# Patient Record
Sex: MALE | Race: WHITE | NOT HISPANIC OR LATINO | Employment: OTHER | URBAN - METROPOLITAN AREA
[De-identification: names, ages, dates, MRNs, and addresses within clinical notes are randomized per-mention and may not be internally consistent; named-entity substitution may affect disease eponyms.]

---

## 2017-01-13 ENCOUNTER — LAB VISIT (OUTPATIENT)
Dept: LAB | Facility: HOSPITAL | Age: 82
End: 2017-01-13
Attending: INTERNAL MEDICINE
Payer: MEDICARE

## 2017-01-13 DIAGNOSIS — E78.5 HYPERLIPIDEMIA: Chronic | ICD-10-CM

## 2017-01-13 LAB
ALBUMIN SERPL BCP-MCNC: 3.9 G/DL
ALP SERPL-CCNC: 42 U/L
ALT SERPL W/O P-5'-P-CCNC: 32 U/L
ANION GAP SERPL CALC-SCNC: 9 MMOL/L
AST SERPL-CCNC: 32 U/L
BILIRUB SERPL-MCNC: 0.9 MG/DL
BUN SERPL-MCNC: 25 MG/DL
CALCIUM SERPL-MCNC: 9.6 MG/DL
CHLORIDE SERPL-SCNC: 107 MMOL/L
CHOLEST/HDLC SERPL: 4.6 {RATIO}
CO2 SERPL-SCNC: 26 MMOL/L
CREAT SERPL-MCNC: 1.3 MG/DL
EST. GFR  (AFRICAN AMERICAN): 59.2 ML/MIN/1.73 M^2
EST. GFR  (NON AFRICAN AMERICAN): 51.2 ML/MIN/1.73 M^2
GLUCOSE SERPL-MCNC: 122 MG/DL
HDL/CHOLESTEROL RATIO: 21.7 %
HDLC SERPL-MCNC: 152 MG/DL
HDLC SERPL-MCNC: 33 MG/DL
LDLC SERPL CALC-MCNC: 85.6 MG/DL
NONHDLC SERPL-MCNC: 119 MG/DL
POTASSIUM SERPL-SCNC: 3.9 MMOL/L
PROT SERPL-MCNC: 7.4 G/DL
SODIUM SERPL-SCNC: 142 MMOL/L
TRIGL SERPL-MCNC: 167 MG/DL

## 2017-01-13 PROCEDURE — 80061 LIPID PANEL: CPT

## 2017-01-13 PROCEDURE — 36415 COLL VENOUS BLD VENIPUNCTURE: CPT | Mod: PO

## 2017-01-13 PROCEDURE — 80053 COMPREHEN METABOLIC PANEL: CPT

## 2017-01-13 RX ORDER — HYDROCHLOROTHIAZIDE 25 MG/1
TABLET ORAL
Qty: 90 TABLET | Refills: 3 | Status: SHIPPED | OUTPATIENT
Start: 2017-01-13 | End: 2018-02-11 | Stop reason: SDUPTHER

## 2017-01-16 ENCOUNTER — TELEPHONE (OUTPATIENT)
Dept: CARDIOLOGY | Facility: CLINIC | Age: 82
End: 2017-01-16

## 2017-01-16 NOTE — TELEPHONE ENCOUNTER
----- Message from Nereyda Birmingham MD sent at 1/13/2017  5:48 PM CST -----  Please review.  We will discuss the results during your upcoming visit with me. It is ok,  But it could be better

## 2017-01-20 ENCOUNTER — OFFICE VISIT (OUTPATIENT)
Dept: CARDIOLOGY | Facility: CLINIC | Age: 82
End: 2017-01-20
Payer: MEDICARE

## 2017-01-20 VITALS
WEIGHT: 172.06 LBS | HEART RATE: 106 BPM | DIASTOLIC BLOOD PRESSURE: 93 MMHG | BODY MASS INDEX: 27.65 KG/M2 | SYSTOLIC BLOOD PRESSURE: 150 MMHG | HEIGHT: 66 IN

## 2017-01-20 DIAGNOSIS — Z87.891 EX-SMOKER: ICD-10-CM

## 2017-01-20 DIAGNOSIS — E88.810 METABOLIC SYNDROME: Chronic | ICD-10-CM

## 2017-01-20 DIAGNOSIS — I10 ESSENTIAL HYPERTENSION: Primary | Chronic | ICD-10-CM

## 2017-01-20 DIAGNOSIS — I48.19 PERSISTENT ATRIAL FIBRILLATION: Chronic | ICD-10-CM

## 2017-01-20 DIAGNOSIS — E78.2 MIXED HYPERLIPIDEMIA: Chronic | ICD-10-CM

## 2017-01-20 DIAGNOSIS — N18.2: Chronic | ICD-10-CM

## 2017-01-20 DIAGNOSIS — Z79.01 CHRONIC ANTICOAGULATION: ICD-10-CM

## 2017-01-20 PROCEDURE — 99214 OFFICE O/P EST MOD 30 MIN: CPT | Mod: S$PBB,,, | Performed by: INTERNAL MEDICINE

## 2017-01-20 PROCEDURE — 99999 PR PBB SHADOW E&M-EST. PATIENT-LVL III: CPT | Mod: PBBFAC,,, | Performed by: INTERNAL MEDICINE

## 2017-01-20 PROCEDURE — 99213 OFFICE O/P EST LOW 20 MIN: CPT | Mod: PBBFAC,PO | Performed by: INTERNAL MEDICINE

## 2017-01-20 RX ORDER — METOPROLOL SUCCINATE 50 MG/1
TABLET, EXTENDED RELEASE ORAL
Qty: 45 TABLET | Refills: 6 | Status: SHIPPED | OUTPATIENT
Start: 2017-01-20 | End: 2018-02-11 | Stop reason: SDUPTHER

## 2017-01-20 RX ORDER — METOPROLOL SUCCINATE 50 MG/1
50 TABLET, EXTENDED RELEASE ORAL DAILY
COMMUNITY
End: 2017-01-20 | Stop reason: SDUPTHER

## 2017-01-20 NOTE — PROGRESS NOTES
"Subjective:   Patient ID:  Cliff Magaña is a 81 y.o. male who presents for follow-up of Hyperlipidemia and Hypertension      HPI: Patient is here for follow-up of elevated blood pressure. He is not exercising and is not adherent to a low-salt diet. Patient denies chest pain, dyspnea, palpitations, lower extremity edema and palpitations.  Today BP and pulse are higher than expected.        Lab Results   Component Value Date     01/13/2017    K 3.9 01/13/2017     01/13/2017    CO2 26 01/13/2017    BUN 25 (H) 01/13/2017    CREATININE 1.3 01/13/2017     (H) 01/13/2017    HGBA1C 6.3 (H) 11/10/2016    AST 32 01/13/2017    ALT 32 01/13/2017    ALBUMIN 3.9 01/13/2017    PROT 7.4 01/13/2017    BILITOT 0.9 01/13/2017    WBC 7.78 11/10/2016    HGB 16.2 11/10/2016    HCT 46.4 11/10/2016    MCV 92 11/10/2016     11/10/2016    INR 2.8 12/17/2013    INR 1.8 (H) 06/07/2011    PSA 0.43 06/06/2013    TSH 2.036 11/10/2016    CHOL 152 01/13/2017    HDL 33 (L) 01/13/2017    LDLCALC 85.6 01/13/2017    TRIG 167 (H) 01/13/2017       Review of Systems   Constitution: Negative.   HENT: Negative.    Eyes: Negative.    Cardiovascular: Positive for irregular heartbeat. Negative for chest pain, claudication, dyspnea on exertion, leg swelling, near-syncope, palpitations and syncope.   Respiratory: Negative.  Negative for cough, shortness of breath, snoring and wheezing.    Endocrine: Negative.  Negative for cold intolerance, heat intolerance, polydipsia, polyphagia and polyuria.   Skin: Negative.    Musculoskeletal: Negative.    Gastrointestinal: Positive for heartburn.   Genitourinary: Positive for bladder incontinence and frequency.   Neurological: Negative.    Psychiatric/Behavioral: Negative.        Objective:   Physical Exam   Constitutional: He is oriented to person, place, and time. He appears well-developed and well-nourished.   BP (!) 150/93  Pulse 106  Ht 5' 5.5" (1.664 m)  Wt 78 kg (172 lb 1.1 oz)  BMI 28.2 " kg/m2     HENT:   Head: Normocephalic.   Eyes: Pupils are equal, round, and reactive to light.   Neck: Normal range of motion. Neck supple. Carotid bruit is not present. No thyromegaly present.   Cardiovascular: Normal heart sounds and intact distal pulses.  An irregularly irregular rhythm present. Tachycardia present.  Exam reveals no gallop and no friction rub.    No murmur heard.  Pulses:       Carotid pulses are 2+ on the right side, and 2+ on the left side.       Radial pulses are 2+ on the right side, and 2+ on the left side.        Femoral pulses are 2+ on the right side, and 2+ on the left side.       Popliteal pulses are 2+ on the right side, and 2+ on the left side.        Dorsalis pedis pulses are 2+ on the right side, and 2+ on the left side.        Posterior tibial pulses are 2+ on the right side, and 2+ on the left side.   Pulmonary/Chest: Effort normal and breath sounds normal. No respiratory distress. He has no wheezes. He has no rales. He exhibits no tenderness.   Abdominal: Soft. Bowel sounds are normal.   Musculoskeletal: Normal range of motion. He exhibits no edema.   Neurological: He is alert and oriented to person, place, and time.   Skin: Skin is warm and dry.   Psychiatric: He has a normal mood and affect.   Nursing note and vitals reviewed.      Current Outpatient Prescriptions   Medication Sig    aspirin (ECOTRIN) 81 MG EC tablet Take 81 mg by mouth once daily.    fenofibrate micronized (LOFIBRA) 200 MG Cap TAKE 1 CAPSULE DAILY WITH BREAKFAST    hydrochlorothiazide (HYDRODIURIL) 25 MG tablet TAKE 1 TABLET DAILY    krill-om-3-dha-epa-phospho-ast (MEGARED OMEGA-3 KRILL OIL) 1,000-230-60 mg Cap Take by mouth once daily. Pt taking one pill once a day    loratadine (CLARITIN) 10 mg tablet Take 10 mg by mouth once daily.    TRAMAINE BIOTIN ORAL Take 5,000 mcg by mouth once daily.    metoprolol succinate (TOPROL XL) 50 MG 24 hr tablet Pt to take 1 1/2 tb daily.    multivitamin-minerals-lutein  (CENTRUM SILVER) Tab Take by mouth. 1 Tablet Oral Every day    XARELTO 20 mg Tab TAKE 1 TABLET DAILY     No current facility-administered medications for this visit.        Assessment:     1. Essential hypertension    2. Persistent atrial fibrillation    3. Mixed hyperlipidemia    4. Metabolic syndrome    5. Chronic renal disease, stage 2 (mild)    6. Chronic anticoagulation    7. Ex-smoker        Plan:     Essential hypertension  -     metoprolol succinate (TOPROL XL) 50 MG 24 hr tablet; Pt to take 1 1/2 tb daily.  Dispense: 45 tablet; Refill: 6  -     TSH; Future; Expected date: 1/20/17  -     Comprehensive metabolic panel; Future; Expected date: 7/22/17  -     Lipid panel; Future; Expected date: 7/22/17    Persistent atrial fibrillation  -     metoprolol succinate (TOPROL XL) 50 MG 24 hr tablet; Pt to take 1 1/2 tb daily.  Dispense: 45 tablet; Refill: 6  -     TSH; Future; Expected date: 1/20/17  -     Comprehensive metabolic panel; Future; Expected date: 7/22/17  -     Lipid panel; Future; Expected date: 7/22/17    Mixed hyperlipidemia  -     metoprolol succinate (TOPROL XL) 50 MG 24 hr tablet; Pt to take 1 1/2 tb daily.  Dispense: 45 tablet; Refill: 6  -     TSH; Future; Expected date: 1/20/17  -     Comprehensive metabolic panel; Future; Expected date: 7/22/17  -     Lipid panel; Future; Expected date: 7/22/17    Metabolic syndrome  -     metoprolol succinate (TOPROL XL) 50 MG 24 hr tablet; Pt to take 1 1/2 tb daily.  Dispense: 45 tablet; Refill: 6  -     TSH; Future; Expected date: 1/20/17  -     Comprehensive metabolic panel; Future; Expected date: 7/22/17  -     Lipid panel; Future; Expected date: 7/22/17    Chronic renal disease, stage 2 (mild)    Chronic anticoagulation    Ex-smoker      Increase Metoprolol to 75 mg daily.  In the past anaphylactic reaction to Niacin.  May need to restart Amlodipine or consider ARB  Orders Placed This Encounter   Procedures    TSH    Comprehensive metabolic panel     Lipid panel     Return in about 6 months (around 7/20/2017).

## 2017-01-20 NOTE — MR AVS SNAPSHOT
[facility]   [facility]  [place] [address]  Phone: [phone]                  Cliff Magaña   2017 11:30 AM   Office Visit    Description:  Male : 1935   Provider:  Nereyda Birmingham MD   Department:  [facility]           Reason for Visit     Hyperlipidemia     Hypertension                To Do List           Goals (5 Years of Data)     None      [facility]     [facility] On Call Nurse Care Line -  Assistance  Registered nurses in the [facility] On Call Center provide clinical advisement, health education, appointment booking, and other advisory services.  Call for this free service at [phone].             Medications           Message regarding Medications     Verify the changes and/or additions to your medication regime listed below are the same as discussed with your clinician today.  If any of these changes or additions are incorrect, please notify your healthcare provider.        STOP taking these medications     UNABLE TO FIND Take 5,000 mcg by mouth once daily. Biotin 5000mcg once a day           Verify that the below list of medications is an accurate representation of the medications you are currently taking.  If none reported, the list may be blank. If incorrect, please contact your healthcare provider. Carry this list with you in case of emergency.           Current Medications     aspirin (ECOTRIN) 81 MG EC tablet Take 81 mg by mouth once daily.    fenofibrate micronized (LOFIBRA) 200 MG Cap TAKE 1 CAPSULE DAILY WITH BREAKFAST    hydrochlorothiazide (HYDRODIURIL) 25 MG tablet TAKE 1 TABLET DAILY    krill-om-3-dha-epa-phospho-ast (MEGARED OMEGA-3 KRILL OIL) 1,000-230-60 mg Cap Take by mouth once daily. Pt taking one pill once a day    loratadine (CLARITIN) 10 mg tablet Take 10 mg by mouth once daily.    TRAMAINE BIOTIN ORAL Take 5,000 mcg by mouth once daily.    multivitamin-minerals-lutein (CENTRUM SILVER) Tab Take by mouth. 1  "Tablet Oral Every day    TOPROL XL 50 mg 24 hr tablet TAKE 1 TABLET DAILY    XARELTO 20 mg Tab TAKE 1 TABLET DAILY           Clinical Reference Information           Vital Signs - Last Recorded  Most recent update: 1/20/2017 11:37 AM by Radha Mccormack MA    BP Pulse Ht Wt BMI    (!) 150/93 106 5' 5.5" (1.664 m) 78 kg (172 lb 1.1 oz) 28.2 kg/m2      Blood Pressure          Most Recent Value    Right Arm BP - Sitting  150/93    Left Arm BP - Sitting  142/84    BP  (!)  150/93      Allergies as of 1/20/2017     Niacin      Immunizations Administered on Date of Encounter - 1/20/2017     None      "

## 2017-02-13 RX ORDER — FENOFIBRATE 200 MG/1
CAPSULE ORAL
Qty: 90 CAPSULE | Refills: 3 | Status: SHIPPED | OUTPATIENT
Start: 2017-02-13 | End: 2019-01-14 | Stop reason: SDUPTHER

## 2017-03-08 ENCOUNTER — PATIENT MESSAGE (OUTPATIENT)
Dept: CARDIOLOGY | Facility: CLINIC | Age: 82
End: 2017-03-08

## 2017-04-21 ENCOUNTER — TELEPHONE (OUTPATIENT)
Dept: INTERNAL MEDICINE | Facility: CLINIC | Age: 82
End: 2017-04-21

## 2017-04-21 DIAGNOSIS — I10 ESSENTIAL HYPERTENSION: Chronic | ICD-10-CM

## 2017-04-21 DIAGNOSIS — Z87.891 EX-SMOKER: ICD-10-CM

## 2017-04-21 DIAGNOSIS — N18.2: Chronic | ICD-10-CM

## 2017-04-21 DIAGNOSIS — I48.19 PERSISTENT ATRIAL FIBRILLATION: Chronic | ICD-10-CM

## 2017-04-21 DIAGNOSIS — E78.2 MIXED HYPERLIPIDEMIA: Chronic | ICD-10-CM

## 2017-04-21 DIAGNOSIS — Z79.01 CHRONIC ANTICOAGULATION: ICD-10-CM

## 2017-04-21 DIAGNOSIS — Z83.3 FAMILY HISTORY OF DIABETES MELLITUS: ICD-10-CM

## 2017-04-21 DIAGNOSIS — E88.810 METABOLIC SYNDROME: Chronic | ICD-10-CM

## 2017-04-21 DIAGNOSIS — Z00.00 WELL ADULT EXAM: Primary | ICD-10-CM

## 2017-04-21 NOTE — TELEPHONE ENCOUNTER
----- Message from Mila Chinchilla sent at 4/21/2017  9:14 AM CDT -----  Contact: fyi  Doctor appointment and lab have been scheduled.  Please link lab orders to the lab appointment.  Date of doctor appointment:  05/18/17  Physical or EP:  epp  Date of lab appointment:  05/12/17  Comments:

## 2017-05-12 ENCOUNTER — LAB VISIT (OUTPATIENT)
Dept: LAB | Facility: HOSPITAL | Age: 82
End: 2017-05-12
Attending: FAMILY MEDICINE
Payer: MEDICARE

## 2017-05-12 DIAGNOSIS — E88.810 METABOLIC SYNDROME: Chronic | ICD-10-CM

## 2017-05-12 DIAGNOSIS — Z83.3 FAMILY HISTORY OF DIABETES MELLITUS: ICD-10-CM

## 2017-05-12 DIAGNOSIS — E78.2 MIXED HYPERLIPIDEMIA: Chronic | ICD-10-CM

## 2017-05-12 DIAGNOSIS — Z00.00 WELL ADULT EXAM: ICD-10-CM

## 2017-05-12 DIAGNOSIS — I48.19 PERSISTENT ATRIAL FIBRILLATION: Chronic | ICD-10-CM

## 2017-05-12 DIAGNOSIS — Z87.891 EX-SMOKER: ICD-10-CM

## 2017-05-12 DIAGNOSIS — Z79.01 CHRONIC ANTICOAGULATION: ICD-10-CM

## 2017-05-12 DIAGNOSIS — I10 ESSENTIAL HYPERTENSION: Chronic | ICD-10-CM

## 2017-05-12 DIAGNOSIS — N18.2: Chronic | ICD-10-CM

## 2017-05-12 LAB
ALBUMIN SERPL BCP-MCNC: 3.8 G/DL
ALP SERPL-CCNC: 44 U/L
ALT SERPL W/O P-5'-P-CCNC: 27 U/L
ANION GAP SERPL CALC-SCNC: 10 MMOL/L
ANISOCYTOSIS BLD QL SMEAR: SLIGHT
AST SERPL-CCNC: 27 U/L
BASOPHILS # BLD AUTO: 0.02 K/UL
BASOPHILS NFR BLD: 0.3 %
BILIRUB SERPL-MCNC: 0.9 MG/DL
BUN SERPL-MCNC: 21 MG/DL
CALCIUM SERPL-MCNC: 9.3 MG/DL
CHLORIDE SERPL-SCNC: 107 MMOL/L
CHOLEST/HDLC SERPL: 4 {RATIO}
CO2 SERPL-SCNC: 25 MMOL/L
CREAT SERPL-MCNC: 1.5 MG/DL
DIFFERENTIAL METHOD: ABNORMAL
EOSINOPHIL # BLD AUTO: 0.3 K/UL
EOSINOPHIL NFR BLD: 3.4 %
ERYTHROCYTE [DISTWIDTH] IN BLOOD BY AUTOMATED COUNT: 13.8 %
EST. GFR  (AFRICAN AMERICAN): 49.8 ML/MIN/1.73 M^2
EST. GFR  (NON AFRICAN AMERICAN): 43 ML/MIN/1.73 M^2
GLUCOSE SERPL-MCNC: 128 MG/DL
HCT VFR BLD AUTO: 46.1 %
HDL/CHOLESTEROL RATIO: 24.8 %
HDLC SERPL-MCNC: 129 MG/DL
HDLC SERPL-MCNC: 32 MG/DL
HGB BLD-MCNC: 16.5 G/DL
LDLC SERPL CALC-MCNC: 72 MG/DL
LYMPHOCYTES # BLD AUTO: 2.9 K/UL
LYMPHOCYTES NFR BLD: 37.8 %
MCH RBC QN AUTO: 32.2 PG
MCHC RBC AUTO-ENTMCNC: 35.8 %
MCV RBC AUTO: 90 FL
MONOCYTES # BLD AUTO: 0.7 K/UL
MONOCYTES NFR BLD: 9.5 %
NEUTROPHILS # BLD AUTO: 3.7 K/UL
NEUTROPHILS NFR BLD: 48.6 %
NONHDLC SERPL-MCNC: 97 MG/DL
PLATELET # BLD AUTO: 188 K/UL
PLATELET BLD QL SMEAR: ABNORMAL
PMV BLD AUTO: 13.5 FL
POTASSIUM SERPL-SCNC: 3.4 MMOL/L
PROT SERPL-MCNC: 7.2 G/DL
RBC # BLD AUTO: 5.13 M/UL
SODIUM SERPL-SCNC: 142 MMOL/L
T4 FREE SERPL-MCNC: 1 NG/DL
TRIGL SERPL-MCNC: 125 MG/DL
TSH SERPL DL<=0.005 MIU/L-ACNC: 2.24 UIU/ML
WBC # BLD AUTO: 7.56 K/UL

## 2017-05-12 PROCEDURE — 36415 COLL VENOUS BLD VENIPUNCTURE: CPT | Mod: PO

## 2017-05-12 PROCEDURE — 84439 ASSAY OF FREE THYROXINE: CPT

## 2017-05-12 PROCEDURE — 80053 COMPREHEN METABOLIC PANEL: CPT

## 2017-05-12 PROCEDURE — 80061 LIPID PANEL: CPT

## 2017-05-12 PROCEDURE — 85025 COMPLETE CBC W/AUTO DIFF WBC: CPT

## 2017-05-12 PROCEDURE — 84443 ASSAY THYROID STIM HORMONE: CPT

## 2017-05-18 ENCOUNTER — OFFICE VISIT (OUTPATIENT)
Dept: INTERNAL MEDICINE | Facility: CLINIC | Age: 82
End: 2017-05-18
Payer: MEDICARE

## 2017-05-18 VITALS
SYSTOLIC BLOOD PRESSURE: 134 MMHG | DIASTOLIC BLOOD PRESSURE: 82 MMHG | RESPIRATION RATE: 14 BRPM | TEMPERATURE: 98 F | HEIGHT: 66 IN | HEART RATE: 69 BPM | WEIGHT: 171.75 LBS | BODY MASS INDEX: 27.6 KG/M2

## 2017-05-18 DIAGNOSIS — I10 ESSENTIAL HYPERTENSION: Chronic | ICD-10-CM

## 2017-05-18 DIAGNOSIS — Z87.891 EX-SMOKER: ICD-10-CM

## 2017-05-18 DIAGNOSIS — Z00.00 WELL ADULT EXAM: Primary | ICD-10-CM

## 2017-05-18 DIAGNOSIS — E88.810 METABOLIC SYNDROME: Chronic | ICD-10-CM

## 2017-05-18 DIAGNOSIS — E78.2 MIXED HYPERLIPIDEMIA: Chronic | ICD-10-CM

## 2017-05-18 DIAGNOSIS — Z79.01 CHRONIC ANTICOAGULATION: ICD-10-CM

## 2017-05-18 DIAGNOSIS — I48.19 PERSISTENT ATRIAL FIBRILLATION: Chronic | ICD-10-CM

## 2017-05-18 DIAGNOSIS — Z83.3 FAMILY HISTORY OF DIABETES MELLITUS: ICD-10-CM

## 2017-05-18 DIAGNOSIS — N18.3 CHRONIC RENAL DISEASE, STAGE 3 (MODERATE): Chronic | ICD-10-CM

## 2017-05-18 PROCEDURE — 99215 OFFICE O/P EST HI 40 MIN: CPT | Mod: S$PBB,,, | Performed by: FAMILY MEDICINE

## 2017-05-18 PROCEDURE — 99213 OFFICE O/P EST LOW 20 MIN: CPT | Mod: PBBFAC,PO | Performed by: FAMILY MEDICINE

## 2017-05-18 PROCEDURE — 99999 PR PBB SHADOW E&M-EST. PATIENT-LVL III: CPT | Mod: PBBFAC,,, | Performed by: FAMILY MEDICINE

## 2017-05-18 NOTE — PROGRESS NOTES
Subjective:       Patient ID: Cliff Magaña is a 81 y.o. male.    Chief Complaint: Annual Exam    HPI 81-year-old white male presents to clinic today for annual physical exam.  He continues to be followed by cardiology for treatment of hypertension and persistent atrial fibrillation which is well-controlled on metoprolol 50 mg daily, hydrochlorothiazide 25 mg daily, and anticoagulation with Xarelto and aspirin.  He continues to have well-controlled hyperlipidemia on fenofibrate and fish oil.  Stage III chronic kidney disease remain stable.  He has a past surgical history of cholecystectomy, tonsillectomy, and cataract repair.  He has a family history of his parents passing away in a motor vehicle collision in his maternal aunt passed away from diabetes.  His cardiac vascular risk factors include male gender, hypertension, cardiovascular disease, and hyperlipidemia.  He is up-to-date with all vaccinations.  Review of Systems   Constitutional: Negative for appetite change, chills, fatigue and fever.   HENT: Negative for congestion, ear pain, hearing loss, postnasal drip, rhinorrhea, sinus pressure, sore throat and tinnitus.    Eyes: Negative for redness, itching and visual disturbance.   Respiratory: Negative for cough, chest tightness and shortness of breath.    Cardiovascular: Negative for chest pain and palpitations.   Gastrointestinal: Negative for abdominal pain, constipation, diarrhea, nausea and vomiting.   Genitourinary: Negative for decreased urine volume, difficulty urinating, dysuria, frequency, hematuria and urgency.   Musculoskeletal: Negative for back pain, myalgias, neck pain and neck stiffness.   Skin: Negative for rash.   Neurological: Negative for dizziness, light-headedness and headaches.   Psychiatric/Behavioral: Negative.        Objective:      Physical Exam   Constitutional: He is oriented to person, place, and time. He appears well-developed and well-nourished. No distress.   HENT:   Head:  Normocephalic and atraumatic.   Right Ear: External ear normal.   Left Ear: External ear normal.   Nose: Nose normal.   Mouth/Throat: Oropharynx is clear and moist. No oropharyngeal exudate.   Eyes: Conjunctivae and EOM are normal. Pupils are equal, round, and reactive to light. Right eye exhibits no discharge. Left eye exhibits no discharge. No scleral icterus.   Neck: Normal range of motion. Neck supple. No JVD present. No tracheal deviation present. No thyromegaly present.   Cardiovascular: Normal rate, regular rhythm, normal heart sounds and intact distal pulses.  Exam reveals no gallop and no friction rub.    No murmur heard.  Pulmonary/Chest: Effort normal and breath sounds normal. No stridor. No respiratory distress. He has no wheezes. He has no rales.   Abdominal: Soft. Bowel sounds are normal. He exhibits no distension and no mass. There is no tenderness. There is no rebound and no guarding.   Musculoskeletal: Normal range of motion. He exhibits no edema or tenderness.   Lymphadenopathy:     He has no cervical adenopathy.   Neurological: He is alert and oriented to person, place, and time.   Skin: Skin is warm and dry. No rash noted. He is not diaphoretic. No erythema. No pallor.   Psychiatric: He has a normal mood and affect. His behavior is normal. Judgment and thought content normal.   Nursing note and vitals reviewed.      Assessment:       1. Well adult exam    2. Persistent atrial fibrillation    3. Essential hypertension    4. Mixed hyperlipidemia    5. Metabolic syndrome    6. Chronic renal disease, stage 3 (moderate)    7. Chronic anticoagulation    8. Ex-smoker    9. Family history of diabetes mellitus        Plan:       1.  Labs have been reviewed and are overall within normal limits except for continued stable stage III chronic kidney disease.  2.  Continue metoprolol 50 mg daily and hydrochlorothiazide 25 mg daily.  Atrial fibrillation and hypertension are well controlled.  Continue follow-up  with cardiology as scheduled.  3.  Continue fenofibrate and fish oil as prescribed.  Hyperlipidemia is well controlled.  4.  Continue diet and exercise.  5.  Encourage hydration.  Stage III chronic kidney disease is stable.  6.  Continue aspirin and Xarelto as prescribed.  7.  Return to clinic as needed or in 6 months for general exam.      40 minutes have been spent with the patient with a proximally 50% of the clinic visit spent reviewing medications, discussing lab results, and counseling on medical care.

## 2017-05-18 NOTE — MR AVS SNAPSHOT
[place] [facility]   [facility]  [place] [address]  Phone: [phone]  Fax: [phone]                  Cliff Magaña   2017 9:00 AM   Office Visit    Description:  Male : 1935   Provider:  Munir Estrada MD   Department:  [place] [facility]           Reason for Visit     Annual Exam                To Do List           Goals (5 Years of Data)     None      Follow-Up and Disposition     Return in about 6 months (around 2017), or if symptoms worsen or fail to improve.      [facility] On Call     [facility] On Call Nurse Care Line -  Assistance  Unless otherwise directed by your provider, please contact [facility], our nurse care line that is available for  assistance.     Registered nurses in the [facility] On Call Center provide: appointment scheduling, clinical advisement, health education, and other advisory services.  Call: [phone] (toll free)               Medications           Message regarding Medications     Verify the changes and/or additions to your medication regime listed below are the same as discussed with your clinician today.  If any of these changes or additions are incorrect, please notify your healthcare provider.             Verify that the below list of medications is an accurate representation of the medications you are currently taking.  If none reported, the list may be blank. If incorrect, please contact your healthcare provider. Carry this list with you in case of emergency.           Current Medications     aspirin (ECOTRIN) 81 MG EC tablet Take 81 mg by mouth once daily.    fenofibrate micronized (LOFIBRA) 200 MG Cap TAKE 1 CAPSULE DAILY WITH BREAKFAST    hydrochlorothiazide (HYDRODIURIL) 25 MG tablet TAKE 1 TABLET DAILY    krill-om-3-dha-epa-phospho-ast (MEGARED OMEGA-3 KRILL OIL) 1,000-230-60 mg Cap Take by mouth once daily. Pt taking one pill once a day    loratadine (CLARITIN) 10 mg tablet Take 10 mg by  "mouth once daily.    TRAMAINE BIOTIN ORAL Take 5,000 mcg by mouth once daily.    metoprolol succinate (TOPROL XL) 50 MG 24 hr tablet Pt to take 1 1/2 tb daily.    multivitamin-minerals-lutein (CENTRUM SILVER) Tab Take by mouth. 1 Tablet Oral Every day    XARELTO 20 mg Tab TAKE 1 TABLET DAILY           Clinical Reference Information           Your Vitals Were     BP Pulse Temp Resp    134/82 (BP Location: Left arm, Patient Position: Sitting, BP Method: Manual) 69 97.7 °F (36.5 °C) (Oral) 14    Height Weight BMI    5' 5.5" (1.664 m) 77.9 kg (171 lb 11.8 oz) 28.14 kg/m2      Blood Pressure          Most Recent Value    BP  134/82      Allergies as of 5/18/2017     Niacin      Immunizations Administered on Date of Encounter - 5/18/2017     None      Language Assistance Services     ATTENTION: Language assistance services are available, free of charge. Please call [phone].      ATENCIÓN: Si habla beata, tiene a claudio disposición servicios gratuitos de asistencia lingüística. Llame al [phone].     ADÁN Ý: N?u b?n nói Ti?ng Vi?t, có các d?ch v? h? tr? ngôn ng? mi?n phí lennoxh cho b?n. G?i s? [phone].         [facility] complies with applicable Federal civil rights laws and does not discriminate on the basis of race, color, national origin, age, disability, or sex.        "

## 2017-07-25 ENCOUNTER — TELEPHONE (OUTPATIENT)
Dept: CARDIOLOGY | Facility: CLINIC | Age: 82
End: 2017-07-25

## 2017-07-25 ENCOUNTER — LAB VISIT (OUTPATIENT)
Dept: LAB | Facility: HOSPITAL | Age: 82
End: 2017-07-25
Attending: INTERNAL MEDICINE
Payer: MEDICARE

## 2017-07-25 DIAGNOSIS — E88.810 METABOLIC SYNDROME: Chronic | ICD-10-CM

## 2017-07-25 DIAGNOSIS — I48.19 PERSISTENT ATRIAL FIBRILLATION: Chronic | ICD-10-CM

## 2017-07-25 DIAGNOSIS — E78.2 MIXED HYPERLIPIDEMIA: Chronic | ICD-10-CM

## 2017-07-25 DIAGNOSIS — I10 ESSENTIAL HYPERTENSION: Chronic | ICD-10-CM

## 2017-07-25 LAB
ALBUMIN SERPL BCP-MCNC: 3.6 G/DL
ALP SERPL-CCNC: 51 U/L
ALT SERPL W/O P-5'-P-CCNC: 31 U/L
ANION GAP SERPL CALC-SCNC: 12 MMOL/L
AST SERPL-CCNC: 32 U/L
BILIRUB SERPL-MCNC: 0.5 MG/DL
BUN SERPL-MCNC: 19 MG/DL
CALCIUM SERPL-MCNC: 9.2 MG/DL
CHLORIDE SERPL-SCNC: 107 MMOL/L
CHOLEST/HDLC SERPL: 4.5 {RATIO}
CO2 SERPL-SCNC: 25 MMOL/L
CREAT SERPL-MCNC: 1.3 MG/DL
EST. GFR  (AFRICAN AMERICAN): 59.2 ML/MIN/1.73 M^2
EST. GFR  (NON AFRICAN AMERICAN): 51.2 ML/MIN/1.73 M^2
GLUCOSE SERPL-MCNC: 135 MG/DL
HDL/CHOLESTEROL RATIO: 22 %
HDLC SERPL-MCNC: 127 MG/DL
HDLC SERPL-MCNC: 28 MG/DL
LDLC SERPL CALC-MCNC: 59.6 MG/DL
NONHDLC SERPL-MCNC: 99 MG/DL
POTASSIUM SERPL-SCNC: 3.5 MMOL/L
PROT SERPL-MCNC: 7.1 G/DL
SODIUM SERPL-SCNC: 144 MMOL/L
TRIGL SERPL-MCNC: 197 MG/DL
TSH SERPL DL<=0.005 MIU/L-ACNC: 2.28 UIU/ML

## 2017-07-25 PROCEDURE — 80061 LIPID PANEL: CPT

## 2017-07-25 PROCEDURE — 36415 COLL VENOUS BLD VENIPUNCTURE: CPT | Mod: PO

## 2017-07-25 PROCEDURE — 84443 ASSAY THYROID STIM HORMONE: CPT

## 2017-07-25 PROCEDURE — 80053 COMPREHEN METABOLIC PANEL: CPT

## 2017-07-25 NOTE — TELEPHONE ENCOUNTER
----- Message from Nereyda Birmingham MD sent at 7/25/2017 12:38 PM CDT -----  Please review.  We will discuss the results during your upcoming visit with me. Lipids are not at goal.

## 2017-07-26 ENCOUNTER — TELEPHONE (OUTPATIENT)
Dept: CARDIOLOGY | Facility: CLINIC | Age: 82
End: 2017-07-26

## 2017-08-01 ENCOUNTER — OFFICE VISIT (OUTPATIENT)
Dept: CARDIOLOGY | Facility: CLINIC | Age: 82
End: 2017-08-01
Payer: MEDICARE

## 2017-08-01 VITALS
SYSTOLIC BLOOD PRESSURE: 132 MMHG | BODY MASS INDEX: 27.39 KG/M2 | WEIGHT: 170.44 LBS | HEART RATE: 88 BPM | DIASTOLIC BLOOD PRESSURE: 86 MMHG | HEIGHT: 66 IN

## 2017-08-01 DIAGNOSIS — Z79.01 CHRONIC ANTICOAGULATION: ICD-10-CM

## 2017-08-01 DIAGNOSIS — I10 ESSENTIAL HYPERTENSION: Primary | Chronic | ICD-10-CM

## 2017-08-01 DIAGNOSIS — N18.30 STAGE 3 CHRONIC KIDNEY DISEASE: Chronic | ICD-10-CM

## 2017-08-01 DIAGNOSIS — E88.810 METABOLIC SYNDROME: Chronic | ICD-10-CM

## 2017-08-01 DIAGNOSIS — I48.19 PERSISTENT ATRIAL FIBRILLATION: Chronic | ICD-10-CM

## 2017-08-01 DIAGNOSIS — Z87.891 EX-SMOKER: ICD-10-CM

## 2017-08-01 DIAGNOSIS — Z83.3 FAMILY HISTORY OF DIABETES MELLITUS: ICD-10-CM

## 2017-08-01 DIAGNOSIS — E78.2 MIXED HYPERLIPIDEMIA: Chronic | ICD-10-CM

## 2017-08-01 DIAGNOSIS — E74.89 OTHER SPECIFIED DISORDERS OF CARBOHYDRATE METABOLISM: ICD-10-CM

## 2017-08-01 PROCEDURE — 99213 OFFICE O/P EST LOW 20 MIN: CPT | Mod: S$PBB,,, | Performed by: INTERNAL MEDICINE

## 2017-08-01 PROCEDURE — 99999 PR PBB SHADOW E&M-EST. PATIENT-LVL III: CPT | Mod: PBBFAC,,, | Performed by: INTERNAL MEDICINE

## 2017-08-01 PROCEDURE — 99213 OFFICE O/P EST LOW 20 MIN: CPT | Mod: PBBFAC,PO | Performed by: INTERNAL MEDICINE

## 2017-08-01 PROCEDURE — 1126F AMNT PAIN NOTED NONE PRSNT: CPT | Mod: ,,, | Performed by: INTERNAL MEDICINE

## 2017-08-01 PROCEDURE — 1159F MED LIST DOCD IN RCRD: CPT | Mod: ,,, | Performed by: INTERNAL MEDICINE

## 2017-08-01 NOTE — PROGRESS NOTES
"Subjective:   Patient ID:  Cliff Magaña is a 81 y.o. male who presents for follow-up of Hypertension and Hyperlipidemia      HPI: No complaints. Doing well. Does not exercise. Lipids have worsened. Patient admits to dietary indiscretions    Lab Results   Component Value Date     07/25/2017    K 3.5 07/25/2017     07/25/2017    CO2 25 07/25/2017    BUN 19 07/25/2017    CREATININE 1.3 07/25/2017     (H) 07/25/2017    HGBA1C 6.3 (H) 11/10/2016    AST 32 07/25/2017    ALT 31 07/25/2017    ALBUMIN 3.6 07/25/2017    PROT 7.1 07/25/2017    BILITOT 0.5 07/25/2017    WBC 7.56 05/12/2017    HGB 16.5 05/12/2017    HCT 46.1 05/12/2017    MCV 90 05/12/2017     05/12/2017    INR 2.8 12/17/2013    INR 1.8 (H) 06/07/2011    PSA 0.43 06/06/2013    TSH 2.281 07/25/2017    CHOL 127 07/25/2017    HDL 28 (L) 07/25/2017    LDLCALC 59.6 (L) 07/25/2017    TRIG 197 (H) 07/25/2017       Review of Systems   Constitution: Negative.   HENT: Negative.    Eyes: Negative.    Cardiovascular: Positive for irregular heartbeat. Negative for chest pain, claudication, dyspnea on exertion, leg swelling, near-syncope, palpitations and syncope.   Respiratory: Negative.  Negative for cough, shortness of breath, snoring and wheezing.    Endocrine: Negative.  Negative for cold intolerance, heat intolerance, polydipsia, polyphagia and polyuria.   Skin: Negative.    Musculoskeletal: Negative.    Gastrointestinal: Negative.    Genitourinary: Positive for bladder incontinence.   Neurological: Negative.    Psychiatric/Behavioral: Negative.        Objective:   Physical Exam   Constitutional: He is oriented to person, place, and time. He appears well-developed and well-nourished.   /86   Pulse 88   Ht 5' 5.5" (1.664 m)   Wt 77.3 kg (170 lb 6.7 oz)   BMI 27.93 kg/m²      HENT:   Head: Normocephalic.   Eyes: Pupils are equal, round, and reactive to light.   Neck: Normal range of motion. Neck supple. Carotid bruit is not present. " No thyromegaly present.   Cardiovascular: Normal rate, normal heart sounds and intact distal pulses.  An irregularly irregular rhythm present. Exam reveals no gallop and no friction rub.    No murmur heard.  Pulses:       Carotid pulses are 2+ on the right side, and 2+ on the left side.       Radial pulses are 2+ on the right side, and 2+ on the left side.        Femoral pulses are 2+ on the right side, and 2+ on the left side.       Popliteal pulses are 2+ on the right side, and 2+ on the left side.        Dorsalis pedis pulses are 2+ on the right side, and 2+ on the left side.        Posterior tibial pulses are 2+ on the right side, and 2+ on the left side.   Pulmonary/Chest: Effort normal and breath sounds normal. No respiratory distress. He has no wheezes. He has no rales. He exhibits no tenderness.   Abdominal: Soft. Bowel sounds are normal.   Musculoskeletal: Normal range of motion. He exhibits no edema.   Neurological: He is alert and oriented to person, place, and time.   Skin: Skin is warm and dry.   Psychiatric: He has a normal mood and affect.   Nursing note and vitals reviewed.        Assessment and Plan:     Problem List Items Addressed This Visit        Cardiology Problems    HTN (hypertension) - Primary (Chronic)    Relevant Orders    Lipid panel    Comprehensive metabolic panel    TSH    Hemoglobin A1c    Hyperlipidemia (Chronic)    Relevant Orders    Lipid panel    Comprehensive metabolic panel    TSH    Hemoglobin A1c    Persistent atrial fibrillation (Chronic)    Relevant Orders    Lipid panel    Comprehensive metabolic panel    TSH    Hemoglobin A1c       Other    Metabolic syndrome (Chronic)    Relevant Orders    Lipid panel    Comprehensive metabolic panel    TSH    Hemoglobin A1c    Chronic renal disease (Chronic)    Relevant Orders    Lipid panel    Comprehensive metabolic panel    TSH    Hemoglobin A1c    Family history of diabetes mellitus    Relevant Orders    Lipid panel    Comprehensive  metabolic panel    TSH    Hemoglobin A1c    Chronic anticoagulation    Ex-smoker    Relevant Orders    Lipid panel    Comprehensive metabolic panel    TSH    Hemoglobin A1c      Other Visit Diagnoses     Other specified disorders of carbohydrate metabolism         Relevant Orders    Hemoglobin A1c          Patient's Medications   New Prescriptions    No medications on file   Previous Medications    ASPIRIN (ECOTRIN) 81 MG EC TABLET    Take 81 mg by mouth once daily.    FENOFIBRATE MICRONIZED (LOFIBRA) 200 MG CAP    TAKE 1 CAPSULE DAILY WITH BREAKFAST    HYDROCHLOROTHIAZIDE (HYDRODIURIL) 25 MG TABLET    TAKE 1 TABLET DAILY    KRILL-OM-3-DHA-EPA-PHOSPHO-AST (MEGARED OMEGA-3 KRILL OIL) 1,000-230-60 MG CAP    Take by mouth once daily. Pt taking one pill once a day    LORATADINE (CLARITIN) 10 MG TABLET    Take 10 mg by mouth once daily.    TRAMAINE BIOTIN ORAL    Take 5,000 mcg by mouth once daily.    METOPROLOL SUCCINATE (TOPROL XL) 50 MG 24 HR TABLET    Pt to take 1 1/2 tb daily.    MULTIVITAMIN-MINERALS-LUTEIN (CENTRUM SILVER) TAB    Take by mouth. 1 Tablet Oral Every day    XARELTO 20 MG TAB    TAKE 1 TABLET DAILY   Modified Medications    No medications on file   Discontinued Medications    No medications on file   Compliance reinforced.  Exercise encouraged.  Return in about 1 year (around 8/1/2018).

## 2017-09-13 ENCOUNTER — TELEPHONE (OUTPATIENT)
Dept: INTERNAL MEDICINE | Facility: CLINIC | Age: 82
End: 2017-09-13

## 2017-09-13 DIAGNOSIS — Z87.891 EX-SMOKER: ICD-10-CM

## 2017-09-13 DIAGNOSIS — Z00.00 WELL ADULT EXAM: Primary | ICD-10-CM

## 2017-09-13 DIAGNOSIS — N18.30 STAGE 3 CHRONIC KIDNEY DISEASE: Chronic | ICD-10-CM

## 2017-09-13 DIAGNOSIS — I48.19 PERSISTENT ATRIAL FIBRILLATION: Chronic | ICD-10-CM

## 2017-09-13 DIAGNOSIS — Z79.01 CHRONIC ANTICOAGULATION: ICD-10-CM

## 2017-09-13 DIAGNOSIS — Z83.3 FAMILY HISTORY OF DIABETES MELLITUS: ICD-10-CM

## 2017-09-13 DIAGNOSIS — R73.01 IMPAIRED FASTING GLUCOSE: ICD-10-CM

## 2017-09-13 DIAGNOSIS — E88.810 METABOLIC SYNDROME: Chronic | ICD-10-CM

## 2017-09-13 DIAGNOSIS — E78.2 MIXED HYPERLIPIDEMIA: Chronic | ICD-10-CM

## 2017-09-13 DIAGNOSIS — I10 ESSENTIAL HYPERTENSION: Chronic | ICD-10-CM

## 2017-09-13 NOTE — TELEPHONE ENCOUNTER
----- Message from Rogelio Catherine sent at 9/13/2017 10:59 AM CDT -----  Contact: Self [phone]  Doctor appointment and lab have been scheduled.  Please link lab orders to the lab appointment.  Date of doctor appointment:    Physical or EP:  11/21  Date of lab appointment: 11/14   Comments:

## 2017-10-17 ENCOUNTER — CLINICAL SUPPORT (OUTPATIENT)
Dept: INTERNAL MEDICINE | Facility: CLINIC | Age: 82
End: 2017-10-17
Payer: MEDICARE

## 2017-10-17 DIAGNOSIS — I48.19 PERSISTENT ATRIAL FIBRILLATION: Chronic | ICD-10-CM

## 2017-10-17 PROCEDURE — G0008 ADMIN INFLUENZA VIRUS VAC: HCPCS | Mod: PBBFAC,PO

## 2017-10-17 RX ORDER — RIVAROXABAN 20 MG/1
TABLET, FILM COATED ORAL
Qty: 90 TABLET | Refills: 4 | Status: SHIPPED | OUTPATIENT
Start: 2017-10-17 | End: 2019-01-10 | Stop reason: SDUPTHER

## 2017-11-07 ENCOUNTER — LAB VISIT (OUTPATIENT)
Dept: LAB | Facility: HOSPITAL | Age: 82
End: 2017-11-07
Attending: INTERNAL MEDICINE
Payer: MEDICARE

## 2017-11-07 DIAGNOSIS — Z79.01 CHRONIC ANTICOAGULATION: ICD-10-CM

## 2017-11-07 DIAGNOSIS — E78.2 MIXED HYPERLIPIDEMIA: Chronic | ICD-10-CM

## 2017-11-07 DIAGNOSIS — R73.01 IMPAIRED FASTING GLUCOSE: ICD-10-CM

## 2017-11-07 DIAGNOSIS — I10 ESSENTIAL HYPERTENSION: Chronic | ICD-10-CM

## 2017-11-07 DIAGNOSIS — Z83.3 FAMILY HISTORY OF DIABETES MELLITUS: ICD-10-CM

## 2017-11-07 DIAGNOSIS — N18.30 STAGE 3 CHRONIC KIDNEY DISEASE: Chronic | ICD-10-CM

## 2017-11-07 DIAGNOSIS — E88.810 METABOLIC SYNDROME: Chronic | ICD-10-CM

## 2017-11-07 DIAGNOSIS — Z87.891 EX-SMOKER: ICD-10-CM

## 2017-11-07 DIAGNOSIS — Z00.00 WELL ADULT EXAM: ICD-10-CM

## 2017-11-07 DIAGNOSIS — I48.19 PERSISTENT ATRIAL FIBRILLATION: Chronic | ICD-10-CM

## 2017-11-07 LAB
25(OH)D3+25(OH)D2 SERPL-MCNC: 31 NG/ML
ALBUMIN SERPL BCP-MCNC: 3.7 G/DL
ALP SERPL-CCNC: 47 U/L
ALT SERPL W/O P-5'-P-CCNC: 30 U/L
ANION GAP SERPL CALC-SCNC: 9 MMOL/L
AST SERPL-CCNC: 32 U/L
BASOPHILS # BLD AUTO: 0.05 K/UL
BASOPHILS NFR BLD: 0.8 %
BILIRUB SERPL-MCNC: 0.8 MG/DL
BUN SERPL-MCNC: 27 MG/DL
CALCIUM SERPL-MCNC: 9.8 MG/DL
CHLORIDE SERPL-SCNC: 108 MMOL/L
CHOLEST SERPL-MCNC: 147 MG/DL
CHOLEST/HDLC SERPL: 4.5 {RATIO}
CO2 SERPL-SCNC: 25 MMOL/L
CREAT SERPL-MCNC: 1.5 MG/DL
DIFFERENTIAL METHOD: ABNORMAL
EOSINOPHIL # BLD AUTO: 0.3 K/UL
EOSINOPHIL NFR BLD: 4.1 %
ERYTHROCYTE [DISTWIDTH] IN BLOOD BY AUTOMATED COUNT: 13.3 %
EST. GFR  (AFRICAN AMERICAN): 49.4 ML/MIN/1.73 M^2
EST. GFR  (NON AFRICAN AMERICAN): 42.7 ML/MIN/1.73 M^2
ESTIMATED AVG GLUCOSE: 117 MG/DL
GLUCOSE SERPL-MCNC: 113 MG/DL
HBA1C MFR BLD HPLC: 5.7 %
HCT VFR BLD AUTO: 48.2 %
HDLC SERPL-MCNC: 33 MG/DL
HDLC SERPL: 22.4 %
HGB BLD-MCNC: 16.6 G/DL
IMM GRANULOCYTES # BLD AUTO: 0.01 K/UL
IMM GRANULOCYTES NFR BLD AUTO: 0.2 %
LDLC SERPL CALC-MCNC: 91 MG/DL
LYMPHOCYTES # BLD AUTO: 2.9 K/UL
LYMPHOCYTES NFR BLD: 45.5 %
MCH RBC QN AUTO: 31.7 PG
MCHC RBC AUTO-ENTMCNC: 34.4 G/DL
MCV RBC AUTO: 92 FL
MONOCYTES # BLD AUTO: 0.6 K/UL
MONOCYTES NFR BLD: 8.8 %
NEUTROPHILS # BLD AUTO: 2.6 K/UL
NEUTROPHILS NFR BLD: 40.6 %
NONHDLC SERPL-MCNC: 114 MG/DL
NRBC BLD-RTO: 0 /100 WBC
PLATELET # BLD AUTO: 194 K/UL
PMV BLD AUTO: 13.3 FL
POTASSIUM SERPL-SCNC: 4 MMOL/L
PROT SERPL-MCNC: 7.4 G/DL
RBC # BLD AUTO: 5.24 M/UL
SODIUM SERPL-SCNC: 142 MMOL/L
T4 FREE SERPL-MCNC: 0.88 NG/DL
TRIGL SERPL-MCNC: 115 MG/DL
TSH SERPL DL<=0.005 MIU/L-ACNC: 1.52 UIU/ML
WBC # BLD AUTO: 6.38 K/UL

## 2017-11-07 PROCEDURE — 85025 COMPLETE CBC W/AUTO DIFF WBC: CPT

## 2017-11-07 PROCEDURE — 80053 COMPREHEN METABOLIC PANEL: CPT

## 2017-11-07 PROCEDURE — 83036 HEMOGLOBIN GLYCOSYLATED A1C: CPT

## 2017-11-07 PROCEDURE — 80061 LIPID PANEL: CPT

## 2017-11-07 PROCEDURE — 84439 ASSAY OF FREE THYROXINE: CPT

## 2017-11-07 PROCEDURE — 84443 ASSAY THYROID STIM HORMONE: CPT

## 2017-11-07 PROCEDURE — 82306 VITAMIN D 25 HYDROXY: CPT

## 2017-11-07 PROCEDURE — 36415 COLL VENOUS BLD VENIPUNCTURE: CPT | Mod: PO

## 2017-11-14 ENCOUNTER — OFFICE VISIT (OUTPATIENT)
Dept: INTERNAL MEDICINE | Facility: CLINIC | Age: 82
End: 2017-11-14
Payer: MEDICARE

## 2017-11-14 VITALS
HEART RATE: 80 BPM | WEIGHT: 164.44 LBS | BODY MASS INDEX: 26.43 KG/M2 | SYSTOLIC BLOOD PRESSURE: 136 MMHG | HEIGHT: 66 IN | TEMPERATURE: 98 F | DIASTOLIC BLOOD PRESSURE: 80 MMHG

## 2017-11-14 DIAGNOSIS — N18.30 STAGE 3 CHRONIC KIDNEY DISEASE: Chronic | ICD-10-CM

## 2017-11-14 DIAGNOSIS — E88.810 METABOLIC SYNDROME: Chronic | ICD-10-CM

## 2017-11-14 DIAGNOSIS — I10 ESSENTIAL HYPERTENSION: Chronic | ICD-10-CM

## 2017-11-14 DIAGNOSIS — E78.2 MIXED HYPERLIPIDEMIA: Chronic | ICD-10-CM

## 2017-11-14 DIAGNOSIS — Z09 FOLLOW-UP EXAM, 3-6 MONTHS SINCE PREVIOUS EXAM: Primary | ICD-10-CM

## 2017-11-14 DIAGNOSIS — Z79.01 CHRONIC ANTICOAGULATION: ICD-10-CM

## 2017-11-14 DIAGNOSIS — I48.19 PERSISTENT ATRIAL FIBRILLATION: Chronic | ICD-10-CM

## 2017-11-14 PROCEDURE — 99214 OFFICE O/P EST MOD 30 MIN: CPT | Mod: S$PBB,,, | Performed by: FAMILY MEDICINE

## 2017-11-14 PROCEDURE — 99213 OFFICE O/P EST LOW 20 MIN: CPT | Mod: PBBFAC,PO | Performed by: FAMILY MEDICINE

## 2017-11-14 PROCEDURE — 99999 PR PBB SHADOW E&M-EST. PATIENT-LVL III: CPT | Mod: PBBFAC,,, | Performed by: FAMILY MEDICINE

## 2017-11-14 NOTE — PROGRESS NOTES
Subjective:       Patient ID: Cliff Magaña is a 82 y.o. male.    Chief Complaint: Follow-up    HPI 82-year-old white male presents to clinic today for follow-up of his general medical conditions.  He continues to be followed by cardiology for treatment of persistent atrial fibrillation and hypertension which remain well controlled on metoprolol 75 mg daily and hydrochlorothiazide 25 mg daily.  He continues to be anticoagulated with Xarelto and aspirin.  Hyperlipidemia controlled has improved over the past 3 months secondary to exercise.  The patient has begun using a stationary bike for approximately 20 minutes per day.  He also continues to take fenofibrate and fish oil.  Chronic kidney disease remains stable.  He is up-to-date with all vaccinations.  Review of Systems   Constitutional: Negative for activity change, appetite change, chills, fatigue, fever and unexpected weight change.   HENT: Positive for rhinorrhea. Negative for congestion, ear pain, hearing loss, postnasal drip, sinus pressure, sore throat, tinnitus and trouble swallowing.    Eyes: Negative for discharge, redness, itching and visual disturbance.   Respiratory: Negative for cough, chest tightness, shortness of breath and wheezing.    Cardiovascular: Negative for chest pain and palpitations.   Gastrointestinal: Negative for abdominal pain, blood in stool, constipation, diarrhea, nausea and vomiting.   Endocrine: Negative for polydipsia and polyuria.   Genitourinary: Positive for urgency. Negative for decreased urine volume, difficulty urinating, dysuria, frequency and hematuria.   Musculoskeletal: Negative for arthralgias, back pain, joint swelling, myalgias, neck pain and neck stiffness.   Skin: Negative for rash.   Neurological: Negative for dizziness, weakness, light-headedness and headaches.   Psychiatric/Behavioral: Negative.  Negative for confusion and dysphoric mood.       Objective:      Physical Exam   Constitutional: He is oriented to  person, place, and time. He appears well-developed and well-nourished. No distress.   HENT:   Head: Normocephalic and atraumatic.   Right Ear: External ear normal.   Left Ear: External ear normal.   Nose: Nose normal.   Mouth/Throat: Oropharynx is clear and moist. No oropharyngeal exudate.   Eyes: Conjunctivae and EOM are normal. Pupils are equal, round, and reactive to light. Right eye exhibits no discharge. Left eye exhibits no discharge. No scleral icterus.   Neck: Normal range of motion. Neck supple. No JVD present. No tracheal deviation present. No thyromegaly present.   Cardiovascular: Normal rate, normal heart sounds and intact distal pulses.  An irregularly irregular rhythm present. Exam reveals no gallop and no friction rub.    No murmur heard.  Pulmonary/Chest: Effort normal and breath sounds normal. No stridor. No respiratory distress. He has no wheezes. He has no rales.   Abdominal: Soft. Bowel sounds are normal. He exhibits no distension and no mass. There is no tenderness. There is no rebound and no guarding.   Musculoskeletal: Normal range of motion. He exhibits no edema or tenderness.   Lymphadenopathy:     He has no cervical adenopathy.   Neurological: He is alert and oriented to person, place, and time.   Skin: Skin is warm and dry. No rash noted. He is not diaphoretic. No erythema. No pallor.   Psychiatric: He has a normal mood and affect. His behavior is normal. Judgment and thought content normal.   Nursing note and vitals reviewed.      Assessment:       1. Follow-up exam, 3-6 months since previous exam    2. Persistent atrial fibrillation    3. Chronic anticoagulation    4. Essential hypertension    5. Stage 3 chronic kidney disease    6. Mixed hyperlipidemia    7. Metabolic syndrome        Plan:       1.  Labs have been reviewed and are within normal limits.  2.  Continue metoprolol 75 mg daily and hydrochlorothiazide 25 mg daily.  Atrial fibrillation is stable.  3.  Continue Xarelto and  aspirin as prescribed.  4.  Hypertension is well-controlled on metoprolol and hydrochlorothiazide.  5.  Encourage hydration.  Stage III chronic kidney disease remains stable.  6.  Continue fenofibrate and fish oil as prescribed and continue exercise daily.  Hyperlipidemia is well controlled.  7.  A1c has improved to 5.7.  8.  Return to clinic as needed or in 6 months for annual physical exam.

## 2018-01-04 ENCOUNTER — PATIENT MESSAGE (OUTPATIENT)
Dept: CARDIOLOGY | Facility: CLINIC | Age: 83
End: 2018-01-04

## 2018-01-17 ENCOUNTER — LAB VISIT (OUTPATIENT)
Dept: LAB | Facility: HOSPITAL | Age: 83
End: 2018-01-17
Attending: INTERNAL MEDICINE
Payer: MEDICARE

## 2018-01-17 DIAGNOSIS — E88.810 METABOLIC SYNDROME: Chronic | ICD-10-CM

## 2018-01-17 DIAGNOSIS — I48.19 PERSISTENT ATRIAL FIBRILLATION: Chronic | ICD-10-CM

## 2018-01-17 DIAGNOSIS — E78.2 MIXED HYPERLIPIDEMIA: Chronic | ICD-10-CM

## 2018-01-17 DIAGNOSIS — N18.30 STAGE 3 CHRONIC KIDNEY DISEASE: Chronic | ICD-10-CM

## 2018-01-17 DIAGNOSIS — I10 ESSENTIAL HYPERTENSION: Chronic | ICD-10-CM

## 2018-01-17 DIAGNOSIS — I48.91 ATRIAL FIBRILLATION, UNSPECIFIED TYPE: ICD-10-CM

## 2018-01-17 DIAGNOSIS — Z87.891 EX-SMOKER: ICD-10-CM

## 2018-01-17 DIAGNOSIS — Z83.3 FAMILY HISTORY OF DIABETES MELLITUS: ICD-10-CM

## 2018-01-17 LAB
ALBUMIN SERPL BCP-MCNC: 3.9 G/DL
ALP SERPL-CCNC: 43 U/L
ALT SERPL W/O P-5'-P-CCNC: 30 U/L
ANION GAP SERPL CALC-SCNC: 7 MMOL/L
AST SERPL-CCNC: 29 U/L
BILIRUB SERPL-MCNC: 1.1 MG/DL
BUN SERPL-MCNC: 21 MG/DL
CALCIUM SERPL-MCNC: 9.9 MG/DL
CHLORIDE SERPL-SCNC: 106 MMOL/L
CHOLEST SERPL-MCNC: 153 MG/DL
CHOLEST/HDLC SERPL: 4.3 {RATIO}
CO2 SERPL-SCNC: 30 MMOL/L
CREAT SERPL-MCNC: 1.3 MG/DL
EST. GFR  (AFRICAN AMERICAN): 58.7 ML/MIN/1.73 M^2
EST. GFR  (NON AFRICAN AMERICAN): 50.8 ML/MIN/1.73 M^2
ESTIMATED AVG GLUCOSE: 128 MG/DL
GLUCOSE SERPL-MCNC: 131 MG/DL
HBA1C MFR BLD HPLC: 6.1 %
HDLC SERPL-MCNC: 36 MG/DL
HDLC SERPL: 23.5 %
LDLC SERPL CALC-MCNC: 89.6 MG/DL
NONHDLC SERPL-MCNC: 117 MG/DL
POTASSIUM SERPL-SCNC: 3.9 MMOL/L
PROT SERPL-MCNC: 7.7 G/DL
SODIUM SERPL-SCNC: 143 MMOL/L
TRIGL SERPL-MCNC: 137 MG/DL
TSH SERPL DL<=0.005 MIU/L-ACNC: 1.79 UIU/ML

## 2018-01-17 PROCEDURE — 36415 COLL VENOUS BLD VENIPUNCTURE: CPT | Mod: PO

## 2018-01-17 PROCEDURE — 80061 LIPID PANEL: CPT

## 2018-01-17 PROCEDURE — 84443 ASSAY THYROID STIM HORMONE: CPT

## 2018-01-17 PROCEDURE — 80053 COMPREHEN METABOLIC PANEL: CPT

## 2018-01-17 PROCEDURE — 83036 HEMOGLOBIN GLYCOSYLATED A1C: CPT

## 2018-01-18 ENCOUNTER — TELEPHONE (OUTPATIENT)
Dept: CARDIOLOGY | Facility: CLINIC | Age: 83
End: 2018-01-18

## 2018-01-18 NOTE — TELEPHONE ENCOUNTER
----- Message from Nereyda Birmingham MD sent at 1/18/2018  8:18 AM CST -----  Please review.  Except for low HDL it looks good

## 2018-01-25 ENCOUNTER — TELEPHONE (OUTPATIENT)
Dept: INTERNAL MEDICINE | Facility: CLINIC | Age: 83
End: 2018-01-25

## 2018-01-25 DIAGNOSIS — Z87.891 EX-SMOKER: ICD-10-CM

## 2018-01-25 DIAGNOSIS — E78.2 MIXED HYPERLIPIDEMIA: Chronic | ICD-10-CM

## 2018-01-25 DIAGNOSIS — Z83.3 FAMILY HISTORY OF DIABETES MELLITUS: ICD-10-CM

## 2018-01-25 DIAGNOSIS — Z79.01 CHRONIC ANTICOAGULATION: ICD-10-CM

## 2018-01-25 DIAGNOSIS — E88.810 METABOLIC SYNDROME: Chronic | ICD-10-CM

## 2018-01-25 DIAGNOSIS — Z00.00 WELL ADULT EXAM: Primary | ICD-10-CM

## 2018-01-25 DIAGNOSIS — I48.19 PERSISTENT ATRIAL FIBRILLATION: Chronic | ICD-10-CM

## 2018-01-25 DIAGNOSIS — I10 ESSENTIAL HYPERTENSION: Chronic | ICD-10-CM

## 2018-01-25 DIAGNOSIS — N18.30 STAGE 3 CHRONIC KIDNEY DISEASE: Chronic | ICD-10-CM

## 2018-01-25 NOTE — TELEPHONE ENCOUNTER
----- Message from Ynes Lucio sent at 1/25/2018  9:53 AM CST -----  Contact: Patient [phone]  Patient has a Physical scheduled on: 05/23/2018    Please call and advise for lab appt.    Thank you

## 2018-02-11 DIAGNOSIS — E78.2 MIXED HYPERLIPIDEMIA: Chronic | ICD-10-CM

## 2018-02-11 DIAGNOSIS — I10 ESSENTIAL HYPERTENSION: Chronic | ICD-10-CM

## 2018-02-11 DIAGNOSIS — E88.810 METABOLIC SYNDROME: Chronic | ICD-10-CM

## 2018-02-11 DIAGNOSIS — I48.19 PERSISTENT ATRIAL FIBRILLATION: Chronic | ICD-10-CM

## 2018-02-12 RX ORDER — METOPROLOL SUCCINATE 50 MG/1
TABLET, EXTENDED RELEASE ORAL
Qty: 135 TABLET | Refills: 3 | Status: SHIPPED | OUTPATIENT
Start: 2018-02-12 | End: 2019-02-14 | Stop reason: SDUPTHER

## 2018-02-12 RX ORDER — HYDROCHLOROTHIAZIDE 25 MG/1
TABLET ORAL
Qty: 90 TABLET | Refills: 3 | Status: SHIPPED | OUTPATIENT
Start: 2018-02-12 | End: 2019-02-14 | Stop reason: SDUPTHER

## 2018-05-18 ENCOUNTER — LAB VISIT (OUTPATIENT)
Dept: LAB | Facility: HOSPITAL | Age: 83
End: 2018-05-18
Attending: FAMILY MEDICINE
Payer: MEDICARE

## 2018-05-18 DIAGNOSIS — I48.19 PERSISTENT ATRIAL FIBRILLATION: Chronic | ICD-10-CM

## 2018-05-18 DIAGNOSIS — I10 ESSENTIAL HYPERTENSION: Chronic | ICD-10-CM

## 2018-05-18 DIAGNOSIS — E78.2 MIXED HYPERLIPIDEMIA: Chronic | ICD-10-CM

## 2018-05-18 DIAGNOSIS — Z00.00 WELL ADULT EXAM: ICD-10-CM

## 2018-05-18 DIAGNOSIS — N18.30 STAGE 3 CHRONIC KIDNEY DISEASE: Chronic | ICD-10-CM

## 2018-05-18 DIAGNOSIS — E88.810 METABOLIC SYNDROME: Chronic | ICD-10-CM

## 2018-05-18 DIAGNOSIS — Z79.01 CHRONIC ANTICOAGULATION: ICD-10-CM

## 2018-05-18 DIAGNOSIS — Z83.3 FAMILY HISTORY OF DIABETES MELLITUS: ICD-10-CM

## 2018-05-18 DIAGNOSIS — Z87.891 EX-SMOKER: ICD-10-CM

## 2018-05-18 LAB
ALBUMIN SERPL BCP-MCNC: 3.9 G/DL
ALP SERPL-CCNC: 38 U/L
ALT SERPL W/O P-5'-P-CCNC: 28 U/L
ANION GAP SERPL CALC-SCNC: 9 MMOL/L
AST SERPL-CCNC: 29 U/L
BASOPHILS # BLD AUTO: 0.07 K/UL
BASOPHILS NFR BLD: 1 %
BILIRUB SERPL-MCNC: 1 MG/DL
BUN SERPL-MCNC: 22 MG/DL
CALCIUM SERPL-MCNC: 10.2 MG/DL
CHLORIDE SERPL-SCNC: 107 MMOL/L
CHOLEST SERPL-MCNC: 151 MG/DL
CHOLEST/HDLC SERPL: 4.3 {RATIO}
CO2 SERPL-SCNC: 28 MMOL/L
CREAT SERPL-MCNC: 1.4 MG/DL
DIFFERENTIAL METHOD: ABNORMAL
EOSINOPHIL # BLD AUTO: 0.3 K/UL
EOSINOPHIL NFR BLD: 4.6 %
ERYTHROCYTE [DISTWIDTH] IN BLOOD BY AUTOMATED COUNT: 13.5 %
EST. GFR  (AFRICAN AMERICAN): 53.7 ML/MIN/1.73 M^2
EST. GFR  (NON AFRICAN AMERICAN): 46.5 ML/MIN/1.73 M^2
GLUCOSE SERPL-MCNC: 111 MG/DL
HCT VFR BLD AUTO: 48.6 %
HDLC SERPL-MCNC: 35 MG/DL
HDLC SERPL: 23.2 %
HGB BLD-MCNC: 16.4 G/DL
IMM GRANULOCYTES # BLD AUTO: 0.02 K/UL
IMM GRANULOCYTES NFR BLD AUTO: 0.3 %
LDLC SERPL CALC-MCNC: 85.8 MG/DL
LYMPHOCYTES # BLD AUTO: 3 K/UL
LYMPHOCYTES NFR BLD: 42.5 %
MCH RBC QN AUTO: 31.5 PG
MCHC RBC AUTO-ENTMCNC: 33.7 G/DL
MCV RBC AUTO: 93 FL
MONOCYTES # BLD AUTO: 0.6 K/UL
MONOCYTES NFR BLD: 9 %
NEUTROPHILS # BLD AUTO: 3 K/UL
NEUTROPHILS NFR BLD: 42.6 %
NONHDLC SERPL-MCNC: 116 MG/DL
NRBC BLD-RTO: 0 /100 WBC
PLATELET # BLD AUTO: 200 K/UL
PMV BLD AUTO: 12.9 FL
POTASSIUM SERPL-SCNC: 4.5 MMOL/L
PROT SERPL-MCNC: 7.4 G/DL
RBC # BLD AUTO: 5.21 M/UL
SODIUM SERPL-SCNC: 144 MMOL/L
T4 FREE SERPL-MCNC: 0.93 NG/DL
TRIGL SERPL-MCNC: 151 MG/DL
TSH SERPL DL<=0.005 MIU/L-ACNC: 2.37 UIU/ML
WBC # BLD AUTO: 7.03 K/UL

## 2018-05-18 PROCEDURE — 84443 ASSAY THYROID STIM HORMONE: CPT

## 2018-05-18 PROCEDURE — 36415 COLL VENOUS BLD VENIPUNCTURE: CPT | Mod: PO

## 2018-05-18 PROCEDURE — 85025 COMPLETE CBC W/AUTO DIFF WBC: CPT

## 2018-05-18 PROCEDURE — 84439 ASSAY OF FREE THYROXINE: CPT

## 2018-05-18 PROCEDURE — 80061 LIPID PANEL: CPT

## 2018-05-18 PROCEDURE — 80053 COMPREHEN METABOLIC PANEL: CPT

## 2018-05-23 ENCOUNTER — OFFICE VISIT (OUTPATIENT)
Dept: INTERNAL MEDICINE | Facility: CLINIC | Age: 83
End: 2018-05-23
Payer: MEDICARE

## 2018-05-23 VITALS
DIASTOLIC BLOOD PRESSURE: 74 MMHG | HEART RATE: 75 BPM | HEIGHT: 66 IN | BODY MASS INDEX: 26.58 KG/M2 | RESPIRATION RATE: 15 BRPM | SYSTOLIC BLOOD PRESSURE: 138 MMHG | WEIGHT: 165.38 LBS | TEMPERATURE: 98 F

## 2018-05-23 DIAGNOSIS — E78.2 MIXED HYPERLIPIDEMIA: Chronic | ICD-10-CM

## 2018-05-23 DIAGNOSIS — N18.30 STAGE 3 CHRONIC KIDNEY DISEASE: Chronic | ICD-10-CM

## 2018-05-23 DIAGNOSIS — E88.810 METABOLIC SYNDROME: Chronic | ICD-10-CM

## 2018-05-23 DIAGNOSIS — Z00.00 WELL ADULT EXAM: Primary | ICD-10-CM

## 2018-05-23 DIAGNOSIS — Z87.891 EX-SMOKER: ICD-10-CM

## 2018-05-23 DIAGNOSIS — Z83.3 FAMILY HISTORY OF DIABETES MELLITUS: ICD-10-CM

## 2018-05-23 DIAGNOSIS — I48.19 PERSISTENT ATRIAL FIBRILLATION: Chronic | ICD-10-CM

## 2018-05-23 DIAGNOSIS — I10 ESSENTIAL HYPERTENSION: Chronic | ICD-10-CM

## 2018-05-23 DIAGNOSIS — Z79.01 CHRONIC ANTICOAGULATION: ICD-10-CM

## 2018-05-23 PROCEDURE — 99215 OFFICE O/P EST HI 40 MIN: CPT | Mod: S$PBB,,, | Performed by: FAMILY MEDICINE

## 2018-05-23 PROCEDURE — 99999 PR PBB SHADOW E&M-EST. PATIENT-LVL III: CPT | Mod: PBBFAC,,, | Performed by: FAMILY MEDICINE

## 2018-05-23 PROCEDURE — 99213 OFFICE O/P EST LOW 20 MIN: CPT | Mod: PBBFAC,PO | Performed by: FAMILY MEDICINE

## 2018-05-23 NOTE — PROGRESS NOTES
Subjective:       Patient ID: Cliff Magaña is a 82 y.o. male.    Chief Complaint: Annual Exam    HPI 82-year-old white male presents to clinic today for annual physical exam.  He continues to be followed by Cardiology for treatment of hypertension and persistent atrial fibrillation which is well controlled on metoprolol 50 mg daily, hydrochlorothiazide 25 mg daily, and anticoagulation with Xarelto and aspirin.  He denies any easy bleeding or bruising.  Hyperlipidemia continues to be well controlled on fenofibrate and fish oil.  He continues to have a mildly low HDL level which continues to remain stable.  Stage 3 chronic kidney disease also continues to remain stable.  He has a past surgical history of cholecystectomy, tonsillectomy, and cataract repair.  He has a family history of his parents passed away in a motor vehicle accident and his maternal aunt passed away from diabetes.  He is up-to-date with all vaccinations.  Review of Systems   Constitutional: Negative for activity change, appetite change, chills, fatigue, fever and unexpected weight change.   HENT: Negative for congestion, ear pain, hearing loss, postnasal drip, rhinorrhea, sinus pressure, sore throat, tinnitus and trouble swallowing.    Eyes: Negative for discharge, redness, itching and visual disturbance.   Respiratory: Negative for cough, chest tightness, shortness of breath and wheezing.    Cardiovascular: Negative for chest pain and palpitations.   Gastrointestinal: Negative for abdominal pain, blood in stool, constipation, diarrhea, nausea and vomiting.   Endocrine: Negative for polydipsia and polyuria.   Genitourinary: Negative for decreased urine volume, difficulty urinating, dysuria, frequency, hematuria and urgency.   Musculoskeletal: Negative for arthralgias, back pain, joint swelling, myalgias, neck pain and neck stiffness.   Skin: Negative for rash.   Neurological: Negative for dizziness, weakness, light-headedness and headaches.    Psychiatric/Behavioral: Negative.  Negative for confusion and dysphoric mood.       Objective:      Physical Exam   Constitutional: He is oriented to person, place, and time. He appears well-developed and well-nourished. No distress.   HENT:   Head: Normocephalic and atraumatic.   Right Ear: External ear normal.   Left Ear: External ear normal.   Nose: Nose normal.   Mouth/Throat: Oropharynx is clear and moist. No oropharyngeal exudate.   Eyes: Conjunctivae and EOM are normal. Pupils are equal, round, and reactive to light. Right eye exhibits no discharge. Left eye exhibits no discharge. No scleral icterus.   Neck: Normal range of motion. Neck supple. No JVD present. No tracheal deviation present. No thyromegaly present.   Cardiovascular: Normal rate, regular rhythm, normal heart sounds and intact distal pulses.  Exam reveals no gallop and no friction rub.    No murmur heard.  Pulmonary/Chest: Effort normal and breath sounds normal. No stridor. No respiratory distress. He has no wheezes. He has no rales.   Abdominal: Soft. Bowel sounds are normal. He exhibits no distension and no mass. There is no tenderness. There is no rebound and no guarding.   Musculoskeletal: Normal range of motion. He exhibits no edema or tenderness.   Lymphadenopathy:     He has no cervical adenopathy.   Neurological: He is alert and oriented to person, place, and time.   Skin: Skin is warm and dry. No rash noted. He is not diaphoretic. No erythema. No pallor.   Psychiatric: He has a normal mood and affect. His behavior is normal. Judgment and thought content normal.   Nursing note and vitals reviewed.      Assessment:       1. Well adult exam    2. Persistent atrial fibrillation    3. Chronic anticoagulation    4. Essential hypertension    5. Mixed hyperlipidemia    6. Metabolic syndrome    7. Stage 3 chronic kidney disease    8. Ex-smoker    9. Family history of diabetes mellitus        Plan:       1.  Labs have been reviewed and were  overall within normal limits.  2.  Continue metoprolol 50 mg daily and hydrochlorothiazide 25 mg daily.  Atrial fibrillation and hypertension are well controlled.  Continue follow-up with Cardiology as scheduled.  3.  Continue aspirin and Xarelto for anticoagulation secondary to atrial fibrillation.  Patient is stable.  4.  Continue fenofibrate and fish oil as prescribed.  Hyperlipidemia is stable.  5.  Metabolic syndrome is stable.  Continue exercise.  6.  Encourage hydration.  Stage 3 chronic kidney disease is stable.  7.  Return to clinic as needed or in 1 year for annual exam.    40 min have been spent with the patient with more than 50% of the clinic visit spent reviewing medications, discussing lab results, and counseling the patient concerning his chronic medical conditions.

## 2018-06-06 ENCOUNTER — TELEPHONE (OUTPATIENT)
Dept: CARDIOLOGY | Facility: CLINIC | Age: 83
End: 2018-06-06

## 2018-06-06 DIAGNOSIS — E78.2 MIXED HYPERLIPIDEMIA: ICD-10-CM

## 2018-06-06 DIAGNOSIS — E88.810 METABOLIC SYNDROME: ICD-10-CM

## 2018-06-06 DIAGNOSIS — I10 ESSENTIAL HYPERTENSION: Primary | ICD-10-CM

## 2018-06-06 DIAGNOSIS — I48.19 PERSISTENT ATRIAL FIBRILLATION: ICD-10-CM

## 2018-06-06 DIAGNOSIS — E74.89 OTHER SPECIFIED DISORDERS OF CARBOHYDRATE METABOLISM: ICD-10-CM

## 2018-06-12 ENCOUNTER — PATIENT MESSAGE (OUTPATIENT)
Dept: INTERNAL MEDICINE | Facility: CLINIC | Age: 83
End: 2018-06-12

## 2018-06-13 ENCOUNTER — OFFICE VISIT (OUTPATIENT)
Dept: INTERNAL MEDICINE | Facility: CLINIC | Age: 83
End: 2018-06-13
Payer: MEDICARE

## 2018-06-13 VITALS
HEIGHT: 66 IN | DIASTOLIC BLOOD PRESSURE: 60 MMHG | SYSTOLIC BLOOD PRESSURE: 148 MMHG | HEART RATE: 66 BPM | RESPIRATION RATE: 18 BRPM | WEIGHT: 165.38 LBS | TEMPERATURE: 98 F | OXYGEN SATURATION: 96 % | BODY MASS INDEX: 26.58 KG/M2

## 2018-06-13 DIAGNOSIS — N60.82 SEBACEOUS CYST OF BREAST, LEFT: Primary | ICD-10-CM

## 2018-06-13 PROCEDURE — 99214 OFFICE O/P EST MOD 30 MIN: CPT | Mod: PBBFAC,PO | Performed by: FAMILY MEDICINE

## 2018-06-13 PROCEDURE — 99214 OFFICE O/P EST MOD 30 MIN: CPT | Mod: S$PBB,,, | Performed by: FAMILY MEDICINE

## 2018-06-13 PROCEDURE — 99999 PR PBB SHADOW E&M-EST. PATIENT-LVL IV: CPT | Mod: PBBFAC,,, | Performed by: FAMILY MEDICINE

## 2018-06-13 RX ORDER — SULFAMETHOXAZOLE AND TRIMETHOPRIM 800; 160 MG/1; MG/1
1 TABLET ORAL 2 TIMES DAILY
Qty: 20 TABLET | Refills: 0 | Status: SHIPPED | OUTPATIENT
Start: 2018-06-13 | End: 2018-06-20 | Stop reason: SDUPTHER

## 2018-06-13 NOTE — PROGRESS NOTES
Subjective:       Patient ID: Cliff Magaña is a 82 y.o. male.    Chief Complaint: Breast Mass (left breast, red, hard, sore. pain level: 1/10)    HPI  82-year-old white male presents to clinic today secondary to concerns of an inflamed mass to the left upper chest noted since yesterday.  He reports an ongoing history of sebaceous cyst to the left upper chest for many years but reports no inflammation or complications.  Yesterday while showering he noted a tender area to his left upper chest and noted inflammation to the area.  He presents for further evaluation.  Review of Systems   Constitutional: Negative for activity change, appetite change, chills, fatigue, fever and unexpected weight change.   HENT: Negative for congestion, ear pain, hearing loss, postnasal drip, rhinorrhea, sinus pressure, sore throat, tinnitus and trouble swallowing.    Eyes: Negative for discharge, redness, itching and visual disturbance.   Respiratory: Negative for cough, chest tightness, shortness of breath and wheezing.    Cardiovascular: Negative for chest pain and palpitations.   Gastrointestinal: Negative for abdominal pain, blood in stool, constipation, diarrhea, nausea and vomiting.   Endocrine: Negative for polydipsia and polyuria.   Genitourinary: Negative for decreased urine volume, difficulty urinating, dysuria, frequency, hematuria and urgency.   Musculoskeletal: Negative for arthralgias, back pain, joint swelling, myalgias, neck pain and neck stiffness.   Skin: Positive for wound (Cyst to left upper chest). Negative for rash.   Neurological: Negative for dizziness, weakness, light-headedness and headaches.   Psychiatric/Behavioral: Negative.  Negative for confusion and dysphoric mood.       Objective:      Physical Exam   Constitutional: He is oriented to person, place, and time. He appears well-developed and well-nourished. No distress.   HENT:   Head: Normocephalic and atraumatic.   Right Ear: External ear normal.   Left  Ear: External ear normal.   Nose: Nose normal.   Mouth/Throat: Oropharynx is clear and moist. No oropharyngeal exudate.   Eyes: Conjunctivae and EOM are normal. Pupils are equal, round, and reactive to light. Right eye exhibits no discharge. Left eye exhibits no discharge. No scleral icterus.   Neck: Normal range of motion. Neck supple. No JVD present. No tracheal deviation present. No thyromegaly present.   Cardiovascular: Normal rate, regular rhythm, normal heart sounds and intact distal pulses.  Exam reveals no gallop and no friction rub.    No murmur heard.  Pulmonary/Chest: Effort normal and breath sounds normal. No stridor. No respiratory distress. He has no wheezes. He has no rales.       Abdominal: Soft. Bowel sounds are normal. He exhibits no distension and no mass. There is no tenderness. There is no rebound and no guarding.   Musculoskeletal: Normal range of motion. He exhibits no edema or tenderness.   Lymphadenopathy:     He has no cervical adenopathy.   Neurological: He is alert and oriented to person, place, and time.   Skin: Skin is warm and dry. No rash noted. He is not diaphoretic. No erythema. No pallor.   Psychiatric: He has a normal mood and affect. His behavior is normal. Judgment and thought content normal.   Nursing note and vitals reviewed.      Assessment:       1. Sebaceous cyst of breast, left        Plan:       Sebaceous cyst of breast, left  -     sulfamethoxazole-trimethoprim 800-160mg (BACTRIM DS) 800-160 mg Tab; Take 1 tablet by mouth 2 (two) times daily.  Dispense: 20 tablet; Refill: 0  -     Ambulatory Referral to General Surgery      Return to clinic as needed if symptoms persist or worsen.

## 2018-06-20 ENCOUNTER — OFFICE VISIT (OUTPATIENT)
Dept: SURGERY | Facility: CLINIC | Age: 83
End: 2018-06-20
Payer: MEDICARE

## 2018-06-20 VITALS
TEMPERATURE: 98 F | BODY MASS INDEX: 26.79 KG/M2 | HEART RATE: 63 BPM | SYSTOLIC BLOOD PRESSURE: 128 MMHG | WEIGHT: 166.69 LBS | HEIGHT: 66 IN | DIASTOLIC BLOOD PRESSURE: 82 MMHG | OXYGEN SATURATION: 93 %

## 2018-06-20 DIAGNOSIS — N60.82 SEBACEOUS CYST OF BREAST, LEFT: Primary | ICD-10-CM

## 2018-06-20 PROCEDURE — 10060 I&D ABSCESS SIMPLE/SINGLE: CPT | Mod: PBBFAC,PO | Performed by: STUDENT IN AN ORGANIZED HEALTH CARE EDUCATION/TRAINING PROGRAM

## 2018-06-20 PROCEDURE — 99213 OFFICE O/P EST LOW 20 MIN: CPT | Mod: PBBFAC,PO,25 | Performed by: STUDENT IN AN ORGANIZED HEALTH CARE EDUCATION/TRAINING PROGRAM

## 2018-06-20 PROCEDURE — 99204 OFFICE O/P NEW MOD 45 MIN: CPT | Mod: S$PBB,25,, | Performed by: STUDENT IN AN ORGANIZED HEALTH CARE EDUCATION/TRAINING PROGRAM

## 2018-06-20 PROCEDURE — 10060 I&D ABSCESS SIMPLE/SINGLE: CPT | Mod: S$PBB,,, | Performed by: STUDENT IN AN ORGANIZED HEALTH CARE EDUCATION/TRAINING PROGRAM

## 2018-06-20 PROCEDURE — 99999 PR PBB SHADOW E&M-EST. PATIENT-LVL III: CPT | Mod: PBBFAC,,, | Performed by: STUDENT IN AN ORGANIZED HEALTH CARE EDUCATION/TRAINING PROGRAM

## 2018-06-20 RX ORDER — HYDROCODONE BITARTRATE AND ACETAMINOPHEN 5; 325 MG/1; MG/1
1 TABLET ORAL EVERY 4 HOURS PRN
Qty: 5 TABLET | Refills: 0 | Status: SHIPPED | OUTPATIENT
Start: 2018-06-20 | End: 2018-09-05

## 2018-06-20 RX ORDER — SULFAMETHOXAZOLE AND TRIMETHOPRIM 800; 160 MG/1; MG/1
1 TABLET ORAL 2 TIMES DAILY
Qty: 20 TABLET | Refills: 0 | Status: SHIPPED | OUTPATIENT
Start: 2018-06-20 | End: 2018-06-30

## 2018-06-20 NOTE — LETTER
June 20, 2018      Munir Estrada MD  [address] [place] [address]           [place] - General Surgery  [address] [facility] [facility] [place] [address]  Phone: [phone]          Patient: Cliff Magaña   MR Number: [id number]   YOB: 1935   Date of Visit: 6/20/2018       Dear Dr. Munir Estrada:    Thank you for referring Cliff Magaña to me for evaluation. Attached you will find relevant portions of my assessment and plan of care.    If you have questions, please do not hesitate to call me. I look forward to following Cliff Magaña along with you.    Sincerely,    Jac Carranza MD    Enclosure  CC:  No Recipients    If you would like to receive this communication electronically, please contact [email or web address] or [phone] to request more information on [employer] Link access.    For providers and/or their staff who would like to refer a patient to [facility], please contact us through our one-stop-shop provider referral line, Carlene Lyons, at [phone].    If you feel you have received this communication in error or would no longer like to receive these types of communications, please e-mail [email or web address]

## 2018-06-20 NOTE — PROGRESS NOTES
"Cliff Magaña is a 82 y.o. male patient.    Temp: 97.5 °F (36.4 °C) (06/20/18 0957)  Pulse: 63 (06/20/18 0957)  BP: 128/82 (06/20/18 0957)  SpO2: (!) 93 % (06/20/18 0957)  Weight: 75.6 kg (166 lb 10.7 oz) (06/20/18 0957)  Height: 5' 5.5" (166.4 cm) (06/20/18 0957)       Incision & Drainage  Date/Time: 6/20/2018 11:08 AM  Performed by: JAC CARRANZA  Authorized by: JAC CARRANZA     Time out: Immediately prior to procedure a "time out" was called to verify the correct patient, procedure, equipment, support staff and site/side marked as required.    Consent Done?:  Yes (Written)    Type:  Abscess  Body area:  Trunk  Location details:  Chest  Anesthesia:  Local infiltration  Local anesthetic: lidocaine 1% with epinephrine  Risk factor:  Underlying major vessel  Scalpel size:  11  Incision type:  Single straight  Complexity:  Simple  Drainage:  Pus  Drainage amount:  Moderate  Wound treatment:  Incision and wound left open  Packing material:  1/2 in gauze  Patient tolerance:  Patient tolerated the procedure well with no immediate complications        Jac Carranza  6/20/2018  "

## 2018-06-20 NOTE — PROGRESS NOTES
Patient ID: Cliff Magaña is a 82 y.o. male.    Chief Complaint: No chief complaint on file.      HPI:  82M with left chest sebaceous cyst that is now infected. Been there for years but never bothered him until about a week ago. Been on bactrim for a week with no improvement. No fevers. Had gluteal abscess I&D many years ago otherwise no similar problems.         Review of Systems   Constitutional: Negative for fever.   HENT: Negative for trouble swallowing.    Respiratory: Negative for shortness of breath.    Cardiovascular: Negative for chest pain.   Gastrointestinal: Negative for abdominal distention.   Genitourinary: Negative for dysuria.   Musculoskeletal: Negative for gait problem.   Skin: Positive for color change.   Allergic/Immunologic: Negative for immunocompromised state.   Neurological: Negative for weakness.   Hematological: Bruises/bleeds easily.   Psychiatric/Behavioral: Negative for agitation.       Current Outpatient Prescriptions   Medication Sig Dispense Refill    aspirin (ECOTRIN) 81 MG EC tablet Take 81 mg by mouth once daily.      fenofibrate micronized (LOFIBRA) 200 MG Cap TAKE 1 CAPSULE DAILY WITH BREAKFAST 90 capsule 3    hydroCHLOROthiazide (HYDRODIURIL) 25 MG tablet TAKE 1 TABLET DAILY 90 tablet 3    krill-om-3-dha-epa-phospho-ast (MEGARED OMEGA-3 KRILL OIL) 1,000-230-60 mg Cap Take by mouth once daily. Pt taking one pill once a day      loratadine (CLARITIN) 10 mg tablet Take 10 mg by mouth once daily.      TRAMAINE BIOTIN ORAL Take 5,000 mcg by mouth once daily.      multivitamin-minerals-lutein (CENTRUM SILVER) Tab Take by mouth. 1 Tablet Oral Every day      sulfamethoxazole-trimethoprim 800-160mg (BACTRIM DS) 800-160 mg Tab Take 1 tablet by mouth 2 (two) times daily. 20 tablet 0    TOPROL XL 50 mg 24 hr tablet TAKE ONE AND ONE-HALF TABLETS ONCE DAILY 135 tablet 3    XARELTO 20 mg Tab TAKE 1 TABLET DAILY 90 tablet 4     No current facility-administered medications for this visit.         Review of patient's allergies indicates:   Allergen Reactions    Niacin      Other reaction(s): Rash  Other reaction(s): Itching       Past Medical History:   Diagnosis Date    *Atrial fibrillation     Chronic kidney disease     Hyperlipidemia     Hypertension     Metabolic syndrome        Past Surgical History:   Procedure Laterality Date    CATARACT EXTRACTION      CHOLECYSTECTOMY      EYE SURGERY      TONSILLECTOMY         Family History   Problem Relation Age of Onset    Diabetes Maternal Aunt     Heart attack Neg Hx     Heart disease Neg Hx     Heart failure Neg Hx     Hyperlipidemia Neg Hx     Hypertension Neg Hx     Stroke Neg Hx        Social History     Social History    Marital status:      Spouse name: N/A    Number of children: N/A    Years of education: N/A     Occupational History    Not on file.     Social History Main Topics    Smoking status: Former Smoker     Packs/day: 2.00     Years: 20.00     Quit date: 5/22/1983    Smokeless tobacco: Never Used    Alcohol use 2.3 oz/week     3 Shots of liquor, 1 Standard drinks or equivalent per week      Comment: occassionally    Drug use: No    Sexual activity: Yes     Partners: Female     Other Topics Concern    Not on file     Social History Narrative    No narrative on file       Vitals:    06/20/18 0957   BP: 128/82   Pulse: 63   Temp: 97.5 °F (36.4 °C)       Physical Exam   Constitutional: He is oriented to person, place, and time. He appears well-nourished. No distress.   Cardiovascular: Normal rate.    Pulmonary/Chest: Effort normal.   Abdominal: Soft. He exhibits no distension.   Musculoskeletal: He exhibits no edema.   Lymphadenopathy:     He has no cervical adenopathy.   Neurological: He is alert and oriented to person, place, and time.   Skin: Skin is warm. There is erythema.   3cm left chest mass  TTP  No purulent drainage  Superficial  Sebaceous cyst   Psychiatric: He has a normal mood and affect. His  behavior is normal.       Assessment & Plan:   82M with infected sebaceous cyst  I&D in office  RTC next week  Bactrim refill  Plan for excision in a few weeks  On xarelto

## 2018-06-21 ENCOUNTER — PATIENT MESSAGE (OUTPATIENT)
Dept: SURGERY | Facility: CLINIC | Age: 83
End: 2018-06-21

## 2018-06-21 ENCOUNTER — OFFICE VISIT (OUTPATIENT)
Dept: SURGERY | Facility: CLINIC | Age: 83
End: 2018-06-21
Payer: MEDICARE

## 2018-06-21 VITALS
WEIGHT: 164.13 LBS | HEIGHT: 66 IN | DIASTOLIC BLOOD PRESSURE: 87 MMHG | TEMPERATURE: 98 F | SYSTOLIC BLOOD PRESSURE: 154 MMHG | HEART RATE: 79 BPM | BODY MASS INDEX: 26.38 KG/M2

## 2018-06-21 DIAGNOSIS — L02.213 ABSCESS OF CHEST WALL: ICD-10-CM

## 2018-06-21 PROCEDURE — 99999 PR PBB SHADOW E&M-EST. PATIENT-LVL III: CPT | Mod: PBBFAC,,, | Performed by: STUDENT IN AN ORGANIZED HEALTH CARE EDUCATION/TRAINING PROGRAM

## 2018-06-21 PROCEDURE — 99213 OFFICE O/P EST LOW 20 MIN: CPT | Mod: PBBFAC,PO | Performed by: STUDENT IN AN ORGANIZED HEALTH CARE EDUCATION/TRAINING PROGRAM

## 2018-06-21 PROCEDURE — 99024 POSTOP FOLLOW-UP VISIT: CPT | Mod: POP,,, | Performed by: STUDENT IN AN ORGANIZED HEALTH CARE EDUCATION/TRAINING PROGRAM

## 2018-06-25 PROBLEM — L02.213 ABSCESS OF CHEST WALL: Status: ACTIVE | Noted: 2018-06-25

## 2018-06-25 NOTE — PROGRESS NOTES
Returns to office with bleeding from left chest abscess site  Stopped anticoag  SEems to be better now  Dressing reinforced at home    Erythema improving  Some oozing of blood from skin      Pressure held in office  Pressure dressing applied with ace    RTC next week

## 2018-06-27 ENCOUNTER — OFFICE VISIT (OUTPATIENT)
Dept: SURGERY | Facility: CLINIC | Age: 83
End: 2018-06-27
Payer: MEDICARE

## 2018-06-27 VITALS
DIASTOLIC BLOOD PRESSURE: 78 MMHG | OXYGEN SATURATION: 96 % | HEART RATE: 61 BPM | HEIGHT: 66 IN | SYSTOLIC BLOOD PRESSURE: 126 MMHG | TEMPERATURE: 98 F | WEIGHT: 162.94 LBS | BODY MASS INDEX: 26.19 KG/M2

## 2018-06-27 DIAGNOSIS — L02.213 ABSCESS OF CHEST WALL: Primary | ICD-10-CM

## 2018-06-27 PROCEDURE — 99999 PR PBB SHADOW E&M-EST. PATIENT-LVL III: CPT | Mod: PBBFAC,,, | Performed by: STUDENT IN AN ORGANIZED HEALTH CARE EDUCATION/TRAINING PROGRAM

## 2018-06-27 PROCEDURE — 99024 POSTOP FOLLOW-UP VISIT: CPT | Mod: POP,,, | Performed by: STUDENT IN AN ORGANIZED HEALTH CARE EDUCATION/TRAINING PROGRAM

## 2018-06-27 PROCEDURE — 99213 OFFICE O/P EST LOW 20 MIN: CPT | Mod: PBBFAC,PO | Performed by: STUDENT IN AN ORGANIZED HEALTH CARE EDUCATION/TRAINING PROGRAM

## 2018-06-27 NOTE — LETTER
June 27, 2018      Munir Estrada MD  [address] [facility] [address] [facility] [place] [address]           [place] - General Surgery  [address] [facility] [place] [address]  Phone: [phone]          Patient: lCiff Magaña   MR Number: [id number]   YOB: 1935   Date of Visit: 6/27/2018       Dear Dr. Munir Estrada:    Thank you for referring Cliff Magaña to me for evaluation. Attached you will find relevant portions of my assessment and plan of care.    If you have questions, please do not hesitate to call me. I look forward to following Cliff Magaña along with you.    Sincerely,    Jac Carranza MD    Enclosure  CC:  No Recipients    If you would like to receive this communication electronically, please contact [email or web address] or [phone] to request more information on [employer] Link access.    For providers and/or their staff who would like to refer a patient to [facility], please contact us through our one-stop-shop provider referral line, Carlene Lyons, at [phone].    If you feel you have received this communication in error or would no longer like to receive these types of communications, please e-mail [email or web address]

## 2018-06-27 NOTE — PROGRESS NOTES
Doing well  No pain now  Minimal drainage  No bleeding  Restarted xarelto 3 days ago    No erythema  Wound open    Wash with soap and water  Finish course of abx  Continue xarelto  RTC in 1 month. Plan for excision of mass once inflammation resolves  Will need to stop xarelto again. Probable office procedure  Going to [facility] in Sept

## 2018-06-28 ENCOUNTER — PATIENT MESSAGE (OUTPATIENT)
Dept: CARDIOLOGY | Facility: CLINIC | Age: 83
End: 2018-06-28

## 2018-06-29 ENCOUNTER — PATIENT MESSAGE (OUTPATIENT)
Dept: CARDIOLOGY | Facility: CLINIC | Age: 83
End: 2018-06-29

## 2018-06-29 ENCOUNTER — TELEPHONE (OUTPATIENT)
Dept: CARDIOLOGY | Facility: CLINIC | Age: 83
End: 2018-06-29

## 2018-06-29 DIAGNOSIS — I10 HYPERTENSION, ESSENTIAL: ICD-10-CM

## 2018-06-29 DIAGNOSIS — N18.30 STAGE 3 CHRONIC KIDNEY DISEASE: Primary | ICD-10-CM

## 2018-07-10 ENCOUNTER — TELEPHONE (OUTPATIENT)
Dept: CARDIOLOGY | Facility: CLINIC | Age: 83
End: 2018-07-10

## 2018-07-10 NOTE — TELEPHONE ENCOUNTER
Pt's wife, Nereyda stated that pt is on Amoxicillin 500mg TID and wants to know if this will cause problems w/the lab(bmp) he is having drawn on 7/16/18. Please advise.

## 2018-07-16 ENCOUNTER — LAB VISIT (OUTPATIENT)
Dept: LAB | Facility: HOSPITAL | Age: 83
End: 2018-07-16
Attending: INTERNAL MEDICINE
Payer: MEDICARE

## 2018-07-16 ENCOUNTER — TELEPHONE (OUTPATIENT)
Dept: CARDIOLOGY | Facility: CLINIC | Age: 83
End: 2018-07-16

## 2018-07-16 DIAGNOSIS — N18.30 STAGE 3 CHRONIC KIDNEY DISEASE: ICD-10-CM

## 2018-07-16 DIAGNOSIS — I10 HYPERTENSION, ESSENTIAL: ICD-10-CM

## 2018-07-16 LAB
ANION GAP SERPL CALC-SCNC: 11 MMOL/L
BUN SERPL-MCNC: 17 MG/DL
CALCIUM SERPL-MCNC: 9.6 MG/DL
CHLORIDE SERPL-SCNC: 105 MMOL/L
CO2 SERPL-SCNC: 25 MMOL/L
CREAT SERPL-MCNC: 1.3 MG/DL
EST. GFR  (AFRICAN AMERICAN): 58.7 ML/MIN/1.73 M^2
EST. GFR  (NON AFRICAN AMERICAN): 50.8 ML/MIN/1.73 M^2
GLUCOSE SERPL-MCNC: 155 MG/DL
POTASSIUM SERPL-SCNC: 4.3 MMOL/L
SODIUM SERPL-SCNC: 141 MMOL/L

## 2018-07-16 PROCEDURE — 36415 COLL VENOUS BLD VENIPUNCTURE: CPT | Mod: PO

## 2018-07-16 PROCEDURE — 80048 BASIC METABOLIC PNL TOTAL CA: CPT

## 2018-07-16 NOTE — TELEPHONE ENCOUNTER
----- Message from Nereyda Birmingham MD sent at 7/16/2018  2:45 PM CDT -----  Please inform the patient that blood work looks fine. Kidney functions are better.

## 2018-07-25 ENCOUNTER — PATIENT MESSAGE (OUTPATIENT)
Dept: CARDIOLOGY | Facility: CLINIC | Age: 83
End: 2018-07-25

## 2018-07-25 ENCOUNTER — OFFICE VISIT (OUTPATIENT)
Dept: SURGERY | Facility: CLINIC | Age: 83
End: 2018-07-25
Payer: MEDICARE

## 2018-07-25 VITALS
DIASTOLIC BLOOD PRESSURE: 78 MMHG | HEIGHT: 66 IN | HEART RATE: 77 BPM | SYSTOLIC BLOOD PRESSURE: 124 MMHG | BODY MASS INDEX: 26.68 KG/M2 | TEMPERATURE: 98 F | WEIGHT: 166 LBS

## 2018-07-25 DIAGNOSIS — L72.3 SEBACEOUS CYST: Primary | ICD-10-CM

## 2018-07-25 PROCEDURE — 99999 PR PBB SHADOW E&M-EST. PATIENT-LVL III: CPT | Mod: PBBFAC,,, | Performed by: STUDENT IN AN ORGANIZED HEALTH CARE EDUCATION/TRAINING PROGRAM

## 2018-07-25 PROCEDURE — 99024 POSTOP FOLLOW-UP VISIT: CPT | Mod: POP,,, | Performed by: STUDENT IN AN ORGANIZED HEALTH CARE EDUCATION/TRAINING PROGRAM

## 2018-07-25 PROCEDURE — 99213 OFFICE O/P EST LOW 20 MIN: CPT | Mod: PBBFAC,PO | Performed by: STUDENT IN AN ORGANIZED HEALTH CARE EDUCATION/TRAINING PROGRAM

## 2018-07-25 NOTE — PROGRESS NOTES
Feeling well  No drainage  No pain  Off abx  No bleeding    Left chest skin cyst ~1cm, nontender  No DC, no erythema    PLan excision next week  Hold xarelto 2 days before

## 2018-08-01 ENCOUNTER — OFFICE VISIT (OUTPATIENT)
Dept: SURGERY | Facility: CLINIC | Age: 83
End: 2018-08-01
Payer: MEDICARE

## 2018-08-01 VITALS
BODY MASS INDEX: 26.58 KG/M2 | WEIGHT: 165.38 LBS | OXYGEN SATURATION: 97 % | SYSTOLIC BLOOD PRESSURE: 142 MMHG | HEIGHT: 66 IN | DIASTOLIC BLOOD PRESSURE: 82 MMHG | HEART RATE: 70 BPM | TEMPERATURE: 98 F

## 2018-08-01 DIAGNOSIS — R22.2 MASS OF CHEST WALL, LEFT: Primary | ICD-10-CM

## 2018-08-01 PROCEDURE — 11422 EXC H-F-NK-SP B9+MARG 1.1-2: CPT | Mod: PBBFAC,PO | Performed by: STUDENT IN AN ORGANIZED HEALTH CARE EDUCATION/TRAINING PROGRAM

## 2018-08-01 PROCEDURE — 99213 OFFICE O/P EST LOW 20 MIN: CPT | Mod: PBBFAC,PO,25 | Performed by: STUDENT IN AN ORGANIZED HEALTH CARE EDUCATION/TRAINING PROGRAM

## 2018-08-01 PROCEDURE — 99999 PR PBB SHADOW E&M-EST. PATIENT-LVL III: CPT | Mod: PBBFAC,,, | Performed by: STUDENT IN AN ORGANIZED HEALTH CARE EDUCATION/TRAINING PROGRAM

## 2018-08-01 PROCEDURE — 11422 EXC H-F-NK-SP B9+MARG 1.1-2: CPT | Mod: S$PBB,51,, | Performed by: STUDENT IN AN ORGANIZED HEALTH CARE EDUCATION/TRAINING PROGRAM

## 2018-08-01 PROCEDURE — 12042 INTMD RPR N-HF/GENIT2.6-7.5: CPT | Mod: S$PBB,,, | Performed by: STUDENT IN AN ORGANIZED HEALTH CARE EDUCATION/TRAINING PROGRAM

## 2018-08-01 PROCEDURE — 99214 OFFICE O/P EST MOD 30 MIN: CPT | Mod: S$PBB,25,, | Performed by: STUDENT IN AN ORGANIZED HEALTH CARE EDUCATION/TRAINING PROGRAM

## 2018-08-01 PROCEDURE — 11402 EXC TR-EXT B9+MARG 1.1-2 CM: CPT | Mod: PBBFAC,PO | Performed by: STUDENT IN AN ORGANIZED HEALTH CARE EDUCATION/TRAINING PROGRAM

## 2018-08-01 NOTE — PROGRESS NOTES
Patient ID: Cliff Magaña is a 82 y.o. male.    Chief Complaint: No chief complaint on file.      HPI:  Doing well s/p I&D left chest infected cyst  Here for excision  No pain, no drainage        Review of Systems   Constitutional: Negative for chills, diaphoresis and fever.   HENT: Negative for trouble swallowing.    Respiratory: Negative for cough, shortness of breath, wheezing and stridor.    Cardiovascular: Negative for chest pain and palpitations.   Gastrointestinal: Negative for abdominal distention, abdominal pain, blood in stool, diarrhea, nausea and vomiting.   Endocrine: Negative for cold intolerance and heat intolerance.   Genitourinary: Negative for difficulty urinating.   Musculoskeletal: Negative for back pain.   Skin: Negative for rash.   Allergic/Immunologic: Negative for immunocompromised state.   Neurological: Negative for dizziness, syncope and numbness.   Hematological: Negative for adenopathy. Bruises/bleeds easily.   Psychiatric/Behavioral: Negative for agitation.       Current Outpatient Prescriptions   Medication Sig Dispense Refill    aspirin (ECOTRIN) 81 MG EC tablet Take 81 mg by mouth once daily.      fenofibrate micronized (LOFIBRA) 200 MG Cap TAKE 1 CAPSULE DAILY WITH BREAKFAST 90 capsule 3    hydroCHLOROthiazide (HYDRODIURIL) 25 MG tablet TAKE 1 TABLET DAILY 90 tablet 3    krill-om-3-dha-epa-phospho-ast (MEGARED OMEGA-3 KRILL OIL) 1,000-230-60 mg Cap Take by mouth once daily. Pt taking one pill once a day      loratadine (CLARITIN) 10 mg tablet Take 10 mg by mouth once daily.      TRAMAINE BIOTIN ORAL Take 5,000 mcg by mouth once daily.      multivitamin-minerals-lutein (CENTRUM SILVER) Tab Take by mouth. 1 Tablet Oral Every day      TOPROL XL 50 mg 24 hr tablet TAKE ONE AND ONE-HALF TABLETS ONCE DAILY 135 tablet 3    HYDROcodone-acetaminophen (NORCO) 5-325 mg per tablet Take 1 tablet by mouth every 4 (four) hours as needed for Pain. 5 tablet 0    XARELTO 20 mg Tab TAKE 1 TABLET  DAILY 90 tablet 4     No current facility-administered medications for this visit.        Review of patient's allergies indicates:   Allergen Reactions    Niacin      Other reaction(s): Rash  Other reaction(s): Itching       Past Medical History:   Diagnosis Date    *Atrial fibrillation     Chronic kidney disease     Hyperlipidemia     Hypertension     Metabolic syndrome        Past Surgical History:   Procedure Laterality Date    CATARACT EXTRACTION      CHOLECYSTECTOMY      EYE SURGERY      TONSILLECTOMY         Family History   Problem Relation Age of Onset    Diabetes Maternal Aunt     Heart attack Neg Hx     Heart disease Neg Hx     Heart failure Neg Hx     Hyperlipidemia Neg Hx     Hypertension Neg Hx     Stroke Neg Hx        Social History     Social History    Marital status:      Spouse name: N/A    Number of children: N/A    Years of education: N/A     Occupational History    Not on file.     Social History Main Topics    Smoking status: Former Smoker     Packs/day: 2.00     Years: 20.00     Quit date: 5/22/1983    Smokeless tobacco: Never Used    Alcohol use 2.3 oz/week     3 Shots of liquor, 1 Standard drinks or equivalent per week      Comment: occassionally    Drug use: No    Sexual activity: Yes     Partners: Female     Other Topics Concern    Not on file     Social History Narrative    No narrative on file       Vitals:    08/01/18 1010   BP: (!) 142/82   Pulse: 70   Temp: 97.8 °F (36.6 °C)       Physical Exam   Constitutional: He is oriented to person, place, and time. He appears well-nourished. No distress.   HENT:   Head: Normocephalic and atraumatic.   Eyes: No scleral icterus.   Cardiovascular: Normal rate.    Pulmonary/Chest: Effort normal. No stridor. No respiratory distress.   Abdominal: Soft. There is no tenderness.   Lymphadenopathy:     He has no cervical adenopathy.   Neurological: He is alert and oriented to person, place, and time.   Skin: Skin is  warm. No erythema.   Left chest skin cyst, healed, no erythema, no TTP  About 2cm   Psychiatric: He has a normal mood and affect. His behavior is normal.       Assessment & Plan:   82M wqith left chest cyst  Xarelto held  Left chest cyst excision in office today

## 2018-08-01 NOTE — PROGRESS NOTES
"Cliff Magaña is a 82 y.o. male patient.    Temp: 97.8 °F (36.6 °C) (08/01/18 1010)  Pulse: 70 (08/01/18 1010)  BP: (!) 142/82 (08/01/18 1010)  SpO2: 97 % (08/01/18 1010)  Weight: 75 kg (165 lb 5.5 oz) (08/01/18 1010)  Height: 5' 5.5" (166.4 cm) (08/01/18 1010)       Exc, Cyst left chest  Date/Time: 8/1/2018 11:27 AM  Performed by: JAC CARRANZA  Authorized by: JAC CARRANZA     Consent Done?:  Yes (Written)  Time out: Immediately prior to procedure a "time out" was called to verify the correct patient, procedure, equipment, support staff and site/side marked as required.    INDICATIONS:cyst  LOCATION:  Body area:  Neck / anterior thoraxleft    PREP:Position:  Supine    ANESTHESIA:  Anesthesia:  Local infiltration  Local anesthetic:  Lidocaine 1% with epinephrine    PROCEDURE DETAILS:  Excision type:  Skin  Malignancy:  Benign  Excision size (cm):  2  Scalpel size:  15  Incision type:  Elliptical  Hemostasis was obtained.  Estimated blood loss (cc):  2  Wound closure:  Intermediate layered  Wound repair size (cm):  3  Sutures:  3-0 Vicryl and 4-0 Monocryl  Sterile dressings:  Gauze and Tegaderm  Needle, instrument, and sponge counts were correct.  Patient tolerated the procedure well with no immediate complications.  Post-operative instructions were provided for the patient.  Patient was discharged and will follow up for wound check.        Jac Carranza  8/1/2018  "

## 2018-08-15 ENCOUNTER — OFFICE VISIT (OUTPATIENT)
Dept: SURGERY | Facility: CLINIC | Age: 83
End: 2018-08-15
Payer: MEDICARE

## 2018-08-15 VITALS
TEMPERATURE: 97 F | WEIGHT: 166.69 LBS | DIASTOLIC BLOOD PRESSURE: 74 MMHG | HEIGHT: 66 IN | HEART RATE: 56 BPM | SYSTOLIC BLOOD PRESSURE: 122 MMHG | BODY MASS INDEX: 26.79 KG/M2

## 2018-08-15 DIAGNOSIS — L72.3 SEBACEOUS CYST: Primary | ICD-10-CM

## 2018-08-15 PROCEDURE — 99214 OFFICE O/P EST MOD 30 MIN: CPT | Mod: PBBFAC,PO | Performed by: STUDENT IN AN ORGANIZED HEALTH CARE EDUCATION/TRAINING PROGRAM

## 2018-08-15 PROCEDURE — 99024 POSTOP FOLLOW-UP VISIT: CPT | Mod: POP,,, | Performed by: STUDENT IN AN ORGANIZED HEALTH CARE EDUCATION/TRAINING PROGRAM

## 2018-08-15 PROCEDURE — 99999 PR PBB SHADOW E&M-EST. PATIENT-LVL IV: CPT | Mod: PBBFAC,,, | Performed by: STUDENT IN AN ORGANIZED HEALTH CARE EDUCATION/TRAINING PROGRAM

## 2018-08-15 NOTE — PROGRESS NOTES
Doing well  No pain  No drainage    Incision healing well  Some induration superiorly around incision but nontender    RTC prn

## 2018-08-29 ENCOUNTER — LAB VISIT (OUTPATIENT)
Dept: LAB | Facility: HOSPITAL | Age: 83
End: 2018-08-29
Attending: FAMILY MEDICINE
Payer: MEDICARE

## 2018-08-29 DIAGNOSIS — I48.19 PERSISTENT ATRIAL FIBRILLATION: ICD-10-CM

## 2018-08-29 DIAGNOSIS — E78.2 MIXED HYPERLIPIDEMIA: ICD-10-CM

## 2018-08-29 DIAGNOSIS — E74.89 OTHER SPECIFIED DISORDERS OF CARBOHYDRATE METABOLISM: ICD-10-CM

## 2018-08-29 DIAGNOSIS — I10 ESSENTIAL HYPERTENSION: ICD-10-CM

## 2018-08-29 DIAGNOSIS — E88.810 METABOLIC SYNDROME: ICD-10-CM

## 2018-08-29 LAB
ALBUMIN SERPL BCP-MCNC: 3.9 G/DL
ALP SERPL-CCNC: 42 U/L
ALT SERPL W/O P-5'-P-CCNC: 34 U/L
ANION GAP SERPL CALC-SCNC: 11 MMOL/L
AST SERPL-CCNC: 33 U/L
BILIRUB SERPL-MCNC: 1.1 MG/DL
BUN SERPL-MCNC: 20 MG/DL
CALCIUM SERPL-MCNC: 9.9 MG/DL
CHLORIDE SERPL-SCNC: 106 MMOL/L
CHOLEST SERPL-MCNC: 153 MG/DL
CHOLEST/HDLC SERPL: 4.5 {RATIO}
CO2 SERPL-SCNC: 25 MMOL/L
CREAT SERPL-MCNC: 1.3 MG/DL
EST. GFR  (AFRICAN AMERICAN): 58.7 ML/MIN/1.73 M^2
EST. GFR  (NON AFRICAN AMERICAN): 50.8 ML/MIN/1.73 M^2
ESTIMATED AVG GLUCOSE: 120 MG/DL
GLUCOSE SERPL-MCNC: 121 MG/DL
HBA1C MFR BLD HPLC: 5.8 %
HDLC SERPL-MCNC: 34 MG/DL
HDLC SERPL: 22.2 %
LDLC SERPL CALC-MCNC: 83.4 MG/DL
NONHDLC SERPL-MCNC: 119 MG/DL
POTASSIUM SERPL-SCNC: 3.9 MMOL/L
PROT SERPL-MCNC: 7.3 G/DL
SODIUM SERPL-SCNC: 142 MMOL/L
TRIGL SERPL-MCNC: 178 MG/DL
TSH SERPL DL<=0.005 MIU/L-ACNC: 2.44 UIU/ML

## 2018-08-29 PROCEDURE — 80053 COMPREHEN METABOLIC PANEL: CPT

## 2018-08-29 PROCEDURE — 84443 ASSAY THYROID STIM HORMONE: CPT

## 2018-08-29 PROCEDURE — 80061 LIPID PANEL: CPT

## 2018-08-29 PROCEDURE — 83036 HEMOGLOBIN GLYCOSYLATED A1C: CPT

## 2018-08-29 PROCEDURE — 36415 COLL VENOUS BLD VENIPUNCTURE: CPT | Mod: PO

## 2018-08-31 ENCOUNTER — TELEPHONE (OUTPATIENT)
Dept: CARDIOLOGY | Facility: CLINIC | Age: 83
End: 2018-08-31

## 2018-08-31 ENCOUNTER — PATIENT MESSAGE (OUTPATIENT)
Dept: CARDIOLOGY | Facility: CLINIC | Age: 83
End: 2018-08-31

## 2018-08-31 NOTE — TELEPHONE ENCOUNTER
----- Message from Nereyda Birmingham MD sent at 8/31/2018  1:38 AM CDT -----  Please review.  We will discuss the results during your upcoming visit with me. It looks good except for elevated TG-C.

## 2018-09-05 ENCOUNTER — OFFICE VISIT (OUTPATIENT)
Dept: CARDIOLOGY | Facility: CLINIC | Age: 83
End: 2018-09-05
Payer: MEDICARE

## 2018-09-05 VITALS
WEIGHT: 166.88 LBS | HEIGHT: 66 IN | HEART RATE: 84 BPM | SYSTOLIC BLOOD PRESSURE: 154 MMHG | BODY MASS INDEX: 26.82 KG/M2 | DIASTOLIC BLOOD PRESSURE: 82 MMHG

## 2018-09-05 DIAGNOSIS — I10 ESSENTIAL HYPERTENSION: Primary | Chronic | ICD-10-CM

## 2018-09-05 DIAGNOSIS — Z87.891 EX-SMOKER: ICD-10-CM

## 2018-09-05 DIAGNOSIS — E78.2 MIXED HYPERLIPIDEMIA: Chronic | ICD-10-CM

## 2018-09-05 DIAGNOSIS — I70.0 AORTIC ATHEROSCLEROSIS: ICD-10-CM

## 2018-09-05 DIAGNOSIS — I48.19 PERSISTENT ATRIAL FIBRILLATION: Chronic | ICD-10-CM

## 2018-09-05 DIAGNOSIS — N18.2 STAGE 2 CHRONIC KIDNEY DISEASE: Chronic | ICD-10-CM

## 2018-09-05 PROCEDURE — 99999 PR PBB SHADOW E&M-EST. PATIENT-LVL III: CPT | Mod: PBBFAC,,, | Performed by: INTERNAL MEDICINE

## 2018-09-05 PROCEDURE — 99213 OFFICE O/P EST LOW 20 MIN: CPT | Mod: S$PBB,,, | Performed by: INTERNAL MEDICINE

## 2018-09-05 PROCEDURE — 99213 OFFICE O/P EST LOW 20 MIN: CPT | Mod: PBBFAC,PO | Performed by: INTERNAL MEDICINE

## 2018-09-05 NOTE — PROGRESS NOTES
"Subjective:   Patient ID:  Cliff Magaña is a 82 y.o. male who presents for follow-up of Hypertension      HPI: Patient has no symptoms and is doing well. He admits to dietary indiscretions. TG-C is elevated again    Lab Results   Component Value Date     08/29/2018    K 3.9 08/29/2018     08/29/2018    CO2 25 08/29/2018    BUN 20 08/29/2018    CREATININE 1.3 08/29/2018     (H) 08/29/2018    HGBA1C 5.8 (H) 08/29/2018    AST 33 08/29/2018    ALT 34 08/29/2018    ALBUMIN 3.9 08/29/2018    PROT 7.3 08/29/2018    BILITOT 1.1 (H) 08/29/2018    WBC 7.03 05/18/2018    HGB 16.4 05/18/2018    HCT 48.6 05/18/2018    MCV 93 05/18/2018     05/18/2018    INR 2.8 12/17/2013    INR 1.8 (H) 06/07/2011    PSA 0.43 06/06/2013    TSH 2.437 08/29/2018    CHOL 153 08/29/2018    HDL 34 (L) 08/29/2018    LDLCALC 83.4 08/29/2018    TRIG 178 (H) 08/29/2018       Review of Systems   Constitution: Negative.   HENT: Negative.    Eyes: Negative.    Cardiovascular: Positive for irregular heartbeat. Negative for chest pain, claudication, dyspnea on exertion, leg swelling, near-syncope, palpitations and syncope.   Respiratory: Positive for cough. Negative for shortness of breath, snoring and wheezing.    Endocrine: Negative.  Negative for cold intolerance, heat intolerance, polydipsia, polyphagia and polyuria.   Skin: Negative.    Musculoskeletal: Negative.    Gastrointestinal: Negative.    Genitourinary: Negative.    Neurological: Negative.    Psychiatric/Behavioral: Negative.        Objective:   Physical Exam   Constitutional: He is oriented to person, place, and time. He appears well-developed and well-nourished.   BP (!) 154/82   Pulse 84   Ht 5' 5.5" (1.664 m)   Wt 75.7 kg (166 lb 14.2 oz)   BMI 27.35 kg/m²      HENT:   Head: Normocephalic.   Eyes: Pupils are equal, round, and reactive to light.   Neck: Normal range of motion. Neck supple. Carotid bruit is not present. No thyromegaly present.   Cardiovascular: " Normal rate, normal heart sounds and intact distal pulses. An irregularly irregular rhythm present. Exam reveals no gallop and no friction rub.   No murmur heard.  Pulses:       Carotid pulses are 2+ on the right side, and 2+ on the left side.       Radial pulses are 2+ on the right side, and 2+ on the left side.        Femoral pulses are 2+ on the right side, and 2+ on the left side.       Popliteal pulses are 2+ on the right side, and 2+ on the left side.        Dorsalis pedis pulses are 2+ on the right side, and 2+ on the left side.        Posterior tibial pulses are 2+ on the right side, and 2+ on the left side.   Pulmonary/Chest: Effort normal and breath sounds normal. No respiratory distress. He has no wheezes. He has no rales. He exhibits no tenderness.   Abdominal: Soft. Bowel sounds are normal.   Musculoskeletal: Normal range of motion. He exhibits no edema.   Neurological: He is alert and oriented to person, place, and time.   Skin: Skin is warm and dry.   Psychiatric: He has a normal mood and affect.   Nursing note and vitals reviewed.        Assessment and Plan:     Problem List Items Addressed This Visit        Cardiology Problems    HTN (hypertension) - Primary (Chronic)    Hyperlipidemia (Chronic)    Persistent atrial fibrillation (Chronic)    Aortic atherosclerosis       Other    Chronic renal disease (Chronic)    Ex-smoker          Patient's Medications   New Prescriptions    No medications on file   Previous Medications    ASPIRIN (ECOTRIN) 81 MG EC TABLET    Take 81 mg by mouth once daily.    FENOFIBRATE MICRONIZED (LOFIBRA) 200 MG CAP    TAKE 1 CAPSULE DAILY WITH BREAKFAST    HYDROCHLOROTHIAZIDE (HYDRODIURIL) 25 MG TABLET    TAKE 1 TABLET DAILY    KRILL-OM-3-DHA-EPA-PHOSPHO-AST (MEGARED OMEGA-3 KRILL OIL) 1,000-230-60 MG CAP    Take by mouth once daily. Pt taking one pill once a day    LORATADINE (CLARITIN) 10 MG TABLET    Take 10 mg by mouth once daily.    TRAMAINE BIOTIN ORAL    Take 5,000 mcg by  mouth once daily.    MULTIVITAMIN-MINERALS-LUTEIN (CENTRUM SILVER) TAB    Take by mouth. 1 Tablet Oral Every day    TOPROL XL 50 MG 24 HR TABLET    TAKE ONE AND ONE-HALF TABLETS ONCE DAILY    XARELTO 20 MG TAB    TAKE 1 TABLET DAILY   Modified Medications    No medications on file   Discontinued Medications    HYDROCODONE-ACETAMINOPHEN (NORCO) 5-325 MG PER TABLET    Take 1 tablet by mouth every 4 (four) hours as needed for Pain.   Limiting carbs and alcohol discussed at length.  Continue on the present regimen  Patient is inquiring if he should continue on ASA.  Despite the fact that he is over 76 yo and taking Xarelto, I would still recommend ,low dose ASA as he is hypertensive, prior smoker and had increased ASCVD risk with known atherosclerosis.  Follow-up in about 1 year (around 9/5/2019).

## 2019-01-10 DIAGNOSIS — I48.19 PERSISTENT ATRIAL FIBRILLATION: Chronic | ICD-10-CM

## 2019-01-10 RX ORDER — RIVAROXABAN 20 MG/1
TABLET, FILM COATED ORAL
Qty: 90 TABLET | Refills: 4 | Status: SHIPPED | OUTPATIENT
Start: 2019-01-10 | End: 2020-04-06

## 2019-01-14 RX ORDER — FENOFIBRATE 200 MG/1
200 CAPSULE ORAL
Qty: 90 CAPSULE | Refills: 3 | Status: SHIPPED | OUTPATIENT
Start: 2019-01-14 | End: 2020-01-28 | Stop reason: SDUPTHER

## 2019-01-22 ENCOUNTER — TELEPHONE (OUTPATIENT)
Dept: INTERNAL MEDICINE | Facility: CLINIC | Age: 84
End: 2019-01-22

## 2019-01-22 DIAGNOSIS — Z87.891 EX-SMOKER: ICD-10-CM

## 2019-01-22 DIAGNOSIS — Z00.00 WELL ADULT EXAM: Primary | ICD-10-CM

## 2019-01-22 DIAGNOSIS — I48.19 PERSISTENT ATRIAL FIBRILLATION: Chronic | ICD-10-CM

## 2019-01-22 DIAGNOSIS — I70.0 AORTIC ATHEROSCLEROSIS: ICD-10-CM

## 2019-01-22 DIAGNOSIS — N18.2 STAGE 2 CHRONIC KIDNEY DISEASE: Chronic | ICD-10-CM

## 2019-01-22 DIAGNOSIS — E78.2 MIXED HYPERLIPIDEMIA: Chronic | ICD-10-CM

## 2019-01-22 DIAGNOSIS — L72.3 SEBACEOUS CYST: ICD-10-CM

## 2019-01-22 DIAGNOSIS — Z83.3 FAMILY HISTORY OF DIABETES MELLITUS: ICD-10-CM

## 2019-01-22 DIAGNOSIS — Z79.01 CHRONIC ANTICOAGULATION: ICD-10-CM

## 2019-01-22 DIAGNOSIS — L02.213 ABSCESS OF CHEST WALL: ICD-10-CM

## 2019-01-22 DIAGNOSIS — I10 ESSENTIAL HYPERTENSION: Chronic | ICD-10-CM

## 2019-01-22 NOTE — TELEPHONE ENCOUNTER
----- Message from Tessa Wharton sent at 1/22/2019  1:42 PM CST -----  Lab Orders Needed    I have scheduled the above patients annual physical and lab appointments. Lab orders need to be placed and linked.    Date of Annual Physical: 05/28/2019    Date of Lab appt: 05/21/2019    Thank You

## 2019-02-14 DIAGNOSIS — I48.19 PERSISTENT ATRIAL FIBRILLATION: Chronic | ICD-10-CM

## 2019-02-14 DIAGNOSIS — E88.810 METABOLIC SYNDROME: Chronic | ICD-10-CM

## 2019-02-14 DIAGNOSIS — E78.2 MIXED HYPERLIPIDEMIA: Chronic | ICD-10-CM

## 2019-02-14 DIAGNOSIS — I10 ESSENTIAL HYPERTENSION: Chronic | ICD-10-CM

## 2019-02-14 RX ORDER — METOPROLOL SUCCINATE 50 MG/1
TABLET, EXTENDED RELEASE ORAL
Qty: 135 TABLET | Refills: 3 | Status: SHIPPED | OUTPATIENT
Start: 2019-02-14 | End: 2019-12-23 | Stop reason: SDUPTHER

## 2019-02-14 RX ORDER — HYDROCHLOROTHIAZIDE 25 MG/1
TABLET ORAL
Qty: 90 TABLET | Refills: 3 | Status: SHIPPED | OUTPATIENT
Start: 2019-02-14 | End: 2019-12-30

## 2019-05-20 ENCOUNTER — TELEPHONE (OUTPATIENT)
Dept: CARDIOLOGY | Facility: CLINIC | Age: 84
End: 2019-05-20

## 2019-05-20 DIAGNOSIS — I10 ESSENTIAL HYPERTENSION: Primary | ICD-10-CM

## 2019-05-20 DIAGNOSIS — N18.30 STAGE 3 CHRONIC KIDNEY DISEASE: ICD-10-CM

## 2019-05-20 DIAGNOSIS — N18.2 STAGE 2 CHRONIC KIDNEY DISEASE: ICD-10-CM

## 2019-05-20 DIAGNOSIS — I70.0 AORTIC ATHEROSCLEROSIS: ICD-10-CM

## 2019-05-20 DIAGNOSIS — I48.19 PERSISTENT ATRIAL FIBRILLATION: ICD-10-CM

## 2019-05-20 DIAGNOSIS — I10 HYPERTENSION, ESSENTIAL: ICD-10-CM

## 2019-05-20 DIAGNOSIS — E78.2 MIXED HYPERLIPIDEMIA: ICD-10-CM

## 2019-05-20 DIAGNOSIS — Z83.3 FAMILY HISTORY OF DIABETES MELLITUS: ICD-10-CM

## 2019-05-21 ENCOUNTER — LAB VISIT (OUTPATIENT)
Dept: LAB | Facility: HOSPITAL | Age: 84
End: 2019-05-21
Attending: FAMILY MEDICINE
Payer: MEDICARE

## 2019-05-21 DIAGNOSIS — N18.2 STAGE 2 CHRONIC KIDNEY DISEASE: Chronic | ICD-10-CM

## 2019-05-21 DIAGNOSIS — L72.3 SEBACEOUS CYST: ICD-10-CM

## 2019-05-21 DIAGNOSIS — L02.213 ABSCESS OF CHEST WALL: ICD-10-CM

## 2019-05-21 DIAGNOSIS — E78.2 MIXED HYPERLIPIDEMIA: Chronic | ICD-10-CM

## 2019-05-21 DIAGNOSIS — I48.19 PERSISTENT ATRIAL FIBRILLATION: Chronic | ICD-10-CM

## 2019-05-21 DIAGNOSIS — Z00.00 WELL ADULT EXAM: ICD-10-CM

## 2019-05-21 DIAGNOSIS — Z79.01 CHRONIC ANTICOAGULATION: ICD-10-CM

## 2019-05-21 DIAGNOSIS — Z83.3 FAMILY HISTORY OF DIABETES MELLITUS: ICD-10-CM

## 2019-05-21 DIAGNOSIS — I10 ESSENTIAL HYPERTENSION: Chronic | ICD-10-CM

## 2019-05-21 DIAGNOSIS — Z87.891 EX-SMOKER: ICD-10-CM

## 2019-05-21 DIAGNOSIS — I70.0 AORTIC ATHEROSCLEROSIS: ICD-10-CM

## 2019-05-21 LAB
ALBUMIN SERPL BCP-MCNC: 4 G/DL (ref 3.5–5.2)
ALP SERPL-CCNC: 42 U/L (ref 55–135)
ALT SERPL W/O P-5'-P-CCNC: 31 U/L (ref 10–44)
ANION GAP SERPL CALC-SCNC: 11 MMOL/L (ref 8–16)
AST SERPL-CCNC: 31 U/L (ref 10–40)
BASOPHILS # BLD AUTO: 0.06 K/UL (ref 0–0.2)
BASOPHILS NFR BLD: 0.8 % (ref 0–1.9)
BILIRUB SERPL-MCNC: 0.8 MG/DL (ref 0.1–1)
BUN SERPL-MCNC: 25 MG/DL (ref 8–23)
CALCIUM SERPL-MCNC: 10.2 MG/DL (ref 8.7–10.5)
CHLORIDE SERPL-SCNC: 109 MMOL/L (ref 95–110)
CHOLEST SERPL-MCNC: 145 MG/DL (ref 120–199)
CHOLEST/HDLC SERPL: 4.8 {RATIO} (ref 2–5)
CO2 SERPL-SCNC: 24 MMOL/L (ref 23–29)
CREAT SERPL-MCNC: 1.5 MG/DL (ref 0.5–1.4)
DIFFERENTIAL METHOD: ABNORMAL
EOSINOPHIL # BLD AUTO: 0.2 K/UL (ref 0–0.5)
EOSINOPHIL NFR BLD: 3.2 % (ref 0–8)
ERYTHROCYTE [DISTWIDTH] IN BLOOD BY AUTOMATED COUNT: 13.5 % (ref 11.5–14.5)
EST. GFR  (AFRICAN AMERICAN): 49.1 ML/MIN/1.73 M^2
EST. GFR  (NON AFRICAN AMERICAN): 42.4 ML/MIN/1.73 M^2
GLUCOSE SERPL-MCNC: 126 MG/DL (ref 70–110)
HCT VFR BLD AUTO: 50.9 % (ref 40–54)
HDLC SERPL-MCNC: 30 MG/DL (ref 40–75)
HDLC SERPL: 20.7 % (ref 20–50)
HGB BLD-MCNC: 17.3 G/DL (ref 14–18)
IMM GRANULOCYTES # BLD AUTO: 0.01 K/UL (ref 0–0.04)
IMM GRANULOCYTES NFR BLD AUTO: 0.1 % (ref 0–0.5)
LDLC SERPL CALC-MCNC: 78.8 MG/DL (ref 63–159)
LYMPHOCYTES # BLD AUTO: 2.9 K/UL (ref 1–4.8)
LYMPHOCYTES NFR BLD: 39.8 % (ref 18–48)
MCH RBC QN AUTO: 31.8 PG (ref 27–31)
MCHC RBC AUTO-ENTMCNC: 34 G/DL (ref 32–36)
MCV RBC AUTO: 94 FL (ref 82–98)
MONOCYTES # BLD AUTO: 0.8 K/UL (ref 0.3–1)
MONOCYTES NFR BLD: 10.5 % (ref 4–15)
NEUTROPHILS # BLD AUTO: 3.3 K/UL (ref 1.8–7.7)
NEUTROPHILS NFR BLD: 45.6 % (ref 38–73)
NONHDLC SERPL-MCNC: 115 MG/DL
NRBC BLD-RTO: 0 /100 WBC
PLATELET # BLD AUTO: 193 K/UL (ref 150–350)
PMV BLD AUTO: 13.4 FL (ref 9.2–12.9)
POTASSIUM SERPL-SCNC: 4.2 MMOL/L (ref 3.5–5.1)
PROT SERPL-MCNC: 7.4 G/DL (ref 6–8.4)
RBC # BLD AUTO: 5.44 M/UL (ref 4.6–6.2)
SODIUM SERPL-SCNC: 144 MMOL/L (ref 136–145)
T4 FREE SERPL-MCNC: 0.92 NG/DL (ref 0.71–1.51)
TRIGL SERPL-MCNC: 181 MG/DL (ref 30–150)
TSH SERPL DL<=0.005 MIU/L-ACNC: 1.46 UIU/ML (ref 0.4–4)
WBC # BLD AUTO: 7.21 K/UL (ref 3.9–12.7)

## 2019-05-21 PROCEDURE — 80061 LIPID PANEL: CPT

## 2019-05-21 PROCEDURE — 36415 COLL VENOUS BLD VENIPUNCTURE: CPT | Mod: PO

## 2019-05-21 PROCEDURE — 84443 ASSAY THYROID STIM HORMONE: CPT

## 2019-05-21 PROCEDURE — 80053 COMPREHEN METABOLIC PANEL: CPT

## 2019-05-21 PROCEDURE — 84439 ASSAY OF FREE THYROXINE: CPT

## 2019-05-21 PROCEDURE — 85025 COMPLETE CBC W/AUTO DIFF WBC: CPT

## 2019-05-28 ENCOUNTER — OFFICE VISIT (OUTPATIENT)
Dept: INTERNAL MEDICINE | Facility: CLINIC | Age: 84
End: 2019-05-28
Payer: MEDICARE

## 2019-05-28 VITALS
SYSTOLIC BLOOD PRESSURE: 120 MMHG | OXYGEN SATURATION: 99 % | DIASTOLIC BLOOD PRESSURE: 60 MMHG | TEMPERATURE: 98 F | WEIGHT: 166.88 LBS | BODY MASS INDEX: 27.81 KG/M2 | HEIGHT: 65 IN | HEART RATE: 86 BPM | RESPIRATION RATE: 18 BRPM

## 2019-05-28 DIAGNOSIS — I70.0 AORTIC ATHEROSCLEROSIS: ICD-10-CM

## 2019-05-28 DIAGNOSIS — Z79.01 CHRONIC ANTICOAGULATION: ICD-10-CM

## 2019-05-28 DIAGNOSIS — E78.2 MIXED HYPERLIPIDEMIA: Chronic | ICD-10-CM

## 2019-05-28 DIAGNOSIS — I10 ESSENTIAL HYPERTENSION: Chronic | ICD-10-CM

## 2019-05-28 DIAGNOSIS — I48.19 PERSISTENT ATRIAL FIBRILLATION: Chronic | ICD-10-CM

## 2019-05-28 DIAGNOSIS — Z87.891 EX-SMOKER: ICD-10-CM

## 2019-05-28 DIAGNOSIS — N52.9 ERECTILE DYSFUNCTION, UNSPECIFIED ERECTILE DYSFUNCTION TYPE: ICD-10-CM

## 2019-05-28 DIAGNOSIS — N18.30 STAGE 3 CHRONIC KIDNEY DISEASE: ICD-10-CM

## 2019-05-28 DIAGNOSIS — Z00.00 WELL ADULT EXAM: Primary | ICD-10-CM

## 2019-05-28 PROCEDURE — 99999 PR PBB SHADOW E&M-EST. PATIENT-LVL IV: CPT | Mod: PBBFAC,,, | Performed by: FAMILY MEDICINE

## 2019-05-28 PROCEDURE — 99999 PR PBB SHADOW E&M-EST. PATIENT-LVL IV: ICD-10-PCS | Mod: PBBFAC,,, | Performed by: FAMILY MEDICINE

## 2019-05-28 PROCEDURE — 99215 PR OFFICE/OUTPT VISIT, EST, LEVL V, 40-54 MIN: ICD-10-PCS | Mod: S$PBB,,, | Performed by: FAMILY MEDICINE

## 2019-05-28 PROCEDURE — 99215 OFFICE O/P EST HI 40 MIN: CPT | Mod: S$PBB,,, | Performed by: FAMILY MEDICINE

## 2019-05-28 PROCEDURE — 99214 OFFICE O/P EST MOD 30 MIN: CPT | Mod: PBBFAC,PO | Performed by: FAMILY MEDICINE

## 2019-05-28 RX ORDER — SILDENAFIL 100 MG/1
100 TABLET, FILM COATED ORAL DAILY PRN
Qty: 10 TABLET | Refills: 11 | Status: SHIPPED | OUTPATIENT
Start: 2019-05-28 | End: 2020-03-11

## 2019-05-28 RX ORDER — TRIAMCINOLONE ACETONIDE 1 MG/G
CREAM TOPICAL 2 TIMES DAILY PRN
Refills: 1 | COMMUNITY
Start: 2019-04-09 | End: 2020-03-11

## 2019-05-28 NOTE — PROGRESS NOTES
Subjective:       Patient ID: Cliff Magaña is a 83 y.o. male.    Chief Complaint: Annual Exam    HPI 83-year-old white male presents to clinic today for annual physical exam.  He continues to be followed by Cardiology for treatment of hypertension and persistent atrial fibrillation which remains well controlled on metoprolol 50 mg daily and hydrochlorothiazide 25 mg daily.  Continues to be anticoagulated on Xarelto and aspirin.  Hyperlipidemia remains well controlled on fenofibrate and fish oil.  He continues to have stable stage 3 chronic kidney disease. He has a past surgical history of cholecystectomy, tonsillectomy, cataract repair, and sebaceous cyst removal.  He has a family history of his parents passing away in a motor vehicle accident and his maternal aunt passing away from diabetes.  He is up-to-date with all vaccinations.  Finally, he is interested in attempting Viagra secondary to erectile dysfunction.  Review of Systems   Constitutional: Negative for activity change, appetite change, chills, fatigue, fever and unexpected weight change.   HENT: Negative for congestion, ear pain, hearing loss, postnasal drip, rhinorrhea, sinus pressure, sore throat, tinnitus and trouble swallowing.    Eyes: Negative for discharge, redness, itching and visual disturbance.   Respiratory: Negative for cough, chest tightness, shortness of breath and wheezing.    Cardiovascular: Negative for chest pain and palpitations.   Gastrointestinal: Negative for abdominal pain, blood in stool, constipation, diarrhea, nausea and vomiting.   Endocrine: Negative for polydipsia and polyuria.   Genitourinary: Positive for urgency. Negative for decreased urine volume, difficulty urinating, dysuria, frequency and hematuria.   Musculoskeletal: Negative for arthralgias, back pain, joint swelling, myalgias, neck pain and neck stiffness.   Skin: Negative for rash.   Neurological: Negative for dizziness, weakness, light-headedness and headaches.    Psychiatric/Behavioral: Negative.  Negative for confusion and dysphoric mood.       Objective:      Physical Exam   Constitutional: He is oriented to person, place, and time. He appears well-developed and well-nourished. No distress.   HENT:   Head: Normocephalic and atraumatic.   Right Ear: External ear normal.   Left Ear: External ear normal.   Nose: Nose normal.   Mouth/Throat: Oropharynx is clear and moist. No oropharyngeal exudate.   Eyes: Pupils are equal, round, and reactive to light. Conjunctivae and EOM are normal. Right eye exhibits no discharge. Left eye exhibits no discharge. No scleral icterus.   Neck: Normal range of motion. Neck supple. No JVD present. No tracheal deviation present. No thyromegaly present.   Cardiovascular: Normal rate, normal heart sounds and intact distal pulses. An irregularly irregular rhythm present. Exam reveals no gallop and no friction rub.   No murmur heard.  Pulmonary/Chest: Effort normal and breath sounds normal. No stridor. No respiratory distress. He has no wheezes. He has no rales.   Abdominal: Soft. Bowel sounds are normal. He exhibits no distension and no mass. There is no tenderness. There is no rebound and no guarding.   Musculoskeletal: Normal range of motion. He exhibits no edema or tenderness.   Lymphadenopathy:     He has no cervical adenopathy.   Neurological: He is alert and oriented to person, place, and time.   Skin: Skin is warm and dry. No rash noted. He is not diaphoretic. No erythema. No pallor.   Psychiatric: He has a normal mood and affect. His behavior is normal. Judgment and thought content normal.   Nursing note and vitals reviewed.      Assessment:       1. Well adult exam    2. Essential hypertension    3. Mixed hyperlipidemia    4. Persistent atrial fibrillation    5. Chronic anticoagulation    6. Aortic atherosclerosis    7. Stage 3 chronic kidney disease    8. Ex-smoker    9. Erectile dysfunction, unspecified erectile dysfunction type         Plan:       1.  Labs have been reviewed and are overall within normal limits except for a mild increasing creatinine.  2.  Continue metoprolol 50 mg daily and hydrochlorothiazide 25 mg daily.  Hypertension is well controlled.  3.  Continue fenofibrate and fish oil as prescribed.  Hyperlipidemia is well controlled.  4.  Atrial fibrillation is stable.  Continue aspirin and Xarelto and continue follow-up with Cardiology as scheduled.  5.  Encourage hydration.  Stage 3 chronic kidney disease remained stable.  6.  Viagra 100 mg as needed for erectile dysfunction.  7.  Return to clinic as needed or in 1 year for annual exam.    40 min have been spent with the patient with more than 50% of the clinic visit spent reviewing medications, discussing lab results, and counseling the patient regarding screening recommendations.

## 2019-05-31 DIAGNOSIS — N18.9 CHRONIC KIDNEY DISEASE, UNSPECIFIED CKD STAGE: ICD-10-CM

## 2019-07-29 ENCOUNTER — HOSPITAL ENCOUNTER (OUTPATIENT)
Dept: RADIOLOGY | Facility: HOSPITAL | Age: 84
Discharge: HOME | End: 2019-07-29
Attending: FAMILY MEDICINE
Payer: MEDICARE

## 2019-07-29 ENCOUNTER — OFFICE VISIT (OUTPATIENT)
Dept: INTERNAL MEDICINE | Facility: CLINIC | Age: 84
End: 2019-07-29
Payer: MEDICARE

## 2019-07-29 VITALS
HEART RATE: 84 BPM | HEIGHT: 66 IN | DIASTOLIC BLOOD PRESSURE: 80 MMHG | BODY MASS INDEX: 26.86 KG/M2 | SYSTOLIC BLOOD PRESSURE: 135 MMHG | WEIGHT: 167.13 LBS | RESPIRATION RATE: 16 BRPM | TEMPERATURE: 98 F

## 2019-07-29 DIAGNOSIS — M54.50 CHRONIC BILATERAL LOW BACK PAIN WITHOUT SCIATICA: ICD-10-CM

## 2019-07-29 DIAGNOSIS — G89.29 CHRONIC BILATERAL LOW BACK PAIN WITHOUT SCIATICA: ICD-10-CM

## 2019-07-29 DIAGNOSIS — M54.50 CHRONIC BILATERAL LOW BACK PAIN WITHOUT SCIATICA: Primary | ICD-10-CM

## 2019-07-29 DIAGNOSIS — G89.29 CHRONIC BILATERAL LOW BACK PAIN WITHOUT SCIATICA: Primary | ICD-10-CM

## 2019-07-29 PROCEDURE — 72110 XR LUMBAR SPINE COMPLETE 5 VIEW: ICD-10-PCS | Mod: 26,,, | Performed by: RADIOLOGY

## 2019-07-29 PROCEDURE — 99999 PR PBB SHADOW E&M-EST. PATIENT-LVL IV: ICD-10-PCS | Mod: PBBFAC,,, | Performed by: FAMILY MEDICINE

## 2019-07-29 PROCEDURE — 72110 X-RAY EXAM L-2 SPINE 4/>VWS: CPT | Mod: TC,PO

## 2019-07-29 PROCEDURE — 99214 OFFICE O/P EST MOD 30 MIN: CPT | Mod: S$PBB,,, | Performed by: FAMILY MEDICINE

## 2019-07-29 PROCEDURE — 99999 PR PBB SHADOW E&M-EST. PATIENT-LVL IV: CPT | Mod: PBBFAC,,, | Performed by: FAMILY MEDICINE

## 2019-07-29 PROCEDURE — 99214 OFFICE O/P EST MOD 30 MIN: CPT | Mod: PBBFAC,PO,25 | Performed by: FAMILY MEDICINE

## 2019-07-29 PROCEDURE — 99214 PR OFFICE/OUTPT VISIT, EST, LEVL IV, 30-39 MIN: ICD-10-PCS | Mod: S$PBB,,, | Performed by: FAMILY MEDICINE

## 2019-07-29 PROCEDURE — 72110 X-RAY EXAM L-2 SPINE 4/>VWS: CPT | Mod: 26,,, | Performed by: RADIOLOGY

## 2019-07-29 RX ORDER — TIZANIDINE 2 MG/1
2 TABLET ORAL EVERY 8 HOURS PRN
Qty: 30 TABLET | Refills: 0 | Status: SHIPPED | OUTPATIENT
Start: 2019-07-29 | End: 2019-08-08

## 2019-07-29 NOTE — PROGRESS NOTES
Subjective:       Patient ID: Cliff Magaña is a 83 y.o. male.    Chief Complaint: Back Pain    HPI 83-year-old white male presents to clinic today accompanied by his wife secondary to a complaint of continued back pain which has been ongoing since approximately February.  He reports the pain began while preparing to move.  He reports increase lifting at that time.  He started noticing lower back pain without any radiation into his extremities.  He has been using heat without relief.  He reports the pain is worse with activities such as walking and he does have to stop frequently to rest secondary to back pain. He reports recently attempting acupuncture while on a cruise which did provide some slight improvement of his back pain. He currently rates his back pain at a 4/10.  Review of Systems   Constitutional: Negative for appetite change, chills, fatigue and fever.   HENT: Negative for congestion, ear pain, hearing loss, postnasal drip, rhinorrhea, sinus pressure, sore throat and tinnitus.    Eyes: Negative for redness, itching and visual disturbance.   Respiratory: Negative for cough, chest tightness and shortness of breath.    Cardiovascular: Negative for chest pain and palpitations.   Gastrointestinal: Negative for abdominal pain, constipation, diarrhea, nausea and vomiting.   Genitourinary: Negative for decreased urine volume, difficulty urinating, dysuria, frequency, hematuria and urgency.   Musculoskeletal: Positive for back pain. Negative for myalgias, neck pain and neck stiffness.   Skin: Negative for rash.   Neurological: Positive for weakness. Negative for dizziness, light-headedness, numbness and headaches.   Psychiatric/Behavioral: Negative.        Objective:      Physical Exam   Constitutional: He is oriented to person, place, and time. He appears well-developed and well-nourished. No distress.   HENT:   Head: Normocephalic and atraumatic.   Right Ear: External ear normal.   Left Ear: External ear  normal.   Nose: Nose normal.   Mouth/Throat: Oropharynx is clear and moist. No oropharyngeal exudate.   Eyes: Pupils are equal, round, and reactive to light. Conjunctivae and EOM are normal. Right eye exhibits no discharge. Left eye exhibits no discharge. No scleral icterus.   Neck: Normal range of motion. Neck supple. No JVD present. No tracheal deviation present. No thyromegaly present.   Cardiovascular: Normal rate, normal heart sounds and intact distal pulses. An irregularly irregular rhythm present. Exam reveals no gallop and no friction rub.   No murmur heard.  Pulmonary/Chest: Effort normal and breath sounds normal. No stridor. No respiratory distress. He has no wheezes. He has no rales.   Abdominal: Soft. Bowel sounds are normal. He exhibits no distension and no mass. There is no tenderness. There is no rebound and no guarding.   Musculoskeletal: Normal range of motion. He exhibits no edema.        Lumbar back: He exhibits tenderness, pain and spasm.   Lymphadenopathy:     He has no cervical adenopathy.   Neurological: He is alert and oriented to person, place, and time.   Skin: Skin is warm and dry. No rash noted. He is not diaphoretic. No erythema. No pallor.   Psychiatric: He has a normal mood and affect. His behavior is normal. Judgment and thought content normal.   Nursing note and vitals reviewed.      Assessment:       1. Chronic bilateral low back pain without sciatica        Plan:       Chronic bilateral low back pain without sciatica  -     X-Ray Lumbar Spine Complete 5 View; Future; Expected date: 07/29/2019  -     tiZANidine (ZANAFLEX) 2 MG tablet; Take 1 tablet (2 mg total) by mouth every 8 (eight) hours as needed (Muscle strain/pain).  Dispense: 30 tablet; Refill: 0  -     Ambulatory Referral to Physical/Occupational Therapy      Heat, massage, stretching as discussed.  Tylenol 1000 mg t.i.d. p.r.n. pain.  Return to clinic as needed if symptoms persist or worsen.

## 2019-08-20 ENCOUNTER — CLINICAL SUPPORT (OUTPATIENT)
Dept: REHABILITATION | Facility: HOSPITAL | Age: 84
End: 2019-08-20
Attending: FAMILY MEDICINE
Payer: MEDICARE

## 2019-08-20 DIAGNOSIS — M25.651 DECREASED RANGE OF MOTION OF BOTH HIPS: ICD-10-CM

## 2019-08-20 DIAGNOSIS — R26.89 DECREASED BACK MOBILITY: ICD-10-CM

## 2019-08-20 DIAGNOSIS — R29.898 DECREASED STRENGTH OF TRUNK AND BACK: ICD-10-CM

## 2019-08-20 DIAGNOSIS — M62.9 HAMSTRING TIGHTNESS OF BOTH LOWER EXTREMITIES: ICD-10-CM

## 2019-08-20 DIAGNOSIS — Z74.09 IMPAIRED MOBILITY AND ENDURANCE: ICD-10-CM

## 2019-08-20 DIAGNOSIS — M25.652 DECREASED RANGE OF MOTION OF BOTH HIPS: ICD-10-CM

## 2019-08-20 DIAGNOSIS — R52 PAIN AGGRAVATED BY WALKING: ICD-10-CM

## 2019-08-20 PROCEDURE — G8979 MOBILITY GOAL STATUS: HCPCS | Mod: CJ,PO | Performed by: PHYSICAL THERAPIST

## 2019-08-20 PROCEDURE — 97161 PT EVAL LOW COMPLEX 20 MIN: CPT | Mod: PO | Performed by: PHYSICAL THERAPIST

## 2019-08-20 PROCEDURE — G8978 MOBILITY CURRENT STATUS: HCPCS | Mod: CK,PO | Performed by: PHYSICAL THERAPIST

## 2019-08-20 NOTE — PROGRESS NOTES
[facility]  Physical Therapy Initial Evaluation    Name: Cliff Magaña  Clinic Number: [id number]    Therapy Diagnosis:   Encounter Diagnoses   Name Primary?    Decreased back mobility     Decreased strength of trunk and back     Decreased range of motion of both hips     Pain aggravated by walking     Hamstring tightness of both lower extremities     Impaired mobility and endurance      Physician: Munir Estrada MD    Physician Orders: PT Eval and Treat back  Medical Diagnosis from Referral:   M54.5,G89.29 (ICD-10-CM) - Chronic bilateral low back pain without sciatica   Evaluation Date: 8/20/2019  Authorization Period Expiration: 7/28/2020  Plan of Care Expiration: 11/20/2020  Visit # / Visits authorized: 1/ 1    Time In: 1315  Time Out: 1415  Total Billable Time: 60 minutes    Precautions: Standard    Subjective   Medical History:   Past Medical History:   Diagnosis Date    *Atrial fibrillation     Chronic kidney disease     Hyperlipidemia     Hypertension     Metabolic syndrome        Surgical History:   Cliff Magaña  has a past surgical history that includes Cholecystectomy; Tonsillectomy; Cataract extraction; and Eye surgery.    Medications:   Cliff has a current medication list which includes the following prescription(s): aspirin, fenofibrate micronized, hydrochlorothiazide, krill-om-3-dha-epa-phospho-ast, loratadine, biotin, metoprolol succinate, multivitamin-minerals-lutein, sildenafil, triamcinolone acetonide 0.1%, and xarelto.    Allergies:   Review of patient's allergies indicates:   Allergen Reactions    Niacin      Other reaction(s): Rash  Other reaction(s): Itching        History of current condition - Cliff reports: discomfort both sides of the low back, intermittent. Some days are good and other days not so good.   Date of onset: 2/2019. Gradual onset. Pt says they were moving and he did a lot of lifting of boxes. Did not notice the pain  immediately but occurred afterwards. Says he was on a cruise in July and did a lot of walking which became difficult.        Pain:  Current 2/10, worst 6/10, best 0/10   Location: left and bilateral back with some pain into the superior region of the left gluteals.   Description: biting type of pain  Aggravating Factors: Walking  Easing Factors: rest, sitting down.     Sleep - no   Cough / sneeze / strain - denies   B/B function - negative     Current Level of Function:   Personal  - no limitation   Domestic- no limitation   Community / social -doing a lot of walking away from home.   Occupation - NA  Recreation/fitness - does not participate.     Prior Level of Function: unchanged but could walk a lot longer.   Prior Therapy: no   Social History:   lives with their spouse single family home. No stairs   Occupation: Retired     Pts goals: to be able to walk better. Says he and wife travel a lot and take walking tours which have become difficult.  Imaging, X-Rays : 7/29/2019, lumbar spine   There are 5 vertebrae of lumbar configuration.  There is slight S shaped scoliotic curve of this portion of the spine on the AP view.  These vertebral bodies are normal in height.  There is loss of disc space height, endplate sclerosis and osteophyte formation at the L2-L3 level with 2 mm posterior positioning of L2 on a degenerative basis.    There is more moderate loss of disc space height and minimal similar findings demonstrated at L3-L4 and also L1-L2 with otherwise unremarkable radiographic appearance of the lumbar disc spaces.  There are findings suggesting the presence of interosseous disc herniation (Schmorl's node) within the inferior aspect of the L1 vertebral body.    There are no pars defects demonstrated on these views        Objective     Posture Alignment: slight forward lean, right lateral shift, absence of lumbar curvature wit a forward head, upward elevation of the scapula and maintains the shoulders  internally rotated with left slightly higher than the right.   Sensation: Light Touch: Intact  Palpation: bilateral lumbar PSM with pain elicited over L3,L4,L5 spinous processes and right mid lumbar PSM.       Lumbar/Thoracic AROM: Pain/Dysfunction with Movement:   Flexion                               80/60 DN   Extension                           15/10 DP   Right side bending                 10    Left side bending                     30    Right rotation DN   Left rotation DN       LOWER EXTREMITY STRENGTH:   Left 5/5 Right 5/5       DTR's:   Left Right   Patella Tendon 2+ 2+   Achilles Tendon 2+ 2+         Hip rotation   -active  R -   ER = FN 40  IR = DN 20  L -    ER = FN 40  IR = DN 20  Passive   R -   ER = NT  IR = DN 25  L -   ER = NT  IR = 25      Selective Functional Movement Assessment:  FN: functional, non-painful  FP: functional, painful  DP: dysfunctional, painful  DN: dysfunctional, non-painful  Multi-Segmental Flexion: see above  Multi-Segmental Extension: see above   Multi-Segmental Rotation:   Right:see above   Left:see above       FUNCTIONAL MOVEMENT BREAKOUTS:  Long sitting  = DN  SLR   -Active   R - DN  L -  DN  -Passive  R - DN  L - DN  Knees to chest   Active - FN  Passive - NT    Hip rotation   -active  R -   ER = FN  IR = DN 20  L -    ER = FN  IR = DN 15  Passive   R -   ER = NT  IR = 25  L -   ER = NT  IR = 20    Press up  - DN    Hip extension   Active  R - FN  L -  FN  Passive   R - NT  L -  NT       FLEXIBILITY  Hamstrings R 60/70     L  70/70  Piriformis    R - minimal to moderate      L - moderate   Hip flexors ( psoas)   / quads (rectus femoris) R minimal    L minimal   Back extensors minimal       SEGMENTAL MOBILITY: hypomobility lumbar segments   Special Tests   Left Right   SLR Neg   Neg       Lasegue Neg  Neg    Jamin Neg  Neg        GAIT: ambulates with no assistive device with independently.     GAIT DEVIATIONS: displays a partially flexed posture with otherwise no significant  fgait deviations       Education provided:   - regarding the function of fascial tissue and effects of restriction on muscle function and mobility .       CMS Impairment/Limitation/Restriction for FOTO lumbar spine  Survey    Therapist reviewed FOTO scores for Cliff Magaña on 8/20/2019.   FOTO documents entered into [employer] - see Media section.    Limitation Score: 47%  Category: Mobility    Current : CK = at least 40% but < 60% impaired, limited or restricted  Goal: CJ = at least 20% but < 40% impaired, limited or restricted               Assessment         Cliff is a 83 y.o. male referred to outpatient Physical Therapy with a medical diagnosis of Chronic bilateral low back pain without sciatica. Pt presents with clinical findings of postural deviations with decreased back motions that represent a posterior chain tissue mobility and spine extension joint mobility and or tissue extensibility dysfunction.       . Evaluation has determined a decrease in functional status and subjective and objective deficits that can be addressed by physical therapy intervention. Pt demonstrates pain limiting functional activities. Decreased flexibility and strength limiting normal movement patterns. Decreased segmental motion. Decreased postural strength and awareness. . Subjective and objective measures are addressed by goals in the plan of care. Patient/family are involved in the development of these goals. Patient/family are educated about current injury and treatment.    .Current impairments limits patient with all functional activities.    Pt prognosis is Fair.   Pt will benefit from skilled outpatient Physical Therapy to address the deficits stated above and in the chart below, provide pt/family education, and to maximize pt's level of independence.     Plan of care discussed with patient: Yes  Pt's spiritual, cultural and educational needs considered and patient is agreeable to the plan of care and goals as stated below:        Anticipated Barriers for therapy: none     Medical Necessity is demonstrated by the following  History  Co-morbidities and personal factors that may impact the plan of care Co-morbidities:   CKD stage 3, HTN and A-fib    Personal Factors:   age     low   Examination  Body Structures and Functions, activity limitations and participation restrictions that may impact the plan of care Body Regions:   back    Body Systems:    musculoskeletal    Participation Restrictions:   None     Activity limitations:   Learning and applying knowledge  no deficits    General Tasks and Commands  no deficits    Communication  no deficits    Mobility  walking    Self care  no deficits    Domestic Life  no deficits    Interactions/Relationships  no deficits    Life Areas  no deficits    Community and Social Life  no deficits         low   Clinical Presentation stable and uncomplicated low   Decision Making/ Complexity Score: low             Short Term GOALS: 4 weeks. Pt agrees with goals set.  1. Pt to report decreased pain 20-40% associated with walking  2. Pt to demonstrate spinal stability with core activation during transitional movements.   3. Pt to appreciate improved back mobility with increased hamstring flexibility to 70 degrees actively.   4. Patient demonstrates independence with HEP.     Long Term GOALS: 8 weeks. Pt agrees with goals set.  1. Patient demonstrates increased LE hamstring and piriformis flexibility with increased passive leg raise to 75 degrees and hip internal rotation to 30 degrees BLE to improve tolerance to walking  2. Patient demonstrates increased trunk and core muscle strength  to improve tolerance to functional activities by performing trunk raise  3. Patient demonstrates improved walking tolerance with 50% or greater reduction in reported pain                             4. Patient demonstrates independence with Postural Awareness demonstrated with reduction of flexed posturing and more upright  presentation and reduction of lateral shift.   5. Patient demonstrates independence with body mechanics.       Plan       Plan of care Certification: 8/20/2019 to 11/20/2019.    Outpatient Physical Therapy 2 times weekly for 8 weeks to include the following interventions: Manual Therapy, Patient Education and Therapeutic Exercise.     Pato Canela, PT

## 2019-08-21 NOTE — PLAN OF CARE
[facility]  Physical Therapy Initial Evaluation    Name: Cliff Magaña  Clinic Number: [id number]    Therapy Diagnosis:   Encounter Diagnoses   Name Primary?    Decreased back mobility     Decreased strength of trunk and back     Decreased range of motion of both hips     Pain aggravated by walking     Hamstring tightness of both lower extremities     Impaired mobility and endurance      Physician: Munir Estrada MD    Physician Orders: PT Eval and Treat back  Medical Diagnosis from Referral:   M54.5,G89.29 (ICD-10-CM) - Chronic bilateral low back pain without sciatica   Evaluation Date: 8/20/2019  Authorization Period Expiration: 7/28/2020  Plan of Care Expiration: 11/20/2020  Visit # / Visits authorized: 1/ 1    Time In: 1315  Time Out: 1415  Total Billable Time: 60 minutes    Precautions: Standard    Subjective   Medical History:   Past Medical History:   Diagnosis Date    *Atrial fibrillation     Chronic kidney disease     Hyperlipidemia     Hypertension     Metabolic syndrome        Surgical History:   Cliff Magaña  has a past surgical history that includes Cholecystectomy; Tonsillectomy; Cataract extraction; and Eye surgery.    Medications:   Cliff has a current medication list which includes the following prescription(s): aspirin, fenofibrate micronized, hydrochlorothiazide, krill-om-3-dha-epa-phospho-ast, loratadine, biotin, metoprolol succinate, multivitamin-minerals-lutein, sildenafil, triamcinolone acetonide 0.1%, and xarelto.    Allergies:   Review of patient's allergies indicates:   Allergen Reactions    Niacin      Other reaction(s): Rash  Other reaction(s): Itching        History of current condition - Cliff reports: discomfort both sides of the low back, intermittent. Some days are good and other days not so good.   Date of onset: 2/2019. Gradual onset. Pt says they were moving and he did a lot of lifting of boxes. Did not notice the pain immediately  but occurred afterwards. Says he was on a cruise in July and did a lot of walking which became difficult.        Pain:  Current 2/10, worst 6/10, best 0/10   Location: left and bilateral back with some pain into the superior region of the left gluteals.   Description: biting type of pain  Aggravating Factors: Walking  Easing Factors: rest, sitting down.     Sleep - no   Cough / sneeze / strain - denies   B/B function - negative     Current Level of Function:   Personal  - no limitation   Domestic- no limitation   Community / social -doing a lot of walking away from home.   Occupation - NA  Recreation/fitness - does not participate.     Prior Level of Function: unchanged but could walk a lot longer.   Prior Therapy: no   Social History:   lives with their spouse single family home. No stairs   Occupation: Retired     Pts goals: to be able to walk better. Says he and wife travel a lot and take walking tours which have become difficult.  Imaging, X-Rays : 7/29/2019, lumbar spine   There are 5 vertebrae of lumbar configuration.  There is slight S shaped scoliotic curve of this portion of the spine on the AP view.  These vertebral bodies are normal in height.  There is loss of disc space height, endplate sclerosis and osteophyte formation at the L2-L3 level with 2 mm posterior positioning of L2 on a degenerative basis.    There is more moderate loss of disc space height and minimal similar findings demonstrated at L3-L4 and also L1-L2 with otherwise unremarkable radiographic appearance of the lumbar disc spaces.  There are findings suggesting the presence of interosseous disc herniation (Schmorl's node) within the inferior aspect of the L1 vertebral body.    There are no pars defects demonstrated on these views        Objective     Posture Alignment: slight forward lean, right lateral shift, absence of lumbar curvature wit a forward head, upward elevation of the scapula and maintains the shoulders internally  rotated with left slightly higher than the right.   Sensation: Light Touch: Intact  Palpation: bilateral lumbar PSM with pain elicited over L3,L4,L5 spinous processes and right mid lumbar PSM.       Lumbar/Thoracic AROM: Pain/Dysfunction with Movement:   Flexion                               80/60 DN   Extension                           15/10 DP   Right side bending                 10    Left side bending                     30    Right rotation DN   Left rotation DN       LOWER EXTREMITY STRENGTH:   Left 5/5 Right 5/5       DTR's:   Left Right   Patella Tendon 2+ 2+   Achilles Tendon 2+ 2+         Hip rotation   -active  R -   ER = FN 40  IR = DN 20  L -    ER = FN 40  IR = DN 20  Passive   R -   ER = NT  IR = DN 25  L -   ER = NT  IR = 25      Selective Functional Movement Assessment:  FN: functional, non-painful  FP: functional, painful  DP: dysfunctional, painful  DN: dysfunctional, non-painful  Multi-Segmental Flexion: see above  Multi-Segmental Extension: see above   Multi-Segmental Rotation:   Right:see above   Left:see above       FUNCTIONAL MOVEMENT BREAKOUTS:  Long sitting  = DN  SLR   -Active   R - DN  L -  DN  -Passive  R - DN  L - DN  Knees to chest   Active - FN  Passive - NT    Hip rotation   -active  R -   ER = FN  IR = DN 20  L -    ER = FN  IR = DN 15  Passive   R -   ER = NT  IR = 25  L -   ER = NT  IR = 20    Press up  - DN    Hip extension   Active  R - FN  L -  FN  Passive   R - NT  L -  NT       FLEXIBILITY  Hamstrings R 60/70     L  70/70  Piriformis    R - minimal to moderate      L - moderate   Hip flexors ( psoas)   / quads (rectus femoris) R minimal    L minimal   Back extensors minimal       SEGMENTAL MOBILITY: hypomobility lumbar segments   Special Tests   Left Right   SLR Neg   Neg       Lasegue Neg  Neg    Jamin Neg  Neg        GAIT: ambulates with no assistive device with independently.     GAIT DEVIATIONS: displays a partially flexed posture with otherwise no significant fgait  deviations       Education provided:   - regarding the function of fascial tissue and effects of restriction on muscle function and mobility .       CMS Impairment/Limitation/Restriction for FOTO lumbar spine  Survey    Therapist reviewed FOTO scores for Cliff Magaña on 8/20/2019.   FOTO documents entered into [employer] - see Media section.    Limitation Score: 47%  Category: Mobility    Current : CK = at least 40% but < 60% impaired, limited or restricted  Goal: CJ = at least 20% but < 40% impaired, limited or restricted               Assessment         Cliff is a 83 y.o. male referred to outpatient Physical Therapy with a medical diagnosis of Chronic bilateral low back pain without sciatica. Pt presents with clinical findings of postural deviations with decreased back motions that represent a posterior chain tissue mobility and spine extension joint mobility and or tissue extensibility dysfunction.       . Evaluation has determined a decrease in functional status and subjective and objective deficits that can be addressed by physical therapy intervention. Pt demonstrates pain limiting functional activities. Decreased flexibility and strength limiting normal movement patterns. Decreased segmental motion. Decreased postural strength and awareness. . Subjective and objective measures are addressed by goals in the plan of care. Patient/family are involved in the development of these goals. Patient/family are educated about current injury and treatment.    .Current impairments limits patient with all functional activities.    Pt prognosis is Fair.   Pt will benefit from skilled outpatient Physical Therapy to address the deficits stated above and in the chart below, provide pt/family education, and to maximize pt's level of independence.     Plan of care discussed with patient: Yes  Pt's spiritual, cultural and educational needs considered and patient is agreeable to the plan of care and goals as stated below:        Anticipated Barriers for therapy: none     Medical Necessity is demonstrated by the following  History  Co-morbidities and personal factors that may impact the plan of care Co-morbidities:   CKD stage 3, HTN and A-fib    Personal Factors:   age     low   Examination  Body Structures and Functions, activity limitations and participation restrictions that may impact the plan of care Body Regions:   back    Body Systems:    musculoskeletal    Participation Restrictions:   None     Activity limitations:   Learning and applying knowledge  no deficits    General Tasks and Commands  no deficits    Communication  no deficits    Mobility  walking    Self care  no deficits    Domestic Life  no deficits    Interactions/Relationships  no deficits    Life Areas  no deficits    Community and Social Life  no deficits         low   Clinical Presentation stable and uncomplicated low   Decision Making/ Complexity Score: low             Short Term GOALS: 4 weeks. Pt agrees with goals set.  1. Pt to report decreased pain 20-40% associated with walking  2. Pt to demonstrate spinal stability with core activation during transitional movements.   3. Pt to appreciate improved back mobility with increased hamstring flexibility to 70 degrees actively.   4. Patient demonstrates independence with HEP.     Long Term GOALS: 8 weeks. Pt agrees with goals set.  1. Patient demonstrates increased LE hamstring and piriformis flexibility with increased passive leg raise to 75 degrees and hip internal rotation to 30 degrees BLE to improve tolerance to walking  2. Patient demonstrates increased trunk and core muscle strength  to improve tolerance to functional activities by performing trunk raise  3. Patient demonstrates improved walking tolerance with 50% or greater reduction in reported pain                             4. Patient demonstrates independence with Postural Awareness demonstrated with reduction of flexed posturing and more upright  presentation and reduction of lateral shift.   5. Patient demonstrates independence with body mechanics.       Plan       Plan of care Certification: 8/20/2019 to 11/20/2019.    Outpatient Physical Therapy 2 times weekly for 8 weeks to include the following interventions: Manual Therapy, Patient Education and Therapeutic Exercise.     Pato Canela, PT

## 2019-08-23 ENCOUNTER — CLINICAL SUPPORT (OUTPATIENT)
Dept: REHABILITATION | Facility: HOSPITAL | Age: 84
End: 2019-08-23
Attending: FAMILY MEDICINE
Payer: MEDICARE

## 2019-08-23 DIAGNOSIS — R52 PAIN AGGRAVATED BY WALKING: ICD-10-CM

## 2019-08-23 DIAGNOSIS — M62.9 HAMSTRING TIGHTNESS OF BOTH LOWER EXTREMITIES: ICD-10-CM

## 2019-08-23 DIAGNOSIS — M25.651 DECREASED RANGE OF MOTION OF BOTH HIPS: ICD-10-CM

## 2019-08-23 DIAGNOSIS — Z74.09 IMPAIRED MOBILITY AND ENDURANCE: ICD-10-CM

## 2019-08-23 DIAGNOSIS — R29.898 DECREASED STRENGTH OF TRUNK AND BACK: ICD-10-CM

## 2019-08-23 DIAGNOSIS — R26.89 DECREASED BACK MOBILITY: ICD-10-CM

## 2019-08-23 DIAGNOSIS — M25.652 DECREASED RANGE OF MOTION OF BOTH HIPS: ICD-10-CM

## 2019-08-23 PROCEDURE — 97110 THERAPEUTIC EXERCISES: CPT | Mod: PO | Performed by: PHYSICAL THERAPIST

## 2019-08-23 NOTE — PROGRESS NOTES
"              Physical Therapy Daily Treatment Note     Name: Cliff Magaña  Clinic Number: [id number]    Therapy Diagnosis:   Encounter Diagnoses   Name Primary?    Decreased back mobility     Decreased strength of trunk and back     Decreased range of motion of both hips     Pain aggravated by walking     Hamstring tightness of both lower extremities     Impaired mobility and endurance      Physician: Munir Estrada MD  Physician Orders: PT Eval and Treat back  Medical Diagnosis from Referral:   M54.5,G89.29 (ICD-10-CM) - Chronic bilateral low back pain without sciatica   Evaluation Date: 8/20/2019  Authorization Period Expiration: 7/28/2020  Plan of Care Expiration: 11/20/2020  Visit # / Visits authorized: 1/ 1    Visit Date: 8/23/2019  Time In: 910  Time Out: 1010  Total Billable Time: 60 minutes    Precautions: Standard    Subjective     Pt reports: the back is feeling pretty good. No pain reported. .  He was not issued a home exercise program.  Response to previous treatment: felt good after the first session    Functional change: no change     Pain: 0/10  Location: bilateral low back      Objective     Cliff received therapeutic exercises to develop strength, ROM, flexibility, posture and core stabilization for 30 minutes including:        Leg press   Recumbent bike  Upright bike   UE ergometer  Treadmill   Elliptical       THERAPEUTIC EXERCISE  SUPINE  -knee to chest HS stretch 5" x 12   +LTR  +TA activation   SIDELYING  -open book x15      PRONE  -sustained exten 3'  -press ups 2x10  -leg lifts   +multifidus activation     STANDING.    SITTING  -HS stretch long sit 7" x 15        MANUAL THERAPY: prone oscillations and PA mobs grade II / III.   MODALITY  DIRECT EDUCATION:          Assessment     The patient performed the activities without encountering pain. He did not report pain at the end of the session. Will focus on hamstring flexibility , back extension and core and back strengthening. "   Cliff is progressing  towards his goals.   Pt prognosis is Good.     Pt will continue to benefit from skilled outpatient physical therapy to address the deficits listed in the problem list box on initial evaluation, provide pt/family education and to maximize pt's level of independence in the home and community environment.     Pt's spiritual, cultural and educational needs considered and pt agreeable to plan of care and goals.     Anticipated barriers to physical therapy: none     Short Term GOALS: 4 weeks. Pt agrees with goals set.  1. Pt to report decreased pain 20-40% associated with walking  2. Pt to demonstrate spinal stability with core activation during transitional movements.   3. Pt to appreciate improved back mobility with increased hamstring flexibility to 70 degrees actively.   4. Patient demonstrates independence with HEP.      Long Term GOALS: 8 weeks. Pt agrees with goals set.  1. Patient demonstrates increased LE hamstring and piriformis flexibility with increased passive leg raise to 75 degrees and hip internal rotation to 30 degrees BLE to improve tolerance to walking  2. Patient demonstrates increased trunk and core muscle strength  to improve tolerance to functional activities by performing trunk raise  3. Patient demonstrates improved walking tolerance with 50% or greater reduction in reported pain                             4. Patient demonstrates independence with Postural Awareness demonstrated with reduction of flexed posturing and more upright presentation and reduction of lateral shift.   5. Patient demonstrates independence with body mechanics.         Plan         Plan of care Certification: 8/20/2019 to 11/20/2019.     Outpatient Physical Therapy 2 times weekly for 8 weeks to include the following interventions: Manual Therapy, Patient Education and Therapeutic Exercise.     Pato Canela, PT

## 2019-08-29 ENCOUNTER — CLINICAL SUPPORT (OUTPATIENT)
Dept: REHABILITATION | Facility: HOSPITAL | Age: 84
End: 2019-08-29
Attending: FAMILY MEDICINE
Payer: MEDICARE

## 2019-08-29 DIAGNOSIS — M25.652 DECREASED RANGE OF MOTION OF BOTH HIPS: ICD-10-CM

## 2019-08-29 DIAGNOSIS — R29.898 DECREASED STRENGTH OF TRUNK AND BACK: ICD-10-CM

## 2019-08-29 DIAGNOSIS — R26.89 DECREASED BACK MOBILITY: ICD-10-CM

## 2019-08-29 DIAGNOSIS — Z74.09 IMPAIRED MOBILITY AND ENDURANCE: ICD-10-CM

## 2019-08-29 DIAGNOSIS — R52 PAIN AGGRAVATED BY WALKING: ICD-10-CM

## 2019-08-29 DIAGNOSIS — M25.651 DECREASED RANGE OF MOTION OF BOTH HIPS: ICD-10-CM

## 2019-08-29 DIAGNOSIS — M62.9 HAMSTRING TIGHTNESS OF BOTH LOWER EXTREMITIES: ICD-10-CM

## 2019-08-29 PROCEDURE — 97110 THERAPEUTIC EXERCISES: CPT | Mod: PO | Performed by: PHYSICAL THERAPIST

## 2019-08-29 NOTE — PROGRESS NOTES
"              Physical Therapy Daily Treatment Note     Name: Cliff Magaña  Clinic Number: [id number]    Therapy Diagnosis:   Encounter Diagnoses   Name Primary?    Decreased back mobility     Decreased strength of trunk and back     Decreased range of motion of both hips     Pain aggravated by walking     Hamstring tightness of both lower extremities     Impaired mobility and endurance      Physician: Munir Estrada MD  Physician Orders: PT Eval and Treat back  Medical Diagnosis from Referral:   M54.5,G89.29 (ICD-10-CM) - Chronic bilateral low back pain without sciatica   Evaluation Date: 8/20/2019  Authorization Period Expiration: 7/28/2020  Plan of Care Expiration: 11/20/2020  Visit # / Visits authorized: 1/ 1    Visit Date: 8/29/2019  Time In: 1310  Time Out: 1420  Total Billable Time: 70 minutes    Precautions: Standard    Subjective     Pt reports: the back is feeling good.   He was not issued a home exercise program but tried to do some things at home.  Response to previous treatment: felt good. There were some days that it bugged me.     Functional change: no change     Pain: 0/10  Location: bilateral low back      Objective     Cliff received therapeutic exercises to develop strength, ROM, flexibility, posture and core stabilization for 30 minutes including:        Leg press   Recumbent bike  Upright bike 8'  UE ergometer  Treadmill   Elliptical       THERAPEUTIC EXERCISE  SUPINE  -knee to chest HS stretch 5" x 12   +LTR x15  +TA activation 5" x 12     SIDELYING  -open book x15      PRONE  -sustained exten 3'  -press ups 2x10  -leg lifts x12  +multifidus activation     STANDING.    SITTING  -HS stretch long sit 7" x 15        MANUAL THERAPY: prone oscillations and PA mobs grade II / III. NP  MODALITY  DIRECT EDUCATION:          Assessment     There patient performed the exercises without pause for pain.  He demonstrated adequate mobility however the hamstrings reman the area of greatest " limitation. Progressing with core activation and strengthening as well as hip muscle strengthening.     Cliff is progressing  towards his goals.   Pt prognosis is Good.     Pt will continue to benefit from skilled outpatient physical therapy to address the deficits listed in the problem list box on initial evaluation, provide pt/family education and to maximize pt's level of independence in the home and community environment.     Pt's spiritual, cultural and educational needs considered and pt agreeable to plan of care and goals.     Anticipated barriers to physical therapy: none     Short Term GOALS: 4 weeks. Pt agrees with goals set.  1. Pt to report decreased pain 20-40% associated with walking  2. Pt to demonstrate spinal stability with core activation during transitional movements.   3. Pt to appreciate improved back mobility with increased hamstring flexibility to 70 degrees actively.   4. Patient demonstrates independence with HEP.      Long Term GOALS: 8 weeks. Pt agrees with goals set.  1. Patient demonstrates increased LE hamstring and piriformis flexibility with increased passive leg raise to 75 degrees and hip internal rotation to 30 degrees BLE to improve tolerance to walking  2. Patient demonstrates increased trunk and core muscle strength  to improve tolerance to functional activities by performing trunk raise  3. Patient demonstrates improved walking tolerance with 50% or greater reduction in reported pain                             4. Patient demonstrates independence with Postural Awareness demonstrated with reduction of flexed posturing and more upright presentation and reduction of lateral shift.   5. Patient demonstrates independence with body mechanics.         Plan         Plan of care Certification: 8/20/2019 to 11/20/2019.     Outpatient Physical Therapy 2 times weekly for 8 weeks to include the following interventions: Manual Therapy, Patient Education and Therapeutic Exercise.     Don  Rola, PT

## 2019-09-03 ENCOUNTER — LAB VISIT (OUTPATIENT)
Dept: LAB | Facility: HOSPITAL | Age: 84
End: 2019-09-03
Attending: INTERNAL MEDICINE
Payer: MEDICARE

## 2019-09-03 ENCOUNTER — PES CALL (OUTPATIENT)
Dept: ADMINISTRATIVE | Facility: CLINIC | Age: 84
End: 2019-09-03

## 2019-09-03 DIAGNOSIS — I70.0 AORTIC ATHEROSCLEROSIS: ICD-10-CM

## 2019-09-03 DIAGNOSIS — E78.2 MIXED HYPERLIPIDEMIA: ICD-10-CM

## 2019-09-03 DIAGNOSIS — I10 HYPERTENSION, ESSENTIAL: ICD-10-CM

## 2019-09-03 DIAGNOSIS — I48.19 PERSISTENT ATRIAL FIBRILLATION: ICD-10-CM

## 2019-09-03 DIAGNOSIS — Z83.3 FAMILY HISTORY OF DIABETES MELLITUS: ICD-10-CM

## 2019-09-03 DIAGNOSIS — N18.2 STAGE 2 CHRONIC KIDNEY DISEASE: ICD-10-CM

## 2019-09-03 DIAGNOSIS — N18.30 STAGE 3 CHRONIC KIDNEY DISEASE: ICD-10-CM

## 2019-09-03 DIAGNOSIS — I10 ESSENTIAL HYPERTENSION: ICD-10-CM

## 2019-09-03 LAB
ALBUMIN SERPL BCP-MCNC: 4.1 G/DL (ref 3.5–5.2)
ALP SERPL-CCNC: 58 U/L (ref 55–135)
ALT SERPL W/O P-5'-P-CCNC: 33 U/L (ref 10–44)
ANION GAP SERPL CALC-SCNC: 12 MMOL/L (ref 8–16)
AST SERPL-CCNC: 31 U/L (ref 10–40)
BILIRUB SERPL-MCNC: 0.4 MG/DL (ref 0.1–1)
BUN SERPL-MCNC: 29 MG/DL (ref 8–23)
CALCIUM SERPL-MCNC: 10.3 MG/DL (ref 8.7–10.5)
CHLORIDE SERPL-SCNC: 108 MMOL/L (ref 95–110)
CHOLEST SERPL-MCNC: 147 MG/DL (ref 120–199)
CHOLEST/HDLC SERPL: 4.6 {RATIO} (ref 2–5)
CO2 SERPL-SCNC: 24 MMOL/L (ref 23–29)
CREAT SERPL-MCNC: 1.4 MG/DL (ref 0.5–1.4)
EST. GFR  (AFRICAN AMERICAN): 53.3 ML/MIN/1.73 M^2
EST. GFR  (NON AFRICAN AMERICAN): 46.1 ML/MIN/1.73 M^2
GLUCOSE SERPL-MCNC: 150 MG/DL (ref 70–110)
HDLC SERPL-MCNC: 32 MG/DL (ref 40–75)
HDLC SERPL: 21.8 % (ref 20–50)
LDLC SERPL CALC-MCNC: 73.8 MG/DL (ref 63–159)
NONHDLC SERPL-MCNC: 115 MG/DL
POTASSIUM SERPL-SCNC: 4.5 MMOL/L (ref 3.5–5.1)
PROT SERPL-MCNC: 7.4 G/DL (ref 6–8.4)
SODIUM SERPL-SCNC: 144 MMOL/L (ref 136–145)
TRIGL SERPL-MCNC: 206 MG/DL (ref 30–150)
TSH SERPL DL<=0.005 MIU/L-ACNC: 2.17 UIU/ML (ref 0.4–4)

## 2019-09-03 PROCEDURE — 84443 ASSAY THYROID STIM HORMONE: CPT

## 2019-09-03 PROCEDURE — 36415 COLL VENOUS BLD VENIPUNCTURE: CPT | Mod: PO

## 2019-09-03 PROCEDURE — 80061 LIPID PANEL: CPT

## 2019-09-03 PROCEDURE — 80053 COMPREHEN METABOLIC PANEL: CPT

## 2019-09-04 ENCOUNTER — TELEPHONE (OUTPATIENT)
Dept: CARDIOLOGY | Facility: CLINIC | Age: 84
End: 2019-09-04

## 2019-09-04 NOTE — TELEPHONE ENCOUNTER
----- Message from Nereyda Birmingham MD sent at 9/3/2019  5:25 PM CDT -----  Please review.  We will discuss the results during your upcoming visit with me. It looks good except BS and NIKOLE.

## 2019-09-05 ENCOUNTER — CLINICAL SUPPORT (OUTPATIENT)
Dept: REHABILITATION | Facility: HOSPITAL | Age: 84
End: 2019-09-05
Attending: FAMILY MEDICINE
Payer: MEDICARE

## 2019-09-05 DIAGNOSIS — R52 PAIN AGGRAVATED BY WALKING: ICD-10-CM

## 2019-09-05 DIAGNOSIS — Z74.09 IMPAIRED MOBILITY AND ENDURANCE: ICD-10-CM

## 2019-09-05 DIAGNOSIS — M62.9 HAMSTRING TIGHTNESS OF BOTH LOWER EXTREMITIES: ICD-10-CM

## 2019-09-05 DIAGNOSIS — M25.652 DECREASED RANGE OF MOTION OF BOTH HIPS: ICD-10-CM

## 2019-09-05 DIAGNOSIS — M25.651 DECREASED RANGE OF MOTION OF BOTH HIPS: ICD-10-CM

## 2019-09-05 DIAGNOSIS — R26.89 DECREASED BACK MOBILITY: ICD-10-CM

## 2019-09-05 DIAGNOSIS — R29.898 DECREASED STRENGTH OF TRUNK AND BACK: ICD-10-CM

## 2019-09-05 PROCEDURE — 97110 THERAPEUTIC EXERCISES: CPT | Mod: PO | Performed by: PHYSICAL THERAPIST

## 2019-09-05 NOTE — PROGRESS NOTES
"              Physical Therapy Daily Treatment Note     Name: Cliff Magaña  Clinic Number: [id number]    Therapy Diagnosis:   Encounter Diagnoses   Name Primary?    Decreased back mobility     Decreased strength of trunk and back     Decreased range of motion of both hips     Pain aggravated by walking     Hamstring tightness of both lower extremities     Impaired mobility and endurance      Physician: Munir Estrada MD  Physician Orders: PT Eval and Treat back  Medical Diagnosis from Referral:   M54.5,G89.29 (ICD-10-CM) - Chronic bilateral low back pain without sciatica   Evaluation Date: 8/20/2019  Authorization Period Expiration: 7/28/2020  Plan of Care Expiration: 11/20/2020  Visit # / Visits authorized: 1/ 1    Visit Date: 9/5/2019  Time In: 1510  Time Out: 1625  Total Billable Time: 75  minutes    Precautions: Standard    Subjective     Pt reports: he is not having any pain today. Does encounter episodes of pain especially in the AM but after up and moving around it gets better. Not having the pain as often and is less intense.    He was not issued a home exercise program but tried to do some things at home.  Response to previous treatment: felt good.    Functional change: doing more at home. Walking farther and stand more comfortably.     Pain: 0/10  Location: bilateral low back      Objective     Cliff received therapeutic exercises to develop strength, ROM, flexibility, posture and core stabilization for 30 minutes including:        Leg press   Recumbent bike  Upright bike 8'  UE ergometer  Treadmill   Elliptical       THERAPEUTIC EXERCISE  SUPINE  -knee to chest HS stretch 5" x 15  -LTR x15  -TA activation 5" x 12   -TA activation foot lifts  5" x 12     SIDELYING  -open book x15  -clams x15      PRONE  -sustained exten 3'  -press ups 2x10  -leg lifts x12  -multifidus activation  5" x 12    STANDING.    SITTING  -HS stretch long sit 7" x 15        MANUAL THERAPY: IASTM over the posterior thighs " and legs as well as the posterior sling of the back using up and down regulation strokes.  MODALITY  DIRECT EDUCATION: Regarding concept of spine stability and core activation to maintain spine stability during movement transitions. The patient seemed to demonstrate an understanding. Requiring further demonstration with cueing.          Assessment     Activities performed as noted. He demonstrated activation of the TA and multifidus, individually. The remainder of the exercises were performed as noted. He is progressing and responding to the exercises as far as pain reduction and episode frequency. Will progress with strengthening of the core and trunk musculature.     Cliff is progressing  towards his goals.   Pt prognosis is Good.     Pt will continue to benefit from skilled outpatient physical therapy to address the deficits listed in the problem list box on initial evaluation, provide pt/family education and to maximize pt's level of independence in the home and community environment.     Pt's spiritual, cultural and educational needs considered and pt agreeable to plan of care and goals.     Anticipated barriers to physical therapy: none     Short Term GOALS: 4 weeks. Pt agrees with goals set.  1. Pt to report decreased pain 20-40% associated with walking  2. Pt to demonstrate spinal stability with core activation during transitional movements.   3. Pt to appreciate improved back mobility with increased hamstring flexibility to 70 degrees actively.   4. Patient demonstrates independence with HEP.      Long Term GOALS: 8 weeks. Pt agrees with goals set.  1. Patient demonstrates increased LE hamstring and piriformis flexibility with increased passive leg raise to 75 degrees and hip internal rotation to 30 degrees BLE to improve tolerance to walking  2. Patient demonstrates increased trunk and core muscle strength  to improve tolerance to functional activities by performing trunk raise  3. Patient demonstrates  improved walking tolerance with 50% or greater reduction in reported pain                             4. Patient demonstrates independence with Postural Awareness demonstrated with reduction of flexed posturing and more upright presentation and reduction of lateral shift.   5. Patient demonstrates independence with body mechanics.         Plan         Plan of care Certification: 8/20/2019 to 11/20/2019.     Outpatient Physical Therapy 2 times weekly for 8 weeks to include the following interventions: Manual Therapy, Patient Education and Therapeutic Exercise.     Pato Canela, PT

## 2019-09-09 ENCOUNTER — OFFICE VISIT (OUTPATIENT)
Dept: CARDIOLOGY | Facility: CLINIC | Age: 84
End: 2019-09-09
Payer: MEDICARE

## 2019-09-09 VITALS
HEIGHT: 66 IN | SYSTOLIC BLOOD PRESSURE: 174 MMHG | DIASTOLIC BLOOD PRESSURE: 91 MMHG | BODY MASS INDEX: 26.61 KG/M2 | HEART RATE: 76 BPM | WEIGHT: 165.56 LBS

## 2019-09-09 DIAGNOSIS — E78.2 MIXED HYPERLIPIDEMIA: Chronic | ICD-10-CM

## 2019-09-09 DIAGNOSIS — I70.0 AORTIC ATHEROSCLEROSIS: ICD-10-CM

## 2019-09-09 DIAGNOSIS — I10 ESSENTIAL HYPERTENSION: Primary | Chronic | ICD-10-CM

## 2019-09-09 DIAGNOSIS — N18.30 STAGE 3 CHRONIC KIDNEY DISEASE: ICD-10-CM

## 2019-09-09 DIAGNOSIS — Z74.09 IMPAIRED MOBILITY AND ENDURANCE: ICD-10-CM

## 2019-09-09 DIAGNOSIS — I48.19 PERSISTENT ATRIAL FIBRILLATION: Chronic | ICD-10-CM

## 2019-09-09 PROBLEM — R29.898 DECREASED STRENGTH OF TRUNK AND BACK: Status: RESOLVED | Noted: 2019-08-20 | Resolved: 2019-09-09

## 2019-09-09 PROBLEM — L02.213 ABSCESS OF CHEST WALL: Status: RESOLVED | Noted: 2018-06-25 | Resolved: 2019-09-09

## 2019-09-09 PROCEDURE — 99999 PR PBB SHADOW E&M-EST. PATIENT-LVL III: CPT | Mod: PBBFAC,,, | Performed by: INTERNAL MEDICINE

## 2019-09-09 PROCEDURE — 99999 PR PBB SHADOW E&M-EST. PATIENT-LVL III: ICD-10-PCS | Mod: PBBFAC,,, | Performed by: INTERNAL MEDICINE

## 2019-09-09 PROCEDURE — 99214 OFFICE O/P EST MOD 30 MIN: CPT | Mod: S$PBB,,, | Performed by: INTERNAL MEDICINE

## 2019-09-09 PROCEDURE — 99213 OFFICE O/P EST LOW 20 MIN: CPT | Mod: PBBFAC,PO | Performed by: INTERNAL MEDICINE

## 2019-09-09 PROCEDURE — 99214 PR OFFICE/OUTPT VISIT, EST, LEVL IV, 30-39 MIN: ICD-10-PCS | Mod: S$PBB,,, | Performed by: INTERNAL MEDICINE

## 2019-09-09 NOTE — PROGRESS NOTES
"Subjective:   Patient ID:  Cliff Magaña is a 83 y.o. male who presents for follow-up of Hypertension      HPI: Patient is sedentary due to chronic lower back pain. He is attending physical therapy. His wife states he is more short of breath on exertion. But patient denies it. He admits to dietary indiscretions; eats ice cream, Shamar Krunal sausages and has vodka Martini every evening.        Lab Results   Component Value Date     09/03/2019    K 4.5 09/03/2019     09/03/2019    CO2 24 09/03/2019    BUN 29 (H) 09/03/2019    CREATININE 1.4 09/03/2019     (H) 09/03/2019    HGBA1C 5.8 (H) 08/29/2018    AST 31 09/03/2019    ALT 33 09/03/2019    ALBUMIN 4.1 09/03/2019    PROT 7.4 09/03/2019    BILITOT 0.4 09/03/2019    WBC 7.21 05/21/2019    HGB 17.3 05/21/2019    HCT 50.9 05/21/2019    MCV 94 05/21/2019     05/21/2019    INR 2.8 12/17/2013    INR 1.8 (H) 06/07/2011    PSA 0.43 06/06/2013    TSH 2.170 09/03/2019    CHOL 147 09/03/2019    HDL 32 (L) 09/03/2019    LDLCALC 73.8 09/03/2019    TRIG 206 (H) 09/03/2019       Review of Systems   Constitution: Negative.   HENT: Negative.    Eyes: Negative.    Cardiovascular: Positive for dyspnea on exertion and irregular heartbeat. Negative for chest pain, claudication, leg swelling, near-syncope, palpitations and syncope.   Respiratory: Negative.  Negative for cough, shortness of breath, snoring and wheezing.    Endocrine: Negative.  Negative for cold intolerance, heat intolerance, polydipsia, polyphagia and polyuria.   Skin: Negative.    Musculoskeletal: Positive for arthritis, back pain and neck pain.   Gastrointestinal: Negative.    Genitourinary: Negative.    Neurological: Negative.    Psychiatric/Behavioral: Negative.        Objective:   Physical Exam   Constitutional: He is oriented to person, place, and time. He appears well-developed and well-nourished.   BP (!) 174/91   Pulse 76   Ht 5' 5.5" (1.664 m)   Wt 75.1 kg (165 lb 9.1 oz)   BMI 27.13 " kg/m²      HENT:   Head: Normocephalic.   Eyes: Pupils are equal, round, and reactive to light.   Neck: Normal range of motion. Neck supple. Carotid bruit is not present. No thyromegaly present.   Cardiovascular: Normal rate, normal heart sounds and intact distal pulses. An irregularly irregular rhythm present. Exam reveals no gallop and no friction rub.   No murmur heard.  Pulses:       Carotid pulses are 2+ on the right side, and 2+ on the left side.       Radial pulses are 2+ on the right side, and 2+ on the left side.        Femoral pulses are 2+ on the right side, and 2+ on the left side.       Popliteal pulses are 2+ on the right side, and 2+ on the left side.        Dorsalis pedis pulses are 2+ on the right side, and 2+ on the left side.        Posterior tibial pulses are 2+ on the right side, and 2+ on the left side.   Pulmonary/Chest: Effort normal and breath sounds normal. No respiratory distress. He has no wheezes. He has no rales. He exhibits no tenderness.   Abdominal: Soft. Bowel sounds are normal.   Musculoskeletal: Normal range of motion. He exhibits no edema.   Neurological: He is alert and oriented to person, place, and time.   Skin: Skin is warm and dry.   Psychiatric: He has a normal mood and affect.   Nursing note and vitals reviewed.        Assessment and Plan:     Problem List Items Addressed This Visit        Cardiology Problems    HTN (hypertension) - Primary (Chronic)    Hyperlipidemia (Chronic)    Persistent atrial fibrillation (Chronic)    Aortic atherosclerosis       Other    Stage 3 chronic kidney disease    Impaired mobility and endurance          Patient's Medications   New Prescriptions    No medications on file   Previous Medications    ASPIRIN (ECOTRIN) 81 MG EC TABLET    Take 81 mg by mouth once daily.    FENOFIBRATE MICRONIZED (LOFIBRA) 200 MG CAP    Take 1 capsule (200 mg total) by mouth daily with breakfast.    HYDROCHLOROTHIAZIDE (HYDRODIURIL) 25 MG TABLET    TAKE 1 TABLET  DAILY    FVPKG-PL-3-DHA-EPA-PHOSPHO-AST (MEGARED OMEGA-3 KRILL OIL) 1,000-230-60 MG CAP    Take by mouth once daily. Pt taking one pill once a day    LORATADINE (CLARITIN) 10 MG TABLET    Take 10 mg by mouth once daily.    TRAMAINE BIOTIN ORAL    Take 5,000 mcg by mouth once daily.    METOPROLOL SUCCINATE (TOPROL-XL) 50 MG 24 HR TABLET    TAKE ONE AND ONE-HALF TABLETS ONCE DAILY    MULTIVITAMIN-MINERALS-LUTEIN (CENTRUM SILVER) TAB    Take by mouth. 1 Tablet Oral Every day    SILDENAFIL (VIAGRA) 100 MG TABLET    Take 1 tablet (100 mg total) by mouth daily as needed for Erectile Dysfunction.    TRIAMCINOLONE ACETONIDE 0.1% (KENALOG) 0.1 % CREAM    APPLY TO AFFECTED AREA TWICE A DAY TO RASH ON SCALP    XARELTO 20 MG TAB    TAKE 1 TABLET DAILY   Modified Medications    No medications on file   Discontinued Medications    No medications on file   Life style modifications strongly encouraged.  Patient will watch his diet more carefully.  Next time will repeat stress PET stress (the last one in 2014).  He is inquiring again about ASA. Even if his risk elevated he is doing well.  Will discontinue ASA  for now and continue on Xarelto for atrial fibrillation..    Follow up in about 6 months (around 3/9/2020).

## 2019-09-10 ENCOUNTER — CLINICAL SUPPORT (OUTPATIENT)
Dept: REHABILITATION | Facility: HOSPITAL | Age: 84
End: 2019-09-10
Attending: FAMILY MEDICINE
Payer: MEDICARE

## 2019-09-10 DIAGNOSIS — R26.89 DECREASED BACK MOBILITY: ICD-10-CM

## 2019-09-10 DIAGNOSIS — M62.9 HAMSTRING TIGHTNESS OF BOTH LOWER EXTREMITIES: ICD-10-CM

## 2019-09-10 DIAGNOSIS — M25.652 DECREASED RANGE OF MOTION OF BOTH HIPS: ICD-10-CM

## 2019-09-10 DIAGNOSIS — Z74.09 IMPAIRED MOBILITY AND ENDURANCE: ICD-10-CM

## 2019-09-10 DIAGNOSIS — R52 PAIN AGGRAVATED BY WALKING: ICD-10-CM

## 2019-09-10 DIAGNOSIS — M25.651 DECREASED RANGE OF MOTION OF BOTH HIPS: ICD-10-CM

## 2019-09-10 PROCEDURE — 97110 THERAPEUTIC EXERCISES: CPT | Mod: PO | Performed by: PHYSICAL THERAPIST

## 2019-09-10 NOTE — PROGRESS NOTES
"              Physical Therapy Daily Treatment Note     Name: Cliff Magaña  Clinic Number: [id number]    Therapy Diagnosis:   Encounter Diagnoses   Name Primary?    Decreased back mobility     Decreased range of motion of both hips     Pain aggravated by walking     Hamstring tightness of both lower extremities     Impaired mobility and endurance      Physician: Munir Estrada MD  Physician Orders: PT Eval and Treat back  Medical Diagnosis from Referral:   M54.5,G89.29 (ICD-10-CM) - Chronic bilateral low back pain without sciatica   Evaluation Date: 8/20/2019  Authorization Period Expiration: 7/28/2020  Plan of Care Expiration: 11/20/2020  Visit # / Visits authorized: 1/ 1    Visit Date: 9/10/2019  Time In: 905  Time Out: 1005  Total Billable Time: 60  minutes    Precautions: Standard    Subjective     Pt reports: he has some pain on the right side that he awoke with but it is getting better as he moves around.     He was not issued a home exercise program but tried to do some things at home.  Response to previous treatment: says she felt good after the last session     Functional change:doing a lot more walking without problems. Sleeping fine.   Pain: 1 /10  Location: bilateral low back      Objective     Cliff received therapeutic exercises to develop strength, ROM, flexibility, posture and core stabilization for 60 minutes including:        Leg press   Recumbent bike  Upright bike 8'  UE ergometer  Treadmill   Elliptical       THERAPEUTIC EXERCISE  SUPINE  -knee to chest HS stretch 5" x 15  -LTR x15  -TA activation 5" x 12   -TA activation foot lifts  5" x 12     SIDELYING  -open book x15  -clams x15 GTB       PRONE  -sustained exten 3'  -press ups 2x10  -leg lifts x12  -multifidus activation  5" x 12 w/donkey kick partial lift     STANDING.      SITTING  -HS stretch long sit 7" x 15        MANUAL THERAPY: Manual myofascial stretching right iliolumbar region in prone.   MODALITY  DIRECT EDUCATION: " Regarding concept of spine stability and core activation to maintain spine stability during movement transitions. The patient seemed to demonstrate an understanding. Requiring further demonstration with cueing.      FOTO next session     Assessment     The patient  Completed the activities as noted. He reported no pain associated with the exercises. There was some challenges with hamstring cramping associated with leg lift in knee flexed position with multifidus activation. He is challenged with TA activation. Reported no pain at the end of the session.       Cliff is progressing  towards his goals.   Pt prognosis is Good.     Pt will continue to benefit from skilled outpatient physical therapy to address the deficits listed in the problem list box on initial evaluation, provide pt/family education and to maximize pt's level of independence in the home and community environment.     Pt's spiritual, cultural and educational needs considered and pt agreeable to plan of care and goals.     Anticipated barriers to physical therapy: none     Short Term GOALS: 4 weeks. Pt agrees with goals set.  1. Pt to report decreased pain 20-40% associated with walking  2. Pt to demonstrate spinal stability with core activation during transitional movements.   3. Pt to appreciate improved back mobility with increased hamstring flexibility to 70 degrees actively.   4. Patient demonstrates independence with HEP.      Long Term GOALS: 8 weeks. Pt agrees with goals set.  1. Patient demonstrates increased LE hamstring and piriformis flexibility with increased passive leg raise to 75 degrees and hip internal rotation to 30 degrees BLE to improve tolerance to walking  2. Patient demonstrates increased trunk and core muscle strength  to improve tolerance to functional activities by performing trunk raise  3. Patient demonstrates improved walking tolerance with 50% or greater reduction in reported pain                             4. Patient  demonstrates independence with Postural Awareness demonstrated with reduction of flexed posturing and more upright presentation and reduction of lateral shift.   5. Patient demonstrates independence with body mechanics.         Plan         Plan of care Certification: 8/20/2019 to 11/20/2019.     Outpatient Physical Therapy 2 times weekly for 8 weeks to include the following interventions: Manual Therapy, Patient Education and Therapeutic Exercise.     Pato Canela, PT

## 2019-09-12 ENCOUNTER — CLINICAL SUPPORT (OUTPATIENT)
Dept: REHABILITATION | Facility: HOSPITAL | Age: 84
End: 2019-09-12
Attending: FAMILY MEDICINE
Payer: MEDICARE

## 2019-09-12 DIAGNOSIS — R26.89 DECREASED BACK MOBILITY: ICD-10-CM

## 2019-09-12 DIAGNOSIS — R52 PAIN AGGRAVATED BY WALKING: ICD-10-CM

## 2019-09-12 DIAGNOSIS — M25.651 DECREASED RANGE OF MOTION OF BOTH HIPS: ICD-10-CM

## 2019-09-12 DIAGNOSIS — Z74.09 IMPAIRED MOBILITY AND ENDURANCE: ICD-10-CM

## 2019-09-12 DIAGNOSIS — M62.9 HAMSTRING TIGHTNESS OF BOTH LOWER EXTREMITIES: ICD-10-CM

## 2019-09-12 DIAGNOSIS — M25.652 DECREASED RANGE OF MOTION OF BOTH HIPS: ICD-10-CM

## 2019-09-12 PROCEDURE — 97110 THERAPEUTIC EXERCISES: CPT | Mod: PO

## 2019-09-12 NOTE — PROGRESS NOTES
"    Physical Therapy Daily Treatment Note     Name: Cliff Magaña  Clinic Number: [id number]    Therapy Diagnosis:   Encounter Diagnoses   Name Primary?    Decreased back mobility     Decreased range of motion of both hips     Pain aggravated by walking     Hamstring tightness of both lower extremities     Impaired mobility and endurance      Physician: Munir Estrada MD  Physician Orders: PT Eval and Treat back  Medical Diagnosis from Referral:   M54.5,G89.29 (ICD-10-CM) - Chronic bilateral low back pain without sciatica   Evaluation Date: 8/20/2019  Authorization Period Expiration: 7/28/2020  Plan of Care Expiration: 12/31/19  Visit # / Visits authorized: 5/19    Visit Date: 9/12/2019  Time In:2:00 pm  Time Out: 3:00 pm  Total Billable Time: 30 minutes (TE-2)    Precautions: Standard    Subjective     Pt reports:  Doing well today , has some mild pain in the mid low back .     He was not issued a home exercise program but tried to do some things at home.  Response to previous treatment: says he felt good after the last session   Functional change:doing a lot more walking without problems. Sleeping fine.     Pain: 1 /10  Location: bilateral low back      Objective     Cliff received therapeutic exercises to develop strength, ROM, flexibility, posture and core stabilization for 60 minutes including:    Leg press   Recumbent bike  Upright bike 8'  UE ergometer  Treadmill   Elliptical       THERAPEUTIC EXERCISE  SUPINE  -knee to chest HS stretch 5" x 15  -LTR x15  -TA activation 5" x 12   -TA activation foot lifts  5" x 12     SIDELYING  -open book x15  -clams x15 GTB       PRONE  -sustained exten 3'  -press ups 2x10  -leg lifts x12  -multifidus activation  5" x 12 w/donkey kick partial lift     STANDING.      SITTING  -HS stretch long sit 7" x 15        MANUAL THERAPY: Manual myofascial stretching right iliolumbar region in prone.   MODALITY    DIRECT EDUCATION:   Pt issued and reviewed HEP in full with " good understanding     Exercises were reviewed and Cliff was able to demonstrate them prior to the end of the session.  Cliff demonstrated good  understanding of the education provided.     See EMR under Patient Instructions for exercises provided 9/12/19.    Assessment     Pt with a good tolerance to session today and was able to complete with no onset of pain . Pt with good carryover with exercises noted and minimal cues required. HEP provided and reviewed in full as pt demonstrated good form and understanding of exercises performed today.     Cliff is progressing  towards his goals.   Pt prognosis is Good.     Pt will continue to benefit from skilled outpatient physical therapy to address the deficits listed in the problem list box on initial evaluation, provide pt/family education and to maximize pt's level of independence in the home and community environment.   Pt's spiritual, cultural and educational needs considered and pt agreeable to plan of care and goals.     Anticipated barriers to physical therapy: none     Short Term GOALS: 4 weeks. Pt agrees with goals set.  1. Pt to report decreased pain 20-40% associated with walking  2. Pt to demonstrate spinal stability with core activation during transitional movements.   3. Pt to appreciate improved back mobility with increased hamstring flexibility to 70 degrees actively.   4. Patient demonstrates independence with HEP.      Long Term GOALS: 8 weeks. Pt agrees with goals set.  1. Patient demonstrates increased LE hamstring and piriformis flexibility with increased passive leg raise to 75 degrees and hip internal rotation to 30 degrees BLE to improve tolerance to walking  2. Patient demonstrates increased trunk and core muscle strength  to improve tolerance to functional activities by performing trunk raise  3. Patient demonstrates improved walking tolerance with 50% or greater reduction in reported pain                             4. Patient demonstrates  independence with Postural Awareness demonstrated with reduction of flexed posturing and more upright presentation and reduction of lateral shift.   5. Patient demonstrates independence with body mechanics.         Plan      Plan of care Certification: 8/20/2019 to 11/20/2019.     Outpatient Physical Therapy 2 times weekly for 8 weeks to include the following interventions: Manual Therapy, Patient Education and Therapeutic Exercise.     Carie Rivera, PTA

## 2019-09-17 ENCOUNTER — CLINICAL SUPPORT (OUTPATIENT)
Dept: REHABILITATION | Facility: HOSPITAL | Age: 84
End: 2019-09-17
Attending: FAMILY MEDICINE
Payer: MEDICARE

## 2019-09-17 DIAGNOSIS — R52 PAIN AGGRAVATED BY WALKING: ICD-10-CM

## 2019-09-17 DIAGNOSIS — M25.651 DECREASED RANGE OF MOTION OF BOTH HIPS: ICD-10-CM

## 2019-09-17 DIAGNOSIS — M62.9 HAMSTRING TIGHTNESS OF BOTH LOWER EXTREMITIES: ICD-10-CM

## 2019-09-17 DIAGNOSIS — M25.652 DECREASED RANGE OF MOTION OF BOTH HIPS: ICD-10-CM

## 2019-09-17 DIAGNOSIS — Z74.09 IMPAIRED MOBILITY AND ENDURANCE: ICD-10-CM

## 2019-09-17 DIAGNOSIS — R26.89 DECREASED BACK MOBILITY: ICD-10-CM

## 2019-09-17 PROCEDURE — 97110 THERAPEUTIC EXERCISES: CPT | Mod: PO | Performed by: PHYSICAL THERAPIST

## 2019-09-17 NOTE — PROGRESS NOTES
"              Physical Therapy Daily Treatment Note     Name: Cliff Magaña  Clinic Number: [id number]    Therapy Diagnosis:   Encounter Diagnoses   Name Primary?    Decreased back mobility     Decreased range of motion of both hips     Pain aggravated by walking     Hamstring tightness of both lower extremities     Impaired mobility and endurance      Physician: Munir Estrada MD  Physician Orders: PT Eval and Treat back  Medical Diagnosis from Referral:   M54.5,G89.29 (ICD-10-CM) - Chronic bilateral low back pain without sciatica   Evaluation Date: 8/20/2019  Authorization Period Expiration: 7/28/2020  Plan of Care Expiration: 11/20/2020  Visit # / Visits authorized: 7/ 1    Visit Date: 9/17/2019  Time In: 1320  Time Out: 1410  Total Billable Time: 50 minutes    Precautions: Standard    Subjective     Pt reports: that he is feeling good and there is no pain.   He was issued a home exercise program and doing them at home.   Response to previous treatment: did fine after the last session.   Functional change:doing a lot more walking without problems.  Did a lot of walking Saturday.   Pain: 0 /10  Location: bilateral low back      Objective     Cliff received therapeutic exercises to develop strength, ROM, flexibility, posture and core stabilization for 50 minutes including:    Leg press   Recumbent bike  Upright bike 8'  UE ergometer  Treadmill   Elliptical       THERAPEUTIC EXERCISE  SUPINE  -knee to chest x15   -knee to chest HS stretch 5" x 15  -LTR x15  -TA activation 5" x 12   -TA activation foot lifts  5" x 12     SIDELYING  -open book x15  -clams x15 GTB     PRONE  -sustained exten 3'  -press ups 2x10  -leg lifts x12  -multifidus activation  5" x 12 w/donkey kick partial lift     STANDING.      SITTING  -HS stretch long sit 7" x 15        MANUAL THERAPY: Manual myofascial stretching right iliolumbar region in prone.   MODALITY  DIRECT EDUCATION: Regarding concept of spine stability and core " activation to maintain spine stability during movement transitions. The patient seemed to demonstrate an understanding. Requiring further demonstration with cueing.      FOTO next session     Assessment     The patient performed the activities noted without difficulty. He is not demonstrating significant limitations.except for hamstring tightness. There appeared to be some improved elasticity after IASTM was performed over the hamstrings. Continue with core strengthening and stretching.       Cliff is progressing  towards his goals.   Pt prognosis is Good.     Pt will continue to benefit from skilled outpatient physical therapy to address the deficits listed in the problem list box on initial evaluation, provide pt/family education and to maximize pt's level of independence in the home and community environment.     Pt's spiritual, cultural and educational needs considered and pt agreeable to plan of care and goals.     Anticipated barriers to physical therapy: none     Short Term GOALS: 4 weeks. Pt agrees with goals set.  1. Pt to report decreased pain 20-40% associated with walking  2. Pt to demonstrate spinal stability with core activation during transitional movements.   3. Pt to appreciate improved back mobility with increased hamstring flexibility to 70 degrees actively.   4. Patient demonstrates independence with HEP.      Long Term GOALS: 8 weeks. Pt agrees with goals set.  1. Patient demonstrates increased LE hamstring and piriformis flexibility with increased passive leg raise to 75 degrees and hip internal rotation to 30 degrees BLE to improve tolerance to walking  2. Patient demonstrates increased trunk and core muscle strength  to improve tolerance to functional activities by performing trunk raise  3. Patient demonstrates improved walking tolerance with 50% or greater reduction in reported pain                             4. Patient demonstrates independence with Postural Awareness demonstrated with  reduction of flexed posturing and more upright presentation and reduction of lateral shift.   5. Patient demonstrates independence with body mechanics.         Plan         Plan of care Certification: 8/20/2019 to 11/20/2019.     Outpatient Physical Therapy 2 times weekly for 8 weeks to include the following interventions: Manual Therapy, Patient Education and Therapeutic Exercise.     Pato Canela, PT

## 2019-09-20 ENCOUNTER — CLINICAL SUPPORT (OUTPATIENT)
Dept: REHABILITATION | Facility: HOSPITAL | Age: 84
End: 2019-09-20
Attending: FAMILY MEDICINE
Payer: MEDICARE

## 2019-09-20 DIAGNOSIS — R26.89 DECREASED BACK MOBILITY: ICD-10-CM

## 2019-09-20 DIAGNOSIS — R52 PAIN AGGRAVATED BY WALKING: ICD-10-CM

## 2019-09-20 DIAGNOSIS — Z74.09 IMPAIRED MOBILITY AND ENDURANCE: ICD-10-CM

## 2019-09-20 DIAGNOSIS — M25.651 DECREASED RANGE OF MOTION OF BOTH HIPS: ICD-10-CM

## 2019-09-20 DIAGNOSIS — M62.9 HAMSTRING TIGHTNESS OF BOTH LOWER EXTREMITIES: ICD-10-CM

## 2019-09-20 DIAGNOSIS — M25.652 DECREASED RANGE OF MOTION OF BOTH HIPS: ICD-10-CM

## 2019-09-20 PROCEDURE — 97110 THERAPEUTIC EXERCISES: CPT | Mod: PO

## 2019-09-20 NOTE — PROGRESS NOTES
"  Physical Therapy Daily Treatment Note     Name: Cliff Magaña  Clinic Number: [id number]    Therapy Diagnosis:   Encounter Diagnoses   Name Primary?    Decreased back mobility     Decreased range of motion of both hips     Pain aggravated by walking     Hamstring tightness of both lower extremities     Impaired mobility and endurance      Physician: Munir Estrada MD  Physician Orders: PT Eval and Treat back  Medical Diagnosis from Referral:   M54.5,G89.29 (ICD-10-CM) - Chronic bilateral low back pain without sciatica   Evaluation Date: 8/20/2019  Authorization Period Expiration: 7/28/2020  Plan of Care Expiration: 11/20/2020  Visit # / Visits authorized: 7/ 1    Visit Date: 9/20/2019  Time In: 900  Time Out: 950  Total Billable Time: 50 minutes    Precautions: Standard    Subjective     Pt reports: he is feeling good since last treatment, little to no pain  He was issued a home exercise program and doing them at home.   Response to previous treatment: did fine after the last session.   Functional change:doing a lot more walking without problems.  Did a lot of walking Saturday.   Pain: 0 /10  Location: bilateral low back      Objective     Cliff received therapeutic exercises to develop strength, ROM, flexibility, posture and core stabilization for 50 minutes including:    Leg press   Recumbent bike  Upright bike 8'  UE ergometer  Treadmill   Elliptical     THERAPEUTIC EXERCISE  SUPINE  -knee to chest x15   -knee to chest HS stretch 5" x 15  -LTR x15  -TA activation 5" x 12   -TA activation foot lifts  5" x 12   - Bridge 2x10    SIDELYING  -open book x15 - np  -clams x15 GTB     PRONE   -sustained exten 3' - np  -press ups 2x10 - np  -leg lifts x12  -multifidus activation  5" x 12 w/donkey kick partial lift     STANDING.  - Anti rot press GTB 2x10  - Unilateral farmers walk 15# 2 laps  - Hip hinge at wall 2x10    SITTING  -HS stretch long sit 7" x 15        MANUAL THERAPY: Manual myofascial stretching " right iliolumbar region in prone.   MODALITY  DIRECT EDUCATION: Regarding concept of spine stability and core activation to maintain spine stability during movement transitions. The patient seemed to demonstrate an understanding. Requiring further demonstration with autumning.      FOTO next session     Assessment     Pt presents w/ good carryover of exercise form from last treatment. Pt tolerates progressed core exercises well, benefits from cuing for posterior weight shift during hip hinge. Will continue to progress as tolerated.    Cliff is progressing  towards his goals.   Pt prognosis is Good.     Pt will continue to benefit from skilled outpatient physical therapy to address the deficits listed in the problem list box on initial evaluation, provide pt/family education and to maximize pt's level of independence in the home and community environment.     Pt's spiritual, cultural and educational needs considered and pt agreeable to plan of care and goals.     Anticipated barriers to physical therapy: none     Short Term GOALS: 4 weeks. Pt agrees with goals set.  1. Pt to report decreased pain 20-40% associated with walking  2. Pt to demonstrate spinal stability with core activation during transitional movements.   3. Pt to appreciate improved back mobility with increased hamstring flexibility to 70 degrees actively.   4. Patient demonstrates independence with HEP.      Long Term GOALS: 8 weeks. Pt agrees with goals set.  1. Patient demonstrates increased LE hamstring and piriformis flexibility with increased passive leg raise to 75 degrees and hip internal rotation to 30 degrees BLE to improve tolerance to walking  2. Patient demonstrates increased trunk and core muscle strength  to improve tolerance to functional activities by performing trunk raise  3. Patient demonstrates improved walking tolerance with 50% or greater reduction in reported pain                             4. Patient demonstrates independence  with Postural Awareness demonstrated with reduction of flexed posturing and more upright presentation and reduction of lateral shift.   5. Patient demonstrates independence with body mechanics.         Plan         Plan of care Certification: 8/20/2019 to 11/20/2019.     Outpatient Physical Therapy 2 times weekly for 8 weeks to include the following interventions: Manual Therapy, Patient Education and Therapeutic Exercise.     Pk Mendoza, PT

## 2019-09-20 NOTE — PROGRESS NOTES
"  Physical Therapy Daily Treatment Note     Name: Cliff Magaña  Clinic Number: [id number]    Therapy Diagnosis:   No diagnosis found.  Physician: Munir Estrada MD  Physician Orders: PT Eval and Treat back  Medical Diagnosis from Referral:   M54.5,G89.29 (ICD-10-CM) - Chronic bilateral low back pain without sciatica   Evaluation Date: 8/20/2019  Authorization Period Expiration: 7/28/2020  Plan of Care Expiration: 11/20/2020  Visit # / Visits authorized: 7/ 1    Visit Date: 9/20/2019  Time In:   Time Out:   Total Billable Time:     Precautions: Standard    Subjective     Pt reports:   He was issued a home exercise program and doing them at home.   Response to previous treatment: did fine after the last session.   Functional change:doing a lot more walking without problems.  Did a lot of walking Saturday.     Pain: 0 /10  Location: bilateral low back      Objective     Cliff received therapeutic exercises to develop strength, ROM, flexibility, posture and core stabilization for 50 minutes including:    Leg press   Recumbent bike  Upright bike 8'  UE ergometer  Treadmill   Elliptical       THERAPEUTIC EXERCISE  SUPINE  -knee to chest x15   -knee to chest HS stretch 5" x 15  -LTR x15  -TA activation 5" x 12   -TA activation foot lifts  5" x 12     SIDELYING  -open book x15  -clams x15 GTB     PRONE  -sustained exten 3'  -press ups 2x10  -leg lifts x12  -multifidus activation  5" x 12 w/donkey kick partial lift     STANDING.      SITTING  -HS stretch long sit 7" x 15        MANUAL THERAPY: Manual myofascial stretching right iliolumbar region in prone.   MODALITY  DIRECT EDUCATION: Regarding concept of spine stability and core activation to maintain spine stability during movement transitions. The patient seemed to demonstrate an understanding. Requiring further demonstration with cueing.      FOTO next session     Assessment         Cliff is progressing  towards his goals.   Pt prognosis is Good.     Pt will " continue to benefit from skilled outpatient physical therapy to address the deficits listed in the problem list box on initial evaluation, provide pt/family education and to maximize pt's level of independence in the home and community environment.   Pt's spiritual, cultural and educational needs considered and pt agreeable to plan of care and goals.     Anticipated barriers to physical therapy: none     Short Term GOALS: 4 weeks. Pt agrees with goals set.  1. Pt to report decreased pain 20-40% associated with walking  2. Pt to demonstrate spinal stability with core activation during transitional movements.   3. Pt to appreciate improved back mobility with increased hamstring flexibility to 70 degrees actively.   4. Patient demonstrates independence with HEP.      Long Term GOALS: 8 weeks. Pt agrees with goals set.  1. Patient demonstrates increased LE hamstring and piriformis flexibility with increased passive leg raise to 75 degrees and hip internal rotation to 30 degrees BLE to improve tolerance to walking  2. Patient demonstrates increased trunk and core muscle strength  to improve tolerance to functional activities by performing trunk raise  3. Patient demonstrates improved walking tolerance with 50% or greater reduction in reported pain                             4. Patient demonstrates independence with Postural Awareness demonstrated with reduction of flexed posturing and more upright presentation and reduction of lateral shift.   5. Patient demonstrates independence with body mechanics.         Plan      Plan of care Certification: 8/20/2019 to 11/20/2019.     Outpatient Physical Therapy 2 times weekly for 8 weeks to include the following interventions: Manual Therapy, Patient Education and Therapeutic Exercise.     Carie Rivera, PTA

## 2019-09-24 ENCOUNTER — CLINICAL SUPPORT (OUTPATIENT)
Dept: REHABILITATION | Facility: HOSPITAL | Age: 84
End: 2019-09-24
Attending: FAMILY MEDICINE
Payer: MEDICARE

## 2019-09-24 DIAGNOSIS — R26.89 DECREASED BACK MOBILITY: ICD-10-CM

## 2019-09-24 DIAGNOSIS — M25.651 DECREASED RANGE OF MOTION OF BOTH HIPS: ICD-10-CM

## 2019-09-24 DIAGNOSIS — M62.9 HAMSTRING TIGHTNESS OF BOTH LOWER EXTREMITIES: ICD-10-CM

## 2019-09-24 DIAGNOSIS — Z74.09 IMPAIRED MOBILITY AND ENDURANCE: ICD-10-CM

## 2019-09-24 DIAGNOSIS — R52 PAIN AGGRAVATED BY WALKING: ICD-10-CM

## 2019-09-24 DIAGNOSIS — M25.652 DECREASED RANGE OF MOTION OF BOTH HIPS: ICD-10-CM

## 2019-09-24 PROCEDURE — 97110 THERAPEUTIC EXERCISES: CPT | Mod: PO | Performed by: PHYSICAL THERAPIST

## 2019-09-24 PROCEDURE — G8978 MOBILITY CURRENT STATUS: HCPCS | Mod: CJ,PO | Performed by: PHYSICAL THERAPIST

## 2019-09-24 PROCEDURE — G8979 MOBILITY GOAL STATUS: HCPCS | Mod: CJ,PO | Performed by: PHYSICAL THERAPIST

## 2019-09-24 NOTE — PROGRESS NOTES
"              Physical Therapy Daily Treatment Note     Name: Cliff Magaña  Clinic Number: [id number]    Therapy Diagnosis:   Encounter Diagnoses   Name Primary?    Decreased back mobility     Decreased range of motion of both hips     Pain aggravated by walking     Hamstring tightness of both lower extremities     Impaired mobility and endurance      Physician: Munir Estarda MD  Physician Orders: PT Eval and Treat back  Medical Diagnosis from Referral:   M54.5,G89.29 (ICD-10-CM) - Chronic bilateral low back pain without sciatica   Evaluation Date: 8/20/2019  Authorization Period Expiration: 12/31/2019  Plan of Care Expiration: 11/20/2020  Visit # / Visits authorized: 9/19    Visit Date: 9/24/2019  Time In: 910  Time Out:1010   Total Billable Time: 60  minutes    Precautions: Standard    Subjective     Pt reports: that he is doing good. There is very minimal pain in the low back on the right     He was issued a home exercise program and doing them at home.   Response to previous treatment: felt good  Functional change:had a busy weekend a did a lot of walking.   Pain: 1 /10  Location: bilateral low back      Objective     Cliff received therapeutic exercises to develop strength, ROM, flexibility, posture and core stabilization for 30 minutes including:    Leg press   Recumbent bike  Upright bike 8'  UE ergometer  Treadmill   Elliptical       THERAPEUTIC EXERCISE  SUPINE  -knee to chest x15   -knee to chest HS stretch 5" x 15  -LTR x15  -TA activation 5" x 12   -TA activation foot lifts  5" x 12     SIDELYING  -open book x15  -clams x15 GTB     PRONE  -sustained exten 3'  -press ups 2x10  -leg lifts x12  -multifidus activation  5" x 12 w/donkey kick partial lift     STANDING.      SITTING  -HS stretch long sit 7" x 15        MANUAL THERAPY: Manual myofascial stretching right iliolumbar region in prone.   MODALITY  DIRECT EDUCATION: Regarding concept of spine stability and core activation to maintain spine " stability during movement transitions. The patient seemed to demonstrate an understanding. Requiring further demonstration with cueing.         CMS Impairment/Limitation/Restriction for FOTO lumbar spine  Survey    Therapist reviewed FOTO scores for Cliff Magaña on 9/24/2019  FOTO documents entered into [employer] - see Media section.    Limitation Score: 21%  Category: Mobility    Current : CJ = at least 20% but < 40% impaired, limited or restricted  Goal: CJ = at least 20% but < 40% impaired, limited or restricted  Discharge:          Assessment     Performed the activities as noted. He did not encounter any limitations with the activities performed. Demonstrated satisfactory understanding of spine stability concept. Progressing with strengthening.     Cliff is progressing  towards his goals.   Pt prognosis is Good.     Pt will continue to benefit from skilled outpatient physical therapy to address the deficits listed in the problem list box on initial evaluation, provide pt/family education and to maximize pt's level of independence in the home and community environment.     Pt's spiritual, cultural and educational needs considered and pt agreeable to plan of care and goals.     Anticipated barriers to physical therapy: none     Short Term GOALS: 4 weeks. Pt agrees with goals set.  1. Pt to report decreased pain 20-40% associated with walking  2. Pt to demonstrate spinal stability with core activation during transitional movements.   3. Pt to appreciate improved back mobility with increased hamstring flexibility to 70 degrees actively.   4. Patient demonstrates independence with HEP.      Long Term GOALS: 8 weeks. Pt agrees with goals set.  1. Patient demonstrates increased LE hamstring and piriformis flexibility with increased passive leg raise to 75 degrees and hip internal rotation to 30 degrees BLE to improve tolerance to walking  2. Patient demonstrates increased trunk and core muscle strength  to improve  tolerance to functional activities by performing trunk raise  3. Patient demonstrates improved walking tolerance with 50% or greater reduction in reported pain                             4. Patient demonstrates independence with Postural Awareness demonstrated with reduction of flexed posturing and more upright presentation and reduction of lateral shift.   5. Patient demonstrates independence with body mechanics.         Plan         Plan of care Certification: 8/20/2019 to 11/20/2019.     Outpatient Physical Therapy 2 times weekly for 8 weeks to include the following interventions: Manual Therapy, Patient Education and Therapeutic Exercise.     Pato Canela, PT

## 2019-09-26 ENCOUNTER — CLINICAL SUPPORT (OUTPATIENT)
Dept: REHABILITATION | Facility: HOSPITAL | Age: 84
End: 2019-09-26
Attending: FAMILY MEDICINE
Payer: MEDICARE

## 2019-09-26 DIAGNOSIS — Z74.09 IMPAIRED MOBILITY AND ENDURANCE: ICD-10-CM

## 2019-09-26 DIAGNOSIS — M25.651 DECREASED RANGE OF MOTION OF BOTH HIPS: ICD-10-CM

## 2019-09-26 DIAGNOSIS — R52 PAIN AGGRAVATED BY WALKING: ICD-10-CM

## 2019-09-26 DIAGNOSIS — R26.89 DECREASED BACK MOBILITY: ICD-10-CM

## 2019-09-26 DIAGNOSIS — M62.9 HAMSTRING TIGHTNESS OF BOTH LOWER EXTREMITIES: ICD-10-CM

## 2019-09-26 DIAGNOSIS — M25.652 DECREASED RANGE OF MOTION OF BOTH HIPS: ICD-10-CM

## 2019-09-26 PROCEDURE — 97110 THERAPEUTIC EXERCISES: CPT | Mod: PO | Performed by: PHYSICAL THERAPIST

## 2019-09-26 NOTE — PROGRESS NOTES
"              Physical Therapy Daily Treatment Note     Name: Cliff Magaña  Clinic Number: [id number]    Therapy Diagnosis:   Encounter Diagnoses   Name Primary?    Decreased back mobility     Decreased range of motion of both hips     Pain aggravated by walking     Hamstring tightness of both lower extremities     Impaired mobility and endurance      Physician: Munir Estrada MD  Physician Orders: PT Eval and Treat back  Medical Diagnosis from Referral:   M54.5,G89.29 (ICD-10-CM) - Chronic bilateral low back pain without sciatica   Evaluation Date: 8/20/2019  Authorization Period Expiration: 12/31/2019  Plan of Care Expiration: 11/20/2020  Visit # / Visits authorized: 9/19    Visit Date: 9/26/2019  Time In: 1310  Time Out: 1415   Total Billable Time: 65  minutes    Precautions: Standard    Subjective     Pt reports the back is feeling good. There is a slight pain but it does not bother me.  Sleeping good.     He was issued a home exercise program and doing them at home.   Response to previous treatment: f\good   Functional change: can do everything that I usually do during a day.     Pain: 1 /10  Location: bilateral low back      Objective     Cliff received therapeutic exercises to develop strength, ROM, flexibility, posture and core stabilization for 30 minutes including:    Leg press   Recumbent bike  Upright bike 8'  UE ergometer  Treadmill   Elliptical       THERAPEUTIC EXERCISE  SUPINE  -knee to chest x15  -knee to chest stretch 5" x 15    -knee to chest HS stretch 5" x 15  -LTR x15  -TA activation 5" x 12   -TA activation foot lifts  5" x 12     SIDELYING  -open book x15  -clams x15 GTB     PRONE  -sustained exten 3'  -press ups 2x10  -leg lifts x12  -multifidus activation  5" x 12 w/donkey kick partial lift     STANDING.      SITTING  -HS stretch long sit 7" x 15        MANUAL THERAPY: Manual myofascial stretching right iliolumbar region in prone.   MODALITY  DIRECT EDUCATION: Regarding concept " of spine stability and core activation to maintain spine stability during movement transitions. The patient seemed to demonstrate an understanding. Requiring further demonstration with cueing.         CMS Impairment/Limitation/Restriction for FOTO lumbar spine  Survey    Therapist reviewed FOTO scores for Cliff Magaña on 9/24/2019  FOTO documents entered into [employer] - see Media section.    Limitation Score: 21%  Category: Mobility    Current : CJ = at least 20% but < 40% impaired, limited or restricted  Goal: CJ = at least 20% but < 40% impaired, limited or restricted  Discharge:          Assessment     The patient demonstrated good mobility and completed his routine without difficulty. He did gained some increased flexibility in the hamstrings evident by increase of 20 degrees LLE and 15 RLE after IASTM and active stretching of the hamstrings.  He is compliant with the HEP.  Did not demonstrate any limitation from pain associated with the exercises.     Cliff is progressing  towards his goals.   Pt prognosis is Good.     Pt will continue to benefit from skilled outpatient physical therapy to address the deficits listed in the problem list box on initial evaluation, provide pt/family education and to maximize pt's level of independence in the home and community environment.     Pt's spiritual, cultural and educational needs considered and pt agreeable to plan of care and goals.     Anticipated barriers to physical therapy: none     Short Term GOALS: 4 weeks. Pt agrees with goals set.  1. Pt to report decreased pain 20-40% associated with walking  2. Pt to demonstrate spinal stability with core activation during transitional movements.   3. Pt to appreciate improved back mobility with increased hamstring flexibility to 70 degrees actively.   4. Patient demonstrates independence with HEP.      Long Term GOALS: 8 weeks. Pt agrees with goals set.  1. Patient demonstrates increased LE hamstring and piriformis  flexibility with increased passive leg raise to 75 degrees and hip internal rotation to 30 degrees BLE to improve tolerance to walking  2. Patient demonstrates increased trunk and core muscle strength  to improve tolerance to functional activities by performing trunk raise  3. Patient demonstrates improved walking tolerance with 50% or greater reduction in reported pain                             4. Patient demonstrates independence with Postural Awareness demonstrated with reduction of flexed posturing and more upright presentation and reduction of lateral shift.   5. Patient demonstrates independence with body mechanics.         Plan         Plan of care Certification: 8/20/2019 to 11/20/2019.     Outpatient Physical Therapy 2 times weekly for 8 weeks to include the following interventions: Manual Therapy, Patient Education and Therapeutic Exercise.     Pato Canela, PT

## 2019-10-01 ENCOUNTER — CLINICAL SUPPORT (OUTPATIENT)
Dept: REHABILITATION | Facility: HOSPITAL | Age: 84
End: 2019-10-01
Attending: FAMILY MEDICINE
Payer: MEDICARE

## 2019-10-01 DIAGNOSIS — Z74.09 IMPAIRED MOBILITY AND ENDURANCE: ICD-10-CM

## 2019-10-01 DIAGNOSIS — R52 PAIN AGGRAVATED BY WALKING: ICD-10-CM

## 2019-10-01 DIAGNOSIS — M25.652 DECREASED RANGE OF MOTION OF BOTH HIPS: ICD-10-CM

## 2019-10-01 DIAGNOSIS — R26.89 DECREASED BACK MOBILITY: ICD-10-CM

## 2019-10-01 DIAGNOSIS — M62.9 HAMSTRING TIGHTNESS OF BOTH LOWER EXTREMITIES: ICD-10-CM

## 2019-10-01 DIAGNOSIS — M25.651 DECREASED RANGE OF MOTION OF BOTH HIPS: ICD-10-CM

## 2019-10-01 PROCEDURE — 97110 THERAPEUTIC EXERCISES: CPT | Mod: PO | Performed by: PHYSICAL THERAPIST

## 2019-10-03 ENCOUNTER — CLINICAL SUPPORT (OUTPATIENT)
Dept: REHABILITATION | Facility: HOSPITAL | Age: 84
End: 2019-10-03
Attending: FAMILY MEDICINE
Payer: MEDICARE

## 2019-10-03 DIAGNOSIS — Z74.09 IMPAIRED MOBILITY AND ENDURANCE: ICD-10-CM

## 2019-10-03 DIAGNOSIS — M62.9 HAMSTRING TIGHTNESS OF BOTH LOWER EXTREMITIES: ICD-10-CM

## 2019-10-03 DIAGNOSIS — M25.651 DECREASED RANGE OF MOTION OF BOTH HIPS: ICD-10-CM

## 2019-10-03 DIAGNOSIS — R52 PAIN AGGRAVATED BY WALKING: ICD-10-CM

## 2019-10-03 DIAGNOSIS — M25.652 DECREASED RANGE OF MOTION OF BOTH HIPS: ICD-10-CM

## 2019-10-03 DIAGNOSIS — R26.89 DECREASED BACK MOBILITY: ICD-10-CM

## 2019-10-03 PROCEDURE — G8979 MOBILITY GOAL STATUS: HCPCS | Mod: CJ,PO | Performed by: PHYSICAL THERAPIST

## 2019-10-03 PROCEDURE — 97110 THERAPEUTIC EXERCISES: CPT | Mod: PO | Performed by: PHYSICAL THERAPIST

## 2019-10-03 PROCEDURE — G8980 MOBILITY D/C STATUS: HCPCS | Mod: CI,PO | Performed by: PHYSICAL THERAPIST

## 2019-10-03 NOTE — PROGRESS NOTES
"              Physical Therapy Daily Treatment Note     Name: Cliff Magaña  Clinic Number: [id number]    Therapy Diagnosis:   Encounter Diagnoses   Name Primary?    Decreased back mobility     Decreased range of motion of both hips     Pain aggravated by walking     Hamstring tightness of both lower extremities     Impaired mobility and endurance      Physician: Munir Estrada MD  Physician Orders: PT Eval and Treat back  Medical Diagnosis from Referral:   M54.5,G89.29 (ICD-10-CM) - Chronic bilateral low back pain without sciatica   Evaluation Date: 8/20/2019  Authorization Period Expiration: 12/31/2019  Plan of Care Expiration: 11/20/2020  Visit # / Visits authorized: 12/19    Visit Date: 10/3/2019  Time In: 905  Time Out: 1015  Total Time:70  minutes    Precautions: Standard    Subjective     Pt reports the back is feeling much better. There is no pain.   He was issued a home exercise program and doing them every day.   Response to previous treatment: did real good  Functional change: can do everything that I usually do during the day.     Pain:  0 /10   Location: bilateral low back      Objective     Lumbar/Thoracic AROM: Pain/Dysfunction with Movement:   Flexion                               80/60     85/60 DN   Extension                           15/10     30/10 DP   Right side bending                 10     Left side bending                     30     Right rotation DN   Left rotation DN        FLEXIBILITY  Hamstrings R 70/75     L  70/75    Cliff received therapeutic exercises to develop strength, ROM, flexibility, posture and core stabilization for 60 minutes including:    Leg press   Recumbent bike 8'  Upright bike 8'  UE ergometer  Treadmill   Elliptical       THERAPEUTIC EXERCISE  SUPINE  -knee to chest x15  -knee to chest stretch 5" x 15    -knee to chest HS stretch 5" x 15  -LTR x15  -TA activation 5" x 12   -TA activation foot lifts  5" x 12     SIDELYING  -open book x12  -clams x15 GTB " "    PRONE  -sustained exten 3'  -press ups 2x10  -leg lifts x12  -multifidus activation  5" x 12 w/donkey kick partial lift     STANDING.      SITTING  -back / HS stretch long sit 5" x 15   -long sit HS stretch 5" x 15   -long sit trunk rotation x10   -sit trunk rot x10   -sit trunk rot in  Lumbar flexion x10      MANUAL THERAPY: Manual myofascial stretching right iliolumbar region in prone. NP  MODALITY  DIRECT EDUCATION: Regarding concept of spine stability and core activation to maintain spine stability during movement transitions. The patient seemed to demonstrate an understanding. Requiring further demonstration with cueing.   :  Patient was issued HEP   Printed Home Exercises Reviewed  Pt demo good understanding of the education provided. Patient demonstrated good return demonstration of activities  See EMR under Patient Instructions for exercises provided 10/1/2019.  HEP reviewed again for clarity        CMS Impairment/Limitation/Restriction for FOTO lumbar spine Survey  Visit #12    Therapist reviewed FOTO scores for Cliff Magaña on 10/3/2019.   FOTO documents entered into [employer] - see Media section.    Limitation Score: 17%  Category: Mobility      Goal: CJ = at least 20% but < 40% impaired, limited or restricted  Discharge: CI = at least 1% but < 20% impaired, limited or restricted             Assessment     The patient will make this his last session. He feels like he is doing well and can manage with doing the exercises at home. Goals have been mainly achieved. Some improved trunk mobility and flexibility in the hamstrings.    Discharge reason : Met goals, Patient has maximized benefit from PT at this time and feels that he can manage at home with HEP.   Goals Achieved: yes    Cliff is progressing  towards his goals.   Pt prognosis is Good.     Pt will continue to benefit from skilled outpatient physical therapy to address the deficits listed in the problem list box on initial evaluation, provide " pt/family education and to maximize pt's level of independence in the home and community environment.     Pt's spiritual, cultural and educational needs considered and pt agreeable to plan of care and goals.     Anticipated barriers to physical therapy: none     Short Term GOALS: 4 weeks. Pt agrees with goals set.  1. Pt to report decreased pain 20-40% associated with walking MET  2. Pt to demonstrate spinal stability with core activation during transitional movements. MET   3. Pt to appreciate improved back mobility with increased hamstring flexibility to 70 degrees actively. MET   4. Patient demonstrates independence with HEP.      Long Term GOALS: 8 weeks. Pt agrees with goals set.  1. Patient demonstrates increased LE hamstring and piriformis flexibility with increased passive leg raise to 75 degrees and hip internal rotation to 30 degrees BLE to improve tolerance to walking MET   2. Patient demonstrates increased trunk and core muscle strength  to improve tolerance to functional activities by performing trunk raise. Not achieved   3. Patient demonstrates improved walking tolerance with 50% or greater reduction in reported pain   MET                           4. Patient demonstrates independence with Postural Awareness demonstrated with reduction of flexed posturing and more upright presentation and reduction of lateral shift. MET  5. Patient demonstrates independence with body mechanics.  MET         Plan         Plan of care Certification: 8/20/2019 to 11/20/2019.     Discharge reason : Met goals,    Discharge plan :Continue HEP,    Outpatient Physical Therapy 2 times weekly for 8 weeks to include the following interventions: Manual Therapy, Patient Education and Therapeutic Exercise.     Pato Canela, PT

## 2019-10-08 ENCOUNTER — PATIENT MESSAGE (OUTPATIENT)
Dept: INTERNAL MEDICINE | Facility: CLINIC | Age: 84
End: 2019-10-08

## 2019-10-10 ENCOUNTER — TELEPHONE (OUTPATIENT)
Dept: INTERNAL MEDICINE | Facility: CLINIC | Age: 84
End: 2019-10-10

## 2019-10-11 ENCOUNTER — OFFICE VISIT (OUTPATIENT)
Dept: INTERNAL MEDICINE | Facility: CLINIC | Age: 84
End: 2019-10-11
Payer: MEDICARE

## 2019-10-11 VITALS
HEART RATE: 66 BPM | SYSTOLIC BLOOD PRESSURE: 142 MMHG | HEIGHT: 66 IN | WEIGHT: 162.5 LBS | DIASTOLIC BLOOD PRESSURE: 80 MMHG | BODY MASS INDEX: 26.12 KG/M2

## 2019-10-11 DIAGNOSIS — H91.93 BILATERAL HEARING LOSS, UNSPECIFIED HEARING LOSS TYPE: ICD-10-CM

## 2019-10-11 DIAGNOSIS — E78.5 HYPERLIPIDEMIA, UNSPECIFIED HYPERLIPIDEMIA TYPE: Chronic | ICD-10-CM

## 2019-10-11 DIAGNOSIS — I70.0 AORTIC ATHEROSCLEROSIS: ICD-10-CM

## 2019-10-11 DIAGNOSIS — Z79.01 CHRONIC ANTICOAGULATION: ICD-10-CM

## 2019-10-11 DIAGNOSIS — N18.30 STAGE 3 CHRONIC KIDNEY DISEASE: ICD-10-CM

## 2019-10-11 DIAGNOSIS — I10 ESSENTIAL HYPERTENSION: Chronic | ICD-10-CM

## 2019-10-11 DIAGNOSIS — I48.19 PERSISTENT ATRIAL FIBRILLATION: Chronic | ICD-10-CM

## 2019-10-11 DIAGNOSIS — Z00.00 ENCOUNTER FOR PREVENTIVE HEALTH EXAMINATION: Primary | ICD-10-CM

## 2019-10-11 PROBLEM — Z74.09 IMPAIRED MOBILITY AND ENDURANCE: Status: RESOLVED | Noted: 2019-08-20 | Resolved: 2019-10-11

## 2019-10-11 PROBLEM — R26.89 DECREASED BACK MOBILITY: Status: RESOLVED | Noted: 2019-08-20 | Resolved: 2019-10-11

## 2019-10-11 PROBLEM — M25.652 DECREASED RANGE OF MOTION OF BOTH HIPS: Status: RESOLVED | Noted: 2019-08-20 | Resolved: 2019-10-11

## 2019-10-11 PROBLEM — M25.651 DECREASED RANGE OF MOTION OF BOTH HIPS: Status: RESOLVED | Noted: 2019-08-20 | Resolved: 2019-10-11

## 2019-10-11 PROCEDURE — G0439 PR MEDICARE ANNUAL WELLNESS SUBSEQUENT VISIT: ICD-10-PCS | Mod: S$GLB,,, | Performed by: NURSE PRACTITIONER

## 2019-10-11 PROCEDURE — 99999 PR PBB SHADOW E&M-EST. PATIENT-LVL V: ICD-10-PCS | Mod: PBBFAC,,, | Performed by: NURSE PRACTITIONER

## 2019-10-11 PROCEDURE — 99215 OFFICE O/P EST HI 40 MIN: CPT | Mod: PBBFAC,PO | Performed by: NURSE PRACTITIONER

## 2019-10-11 PROCEDURE — 99999 PR PBB SHADOW E&M-EST. PATIENT-LVL V: CPT | Mod: PBBFAC,,, | Performed by: NURSE PRACTITIONER

## 2019-10-11 PROCEDURE — 90662 IIV NO PRSV INCREASED AG IM: CPT | Mod: PBBFAC,PO

## 2019-10-11 PROCEDURE — G0439 PPPS, SUBSEQ VISIT: HCPCS | Mod: S$GLB,,, | Performed by: NURSE PRACTITIONER

## 2019-10-11 NOTE — PATIENT INSTRUCTIONS
Counseling and Referral of Other Preventative  (Italic type indicates deductible and co-insurance are waived)    Patient Name: Cliff Magaña  Today's Date: 10/11/2019    Health Maintenance       Date Due Completion Date    Shingles Vaccine (2 of 3) 04/27/2015 3/2/2015 Obtain new shingles vaccine - SHINGRIX - when available    Influenza Vaccine (1) 09/01/2019 10/12/2018    Lipid Panel 09/03/2020 9/3/2019    TETANUS VACCINE 03/02/2025 3/2/2015        Orders Placed This Encounter   Procedures    Influenza - High Dose (65+) (PF) (IM)    Ambulatory Referral to ENT     The following information is provided to all patients.  This information is to help you find resources for any of the problems found today that may be affecting your health:                Living healthy guide: www.[facility].gov      Understanding Diabetes: www.diabetes.org      Eating healthy: www.cdc.gov/healthyweight      CDC home safety checklist: www.cdc.gov/steadi/patient.html      Agency on Aging: www.goea.[place].[facility]      Alcoholics anonymous (AA): www.aa.org      Physical Activity: www.shayy.nih.gov/kw9zphb      Tobacco use: www.quitwithusla.org

## 2019-10-11 NOTE — PROGRESS NOTES
"Cliff Magaña presented for a  Medicare AWV and comprehensive Health Risk Assessment today. The following components were reviewed and updated:    · Medical history  · Family History  · Social history  · Allergies and Current Medications  · Health Risk Assessment  · Health Maintenance  · Care Team     ** See Completed Assessments for Annual Wellness Visit within the encounter summary.**       The following assessments were completed:  · Living Situation  · CAGE  · Depression Screening  · Timed Get Up and Go  · Whisper Test  · Cognitive Function Screening      ·   · Nutrition Screening  · ADL Screening  · PAQ Screening    Vitals:    10/11/19 0929 10/11/19 1003   BP: (!) 142/66 (!) 142/80   BP Location: Right arm Right arm   Pulse: 66    Weight: 73.7 kg (162 lb 7.7 oz)    Height: 5' 5.5" (1.664 m)      Body mass index is 26.63 kg/m².  Physical Exam   Constitutional: He is oriented to person, place, and time. He appears well-developed and well-nourished.   HENT:   Head: Normocephalic.   Cardiovascular: Normal rate and regular rhythm.   Pulmonary/Chest: Effort normal and breath sounds normal.   Abdominal: Soft. Bowel sounds are normal.   Musculoskeletal: Normal range of motion. He exhibits no edema.   Neurological: He is alert and oriented to person, place, and time.   Skin: Skin is warm and dry.   Psychiatric: He has a normal mood and affect.   Nursing note and vitals reviewed.        Diagnoses and health risks identified today and associated recommendations/orders:    1. Encounter for preventive health examination  Here for Health Risk Assessment/Annual Wellness Visit.  Health maintenance reviewed and updated. Follow up in one year.    2. Bilateral hearing loss, unspecified hearing loss type  Chronic, reports long term hearing problems - since  service - no recent evaluation. Hearing screen abnormal. Agreeable to evaluation.  - Ambulatory Referral to ENT    3. Essential hypertension  Chronic, systolic B/P " mildly elevated x 2 today. Advised to monitor B/P on regular basis and notify PCP if greater than 140/90. Followed by PCP.    4. Aortic atherosclerosis  Chronic, stable on current medications. Noted CXR 5/31/11. Followed by PCP, Cardiology.    5. Persistent atrial fibrillation  Chronic, stable on current medications. Followed by PCP, Cardiology.    6. Chronic anticoagulation  Chronic, stable with rivaroxaban. Followed by PCP, Cardiology.    7. Hyperlipidemia, unspecified hyperlipidemia type  Chronic, stable on current medication. Followed by PCP, Cardiology.    8. Stage 3 chronic kidney disease  Chronic, stable. Followed by PCP.      Provided Cliff with a 5-10 year written screening schedule and personal prevention plan. Recommendations were developed using the USPSTF age appropriate recommendations. Education, counseling, and referrals were provided as needed. After Visit Summary printed and given to patient which includes a list of additional screenings\tests needed.    Follow up in about 8 months (around 5/28/2020).with PCP    Ebony Goldsmith NP

## 2019-10-22 ENCOUNTER — IMMUNIZATION (OUTPATIENT)
Dept: PHARMACY | Facility: CLINIC | Age: 84
End: 2019-10-22
Payer: MEDICARE

## 2019-11-11 ENCOUNTER — CLINICAL SUPPORT (OUTPATIENT)
Dept: AUDIOLOGY | Facility: CLINIC | Age: 84
End: 2019-11-11
Payer: MEDICARE

## 2019-11-11 ENCOUNTER — OFFICE VISIT (OUTPATIENT)
Dept: OTOLARYNGOLOGY | Facility: CLINIC | Age: 84
End: 2019-11-11
Payer: MEDICARE

## 2019-11-11 DIAGNOSIS — H90.3 SENSORINEURAL HEARING LOSS (SNHL) OF BOTH EARS: Primary | ICD-10-CM

## 2019-11-11 DIAGNOSIS — H90.3 BILATERAL SENSORINEURAL HEARING LOSS: Primary | ICD-10-CM

## 2019-11-11 PROCEDURE — 99202 PR OFFICE/OUTPT VISIT, NEW, LEVL II, 15-29 MIN: ICD-10-PCS | Mod: 25,S$PBB,, | Performed by: NURSE PRACTITIONER

## 2019-11-11 PROCEDURE — 99999 PR PBB SHADOW E&M-EST. PATIENT-LVL III: ICD-10-PCS | Mod: PBBFAC,,, | Performed by: NURSE PRACTITIONER

## 2019-11-11 PROCEDURE — 99999 PR PBB SHADOW E&M-EST. PATIENT-LVL I: CPT | Mod: PBBFAC,,, | Performed by: AUDIOLOGIST

## 2019-11-11 PROCEDURE — 99999 PR PBB SHADOW E&M-EST. PATIENT-LVL III: CPT | Mod: PBBFAC,,, | Performed by: NURSE PRACTITIONER

## 2019-11-11 PROCEDURE — 99202 OFFICE O/P NEW SF 15 MIN: CPT | Mod: 25,S$PBB,, | Performed by: NURSE PRACTITIONER

## 2019-11-11 PROCEDURE — 99213 OFFICE O/P EST LOW 20 MIN: CPT | Mod: PBBFAC,27,25 | Performed by: NURSE PRACTITIONER

## 2019-11-11 PROCEDURE — 99211 OFF/OP EST MAY X REQ PHY/QHP: CPT | Mod: PBBFAC | Performed by: AUDIOLOGIST

## 2019-11-11 PROCEDURE — 92557 COMPREHENSIVE HEARING TEST: CPT | Mod: PBBFAC | Performed by: AUDIOLOGIST

## 2019-11-11 PROCEDURE — 99999 PR PBB SHADOW E&M-EST. PATIENT-LVL I: ICD-10-PCS | Mod: PBBFAC,,, | Performed by: AUDIOLOGIST

## 2019-11-11 PROCEDURE — 92567 TYMPANOMETRY: CPT | Mod: PBBFAC | Performed by: AUDIOLOGIST

## 2019-11-11 NOTE — PROGRESS NOTES
Subjective:      Cliff Magaña is a 84 y.o. male who was referred to me by Ebony Goldsmith in consultation for hearing loss.    Mr. Magaña a [employer]  who presents today with decreased hearing in both ears for many years. He denies tinnitus or dizziness. He reports difficult hearing people speak with the presence of background noise. He has to turn is television volume on max to hear it. There is not a family history of hearing loss at a young age.  There is not a prior history of ear surgery.  There is not a prior history of ear infections .  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  He does not wear hearing aids currently.  He has not had a hearing test recently.     Past Medical History  He has a past medical history of *Atrial fibrillation, Chronic kidney disease, Hyperlipidemia, Hypertension, and Metabolic syndrome.    Past Surgical History  He has a past surgical history that includes Cholecystectomy; Tonsillectomy; Cataract extraction; Eye surgery; and Skin cancer excision.    Family History  His family history includes Diabetes in his maternal aunt.    Social History  He reports that he quit smoking about 36 years ago. He has a 40.00 pack-year smoking history. He has never used smokeless tobacco. He reports that he drinks about 1.0 standard drinks of alcohol per week. He reports that he does not use drugs.    Allergies  He is allergic to niacin.    Medications  He has a current medication list which includes the following prescription(s): fenofibrate micronized, hydrochlorothiazide, krill-om-3-dha-epa-phospho-ast, loratadine, biotin, metoprolol succinate, multivitamin-minerals-lutein, sildenafil, triamcinolone acetonide 0.1%, and xarelto.    Review of Systems   Constitutional: Negative for chills, fever and unexpected weight change.   HENT: Positive for hearing loss and tinnitus. Negative for ear discharge, ear pain, sore throat and trouble swallowing.     Eyes: Negative for pain and visual disturbance.   Respiratory: Negative for apnea and shortness of breath.    Cardiovascular: Negative for chest pain and palpitations.   Gastrointestinal: Negative for abdominal pain and nausea.   Endocrine: Negative for cold intolerance and heat intolerance.   Musculoskeletal: Negative for joint swelling and neck stiffness.   Skin: Negative for color change and rash.   Neurological: Negative for dizziness, facial asymmetry and headaches.   Hematological: Negative for adenopathy. Does not bruise/bleed easily.   Psychiatric/Behavioral: Negative for agitation and sleep disturbance. The patient is not nervous/anxious.           Objective:     There were no vitals taken for this visit.     Constitutional:   Vital signs are normal. He appears well-developed and well-nourished.     Head:  Normocephalic and atraumatic.     Ears:    Right Ear: No lacerations. No drainage, swelling or tenderness. No foreign bodies. No mastoid tenderness. Tympanic membrane is not injected, not scarred, not perforated, not erythematous, not retracted and not bulging. Tympanic membrane mobility is normal. No middle ear effusion. No hemotympanum. Decreased hearing is noted.   Left Ear: No lacerations. No drainage, swelling or tenderness. No foreign bodies. No mastoid tenderness. Tympanic membrane is not injected, not scarred, not perforated, not erythematous, not retracted and not bulging. Tympanic membrane mobility is normal.  No middle ear effusion. No hemotympanum. Decreased hearing is noted.     Nose:  Nose normal including turbinates, nasal mucosa, sinuses and nasal septum.     Mouth/Throat  Lips, teeth, and gums normal and oropharynx normal.     Neck:  Neck normal without thyromegaly masses, asymmetry, normal tracheal structure, crepitus, and tenderness and no adenopathy.     Psychiatric:   He has a normal mood and affect.       Procedure    None        Data Reviewed    WBC (K/uL)   Date Value    05/21/2019 7.21     Platelets (K/uL)   Date Value   05/21/2019 193      Creatinine (mg/dL)   Date Value   09/03/2019 1.4     TSH (uIU/mL)   Date Value   09/03/2019 2.170     Glucose (mg/dL)   Date Value   09/03/2019 150 (H)     Hemoglobin A1C (%)   Date Value   08/29/2018 5.8 (H)       I independently reviewed the tracings of the complete audiometric evaluation performed today.  I reviewed the audiogram with the patient as well.  Pertinent findings include right downsloping mild (1000-1500Hz) to moderate-severe (2000-8000Hz); type A tympanogram in both ears. Right SRT 35 with 76% discrimination at 75 db. Left SRT 35 with 80% discrimination at 75 db..         Assessment:     1. Sensorineural hearing loss (SNHL) of both ears         Plan:     I had a long discussion with the patient regarding his condition and the further workup and management options.    I recommend hearing aid evaluation. Raven Bardales's card provided to patient.  Follow up in one year with audiogram.    Follow up in about 1 year (around 11/11/2020).

## 2019-11-11 NOTE — PROGRESS NOTES
Audiologic Evaluation    Cliff Magaña was seen on the above date for a hearing evaluation. Cliff Magaña reports decreased hearing sensitivity. Cliff Magaña denies tinnitus, aural fullness and dizziness.    Tympanometry: Type As (reduced compliance), bilaterally.  Speech Recognition Threshold: 35 dB HL, bilaterally.  Discrimination: RIGHT: 76%; LEFT: 80%  Audio: RIGHT: Within normal limits (250-500 Hz) to a mild to severe sensorineural hearing loss (9228-4953 Hz); LEFT: Within Normal Limits (250-750 Hz) sloping to a moderate to severe sensorineural hearing loss (8681-5157 Hz).    REC:  1. Otologic Evaluation  2. Annual Audiograms  3. Hearing Protection in Noise  4. Hearing Aid Consult

## 2019-11-11 NOTE — LETTER
November 11, 2019      Ebony Goldsmith, SHERRELL  [address]  [facility] [address]           [facility] - Otorhinolaryngology  [address]  [place] [address]  Phone: [phone]  Fax: [phone]          Patient: Cliff Magaña   MR Number: [id number]   YOB: 1935   Date of Visit: 11/11/2019       Dear Ebony Goldsmith:    Thank you for referring Cliff Magaña to me for evaluation. Attached you will find relevant portions of my assessment and plan of care.    If you have questions, please do not hesitate to call me. I look forward to following Cliff Magaña along with you.    Sincerely,    Lit North, NP    Enclosure  CC:  No Recipients    If you would like to receive this communication electronically, please contact [email or web address] or [phone] to request more information on [employer] access.    For providers and/or their staff who would like to refer a patient to [facility], please contact us through our one-stop-shop provider referral line, [facility], at [phone].    If you feel you have received this communication in error or would no longer like to receive these types of communications, please e-mail [email or web address]

## 2019-11-12 ENCOUNTER — CLINICAL SUPPORT (OUTPATIENT)
Dept: AUDIOLOGY | Facility: CLINIC | Age: 84
End: 2019-11-12
Payer: MEDICARE

## 2019-11-12 DIAGNOSIS — H90.3 SENSORINEURAL HEARING LOSS, BILATERAL: Primary | ICD-10-CM

## 2019-11-12 PROCEDURE — 99499 UNLISTED E&M SERVICE: CPT | Mod: S$PBB,,, | Performed by: AUDIOLOGIST-HEARING AID FITTER

## 2019-11-12 PROCEDURE — 99499 NO LOS: ICD-10-PCS | Mod: S$PBB,,, | Performed by: AUDIOLOGIST-HEARING AID FITTER

## 2019-11-12 NOTE — PROGRESS NOTES
Cliff Magaña was seen in the clinic today for a hearing aid consult. Mr. Magaña was accompanied by his wife.     Pricing and styles were discussed. Two hearing aids were recommended. Mr. Magaña was interested in rechargeable technology. He decided to order a pair of Jukedeckeo M90-RT hearing aids in P5 with #2 M receivers and large open domes. The contract was signed and Mr. Magaña was given a copy. An appointment was scheduled for Mr. Magaña to return to the clinic to  the hearing aids.

## 2019-11-19 ENCOUNTER — PATIENT MESSAGE (OUTPATIENT)
Dept: PHARMACY | Facility: CLINIC | Age: 84
End: 2019-11-19

## 2019-12-03 NOTE — PROGRESS NOTES
Cliff Magaña was seen in the clinic today to  his hearing aids. Mr. Magaña was accompanied by his wife.     The hearing aids were programmed. The fitting formula was changed to NAL-NL 2. Acclimatization was set to 90%. The feedback manager was performed. Mr Magaña was pleased with how the hearing aids sounded. His was given a manual t-coil program. The alerts were demonstrated. Mr. Magaña was shown how to properly place the hearing aids on the  and how to turn them on and off. He was shown how to adjust the volume. Proper insertion/removal as well as care and maintenance were also reviewed.     A two week follow-up appointment was scheduled. Mr. Magaña was encouraged to call the clinic if he needed to be seen sooner.      Hearing Aid Information:    Right ear  : Phonak  Model: Audeo M90-RT   Type: NI    Color: Champagne  Battery: Lithium ion  Tube/ length & power: #2M  Dome size & style: Phonak large open    Serial number: [id number]  Warranty expiration: 02/17/2023   L and D expiration: 02/17/2023     Left ear  :    Model:    Type:    Color:    Battery:  Tube/ length & power:    Dome size & style:    Serial number: [id number]  Warranty expiration:    L and D expiration:       serial number: [id number]

## 2019-12-04 ENCOUNTER — CLINICAL SUPPORT (OUTPATIENT)
Dept: AUDIOLOGY | Facility: CLINIC | Age: 84
End: 2019-12-04
Payer: MEDICARE

## 2019-12-04 DIAGNOSIS — H90.3 SENSORINEURAL HEARING LOSS, BILATERAL: Primary | ICD-10-CM

## 2019-12-17 ENCOUNTER — CLINICAL SUPPORT (OUTPATIENT)
Dept: AUDIOLOGY | Facility: CLINIC | Age: 84
End: 2019-12-17
Payer: MEDICARE

## 2019-12-17 DIAGNOSIS — H90.3 SENSORINEURAL HEARING LOSS, BILATERAL: Primary | ICD-10-CM

## 2019-12-17 PROCEDURE — 99499 UNLISTED E&M SERVICE: CPT | Mod: S$PBB,,, | Performed by: AUDIOLOGIST-HEARING AID FITTER

## 2019-12-17 PROCEDURE — 99499 NO LOS: ICD-10-PCS | Mod: S$PBB,,, | Performed by: AUDIOLOGIST-HEARING AID FITTER

## 2019-12-17 NOTE — PROGRESS NOTES
Cliff Magaña was seen in the clinic today for his first hearing aid follow-up. Mr. Magaña was accompanied by his wife.    Overall Mr. Magaña was pleased with the hearing aids. No adjustments were made.    Mr. Magaña will call the clinic for a follow-up appointment as needed. A one year appointment reminder was put in to the Epic system.

## 2019-12-21 ENCOUNTER — PATIENT MESSAGE (OUTPATIENT)
Dept: CARDIOLOGY | Facility: CLINIC | Age: 84
End: 2019-12-21

## 2019-12-23 DIAGNOSIS — E78.2 MIXED HYPERLIPIDEMIA: Chronic | ICD-10-CM

## 2019-12-23 DIAGNOSIS — I10 ESSENTIAL HYPERTENSION: Chronic | ICD-10-CM

## 2019-12-23 DIAGNOSIS — I48.19 PERSISTENT ATRIAL FIBRILLATION: Chronic | ICD-10-CM

## 2019-12-23 DIAGNOSIS — E88.810 METABOLIC SYNDROME: Chronic | ICD-10-CM

## 2019-12-23 RX ORDER — METOPROLOL SUCCINATE 50 MG/1
TABLET, EXTENDED RELEASE ORAL
Qty: 45 TABLET | Refills: 0 | Status: SHIPPED | OUTPATIENT
Start: 2019-12-23 | End: 2019-12-23 | Stop reason: SDUPTHER

## 2019-12-23 RX ORDER — METOPROLOL SUCCINATE 50 MG/1
TABLET, EXTENDED RELEASE ORAL
Qty: 135 TABLET | Refills: 3 | Status: SHIPPED | OUTPATIENT
Start: 2019-12-23 | End: 2020-11-30

## 2019-12-26 ENCOUNTER — PATIENT MESSAGE (OUTPATIENT)
Dept: CARDIOLOGY | Facility: CLINIC | Age: 84
End: 2019-12-26

## 2019-12-26 NOTE — TELEPHONE ENCOUNTER
Spoke with the patient regarding his Metoprolol and he states he did not receive the prescription that was mailed out in November. She states that they are home all day and has not received anything in their mailbox. Asked the patient if he is out of his medicine.  Pt states he is not out of his medicine yet, he has a weeks worth left. The patient states he called [facility] before [facility]. He was told his medicine will be shipped on  1/17/2020. They wanted a 30day supply sent to the local pharmacy. Called the patient back and advised him and his wife that a refill was shipped to [facility] and local pharmacy.

## 2019-12-30 RX ORDER — HYDROCHLOROTHIAZIDE 25 MG/1
25 TABLET ORAL DAILY
Qty: 90 TABLET | Refills: 0 | Status: SHIPPED | OUTPATIENT
Start: 2019-12-30 | End: 2020-05-05 | Stop reason: SDUPTHER

## 2020-01-07 ENCOUNTER — IMMUNIZATION (OUTPATIENT)
Dept: PHARMACY | Facility: CLINIC | Age: 85
End: 2020-01-07
Payer: MEDICARE

## 2020-01-13 ENCOUNTER — TELEPHONE (OUTPATIENT)
Dept: CARDIOLOGY | Facility: CLINIC | Age: 85
End: 2020-01-13

## 2020-01-13 DIAGNOSIS — E78.2 MIXED HYPERLIPIDEMIA: Primary | ICD-10-CM

## 2020-01-13 DIAGNOSIS — I10 ESSENTIAL HYPERTENSION: ICD-10-CM

## 2020-01-14 ENCOUNTER — TELEPHONE (OUTPATIENT)
Dept: CARDIOLOGY | Facility: CLINIC | Age: 85
End: 2020-01-14

## 2020-01-14 NOTE — TELEPHONE ENCOUNTER
MD Radha Stubbs MA             I think I would prefer to see him first and reassess. Thank you    Previous Messages      ----- Message -----   From: Radha Mccormack MA   Sent: 1/13/2020   3:47 PM CST   To: Nereyda Birmingham MD     You wanted pt have PET Stress Test before his next appointment w/you. Pt is scheduled to see you 3/2020. Please order PET Stress Test and I will schedule it the same day as his labs.   Thanks,   Radha Mccormack MA

## 2020-01-27 ENCOUNTER — TELEPHONE (OUTPATIENT)
Dept: INTERNAL MEDICINE | Facility: CLINIC | Age: 85
End: 2020-01-27

## 2020-01-27 DIAGNOSIS — I48.19 PERSISTENT ATRIAL FIBRILLATION: Chronic | ICD-10-CM

## 2020-01-27 DIAGNOSIS — R52 PAIN AGGRAVATED BY WALKING: ICD-10-CM

## 2020-01-27 DIAGNOSIS — Z79.01 CHRONIC ANTICOAGULATION: ICD-10-CM

## 2020-01-27 DIAGNOSIS — Z87.891 EX-SMOKER: ICD-10-CM

## 2020-01-27 DIAGNOSIS — Z83.3 FAMILY HISTORY OF DIABETES MELLITUS: ICD-10-CM

## 2020-01-27 DIAGNOSIS — Z00.00 PREVENTATIVE HEALTH CARE: Primary | ICD-10-CM

## 2020-01-27 DIAGNOSIS — I70.0 AORTIC ATHEROSCLEROSIS: ICD-10-CM

## 2020-01-27 DIAGNOSIS — E78.5 HYPERLIPIDEMIA, UNSPECIFIED HYPERLIPIDEMIA TYPE: Chronic | ICD-10-CM

## 2020-01-27 DIAGNOSIS — M62.9 HAMSTRING TIGHTNESS OF BOTH LOWER EXTREMITIES: ICD-10-CM

## 2020-01-27 DIAGNOSIS — N18.30 STAGE 3 CHRONIC KIDNEY DISEASE: ICD-10-CM

## 2020-01-27 DIAGNOSIS — I10 ESSENTIAL HYPERTENSION: Chronic | ICD-10-CM

## 2020-01-27 DIAGNOSIS — L72.3 SEBACEOUS CYST: ICD-10-CM

## 2020-01-27 NOTE — TELEPHONE ENCOUNTER
----- Message from Tom Knight sent at 1/27/2020  3:49 PM CST -----  Contact: Pt   Doctor appointment and lab have been scheduled.  Please link lab orders to the lab appointment.  Date of doctor appointment: 5/28/20   Date of lab appointment:  5/21/20  Physical or EP: EPP

## 2020-01-28 RX ORDER — FENOFIBRATE 200 MG/1
200 CAPSULE ORAL
Qty: 90 CAPSULE | Refills: 3 | Status: SHIPPED | OUTPATIENT
Start: 2020-01-28 | End: 2021-01-31

## 2020-03-04 ENCOUNTER — LAB VISIT (OUTPATIENT)
Dept: LAB | Facility: HOSPITAL | Age: 85
End: 2020-03-04
Attending: INTERNAL MEDICINE
Payer: MEDICARE

## 2020-03-04 ENCOUNTER — TELEPHONE (OUTPATIENT)
Dept: CARDIOLOGY | Facility: CLINIC | Age: 85
End: 2020-03-04

## 2020-03-04 DIAGNOSIS — E78.2 MIXED HYPERLIPIDEMIA: ICD-10-CM

## 2020-03-04 DIAGNOSIS — I10 ESSENTIAL HYPERTENSION: ICD-10-CM

## 2020-03-04 LAB
ALBUMIN SERPL BCP-MCNC: 3.9 G/DL (ref 3.5–5.2)
ALP SERPL-CCNC: 50 U/L (ref 55–135)
ALT SERPL W/O P-5'-P-CCNC: 27 U/L (ref 10–44)
ANION GAP SERPL CALC-SCNC: 10 MMOL/L (ref 8–16)
AST SERPL-CCNC: 25 U/L (ref 10–40)
BILIRUB SERPL-MCNC: 0.8 MG/DL (ref 0.1–1)
BUN SERPL-MCNC: 30 MG/DL (ref 8–23)
CALCIUM SERPL-MCNC: 9.8 MG/DL (ref 8.7–10.5)
CHLORIDE SERPL-SCNC: 107 MMOL/L (ref 95–110)
CHOLEST SERPL-MCNC: 152 MG/DL (ref 120–199)
CHOLEST/HDLC SERPL: 4.8 {RATIO} (ref 2–5)
CO2 SERPL-SCNC: 26 MMOL/L (ref 23–29)
CREAT SERPL-MCNC: 1.3 MG/DL (ref 0.5–1.4)
EST. GFR  (AFRICAN AMERICAN): 57.9 ML/MIN/1.73 M^2
EST. GFR  (NON AFRICAN AMERICAN): 50.1 ML/MIN/1.73 M^2
GLUCOSE SERPL-MCNC: 133 MG/DL (ref 70–110)
HDLC SERPL-MCNC: 32 MG/DL (ref 40–75)
HDLC SERPL: 21.1 % (ref 20–50)
LDLC SERPL CALC-MCNC: 79.8 MG/DL (ref 63–159)
NONHDLC SERPL-MCNC: 120 MG/DL
POTASSIUM SERPL-SCNC: 3.8 MMOL/L (ref 3.5–5.1)
PROT SERPL-MCNC: 7.3 G/DL (ref 6–8.4)
SODIUM SERPL-SCNC: 143 MMOL/L (ref 136–145)
TRIGL SERPL-MCNC: 201 MG/DL (ref 30–150)
TSH SERPL DL<=0.005 MIU/L-ACNC: 2.09 UIU/ML (ref 0.4–4)

## 2020-03-04 PROCEDURE — 84443 ASSAY THYROID STIM HORMONE: CPT

## 2020-03-04 PROCEDURE — 80061 LIPID PANEL: CPT

## 2020-03-04 PROCEDURE — 36415 COLL VENOUS BLD VENIPUNCTURE: CPT | Mod: PO

## 2020-03-04 PROCEDURE — 80053 COMPREHEN METABOLIC PANEL: CPT

## 2020-03-04 NOTE — TELEPHONE ENCOUNTER
----- Message from Nereyda Birmingham MD sent at 3/4/2020  2:48 PM CST -----  Please review.  We will discuss the results during your upcoming visit with me. It is not much changed from the last one.

## 2020-03-10 ENCOUNTER — PATIENT MESSAGE (OUTPATIENT)
Dept: CARDIOLOGY | Facility: CLINIC | Age: 85
End: 2020-03-10

## 2020-03-11 ENCOUNTER — OFFICE VISIT (OUTPATIENT)
Dept: CARDIOLOGY | Facility: CLINIC | Age: 85
End: 2020-03-11
Payer: MEDICARE

## 2020-03-11 VITALS
HEIGHT: 66 IN | BODY MASS INDEX: 26.29 KG/M2 | SYSTOLIC BLOOD PRESSURE: 187 MMHG | WEIGHT: 163.56 LBS | DIASTOLIC BLOOD PRESSURE: 79 MMHG | HEART RATE: 83 BPM

## 2020-03-11 DIAGNOSIS — N18.30 STAGE 3 CHRONIC KIDNEY DISEASE: ICD-10-CM

## 2020-03-11 DIAGNOSIS — I48.19 PERSISTENT ATRIAL FIBRILLATION: Chronic | ICD-10-CM

## 2020-03-11 DIAGNOSIS — I10 ESSENTIAL HYPERTENSION: Primary | Chronic | ICD-10-CM

## 2020-03-11 DIAGNOSIS — E78.2 MIXED HYPERLIPIDEMIA: Chronic | ICD-10-CM

## 2020-03-11 DIAGNOSIS — I70.0 AORTIC ATHEROSCLEROSIS: ICD-10-CM

## 2020-03-11 DIAGNOSIS — Z79.01 CHRONIC ANTICOAGULATION: ICD-10-CM

## 2020-03-11 PROCEDURE — 99999 PR PBB SHADOW E&M-EST. PATIENT-LVL III: ICD-10-PCS | Mod: PBBFAC,,, | Performed by: INTERNAL MEDICINE

## 2020-03-11 PROCEDURE — 99214 OFFICE O/P EST MOD 30 MIN: CPT | Mod: S$PBB,,, | Performed by: INTERNAL MEDICINE

## 2020-03-11 PROCEDURE — 99213 OFFICE O/P EST LOW 20 MIN: CPT | Mod: PBBFAC,PO | Performed by: INTERNAL MEDICINE

## 2020-03-11 PROCEDURE — 99999 PR PBB SHADOW E&M-EST. PATIENT-LVL III: CPT | Mod: PBBFAC,,, | Performed by: INTERNAL MEDICINE

## 2020-03-11 PROCEDURE — 99214 PR OFFICE/OUTPT VISIT, EST, LEVL IV, 30-39 MIN: ICD-10-PCS | Mod: S$PBB,,, | Performed by: INTERNAL MEDICINE

## 2020-03-11 RX ORDER — AMLODIPINE BESYLATE 5 MG/1
5 TABLET ORAL DAILY
Qty: 30 TABLET | Refills: 6 | Status: SHIPPED | OUTPATIENT
Start: 2020-03-11 | End: 2020-04-01 | Stop reason: SDUPTHER

## 2020-03-11 RX ORDER — METOPROLOL SUCCINATE 25 MG/1
25 TABLET, EXTENDED RELEASE ORAL DAILY
COMMUNITY
End: 2020-03-11 | Stop reason: SDUPTHER

## 2020-03-11 RX ORDER — AMLODIPINE BESYLATE 5 MG/1
5 TABLET ORAL DAILY
COMMUNITY
End: 2020-03-11 | Stop reason: SDUPTHER

## 2020-03-11 RX ORDER — METOPROLOL SUCCINATE 25 MG/1
25 TABLET, EXTENDED RELEASE ORAL DAILY
Qty: 90 TABLET | Refills: 3 | Status: SHIPPED | OUTPATIENT
Start: 2020-03-11 | End: 2021-02-17

## 2020-03-11 NOTE — PROGRESS NOTES
"Subjective:   Patient ID:  Cliff Magaña is a 84 y.o. male who presents for follow-up of Hypertension      HPI: BP is not controlled in the office and at home. Patient admits to dietary indiscretions.  Otherwise no symptoms.    Blood work is fine.  Decreased alcohol intake to 1 Martini a week.    Lab Results   Component Value Date     03/04/2020    K 3.8 03/04/2020     03/04/2020    CO2 26 03/04/2020    BUN 30 (H) 03/04/2020    CREATININE 1.3 03/04/2020     (H) 03/04/2020    HGBA1C 5.8 (H) 08/29/2018    AST 25 03/04/2020    ALT 27 03/04/2020    ALBUMIN 3.9 03/04/2020    PROT 7.3 03/04/2020    BILITOT 0.8 03/04/2020    WBC 7.21 05/21/2019    HGB 17.3 05/21/2019    HCT 50.9 05/21/2019    MCV 94 05/21/2019     05/21/2019    INR 2.8 12/17/2013    INR 1.8 (H) 06/07/2011    PSA 0.43 06/06/2013    TSH 2.088 03/04/2020    CHOL 152 03/04/2020    HDL 32 (L) 03/04/2020    LDLCALC 79.8 03/04/2020    TRIG 201 (H) 03/04/2020       Review of Systems   Constitution: Negative.   HENT: Positive for hearing loss.    Eyes: Negative.    Cardiovascular: Negative.  Negative for chest pain, claudication, dyspnea on exertion, irregular heartbeat, leg swelling, near-syncope, palpitations and syncope.   Respiratory: Negative.  Negative for cough, shortness of breath, snoring and wheezing.    Endocrine: Negative.  Negative for cold intolerance, heat intolerance, polydipsia, polyphagia and polyuria.   Skin: Negative.    Musculoskeletal: Positive for back pain.   Gastrointestinal: Negative.    Genitourinary: Negative.    Neurological: Negative.    Psychiatric/Behavioral: Negative.        Objective:   Physical Exam   Constitutional: He is oriented to person, place, and time. He appears well-developed and well-nourished.   BP (!) 187/79   Pulse 83   Ht 5' 5.5" (1.664 m)   Wt 74.2 kg (163 lb 9.3 oz)   BMI 26.81 kg/m²      HENT:   Head: Normocephalic.   Eyes: Pupils are equal, round, and reactive to light.   Neck: " Normal range of motion. Neck supple. Carotid bruit is not present. No thyromegaly present.   Cardiovascular: Normal rate, normal heart sounds and intact distal pulses. An irregularly irregular rhythm present. Exam reveals no gallop and no friction rub.   No murmur heard.  Pulses:       Carotid pulses are 2+ on the right side, and 2+ on the left side.       Radial pulses are 2+ on the right side, and 2+ on the left side.        Femoral pulses are 2+ on the right side, and 2+ on the left side.       Popliteal pulses are 2+ on the right side, and 2+ on the left side.        Dorsalis pedis pulses are 2+ on the right side, and 2+ on the left side.        Posterior tibial pulses are 2+ on the right side, and 2+ on the left side.   Pulmonary/Chest: Effort normal and breath sounds normal. No respiratory distress. He has no wheezes. He has no rales. He exhibits no tenderness.   Abdominal: Soft. Bowel sounds are normal.   Musculoskeletal: Normal range of motion. He exhibits no edema.   Neurological: He is alert and oriented to person, place, and time.   Skin: Skin is warm and dry.   Psychiatric: He has a normal mood and affect.   Nursing note and vitals reviewed.        Assessment and Plan:     Problem List Items Addressed This Visit        Cardiology Problems    HTN (hypertension) - Primary (Chronic)    Hyperlipidemia (Chronic)    Persistent atrial fibrillation (Chronic)    Aortic atherosclerosis       Other    Stage 3 chronic kidney disease    Chronic anticoagulation          Patient's Medications   New Prescriptions    No medications on file   Previous Medications    AMLODIPINE (NORVASC) 5 MG TABLET    Take 5 mg by mouth once daily.    FENOFIBRATE MICRONIZED (LOFIBRA) 200 MG CAP    Take 1 capsule (200 mg total) by mouth daily with breakfast.    HYDROCHLOROTHIAZIDE (HYDRODIURIL) 25 MG TABLET    Take 1 tablet (25 mg total) by mouth once daily.    KRILL-OM-3-DHA-EPA-PHOSPHO-AST (MEGARED OMEGA-3 KRILL OIL) 1,000-230-60 MG CAP     Take by mouth once daily. Pt taking one pill once a day    LORATADINE (CLARITIN) 10 MG TABLET    Take 10 mg by mouth once daily.    TRAMAINE BIOTIN ORAL    Take 5,000 mcg by mouth once daily.    METOPROLOL SUCCINATE (TOPROL-XL) 25 MG 24 HR TABLET    Take 25 mg by mouth once daily.    METOPROLOL SUCCINATE (TOPROL-XL) 50 MG 24 HR TABLET    TAKE ONE AND ONE-HALF TABLETS ONCE DAILY    MULTIVITAMIN-MINERALS-LUTEIN (CENTRUM SILVER) TAB    Take by mouth. 1 Tablet Oral Every day    XARELTO 20 MG TAB    TAKE 1 TABLET DAILY   Modified Medications    No medications on file   Discontinued Medications    SILDENAFIL (VIAGRA) 100 MG TABLET    Take 1 tablet (100 mg total) by mouth daily as needed for Erectile Dysfunction.    TRIAMCINOLONE ACETONIDE 0.1% (KENALOG) 0.1 % CREAM    2 (two) times daily as needed.      Prescribe additional 25 mg mg Metoprolol for a total of 75 mg ( rather than cutting the pills).  Add Amlodipine 5 mg daily and monitor BP.  May need to increase Metoprolol to 100 mg daily.  Dietary compliance encouraged.  No follow-ups on file.

## 2020-03-26 ENCOUNTER — DOCUMENTATION ONLY (OUTPATIENT)
Dept: REHABILITATION | Facility: HOSPITAL | Age: 85
End: 2020-03-26

## 2020-03-26 NOTE — PROGRESS NOTES
Outpatient Therapy Discharge Summary     Name: Cliff Magaña  Clinic Number: [id number]  Therapy Diagnosis:        Encounter Diagnoses   Name Primary?    Decreased back mobility      Decreased range of motion of both hips      Pain aggravated by walking      Hamstring tightness of both lower extremities      Impaired mobility and endurance        Physician: Munir Estrada MD  Physician Orders: PT Eval and Treat back  Medical Diagnosis from Referral:   M54.5,G89.29 (ICD-10-CM) - Chronic bilateral low back pain without sciatica   Evaluation Date: 8/20/2019  Date of Last visit: 10/3/2019  Total Visits Received: 12/19    Assessment    Goals:   Short Term GOALS: 4 weeks. Pt agrees with goals set.  1. Pt to report decreased pain 20-40% associated with walking MET  2. Pt to demonstrate spinal stability with core activation during transitional movements. MET   3. Pt to appreciate improved back mobility with increased hamstring flexibility to 70 degrees actively. MET   4. Patient demonstrates independence with HEP.      Long Term GOALS: 8 weeks. Pt agrees with goals set.  1. Patient demonstrates increased LE hamstring and piriformis flexibility with increased passive leg raise to 75 degrees and hip internal rotation to 30 degrees BLE to improve tolerance to walking MET   2. Patient demonstrates increased trunk and core muscle strength  to improve tolerance to functional activities by performing trunk raise. Not achieved   3. Patient demonstrates improved walking tolerance with 50% or greater reduction in reported pain   MET                           4. Patient demonstrates independence with Postural Awareness demonstrated with reduction of flexed posturing and more upright presentation and reduction of lateral shift. MET  5. Patient demonstrates independence with body mechanics.  MET     Discharge reason: Patient has met all of his/her goals, Patient has reached the maximum rehab potential for the present time and  Patient requested discharge    Plan   This patient is discharged from Physical Therapy    Pato Canela PT

## 2020-04-01 DIAGNOSIS — I48.19 PERSISTENT ATRIAL FIBRILLATION: Chronic | ICD-10-CM

## 2020-04-01 DIAGNOSIS — I10 ESSENTIAL HYPERTENSION: Chronic | ICD-10-CM

## 2020-04-01 RX ORDER — AMLODIPINE BESYLATE 5 MG/1
5 TABLET ORAL DAILY
Qty: 90 TABLET | Refills: 3 | Status: SHIPPED | OUTPATIENT
Start: 2020-04-01 | End: 2020-10-16 | Stop reason: DRUGHIGH

## 2020-04-04 DIAGNOSIS — I48.19 PERSISTENT ATRIAL FIBRILLATION: Chronic | ICD-10-CM

## 2020-04-06 RX ORDER — RIVAROXABAN 20 MG/1
TABLET, FILM COATED ORAL
Qty: 90 TABLET | Refills: 3 | Status: SHIPPED | OUTPATIENT
Start: 2020-04-06 | End: 2020-10-16 | Stop reason: ALTCHOICE

## 2020-04-27 ENCOUNTER — TELEPHONE (OUTPATIENT)
Dept: INTERNAL MEDICINE | Facility: CLINIC | Age: 85
End: 2020-04-27

## 2020-04-27 NOTE — TELEPHONE ENCOUNTER
----- Message from Colton Max sent at 4/27/2020 10:48 AM CDT -----  Contact: Cliff- [phone]  Please reschedule the labs set for 5/21/2020. Pt rescheduled for 8/7/2020

## 2020-05-05 RX ORDER — HYDROCHLOROTHIAZIDE 25 MG/1
25 TABLET ORAL DAILY
Qty: 90 TABLET | Refills: 3 | Status: SHIPPED | OUTPATIENT
Start: 2020-05-05 | End: 2021-04-30

## 2020-07-15 DIAGNOSIS — Z71.89 COMPLEX CARE COORDINATION: ICD-10-CM

## 2020-08-03 ENCOUNTER — LAB VISIT (OUTPATIENT)
Dept: LAB | Facility: HOSPITAL | Age: 85
End: 2020-08-03
Attending: FAMILY MEDICINE
Payer: MEDICARE

## 2020-08-03 DIAGNOSIS — Z83.3 FAMILY HISTORY OF DIABETES MELLITUS: ICD-10-CM

## 2020-08-03 DIAGNOSIS — R52 PAIN AGGRAVATED BY WALKING: ICD-10-CM

## 2020-08-03 DIAGNOSIS — M62.9 HAMSTRING TIGHTNESS OF BOTH LOWER EXTREMITIES: ICD-10-CM

## 2020-08-03 DIAGNOSIS — Z87.891 EX-SMOKER: ICD-10-CM

## 2020-08-03 DIAGNOSIS — I70.0 AORTIC ATHEROSCLEROSIS: ICD-10-CM

## 2020-08-03 DIAGNOSIS — L72.3 SEBACEOUS CYST: ICD-10-CM

## 2020-08-03 DIAGNOSIS — I48.19 PERSISTENT ATRIAL FIBRILLATION: Chronic | ICD-10-CM

## 2020-08-03 DIAGNOSIS — Z00.00 PREVENTATIVE HEALTH CARE: ICD-10-CM

## 2020-08-03 DIAGNOSIS — N18.30 STAGE 3 CHRONIC KIDNEY DISEASE: ICD-10-CM

## 2020-08-03 DIAGNOSIS — Z79.01 CHRONIC ANTICOAGULATION: ICD-10-CM

## 2020-08-03 DIAGNOSIS — I10 ESSENTIAL HYPERTENSION: Chronic | ICD-10-CM

## 2020-08-03 DIAGNOSIS — E78.5 HYPERLIPIDEMIA, UNSPECIFIED HYPERLIPIDEMIA TYPE: Chronic | ICD-10-CM

## 2020-08-03 LAB
25(OH)D3+25(OH)D2 SERPL-MCNC: 31 NG/ML (ref 30–96)
ALBUMIN SERPL BCP-MCNC: 4.2 G/DL (ref 3.5–5.2)
ALP SERPL-CCNC: 79 U/L (ref 55–135)
ALT SERPL W/O P-5'-P-CCNC: 31 U/L (ref 10–44)
ANION GAP SERPL CALC-SCNC: 7 MMOL/L (ref 8–16)
AST SERPL-CCNC: 27 U/L (ref 10–40)
BASOPHILS # BLD AUTO: 0.09 K/UL (ref 0–0.2)
BASOPHILS NFR BLD: 1.1 % (ref 0–1.9)
BILIRUB SERPL-MCNC: 0.5 MG/DL (ref 0.1–1)
BUN SERPL-MCNC: 24 MG/DL (ref 8–23)
CALCIUM SERPL-MCNC: 9.9 MG/DL (ref 8.7–10.5)
CHLORIDE SERPL-SCNC: 104 MMOL/L (ref 95–110)
CHOLEST SERPL-MCNC: 156 MG/DL (ref 120–199)
CHOLEST/HDLC SERPL: 4.1 {RATIO} (ref 2–5)
CO2 SERPL-SCNC: 30 MMOL/L (ref 23–29)
CREAT SERPL-MCNC: 1.4 MG/DL (ref 0.5–1.4)
DIFFERENTIAL METHOD: ABNORMAL
EOSINOPHIL # BLD AUTO: 0.2 K/UL (ref 0–0.5)
EOSINOPHIL NFR BLD: 2.4 % (ref 0–8)
ERYTHROCYTE [DISTWIDTH] IN BLOOD BY AUTOMATED COUNT: 13.2 % (ref 11.5–14.5)
EST. GFR  (AFRICAN AMERICAN): 53 ML/MIN/1.73 M^2
EST. GFR  (NON AFRICAN AMERICAN): 45.8 ML/MIN/1.73 M^2
GLUCOSE SERPL-MCNC: 190 MG/DL (ref 70–110)
HCT VFR BLD AUTO: 50 % (ref 40–54)
HDLC SERPL-MCNC: 38 MG/DL (ref 40–75)
HDLC SERPL: 24.4 % (ref 20–50)
HGB BLD-MCNC: 16.6 G/DL (ref 14–18)
IMM GRANULOCYTES # BLD AUTO: 0.03 K/UL (ref 0–0.04)
IMM GRANULOCYTES NFR BLD AUTO: 0.4 % (ref 0–0.5)
LDLC SERPL CALC-MCNC: 83 MG/DL (ref 63–159)
LYMPHOCYTES # BLD AUTO: 3.7 K/UL (ref 1–4.8)
LYMPHOCYTES NFR BLD: 44.5 % (ref 18–48)
MCH RBC QN AUTO: 31.3 PG (ref 27–31)
MCHC RBC AUTO-ENTMCNC: 33.2 G/DL (ref 32–36)
MCV RBC AUTO: 94 FL (ref 82–98)
MONOCYTES # BLD AUTO: 0.8 K/UL (ref 0.3–1)
MONOCYTES NFR BLD: 10.1 % (ref 4–15)
NEUTROPHILS # BLD AUTO: 3.5 K/UL (ref 1.8–7.7)
NEUTROPHILS NFR BLD: 41.5 % (ref 38–73)
NONHDLC SERPL-MCNC: 118 MG/DL
NRBC BLD-RTO: 0 /100 WBC
PLATELET # BLD AUTO: 209 K/UL (ref 150–350)
PMV BLD AUTO: 13.6 FL (ref 9.2–12.9)
POTASSIUM SERPL-SCNC: 4.4 MMOL/L (ref 3.5–5.1)
PROT SERPL-MCNC: 7.7 G/DL (ref 6–8.4)
RBC # BLD AUTO: 5.3 M/UL (ref 4.6–6.2)
SODIUM SERPL-SCNC: 141 MMOL/L (ref 136–145)
T4 FREE SERPL-MCNC: 0.94 NG/DL (ref 0.71–1.51)
TRIGL SERPL-MCNC: 175 MG/DL (ref 30–150)
TSH SERPL DL<=0.005 MIU/L-ACNC: 2.59 UIU/ML (ref 0.4–4)
WBC # BLD AUTO: 8.3 K/UL (ref 3.9–12.7)

## 2020-08-03 PROCEDURE — 80053 COMPREHEN METABOLIC PANEL: CPT

## 2020-08-03 PROCEDURE — 80061 LIPID PANEL: CPT

## 2020-08-03 PROCEDURE — 36415 COLL VENOUS BLD VENIPUNCTURE: CPT | Mod: PO

## 2020-08-03 PROCEDURE — 85025 COMPLETE CBC W/AUTO DIFF WBC: CPT

## 2020-08-03 PROCEDURE — 82306 VITAMIN D 25 HYDROXY: CPT

## 2020-08-03 PROCEDURE — 84439 ASSAY OF FREE THYROXINE: CPT

## 2020-08-03 PROCEDURE — 84443 ASSAY THYROID STIM HORMONE: CPT

## 2020-08-07 ENCOUNTER — OFFICE VISIT (OUTPATIENT)
Dept: INTERNAL MEDICINE | Facility: CLINIC | Age: 85
End: 2020-08-07
Payer: MEDICARE

## 2020-08-07 VITALS
HEART RATE: 74 BPM | OXYGEN SATURATION: 97 % | TEMPERATURE: 97 F | RESPIRATION RATE: 20 BRPM | WEIGHT: 161.19 LBS | DIASTOLIC BLOOD PRESSURE: 75 MMHG | BODY MASS INDEX: 25.9 KG/M2 | HEIGHT: 66 IN | SYSTOLIC BLOOD PRESSURE: 135 MMHG

## 2020-08-07 DIAGNOSIS — Z00.00 PREVENTATIVE HEALTH CARE: Primary | ICD-10-CM

## 2020-08-07 DIAGNOSIS — E78.2 MIXED HYPERLIPIDEMIA: Chronic | ICD-10-CM

## 2020-08-07 DIAGNOSIS — I70.0 AORTIC ATHEROSCLEROSIS: ICD-10-CM

## 2020-08-07 DIAGNOSIS — I48.19 PERSISTENT ATRIAL FIBRILLATION: Chronic | ICD-10-CM

## 2020-08-07 DIAGNOSIS — I10 ESSENTIAL HYPERTENSION: Chronic | ICD-10-CM

## 2020-08-07 DIAGNOSIS — Z79.01 CHRONIC ANTICOAGULATION: ICD-10-CM

## 2020-08-07 DIAGNOSIS — N18.30 STAGE 3 CHRONIC KIDNEY DISEASE: ICD-10-CM

## 2020-08-07 PROCEDURE — 99999 PR PBB SHADOW E&M-EST. PATIENT-LVL V: CPT | Mod: PBBFAC,,, | Performed by: FAMILY MEDICINE

## 2020-08-07 PROCEDURE — 99215 OFFICE O/P EST HI 40 MIN: CPT | Mod: PBBFAC,PO | Performed by: FAMILY MEDICINE

## 2020-08-07 PROCEDURE — 99215 PR OFFICE/OUTPT VISIT, EST, LEVL V, 40-54 MIN: ICD-10-PCS | Mod: S$PBB,,, | Performed by: FAMILY MEDICINE

## 2020-08-07 PROCEDURE — 99215 OFFICE O/P EST HI 40 MIN: CPT | Mod: S$PBB,,, | Performed by: FAMILY MEDICINE

## 2020-08-07 PROCEDURE — 99999 PR PBB SHADOW E&M-EST. PATIENT-LVL V: ICD-10-PCS | Mod: PBBFAC,,, | Performed by: FAMILY MEDICINE

## 2020-08-07 NOTE — PROGRESS NOTES
Subjective:       Patient ID: Cliff Magaña is a 84 y.o. male.    Chief Complaint: Annual Exam    HPI 84-year-old white male presents to clinic today for annual physical exam.  He continues to be followed by Cardiology for treatment of hypertension and persistent atrial fibrillation which remains stable on amlodipine 5 mg daily, metoprolol 75 mg daily, and hydrochlorothiazide 25 mg daily.  Hypertriglyceridemia remains stable on fenofibrate.  He continues to be anticoagulated on Xarelto.  Stage 3 chronic kidney disease remains stable.  He has a past surgical history of cholecystectomy, tonsillectomy, cataract repair, and sebaceous cyst removal.  He has a family history of his parents passed away in a motor vehicle accident and his maternal and passing away from diabetes.  He is up-to-date with all vaccinations.  Review of Systems   Constitutional: Negative for activity change, appetite change, chills, fatigue, fever and unexpected weight change.   HENT: Positive for hearing loss. Negative for nasal congestion, ear pain, postnasal drip, rhinorrhea, sinus pressure/congestion, sore throat, tinnitus and trouble swallowing.    Eyes: Negative for discharge, redness, itching and visual disturbance.   Respiratory: Negative for cough, chest tightness, shortness of breath and wheezing.    Cardiovascular: Negative for chest pain and palpitations.   Gastrointestinal: Negative for abdominal pain, blood in stool, constipation, diarrhea, nausea and vomiting.   Endocrine: Negative for polydipsia and polyuria.   Genitourinary: Positive for urgency. Negative for decreased urine volume, difficulty urinating, dysuria, frequency and hematuria.   Musculoskeletal: Negative for arthralgias, back pain, joint swelling, myalgias, neck pain and neck stiffness.   Integumentary:  Negative for rash.   Neurological: Negative for dizziness, weakness, light-headedness and headaches.   Psychiatric/Behavioral: Negative.  Negative for confusion and  dysphoric mood.         Objective:      Physical Exam  Vitals signs and nursing note reviewed.   Constitutional:       General: He is not in acute distress.     Appearance: He is well-developed. He is not diaphoretic.   HENT:      Head: Normocephalic and atraumatic.      Right Ear: External ear normal.      Left Ear: External ear normal.      Nose: Nose normal.      Mouth/Throat:      Pharynx: No oropharyngeal exudate.   Eyes:      General: No scleral icterus.        Right eye: No discharge.         Left eye: No discharge.      Conjunctiva/sclera: Conjunctivae normal.      Pupils: Pupils are equal, round, and reactive to light.   Neck:      Musculoskeletal: Normal range of motion and neck supple.      Thyroid: No thyromegaly.      Vascular: No JVD.      Trachea: No tracheal deviation.   Cardiovascular:      Rate and Rhythm: Normal rate and regular rhythm.      Heart sounds: Normal heart sounds. No murmur. No friction rub. No gallop.    Pulmonary:      Effort: Pulmonary effort is normal. No respiratory distress.      Breath sounds: Normal breath sounds. No stridor. No wheezing or rales.   Abdominal:      General: Bowel sounds are normal. There is no distension.      Palpations: Abdomen is soft. There is no mass.      Tenderness: There is no abdominal tenderness. There is no guarding or rebound.   Musculoskeletal: Normal range of motion.         General: No tenderness.   Lymphadenopathy:      Cervical: No cervical adenopathy.   Skin:     General: Skin is warm and dry.      Coloration: Skin is not pale.      Findings: No erythema or rash.   Neurological:      Mental Status: He is alert and oriented to person, place, and time.   Psychiatric:         Behavior: Behavior normal.         Thought Content: Thought content normal.         Judgment: Judgment normal.         Assessment:       1. Preventative health care    2. Persistent atrial fibrillation    3. Chronic anticoagulation    4. Essential hypertension    5. Mixed  hyperlipidemia    6. Aortic atherosclerosis    7. Stage 3 chronic kidney disease        Plan:       1.  Labs have been reviewed and are within normal limits.  2.  Continue amlodipine 5 mg daily, hydrochlorothiazide 25 mg daily, and metoprolol 75 mg daily.  Hypertension and atrial fibrillation are well controlled.  3.  Continue Xarelto as prescribed.  Atherosclerosis in atrial fibrillation are stable.  4.  Encourage hydration.  Stage 3 chronic kidney disease remains stable.  5.  Return to clinic as needed or in 1 year for annual exam.

## 2020-09-28 ENCOUNTER — TELEPHONE (OUTPATIENT)
Dept: AUDIOLOGY | Facility: CLINIC | Age: 85
End: 2020-09-28

## 2020-09-28 DIAGNOSIS — H90.3 SENSORINEURAL HEARING LOSS, BILATERAL: Primary | ICD-10-CM

## 2020-09-29 ENCOUNTER — PATIENT MESSAGE (OUTPATIENT)
Dept: OTHER | Facility: OTHER | Age: 85
End: 2020-09-29

## 2020-09-30 ENCOUNTER — IMMUNIZATION (OUTPATIENT)
Dept: PHARMACY | Facility: CLINIC | Age: 85
End: 2020-09-30
Payer: MEDICARE

## 2020-10-16 ENCOUNTER — PATIENT MESSAGE (OUTPATIENT)
Dept: ADMINISTRATIVE | Facility: OTHER | Age: 85
End: 2020-10-16

## 2020-10-16 ENCOUNTER — OFFICE VISIT (OUTPATIENT)
Dept: CARDIOLOGY | Facility: CLINIC | Age: 85
End: 2020-10-16
Payer: MEDICARE

## 2020-10-16 VITALS
BODY MASS INDEX: 25.6 KG/M2 | SYSTOLIC BLOOD PRESSURE: 168 MMHG | HEART RATE: 87 BPM | WEIGHT: 159.31 LBS | DIASTOLIC BLOOD PRESSURE: 84 MMHG | HEIGHT: 66 IN

## 2020-10-16 DIAGNOSIS — I10 ESSENTIAL HYPERTENSION: Primary | ICD-10-CM

## 2020-10-16 DIAGNOSIS — N18.31 STAGE 3A CHRONIC KIDNEY DISEASE: ICD-10-CM

## 2020-10-16 DIAGNOSIS — Z79.01 CHRONIC ANTICOAGULATION: ICD-10-CM

## 2020-10-16 DIAGNOSIS — I70.0 AORTIC ATHEROSCLEROSIS: ICD-10-CM

## 2020-10-16 DIAGNOSIS — I48.19 PERSISTENT ATRIAL FIBRILLATION: Chronic | ICD-10-CM

## 2020-10-16 DIAGNOSIS — E78.2 MIXED HYPERLIPIDEMIA: Chronic | ICD-10-CM

## 2020-10-16 DIAGNOSIS — Z83.3 FAMILY HISTORY OF DIABETES MELLITUS: ICD-10-CM

## 2020-10-16 PROCEDURE — 99214 PR OFFICE/OUTPT VISIT, EST, LEVL IV, 30-39 MIN: ICD-10-PCS | Mod: S$PBB,,, | Performed by: INTERNAL MEDICINE

## 2020-10-16 PROCEDURE — 99214 OFFICE O/P EST MOD 30 MIN: CPT | Mod: PBBFAC,PO | Performed by: INTERNAL MEDICINE

## 2020-10-16 PROCEDURE — 99214 OFFICE O/P EST MOD 30 MIN: CPT | Mod: S$PBB,,, | Performed by: INTERNAL MEDICINE

## 2020-10-16 PROCEDURE — 99999 PR PBB SHADOW E&M-EST. PATIENT-LVL IV: ICD-10-PCS | Mod: PBBFAC,,, | Performed by: INTERNAL MEDICINE

## 2020-10-16 PROCEDURE — 99999 PR PBB SHADOW E&M-EST. PATIENT-LVL IV: CPT | Mod: PBBFAC,,, | Performed by: INTERNAL MEDICINE

## 2020-10-16 RX ORDER — INFLUENZA A VIRUS A/GUANGDONG-MAONAN/SWL1536/2019 CNIC-1909 (H1N1) ANTIGEN (FORMALDEHYDE INACTIVATED), INFLUENZA A VIRUS A/HONG KONG/2671/2019 (H3N2) ANTIGEN (FORMALDEHYDE INACTIVATED), INFLUENZA B VIRUS B/PHUKET/3073/2013 ANTIGEN (FORMALDEHYDE INACTIVATED), AND INFLUENZA B VIRUS B/WASHINGTON/02/2019 ANTIGEN (FORMALDEHYDE INACTIVATED) 15; 15; 15; 15 UG/.5ML; UG/.5ML; UG/.5ML; UG/.5ML
INJECTION, SUSPENSION INTRAMUSCULAR
COMMUNITY
Start: 2020-09-30 | End: 2022-03-28 | Stop reason: ALTCHOICE

## 2020-10-16 RX ORDER — AMLODIPINE BESYLATE 10 MG/1
10 TABLET ORAL DAILY
COMMUNITY
End: 2020-10-16 | Stop reason: SDUPTHER

## 2020-10-16 RX ORDER — AMLODIPINE BESYLATE 10 MG/1
10 TABLET ORAL DAILY
Qty: 90 TABLET | Refills: 3 | Status: SHIPPED | OUTPATIENT
Start: 2020-10-16 | End: 2021-09-02

## 2020-10-16 NOTE — PROGRESS NOTES
"Subjective:   Patient ID:  Cliff Magaña is a 85 y.o. male who presents for follow-up of Hypertension      HPI: Presents with no complaints. Doing well.  Admits to dietary non complaince with salt and starches. Today BP elevated ( after ham dinner).    Lab Results   Component Value Date     08/03/2020    K 4.4 08/03/2020     08/03/2020    CO2 30 (H) 08/03/2020    BUN 24 (H) 08/03/2020    CREATININE 1.4 08/03/2020     (H) 08/03/2020    HGBA1C 5.8 (H) 08/29/2018    AST 27 08/03/2020    ALT 31 08/03/2020    ALBUMIN 4.2 08/03/2020    PROT 7.7 08/03/2020    BILITOT 0.5 08/03/2020    WBC 8.30 08/03/2020    HGB 16.6 08/03/2020    HCT 50.0 08/03/2020    MCV 94 08/03/2020     08/03/2020    INR 2.8 12/17/2013    INR 1.8 (H) 06/07/2011    PSA 0.43 06/06/2013    TSH 2.592 08/03/2020    CHOL 156 08/03/2020    HDL 38 (L) 08/03/2020    LDLCALC 83.0 08/03/2020    TRIG 175 (H) 08/03/2020       Review of Systems   Constitution: Negative.   HENT: Positive for hearing loss.    Eyes: Negative.    Cardiovascular: Negative.  Negative for chest pain, claudication, dyspnea on exertion, irregular heartbeat, leg swelling, near-syncope, palpitations and syncope.   Respiratory: Negative.  Negative for cough, shortness of breath, snoring and wheezing.    Endocrine: Negative.  Negative for cold intolerance, heat intolerance, polydipsia, polyphagia and polyuria.   Skin: Negative.    Musculoskeletal: Positive for arthritis, back pain and muscle cramps.   Gastrointestinal: Positive for heartburn.   Genitourinary: Negative.    Neurological: Negative.    Psychiatric/Behavioral: Negative.        Objective:   Physical Exam   Constitutional: He is oriented to person, place, and time. He appears well-developed and well-nourished.   BP (!) 168/84   Pulse 87   Ht 5' 5.5" (1.664 m)   Wt 72.3 kg (159 lb 4.5 oz)   BMI 26.10 kg/m²      HENT:   Head: Normocephalic.   Eyes: Pupils are equal, round, and reactive to light.   Neck: " Normal range of motion. Neck supple. Carotid bruit is not present. No thyromegaly present.   Cardiovascular: Normal rate, normal heart sounds and intact distal pulses. An irregularly irregular rhythm present. Exam reveals no gallop and no friction rub.   No murmur heard.  Pulses:       Carotid pulses are 2+ on the right side and 2+ on the left side.       Radial pulses are 2+ on the right side and 2+ on the left side.        Femoral pulses are 2+ on the right side and 2+ on the left side.       Popliteal pulses are 2+ on the right side and 2+ on the left side.        Dorsalis pedis pulses are 2+ on the right side and 2+ on the left side.        Posterior tibial pulses are 2+ on the right side and 2+ on the left side.   Pulmonary/Chest: Effort normal and breath sounds normal. No respiratory distress. He has no wheezes. He has no rales. He exhibits no tenderness.   Abdominal: Soft. Bowel sounds are normal.   Musculoskeletal: Normal range of motion.         General: No edema.   Neurological: He is alert and oriented to person, place, and time.   Skin: Skin is warm and dry.   Psychiatric: He has a normal mood and affect.   Nursing note and vitals reviewed.        Assessment and Plan:     Problem List Items Addressed This Visit        Cardiology Problems    HTN (hypertension) - Primary (Chronic)    Relevant Medications    amLODIPine (NORVASC) 10 MG tablet    apixaban (ELIQUIS) 5 mg Tab    Other Relevant Orders    Hypertension Digital Medicine (HDMP) Enrollment Order (Completed)    Hyperlipidemia (Chronic)    Persistent atrial fibrillation (Chronic)    Relevant Medications    amLODIPine (NORVASC) 10 MG tablet    apixaban (ELIQUIS) 5 mg Tab    Aortic atherosclerosis       Other    Stage 3 chronic kidney disease    Family history of diabetes mellitus    Chronic anticoagulation          Patient's Medications   New Prescriptions    No medications on file   Previous Medications    FENOFIBRATE MICRONIZED (LOFIBRA) 200 MG CAP     Take 1 capsule (200 mg total) by mouth daily with breakfast.    FLU VACCINE [id number],6MOS UP, (FLUZONE QUAD [id number]) 60 MCG (15 MCG X 4)/0.5 ML SUSP        HYDROCHLOROTHIAZIDE (HYDRODIURIL) 25 MG TABLET    Take 1 tablet (25 mg total) by mouth once daily.    KRILL-OM-3-DHA-EPA-PHOSPHO-AST (MEGARED OMEGA-3 KRILL OIL) 1,000-230-60 MG CAP    Take by mouth once daily. Pt taking one pill once a day    LORATADINE (CLARITIN) 10 MG TABLET    Take 10 mg by mouth once daily.    TRAMAINE BIOTIN ORAL    Take 5,000 mcg by mouth once daily.    METOPROLOL SUCCINATE (TOPROL-XL) 25 MG 24 HR TABLET    Take 1 tablet (25 mg total) by mouth once daily.    METOPROLOL SUCCINATE (TOPROL-XL) 50 MG 24 HR TABLET    TAKE ONE AND ONE-HALF TABLETS ONCE DAILY    MULTIVITAMIN-MINERALS-LUTEIN (CENTRUM SILVER) TAB    Take by mouth. 1 Tablet Oral Every day   Modified Medications    Modified Medication Previous Medication    AMLODIPINE (NORVASC) 10 MG TABLET amLODIPine (NORVASC) 10 MG tablet       Take 1 tablet (10 mg total) by mouth once daily.    Take 10 mg by mouth once daily.    APIXABAN (ELIQUIS) 5 MG TAB apixaban (ELIQUIS) 5 mg Tab       Take 1 tablet (5 mg total) by mouth 2 (two) times daily.    Take 5 mg by mouth 2 (two) times daily.   Discontinued Medications    AMLODIPINE (NORVASC) 5 MG TABLET    Take 1 tablet (5 mg total) by mouth once daily.    XARELTO 20 MG TAB    TAKE 1 TABLET DAILY     Due CKD 3 and advancing age I will change Xarelto to Eliquis, 5 mg BID.  Increase Amlodipine to 10 mg and sign up for a digital medicine program.  Next visit will schedule Lexiscan.  Follow up in about 6 months (around 4/16/2021).

## 2020-10-21 ENCOUNTER — PATIENT OUTREACH (OUTPATIENT)
Dept: OTHER | Facility: OTHER | Age: 85
End: 2020-10-21

## 2020-10-21 PROCEDURE — 99453 REM MNTR PHYSIOL PARAM SETUP: CPT | Mod: PBBFAC | Performed by: INTERNAL MEDICINE

## 2020-10-21 NOTE — LETTER
October 21, 2020     Cliff Magaña  [address] [id number]       Dear Cliff,    Welcome to [facility]! Our goal is to make care effective, proactive and convenient by using data you send us from home to better treat your chronic conditions.                My name is Raven Billingsley, and I am your dedicated Digital Medicine clinician. As an expert in medication management, I will help ensure that the medications you are taking continue to provide the intended benefits and help you reach your goals. You can reach me directly at [phone] or by sending me a message directly through your [facility] account.      I am Cecilia Briones and I will be your health . My job is to help you identify lifestyle changes to improve your disease control. We will talk about nutrition, exercise, and other ways you may be able to adjust your current habits to better your health. Additionally, we will help ensure you are completing the tests and screenings that are necessary to help manage your conditions. You can reach me directly at [phone] or by sending me a message directly through your [facility] account.    Most importantly, YOU are at the center of this team. Together, we will work to improve your overall health and encourage you to meet your goals for a healthier lifestyle.     What we expect from YOU:  · Please take frequent home blood pressure measurements. We ask that you take at least 1 blood pressure reading per week, but more information will better help us get you know you. Be sure you rest for a few minutes before taking the reading in a quiet, comfortable place.     Be available to receive phone calls or [employer] messages, when appropriate, from your care team. Please let us know if there are any specific days or times that work best for us to reach you via phone.     Complete routine tests and screenings. Dont worry, we will help keep you on track!           What you should  expect from your Digital Medicine Care Team:   We will work with you to create a personalized plan of care and provide you with encouragement and education, including regarding lifestyle changes, that could help you manage your disease states.     We will adjust your current medications, if needed, and continue to monitor your long-term progress.     We will provide you and your physician with monthly progress reports after you have been in the program for more than 30 days.     We will send you reminders through [employer] and text messages to help ensure you do not miss any testing deadlines to help manage your disease states.    You will be able to reach us by phone or through your [employer] account by clicking our names under Care Team on the right side of the home screen.    We look forward to working with you to help manage your health,    Sincerely,    Your Digital Medicine Team    Please visit our websites to learn more:   · Hypertension: [email or web address]      Remember, we are not available for emergencies. If you have an emergency, please contact your doctors office directly or call [facility] on-call ([phone] or [phone]) or 911.

## 2020-10-21 NOTE — PROGRESS NOTES
Digital Medicine: Health  Introduction    Introduced Cliff Magaña to Digital Medicine. Discussed health  role and recommended lifestyle modifications.    The history is provided by the patient and the spouse/significant other.           Additional Enrollment Details: Reviewed details of digital coaching and explained automated text messages.       HYPERTENSION  Explained hypertension digital medicine goals including BP goal less than or equal to 130/80mmHg, improved convenience of BP management and reduced risk of heart attack, kidney failure, stroke, eye disease, dementia, and death.      Explained non-pharmacologic therapies like low salt diet and physical activity can reduce blood pressure. .      Explained that we expect patient to submit several blood pressure readings per week at random times of the day, but at least 30 minutes after taking blood pressure medications. Instructed patient not to allow anyone else to use their blood pressure monitor and phone as data submitted is directly entered into medical record. Reviewed and confirmed appropriate blood pressure monitoring technique.         Patient's BP goal is less than or equal to 130/80.Patient's BP average is 159/81 mmHg, which is above goal, per 2017 ACC/AHA Hypertension Guidelines.              Diet-Assessed        Intervention(s): portion control, reducing processed foods and DASH diet  Additional diet details: Encouraged limiting sodium intake and discussed foods high in sodium. Encouraged the use of salt free seasonings and gave examples.       Physical Activity-Assessed      Intervention(s): created new goal         Additional physical activity details: Encouraged 150 minutes of physical activity weekly (30 minutes five days a week).       Medication Adherence-Medication Adherence not addressed.      Substance, Sleep, Stress-Not assessed      Continue current diet/physical activity routine.  Provided patient education.  Reviewed Device  Techniques.     Addressed patient questions and patient has my contact information if needed prior to next outreach. Patient verbalizes understanding.      Explained the importance of self-monitoring and medication adherence. Encouraged the patient to communicate with their health  for lifestyle modifications to help improve or maintain a healthy lifestyle.               There are no preventive care reminders to display for this patient.      Last 5 Patient Entered Readings                                      Current 30 Day Average: 159/81     Recent Readings 10/20/2020    SBP (mmHg) 159    DBP (mmHg) 81    Pulse 91

## 2020-10-22 ENCOUNTER — PATIENT MESSAGE (OUTPATIENT)
Dept: OTHER | Facility: OTHER | Age: 85
End: 2020-10-22

## 2020-10-26 ENCOUNTER — PATIENT OUTREACH (OUTPATIENT)
Dept: OTHER | Facility: OTHER | Age: 85
End: 2020-10-26

## 2020-10-26 NOTE — PROGRESS NOTES
Digital Medicine: Clinician Introduction    Cliff Magaña is a 85 y.o. male who is newly enrolled in the Digital Medicine Clinic.    Takes BP meds in the morning. Checks -9am after coffee and 1 hour after medications. BP technique reported by patient was appropriate.      Cardio increased amlodipine on 10/16/20 - tolerating increased dose of amlodipine well with no side effects. Confirmed he is taking total metoprolol 75 mg one tablet 50 mg and one tablet 25 mg     Patient does not have any stressors that he can identify for cause of elevated BP.     The history is provided by the patient.      Review of patient's allergies indicates:   -- Niacin     --  Other reaction(s): Rash             Other reaction(s): Itching  Completed Medication Reconciliation      HYPERTENSION  Explained hypertension digital medicine goals including BP goal less than or equal to 130/80mmHg, improved convenience of BP management and reduced risk of heart attack, kidney failure, stroke, eye disease, dementia, and death.     Explained non-pharmacologic therapies like low salt diet and physical activity can reduce blood pressure.       Explained that we expect patient to submit several blood pressure readings per week at random times of the day, but at least 30 minutes after taking blood pressure medications. Instructed patient not to allow anyone else to use their blood pressure monitor and phone as data submitted is directly entered into medical record. Reviewed and confirmed appropriate blood pressure monitoring technique.         Reviewed signs/symptoms of hypertension (headache, changes in vision, chest pain, shortness of breath)   Reviewed signs/symptoms of hypotension (lightheaded, dizziness, weakness)     Patient's BP goal is less than or equal to 130/80. Patients BP average is 158/88 mmHg, which is above goal, per 2017 ACC/AHA Hypertension Guidelines. Denies any recent or current hypertensive or hypotensive s/s.         Last 5  Patient Entered Readings                                      Current 30 Day Average: 158/88     Recent Readings 10/26/2020 10/26/2020 10/25/2020 10/24/2020 10/23/2020    SBP (mmHg) 143 153 157 169 156    DBP (mmHg) 78 91 83 88 90    Pulse 82 77 78 77 77                Depression Screening  Cliff Magaña screened negative on the depression screening.     Sleep Apnea Screening  Patient not previously diagnosed with ANTHONY       Medication Affordability Screening  Patient did not answer the medication affordability questionnaires. Patient is currently not having problems affording medications    Medication Adherence-Medication adherence was assessed.  Patient continue taking medication as prescribed.          Uses a pillbox (fills every Saturday) - denies any missed doses       ASSESSMENT(S)  Patients BP average is 158/88 mmHg, which is above goal. Patient's BP goal is less than or equal to 130/80.     Patient is appropriately managed on first line BP agents DHP CCB and thiazide like diuretics along with cardioselective BB (also indicated for afib).       Hypertension Plan  Continue current therapy.  Continue current diet/physical activity routine.  Instructed to charge device.  Plan to follow up in 3-4 weeks to assess BP trend. Amlodipine can take longer to show effects in older patients. If BP remains elevated, may consider adding on ARB.      Addressed patient questions and patient has my contact information if needed prior to next outreach. Patient verbalizes understanding.      Explained the importance of self-monitoring and medication adherence. Encouraged the patient to communicate with their health  for lifestyle modifications to help improve or maintain a healthy lifestyle.        Sent link to [facility] on [facility] webpages and my contact information via [employer] for future questions.        Explained to the patient that the Digital Medicine team is not available for emergencies. Advised patient call  [facility] ([phone] or [phone]) or [phone] if needed.            There are no preventive care reminders to display for this patient.       Current Medication Regimen:  Hypertension Medications             amLODIPine (NORVASC) 10 MG tablet Take 1 tablet (10 mg total) by mouth once daily.    hydroCHLOROthiazide (HYDRODIURIL) 25 MG tablet Take 1 tablet (25 mg total) by mouth once daily.    metoprolol succinate (TOPROL-XL) 25 MG 24 hr tablet Take 1 tablet (25 mg total) by mouth once daily.    metoprolol succinate (TOPROL-XL) 50 MG 24 hr tablet TAKE ONE AND ONE-HALF TABLETS ONCE DAILY

## 2020-11-11 ENCOUNTER — PATIENT MESSAGE (OUTPATIENT)
Dept: ADMINISTRATIVE | Facility: OTHER | Age: 85
End: 2020-11-11

## 2020-11-12 ENCOUNTER — CLINICAL SUPPORT (OUTPATIENT)
Dept: AUDIOLOGY | Facility: CLINIC | Age: 85
End: 2020-11-12
Payer: MEDICARE

## 2020-11-12 ENCOUNTER — OFFICE VISIT (OUTPATIENT)
Dept: OTOLARYNGOLOGY | Facility: CLINIC | Age: 85
End: 2020-11-12
Payer: MEDICARE

## 2020-11-12 VITALS — DIASTOLIC BLOOD PRESSURE: 77 MMHG | HEART RATE: 85 BPM | SYSTOLIC BLOOD PRESSURE: 161 MMHG

## 2020-11-12 DIAGNOSIS — H90.3 SENSORINEURAL HEARING LOSS, BILATERAL: Primary | ICD-10-CM

## 2020-11-12 DIAGNOSIS — H90.3 SENSORINEURAL HEARING LOSS (SNHL) OF BOTH EARS: Primary | ICD-10-CM

## 2020-11-12 PROCEDURE — 99211 OFF/OP EST MAY X REQ PHY/QHP: CPT | Mod: PBBFAC,27 | Performed by: AUDIOLOGIST

## 2020-11-12 PROCEDURE — 99999 PR PBB SHADOW E&M-EST. PATIENT-LVL I: ICD-10-PCS | Mod: PBBFAC,,, | Performed by: AUDIOLOGIST

## 2020-11-12 PROCEDURE — 99999 PR PBB SHADOW E&M-EST. PATIENT-LVL III: ICD-10-PCS | Mod: PBBFAC,,, | Performed by: NURSE PRACTITIONER

## 2020-11-12 PROCEDURE — 99499 NO LOS: ICD-10-PCS | Mod: S$PBB,,, | Performed by: AUDIOLOGIST

## 2020-11-12 PROCEDURE — 99999 PR PBB SHADOW E&M-EST. PATIENT-LVL I: CPT | Mod: PBBFAC,,, | Performed by: AUDIOLOGIST

## 2020-11-12 PROCEDURE — 99213 OFFICE O/P EST LOW 20 MIN: CPT | Mod: PBBFAC,25 | Performed by: NURSE PRACTITIONER

## 2020-11-12 PROCEDURE — 99212 PR OFFICE/OUTPT VISIT, EST, LEVL II, 10-19 MIN: ICD-10-PCS | Mod: S$PBB,,, | Performed by: NURSE PRACTITIONER

## 2020-11-12 PROCEDURE — 99999 PR PBB SHADOW E&M-EST. PATIENT-LVL III: CPT | Mod: PBBFAC,,, | Performed by: NURSE PRACTITIONER

## 2020-11-12 PROCEDURE — 92557 COMPREHENSIVE HEARING TEST: CPT | Mod: PBBFAC | Performed by: AUDIOLOGIST

## 2020-11-12 PROCEDURE — 99212 OFFICE O/P EST SF 10 MIN: CPT | Mod: S$PBB,,, | Performed by: NURSE PRACTITIONER

## 2020-11-12 PROCEDURE — 99499 UNLISTED E&M SERVICE: CPT | Mod: S$PBB,,, | Performed by: AUDIOLOGIST

## 2020-11-12 PROCEDURE — 92567 TYMPANOMETRY: CPT | Mod: PBBFAC | Performed by: AUDIOLOGIST

## 2020-11-12 NOTE — PROGRESS NOTES
Cliff Magaña was seen today in the clinic for an audiologic evaluation.  Patients main complaint was decreased hearing of both ears.  Mr. Magaña reported that he has noticed some trouble hearing with his Phonak hearing aids and is unsure why.    Tympanometry revealed Type As (hypcompliant), bilaterally. Audiogram results revealed normal hearing (250-500 Hz) to a moderate to severe sensorineural hearing loss (750-8000 Hz) in the right ear and a mild to moderate to severe sensorineural hearing loss (250-8000 Hz) in the left ear.  Speech reception thresholds were noted at 40 dB in the right ear and   35 dB in the left ear.  Speech discrimination scores were 80% in the right ear and 76% in the left ear.    Recommendations:  1. Otologic evaluation  2. Annual audiogram  3. Noise protection when in noise  4. Hearing aid follow-up to make adjustments as needed

## 2020-11-12 NOTE — PROGRESS NOTES
Subjective:      Cliff Magaña is a 85 y.o. male who is here for one year follow up. He was noted to have bilateral SNHL a year ago. He is now wearing hearing aids. Doing well. No other concerns today. He is schedule to meet with audiology today.        Past Medical History  He has a past medical history of *Atrial fibrillation, Chronic kidney disease, Hyperlipidemia, Hypertension, and Metabolic syndrome.    Past Surgical History  He has a past surgical history that includes Cholecystectomy; Tonsillectomy; Cataract extraction; Eye surgery; and Skin cancer excision.    Family History  His family history includes Diabetes in his maternal aunt.    Social History  He reports that he quit smoking about 37 years ago. He has a 40.00 pack-year smoking history. He has never used smokeless tobacco. He reports current alcohol use of about 1.0 standard drinks of alcohol per week. He reports that he does not use drugs.    Allergies  He is allergic to niacin.    Medications  He has a current medication list which includes the following prescription(s): amlodipine, apixaban, fenofibrate micronized, fluzone quad 4621-9845, hydrochlorothiazide, krill-om-3-dha-epa-phospho-ast, loratadine, biotin, metoprolol succinate, metoprolol succinate, and multivitamin-minerals-lutein.    Review of Systems   Constitutional: Negative.  Negative for chills, fever and unexpected weight change.   HENT: Positive for hearing loss and tinnitus. Negative for ear discharge, ear pain, sore throat and trouble swallowing.    Eyes: Negative.  Negative for pain and visual disturbance.   Respiratory: Negative.  Negative for apnea and shortness of breath.    Cardiovascular: Negative for chest pain and palpitations.   Gastrointestinal: Negative.  Negative for abdominal pain and nausea.   Endocrine: Negative for cold intolerance and heat intolerance.   Genitourinary: Negative.    Musculoskeletal: Positive for back pain. Negative for joint swelling and neck  stiffness.   Skin: Negative.  Negative for color change and rash.   Allergic/Immunologic: Negative.    Neurological: Negative.  Negative for dizziness, facial asymmetry and headaches.   Hematological: Negative.  Negative for adenopathy. Does not bruise/bleed easily.   Psychiatric/Behavioral: Negative.  Negative for agitation and sleep disturbance. The patient is not nervous/anxious.           Objective:     BP (!) 161/77   Pulse 85      Constitutional:   Vital signs are normal. He appears well-developed and well-nourished.     Head:  Normocephalic and atraumatic.     Ears:    Right Ear: No lacerations. No drainage, swelling or tenderness. No foreign bodies. No mastoid tenderness. Tympanic membrane is not injected, not scarred, not perforated, not erythematous, not retracted and not bulging. Tympanic membrane mobility is normal. No middle ear effusion. No hemotympanum. Decreased hearing is noted.   Left Ear: No lacerations. No drainage, swelling or tenderness. No foreign bodies. No mastoid tenderness. Tympanic membrane is not injected, not scarred, not perforated, not erythematous, not retracted and not bulging. Tympanic membrane mobility is normal.  No middle ear effusion. No hemotympanum. Decreased hearing is noted.     Nose:  Nose normal including turbinates, nasal mucosa, sinuses and nasal septum.     Mouth/Throat  Lips, teeth, and gums normal and oropharynx normal.     Neck:  Neck normal without thyromegaly masses, asymmetry, normal tracheal structure, crepitus, and tenderness and no adenopathy.     Psychiatric:   He has a normal mood and affect.       Procedure    None      Data Reviewed    WBC (K/uL)   Date Value   08/03/2020 8.30     Platelets (K/uL)   Date Value   08/03/2020 209      Creatinine (mg/dL)   Date Value   08/03/2020 1.4     TSH (uIU/mL)   Date Value   08/03/2020 2.592     Glucose (mg/dL)   Date Value   08/03/2020 190 (H)     Hemoglobin A1C (%)   Date Value   08/29/2018 5.8 (H)        Assessment:      1. Sensorineural hearing loss (SNHL) of both ears         Plan:     I had a long discussion with the patient regarding his condition and the further workup and management options.    Doing well on hearing aids. No concerns today. Continue with recommended hearing maintenance per audiology recommendations. I recommend repeating audiometric testing in 18-24 months.    Follow up in about 18 months (around 5/12/2022).

## 2020-11-12 NOTE — PROGRESS NOTES
HEARING AID FOLLOW-UP:    Patient arrived with a complaint that hearing aids were too low and needed adjustments.  New audiogram was put into [facility] and adjustments were made accordingly based on new audiogram.  Patient reported good subjective benefit and did not need any additional adjustments.    Patient will follow-up as needed.

## 2020-11-17 ENCOUNTER — PES CALL (OUTPATIENT)
Dept: ADMINISTRATIVE | Facility: CLINIC | Age: 85
End: 2020-11-17

## 2020-11-18 PROBLEM — I70.0 ATHEROSCLEROSIS OF AORTA: Status: ACTIVE | Noted: 2020-11-18

## 2020-11-19 ENCOUNTER — OFFICE VISIT (OUTPATIENT)
Dept: INTERNAL MEDICINE | Facility: CLINIC | Age: 85
End: 2020-11-19
Payer: MEDICARE

## 2020-11-19 VITALS
BODY MASS INDEX: 24.27 KG/M2 | DIASTOLIC BLOOD PRESSURE: 62 MMHG | OXYGEN SATURATION: 98 % | WEIGHT: 151 LBS | SYSTOLIC BLOOD PRESSURE: 128 MMHG | RESPIRATION RATE: 14 BRPM | HEART RATE: 83 BPM | HEIGHT: 66 IN

## 2020-11-19 DIAGNOSIS — Z83.3 FAMILY HISTORY OF DIABETES MELLITUS: ICD-10-CM

## 2020-11-19 DIAGNOSIS — M54.50 CHRONIC LOW BACK PAIN WITHOUT SCIATICA, UNSPECIFIED BACK PAIN LATERALITY: ICD-10-CM

## 2020-11-19 DIAGNOSIS — I10 HYPERTENSION, UNSPECIFIED TYPE: Chronic | ICD-10-CM

## 2020-11-19 DIAGNOSIS — I70.0 AORTIC ATHEROSCLEROSIS: ICD-10-CM

## 2020-11-19 DIAGNOSIS — E78.5 HYPERLIPIDEMIA, UNSPECIFIED HYPERLIPIDEMIA TYPE: Chronic | ICD-10-CM

## 2020-11-19 DIAGNOSIS — G89.29 CHRONIC LOW BACK PAIN WITHOUT SCIATICA, UNSPECIFIED BACK PAIN LATERALITY: ICD-10-CM

## 2020-11-19 DIAGNOSIS — Z87.891 EX-SMOKER: ICD-10-CM

## 2020-11-19 DIAGNOSIS — H90.3 SENSORINEURAL HEARING LOSS (SNHL) OF BOTH EARS: ICD-10-CM

## 2020-11-19 DIAGNOSIS — Z00.00 ENCOUNTER FOR PREVENTIVE HEALTH EXAMINATION: Primary | ICD-10-CM

## 2020-11-19 DIAGNOSIS — L72.3 SEBACEOUS CYST: ICD-10-CM

## 2020-11-19 DIAGNOSIS — R52 PAIN AGGRAVATED BY WALKING: ICD-10-CM

## 2020-11-19 DIAGNOSIS — I48.19 PERSISTENT ATRIAL FIBRILLATION: Chronic | ICD-10-CM

## 2020-11-19 DIAGNOSIS — Z79.01 CHRONIC ANTICOAGULATION: ICD-10-CM

## 2020-11-19 DIAGNOSIS — N18.30 STAGE 3 CHRONIC KIDNEY DISEASE, UNSPECIFIED WHETHER STAGE 3A OR 3B CKD: ICD-10-CM

## 2020-11-19 DIAGNOSIS — I70.0 ATHEROSCLEROSIS OF AORTA: ICD-10-CM

## 2020-11-19 PROBLEM — M62.9 HAMSTRING TIGHTNESS OF BOTH LOWER EXTREMITIES: Status: RESOLVED | Noted: 2019-08-20 | Resolved: 2020-11-19

## 2020-11-19 PROCEDURE — G0439 PR MEDICARE ANNUAL WELLNESS SUBSEQUENT VISIT: ICD-10-PCS | Mod: S$GLB,,, | Performed by: NURSE PRACTITIONER

## 2020-11-19 PROCEDURE — 99999 PR PBB SHADOW E&M-EST. PATIENT-LVL V: CPT | Mod: PBBFAC,,, | Performed by: NURSE PRACTITIONER

## 2020-11-19 PROCEDURE — G0439 PPPS, SUBSEQ VISIT: HCPCS | Mod: S$GLB,,, | Performed by: NURSE PRACTITIONER

## 2020-11-19 PROCEDURE — 99215 OFFICE O/P EST HI 40 MIN: CPT | Mod: PBBFAC,PO | Performed by: NURSE PRACTITIONER

## 2020-11-19 PROCEDURE — 99999 PR PBB SHADOW E&M-EST. PATIENT-LVL V: ICD-10-PCS | Mod: PBBFAC,,, | Performed by: NURSE PRACTITIONER

## 2020-11-19 NOTE — PROGRESS NOTES
"I offered to discuss end of life issues, including information on how to make advance directives that the patient could use to name someone who would make medical decisions on their behalf if they became too ill to make themselves.    ___Patient declined  _X_Patient has completed  Cliff Magaña presented for a  Medicare AWV and comprehensive Health Risk Assessment today. The following components were reviewed and updated:    · Medical history  · Family History  · Social history  · Allergies and Current Medications  · Health Risk Assessment  · Health Maintenance  · Care Team external eye Dr leader & derm [facility]    ** See Completed Assessments for Annual Wellness Visit within the encounter summary.**       The following assessments were completed:  · Living Situation  · CAGE  · Depression Screening  · Timed Get Up and Go  · Whisper Test- abn- hearing aide bilaterally  · Cognitive Function Screening  · Nutrition Screening  · ADL Screening  · PAQ Screening    Vitals:    11/19/20 0926   BP: 128/62   Pulse: 83   Resp: 14   SpO2: 98%   Weight: 68.5 kg (151 lb)   Height: 5' 5.5" (1.664 m)     Body mass index is 24.75 kg/m².  Physical Exam  Constitutional:       Appearance: He is well-developed.      Comments: Younger in appearance than age, pleasant    HENT:      Head: Normocephalic.      Comments: Wears mask     Right Ear: External ear normal.      Left Ear: External ear normal.      Ears:      Comments: malik hearing aides     Nose: Nose normal.      Mouth/Throat:      Pharynx: No oropharyngeal exudate.   Eyes:      General: No scleral icterus.  Cardiovascular:      Rate and Rhythm: Normal rate. Rhythm irregular.   Pulmonary:      Effort: Pulmonary effort is normal. No respiratory distress.      Breath sounds: Normal breath sounds.   Abdominal:      General: Bowel sounds are normal. There is no distension.      Palpations: Abdomen is soft.   Musculoskeletal: Normal range of motion.   Skin:     General: Skin is warm and " dry.      Findings: No rash.   Neurological:      General: No focal deficit present.      Mental Status: He is alert and oriented to person, place, and time.      Cranial Nerves: No cranial nerve deficit.      Motor: No abnormal muscle tone.      Coordination: Coordination normal.      Deep Tendon Reflexes: Reflexes are normal and symmetric. Reflexes normal.   Psychiatric:         Mood and Affect: Mood normal.         Behavior: Behavior normal.         Thought Content: Thought content normal.         Judgment: Judgment normal.           Lab Results   Component Value Date    HGBA1C 5.8 (H) 08/29/2018    CHOL 156 08/03/2020    HDL 38 (L) 08/03/2020    LDLCALC 83.0 08/03/2020    TRIG 175 (H) 08/03/2020    CHOLHDL 24.4 08/03/2020      Lab Results   Component Value Date    PSA 0.43 06/06/2013       Diagnoses and health risks identified today and associated recommendations/orders:    1. Family history of diabetes mellitus  Stable- followed by PCP    2. Stage 3 chronic kidney disease, unspecified whether stage 3a or 3b CKD  Stable- followed by PCP    3. Chronic anticoagulation  Stable- followed by cardiology    4. Ex-smoker  Stable- followed by PCP    5. Sebaceous cyst  Stable- followed by PCP    6. Aortic atherosclerosis  Stable- followed by PCP    7. Hypertension, unspecified type  Stable- followed by cardiology    8. Hyperlipidemia, unspecified hyperlipidemia type  Stable- followed by PCP, cardiology    9. Persistent atrial fibrillation  Stable- followed by cardiology    10. Atherosclerosis of aorta  Stable- followed by cardiology    11. Chronic low back pain without sciatica, unspecified back pain laterality  Stable- followed by PCP    12. Pain aggravated by walking  Stable- followed by PCP    13. Encounter for preventive health examination  Here for Health Risk Assessment/Annual Wellness Visit.  Health maintenance reviewed and updated. Follow up in one year.         Provided Cliff with a 5-10 year written screening  schedule and personal prevention plan. Recommendations were developed using the USPSTF age appropriate recommendations. Education, counseling, and referrals were provided as needed. After Visit Summary printed and given to patient which includes a list of additional screenings\tests needed. Fall prevention. Here w wife. Denies bleeding, CP. Eye exam due.    Follow up in about 1 year (around 11/19/2021) for HRA.    Domitila Felder NP

## 2020-11-19 NOTE — PATIENT INSTRUCTIONS
Counseling and Referral of Other Preventative  (Italic type indicates deductible and co-insurance are waived)    Patient Name: Cliff Magaña  Today's Date: 11/19/2020    Health Maintenance       Date Due Completion Date    Lipid Panel 08/03/2021   8/3/2020    TETANUS VACCINE      Annual skin exam- done-external MD    Annual eye exam- due- external    Dental exam- current   03/02/2025       3/2/2015        No orders of the defined types were placed in this encounter.    The following information is provided to all patients.  This information is to help you find resources for any of the problems found today that may be affecting your health:                Living healthy guide: www.[facility].[place].[facility]      Understanding Diabetes: www.diabetes.org      Eating healthy: www.cdc.gov/healthyweight      CDC home safety checklist: www.cdc.gov/steadi/patient.html      Agency on Aging: www.goea.[place].[facility]      Alcoholics anonymous (AA): www.aa.org      Physical Activity: www.shayy.nih.gov/eq5tugl      Tobacco use: www.quitwithusla.org     Preventing Falls: Making Changes in Your Living Space  Is your living space filled with hazards that could cause you to fall? Changes can make you safer. They could even save your life. Take a careful look around your home. Change what you can on your own. Hire someone or ask friends or family to help with harder tasks.      Be sure to add a nonslip mat to the inside of your shower or bathtub. Always keep a nightlight on. Keep a clear path from your bed to the bathroom. Move items from higher shelves to lower ones.   Remove hazards  · Remove things that can trip you, like throw rugs, boxes, piles of paper, or cords.  · Nail down rugs or carpeting if you don't want to remove them.  · Don't store items on stairs.  · Keep walkways clear.  · Clean up spills right away.  · Replace glass tables with wooden ones. They're safer if you fall.  Add safety devices  · Add handrails to both sides of  stairs.  · Buy a raised toilet seat.  · Add grab bars near the toilet and in the shower.  · Get grabbers to help you reach things and avoid climbing.  Improve lighting  · Add nightlights to halls, bedrooms, and bathrooms.   · Put light switches at the top and bottom of stairs.  · Be sure each room and flight of stairs has proper lighting.  · Use shades or curtains to cut glare from windows.  · Put flashlights in each room. Replace burned-out bulbs.  · Get glowing light switches for room entrances.  Take other precautions  · Use nonskid floor wax.  · Buy a nonslip mat and a liquid soap dispenser for the shower.  · Tack down carpets or use slip-resistant backing.  · Put most-used items within easy reach.  · Add bright paint or tape on the top front edge of steps.  · Save big jobs, such as moving furniture or other heavy objects, for family or friends.  · Get professional help installing grab bars. They can be unsafe if not installed the right way.  Fix riskier rooms first  Don't tackle everything at once. Focus on one room at a time. The bathroom is a common spot for falls, so you may start there. Or start with a room you spend lots of time in, such as your bedroom. Make only a few changes at once. This will give you time to adjust to them.     Outside your home  You might arrange for these changes yourself, or you might need to talk to your  or homeowners' association about them.  · Have loose boards on porches or damaged stairs repaired.  · Have rough edges, holes, or large cracks in sidewalks or driveways repaired.  · Have hazards that could trip you, such as hoses or sarai, removed.  · Use high-wattage light bulbs (100 or greater) near outside doors and stairs.  · Get handrails added to outside stairs. Have them extend beyond the bottom step.  · Get help in winter weather with ice or snow removal.   Date Last Reviewed: 6/12/2015  © 1037-5237 [employer]. [address] [facility],  [place] [address]. All rights reserved. This information is not intended as a substitute for professional medical care. Always follow your healthcare professional's instructions.

## 2020-11-23 ENCOUNTER — PATIENT OUTREACH (OUTPATIENT)
Dept: OTHER | Facility: OTHER | Age: 85
End: 2020-11-23

## 2020-11-23 NOTE — PROGRESS NOTES
Digital Medicine: Clinician Follow-Up    Patient reports he has not charged BP machine in awhile.     Patient has been taking amlodipine 5 mg once daily and NOT 10 mg once daily - was increased on 10/16/20 by cardio.  Takes all BP meds in the morning and waiting >1 hour after meds, coffee and exercise before taking readings    Rides stationary bike for 1 hour x 5 days a week    The history is provided by the patient.   Follow-up reason(s): routine follow up.     Hypertension    Patient's blood pressure is stable.   Patient is not experiencing signs/symptoms of hypotension.  Patient is not experiencing signs/symptoms of hypertension.            Last 5 Patient Entered Readings                                      Current 30 Day Average: 160/85     Recent Readings 11/22/2020 11/21/2020 11/20/2020 11/19/2020 11/18/2020    SBP (mmHg) 177 158 168 169 157    DBP (mmHg) 84 82 84 82 78    Pulse 71 81 77 77 80                 Depression Screening  Did not address depression screening.    Sleep Apnea Screening    Did not address sleep apnea screening.     Medication Affordability Screening  Did not address medication affordability screening.     Medication Adherence-Medication adherence was assessed.          ASSESSMENT(S)  Patients BP average is 160/85 mmHg, which is above goal. Patient's BP goal is less than or equal to 130/80.     BP reading elevated above goal. Last in-clinic /62 11/19/20. Did have some readings within goal 11/13 but otherwise elevated. Elevated readings may be contributed by not charging device or taking a lower dose of amlodipine then was prescribed. He went through med box on the phone and confirmed he does have 10 mg strength but has been filling pillbox with 5 mg strength.    Patient is appropriately managed on first line BP agents DHP CCB and thiazide like diuretics along with cardioselective BB (also indicated for afib).       Hypertension Plan  Hypertension Medication Change. Start INTENDED  amlodipine 10 mg daily   Continue current therapy.  Continue current diet/physical activity routine.  Instructed to charge device.  Plan to follow up to assess tolerance to amlodipine.        Addressed patient questions and patient has my contact information if needed prior to next outreach. Patient verbalizes understanding.             There are no preventive care reminders to display for this patient.  There are no preventive care reminders to display for this patient.      Hypertension Medications             amLODIPine (NORVASC) 10 MG tablet Take 1 tablet (10 mg total) by mouth once daily.    hydroCHLOROthiazide (HYDRODIURIL) 25 MG tablet Take 1 tablet (25 mg total) by mouth once daily.    metoprolol succinate (TOPROL-XL) 25 MG 24 hr tablet Take 1 tablet (25 mg total) by mouth once daily.    metoprolol succinate (TOPROL-XL) 50 MG 24 hr tablet TAKE ONE AND ONE-HALF TABLETS ONCE DAILY

## 2020-12-01 ENCOUNTER — PATIENT MESSAGE (OUTPATIENT)
Dept: CARDIOLOGY | Facility: CLINIC | Age: 85
End: 2020-12-01

## 2020-12-18 ENCOUNTER — PATIENT OUTREACH (OUTPATIENT)
Dept: OTHER | Facility: OTHER | Age: 85
End: 2020-12-18

## 2020-12-18 DIAGNOSIS — I10 HYPERTENSION, UNSPECIFIED TYPE: Primary | Chronic | ICD-10-CM

## 2020-12-18 RX ORDER — VALSARTAN 80 MG/1
80 TABLET ORAL DAILY
Qty: 30 TABLET | Refills: 3 | Status: SHIPPED | OUTPATIENT
Start: 2020-12-18 | End: 2021-04-13 | Stop reason: SDUPTHER

## 2020-12-18 NOTE — PROGRESS NOTES
Digital Medicine: Clinician Follow-Up    Patient reports he has been taking amlodipine 5 mg BID since last encounter and just received 10 mg tablet strength yesterday - tolerating it well with no side effects.     States BP machine has been charged. Reviewed BP technique with him again which was appropriate. He puts in BP comment section that he is riding his bike but states he has been waiting >30 min after biking to check BP. Takes BP meds in the morning    Reports he has been reading nutrition labels to lower BP. Rides his stationary bike 1 hour x 5 days. Cannot contribute any other lifestyle changes to high BP readings.     Few readings >160/90 mm Hg - denies any hypertensive urgency s/s like severe HA, CP or SOB    The history is provided by the patient and the spouse/significant other.      Review of patient's allergies indicates:   -- Niacin     --  Other reaction(s): Rash             Other reaction(s): Itching  Follow-up reason(s): medication change follow-up.     Hypertension    Patient's blood pressure is stable.   Patient is not experiencing signs/symptoms of hypotension.  Patient is not experiencing signs/symptoms of hypertension.      Patient did make medication change.    Is patient tolerating med change? yes            Last 5 Patient Entered Readings                                      Current 30 Day Average: 159/83     Recent Readings 12/17/2020 12/16/2020 12/14/2020 12/11/2020 12/10/2020    SBP (mmHg) 159 139 154 173 153    DBP (mmHg) 80 87 80 93 77    Pulse 76 75 73 66 73                 Depression Screening  Did not address depression screening.    Sleep Apnea Screening    Did not address sleep apnea screening.     Medication Affordability Screening  Did not address medication affordability screening.     Medication Adherence-Medication adherence was assessed.          ASSESSMENT(S)  Patients BP average is 159/83 mmHg, which is above goal. Patient's BP goal is less than or equal to 130/80.      Avg BP remains above goal and has not changed since amlodipine was increased. Patient is appropriately managed on first line BP agents DHP CCB and thiazide like diuretics along with cardioselective BB (also indicated for afib). Patient has CKD stage 3 and last eGFR was 45.8.       Hypertension Plan  Hypertension Medication Change. Start valsartan 80 mg daily. Counseled on side effects to monitor for. Continue amlodipine, HCTZ, and metoprolol as prescribed  Labs ordered. Patient has hx CKD. BMP f/u ordered Expected Lab Date: 1/12/2021  Continue current diet/physical activity routine.  Plan to follow up in 2-3 weeks after BMP and to assess tolerance to valsartan.        Addressed patient questions and patient has my contact information if needed prior to next outreach. Patient verbalizes understanding.             There are no preventive care reminders to display for this patient.  There are no preventive care reminders to display for this patient.      Hypertension Medications             amLODIPine (NORVASC) 10 MG tablet Take 1 tablet (10 mg total) by mouth once daily.    hydroCHLOROthiazide (HYDRODIURIL) 25 MG tablet Take 1 tablet (25 mg total) by mouth once daily.    metoprolol succinate (TOPROL-XL) 25 MG 24 hr tablet Take 1 tablet (25 mg total) by mouth once daily.    metoprolol succinate (TOPROL-XL) 50 MG 24 hr tablet TAKE ONE AND ONE-HALF TABLETS ONCE DAILY

## 2021-01-04 ENCOUNTER — PATIENT MESSAGE (OUTPATIENT)
Dept: INTERNAL MEDICINE | Facility: CLINIC | Age: 86
End: 2021-01-04

## 2021-01-12 ENCOUNTER — LAB VISIT (OUTPATIENT)
Dept: LAB | Facility: HOSPITAL | Age: 86
End: 2021-01-12
Attending: INTERNAL MEDICINE
Payer: MEDICARE

## 2021-01-12 ENCOUNTER — TELEPHONE (OUTPATIENT)
Dept: INTERNAL MEDICINE | Facility: CLINIC | Age: 86
End: 2021-01-12

## 2021-01-12 ENCOUNTER — TELEPHONE (OUTPATIENT)
Dept: CARDIOLOGY | Facility: CLINIC | Age: 86
End: 2021-01-12

## 2021-01-12 DIAGNOSIS — R73.01 IMPAIRED FASTING GLUCOSE: Primary | ICD-10-CM

## 2021-01-12 DIAGNOSIS — I10 HYPERTENSION, UNSPECIFIED TYPE: Chronic | ICD-10-CM

## 2021-01-12 LAB
ANION GAP SERPL CALC-SCNC: 9 MMOL/L (ref 8–16)
BUN SERPL-MCNC: 30 MG/DL (ref 8–23)
CALCIUM SERPL-MCNC: 9.7 MG/DL (ref 8.7–10.5)
CHLORIDE SERPL-SCNC: 103 MMOL/L (ref 95–110)
CO2 SERPL-SCNC: 28 MMOL/L (ref 23–29)
CREAT SERPL-MCNC: 1.4 MG/DL (ref 0.5–1.4)
EST. GFR  (AFRICAN AMERICAN): 52.6 ML/MIN/1.73 M^2
EST. GFR  (NON AFRICAN AMERICAN): 45.5 ML/MIN/1.73 M^2
GLUCOSE SERPL-MCNC: 244 MG/DL (ref 70–110)
POTASSIUM SERPL-SCNC: 4.4 MMOL/L (ref 3.5–5.1)
SODIUM SERPL-SCNC: 140 MMOL/L (ref 136–145)

## 2021-01-12 PROCEDURE — 80048 BASIC METABOLIC PNL TOTAL CA: CPT

## 2021-01-12 PROCEDURE — 36415 COLL VENOUS BLD VENIPUNCTURE: CPT | Mod: PO

## 2021-01-15 ENCOUNTER — LAB VISIT (OUTPATIENT)
Dept: LAB | Facility: HOSPITAL | Age: 86
End: 2021-01-15
Attending: FAMILY MEDICINE
Payer: MEDICARE

## 2021-01-15 DIAGNOSIS — R73.01 IMPAIRED FASTING GLUCOSE: ICD-10-CM

## 2021-01-15 PROCEDURE — 36415 COLL VENOUS BLD VENIPUNCTURE: CPT | Mod: PO

## 2021-01-15 PROCEDURE — 83036 HEMOGLOBIN GLYCOSYLATED A1C: CPT

## 2021-01-16 LAB
ESTIMATED AVG GLUCOSE: 180 MG/DL (ref 68–131)
HBA1C MFR BLD HPLC: 7.9 % (ref 4–5.6)

## 2021-01-17 ENCOUNTER — IMMUNIZATION (OUTPATIENT)
Dept: INTERNAL MEDICINE | Facility: CLINIC | Age: 86
End: 2021-01-17
Payer: MEDICARE

## 2021-01-17 DIAGNOSIS — Z23 NEED FOR VACCINATION: Primary | ICD-10-CM

## 2021-01-17 PROCEDURE — 91300 COVID-19, MRNA, LNP-S, PF, 30 MCG/0.3 ML DOSE VACCINE: CPT | Mod: PBBFAC | Performed by: FAMILY MEDICINE

## 2021-01-18 ENCOUNTER — PATIENT MESSAGE (OUTPATIENT)
Dept: INTERNAL MEDICINE | Facility: CLINIC | Age: 86
End: 2021-01-18

## 2021-01-18 DIAGNOSIS — E11.65 UNCONTROLLED TYPE 2 DIABETES MELLITUS WITH HYPERGLYCEMIA: Primary | ICD-10-CM

## 2021-01-18 RX ORDER — METFORMIN HYDROCHLORIDE 500 MG/1
500 TABLET, EXTENDED RELEASE ORAL
Qty: 30 TABLET | Refills: 11 | Status: SHIPPED | OUTPATIENT
Start: 2021-01-18 | End: 2021-05-09

## 2021-02-08 ENCOUNTER — IMMUNIZATION (OUTPATIENT)
Dept: INTERNAL MEDICINE | Facility: CLINIC | Age: 86
End: 2021-02-08
Payer: MEDICARE

## 2021-02-08 DIAGNOSIS — Z23 NEED FOR VACCINATION: Primary | ICD-10-CM

## 2021-02-08 PROCEDURE — 0002A COVID-19, MRNA, LNP-S, PF, 30 MCG/0.3 ML DOSE VACCINE: CPT | Mod: PBBFAC | Performed by: FAMILY MEDICINE

## 2021-02-08 PROCEDURE — 91300 COVID-19, MRNA, LNP-S, PF, 30 MCG/0.3 ML DOSE VACCINE: CPT | Mod: PBBFAC | Performed by: FAMILY MEDICINE

## 2021-03-01 ENCOUNTER — TELEPHONE (OUTPATIENT)
Dept: CARDIOLOGY | Facility: CLINIC | Age: 86
End: 2021-03-01

## 2021-03-01 DIAGNOSIS — I10 ESSENTIAL HYPERTENSION: Primary | ICD-10-CM

## 2021-03-01 DIAGNOSIS — E78.2 MIXED HYPERLIPIDEMIA: ICD-10-CM

## 2021-03-29 ENCOUNTER — PATIENT MESSAGE (OUTPATIENT)
Dept: INTERNAL MEDICINE | Facility: CLINIC | Age: 86
End: 2021-03-29

## 2021-03-29 DIAGNOSIS — G89.29 CHRONIC LOW BACK PAIN WITHOUT SCIATICA, UNSPECIFIED BACK PAIN LATERALITY: Primary | ICD-10-CM

## 2021-03-29 DIAGNOSIS — M54.50 CHRONIC LOW BACK PAIN WITHOUT SCIATICA, UNSPECIFIED BACK PAIN LATERALITY: Primary | ICD-10-CM

## 2021-04-13 ENCOUNTER — PATIENT MESSAGE (OUTPATIENT)
Dept: ORTHOPEDICS | Facility: CLINIC | Age: 86
End: 2021-04-13

## 2021-04-14 ENCOUNTER — HOSPITAL ENCOUNTER (OUTPATIENT)
Dept: RADIOLOGY | Facility: HOSPITAL | Age: 86
Discharge: HOME | End: 2021-04-14
Attending: ORTHOPAEDIC SURGERY
Payer: MEDICARE

## 2021-04-14 ENCOUNTER — OFFICE VISIT (OUTPATIENT)
Dept: ORTHOPEDICS | Facility: CLINIC | Age: 86
End: 2021-04-14
Payer: MEDICARE

## 2021-04-14 VITALS — BODY MASS INDEX: 24.18 KG/M2 | HEIGHT: 66 IN | WEIGHT: 150.44 LBS

## 2021-04-14 DIAGNOSIS — M51.36 DDD (DEGENERATIVE DISC DISEASE), LUMBAR: ICD-10-CM

## 2021-04-14 DIAGNOSIS — G89.29 CHRONIC LOW BACK PAIN WITHOUT SCIATICA, UNSPECIFIED BACK PAIN LATERALITY: ICD-10-CM

## 2021-04-14 DIAGNOSIS — M48.061 SPINAL STENOSIS OF LUMBAR REGION WITHOUT NEUROGENIC CLAUDICATION: Primary | ICD-10-CM

## 2021-04-14 DIAGNOSIS — M54.50 CHRONIC LOW BACK PAIN WITHOUT SCIATICA, UNSPECIFIED BACK PAIN LATERALITY: ICD-10-CM

## 2021-04-14 PROCEDURE — 72110 XR LUMBAR SPINE AP AND LAT WITH FLEX/EXT: ICD-10-PCS | Mod: 26,,, | Performed by: RADIOLOGY

## 2021-04-14 PROCEDURE — 72110 X-RAY EXAM L-2 SPINE 4/>VWS: CPT | Mod: 26,,, | Performed by: RADIOLOGY

## 2021-04-14 PROCEDURE — 99214 OFFICE O/P EST MOD 30 MIN: CPT | Mod: PBBFAC,25 | Performed by: ORTHOPAEDIC SURGERY

## 2021-04-14 PROCEDURE — 72110 X-RAY EXAM L-2 SPINE 4/>VWS: CPT | Mod: TC

## 2021-04-14 PROCEDURE — 99999 PR PBB SHADOW E&M-EST. PATIENT-LVL IV: ICD-10-PCS | Mod: PBBFAC,,, | Performed by: ORTHOPAEDIC SURGERY

## 2021-04-14 PROCEDURE — 99204 OFFICE O/P NEW MOD 45 MIN: CPT | Mod: S$PBB,,, | Performed by: ORTHOPAEDIC SURGERY

## 2021-04-14 PROCEDURE — 99999 PR PBB SHADOW E&M-EST. PATIENT-LVL IV: CPT | Mod: PBBFAC,,, | Performed by: ORTHOPAEDIC SURGERY

## 2021-04-14 PROCEDURE — 99204 PR OFFICE/OUTPT VISIT, NEW, LEVL IV, 45-59 MIN: ICD-10-PCS | Mod: S$PBB,,, | Performed by: ORTHOPAEDIC SURGERY

## 2021-04-19 ENCOUNTER — OFFICE VISIT (OUTPATIENT)
Dept: ORTHOPEDICS | Facility: CLINIC | Age: 86
End: 2021-04-19
Payer: MEDICARE

## 2021-04-19 ENCOUNTER — HOSPITAL ENCOUNTER (OUTPATIENT)
Dept: RADIOLOGY | Facility: HOSPITAL | Age: 86
Discharge: HOME | End: 2021-04-19
Attending: ORTHOPAEDIC SURGERY
Payer: MEDICARE

## 2021-04-19 ENCOUNTER — PATIENT MESSAGE (OUTPATIENT)
Dept: INTERNAL MEDICINE | Facility: CLINIC | Age: 86
End: 2021-04-19

## 2021-04-19 VITALS — WEIGHT: 150.38 LBS | HEIGHT: 66 IN | BODY MASS INDEX: 24.17 KG/M2

## 2021-04-19 DIAGNOSIS — M54.50 CHRONIC LOW BACK PAIN WITHOUT SCIATICA, UNSPECIFIED BACK PAIN LATERALITY: ICD-10-CM

## 2021-04-19 DIAGNOSIS — M48.061 SPINAL STENOSIS OF LUMBAR REGION WITHOUT NEUROGENIC CLAUDICATION: ICD-10-CM

## 2021-04-19 DIAGNOSIS — R93.89 ABNORMAL MRI: Primary | ICD-10-CM

## 2021-04-19 DIAGNOSIS — N13.30 HYDRONEPHROSIS, UNSPECIFIED HYDRONEPHROSIS TYPE: ICD-10-CM

## 2021-04-19 DIAGNOSIS — G89.29 CHRONIC LOW BACK PAIN WITHOUT SCIATICA, UNSPECIFIED BACK PAIN LATERALITY: Primary | ICD-10-CM

## 2021-04-19 DIAGNOSIS — G89.29 CHRONIC LOW BACK PAIN WITHOUT SCIATICA, UNSPECIFIED BACK PAIN LATERALITY: ICD-10-CM

## 2021-04-19 DIAGNOSIS — M54.50 CHRONIC LOW BACK PAIN WITHOUT SCIATICA, UNSPECIFIED BACK PAIN LATERALITY: Primary | ICD-10-CM

## 2021-04-19 DIAGNOSIS — M47.816 LUMBAR SPONDYLOSIS: ICD-10-CM

## 2021-04-19 PROCEDURE — 99213 PR OFFICE/OUTPT VISIT, EST, LEVL III, 20-29 MIN: ICD-10-PCS | Mod: S$PBB,,, | Performed by: ORTHOPAEDIC SURGERY

## 2021-04-19 PROCEDURE — 72148 MRI LUMBAR SPINE W/O DYE: CPT | Mod: TC

## 2021-04-19 PROCEDURE — 72148 MRI LUMBAR SPINE WITHOUT CONTRAST: ICD-10-PCS | Mod: 26,,, | Performed by: RADIOLOGY

## 2021-04-19 PROCEDURE — 99999 PR PBB SHADOW E&M-EST. PATIENT-LVL III: ICD-10-PCS | Mod: PBBFAC,,, | Performed by: ORTHOPAEDIC SURGERY

## 2021-04-19 PROCEDURE — 99213 OFFICE O/P EST LOW 20 MIN: CPT | Mod: PBBFAC,25 | Performed by: ORTHOPAEDIC SURGERY

## 2021-04-19 PROCEDURE — 72148 MRI LUMBAR SPINE W/O DYE: CPT | Mod: 26,,, | Performed by: RADIOLOGY

## 2021-04-19 PROCEDURE — 99213 OFFICE O/P EST LOW 20 MIN: CPT | Mod: S$PBB,,, | Performed by: ORTHOPAEDIC SURGERY

## 2021-04-19 PROCEDURE — 99999 PR PBB SHADOW E&M-EST. PATIENT-LVL III: CPT | Mod: PBBFAC,,, | Performed by: ORTHOPAEDIC SURGERY

## 2021-04-20 ENCOUNTER — TELEPHONE (OUTPATIENT)
Dept: PAIN MEDICINE | Facility: OTHER | Age: 86
End: 2021-04-20

## 2021-04-20 ENCOUNTER — PATIENT MESSAGE (OUTPATIENT)
Dept: PAIN MEDICINE | Facility: OTHER | Age: 86
End: 2021-04-20

## 2021-04-20 DIAGNOSIS — M47.816 LUMBAR SPONDYLOSIS: Primary | ICD-10-CM

## 2021-04-28 ENCOUNTER — HOSPITAL ENCOUNTER (OUTPATIENT)
Facility: OTHER | Age: 86
Discharge: HOME | End: 2021-04-28
Attending: ANESTHESIOLOGY | Admitting: ANESTHESIOLOGY
Payer: MEDICARE

## 2021-04-28 VITALS
RESPIRATION RATE: 16 BRPM | TEMPERATURE: 98 F | HEART RATE: 68 BPM | DIASTOLIC BLOOD PRESSURE: 61 MMHG | OXYGEN SATURATION: 99 % | SYSTOLIC BLOOD PRESSURE: 130 MMHG

## 2021-04-28 DIAGNOSIS — G89.29 CHRONIC PAIN: ICD-10-CM

## 2021-04-28 DIAGNOSIS — M47.816 LUMBAR SPONDYLOSIS: Primary | ICD-10-CM

## 2021-04-28 LAB — POCT GLUCOSE: 119 MG/DL (ref 70–110)

## 2021-04-28 PROCEDURE — 82947 ASSAY GLUCOSE BLOOD QUANT: CPT | Performed by: ANESTHESIOLOGY

## 2021-04-28 PROCEDURE — 64493 PR INJ DX/THER AGNT PARAVERT FACET JOINT,IMG GUIDE,LUMBAR/SAC,1ST LVL: ICD-10-PCS | Mod: 50,,, | Performed by: ANESTHESIOLOGY

## 2021-04-28 PROCEDURE — 63600175 PHARM REV CODE 636 W HCPCS: Performed by: ANESTHESIOLOGY

## 2021-04-28 PROCEDURE — 64493 INJ PARAVERT F JNT L/S 1 LEV: CPT | Mod: 50,,, | Performed by: ANESTHESIOLOGY

## 2021-04-28 PROCEDURE — 25000003 PHARM REV CODE 250: Performed by: ANESTHESIOLOGY

## 2021-04-28 PROCEDURE — 64493 INJ PARAVERT F JNT L/S 1 LEV: CPT | Mod: 50 | Performed by: ANESTHESIOLOGY

## 2021-04-28 RX ORDER — ALPRAZOLAM 0.5 MG/1
0.5 TABLET ORAL
Status: DISCONTINUED | OUTPATIENT
Start: 2021-04-28 | End: 2021-04-28 | Stop reason: HOSPADM

## 2021-04-28 RX ORDER — BUPIVACAINE HYDROCHLORIDE 2.5 MG/ML
INJECTION, SOLUTION EPIDURAL; INFILTRATION; INTRACAUDAL
Status: DISCONTINUED | OUTPATIENT
Start: 2021-04-28 | End: 2021-04-28 | Stop reason: HOSPADM

## 2021-04-28 RX ORDER — TRIAMCINOLONE ACETONIDE 40 MG/ML
INJECTION, SUSPENSION INTRA-ARTICULAR; INTRAMUSCULAR
Status: DISCONTINUED | OUTPATIENT
Start: 2021-04-28 | End: 2021-04-28 | Stop reason: HOSPADM

## 2021-04-28 RX ADMIN — ALPRAZOLAM 0.5 MG: 0.5 TABLET ORAL at 11:04

## 2021-04-29 ENCOUNTER — HOSPITAL ENCOUNTER (OUTPATIENT)
Dept: RADIOLOGY | Facility: HOSPITAL | Age: 86
Discharge: HOME | End: 2021-04-29
Attending: FAMILY MEDICINE
Payer: MEDICARE

## 2021-04-29 DIAGNOSIS — N13.30 HYDRONEPHROSIS, UNSPECIFIED HYDRONEPHROSIS TYPE: ICD-10-CM

## 2021-04-29 DIAGNOSIS — R93.89 ABNORMAL MRI: ICD-10-CM

## 2021-04-29 PROCEDURE — 76770 US RETROPERITONEAL COMPLETE: ICD-10-PCS | Mod: 26,,, | Performed by: RADIOLOGY

## 2021-04-29 PROCEDURE — 76770 US EXAM ABDO BACK WALL COMP: CPT | Mod: TC

## 2021-04-29 PROCEDURE — 76770 US EXAM ABDO BACK WALL COMP: CPT | Mod: 26,,, | Performed by: RADIOLOGY

## 2021-04-30 ENCOUNTER — PATIENT MESSAGE (OUTPATIENT)
Dept: INTERNAL MEDICINE | Facility: CLINIC | Age: 86
End: 2021-04-30

## 2021-04-30 DIAGNOSIS — N18.30 STAGE 3 CHRONIC KIDNEY DISEASE, UNSPECIFIED WHETHER STAGE 3A OR 3B CKD: Primary | ICD-10-CM

## 2021-04-30 DIAGNOSIS — Q61.02 MULTIPLE RENAL CYSTS: ICD-10-CM

## 2021-05-10 DIAGNOSIS — I10 HYPERTENSION, UNSPECIFIED TYPE: Chronic | ICD-10-CM

## 2021-05-10 RX ORDER — VALSARTAN 80 MG/1
80 TABLET ORAL DAILY
Qty: 90 TABLET | Refills: 1 | Status: SHIPPED | OUTPATIENT
Start: 2021-05-10 | End: 2021-10-01

## 2021-05-12 ENCOUNTER — OFFICE VISIT (OUTPATIENT)
Dept: NEPHROLOGY | Facility: CLINIC | Age: 86
End: 2021-05-12
Payer: MEDICARE

## 2021-05-12 VITALS
WEIGHT: 147.06 LBS | BODY MASS INDEX: 23.64 KG/M2 | DIASTOLIC BLOOD PRESSURE: 68 MMHG | HEART RATE: 74 BPM | HEIGHT: 66 IN | SYSTOLIC BLOOD PRESSURE: 122 MMHG | OXYGEN SATURATION: 99 %

## 2021-05-12 DIAGNOSIS — N18.30 STAGE 3 CHRONIC KIDNEY DISEASE, UNSPECIFIED WHETHER STAGE 3A OR 3B CKD: Primary | ICD-10-CM

## 2021-05-12 DIAGNOSIS — Q61.02 MULTIPLE RENAL CYSTS: ICD-10-CM

## 2021-05-12 DIAGNOSIS — E11.9 TYPE 2 DIABETES MELLITUS WITHOUT COMPLICATION, WITHOUT LONG-TERM CURRENT USE OF INSULIN: ICD-10-CM

## 2021-05-12 DIAGNOSIS — I70.0 ATHEROSCLEROSIS OF AORTA: ICD-10-CM

## 2021-05-12 DIAGNOSIS — I10 HYPERTENSION, UNSPECIFIED TYPE: ICD-10-CM

## 2021-05-12 PROCEDURE — 99999 PR PBB SHADOW E&M-EST. PATIENT-LVL V: CPT | Mod: PBBFAC,,, | Performed by: NURSE PRACTITIONER

## 2021-05-12 PROCEDURE — 99214 PR OFFICE/OUTPT VISIT, EST, LEVL IV, 30-39 MIN: ICD-10-PCS | Mod: S$PBB,ICN,, | Performed by: NURSE PRACTITIONER

## 2021-05-12 PROCEDURE — 99215 OFFICE O/P EST HI 40 MIN: CPT | Mod: PBBFAC | Performed by: NURSE PRACTITIONER

## 2021-05-12 PROCEDURE — 99999 PR PBB SHADOW E&M-EST. PATIENT-LVL V: ICD-10-PCS | Mod: PBBFAC,,, | Performed by: NURSE PRACTITIONER

## 2021-05-12 PROCEDURE — 99214 OFFICE O/P EST MOD 30 MIN: CPT | Mod: S$PBB,ICN,, | Performed by: NURSE PRACTITIONER

## 2021-05-24 ENCOUNTER — LAB VISIT (OUTPATIENT)
Dept: LAB | Facility: HOSPITAL | Age: 86
End: 2021-05-24
Attending: FAMILY MEDICINE
Payer: MEDICARE

## 2021-05-24 DIAGNOSIS — E78.2 MIXED HYPERLIPIDEMIA: ICD-10-CM

## 2021-05-24 DIAGNOSIS — I10 ESSENTIAL HYPERTENSION: ICD-10-CM

## 2021-05-24 LAB
ALBUMIN SERPL BCP-MCNC: 4 G/DL (ref 3.5–5.2)
ALP SERPL-CCNC: 48 U/L (ref 55–135)
ALT SERPL W/O P-5'-P-CCNC: 19 U/L (ref 10–44)
ANION GAP SERPL CALC-SCNC: 10 MMOL/L (ref 8–16)
AST SERPL-CCNC: 20 U/L (ref 10–40)
BILIRUB SERPL-MCNC: 0.6 MG/DL (ref 0.1–1)
BUN SERPL-MCNC: 45 MG/DL (ref 8–23)
CALCIUM SERPL-MCNC: 11 MG/DL (ref 8.7–10.5)
CHLORIDE SERPL-SCNC: 106 MMOL/L (ref 95–110)
CHOLEST SERPL-MCNC: 159 MG/DL (ref 120–199)
CHOLEST/HDLC SERPL: 3.5 {RATIO} (ref 2–5)
CO2 SERPL-SCNC: 26 MMOL/L (ref 23–29)
CREAT SERPL-MCNC: 1.6 MG/DL (ref 0.5–1.4)
EST. GFR  (AFRICAN AMERICAN): 44.7 ML/MIN/1.73 M^2
EST. GFR  (NON AFRICAN AMERICAN): 38.7 ML/MIN/1.73 M^2
GLUCOSE SERPL-MCNC: 114 MG/DL (ref 70–110)
HDLC SERPL-MCNC: 46 MG/DL (ref 40–75)
HDLC SERPL: 28.9 % (ref 20–50)
LDLC SERPL CALC-MCNC: 92.2 MG/DL (ref 63–159)
NONHDLC SERPL-MCNC: 113 MG/DL
POTASSIUM SERPL-SCNC: 5.2 MMOL/L (ref 3.5–5.1)
PROT SERPL-MCNC: 7.4 G/DL (ref 6–8.4)
SODIUM SERPL-SCNC: 142 MMOL/L (ref 136–145)
TRIGL SERPL-MCNC: 104 MG/DL (ref 30–150)
TSH SERPL DL<=0.005 MIU/L-ACNC: 1.85 UIU/ML (ref 0.4–4)

## 2021-05-24 PROCEDURE — 80053 COMPREHEN METABOLIC PANEL: CPT | Performed by: INTERNAL MEDICINE

## 2021-05-24 PROCEDURE — 80061 LIPID PANEL: CPT | Performed by: INTERNAL MEDICINE

## 2021-05-24 PROCEDURE — 36415 COLL VENOUS BLD VENIPUNCTURE: CPT | Mod: PO | Performed by: INTERNAL MEDICINE

## 2021-05-24 PROCEDURE — 84443 ASSAY THYROID STIM HORMONE: CPT | Performed by: INTERNAL MEDICINE

## 2021-05-25 ENCOUNTER — TELEPHONE (OUTPATIENT)
Dept: CARDIOLOGY | Facility: CLINIC | Age: 86
End: 2021-05-25

## 2021-05-31 ENCOUNTER — TELEPHONE (OUTPATIENT)
Dept: CARDIOLOGY | Facility: CLINIC | Age: 86
End: 2021-05-31

## 2021-05-31 ENCOUNTER — OFFICE VISIT (OUTPATIENT)
Dept: CARDIOLOGY | Facility: CLINIC | Age: 86
End: 2021-05-31
Payer: MEDICARE

## 2021-05-31 VITALS
WEIGHT: 146.63 LBS | SYSTOLIC BLOOD PRESSURE: 141 MMHG | HEART RATE: 77 BPM | DIASTOLIC BLOOD PRESSURE: 67 MMHG | BODY MASS INDEX: 23.57 KG/M2 | HEIGHT: 66 IN

## 2021-05-31 DIAGNOSIS — Z79.01 CHRONIC ANTICOAGULATION: ICD-10-CM

## 2021-05-31 DIAGNOSIS — I48.19 PERSISTENT ATRIAL FIBRILLATION: Primary | Chronic | ICD-10-CM

## 2021-05-31 DIAGNOSIS — E78.2 MIXED HYPERLIPIDEMIA: ICD-10-CM

## 2021-05-31 DIAGNOSIS — N18.32 STAGE 3B CHRONIC KIDNEY DISEASE: ICD-10-CM

## 2021-05-31 DIAGNOSIS — E78.2 MIXED HYPERLIPIDEMIA: Chronic | ICD-10-CM

## 2021-05-31 DIAGNOSIS — I10 ESSENTIAL HYPERTENSION: Chronic | ICD-10-CM

## 2021-05-31 DIAGNOSIS — I70.0 ATHEROSCLEROSIS OF AORTA: ICD-10-CM

## 2021-05-31 DIAGNOSIS — I10 ESSENTIAL HYPERTENSION: Primary | ICD-10-CM

## 2021-05-31 DIAGNOSIS — I70.0 AORTIC ATHEROSCLEROSIS: ICD-10-CM

## 2021-05-31 PROCEDURE — 99999 PR PBB SHADOW E&M-EST. PATIENT-LVL IV: ICD-10-PCS | Mod: PBBFAC,,, | Performed by: INTERNAL MEDICINE

## 2021-05-31 PROCEDURE — 99214 OFFICE O/P EST MOD 30 MIN: CPT | Mod: PBBFAC,PO | Performed by: INTERNAL MEDICINE

## 2021-05-31 PROCEDURE — 99999 PR PBB SHADOW E&M-EST. PATIENT-LVL IV: CPT | Mod: PBBFAC,,, | Performed by: INTERNAL MEDICINE

## 2021-05-31 PROCEDURE — 99214 OFFICE O/P EST MOD 30 MIN: CPT | Mod: S$PBB,,, | Performed by: INTERNAL MEDICINE

## 2021-05-31 PROCEDURE — 99214 PR OFFICE/OUTPT VISIT, EST, LEVL IV, 30-39 MIN: ICD-10-PCS | Mod: S$PBB,,, | Performed by: INTERNAL MEDICINE

## 2021-06-07 ENCOUNTER — TELEPHONE (OUTPATIENT)
Dept: INTERNAL MEDICINE | Facility: CLINIC | Age: 86
End: 2021-06-07

## 2021-06-07 DIAGNOSIS — I70.0 ATHEROSCLEROSIS OF AORTA: ICD-10-CM

## 2021-06-07 DIAGNOSIS — N18.32 STAGE 3B CHRONIC KIDNEY DISEASE: ICD-10-CM

## 2021-06-07 DIAGNOSIS — I10 ESSENTIAL HYPERTENSION: Chronic | ICD-10-CM

## 2021-06-07 DIAGNOSIS — Z00.00 PREVENTATIVE HEALTH CARE: Primary | ICD-10-CM

## 2021-06-07 DIAGNOSIS — I48.19 PERSISTENT ATRIAL FIBRILLATION: Chronic | ICD-10-CM

## 2021-06-07 DIAGNOSIS — E78.2 MIXED HYPERLIPIDEMIA: Chronic | ICD-10-CM

## 2021-06-07 DIAGNOSIS — Z79.01 CHRONIC ANTICOAGULATION: ICD-10-CM

## 2021-06-07 DIAGNOSIS — I70.0 AORTIC ATHEROSCLEROSIS: ICD-10-CM

## 2021-06-14 ENCOUNTER — HOSPITAL ENCOUNTER (OUTPATIENT)
Dept: CARDIOLOGY | Facility: HOSPITAL | Age: 86
Discharge: HOME | End: 2021-06-14
Attending: INTERNAL MEDICINE
Payer: MEDICARE

## 2021-06-14 ENCOUNTER — TELEPHONE (OUTPATIENT)
Dept: CARDIOLOGY | Facility: CLINIC | Age: 86
End: 2021-06-14

## 2021-06-14 VITALS
HEIGHT: 65 IN | BODY MASS INDEX: 24.32 KG/M2 | HEART RATE: 68 BPM | SYSTOLIC BLOOD PRESSURE: 132 MMHG | WEIGHT: 146 LBS | DIASTOLIC BLOOD PRESSURE: 62 MMHG

## 2021-06-14 DIAGNOSIS — I48.19 PERSISTENT ATRIAL FIBRILLATION: ICD-10-CM

## 2021-06-14 DIAGNOSIS — I10 ESSENTIAL HYPERTENSION: ICD-10-CM

## 2021-06-14 DIAGNOSIS — I70.0 AORTIC ATHEROSCLEROSIS: ICD-10-CM

## 2021-06-14 DIAGNOSIS — E78.2 MIXED HYPERLIPIDEMIA: ICD-10-CM

## 2021-06-14 DIAGNOSIS — Z79.01 CHRONIC ANTICOAGULATION: ICD-10-CM

## 2021-06-14 DIAGNOSIS — I70.0 ATHEROSCLEROSIS OF AORTA: ICD-10-CM

## 2021-06-14 DIAGNOSIS — N18.32 STAGE 3B CHRONIC KIDNEY DISEASE: ICD-10-CM

## 2021-06-14 LAB
ASCENDING AORTA: 3.59 CM
AV INDEX (PROSTH): 0.67
AV MEAN GRADIENT: 6 MMHG
AV PEAK GRADIENT: 10 MMHG
AV VALVE AREA: 2.35 CM2
AV VELOCITY RATIO: 0.63
BSA FOR ECHO PROCEDURE: 1.74 M2
CV ECHO LV RWT: 0.37 CM
DOP CALC AO PEAK VEL: 1.55 M/S
DOP CALC AO VTI: 32.63 CM
DOP CALC LVOT AREA: 3.5 CM2
DOP CALC LVOT DIAMETER: 2.12 CM
DOP CALC LVOT PEAK VEL: 0.98 M/S
DOP CALC LVOT STROKE VOLUME: 76.63 CM3
DOP CALCLVOT PEAK VEL VTI: 21.72 CM
E WAVE DECELERATION TIME: 175.62 MSEC
E/A RATIO: 2.52
E/E' RATIO: 17.08 M/S
ECHO LV POSTERIOR WALL: 0.89 CM (ref 0.6–1.1)
EJECTION FRACTION: 55 %
FRACTIONAL SHORTENING: 31 % (ref 28–44)
INTERVENTRICULAR SEPTUM: 0.85 CM (ref 0.6–1.1)
IVRT: 94.2 MSEC
LA MAJOR: 6.21 CM
LA MINOR: 6.06 CM
LA WIDTH: 4.28 CM
LEFT ATRIUM SIZE: 4.29 CM
LEFT ATRIUM VOLUME INDEX MOD: 42.8 ML/M2
LEFT ATRIUM VOLUME INDEX: 55.3 ML/M2
LEFT ATRIUM VOLUME MOD: 74.13 CM3
LEFT ATRIUM VOLUME: 95.73 CM3
LEFT INTERNAL DIMENSION IN SYSTOLE: 3.34 CM (ref 2.1–4)
LEFT VENTRICLE DIASTOLIC VOLUME INDEX: 62.9 ML/M2
LEFT VENTRICLE DIASTOLIC VOLUME: 108.81 ML
LEFT VENTRICLE MASS INDEX: 82 G/M2
LEFT VENTRICLE SYSTOLIC VOLUME INDEX: 26.3 ML/M2
LEFT VENTRICLE SYSTOLIC VOLUME: 45.43 ML
LEFT VENTRICULAR INTERNAL DIMENSION IN DIASTOLE: 4.82 CM (ref 3.5–6)
LEFT VENTRICULAR MASS: 142.31 G
LV LATERAL E/E' RATIO: 13.88 M/S
LV SEPTAL E/E' RATIO: 22.2 M/S
MV PEAK A VEL: 0.44 M/S
MV PEAK E VEL: 1.11 M/S
MV STENOSIS PRESSURE HALF TIME: 50.93 MS
MV VALVE AREA P 1/2 METHOD: 4.32 CM2
PISA TR MAX VEL: 2.86 M/S
PULM VEIN S/D RATIO: 0.23
PV PEAK D VEL: 0.83 M/S
PV PEAK S VEL: 0.19 M/S
RA MAJOR: 5.61 CM
RA PRESSURE: 3 MMHG
RA WIDTH: 3.95 CM
RIGHT VENTRICULAR END-DIASTOLIC DIMENSION: 3 CM
SINUS: 3.1 CM
STJ: 2.78 CM
TDI LATERAL: 0.08 M/S
TDI SEPTAL: 0.05 M/S
TDI: 0.07 M/S
TR MAX PG: 33 MMHG
TRICUSPID ANNULAR PLANE SYSTOLIC EXCURSION: 1.4 CM
TV REST PULMONARY ARTERY PRESSURE: 36 MMHG

## 2021-06-14 PROCEDURE — 93306 TTE W/DOPPLER COMPLETE: CPT | Mod: 26,,, | Performed by: INTERNAL MEDICINE

## 2021-06-14 PROCEDURE — 93306 TTE W/DOPPLER COMPLETE: CPT

## 2021-06-14 PROCEDURE — 93306 ECHO (CUPID ONLY): ICD-10-PCS | Mod: 26,,, | Performed by: INTERNAL MEDICINE

## 2021-06-15 ENCOUNTER — TELEPHONE (OUTPATIENT)
Dept: CARDIOLOGY | Facility: CLINIC | Age: 86
End: 2021-06-15

## 2021-07-10 ENCOUNTER — PATIENT MESSAGE (OUTPATIENT)
Dept: INTERNAL MEDICINE | Facility: CLINIC | Age: 86
End: 2021-07-10

## 2021-07-12 ENCOUNTER — TELEPHONE (OUTPATIENT)
Dept: INTERNAL MEDICINE | Facility: CLINIC | Age: 86
End: 2021-07-12

## 2021-08-09 ENCOUNTER — LAB VISIT (OUTPATIENT)
Dept: LAB | Facility: HOSPITAL | Age: 86
End: 2021-08-09
Attending: FAMILY MEDICINE
Payer: MEDICARE

## 2021-08-09 DIAGNOSIS — Z79.01 CHRONIC ANTICOAGULATION: ICD-10-CM

## 2021-08-09 DIAGNOSIS — E78.2 MIXED HYPERLIPIDEMIA: Chronic | ICD-10-CM

## 2021-08-09 DIAGNOSIS — Z00.00 PREVENTATIVE HEALTH CARE: ICD-10-CM

## 2021-08-09 DIAGNOSIS — I48.19 PERSISTENT ATRIAL FIBRILLATION: Chronic | ICD-10-CM

## 2021-08-09 DIAGNOSIS — N18.32 STAGE 3B CHRONIC KIDNEY DISEASE: ICD-10-CM

## 2021-08-09 DIAGNOSIS — I70.0 AORTIC ATHEROSCLEROSIS: ICD-10-CM

## 2021-08-09 DIAGNOSIS — I10 ESSENTIAL HYPERTENSION: Chronic | ICD-10-CM

## 2021-08-09 DIAGNOSIS — I70.0 ATHEROSCLEROSIS OF AORTA: ICD-10-CM

## 2021-08-09 LAB
25(OH)D3+25(OH)D2 SERPL-MCNC: 38 NG/ML (ref 30–96)
ALBUMIN SERPL BCP-MCNC: 4.2 G/DL (ref 3.5–5.2)
ALP SERPL-CCNC: 40 U/L (ref 55–135)
ALT SERPL W/O P-5'-P-CCNC: 26 U/L (ref 10–44)
ANION GAP SERPL CALC-SCNC: 14 MMOL/L (ref 8–16)
AST SERPL-CCNC: 30 U/L (ref 10–40)
BASOPHILS # BLD AUTO: 0.06 K/UL (ref 0–0.2)
BASOPHILS NFR BLD: 0.8 % (ref 0–1.9)
BILIRUB SERPL-MCNC: 1 MG/DL (ref 0.1–1)
BUN SERPL-MCNC: 37 MG/DL (ref 8–23)
CALCIUM SERPL-MCNC: 10.9 MG/DL (ref 8.7–10.5)
CHLORIDE SERPL-SCNC: 108 MMOL/L (ref 95–110)
CHOLEST SERPL-MCNC: 160 MG/DL (ref 120–199)
CHOLEST/HDLC SERPL: 4.2 {RATIO} (ref 2–5)
CO2 SERPL-SCNC: 22 MMOL/L (ref 23–29)
CREAT SERPL-MCNC: 1.3 MG/DL (ref 0.5–1.4)
DIFFERENTIAL METHOD: ABNORMAL
EOSINOPHIL # BLD AUTO: 0.2 K/UL (ref 0–0.5)
EOSINOPHIL NFR BLD: 3.1 % (ref 0–8)
ERYTHROCYTE [DISTWIDTH] IN BLOOD BY AUTOMATED COUNT: 14.1 % (ref 11.5–14.5)
EST. GFR  (AFRICAN AMERICAN): 57.5 ML/MIN/1.73 M^2
EST. GFR  (NON AFRICAN AMERICAN): 49.8 ML/MIN/1.73 M^2
GLUCOSE SERPL-MCNC: 117 MG/DL (ref 70–110)
HCT VFR BLD AUTO: 45.7 % (ref 40–54)
HDLC SERPL-MCNC: 38 MG/DL (ref 40–75)
HDLC SERPL: 23.8 % (ref 20–50)
HGB BLD-MCNC: 15.4 G/DL (ref 14–18)
IMM GRANULOCYTES # BLD AUTO: 0.03 K/UL (ref 0–0.04)
IMM GRANULOCYTES NFR BLD AUTO: 0.4 % (ref 0–0.5)
LDLC SERPL CALC-MCNC: 85.4 MG/DL (ref 63–159)
LYMPHOCYTES # BLD AUTO: 2.9 K/UL (ref 1–4.8)
LYMPHOCYTES NFR BLD: 38.3 % (ref 18–48)
MCH RBC QN AUTO: 31.8 PG (ref 27–31)
MCHC RBC AUTO-ENTMCNC: 33.7 G/DL (ref 32–36)
MCV RBC AUTO: 94 FL (ref 82–98)
MONOCYTES # BLD AUTO: 0.7 K/UL (ref 0.3–1)
MONOCYTES NFR BLD: 9.7 % (ref 4–15)
NEUTROPHILS # BLD AUTO: 3.6 K/UL (ref 1.8–7.7)
NEUTROPHILS NFR BLD: 47.7 % (ref 38–73)
NONHDLC SERPL-MCNC: 122 MG/DL
NRBC BLD-RTO: 0 /100 WBC
PLATELET # BLD AUTO: 219 K/UL (ref 150–450)
PMV BLD AUTO: 13.4 FL (ref 9.2–12.9)
POTASSIUM SERPL-SCNC: 3.9 MMOL/L (ref 3.5–5.1)
PROT SERPL-MCNC: 7.7 G/DL (ref 6–8.4)
RBC # BLD AUTO: 4.84 M/UL (ref 4.6–6.2)
SODIUM SERPL-SCNC: 144 MMOL/L (ref 136–145)
T4 FREE SERPL-MCNC: 0.93 NG/DL (ref 0.71–1.51)
TRIGL SERPL-MCNC: 183 MG/DL (ref 30–150)
TSH SERPL DL<=0.005 MIU/L-ACNC: 1.58 UIU/ML (ref 0.4–4)
WBC # BLD AUTO: 7.62 K/UL (ref 3.9–12.7)

## 2021-08-09 PROCEDURE — 80053 COMPREHEN METABOLIC PANEL: CPT | Performed by: FAMILY MEDICINE

## 2021-08-09 PROCEDURE — 84443 ASSAY THYROID STIM HORMONE: CPT | Performed by: FAMILY MEDICINE

## 2021-08-09 PROCEDURE — 82306 VITAMIN D 25 HYDROXY: CPT | Performed by: FAMILY MEDICINE

## 2021-08-09 PROCEDURE — 36415 COLL VENOUS BLD VENIPUNCTURE: CPT | Mod: PO | Performed by: FAMILY MEDICINE

## 2021-08-09 PROCEDURE — 85025 COMPLETE CBC W/AUTO DIFF WBC: CPT | Performed by: FAMILY MEDICINE

## 2021-08-09 PROCEDURE — 84439 ASSAY OF FREE THYROXINE: CPT | Performed by: FAMILY MEDICINE

## 2021-08-09 PROCEDURE — 80061 LIPID PANEL: CPT | Performed by: FAMILY MEDICINE

## 2021-08-10 ENCOUNTER — OFFICE VISIT (OUTPATIENT)
Dept: INTERNAL MEDICINE | Facility: CLINIC | Age: 86
End: 2021-08-10
Payer: MEDICARE

## 2021-08-10 VITALS
DIASTOLIC BLOOD PRESSURE: 70 MMHG | HEART RATE: 78 BPM | OXYGEN SATURATION: 98 % | WEIGHT: 148.38 LBS | SYSTOLIC BLOOD PRESSURE: 110 MMHG | TEMPERATURE: 98 F | BODY MASS INDEX: 23.84 KG/M2 | HEIGHT: 66 IN

## 2021-08-10 DIAGNOSIS — Z79.01 CHRONIC ANTICOAGULATION: ICD-10-CM

## 2021-08-10 DIAGNOSIS — Z00.00 PREVENTATIVE HEALTH CARE: Primary | ICD-10-CM

## 2021-08-10 DIAGNOSIS — E11.22 TYPE 2 DIABETES MELLITUS WITH STAGE 3A CHRONIC KIDNEY DISEASE, WITHOUT LONG-TERM CURRENT USE OF INSULIN: ICD-10-CM

## 2021-08-10 DIAGNOSIS — G89.29 CHRONIC LOW BACK PAIN WITHOUT SCIATICA, UNSPECIFIED BACK PAIN LATERALITY: ICD-10-CM

## 2021-08-10 DIAGNOSIS — I48.19 PERSISTENT ATRIAL FIBRILLATION: Chronic | ICD-10-CM

## 2021-08-10 DIAGNOSIS — I10 ESSENTIAL HYPERTENSION: Chronic | ICD-10-CM

## 2021-08-10 DIAGNOSIS — E78.2 MIXED HYPERLIPIDEMIA: Chronic | ICD-10-CM

## 2021-08-10 DIAGNOSIS — M54.50 CHRONIC LOW BACK PAIN WITHOUT SCIATICA, UNSPECIFIED BACK PAIN LATERALITY: ICD-10-CM

## 2021-08-10 DIAGNOSIS — I70.0 ATHEROSCLEROSIS OF AORTA: ICD-10-CM

## 2021-08-10 DIAGNOSIS — N18.32 STAGE 3B CHRONIC KIDNEY DISEASE: ICD-10-CM

## 2021-08-10 DIAGNOSIS — N18.31 TYPE 2 DIABETES MELLITUS WITH STAGE 3A CHRONIC KIDNEY DISEASE, WITHOUT LONG-TERM CURRENT USE OF INSULIN: ICD-10-CM

## 2021-08-10 PROCEDURE — 99215 OFFICE O/P EST HI 40 MIN: CPT | Mod: S$PBB,,, | Performed by: FAMILY MEDICINE

## 2021-08-10 PROCEDURE — 99999 PR PBB SHADOW E&M-EST. PATIENT-LVL IV: CPT | Mod: PBBFAC,,, | Performed by: FAMILY MEDICINE

## 2021-08-10 PROCEDURE — 99215 PR OFFICE/OUTPT VISIT, EST, LEVL V, 40-54 MIN: ICD-10-PCS | Mod: S$PBB,,, | Performed by: FAMILY MEDICINE

## 2021-08-10 PROCEDURE — 99214 OFFICE O/P EST MOD 30 MIN: CPT | Mod: PBBFAC,PO | Performed by: FAMILY MEDICINE

## 2021-08-10 PROCEDURE — 99999 PR PBB SHADOW E&M-EST. PATIENT-LVL IV: ICD-10-PCS | Mod: PBBFAC,,, | Performed by: FAMILY MEDICINE

## 2021-09-08 NOTE — PROGRESS NOTES
"              Physical Therapy Daily Treatment Note     Name: Cliff Magaña  Clinic Number: [id number]    Therapy Diagnosis:   Encounter Diagnoses   Name Primary?    Decreased back mobility     Decreased range of motion of both hips     Pain aggravated by walking     Hamstring tightness of both lower extremities     Impaired mobility and endurance      Physician: Munir Estrada MD  Physician Orders: PT Eval and Treat back  Medical Diagnosis from Referral:   M54.5,G89.29 (ICD-10-CM) - Chronic bilateral low back pain without sciatica   Evaluation Date: 8/20/2019  Authorization Period Expiration: 12/31/2019  Plan of Care Expiration: 11/20/2020  Visit # / Visits authorized: 11/19    Visit Date: 10/1/2019  Time In: 905  Time Out: 1005  Total Billable Time: 65   minutes    Precautions: Standard    Subjective     Pt reports the back is doing fine and feeling good. There is occasional pain that may occur with certain motions  But the pain does not last. If IO stretch or do something it goes away.   He was issued a home exercise program and doing them at home.   Response to previous treatment: did real good  Functional change: can do everything that I usually do during a day.     Pain: 3-4 /10 at the worse.   Location: bilateral low back      Objective     Cliff received therapeutic exercises to develop strength, ROM, flexibility, posture and core stabilization for 60 minutes including:    Leg press   Recumbent bike 8'  Upright bike 8'  UE ergometer  Treadmill   Elliptical       THERAPEUTIC EXERCISE  SUPINE  -knee to chest x15  -knee to chest stretch 5" x 15    -knee to chest HS stretch 5" x 15  -LTR x15  -TA activation 5" x 12   -TA activation foot lifts  5" x 12     SIDELYING  -open book x12  -clams x12 GTB     PRONE  -sustained exten 3'  -press ups 2x10  -leg lifts x12  -multifidus activation  5" x 12 w/donkey kick partial lift     STANDING.      SITTING  -HS stretch long sit 7" x 15        MANUAL THERAPY: " Manual myofascial stretching right iliolumbar region in prone.   MODALITY  DIRECT EDUCATION: Regarding concept of spine stability and core activation to maintain spine stability during movement transitions. The patient seemed to demonstrate an understanding. Requiring further demonstration with cueing.   :  Patient was issued HEP   Printed Home Exercises Reviewed.   Pt demo good understanding of the education provided. Patient demonstrated good return demonstration of activities       See EMR under Patient Instructions for exercises provided 10/1/2019.          CMS Impairment/Limitation/Restriction for FOTO lumbar spine  Survey   Visit # 9     Therapist reviewed FOTO scores for Cliff Magaña on 9/24/2019  FOTO documents entered into [facility] - see Media section.    Limitation Score: 21%  Category: Mobility    Current : CJ = at least 20% but < 40% impaired, limited or restricted  Goal: CJ = at least 20% but < 40% impaired, limited or restricted  Discharge:          Assessment     All activities were performed as noted. He did not encounter any significant challenges and did not demonstrate any signs of pain. Progress to core and trunk strengthening.     Cliff is progressing  towards his goals.   Pt prognosis is Good.     Pt will continue to benefit from skilled outpatient physical therapy to address the deficits listed in the problem list box on initial evaluation, provide pt/family education and to maximize pt's level of independence in the home and community environment.     Pt's spiritual, cultural and educational needs considered and pt agreeable to plan of care and goals.     Anticipated barriers to physical therapy: none     Short Term GOALS: 4 weeks. Pt agrees with goals set.  1. Pt to report decreased pain 20-40% associated with walking  2. Pt to demonstrate spinal stability with core activation during transitional movements.   3. Pt to appreciate improved back mobility with increased hamstring flexibility to  70 degrees actively.   4. Patient demonstrates independence with HEP.      Long Term GOALS: 8 weeks. Pt agrees with goals set.  1. Patient demonstrates increased LE hamstring and piriformis flexibility with increased passive leg raise to 75 degrees and hip internal rotation to 30 degrees BLE to improve tolerance to walking  2. Patient demonstrates increased trunk and core muscle strength  to improve tolerance to functional activities by performing trunk raise  3. Patient demonstrates improved walking tolerance with 50% or greater reduction in reported pain                             4. Patient demonstrates independence with Postural Awareness demonstrated with reduction of flexed posturing and more upright presentation and reduction of lateral shift.   5. Patient demonstrates independence with body mechanics.         Plan         Plan of care Certification: 8/20/2019 to 11/20/2019.     Outpatient Physical Therapy 2 times weekly for 8 weeks to include the following interventions: Manual Therapy, Patient Education and Therapeutic Exercise.     Pato Canela, PT                              Home

## 2021-09-15 ENCOUNTER — PATIENT MESSAGE (OUTPATIENT)
Dept: INTERNAL MEDICINE | Facility: CLINIC | Age: 86
End: 2021-09-15

## 2021-09-27 ENCOUNTER — PATIENT MESSAGE (OUTPATIENT)
Dept: INTERNAL MEDICINE | Facility: CLINIC | Age: 86
End: 2021-09-27

## 2021-09-27 ENCOUNTER — PES CALL (OUTPATIENT)
Dept: ADMINISTRATIVE | Facility: CLINIC | Age: 86
End: 2021-09-27

## 2021-10-04 ENCOUNTER — PATIENT OUTREACH (OUTPATIENT)
Dept: ADMINISTRATIVE | Facility: OTHER | Age: 86
End: 2021-10-04

## 2021-10-05 ENCOUNTER — TELEPHONE (OUTPATIENT)
Dept: NEPHROLOGY | Facility: CLINIC | Age: 86
End: 2021-10-05

## 2021-10-05 ENCOUNTER — OFFICE VISIT (OUTPATIENT)
Dept: NEPHROLOGY | Facility: CLINIC | Age: 86
End: 2021-10-05
Payer: MEDICARE

## 2021-10-05 ENCOUNTER — PATIENT MESSAGE (OUTPATIENT)
Dept: NEPHROLOGY | Facility: CLINIC | Age: 86
End: 2021-10-05

## 2021-10-05 VITALS
WEIGHT: 153.44 LBS | SYSTOLIC BLOOD PRESSURE: 124 MMHG | DIASTOLIC BLOOD PRESSURE: 52 MMHG | HEART RATE: 70 BPM | BODY MASS INDEX: 24.66 KG/M2 | OXYGEN SATURATION: 98 % | HEIGHT: 66 IN

## 2021-10-05 DIAGNOSIS — N18.30 STAGE 3 CHRONIC KIDNEY DISEASE, UNSPECIFIED WHETHER STAGE 3A OR 3B CKD: Primary | ICD-10-CM

## 2021-10-05 DIAGNOSIS — I70.0 ATHEROSCLEROSIS OF AORTA: ICD-10-CM

## 2021-10-05 DIAGNOSIS — Q61.02 MULTIPLE RENAL CYSTS: ICD-10-CM

## 2021-10-05 DIAGNOSIS — I10 HYPERTENSION, UNSPECIFIED TYPE: ICD-10-CM

## 2021-10-05 DIAGNOSIS — E11.9 TYPE 2 DIABETES MELLITUS WITHOUT COMPLICATION, WITHOUT LONG-TERM CURRENT USE OF INSULIN: ICD-10-CM

## 2021-10-05 PROCEDURE — 99214 PR OFFICE/OUTPT VISIT, EST, LEVL IV, 30-39 MIN: ICD-10-PCS | Mod: S$PBB,,, | Performed by: NURSE PRACTITIONER

## 2021-10-05 PROCEDURE — 99999 PR PBB SHADOW E&M-EST. PATIENT-LVL IV: CPT | Mod: PBBFAC,,, | Performed by: NURSE PRACTITIONER

## 2021-10-05 PROCEDURE — 99214 OFFICE O/P EST MOD 30 MIN: CPT | Mod: S$PBB,,, | Performed by: NURSE PRACTITIONER

## 2021-10-05 PROCEDURE — 99214 OFFICE O/P EST MOD 30 MIN: CPT | Mod: PBBFAC | Performed by: NURSE PRACTITIONER

## 2021-10-05 PROCEDURE — 99999 PR PBB SHADOW E&M-EST. PATIENT-LVL IV: ICD-10-PCS | Mod: PBBFAC,,, | Performed by: NURSE PRACTITIONER

## 2021-10-05 RX ORDER — DEXTROMETHORPHAN HYDROBROMIDE, GUAIFENESIN 5; 100 MG/5ML; MG/5ML
650 LIQUID ORAL EVERY 8 HOURS
COMMUNITY

## 2021-10-12 ENCOUNTER — TELEPHONE (OUTPATIENT)
Dept: NEPHROLOGY | Facility: CLINIC | Age: 86
End: 2021-10-12

## 2021-10-12 ENCOUNTER — HOSPITAL ENCOUNTER (OUTPATIENT)
Dept: RADIOLOGY | Facility: HOSPITAL | Age: 86
Discharge: HOME | End: 2021-10-12
Attending: NURSE PRACTITIONER
Payer: MEDICARE

## 2021-10-12 DIAGNOSIS — N28.1 COMPLEX RENAL CYST: Primary | ICD-10-CM

## 2021-10-12 DIAGNOSIS — Q61.02 MULTIPLE RENAL CYSTS: ICD-10-CM

## 2021-10-12 PROCEDURE — 76770 US RETROPERITONEAL COMPLETE: ICD-10-PCS | Mod: 26,,, | Performed by: RADIOLOGY

## 2021-10-12 PROCEDURE — 76770 US EXAM ABDO BACK WALL COMP: CPT | Mod: TC

## 2021-10-12 PROCEDURE — 76770 US EXAM ABDO BACK WALL COMP: CPT | Mod: 26,,, | Performed by: RADIOLOGY

## 2021-10-14 ENCOUNTER — IMMUNIZATION (OUTPATIENT)
Dept: INTERNAL MEDICINE | Facility: CLINIC | Age: 86
End: 2021-10-14
Payer: MEDICARE

## 2021-10-14 DIAGNOSIS — Z23 NEED FOR VACCINATION: Primary | ICD-10-CM

## 2021-10-14 PROCEDURE — 0003A COVID-19, MRNA, LNP-S, PF, 30 MCG/0.3 ML DOSE VACCINE: CPT | Mod: PBBFAC,PO

## 2021-10-14 PROCEDURE — 91300 COVID-19, MRNA, LNP-S, PF, 30 MCG/0.3 ML DOSE VACCINE: CPT | Mod: PBBFAC,PO

## 2021-10-16 ENCOUNTER — PATIENT MESSAGE (OUTPATIENT)
Dept: CARDIOLOGY | Facility: CLINIC | Age: 86
End: 2021-10-16
Payer: MEDICARE

## 2021-10-18 DIAGNOSIS — I10 HYPERTENSION, UNSPECIFIED TYPE: Chronic | ICD-10-CM

## 2021-10-18 RX ORDER — VALSARTAN 80 MG/1
80 TABLET ORAL DAILY
Qty: 30 TABLET | Refills: 0 | Status: SHIPPED | OUTPATIENT
Start: 2021-10-18 | End: 2022-03-05 | Stop reason: SDUPTHER

## 2021-10-20 ENCOUNTER — OFFICE VISIT (OUTPATIENT)
Dept: UROLOGY | Facility: CLINIC | Age: 86
End: 2021-10-20
Payer: MEDICARE

## 2021-10-20 VITALS
BODY MASS INDEX: 24.41 KG/M2 | WEIGHT: 151.88 LBS | SYSTOLIC BLOOD PRESSURE: 144 MMHG | HEART RATE: 67 BPM | DIASTOLIC BLOOD PRESSURE: 67 MMHG | HEIGHT: 66 IN

## 2021-10-20 DIAGNOSIS — N28.1 COMPLEX RENAL CYST: ICD-10-CM

## 2021-10-20 PROCEDURE — 99999 PR PBB SHADOW E&M-EST. PATIENT-LVL IV: ICD-10-PCS | Mod: PBBFAC,,, | Performed by: UROLOGY

## 2021-10-20 PROCEDURE — 99203 OFFICE O/P NEW LOW 30 MIN: CPT | Mod: S$PBB,,, | Performed by: UROLOGY

## 2021-10-20 PROCEDURE — 99203 PR OFFICE/OUTPT VISIT, NEW, LEVL III, 30-44 MIN: ICD-10-PCS | Mod: S$PBB,,, | Performed by: UROLOGY

## 2021-10-20 PROCEDURE — 99214 OFFICE O/P EST MOD 30 MIN: CPT | Mod: PBBFAC | Performed by: UROLOGY

## 2021-10-20 PROCEDURE — 99999 PR PBB SHADOW E&M-EST. PATIENT-LVL IV: CPT | Mod: PBBFAC,,, | Performed by: UROLOGY

## 2021-12-13 PROBLEM — E11.22 CKD STAGE 3 SECONDARY TO DIABETES: Status: ACTIVE | Noted: 2021-12-13

## 2021-12-13 PROBLEM — E11.9 TYPE 2 DIABETES MELLITUS, WITHOUT LONG-TERM CURRENT USE OF INSULIN: Status: ACTIVE | Noted: 2021-12-13

## 2021-12-13 PROBLEM — N18.30 CKD STAGE 3 SECONDARY TO DIABETES: Status: ACTIVE | Noted: 2021-12-13

## 2021-12-15 ENCOUNTER — OFFICE VISIT (OUTPATIENT)
Dept: INTERNAL MEDICINE | Facility: CLINIC | Age: 86
End: 2021-12-15
Payer: MEDICARE

## 2021-12-15 VITALS
RESPIRATION RATE: 14 BRPM | WEIGHT: 145 LBS | BODY MASS INDEX: 24.16 KG/M2 | OXYGEN SATURATION: 98 % | DIASTOLIC BLOOD PRESSURE: 60 MMHG | HEIGHT: 65 IN | HEART RATE: 57 BPM | SYSTOLIC BLOOD PRESSURE: 110 MMHG

## 2021-12-15 DIAGNOSIS — E11.22 CKD STAGE 3 SECONDARY TO DIABETES: ICD-10-CM

## 2021-12-15 DIAGNOSIS — H90.3 SENSORINEURAL HEARING LOSS (SNHL) OF BOTH EARS: ICD-10-CM

## 2021-12-15 DIAGNOSIS — G89.4 CHRONIC PAIN SYNDROME: ICD-10-CM

## 2021-12-15 DIAGNOSIS — E11.22 TYPE 2 DIABETES MELLITUS WITH CHRONIC KIDNEY DISEASE, WITHOUT LONG-TERM CURRENT USE OF INSULIN, UNSPECIFIED CKD STAGE: ICD-10-CM

## 2021-12-15 DIAGNOSIS — M54.40 LOW BACK PAIN WITH SCIATICA, SCIATICA LATERALITY UNSPECIFIED, UNSPECIFIED BACK PAIN LATERALITY, UNSPECIFIED CHRONICITY: ICD-10-CM

## 2021-12-15 DIAGNOSIS — Z87.891 EX-SMOKER: ICD-10-CM

## 2021-12-15 DIAGNOSIS — I10 HYPERTENSION, UNSPECIFIED TYPE: Chronic | ICD-10-CM

## 2021-12-15 DIAGNOSIS — I70.0 AORTIC ATHEROSCLEROSIS: ICD-10-CM

## 2021-12-15 DIAGNOSIS — N18.30 CKD STAGE 3 SECONDARY TO DIABETES: ICD-10-CM

## 2021-12-15 DIAGNOSIS — N18.30 STAGE 3 CHRONIC KIDNEY DISEASE, UNSPECIFIED WHETHER STAGE 3A OR 3B CKD: ICD-10-CM

## 2021-12-15 DIAGNOSIS — Z00.00 ENCOUNTER FOR PREVENTIVE HEALTH EXAMINATION: Primary | ICD-10-CM

## 2021-12-15 DIAGNOSIS — Z91.81 RISK FOR FALLS: ICD-10-CM

## 2021-12-15 DIAGNOSIS — E11.8 DIABETIC FEET: ICD-10-CM

## 2021-12-15 DIAGNOSIS — I70.0 ATHEROSCLEROSIS OF AORTA: ICD-10-CM

## 2021-12-15 DIAGNOSIS — Z79.01 CHRONIC ANTICOAGULATION: ICD-10-CM

## 2021-12-15 DIAGNOSIS — Z83.3 FAMILY HISTORY OF DIABETES MELLITUS: ICD-10-CM

## 2021-12-15 DIAGNOSIS — I48.19 PERSISTENT ATRIAL FIBRILLATION: Chronic | ICD-10-CM

## 2021-12-15 DIAGNOSIS — E78.5 HYPERLIPIDEMIA, UNSPECIFIED HYPERLIPIDEMIA TYPE: Chronic | ICD-10-CM

## 2021-12-15 DIAGNOSIS — L72.3 SEBACEOUS CYST: ICD-10-CM

## 2021-12-15 DIAGNOSIS — R52 PAIN AGGRAVATED BY WALKING: ICD-10-CM

## 2021-12-15 PROCEDURE — 99999 PR PBB SHADOW E&M-EST. PATIENT-LVL V: ICD-10-PCS | Mod: PBBFAC,,, | Performed by: NURSE PRACTITIONER

## 2021-12-15 PROCEDURE — G0439 PPPS, SUBSEQ VISIT: HCPCS | Mod: ,,, | Performed by: NURSE PRACTITIONER

## 2021-12-15 PROCEDURE — 99215 OFFICE O/P EST HI 40 MIN: CPT | Mod: PBBFAC,PO | Performed by: NURSE PRACTITIONER

## 2021-12-15 PROCEDURE — 99999 PR PBB SHADOW E&M-EST. PATIENT-LVL V: CPT | Mod: PBBFAC,,, | Performed by: NURSE PRACTITIONER

## 2021-12-15 PROCEDURE — G0439 PR MEDICARE ANNUAL WELLNESS SUBSEQUENT VISIT: ICD-10-PCS | Mod: ,,, | Performed by: NURSE PRACTITIONER

## 2021-12-17 ENCOUNTER — LAB VISIT (OUTPATIENT)
Dept: LAB | Facility: HOSPITAL | Age: 86
End: 2021-12-17
Attending: NURSE PRACTITIONER
Payer: MEDICARE

## 2021-12-17 DIAGNOSIS — E11.22 TYPE 2 DIABETES MELLITUS WITH CHRONIC KIDNEY DISEASE, WITHOUT LONG-TERM CURRENT USE OF INSULIN, UNSPECIFIED CKD STAGE: ICD-10-CM

## 2021-12-17 LAB
ESTIMATED AVG GLUCOSE: 134 MG/DL (ref 68–131)
HBA1C MFR BLD: 6.3 % (ref 4–5.6)

## 2021-12-17 PROCEDURE — 36415 COLL VENOUS BLD VENIPUNCTURE: CPT | Mod: PO | Performed by: NURSE PRACTITIONER

## 2021-12-17 PROCEDURE — 83036 HEMOGLOBIN GLYCOSYLATED A1C: CPT | Performed by: NURSE PRACTITIONER

## 2022-01-12 ENCOUNTER — TELEPHONE (OUTPATIENT)
Dept: PODIATRY | Facility: CLINIC | Age: 87
End: 2022-01-12
Payer: MEDICARE

## 2022-01-12 NOTE — TELEPHONE ENCOUNTER
Spoke with pt and pt's wife to inform them that Dr. Brar will be out of the office on 1/27/22, but will be holding clinic on 1/26/22. Pt accepted new appointment time of 11:00 on 1/26/22. Encouraged pt to call if further assistance is needed.

## 2022-01-20 ENCOUNTER — PATIENT MESSAGE (OUTPATIENT)
Dept: CARDIOLOGY | Facility: CLINIC | Age: 87
End: 2022-01-20
Payer: MEDICARE

## 2022-01-25 NOTE — TELEPHONE ENCOUNTER
No new care gaps identified.  Powered by [employer]. Reference number: [id number].   1/25/2022 1:48:52 AM CST

## 2022-01-26 ENCOUNTER — OFFICE VISIT (OUTPATIENT)
Dept: PODIATRY | Facility: CLINIC | Age: 87
End: 2022-01-26
Payer: MEDICARE

## 2022-01-26 VITALS
HEART RATE: 73 BPM | DIASTOLIC BLOOD PRESSURE: 67 MMHG | BODY MASS INDEX: 24.5 KG/M2 | HEIGHT: 65 IN | SYSTOLIC BLOOD PRESSURE: 128 MMHG

## 2022-01-26 DIAGNOSIS — B35.1 ONYCHOMYCOSIS: ICD-10-CM

## 2022-01-26 DIAGNOSIS — E11.49 TYPE 2 DIABETES MELLITUS WITH NEUROLOGICAL MANIFESTATIONS: Primary | ICD-10-CM

## 2022-01-26 DIAGNOSIS — E11.8 DIABETIC FEET: ICD-10-CM

## 2022-01-26 DIAGNOSIS — E11.51 TYPE 2 DIABETES MELLITUS WITH PERIPHERAL VASCULAR DISEASE: ICD-10-CM

## 2022-01-26 PROCEDURE — 11721 DEBRIDE NAIL 6 OR MORE: CPT | Mod: PBBFAC,PN | Performed by: PODIATRIST

## 2022-01-26 PROCEDURE — 11721 ROUTINE FOOT CARE: ICD-10-PCS | Mod: Q9,S$PBB,, | Performed by: PODIATRIST

## 2022-01-26 PROCEDURE — 99203 OFFICE O/P NEW LOW 30 MIN: CPT | Mod: 25,S$PBB,, | Performed by: PODIATRIST

## 2022-01-26 PROCEDURE — 99999 PR PBB SHADOW E&M-EST. PATIENT-LVL IV: CPT | Mod: PBBFAC,,, | Performed by: PODIATRIST

## 2022-01-26 PROCEDURE — 99214 OFFICE O/P EST MOD 30 MIN: CPT | Mod: 25,PBBFAC,PN | Performed by: PODIATRIST

## 2022-01-26 PROCEDURE — 99203 PR OFFICE/OUTPT VISIT, NEW, LEVL III, 30-44 MIN: ICD-10-PCS | Mod: 25,S$PBB,, | Performed by: PODIATRIST

## 2022-01-26 PROCEDURE — 99999 PR PBB SHADOW E&M-EST. PATIENT-LVL IV: ICD-10-PCS | Mod: PBBFAC,,, | Performed by: PODIATRIST

## 2022-01-26 NOTE — PROCEDURES
"Routine Foot Care    Date/Time: 1/26/2022 11:00 AM  Performed by: Bhupendra Brar DPM  Authorized by: Bhupendra Brar DPM     Time out: Immediately prior to procedure a "time out" was called to verify the correct patient, procedure, equipment, support staff and site/side marked as required.    Consent Done?:  Yes (Verbal)  Hyperkeratotic Skin Lesions?: No      Nail Care Type:  Debride  Location(s): All  (Left 1st Toe, Left 3rd Toe, Left 2nd Toe, Left 4th Toe, Left 5th Toe, Right 1st Toe, Right 2nd Toe, Right 3rd Toe, Right 4th Toe and Right 5th Toe)  Patient tolerance:  Patient tolerated the procedure well with no immediate complications     Used sterile nail nipper.        "

## 2022-01-26 NOTE — PROGRESS NOTES
Subjective:      Patient ID: Cliff Magaña is a 86 y.o. male.    Chief Complaint: Diabetes Mellitus (PCP Dr. Estrada/ [place] NP 12/15/21), Diabetic Foot Exam, and Routine Foot Care    Cliff is a 86 y.o. male who presents to the clinic upon referral from Dr. Felder  for evaluation and treatment of diabetic feet. Cliff has a past medical history of *Atrial fibrillation, Chronic kidney disease, Hyperlipidemia, Hypertension, Metabolic syndrome, and Type 2 diabetes mellitus, without long-term current use of insulin (12/13/2021). Patient relates no major problem with feet. Only complaints today consist of thickened toenails that he has difficulty trimming.  He will sometimes get a pedicure.  He also relates he gets intermittent cramping to both legs at night.  History of chronic low back pain with right lower extremity symptoms.    PCP: Munir Estrada MD    Date Last Seen by PCP:  08/10/2021 and last seen by Domitila Felder NP on 02/15/2021    Current shoe gear: Casual shoes    Hemoglobin A1C   Date Value Ref Range Status   12/17/2021 6.3 (H) 4.0 - 5.6 % Final     Comment:     ADA Screening Guidelines:  5.7-6.4%  Consistent with prediabetes  >or=6.5%  Consistent with diabetes    High levels of fetal hemoglobin interfere with the HbA1C  assay. Heterozygous hemoglobin variants (HbS, HgC, etc)do  not significantly interfere with this assay.   However, presence of multiple variants may affect accuracy.     04/19/2021 6.2 (H) 4.0 - 5.6 % Final     Comment:     ADA Screening Guidelines:  5.7-6.4%  Consistent with prediabetes  >or=6.5%  Consistent with diabetes    High levels of fetal hemoglobin interfere with the HbA1C  assay. Heterozygous hemoglobin variants (HbS, HgC, etc)do  not significantly interfere with this assay.   However, presence of multiple variants may affect accuracy.     01/15/2021 7.9 (H) 4.0 - 5.6 % Final     Comment:     ADA Screening Guidelines:  5.7-6.4%  Consistent with prediabetes  >or=6.5%   "Consistent with diabetes  High levels of fetal hemoglobin interfere with the HbA1C  assay. Heterozygous hemoglobin variants (HbS, HgC, etc)do  not significantly interfere with this assay.   However, presence of multiple variants may affect accuracy.       Vitals:    22 1059   BP: 128/67   Pulse: 73   Height: 5' 4.5" (1.638 m)   PainSc: 0-No pain      Past Medical History:   Diagnosis Date    *Atrial fibrillation     Chronic kidney disease     Hyperlipidemia     Hypertension     Metabolic syndrome     Type 2 diabetes mellitus, without long-term current use of insulin 2021       Past Surgical History:   Procedure Laterality Date    CATARACT EXTRACTION      CHOLECYSTECTOMY      EYE SURGERY      INJECTION OF FACET JOINT Bilateral 2021    Procedure: FACET JOINT INJECTION BILATERAL L4/L5 DIRECT REFERRAL;  Surgeon: Philipp Iyer MD;  Location: [facility];  Service: Pain Management;  Laterality: Bilateral;  NEEDS CONSENT, ELIQUIS CLEARANCE IN CHART    SKIN CANCER EXCISION      TONSILLECTOMY         Family History   Problem Relation Age of Onset    Diabetes Maternal Aunt     Heart attack Neg Hx     Heart disease Neg Hx     Heart failure Neg Hx     Hyperlipidemia Neg Hx     Hypertension Neg Hx     Stroke Neg Hx        Social History     Socioeconomic History    Marital status:    Tobacco Use    Smoking status: Former Smoker     Packs/day: 2.00     Years: 20.00     Pack years: 40.00     Quit date: 1983     Years since quittin.7    Smokeless tobacco: Never Used   Substance and Sexual Activity    Alcohol use: Yes     Alcohol/week: 1.0 standard drink     Types: 1 Standard drinks or equivalent per week     Comment: 1 drink a week    Drug use: No    Sexual activity: Yes     Partners: Female     Social Determinants of Health     Financial Resource Strain: Low Risk     Difficulty of Paying Living Expenses: Not hard at all   Food Insecurity: No Food Insecurity    " Worried About Running Out of Food in the Last Year: Never true    Ran Out of Food in the Last Year: Never true   Transportation Needs: No Transportation Needs    Lack of Transportation (Medical): No    Lack of Transportation (Non-Medical): No   Physical Activity: Sufficiently Active    Days of Exercise per Week: 4 days    Minutes of Exercise per Session: 60 min   Stress: No Stress Concern Present    Feeling of Stress : Not at all   Social Connections: Unknown    Frequency of Communication with Friends and Family: Three times a week    Frequency of Social Gatherings with Friends and Family: Twice a week    Active Member of Clubs or Organizations: Yes    Attends Club or Organization Meetings: More than 4 times per year    Marital Status:        Current Outpatient Medications   Medication Sig Dispense Refill    acetaminophen (TYLENOL) 650 MG TbSR Take 650 mg by mouth every 8 (eight) hours.      amLODIPine (NORVASC) 10 MG tablet TAKE 1 TABLET DAILY 90 tablet 3    ELIQUIS 5 mg Tab TAKE 1 TABLET TWICE A  tablet 3    fenofibrate micronized (LOFIBRA) 200 MG Cap TAKE 1 CAPSULE DAILY WITH BREAKFAST 90 capsule 3    flu vaccine [id number],6mos up, (FLUZONE QUAD [id number]) 60 mcg (15 mcg x 4)/0.5 mL Susp       hydroCHLOROthiazide (HYDRODIURIL) 25 MG tablet TAKE 1 TABLET DAILY 90 tablet 3    krill-om-3-dha-epa-phospho-ast 1,000-230-60 mg Cap Take by mouth once daily. Pt taking one pill once a day      loratadine (CLARITIN) 10 mg tablet Take 10 mg by mouth once daily.      MAGNESIUM CITRATE ORAL Take 2 capsules by mouth once daily. 250mg      TRAMAINE BIOTIN ORAL Take 5,000 mcg by mouth once daily.      metFORMIN (GLUCOPHAGE-XR) 500 MG ER 24hr tablet Take 1 tablet (500 mg total) by mouth once daily. 90 tablet 3    metoprolol succinate (TOPROL-XL) 25 MG 24 hr tablet TAKE 1 TABLET DAILY 90 tablet 3    metoprolol succinate (TOPROL-XL) 50 MG 24 hr tablet TAKE ONE AND ONE-HALF TABLETS ONCE DAILY 135  tablet 3    valsartan (DIOVAN) 80 MG tablet Take 1 tablet (80 mg total) by mouth once daily. 30 tablet 0     No current facility-administered medications for this visit.       Review of patient's allergies indicates:   Allergen Reactions    Niacin      Other reaction(s): Rash  Other reaction(s): Itching           Review of Systems   Constitutional: Negative for chills, fever and malaise/fatigue.   HENT: Negative for congestion and hearing loss.    Cardiovascular: Negative for chest pain, claudication and leg swelling.   Respiratory: Negative for cough and shortness of breath.    Skin: Positive for nail changes.   Musculoskeletal: Positive for back pain. Negative for joint pain, muscle cramps and muscle weakness.   Gastrointestinal: Negative for nausea and vomiting.   Neurological: Positive for paresthesias. Negative for numbness and weakness.   Psychiatric/Behavioral: Negative for altered mental status.           Objective:      Physical Exam  Constitutional:       General: He is not in acute distress.     Appearance: Normal appearance. He is not ill-appearing.   Cardiovascular:      Pulses:           Dorsalis pedis pulses are 0 on the right side and 0 on the left side.        Posterior tibial pulses are 0 on the right side and 0 on the left side.      Comments: Abnormal monophasic signal detect of the DP and PT bilateral with Doppler.  Patient has history of AFib which is apparent with the Doppler.    Mild nonpitting edema to lower extremity bilateral.  No hair growth bilateral lower extremity.  Mild rubor on dependency bilateral lower extremity.  Musculoskeletal:      Comments: No pain with ROM or MMT bilateral lower extremity.    No significant digital deformity bilateral.  Rectus appearing foot type bilateral.   Feet:      Right foot:      Protective Sensation: 10 sites tested. 9 sites sensed.      Skin integrity: Dry skin present.      Toenail Condition: Right toenails are abnormally thick and long.      Left  foot:      Protective Sensation: 10 sites tested. 10 sites sensed.      Skin integrity: Dry skin present.      Toenail Condition: Left toenails are abnormally thick and long.   Skin:     General: Skin is warm.      Capillary Refill: Capillary refill takes 2 to 3 seconds.      Findings: No ecchymosis or erythema.      Nails: There is no clubbing.      Comments: Second toenail bilateral is growing a superior direction, thickened and discolored yellow with some mild loosening.  Nails 1, 3, 4 and 5 bilateral are mildly elongated 2-3 mm, thickened with patchy yellow discoloration mild underlying debris.    No open lesions or macerations to lower extremity bilateral.    Skin is atrophied to lower extremity bilateral.   Neurological:      Mental Status: He is alert and oriented to person, place, and time.      Sensory: Sensory deficit present.      Motor: Motor function is intact.      Comments: Absent vibratory sensation right foot and decreased left foot.               Assessment:       Encounter Diagnoses   Name Primary?    Diabetic feet     Type 2 diabetes mellitus with neurological manifestations Yes    Type 2 diabetes mellitus with peripheral vascular disease     Onychomycosis          Plan:       Cliff was seen today for diabetes mellitus, diabetic foot exam and routine foot care.    Diagnoses and all orders for this visit:    Type 2 diabetes mellitus with neurological manifestations  -     Routine Foot Care    Diabetic feet  -     Ambulatory referral/consult to Podiatry    Type 2 diabetes mellitus with peripheral vascular disease  -      Lower Extremity Arteries Bilateral; Future  -     Routine Foot Care    Onychomycosis  -     Routine Foot Care      I counseled the patient on his conditions, their implications and medical management.    Shoe inspection. Diabetic Foot Education. Patient reminded of the importance of good nutrition and blood sugar control to help prevent podiatric complications of diabetes.  Patient instructed on proper foot hygeine. We discussed wearing proper shoe gear, daily foot inspections, never walking without protective shoe gear, never putting sharp instruments to feet.    Routine foot care per attached note. Patient relates relief following the procedure. He will continue to monitor the areas daily, inspect his feet, wear protective shoe gear when ambulatory, moisturizer to maintain skin integrity.    Arterial ultrasound the lower extremity bilateral ordered to assess for significant stenosis.    Comprehensive annual diabetic foot exam completed today.  Patient is found to be intermediate risk for diabetic foot complication.    A portion of this note was generated by voice recognition software and may contain spelling and grammar errors.

## 2022-01-27 RX ORDER — FENOFIBRATE 200 MG/1
CAPSULE ORAL
Qty: 90 CAPSULE | Refills: 1 | Status: SHIPPED | OUTPATIENT
Start: 2022-01-27 | End: 2022-07-25

## 2022-01-27 NOTE — TELEPHONE ENCOUNTER
Refill Routing Note   Medication(s) are not appropriate for processing by [facility] for the following reason(s):      - Drug-Disease Interaction (fenofibrate micronized and CKD stage 3 secondary to diabetes; Stage 3 chronic kidney disease)    [facility] action(s):  Defer Medication-related problems identified: Drug-disease interaction        --->Care Gap information included in message below if applicable.   Medication reconciliation completed: No   Automatic Epic Generated Protocol Data:        Requested Prescriptions   Pending Prescriptions Disp Refills    fenofibrate micronized (LOFIBRA) 200 MG Cap [Pharmacy Med Name: FENOFIBRATE CAPS 200MG] 90 capsule 1     Sig: TAKE 1 CAPSULE DAILY WITH BREAKFAST       Cardiovascular:  Antilipid - Fibric Acid Derivatives Passed - 1/27/2022 12:03 PM        Passed - Patient is at least 18 years old        Passed - Valid encounter within last 15 months     Recent Visits  Date Type Provider Dept   08/10/21 Office Visit Munir Estrada MD [facility] Internal Medicine   08/07/20 Office Visit Munir Estrada MD [facility] Internal [facility]   Showing recent visits within past 720 days and meeting all other requirements  Future Appointments  No visits were found meeting these conditions.  Showing future appointments within next 150 days and meeting all other requirements      Future Appointments              In 2 weeks [facility] - [facility], Imaging Ctr    In 2 months [facility], [facility] [place]    In 2 months [facility], [place] [place]    In 3 months Bhupendra Brar, [facility]    In 8 months [facility] - Imaging Center, Imaging Ctr    In 8 months Shantel Dalton NP [facility] - Urology [facility], [place]                Passed - ALT is 119 or below and within 360 days     ALT   Date Value Ref Range Status   08/09/2021 26 10 - 44 U/L Final   05/24/2021 19 10 -  44 U/L Final   04/19/2021 23 10 - 44 U/L Final              Passed - AST is 131 or below and within 360 days     AST   Date Value Ref Range Status   08/09/2021 30 10 - 40 U/L Final   05/24/2021 20 10 - 40 U/L Final   04/19/2021 27 10 - 40 U/L Final              Passed - Cr is 1.39 or below and within 360 days     Lab Results   Component Value Date    CREATININE 1.2 10/05/2021    CREATININE 1.3 08/09/2021    CREATININE 1.5 (H) 06/14/2021              Passed - eGFR is 30 or above and within 360 days     Lab Results   Component Value Date    EGFRNONAA 54.4 (A) 10/05/2021    EGFRNONAA 49.8 (A) 08/09/2021    EGFRNONAA 41.8 (A) 06/14/2021                Passed - WBC within 360 days     WBC   Date Value Ref Range Status   10/05/2021 7.90 3.90 - 12.70 K/uL Final   08/09/2021 7.62 3.90 - 12.70 K/uL Final   08/03/2020 8.30 3.90 - 12.70 K/uL Final              Passed - Total Cholesterol within 360 days     Lab Results   Component Value Date    CHOL 160 08/09/2021    CHOL 159 05/24/2021    CHOL 156 08/03/2020              Passed - LDL within 360 days     LDL Cholesterol   Date Value Ref Range Status   08/09/2021 85.4 63.0 - 159.0 mg/dL Final     Comment:     The National Cholesterol Education Program (NCEP) has set the  following guidelines (reference values) for LDL Cholesterol:  Optimal.......................<130 mg/dL  Borderline High...............130-159 mg/dL  High..........................160-189 mg/dL  Very High.....................>190 mg/dL              Passed - HDL within 360 days     HDL   Date Value Ref Range Status   08/09/2021 38 (L) 40 - 75 mg/dL Final     Comment:     The National Cholesterol Education Program (NCEP) has set the  following guidelines (reference values) for HDL Cholesterol:  Low...............<40 mg/dL  Optimal...........>60 mg/dL              Passed - Triglycerides within 360 days     Lab Results   Component Value Date    TRIG 183 (H) 08/09/2021    TRIG 104 05/24/2021    TRIG 175 (H) 08/03/2020                     Appointments  past 12m or future 3m with PCP    Date Provider   Last Visit   8/10/2021 Munir Estraad MD   Next Visit   Visit date not found Munir Estrada MD   ED visits in past 90 days: 0        Note composed:12:14 PM 01/27/2022

## 2022-02-10 ENCOUNTER — HOSPITAL ENCOUNTER (OUTPATIENT)
Dept: RADIOLOGY | Facility: HOSPITAL | Age: 87
Discharge: HOME | End: 2022-02-10
Attending: PODIATRIST
Payer: MEDICARE

## 2022-02-10 DIAGNOSIS — E11.51 TYPE 2 DIABETES MELLITUS WITH PERIPHERAL VASCULAR DISEASE: ICD-10-CM

## 2022-02-10 PROCEDURE — 93925 LOWER EXTREMITY STUDY: CPT | Mod: TC

## 2022-02-10 PROCEDURE — 93922 US ARTERIAL LOWER EXTREMITY BILAT WITH ABI (XPD): ICD-10-PCS | Mod: 26,,, | Performed by: INTERNAL MEDICINE

## 2022-02-10 PROCEDURE — 93922 UPR/L XTREMITY ART 2 LEVELS: CPT | Mod: 26,,, | Performed by: INTERNAL MEDICINE

## 2022-02-10 PROCEDURE — 93925 US ARTERIAL LOWER EXTREMITY BILAT WITH ABI (XPD): ICD-10-PCS | Mod: 26,,, | Performed by: INTERNAL MEDICINE

## 2022-02-10 PROCEDURE — 93925 LOWER EXTREMITY STUDY: CPT | Mod: 26,,, | Performed by: INTERNAL MEDICINE

## 2022-02-11 ENCOUNTER — PATIENT MESSAGE (OUTPATIENT)
Dept: PODIATRY | Facility: CLINIC | Age: 87
End: 2022-02-11
Payer: MEDICARE

## 2022-02-16 ENCOUNTER — TELEPHONE (OUTPATIENT)
Dept: NEPHROLOGY | Facility: CLINIC | Age: 87
End: 2022-02-16
Payer: MEDICARE

## 2022-02-16 NOTE — TELEPHONE ENCOUNTER
----- Message from Tia Field sent at 2/16/2022 12:26 PM CST -----  Contact: Nereyda (wife)  Pt's wife requesting call back re: scheduling appt from recall received for April.     Confirmed contact below:  Contact Name:Nereyda  Phone Number: [phone]

## 2022-03-07 ENCOUNTER — TELEPHONE (OUTPATIENT)
Dept: CARDIOLOGY | Facility: CLINIC | Age: 87
End: 2022-03-07
Payer: MEDICARE

## 2022-03-07 DIAGNOSIS — E78.2 MIXED HYPERLIPIDEMIA: ICD-10-CM

## 2022-03-07 DIAGNOSIS — I10 ESSENTIAL HYPERTENSION: Primary | ICD-10-CM

## 2022-03-25 ENCOUNTER — LAB VISIT (OUTPATIENT)
Dept: LAB | Facility: HOSPITAL | Age: 87
End: 2022-03-25
Payer: MEDICARE

## 2022-03-25 DIAGNOSIS — I10 ESSENTIAL HYPERTENSION: ICD-10-CM

## 2022-03-25 DIAGNOSIS — E78.2 MIXED HYPERLIPIDEMIA: ICD-10-CM

## 2022-03-25 LAB
ALBUMIN SERPL BCP-MCNC: 4.1 G/DL (ref 3.5–5.2)
ALP SERPL-CCNC: 54 U/L (ref 55–135)
ALT SERPL W/O P-5'-P-CCNC: 21 U/L (ref 10–44)
ANION GAP SERPL CALC-SCNC: 10 MMOL/L (ref 8–16)
AST SERPL-CCNC: 20 U/L (ref 10–40)
BILIRUB SERPL-MCNC: 0.6 MG/DL (ref 0.1–1)
BUN SERPL-MCNC: 54 MG/DL (ref 8–23)
CALCIUM SERPL-MCNC: 10.5 MG/DL (ref 8.7–10.5)
CHLORIDE SERPL-SCNC: 105 MMOL/L (ref 95–110)
CHOLEST SERPL-MCNC: 178 MG/DL (ref 120–199)
CHOLEST/HDLC SERPL: 5.2 {RATIO} (ref 2–5)
CO2 SERPL-SCNC: 24 MMOL/L (ref 23–29)
CREAT SERPL-MCNC: 1.5 MG/DL (ref 0.5–1.4)
EST. GFR  (AFRICAN AMERICAN): 48 ML/MIN/1.73 M^2
EST. GFR  (NON AFRICAN AMERICAN): 41.6 ML/MIN/1.73 M^2
GLUCOSE SERPL-MCNC: 121 MG/DL (ref 70–110)
HDLC SERPL-MCNC: 34 MG/DL (ref 40–75)
HDLC SERPL: 19.1 % (ref 20–50)
LDLC SERPL CALC-MCNC: 104.8 MG/DL (ref 63–159)
NONHDLC SERPL-MCNC: 144 MG/DL
POTASSIUM SERPL-SCNC: 4.4 MMOL/L (ref 3.5–5.1)
PROT SERPL-MCNC: 7.6 G/DL (ref 6–8.4)
SODIUM SERPL-SCNC: 139 MMOL/L (ref 136–145)
TRIGL SERPL-MCNC: 196 MG/DL (ref 30–150)
TSH SERPL DL<=0.005 MIU/L-ACNC: 1.45 UIU/ML (ref 0.4–4)

## 2022-03-25 PROCEDURE — 84443 ASSAY THYROID STIM HORMONE: CPT | Performed by: INTERNAL MEDICINE

## 2022-03-25 PROCEDURE — 36415 COLL VENOUS BLD VENIPUNCTURE: CPT | Mod: PO | Performed by: INTERNAL MEDICINE

## 2022-03-25 PROCEDURE — 80053 COMPREHEN METABOLIC PANEL: CPT | Performed by: INTERNAL MEDICINE

## 2022-03-25 PROCEDURE — 80061 LIPID PANEL: CPT | Performed by: INTERNAL MEDICINE

## 2022-03-28 ENCOUNTER — PATIENT MESSAGE (OUTPATIENT)
Dept: CARDIOLOGY | Facility: CLINIC | Age: 87
End: 2022-03-28

## 2022-03-28 ENCOUNTER — OFFICE VISIT (OUTPATIENT)
Dept: CARDIOLOGY | Facility: CLINIC | Age: 87
End: 2022-03-28
Payer: MEDICARE

## 2022-03-28 VITALS
BODY MASS INDEX: 26.27 KG/M2 | WEIGHT: 153.88 LBS | DIASTOLIC BLOOD PRESSURE: 60 MMHG | HEART RATE: 72 BPM | SYSTOLIC BLOOD PRESSURE: 134 MMHG | HEIGHT: 64 IN

## 2022-03-28 DIAGNOSIS — E11.22 CKD STAGE 3 SECONDARY TO DIABETES: ICD-10-CM

## 2022-03-28 DIAGNOSIS — I70.0 AORTIC ATHEROSCLEROSIS: Primary | ICD-10-CM

## 2022-03-28 DIAGNOSIS — I10 PRIMARY HYPERTENSION: Chronic | ICD-10-CM

## 2022-03-28 DIAGNOSIS — E78.2 MIXED HYPERLIPIDEMIA: Chronic | ICD-10-CM

## 2022-03-28 DIAGNOSIS — I48.19 PERSISTENT ATRIAL FIBRILLATION: Chronic | ICD-10-CM

## 2022-03-28 DIAGNOSIS — N18.30 CKD STAGE 3 SECONDARY TO DIABETES: ICD-10-CM

## 2022-03-28 DIAGNOSIS — E11.22 TYPE 2 DIABETES MELLITUS WITH CHRONIC KIDNEY DISEASE, WITHOUT LONG-TERM CURRENT USE OF INSULIN, UNSPECIFIED CKD STAGE: ICD-10-CM

## 2022-03-28 DIAGNOSIS — I70.0 ATHEROSCLEROSIS OF AORTA: ICD-10-CM

## 2022-03-28 DIAGNOSIS — Z79.01 CHRONIC ANTICOAGULATION: ICD-10-CM

## 2022-03-28 PROCEDURE — 99214 OFFICE O/P EST MOD 30 MIN: CPT | Mod: PBBFAC,PO | Performed by: INTERNAL MEDICINE

## 2022-03-28 PROCEDURE — 99214 PR OFFICE/OUTPT VISIT, EST, LEVL IV, 30-39 MIN: ICD-10-PCS | Mod: S$PBB,,, | Performed by: INTERNAL MEDICINE

## 2022-03-28 PROCEDURE — 99214 OFFICE O/P EST MOD 30 MIN: CPT | Mod: S$PBB,,, | Performed by: INTERNAL MEDICINE

## 2022-03-28 PROCEDURE — 99999 PR PBB SHADOW E&M-EST. PATIENT-LVL IV: ICD-10-PCS | Mod: PBBFAC,,, | Performed by: INTERNAL MEDICINE

## 2022-03-28 PROCEDURE — 99999 PR PBB SHADOW E&M-EST. PATIENT-LVL IV: CPT | Mod: PBBFAC,,, | Performed by: INTERNAL MEDICINE

## 2022-03-28 NOTE — PROGRESS NOTES
"Subjective:   Patient ID:  Cliff Magaña is a 86 y.o. male who presents for follow-up of Atrial Fibrillation and Hypertension      HPI: No CV complaints.  Due to lower back issues patient is using stationary bicycle for exercising and is doing well. Otherwise he is quite sedentary. He admits to dietary indiscretions.  TG-C elevated.        Lab Results   Component Value Date     03/25/2022    K 4.4 03/25/2022     03/25/2022    CO2 24 03/25/2022    BUN 54 (H) 03/25/2022    CREATININE 1.5 (H) 03/25/2022     (H) 03/25/2022    HGBA1C 6.3 (H) 12/17/2021    AST 20 03/25/2022    ALT 21 03/25/2022    ALBUMIN 4.1 03/25/2022    PROT 7.6 03/25/2022    BILITOT 0.6 03/25/2022    WBC 7.90 10/05/2021    HGB 15.9 10/05/2021    HCT 46.3 10/05/2021    MCV 94 10/05/2021     10/05/2021    INR 2.8 12/17/2013    INR 1.8 (H) 06/07/2011    PSA 0.43 06/06/2013    TSH 1.454 03/25/2022    CHOL 178 03/25/2022    HDL 34 (L) 03/25/2022    LDLCALC 104.8 03/25/2022    TRIG 196 (H) 03/25/2022       No results found in the last 24 hours.     Review of Systems   Constitutional: Negative.   HENT: Negative.    Eyes: Negative.    Cardiovascular: Positive for irregular heartbeat. Negative for chest pain, claudication, dyspnea on exertion, leg swelling, near-syncope, palpitations and syncope.   Respiratory: Negative.  Negative for cough, shortness of breath, snoring and wheezing.    Endocrine: Negative.  Negative for cold intolerance, heat intolerance, polydipsia, polyphagia and polyuria.   Skin: Negative.    Musculoskeletal: Positive for arthritis and back pain.   Gastrointestinal: Negative.    Genitourinary: Negative.    Neurological: Negative.    Psychiatric/Behavioral: Negative.        Objective:   Physical Exam  Vitals and nursing note reviewed.   Constitutional:       Appearance: He is well-developed.      Comments: /60   Pulse 72   Ht 5' 4" (1.626 m)   Wt 69.8 kg (153 lb 14.1 oz)   BMI 26.41 kg/m²      HENT:      " Head: Normocephalic.   Eyes:      Pupils: Pupils are equal, round, and reactive to light.   Neck:      Thyroid: No thyromegaly.      Vascular: No carotid bruit.   Cardiovascular:      Rate and Rhythm: Normal rate. Rhythm irregularly irregular.      Pulses: Intact distal pulses.           Carotid pulses are 2+ on the right side and 2+ on the left side.       Radial pulses are 2+ on the right side and 2+ on the left side.        Femoral pulses are 2+ on the right side and 2+ on the left side.       Popliteal pulses are 2+ on the right side and 2+ on the left side.        Dorsalis pedis pulses are 2+ on the right side and 2+ on the left side.        Posterior tibial pulses are 2+ on the right side and 2+ on the left side.      Heart sounds: Normal heart sounds. No murmur heard.    No friction rub. No gallop.   Pulmonary:      Effort: Pulmonary effort is normal. No respiratory distress.      Breath sounds: Normal breath sounds. No wheezing or rales.   Chest:      Chest wall: No tenderness.   Abdominal:      General: Bowel sounds are normal.      Palpations: Abdomen is soft.   Musculoskeletal:         General: Normal range of motion.      Cervical back: Normal range of motion and neck supple.   Skin:     General: Skin is warm and dry.   Neurological:      Mental Status: He is alert and oriented to person, place, and time.           Assessment and Plan:     Problem List Items Addressed This Visit        Cardiology Problems    HTN (hypertension) (Chronic)    Relevant Orders    Nuclear Stress - Cardiology Interpreted    Basic Metabolic Panel    Hyperlipidemia (Chronic)    Relevant Orders    Nuclear Stress - Cardiology Interpreted    Basic Metabolic Panel    Persistent atrial fibrillation (Chronic)    Relevant Orders    Nuclear Stress - Cardiology Interpreted    Basic Metabolic Panel    Aortic atherosclerosis - Primary    Relevant Orders    Nuclear Stress - Cardiology Interpreted    Basic Metabolic Panel    Atherosclerosis  of aorta    Relevant Orders    Nuclear Stress - Cardiology Interpreted    Basic Metabolic Panel       Other    Chronic anticoagulation    Relevant Orders    Nuclear Stress - Cardiology Interpreted    Basic Metabolic Panel    Type 2 diabetes mellitus, without long-term current use of insulin    Relevant Orders    Nuclear Stress - Cardiology Interpreted    Basic Metabolic Panel    CKD stage 3 secondary to diabetes    Relevant Orders    Nuclear Stress - Cardiology Interpreted    Basic Metabolic Panel          Patient's Medications   New Prescriptions    No medications on file   Previous Medications    ACETAMINOPHEN (TYLENOL) 650 MG TBSR    Take 650 mg by mouth every 8 (eight) hours.    AMLODIPINE (NORVASC) 10 MG TABLET    Take 1 tablet (10 mg total) by mouth once daily.    FENOFIBRATE MICRONIZED (LOFIBRA) 200 MG CAP    TAKE 1 CAPSULE DAILY WITH BREAKFAST    HYDROCHLOROTHIAZIDE (HYDRODIURIL) 25 MG TABLET    TAKE 1 TABLET DAILY    KRILL-OM-3-DHA-EPA-PHOSPHO-AST 1,000-230-60 MG CAP    Take by mouth once daily. Pt taking one pill once a day    LORATADINE (CLARITIN) 10 MG TABLET    Take 10 mg by mouth once daily.    TRAMAINE BIOTIN ORAL    Take 5,000 mcg by mouth once daily.    METFORMIN (GLUCOPHAGE-XR) 500 MG ER 24HR TABLET    Take 1 tablet (500 mg total) by mouth once daily.    METOPROLOL SUCCINATE (TOPROL-XL) 25 MG 24 HR TABLET    TAKE 1 TABLET DAILY    METOPROLOL SUCCINATE (TOPROL-XL) 50 MG 24 HR TABLET    TAKE ONE AND ONE-HALF TABLETS ONCE DAILY    VALSARTAN (DIOVAN) 80 MG TABLET    Take 1 tablet (80 mg total) by mouth once daily.   Modified Medications    Modified Medication Previous Medication    APIXABAN (ELIQUIS) 2.5 MG TAB apixaban (ELIQUIS) 2.5 mg Tab       Take 1 tablet (2.5 mg total) by mouth 2 (two) times daily.    Take 2.5 mg by mouth 2 (two) times daily.   Discontinued Medications    ELIQUIS 5 MG TAB    TAKE 1 TABLET TWICE A DAY    FLU VACCINE JO2229-43,6MOS UP, (FLUZONE QUAD 1543-9045) 60 MCG (15 MCG X 4)/0.5  ML SUSP        MAGNESIUM CITRATE ORAL    Take 2 capsules by mouth once daily. 250mg     Due to his age and elevated creatinine will decrease Eliquis to 2.5mg BID and recheck BMP in 1 month.  HIS GFR is 42 and he could still be tried on a lower dose of SGLT2 inhibitor. Defer to Dr. Estrada.  Review Lexiscan results and advise.    Follow up in about 6 months (around 9/28/2022).

## 2022-03-30 DIAGNOSIS — E11.65 UNCONTROLLED TYPE 2 DIABETES MELLITUS WITH HYPERGLYCEMIA: ICD-10-CM

## 2022-03-30 NOTE — TELEPHONE ENCOUNTER
Care Due:                  Date            Visit Type   Department     Provider  --------------------------------------------------------------------------------                                EP -                              PRIMARY      [facility] INTERNAL  Last Visit: 08-      CARE ([facility])   BARRY Estrada                               -                              PRIMARY      [facility] INTERNAL  Next Visit: 08-      CARE ([facility])   BARRY Estrada                                                            Last  Test          Frequency    Reason                     Performed    Due Date  --------------------------------------------------------------------------------    HBA1C.......  6 months...  metFORMIN................  12-   06-    Powered by [employer]. Reference number: [id number].   3/30/2022 2:32:45 AM CDT

## 2022-03-31 ENCOUNTER — TELEPHONE (OUTPATIENT)
Dept: CARDIOLOGY | Facility: HOSPITAL | Age: 87
End: 2022-03-31
Payer: MEDICARE

## 2022-03-31 RX ORDER — METFORMIN HYDROCHLORIDE 500 MG/1
TABLET, EXTENDED RELEASE ORAL
Qty: 90 TABLET | Refills: 1 | Status: SHIPPED | OUTPATIENT
Start: 2022-03-31 | End: 2022-08-17 | Stop reason: SDUPTHER

## 2022-03-31 NOTE — TELEPHONE ENCOUNTER
This Rx Request does not qualify for assessment with the [facility]   Please review protocol details and the Care Due Message for extra clinical information    Reasons Rx Request may be deferred:  Labs/Vitals overdue  Labs/Vitals abnormal    Note composed:9:28 PM 03/30/2022

## 2022-04-04 ENCOUNTER — TELEPHONE (OUTPATIENT)
Dept: CARDIOLOGY | Facility: CLINIC | Age: 87
End: 2022-04-04
Payer: MEDICARE

## 2022-04-04 ENCOUNTER — HOSPITAL ENCOUNTER (OUTPATIENT)
Dept: CARDIOLOGY | Facility: HOSPITAL | Age: 87
Discharge: HOME | End: 2022-04-04
Attending: INTERNAL MEDICINE
Payer: MEDICARE

## 2022-04-04 VITALS
DIASTOLIC BLOOD PRESSURE: 89 MMHG | RESPIRATION RATE: 18 BRPM | WEIGHT: 153 LBS | BODY MASS INDEX: 26.12 KG/M2 | HEIGHT: 64 IN | SYSTOLIC BLOOD PRESSURE: 170 MMHG | HEART RATE: 68 BPM

## 2022-04-04 DIAGNOSIS — I70.0 AORTIC ATHEROSCLEROSIS: ICD-10-CM

## 2022-04-04 DIAGNOSIS — E11.22 CKD STAGE 3 SECONDARY TO DIABETES: ICD-10-CM

## 2022-04-04 DIAGNOSIS — N18.30 CKD STAGE 3 SECONDARY TO DIABETES: ICD-10-CM

## 2022-04-04 DIAGNOSIS — E11.22 TYPE 2 DIABETES MELLITUS WITH CHRONIC KIDNEY DISEASE, WITHOUT LONG-TERM CURRENT USE OF INSULIN, UNSPECIFIED CKD STAGE: ICD-10-CM

## 2022-04-04 DIAGNOSIS — I48.19 PERSISTENT ATRIAL FIBRILLATION: Chronic | ICD-10-CM

## 2022-04-04 DIAGNOSIS — I70.0 ATHEROSCLEROSIS OF AORTA: ICD-10-CM

## 2022-04-04 DIAGNOSIS — E78.2 MIXED HYPERLIPIDEMIA: Chronic | ICD-10-CM

## 2022-04-04 DIAGNOSIS — Z79.01 CHRONIC ANTICOAGULATION: ICD-10-CM

## 2022-04-04 DIAGNOSIS — Z79.01 CHRONIC ANTICOAGULATION: Primary | ICD-10-CM

## 2022-04-04 DIAGNOSIS — I10 PRIMARY HYPERTENSION: Chronic | ICD-10-CM

## 2022-04-04 LAB
CV PHARM DOSE: 0.4 MG
CV STRESS BASE HR: 68 BPM
DIASTOLIC BLOOD PRESSURE: 99 MMHG
EJECTION FRACTION- HIGH: 65 %
END DIASTOLIC INDEX-HIGH: 153 ML/M2
END DIASTOLIC INDEX-LOW: 93 ML/M2
END SYSTOLIC INDEX-HIGH: 71 ML/M2
END SYSTOLIC INDEX-LOW: 31 ML/M2
OHS CV CPX 1 MINUTE RECOVERY HEART RATE: 71 BPM
OHS CV CPX 85 PERCENT MAX PREDICTED HEART RATE MALE: 114
OHS CV CPX MAX PREDICTED HEART RATE: 134
OHS CV CPX PATIENT IS FEMALE: 0
OHS CV CPX PATIENT IS MALE: 1
OHS CV CPX PEAK DIASTOLIC BLOOD PRESSURE: 99 MMHG
OHS CV CPX PEAK HEAR RATE: 69 BPM
OHS CV CPX PEAK RATE PRESSURE PRODUCT: NORMAL
OHS CV CPX PEAK SYSTOLIC BLOOD PRESSURE: 178 MMHG
OHS CV CPX PERCENT MAX PREDICTED HEART RATE ACHIEVED: 51
OHS CV CPX RATE PRESSURE PRODUCT PRESENTING: NORMAL
RETIRED EF AND QEF - SEE NOTES: 53 %
SYSTOLIC BLOOD PRESSURE: 170 MMHG

## 2022-04-04 PROCEDURE — 93016 STRESS TEST WITH MYOCARDIAL PERFUSION (CUPID ONLY): ICD-10-PCS | Mod: ,,, | Performed by: INTERNAL MEDICINE

## 2022-04-04 PROCEDURE — 93016 CV STRESS TEST SUPVJ ONLY: CPT | Mod: ,,, | Performed by: INTERNAL MEDICINE

## 2022-04-04 PROCEDURE — A9502 TC99M TETROFOSMIN: HCPCS

## 2022-04-04 PROCEDURE — 78452 HT MUSCLE IMAGE SPECT MULT: CPT | Mod: 26,,, | Performed by: INTERNAL MEDICINE

## 2022-04-04 PROCEDURE — 93018 STRESS TEST WITH MYOCARDIAL PERFUSION (CUPID ONLY): ICD-10-PCS | Mod: ,,, | Performed by: INTERNAL MEDICINE

## 2022-04-04 PROCEDURE — 93018 CV STRESS TEST I&R ONLY: CPT | Mod: ,,, | Performed by: INTERNAL MEDICINE

## 2022-04-04 PROCEDURE — 63600175 PHARM REV CODE 636 W HCPCS: Performed by: INTERNAL MEDICINE

## 2022-04-04 PROCEDURE — 78452 STRESS TEST WITH MYOCARDIAL PERFUSION (CUPID ONLY): ICD-10-PCS | Mod: 26,,, | Performed by: INTERNAL MEDICINE

## 2022-04-04 RX ORDER — REGADENOSON 0.08 MG/ML
0.4 INJECTION, SOLUTION INTRAVENOUS ONCE
Status: COMPLETED | OUTPATIENT
Start: 2022-04-04 | End: 2022-04-04

## 2022-04-04 RX ADMIN — REGADENOSON 0.4 MG: 0.08 INJECTION, SOLUTION INTRAVENOUS at 09:04

## 2022-04-05 ENCOUNTER — TELEPHONE (OUTPATIENT)
Dept: CARDIOLOGY | Facility: CLINIC | Age: 87
End: 2022-04-05
Payer: MEDICARE

## 2022-04-05 NOTE — TELEPHONE ENCOUNTER
----- Message from Nereyda Birmingham MD sent at 4/5/2022 10:04 AM CDT -----  Please inform the patient that his stress test was negative for ischemia and overall heart function was normal. Great news.  NO need to make any changes in his medical regimen. ALISA

## 2022-04-05 NOTE — PROGRESS NOTES
Please inform the patient that his stress test was negative for ischemia and overall heart function was normal. Great news.  NO need to make any changes in his medical regimen. ALISA

## 2022-04-06 ENCOUNTER — LAB VISIT (OUTPATIENT)
Dept: LAB | Facility: HOSPITAL | Age: 87
End: 2022-04-06
Attending: NURSE PRACTITIONER
Payer: MEDICARE

## 2022-04-06 ENCOUNTER — TELEPHONE (OUTPATIENT)
Dept: CARDIOLOGY | Facility: CLINIC | Age: 87
End: 2022-04-06
Payer: MEDICARE

## 2022-04-06 DIAGNOSIS — E11.22 CKD STAGE 3 SECONDARY TO DIABETES: ICD-10-CM

## 2022-04-06 DIAGNOSIS — N18.30 STAGE 3 CHRONIC KIDNEY DISEASE, UNSPECIFIED WHETHER STAGE 3A OR 3B CKD: ICD-10-CM

## 2022-04-06 DIAGNOSIS — E11.22 TYPE 2 DIABETES MELLITUS WITH CHRONIC KIDNEY DISEASE, WITHOUT LONG-TERM CURRENT USE OF INSULIN, UNSPECIFIED CKD STAGE: ICD-10-CM

## 2022-04-06 DIAGNOSIS — I10 PRIMARY HYPERTENSION: Chronic | ICD-10-CM

## 2022-04-06 DIAGNOSIS — Z79.01 CHRONIC ANTICOAGULATION: Primary | ICD-10-CM

## 2022-04-06 DIAGNOSIS — I70.0 ATHEROSCLEROSIS OF AORTA: ICD-10-CM

## 2022-04-06 DIAGNOSIS — N18.30 CKD STAGE 3 SECONDARY TO DIABETES: ICD-10-CM

## 2022-04-06 DIAGNOSIS — Z79.01 CHRONIC ANTICOAGULATION: ICD-10-CM

## 2022-04-06 DIAGNOSIS — I48.19 PERSISTENT ATRIAL FIBRILLATION: ICD-10-CM

## 2022-04-06 DIAGNOSIS — I70.0 AORTIC ATHEROSCLEROSIS: ICD-10-CM

## 2022-04-06 DIAGNOSIS — E78.2 MIXED HYPERLIPIDEMIA: Chronic | ICD-10-CM

## 2022-04-06 DIAGNOSIS — I48.19 PERSISTENT ATRIAL FIBRILLATION: Chronic | ICD-10-CM

## 2022-04-06 LAB
ALBUMIN SERPL BCP-MCNC: 3.8 G/DL (ref 3.5–5.2)
ANION GAP SERPL CALC-SCNC: 11 MMOL/L (ref 8–16)
ANION GAP SERPL CALC-SCNC: 11 MMOL/L (ref 8–16)
BASOPHILS # BLD AUTO: 0.06 K/UL (ref 0–0.2)
BASOPHILS NFR BLD: 0.9 % (ref 0–1.9)
BUN SERPL-MCNC: 38 MG/DL (ref 8–23)
BUN SERPL-MCNC: 38 MG/DL (ref 8–23)
CALCIUM SERPL-MCNC: 10.1 MG/DL (ref 8.7–10.5)
CALCIUM SERPL-MCNC: 10.1 MG/DL (ref 8.7–10.5)
CHLORIDE SERPL-SCNC: 106 MMOL/L (ref 95–110)
CHLORIDE SERPL-SCNC: 106 MMOL/L (ref 95–110)
CO2 SERPL-SCNC: 25 MMOL/L (ref 23–29)
CO2 SERPL-SCNC: 25 MMOL/L (ref 23–29)
CREAT SERPL-MCNC: 1.3 MG/DL (ref 0.5–1.4)
CREAT SERPL-MCNC: 1.3 MG/DL (ref 0.5–1.4)
DIFFERENTIAL METHOD: ABNORMAL
EOSINOPHIL # BLD AUTO: 0.3 K/UL (ref 0–0.5)
EOSINOPHIL NFR BLD: 4.4 % (ref 0–8)
ERYTHROCYTE [DISTWIDTH] IN BLOOD BY AUTOMATED COUNT: 13.2 % (ref 11.5–14.5)
EST. GFR  (AFRICAN AMERICAN): 57.1 ML/MIN/1.73 M^2
EST. GFR  (AFRICAN AMERICAN): 57.1 ML/MIN/1.73 M^2
EST. GFR  (NON AFRICAN AMERICAN): 49.4 ML/MIN/1.73 M^2
EST. GFR  (NON AFRICAN AMERICAN): 49.4 ML/MIN/1.73 M^2
GLUCOSE SERPL-MCNC: 123 MG/DL (ref 70–110)
GLUCOSE SERPL-MCNC: 123 MG/DL (ref 70–110)
HCT VFR BLD AUTO: 44.9 % (ref 40–54)
HGB BLD-MCNC: 14.9 G/DL (ref 14–18)
IMM GRANULOCYTES # BLD AUTO: 0.03 K/UL (ref 0–0.04)
IMM GRANULOCYTES NFR BLD AUTO: 0.5 % (ref 0–0.5)
LYMPHOCYTES # BLD AUTO: 2.9 K/UL (ref 1–4.8)
LYMPHOCYTES NFR BLD: 44.6 % (ref 18–48)
MCH RBC QN AUTO: 31.4 PG (ref 27–31)
MCHC RBC AUTO-ENTMCNC: 33.2 G/DL (ref 32–36)
MCV RBC AUTO: 95 FL (ref 82–98)
MONOCYTES # BLD AUTO: 0.6 K/UL (ref 0.3–1)
MONOCYTES NFR BLD: 9.8 % (ref 4–15)
NEUTROPHILS # BLD AUTO: 2.6 K/UL (ref 1.8–7.7)
NEUTROPHILS NFR BLD: 39.8 % (ref 38–73)
NRBC BLD-RTO: 0 /100 WBC
PHOSPHATE SERPL-MCNC: 3.6 MG/DL (ref 2.7–4.5)
PLATELET # BLD AUTO: 204 K/UL (ref 150–450)
PMV BLD AUTO: 13.1 FL (ref 9.2–12.9)
POTASSIUM SERPL-SCNC: 4.2 MMOL/L (ref 3.5–5.1)
POTASSIUM SERPL-SCNC: 4.2 MMOL/L (ref 3.5–5.1)
PTH-INTACT SERPL-MCNC: 46.7 PG/ML (ref 9–77)
RBC # BLD AUTO: 4.75 M/UL (ref 4.6–6.2)
SODIUM SERPL-SCNC: 142 MMOL/L (ref 136–145)
SODIUM SERPL-SCNC: 142 MMOL/L (ref 136–145)
WBC # BLD AUTO: 6.41 K/UL (ref 3.9–12.7)

## 2022-04-06 PROCEDURE — 85025 COMPLETE CBC W/AUTO DIFF WBC: CPT | Performed by: NURSE PRACTITIONER

## 2022-04-06 PROCEDURE — 80069 RENAL FUNCTION PANEL: CPT | Performed by: NURSE PRACTITIONER

## 2022-04-06 PROCEDURE — 36415 COLL VENOUS BLD VENIPUNCTURE: CPT | Mod: PO | Performed by: NURSE PRACTITIONER

## 2022-04-06 PROCEDURE — 83970 ASSAY OF PARATHORMONE: CPT | Performed by: NURSE PRACTITIONER

## 2022-04-06 NOTE — PROGRESS NOTES
Please inform the patient that his kidney functions have slightly improved. I would recommend to resume Eliquis 5 mg BID and we should repeat the test in 1 month.

## 2022-04-06 NOTE — TELEPHONE ENCOUNTER
----- Message from Nereyda Birmingham MD sent at 4/6/2022  3:30 PM CDT -----  Please inform the patient that his kidney functions have slightly improved. I would recommend to resume Eliquis 5 mg BID and we should repeat the test in 1 month.

## 2022-04-12 ENCOUNTER — OFFICE VISIT (OUTPATIENT)
Dept: NEPHROLOGY | Facility: CLINIC | Age: 87
End: 2022-04-12
Payer: MEDICARE

## 2022-04-12 VITALS
SYSTOLIC BLOOD PRESSURE: 125 MMHG | WEIGHT: 153.88 LBS | OXYGEN SATURATION: 99 % | HEIGHT: 64 IN | HEART RATE: 72 BPM | BODY MASS INDEX: 26.27 KG/M2 | DIASTOLIC BLOOD PRESSURE: 61 MMHG

## 2022-04-12 DIAGNOSIS — N28.1 COMPLEX RENAL CYST: ICD-10-CM

## 2022-04-12 DIAGNOSIS — I10 HYPERTENSION, UNSPECIFIED TYPE: ICD-10-CM

## 2022-04-12 DIAGNOSIS — E11.9 TYPE 2 DIABETES MELLITUS WITHOUT COMPLICATION, WITHOUT LONG-TERM CURRENT USE OF INSULIN: ICD-10-CM

## 2022-04-12 DIAGNOSIS — N18.30 STAGE 3 CHRONIC KIDNEY DISEASE, UNSPECIFIED WHETHER STAGE 3A OR 3B CKD: Primary | ICD-10-CM

## 2022-04-12 PROCEDURE — 99214 PR OFFICE/OUTPT VISIT, EST, LEVL IV, 30-39 MIN: ICD-10-PCS | Mod: S$PBB,,, | Performed by: NURSE PRACTITIONER

## 2022-04-12 PROCEDURE — 99214 OFFICE O/P EST MOD 30 MIN: CPT | Mod: S$PBB,,, | Performed by: NURSE PRACTITIONER

## 2022-04-12 PROCEDURE — 99214 OFFICE O/P EST MOD 30 MIN: CPT | Mod: PBBFAC | Performed by: NURSE PRACTITIONER

## 2022-04-12 PROCEDURE — 99999 PR PBB SHADOW E&M-EST. PATIENT-LVL IV: CPT | Mod: PBBFAC,,, | Performed by: NURSE PRACTITIONER

## 2022-04-12 PROCEDURE — 99999 PR PBB SHADOW E&M-EST. PATIENT-LVL IV: ICD-10-PCS | Mod: PBBFAC,,, | Performed by: NURSE PRACTITIONER

## 2022-04-12 NOTE — PROGRESS NOTES
"Subjective:       Patient ID: Cliff Magaña is a 86 y.o. white male who presents for new evaluation of   No chief complaint on file.    HPI     Patient is new to me. New to clinic.  Prior pertinent chart reviewed since this is patient's first appointment with me.    Patient presents for new evaluation of CKD and renal cysts.  Baseline creatinine of 1.3-1.5 mg/dL.    Had MRI for back pain to determine placment of injections; incidentlally found cysts on kidneys at that time.    Significant hx includes HTN since before [place], afib, HLD, metabolic syndrome, aortic atheroslcerosis, T2DM      The patient denies taking NSAIDs, herbal supplements, or new antibiotics, recreational drugs, recent episode of dehydration, diarrhea, nausea or vomiting, acute illness, hospitalization or exposure to IV radiocontrast.     Significant family hx includes: No known kidney disease.    Last renal US: April 2021, reviewed.    Update 10/5/21:  Last seen by me in May 2021.  Baseline sCr 1.3-1.5 mg/dL.  Home BPs: fluctuate from low 100s-140s/60-70s    Denies recent hospitalizations or NSAID use.    Update 4/12/22:  Returns for f/u of CKD.  Baseline sCr 1.3-1.5 mg/dL.  Home BPs: 110s-130s/60s-70s on digital monitoring  Denies recent hospitalizations or NSAID use.      Review of Systems   Respiratory: Negative for shortness of breath.    Cardiovascular: Negative for leg swelling.   Gastrointestinal: Negative for diarrhea, nausea and vomiting.   Genitourinary: Negative for difficulty urinating, dysuria and hematuria.   Musculoskeletal: Positive for back pain (near beltline).   Neurological: Negative for dizziness.       Objective:       Blood pressure 125/61, pulse 72, height 5' 4" (1.626 m), weight 69.8 kg (153 lb 14.1 oz), SpO2 99 %.  Physical Exam  Vitals reviewed.   Constitutional:       General: He is not in acute distress.     Appearance: Normal appearance. He is well-developed.   Eyes:      Conjunctiva/sclera: Conjunctivae normal. "   Cardiovascular:      Rate and Rhythm: Normal rate.   Pulmonary:      Effort: Pulmonary effort is normal. No respiratory distress.      Breath sounds: Normal breath sounds.   Abdominal:      Tenderness: There is no right CVA tenderness or left CVA tenderness.   Musculoskeletal:      Cervical back: Neck supple.      Right lower leg: No edema.      Left lower leg: No edema.   Skin:     General: Skin is warm and dry.      Findings: No lesion or rash.   Neurological:      Mental Status: He is alert and oriented to person, place, and time.   Psychiatric:         Mood and Affect: Mood normal.         Behavior: Behavior normal.         Thought Content: Thought content normal.         Judgment: Judgment normal.           Lab Results   Component Value Date    CREATININE 1.3 04/06/2022    CREATININE 1.3 04/06/2022     Prot/Creat Ratio, Urine   Date Value Ref Range Status   04/06/2022 Unable to calculate 0.00 - 0.20 Final   10/05/2021 0.17 0.00 - 0.20 Final   05/12/2021 Unable to calculate 0.00 - 0.20 Final   05/12/2021 Unable to calculate 0.00 - 0.20 Final     Lab Results   Component Value Date     04/06/2022     04/06/2022    K 4.2 04/06/2022    K 4.2 04/06/2022    CO2 25 04/06/2022    CO2 25 04/06/2022     04/06/2022     04/06/2022     Lab Results   Component Value Date    PTH 46.7 04/06/2022    CALCIUM 10.1 04/06/2022    CALCIUM 10.1 04/06/2022    PHOS 3.6 04/06/2022     Lab Results   Component Value Date    HGB 14.9 04/06/2022    WBC 6.41 04/06/2022    HCT 44.9 04/06/2022      Lab Results   Component Value Date    HGBA1C 6.3 (H) 12/17/2021     04/06/2022    BUN 38 (H) 04/06/2022    BUN 38 (H) 04/06/2022     Lab Results   Component Value Date    LDLCALC 104.8 03/25/2022         Assessment:       1. Stage 3 chronic kidney disease, unspecified whether stage 3a or 3b CKD    2. Complex renal cyst    3. Hypertension, unspecified type    4. Type 2 diabetes mellitus without complication, without  "long-term current use of insulin        Plan:   CKD stage 3A c eGFR 45-49 mL/min - likely 2/2 age-related nephron loss coupled with persistent afib.  Less likely that renal cysts are contributing. Need repeat labs. Stable. Educated patient to control BP, BG, remain well-hydrated, and avoid NSAIDs to prevent progression of CKD.    UPCR Albuminuric in [id number] but not recently. On ARB.   Acid-base WNL   Renal osteodystrophy Ca okay. Phos, PTH okay. Vit D WNL.   Anemia WNL   DM Well-controlled.    Lipid Management On fenofibrate since at least 2012.   ESRD planning  Reassurance given.     HTN - WNL on digital monitoring record. On amlodipine 10 mg, Hctz 25 mg, metoprolol succinate 75 mg, valsartan 80 mg.     Renal cysts - complex cyst reviewed by Dr. Moffett in October 2021. He classified it has "minimally complex" and recommended 1 year follow up with [facility]. Will defer to urology for further management of renal cysts.    All questions patient had were answered.  Asked if further questions. None. F/u in clinic in 6 mos with labs and urine prior to next visit or sooner if needed.  ER for emergency concerns.    Note: enrolled in [employer] 10/5/21; post-survey completed today with the help of patient's spouse. They do not remember the [employer] card.    Summary of Plan:  1. Continue current BP regimen  2. avoid NSAID/ bactrim/ IV contrast/ gadolinium/ aminoglycoside where possible  3. RTC in 6 mos     Addendum 4/19/22: Pt is actually not enrolled in [employer]. Post-survey he took was discarded.    "

## 2022-04-25 NOTE — TELEPHONE ENCOUNTER
No new care gaps identified.  Powered by [employer]. Reference number: [id number].   4/25/2022 3:01:39 AM CDT

## 2022-04-26 RX ORDER — HYDROCHLOROTHIAZIDE 25 MG/1
TABLET ORAL
Qty: 90 TABLET | Refills: 1 | Status: SHIPPED | OUTPATIENT
Start: 2022-04-26 | End: 2022-08-17 | Stop reason: SDUPTHER

## 2022-04-26 NOTE — TELEPHONE ENCOUNTER
Refill Authorization Note   Cliff Magaña  is requesting a refill authorization.  Brief Assessment and Rationale for Refill:  Approve     Medication Therapy Plan:       Medication Reconciliation Completed: No   Comments:     No Care Gaps recommended.     Note composed:10:19 AM 04/26/2022

## 2022-04-29 ENCOUNTER — OFFICE VISIT (OUTPATIENT)
Dept: PODIATRY | Facility: CLINIC | Age: 87
End: 2022-04-29
Payer: MEDICARE

## 2022-04-29 ENCOUNTER — PATIENT OUTREACH (OUTPATIENT)
Dept: ADMINISTRATIVE | Facility: OTHER | Age: 87
End: 2022-04-29
Payer: MEDICARE

## 2022-04-29 VITALS
SYSTOLIC BLOOD PRESSURE: 133 MMHG | HEART RATE: 71 BPM | BODY MASS INDEX: 26.12 KG/M2 | WEIGHT: 153 LBS | DIASTOLIC BLOOD PRESSURE: 65 MMHG | HEIGHT: 64 IN

## 2022-04-29 DIAGNOSIS — E11.51 TYPE 2 DIABETES MELLITUS WITH PERIPHERAL VASCULAR DISEASE: ICD-10-CM

## 2022-04-29 DIAGNOSIS — E11.49 TYPE 2 DIABETES MELLITUS WITH NEUROLOGICAL MANIFESTATIONS: Primary | ICD-10-CM

## 2022-04-29 DIAGNOSIS — B35.1 ONYCHOMYCOSIS: ICD-10-CM

## 2022-04-29 PROCEDURE — 99499 NO LOS: ICD-10-PCS | Mod: S$PBB,,, | Performed by: PODIATRIST

## 2022-04-29 PROCEDURE — 99999 PR PBB SHADOW E&M-EST. PATIENT-LVL III: CPT | Mod: PBBFAC,,, | Performed by: PODIATRIST

## 2022-04-29 PROCEDURE — 99499 UNLISTED E&M SERVICE: CPT | Mod: S$PBB,,, | Performed by: PODIATRIST

## 2022-04-29 PROCEDURE — 11721 DEBRIDE NAIL 6 OR MORE: CPT | Mod: PBBFAC,PN | Performed by: PODIATRIST

## 2022-04-29 PROCEDURE — 11721 ROUTINE FOOT CARE: ICD-10-PCS | Mod: Q9,S$PBB,, | Performed by: PODIATRIST

## 2022-04-29 PROCEDURE — 99213 OFFICE O/P EST LOW 20 MIN: CPT | Mod: PBBFAC,PN | Performed by: PODIATRIST

## 2022-04-29 PROCEDURE — 99999 PR PBB SHADOW E&M-EST. PATIENT-LVL III: ICD-10-PCS | Mod: PBBFAC,,, | Performed by: PODIATRIST

## 2022-04-29 NOTE — PROGRESS NOTES
Subjective:      Patient ID: Cliff Magaña is a 86 y.o. male.    Chief Complaint: Nail Care (3 month nail care; also of note, had arterial US done in Jan)    Cliff is a 86 y.o. male who presents to the clinic upon referral from Dr. Steff choi. provider found  for evaluation and treatment of diabetic feet. Cliff has a past medical history of *Atrial fibrillation, Chronic kidney disease, Hyperlipidemia, Hypertension, Metabolic syndrome, and Type 2 diabetes mellitus, without long-term current use of insulin (12/13/2021). Patient relates no major problem with feet. Only complaints today consist of thickened toenails that he has difficulty trimming.  He will sometimes get a pedicure.  He also relates he gets intermittent cramping to both legs at night.  History of chronic low back pain with right lower extremity symptoms.    04/29/2022: Returns for routine nail care. No new concerns.     PCP: Munir Estrada MD    Date Last Seen by PCP:  12/15/2021    Current shoe gear: Casual shoes    Hemoglobin A1C   Date Value Ref Range Status   12/17/2021 6.3 (H) 4.0 - 5.6 % Final     Comment:     ADA Screening Guidelines:  5.7-6.4%  Consistent with prediabetes  >or=6.5%  Consistent with diabetes    High levels of fetal hemoglobin interfere with the HbA1C  assay. Heterozygous hemoglobin variants (HbS, HgC, etc)do  not significantly interfere with this assay.   However, presence of multiple variants may affect accuracy.     04/19/2021 6.2 (H) 4.0 - 5.6 % Final     Comment:     ADA Screening Guidelines:  5.7-6.4%  Consistent with prediabetes  >or=6.5%  Consistent with diabetes    High levels of fetal hemoglobin interfere with the HbA1C  assay. Heterozygous hemoglobin variants (HbS, HgC, etc)do  not significantly interfere with this assay.   However, presence of multiple variants may affect accuracy.     01/15/2021 7.9 (H) 4.0 - 5.6 % Final     Comment:     ADA Screening Guidelines:  5.7-6.4%  Consistent with prediabetes  >or=6.5%   "Consistent with diabetes  High levels of fetal hemoglobin interfere with the HbA1C  assay. Heterozygous hemoglobin variants (HbS, HgC, etc)do  not significantly interfere with this assay.   However, presence of multiple variants may affect accuracy.       Vitals:    22 0929   BP: 133/65   Pulse: 71   Weight: 69.4 kg (153 lb)   Height: 5' 4" (1.626 m)   PainSc: 0-No pain      Past Medical History:   Diagnosis Date    *Atrial fibrillation     Chronic kidney disease     Hyperlipidemia     Hypertension     Metabolic syndrome     Type 2 diabetes mellitus, without long-term current use of insulin 2021       Past Surgical History:   Procedure Laterality Date    CATARACT EXTRACTION      CHOLECYSTECTOMY      EYE SURGERY      INJECTION OF FACET JOINT Bilateral 2021    Procedure: FACET JOINT INJECTION BILATERAL L4/L5 DIRECT REFERRAL;  Surgeon: Philipp Iyer MD;  [facility];  Service: Pain Management;  Laterality: Bilateral;  NEEDS CONSENT, ELIQUIS CLEARANCE IN CHART    SKIN CANCER EXCISION      TONSILLECTOMY         Family History   Problem Relation Age of Onset    Diabetes Maternal Aunt     Heart attack Neg Hx     Heart disease Neg Hx     Heart failure Neg Hx     Hyperlipidemia Neg Hx     Hypertension Neg Hx     Stroke Neg Hx        Social History     Socioeconomic History    Marital status:    Tobacco Use    Smoking status: Former Smoker     Packs/day: 2.00     Years: 20.00     Pack years: 40.00     Quit date: 1983     Years since quittin.9    Smokeless tobacco: Never Used   Substance and Sexual Activity    Alcohol use: Yes     Alcohol/week: 1.0 standard drink     Types: 1 Standard drinks or equivalent per week     Comment: 3 drinks per week    Drug use: No    Sexual activity: Yes     Partners: Female     Social Determinants of Health     Financial Resource Strain: Low Risk     Difficulty of Paying Living Expenses: Not hard at all   Food Insecurity: " No Food Insecurity    Worried About Running Out of Food in the Last Year: Never true    Ran Out of Food in the Last Year: Never true   Transportation Needs: No Transportation Needs    Lack of Transportation (Medical): No    Lack of Transportation (Non-Medical): No   Physical Activity: Sufficiently Active    Days of Exercise per Week: 5 days    Minutes of Exercise per Session: 60 min   Stress: No Stress Concern Present    Feeling of Stress : Not at all   Social Connections: Unknown    Frequency of Communication with Friends and Family: Three times a week    Frequency of Social Gatherings with Friends and Family: Once a week    Active Member of Clubs or Organizations: Yes    Attends Club or Organization Meetings: More than 4 times per year    Marital Status:    Housing Stability: Low Risk     Unable to Pay for Housing in the Last Year: No    Number of Places Lived in the Last Year: 1    Unstable Housing in the Last Year: No       Current Outpatient Medications   Medication Sig Dispense Refill    acetaminophen (TYLENOL) 650 MG TbSR Take 650 mg by mouth every 8 (eight) hours.      amLODIPine (NORVASC) 10 MG tablet Take 1 tablet (10 mg total) by mouth once daily. 90 tablet 0    apixaban (ELIQUIS) 5 mg Tab Take 1 tablet (5 mg total) by mouth 2 (two) times daily. 180 tablet 3    fenofibrate micronized (LOFIBRA) 200 MG Cap TAKE 1 CAPSULE DAILY WITH BREAKFAST 90 capsule 1    hydroCHLOROthiazide (HYDRODIURIL) 25 MG tablet TAKE 1 TABLET DAILY 90 tablet 1    krill-om-3-dha-epa-phospho-ast 1,000-230-60 mg Cap Take by mouth once daily. Pt taking one pill once a day      loratadine (CLARITIN) 10 mg tablet Take 10 mg by mouth once daily.      TRAMAINE BIOTIN ORAL Take 5,000 mcg by mouth once daily.      metFORMIN (GLUCOPHAGE-XR) 500 MG ER 24hr tablet TAKE 1 TABLET DAILY 90 tablet 1    metoprolol succinate (TOPROL-XL) 25 MG 24 hr tablet TAKE 1 TABLET DAILY 90 tablet 3    metoprolol succinate (TOPROL-XL)  50 MG 24 hr tablet TAKE ONE AND ONE-HALF TABLETS ONCE DAILY (Patient taking differently: Take 50 mg by mouth once daily.) 135 tablet 3    valsartan (DIOVAN) 80 MG tablet Take 1 tablet (80 mg total) by mouth once daily. 90 tablet 0     No current facility-administered medications for this visit.       Review of patient's allergies indicates:   Allergen Reactions    Niacin      Other reaction(s): Rash  Other reaction(s): Itching           Review of Systems   Constitutional: Negative for chills, fever and malaise/fatigue.   HENT: Negative for congestion and hearing loss.    Cardiovascular: Negative for chest pain, claudication and leg swelling.   Respiratory: Negative for cough and shortness of breath.    Skin: Positive for nail changes.   Musculoskeletal: Positive for back pain. Negative for joint pain, muscle cramps and muscle weakness.   Gastrointestinal: Negative for nausea and vomiting.   Neurological: Positive for paresthesias. Negative for numbness and weakness.   Psychiatric/Behavioral: Negative for altered mental status.           Objective:      Physical Exam  Constitutional:       General: He is not in acute distress.     Appearance: Normal appearance. He is not ill-appearing.   Cardiovascular:      Pulses:           Dorsalis pedis pulses are 0 on the right side and 0 on the left side.        Posterior tibial pulses are 0 on the right side and 0 on the left side.      Comments: Abnormal monophasic signal detect of the DP and PT bilateral with Doppler.  Patient has history of AFib which is apparent with the Doppler.    Mild nonpitting edema to lower extremity bilateral.  No hair growth bilateral lower extremity.  Mild rubor on dependency bilateral lower extremity.  Musculoskeletal:      Comments: No pain with ROM or MMT bilateral lower extremity.    No significant digital deformity bilateral.  Rectus appearing foot type bilateral.   Feet:      Right foot:      Protective Sensation: 10 sites tested. 9 sites  sensed.      Skin integrity: Dry skin present.      Toenail Condition: Right toenails are abnormally thick and long.      Left foot:      Protective Sensation: 10 sites tested. 10 sites sensed.      Skin integrity: Dry skin present.      Toenail Condition: Left toenails are abnormally thick and long.   Skin:     General: Skin is warm.      Capillary Refill: Capillary refill takes 2 to 3 seconds.      Findings: No ecchymosis or erythema.      Nails: There is no clubbing.      Comments: Second toenail bilateral is growing a superior direction, thickened and discolored yellow with some mild loosening.  Nails 1, 3, 4 and 5 bilateral are mildly elongated 2-3 mm, thickened with patchy yellow discoloration mild underlying debris.    No open lesions or macerations to lower extremity bilateral.    Skin is atrophied to lower extremity bilateral.   Neurological:      Mental Status: He is alert and oriented to person, place, and time.      Sensory: Sensory deficit present.      Motor: Motor function is intact.      Comments: Absent vibratory sensation right foot and decreased left foot.               Assessment:       Encounter Diagnoses   Name Primary?    Type 2 diabetes mellitus with neurological manifestations Yes    Type 2 diabetes mellitus with peripheral vascular disease     Onychomycosis          Plan:       Cliff was seen today for nail care.    Diagnoses and all orders for this visit:    Type 2 diabetes mellitus with neurological manifestations  -     Routine Foot Care    Type 2 diabetes mellitus with peripheral vascular disease  -     Routine Foot Care    Onychomycosis  -     Routine Foot Care      I counseled the patient on his conditions, their implications and medical management.    Shoe inspection. Diabetic Foot Education. Patient reminded of the importance of good nutrition and blood sugar control to help prevent podiatric complications of diabetes. Patient instructed on proper foot hygeine. We discussed  wearing proper shoe gear, daily foot inspections, never walking without protective shoe gear, never putting sharp instruments to feet.    Routine foot care per attached note. Patient relates relief following the procedure. He will continue to monitor the areas daily, inspect his feet, wear protective shoe gear when ambulatory, moisturizer to maintain skin integrity.    Assisted by Ulysses Castillo DPM PGY 2    A portion of this note was generated by voice recognition software and may contain spelling and grammar errors.

## 2022-04-29 NOTE — PROGRESS NOTES
Health Maintenance Due   Topic Date Due    COVID-19 Vaccine (4 - Booster for Pfizer series) 02/14/2022     Updates were requested from care everywhere.  Chart was reviewed for overdue Proactive [facility] Encounters ([facility]) topics (CRS, Breast Cancer Screening, Eye exam)  Health Maintenance has been updated.  LINKS immunization registry triggered.  Immunizations were reconciled.

## 2022-04-29 NOTE — PROCEDURES
"Routine Foot Care    Date/Time: 4/29/2022 9:30 AM  Performed by: Bhupendra Brar DPM  Authorized by: Bhupendra Brar DPM     Time out: Immediately prior to procedure a "time out" was called to verify the correct patient, procedure, equipment, support staff and site/side marked as required.    Consent Done?:  Yes (Verbal)  Hyperkeratotic Skin Lesions?: No      Nail Care Type:  Debride  Location(s): All  (Left 1st Toe, Left 3rd Toe, Left 2nd Toe, Left 4th Toe, Left 5th Toe, Right 1st Toe, Right 2nd Toe, Right 3rd Toe, Right 4th Toe and Right 5th Toe)  Patient tolerance:  Patient tolerated the procedure well with no immediate complications     Used sterile nail nipper.        "

## 2022-05-06 ENCOUNTER — LAB VISIT (OUTPATIENT)
Dept: LAB | Facility: HOSPITAL | Age: 87
End: 2022-05-06
Payer: MEDICARE

## 2022-05-06 DIAGNOSIS — I48.19 PERSISTENT ATRIAL FIBRILLATION: ICD-10-CM

## 2022-05-06 DIAGNOSIS — Z79.01 CHRONIC ANTICOAGULATION: ICD-10-CM

## 2022-05-06 DIAGNOSIS — I70.0 ATHEROSCLEROSIS OF AORTA: ICD-10-CM

## 2022-05-06 LAB
ANION GAP SERPL CALC-SCNC: 10 MMOL/L (ref 8–16)
BUN SERPL-MCNC: 39 MG/DL (ref 8–23)
CALCIUM SERPL-MCNC: 9.8 MG/DL (ref 8.7–10.5)
CHLORIDE SERPL-SCNC: 104 MMOL/L (ref 95–110)
CO2 SERPL-SCNC: 22 MMOL/L (ref 23–29)
CREAT SERPL-MCNC: 2 MG/DL (ref 0.5–1.4)
EST. GFR  (AFRICAN AMERICAN): 33.9 ML/MIN/1.73 M^2
EST. GFR  (NON AFRICAN AMERICAN): 29.3 ML/MIN/1.73 M^2
GLUCOSE SERPL-MCNC: 154 MG/DL (ref 70–110)
POTASSIUM SERPL-SCNC: 4.4 MMOL/L (ref 3.5–5.1)
SODIUM SERPL-SCNC: 136 MMOL/L (ref 136–145)

## 2022-05-06 PROCEDURE — 36415 COLL VENOUS BLD VENIPUNCTURE: CPT | Mod: PO | Performed by: INTERNAL MEDICINE

## 2022-05-06 PROCEDURE — 80048 BASIC METABOLIC PNL TOTAL CA: CPT | Performed by: INTERNAL MEDICINE

## 2022-05-09 ENCOUNTER — TELEPHONE (OUTPATIENT)
Dept: CARDIOLOGY | Facility: CLINIC | Age: 87
End: 2022-05-09
Payer: MEDICARE

## 2022-05-09 ENCOUNTER — PATIENT MESSAGE (OUTPATIENT)
Dept: INTERNAL MEDICINE | Facility: CLINIC | Age: 87
End: 2022-05-09
Payer: MEDICARE

## 2022-05-09 DIAGNOSIS — I48.19 PERSISTENT ATRIAL FIBRILLATION: Primary | ICD-10-CM

## 2022-05-09 RX ORDER — AMLODIPINE BESYLATE 2.5 MG/1
2.5 TABLET ORAL DAILY
COMMUNITY
End: 2022-05-09 | Stop reason: CLARIF

## 2022-05-09 NOTE — TELEPHONE ENCOUNTER
Last OV 8-10-21    FYI    Pt saw his cardiologist and Rx Eliquis have been decreased to 2.5 mg BID  And a referral to see Nephrology    Are you ok with the Rx change?  Please advise, thanks

## 2022-05-09 NOTE — TELEPHONE ENCOUNTER
----- Message from Nereyda Birmingham MD sent at 5/9/2022 10:13 AM CDT -----  Please inform the patient that his kidney function fluctuate and are elevated again. I would recommend decrease Eliquis to 2.mg BID. Please send a script for 2.5mg. Until he gets new pills he could cut  the one he has in half.  I would also recommend a consult or a follow up with nephrology. I have included Dr. Estrada in this message.

## 2022-05-09 NOTE — PROGRESS NOTES
Please inform the patient that his kidney function fluctuate and are elevated again. I would recommend decrease Eliquis to 2.mg BID. Please send a script for 2.5mg. Until he gets new pills he could cut  the one he has in half.  I would also recommend a consult or a follow up with nephrology. I have included Dr. Estrada in this message.

## 2022-05-09 NOTE — TELEPHONE ENCOUNTER
I was informed of these changes by Cardiology and I am in agreement.  Please inform the patient.  Thank you.

## 2022-05-16 ENCOUNTER — TELEPHONE (OUTPATIENT)
Dept: NEPHROLOGY | Facility: CLINIC | Age: 87
End: 2022-05-16
Payer: MEDICARE

## 2022-05-17 ENCOUNTER — TELEPHONE (OUTPATIENT)
Dept: NEPHROLOGY | Facility: CLINIC | Age: 87
End: 2022-05-17

## 2022-05-17 ENCOUNTER — LAB VISIT (OUTPATIENT)
Dept: LAB | Facility: HOSPITAL | Age: 87
End: 2022-05-17
Payer: MEDICARE

## 2022-05-17 ENCOUNTER — OFFICE VISIT (OUTPATIENT)
Dept: NEPHROLOGY | Facility: CLINIC | Age: 87
End: 2022-05-17
Payer: MEDICARE

## 2022-05-17 VITALS
HEART RATE: 74 BPM | DIASTOLIC BLOOD PRESSURE: 67 MMHG | SYSTOLIC BLOOD PRESSURE: 124 MMHG | BODY MASS INDEX: 26.64 KG/M2 | WEIGHT: 156.06 LBS | HEIGHT: 64 IN | OXYGEN SATURATION: 98 %

## 2022-05-17 DIAGNOSIS — N17.9 AKI (ACUTE KIDNEY INJURY): Primary | ICD-10-CM

## 2022-05-17 DIAGNOSIS — E87.5 HYPERKALEMIA: Primary | ICD-10-CM

## 2022-05-17 DIAGNOSIS — I10 HYPERTENSION, UNSPECIFIED TYPE: ICD-10-CM

## 2022-05-17 DIAGNOSIS — Q61.02 MULTIPLE RENAL CYSTS: ICD-10-CM

## 2022-05-17 DIAGNOSIS — N17.9 AKI (ACUTE KIDNEY INJURY): ICD-10-CM

## 2022-05-17 DIAGNOSIS — N18.30 STAGE 3 CHRONIC KIDNEY DISEASE, UNSPECIFIED WHETHER STAGE 3A OR 3B CKD: ICD-10-CM

## 2022-05-17 LAB
ALBUMIN SERPL BCP-MCNC: 4 G/DL (ref 3.5–5.2)
ANION GAP SERPL CALC-SCNC: 9 MMOL/L (ref 8–16)
BUN SERPL-MCNC: 33 MG/DL (ref 8–23)
CALCIUM SERPL-MCNC: 9.9 MG/DL (ref 8.7–10.5)
CHLORIDE SERPL-SCNC: 106 MMOL/L (ref 95–110)
CO2 SERPL-SCNC: 24 MMOL/L (ref 23–29)
CREAT SERPL-MCNC: 1.5 MG/DL (ref 0.5–1.4)
EST. GFR  (AFRICAN AMERICAN): 48 ML/MIN/1.73 M^2
EST. GFR  (NON AFRICAN AMERICAN): 41.6 ML/MIN/1.73 M^2
GLUCOSE SERPL-MCNC: 143 MG/DL (ref 70–110)
PHOSPHATE SERPL-MCNC: 2.9 MG/DL (ref 2.7–4.5)
POTASSIUM SERPL-SCNC: 5.6 MMOL/L (ref 3.5–5.1)
SODIUM SERPL-SCNC: 139 MMOL/L (ref 136–145)

## 2022-05-17 PROCEDURE — 99214 OFFICE O/P EST MOD 30 MIN: CPT | Mod: S$PBB,,, | Performed by: NURSE PRACTITIONER

## 2022-05-17 PROCEDURE — 80069 RENAL FUNCTION PANEL: CPT | Performed by: NURSE PRACTITIONER

## 2022-05-17 PROCEDURE — 99214 PR OFFICE/OUTPT VISIT, EST, LEVL IV, 30-39 MIN: ICD-10-PCS | Mod: S$PBB,,, | Performed by: NURSE PRACTITIONER

## 2022-05-17 PROCEDURE — 99999 PR PBB SHADOW E&M-EST. PATIENT-LVL IV: ICD-10-PCS | Mod: PBBFAC,,, | Performed by: NURSE PRACTITIONER

## 2022-05-17 PROCEDURE — 36415 COLL VENOUS BLD VENIPUNCTURE: CPT | Performed by: NURSE PRACTITIONER

## 2022-05-17 PROCEDURE — 99214 OFFICE O/P EST MOD 30 MIN: CPT | Mod: PBBFAC | Performed by: NURSE PRACTITIONER

## 2022-05-17 PROCEDURE — 99999 PR PBB SHADOW E&M-EST. PATIENT-LVL IV: CPT | Mod: PBBFAC,,, | Performed by: NURSE PRACTITIONER

## 2022-05-17 NOTE — PROGRESS NOTES
Subjective:       Patient ID: Cliff Magaña is a 86 y.o. white male who presents for new evaluation of   No chief complaint on file.    HPI     Patient is new to me. New to clinic.  Prior pertinent chart reviewed since this is patient's first appointment with me.    Patient presents for new evaluation of CKD and renal cysts.  Baseline creatinine of 1.3-1.5 mg/dL.    Had MRI for back pain to determine placment of injections; incidentlally found cysts on kidneys at that time.    Significant hx includes HTN since before [place], afib, HLD, metabolic syndrome, aortic atheroslcerosis, T2DM      The patient denies taking NSAIDs, herbal supplements, or new antibiotics, recreational drugs, recent episode of dehydration, diarrhea, nausea or vomiting, acute illness, hospitalization or exposure to IV radiocontrast.     Significant family hx includes: No known kidney disease.    Last renal US: April 2021, reviewed.    Update 10/5/21:  Last seen by me in May 2021.  Baseline sCr 1.3-1.5 mg/dL.  Home BPs: fluctuate from low 100s-140s/60-70s    Denies recent hospitalizations or NSAID use.    Update 4/12/22:  Returns for f/u of CKD.  Baseline sCr 1.3-1.5 mg/dL.  Home BPs: 110s-130s/60s-70s on digital monitoring  Denies recent hospitalizations or NSAID use.    Update 5/17/22:  Returns for f/u of CKD.  Now has an JER.  Baseline sCr 1.3-1.5 mg/dL. sCr 2.0 recently.  Says he has h/o nephrolithiasis; never had any surgically removed.  Eliquis decreased by cardiology.  Home BPs: 110s-130s/60s-70s on digital monitoring; occassional SBP in 140s or less than 110  Denies recent NSAID use, new antibiotics, illnesses.    Review of Systems   Respiratory: Negative for shortness of breath.    Cardiovascular: Negative for leg swelling.   Gastrointestinal: Negative for diarrhea, nausea and vomiting.   Genitourinary: Positive for frequency (nocturia (increased recently?) intermittently). Negative for difficulty urinating, dysuria and hematuria.  "  Musculoskeletal: Positive for back pain (near beltline).   Neurological: Negative for dizziness.       Objective:       Blood pressure 124/67, pulse 74, height 5' 4" (1.626 m), weight 70.8 kg (156 lb 1.4 oz), SpO2 98 %.  Physical Exam  Vitals reviewed.   Constitutional:       General: He is not in acute distress.     Appearance: Normal appearance. He is well-developed.   Eyes:      Conjunctiva/sclera: Conjunctivae normal.   Cardiovascular:      Rate and Rhythm: Normal rate. Rhythm irregular.   Pulmonary:      Effort: Pulmonary effort is normal. No respiratory distress.      Breath sounds: Normal breath sounds. No wheezing or rales.   Abdominal:      Tenderness: There is no right CVA tenderness or left CVA tenderness.   Musculoskeletal:      Cervical back: Neck supple.      Right lower leg: Edema (trace) present.      Left lower leg: Edema (trace) present.   Skin:     General: Skin is warm and dry.      Findings: No lesion or rash.   Neurological:      Mental Status: He is alert and oriented to person, place, and time.   Psychiatric:         Mood and Affect: Mood normal.         Behavior: Behavior normal.         Thought Content: Thought content normal.         Judgment: Judgment normal.           Lab Results   Component Value Date    CREATININE 2.0 (H) 05/06/2022     Prot/Creat Ratio, Urine   Date Value Ref Range Status   04/06/2022 Unable to calculate 0.00 - 0.20 Final   10/05/2021 0.17 0.00 - 0.20 Final   05/12/2021 Unable to calculate 0.00 - 0.20 Final   05/12/2021 Unable to calculate 0.00 - 0.20 Final     Lab Results   Component Value Date     05/06/2022    K 4.4 05/06/2022    CO2 22 (L) 05/06/2022     05/06/2022     Lab Results   Component Value Date    PTH 46.7 04/06/2022    CALCIUM 9.8 05/06/2022    PHOS 3.6 04/06/2022     Lab Results   Component Value Date    HGB 14.9 04/06/2022    WBC 6.41 04/06/2022    HCT 44.9 04/06/2022      Lab Results   Component Value Date    HGBA1C 6.3 (H) 12/17/2021    " " 04/06/2022    BUN 39 (H) 05/06/2022     Lab Results   Component Value Date    LDLCALC 104.8 03/25/2022         Assessment:       1. JER (acute kidney injury)    2. Stage 3 chronic kidney disease, unspecified whether stage 3a or 3b CKD    3. Multiple renal cysts    4. Hypertension, unspecified type        Plan:   CKD stage 3A c eGFR 45-49 mL/min c superimposed JER - likely 2/2 age-related nephron loss coupled with persistent afib.  Less likely that renal cysts are contributing. Educated patient to control BP, BG, remain well-hydrated, and avoid NSAIDs to prevent progression of CKD.    Unclear cause of JER, though patient says he only drinks about 36 oz of water daily. Encouraged to increase to 48-64 oz. Repeat labs today. If no improvement, consider renal US; pt reports h/o nephrolithiasis. Also consider d/c fenofibrate.    UPCR Albuminuric in 5141-5206 but not recently. On ARB.   Acid-base WNL   Renal osteodystrophy Ca okay. Phos, PTH okay. Vit D WNL.   Anemia WNL   DM Well-controlled.    Lipid Management On fenofibrate since at least 2012.   ESRD planning  Reassurance given.     HTN - WNL on digital monitoring record. On amlodipine 10 mg, Hctz 25 mg, metoprolol succinate 75 mg, valsartan 80 mg.     Renal cysts - complex cyst reviewed by Dr. Moffett in October 2021. He classified it has "minimally complex" and recommended 1 year follow up with [facility]. Will defer to urology for further management of renal cysts.    All questions patient had were answered.  Asked if further questions. None. F/u in clinic in 6 mos with labs and urine prior to next visit or sooner if needed.  ER for emergency concerns.    Summary of Plan:  1. CBC, RFP, UA, UPCR  2. avoid NSAID/ bactrim/ IV contrast/ gadolinium/ aminoglycoside where possible  3. RTC as previously scheduled        "

## 2022-05-17 NOTE — TELEPHONE ENCOUNTER
----- Message from Thi Castillo NP sent at 5/17/2022 10:42 AM CDT -----  Pls inform pt that his kidney function is back to his previous level, but now his potassium is a bit high.    He should stop the valsartan for now. Hydrate well with water. Eat a low K diet (please advise him on foods to avoid like avoid orange juice, powdered drink mixes like crystal light lemonade, tomatoes, potatoes, avocados, bananas) as well as warning signs of high potassium for which he should got to ER: Shortness of breath, chest pain, palpitations of your heart, weakness, or muscle twitching.    I would like him to repeat BMP on Friday to make sure it improves. I will enter order. Thanks.

## 2022-05-20 ENCOUNTER — LAB VISIT (OUTPATIENT)
Dept: LAB | Facility: HOSPITAL | Age: 87
End: 2022-05-20
Attending: NURSE PRACTITIONER
Payer: MEDICARE

## 2022-05-20 ENCOUNTER — PATIENT MESSAGE (OUTPATIENT)
Dept: NEPHROLOGY | Facility: CLINIC | Age: 87
End: 2022-05-20
Payer: MEDICARE

## 2022-05-20 DIAGNOSIS — E87.5 HYPERKALEMIA: ICD-10-CM

## 2022-05-20 LAB
ANION GAP SERPL CALC-SCNC: 8 MMOL/L (ref 8–16)
BUN SERPL-MCNC: 32 MG/DL (ref 8–23)
CALCIUM SERPL-MCNC: 10 MG/DL (ref 8.7–10.5)
CHLORIDE SERPL-SCNC: 106 MMOL/L (ref 95–110)
CO2 SERPL-SCNC: 23 MMOL/L (ref 23–29)
CREAT SERPL-MCNC: 1.5 MG/DL (ref 0.5–1.4)
EST. GFR  (AFRICAN AMERICAN): 48 ML/MIN/1.73 M^2
EST. GFR  (NON AFRICAN AMERICAN): 41.6 ML/MIN/1.73 M^2
GLUCOSE SERPL-MCNC: 235 MG/DL (ref 70–110)
POTASSIUM SERPL-SCNC: 4.7 MMOL/L (ref 3.5–5.1)
SODIUM SERPL-SCNC: 137 MMOL/L (ref 136–145)

## 2022-05-20 PROCEDURE — 36415 COLL VENOUS BLD VENIPUNCTURE: CPT | Mod: PO | Performed by: NURSE PRACTITIONER

## 2022-05-20 PROCEDURE — 80048 BASIC METABOLIC PNL TOTAL CA: CPT | Performed by: NURSE PRACTITIONER

## 2022-07-21 ENCOUNTER — PATIENT MESSAGE (OUTPATIENT)
Dept: OTHER | Facility: OTHER | Age: 87
End: 2022-07-21
Payer: MEDICARE

## 2022-07-25 DIAGNOSIS — G89.4 CHRONIC PAIN SYNDROME: ICD-10-CM

## 2022-07-25 DIAGNOSIS — M54.40 LOW BACK PAIN WITH SCIATICA, SCIATICA LATERALITY UNSPECIFIED, UNSPECIFIED BACK PAIN LATERALITY, UNSPECIFIED CHRONICITY: ICD-10-CM

## 2022-07-25 DIAGNOSIS — N18.30 STAGE 3 CHRONIC KIDNEY DISEASE, UNSPECIFIED WHETHER STAGE 3A OR 3B CKD: ICD-10-CM

## 2022-07-25 DIAGNOSIS — E11.22 CKD STAGE 3 SECONDARY TO DIABETES: ICD-10-CM

## 2022-07-25 DIAGNOSIS — E11.22 TYPE 2 DIABETES MELLITUS WITH CHRONIC KIDNEY DISEASE, WITHOUT LONG-TERM CURRENT USE OF INSULIN, UNSPECIFIED CKD STAGE: ICD-10-CM

## 2022-07-25 DIAGNOSIS — Z91.81 RISK FOR FALLS: ICD-10-CM

## 2022-07-25 DIAGNOSIS — R52 PAIN AGGRAVATED BY WALKING: ICD-10-CM

## 2022-07-25 DIAGNOSIS — Z79.01 CHRONIC ANTICOAGULATION: ICD-10-CM

## 2022-07-25 DIAGNOSIS — Z87.891 EX-SMOKER: ICD-10-CM

## 2022-07-25 DIAGNOSIS — Z00.00 PREVENTATIVE HEALTH CARE: Primary | ICD-10-CM

## 2022-07-25 DIAGNOSIS — I70.0 AORTIC ATHEROSCLEROSIS: ICD-10-CM

## 2022-07-25 DIAGNOSIS — L72.3 SEBACEOUS CYST: ICD-10-CM

## 2022-07-25 DIAGNOSIS — E78.2 MIXED HYPERLIPIDEMIA: ICD-10-CM

## 2022-07-25 DIAGNOSIS — Z83.3 FAMILY HISTORY OF DIABETES MELLITUS: ICD-10-CM

## 2022-07-25 DIAGNOSIS — I48.19 PERSISTENT ATRIAL FIBRILLATION: ICD-10-CM

## 2022-07-25 DIAGNOSIS — I70.0 ATHEROSCLEROSIS OF AORTA: ICD-10-CM

## 2022-07-25 DIAGNOSIS — H90.3 SENSORINEURAL HEARING LOSS (SNHL) OF BOTH EARS: ICD-10-CM

## 2022-07-25 DIAGNOSIS — N18.30 CKD STAGE 3 SECONDARY TO DIABETES: ICD-10-CM

## 2022-07-25 DIAGNOSIS — I10 PRIMARY HYPERTENSION: ICD-10-CM

## 2022-07-29 ENCOUNTER — OFFICE VISIT (OUTPATIENT)
Dept: PODIATRY | Facility: CLINIC | Age: 87
End: 2022-07-29
Payer: MEDICARE

## 2022-07-29 VITALS
HEIGHT: 64 IN | BODY MASS INDEX: 26.79 KG/M2 | SYSTOLIC BLOOD PRESSURE: 155 MMHG | HEART RATE: 73 BPM | DIASTOLIC BLOOD PRESSURE: 72 MMHG

## 2022-07-29 DIAGNOSIS — B35.1 ONYCHOMYCOSIS: ICD-10-CM

## 2022-07-29 DIAGNOSIS — E11.49 TYPE 2 DIABETES MELLITUS WITH NEUROLOGICAL MANIFESTATIONS: Primary | ICD-10-CM

## 2022-07-29 DIAGNOSIS — E11.51 TYPE 2 DIABETES MELLITUS WITH PERIPHERAL VASCULAR DISEASE: ICD-10-CM

## 2022-07-29 PROCEDURE — 99499 NO LOS: ICD-10-PCS | Mod: S$PBB,,, | Performed by: PODIATRIST

## 2022-07-29 PROCEDURE — 99213 OFFICE O/P EST LOW 20 MIN: CPT | Mod: PBBFAC,PN | Performed by: PODIATRIST

## 2022-07-29 PROCEDURE — 99999 PR PBB SHADOW E&M-EST. PATIENT-LVL III: ICD-10-PCS | Mod: PBBFAC,,, | Performed by: PODIATRIST

## 2022-07-29 PROCEDURE — 11721 ROUTINE FOOT CARE: ICD-10-PCS | Mod: Q9,S$PBB,, | Performed by: PODIATRIST

## 2022-07-29 PROCEDURE — 99499 UNLISTED E&M SERVICE: CPT | Mod: S$PBB,,, | Performed by: PODIATRIST

## 2022-07-29 PROCEDURE — 99999 PR PBB SHADOW E&M-EST. PATIENT-LVL III: CPT | Mod: PBBFAC,,, | Performed by: PODIATRIST

## 2022-07-29 PROCEDURE — 11721 DEBRIDE NAIL 6 OR MORE: CPT | Mod: Q9,PBBFAC,PN | Performed by: PODIATRIST

## 2022-07-29 NOTE — PROCEDURES
"Routine Foot Care    Date/Time: 7/29/2022 9:45 AM  Performed by: Bhupendra Brar DPM  Authorized by: Bhupendra Brar DPM     Time out: Immediately prior to procedure a "time out" was called to verify the correct patient, procedure, equipment, support staff and site/side marked as required.    Consent Done?:  Yes (Verbal)  Hyperkeratotic Skin Lesions?: No      Nail Care Type:  Debride  Location(s): All  (Left 1st Toe, Left 3rd Toe, Left 2nd Toe, Left 4th Toe, Left 5th Toe, Right 1st Toe, Right 2nd Toe, Right 3rd Toe, Right 4th Toe and Right 5th Toe)  Patient tolerance:  Patient tolerated the procedure well with no immediate complications     Used sterile nail nipper. Assisted by Ulysses Castillo DPM PGY 3      "

## 2022-07-29 NOTE — PROGRESS NOTES
Subjective:      Patient ID: Cilff Magaña is a 86 y.o. male.    Chief Complaint: Diabetes Mellitus (Munir Estrada MD  08/17/2022) and Nail Care    Cliff is a 86 y.o. male who presents to the clinic upon referral from Dr. Steff choi. provider found  for evaluation and treatment of diabetic feet. Cliff has a past medical history of *Atrial fibrillation, Chronic kidney disease, Hyperlipidemia, Hypertension, Metabolic syndrome, and Type 2 diabetes mellitus, without long-term current use of insulin (12/13/2021). Patient relates no major problem with feet. Only complaints today consist of thickened toenails that he has difficulty trimming.  He will sometimes get a pedicure.  He also relates he gets intermittent cramping to both legs at night.  History of chronic low back pain with right lower extremity symptoms.    04/29/2022: Returns for routine nail care. No new concerns.     07/29/2022: Returns for routine nail care. No new concerns.     PCP: Munir Estrada MD    Date Last Seen by PCP:  12/15/2021 Pending on 08/17/2022    Current shoe gear: Casual shoes    Hemoglobin A1C   Date Value Ref Range Status   12/17/2021 6.3 (H) 4.0 - 5.6 % Final     Comment:     ADA Screening Guidelines:  5.7-6.4%  Consistent with prediabetes  >or=6.5%  Consistent with diabetes    High levels of fetal hemoglobin interfere with the HbA1C  assay. Heterozygous hemoglobin variants (HbS, HgC, etc)do  not significantly interfere with this assay.   However, presence of multiple variants may affect accuracy.     04/19/2021 6.2 (H) 4.0 - 5.6 % Final     Comment:     ADA Screening Guidelines:  5.7-6.4%  Consistent with prediabetes  >or=6.5%  Consistent with diabetes    High levels of fetal hemoglobin interfere with the HbA1C  assay. Heterozygous hemoglobin variants (HbS, HgC, etc)do  not significantly interfere with this assay.   However, presence of multiple variants may affect accuracy.     01/15/2021 7.9 (H) 4.0 - 5.6 % Final     Comment:  "    ADA Screening Guidelines:  5.7-6.4%  Consistent with prediabetes  >or=6.5%  Consistent with diabetes  High levels of fetal hemoglobin interfere with the HbA1C  assay. Heterozygous hemoglobin variants (HbS, HgC, etc)do  not significantly interfere with this assay.   However, presence of multiple variants may affect accuracy.       Vitals:    22 0945   BP: (!) 155/72   Pulse: 73   Height: 5' 4" (1.626 m)   PainSc: 0-No pain   PainLoc: Foot      Past Medical History:   Diagnosis Date    *Atrial fibrillation     Chronic kidney disease     Hyperlipidemia     Hypertension     Metabolic syndrome     Type 2 diabetes mellitus, without long-term current use of insulin 2021       Past Surgical History:   Procedure Laterality Date    CATARACT EXTRACTION      CHOLECYSTECTOMY      EYE SURGERY      INJECTION OF FACET JOINT Bilateral 2021    Procedure: FACET JOINT INJECTION BILATERAL L4/L5 DIRECT REFERRAL;  Surgeon: Philipp Iyer MD;  Location: [facility];  Service: Pain Management;  Laterality: Bilateral;  NEEDS CONSENT, ELIQUIS CLEARANCE IN CHART    SKIN CANCER EXCISION      TONSILLECTOMY         Family History   Problem Relation Age of Onset    Diabetes Maternal Aunt     Heart attack Neg Hx     Heart disease Neg Hx     Heart failure Neg Hx     Hyperlipidemia Neg Hx     Hypertension Neg Hx     Stroke Neg Hx        Social History     Socioeconomic History    Marital status:    Tobacco Use    Smoking status: Former Smoker     Packs/day: 2.00     Years: 20.00     Pack years: 40.00     Quit date: 1983     Years since quittin.2    Smokeless tobacco: Never Used   Substance and Sexual Activity    Alcohol use: Yes     Alcohol/week: 1.0 standard drink     Types: 1 Standard drinks or equivalent per week     Comment: 3 drinks per week    Drug use: No    Sexual activity: Yes     Partners: Female     Social Determinants of Health     Financial Resource Strain: Low Risk     " Difficulty of Paying Living Expenses: Not hard at all   Food Insecurity: No Food Insecurity    Worried About Running Out of Food in the Last Year: Never true    Ran Out of Food in the Last Year: Never true   Transportation Needs: No Transportation Needs    Lack of Transportation (Medical): No    Lack of Transportation (Non-Medical): No   Physical Activity: Sufficiently Active    Days of Exercise per Week: 5 days    Minutes of Exercise per Session: 60 min   Stress: No Stress Concern Present    Feeling of Stress : Not at all   Social Connections: Unknown    Frequency of Communication with Friends and Family: Three times a week    Frequency of Social Gatherings with Friends and Family: Once a week    Active Member of Clubs or Organizations: Yes    Attends Club or Organization Meetings: More than 4 times per year    Marital Status:    Housing Stability: Low Risk     Unable to Pay for Housing in the Last Year: No    Number of Places Lived in the Last Year: 1    Unstable Housing in the Last Year: No       Current Outpatient Medications   Medication Sig Dispense Refill    acetaminophen (TYLENOL) 650 MG TbSR Take 650 mg by mouth every 8 (eight) hours.      amLODIPine (NORVASC) 10 MG tablet Take 1 tablet (10 mg total) by mouth once daily. 90 tablet 0    apixaban (ELIQUIS) 2.5 mg Tab Take 1 tablet (2.5 mg total) by mouth 2 (two) times daily. 180 tablet 3    fenofibrate micronized (LOFIBRA) 200 MG Cap TAKE 1 CAPSULE DAILY WITH BREAKFAST 90 capsule 3    hydroCHLOROthiazide (HYDRODIURIL) 25 MG tablet TAKE 1 TABLET DAILY 90 tablet 1    krill-om-3-dha-epa-phospho-ast 1,000-230-60 mg Cap Take by mouth once daily. Pt taking one pill once a day      loratadine (CLARITIN) 10 mg tablet Take 10 mg by mouth once daily.      TRAMAINE BIOTIN ORAL Take 5,000 mcg by mouth once daily.      metFORMIN (GLUCOPHAGE-XR) 500 MG ER 24hr tablet TAKE 1 TABLET DAILY 90 tablet 1    metoprolol succinate (TOPROL-XL) 25 MG 24 hr  tablet TAKE 1 TABLET DAILY 90 tablet 3    metoprolol succinate (TOPROL-XL) 50 MG 24 hr tablet TAKE ONE AND ONE-HALF TABLETS ONCE DAILY (Patient taking differently: Take 50 mg by mouth once daily.) 135 tablet 3     No current facility-administered medications for this visit.       Review of patient's allergies indicates:   Allergen Reactions    Niacin      Other reaction(s): Rash  Other reaction(s): Itching           Review of Systems   Constitutional: Negative for chills, fever and malaise/fatigue.   HENT: Negative for congestion and hearing loss.    Cardiovascular: Negative for chest pain, claudication and leg swelling.   Respiratory: Negative for cough and shortness of breath.    Skin: Positive for nail changes.   Musculoskeletal: Positive for back pain. Negative for joint pain, muscle cramps and muscle weakness.   Gastrointestinal: Negative for nausea and vomiting.   Neurological: Positive for paresthesias. Negative for numbness and weakness.   Psychiatric/Behavioral: Negative for altered mental status.           Objective:      Physical Exam  Constitutional:       General: He is not in acute distress.     Appearance: Normal appearance. He is not ill-appearing.   Cardiovascular:      Pulses:           Dorsalis pedis pulses are 0 on the right side and 0 on the left side.        Posterior tibial pulses are 0 on the right side and 0 on the left side.      Comments: Abnormal monophasic signal detect of the DP and PT bilateral with Doppler.  Patient has history of AFib which is apparent with the Doppler.    Mild nonpitting edema to lower extremity bilateral.  No hair growth bilateral lower extremity.  Mild rubor on dependency bilateral lower extremity.  Musculoskeletal:      Comments: No pain with ROM or MMT bilateral lower extremity.    No significant digital deformity bilateral.  Rectus appearing foot type bilateral.   Feet:      Right foot:      Protective Sensation: 10 sites tested. 9 sites sensed.      Skin  integrity: Dry skin present.      Toenail Condition: Right toenails are abnormally thick and long.      Left foot:      Protective Sensation: 10 sites tested. 10 sites sensed.      Skin integrity: Dry skin present.      Toenail Condition: Left toenails are abnormally thick and long.   Skin:     General: Skin is warm.      Capillary Refill: Capillary refill takes 2 to 3 seconds.      Findings: No ecchymosis or erythema.      Nails: There is no clubbing.      Comments: Second toenail bilateral is growing a superior direction, thickened and discolored yellow with some mild loosening.  Nails 1, 3, 4 and 5 bilateral are mildly elongated 2-3 mm, thickened with patchy yellow discoloration mild underlying debris.    No open lesions or macerations to lower extremity bilateral.    Skin is atrophied to lower extremity bilateral.   Neurological:      Mental Status: He is alert and oriented to person, place, and time.      Sensory: Sensory deficit present.      Motor: Motor function is intact.      Comments: Absent vibratory sensation right foot and decreased left foot.               Assessment:       Encounter Diagnoses   Name Primary?    Type 2 diabetes mellitus with neurological manifestations Yes    Type 2 diabetes mellitus with peripheral vascular disease     Onychomycosis          Plan:       Cliff was seen today for diabetes mellitus and nail care.    Diagnoses and all orders for this visit:    Type 2 diabetes mellitus with neurological manifestations  -     Routine Foot Care    Type 2 diabetes mellitus with peripheral vascular disease  -     Routine Foot Care    Onychomycosis  -     Routine Foot Care      I counseled the patient on his conditions, their implications and medical management.    Shoe inspection. Diabetic Foot Education. Patient reminded of the importance of good nutrition and blood sugar control to help prevent podiatric complications of diabetes. Patient instructed on proper foot hygeine. We discussed  wearing proper shoe gear, daily foot inspections, never walking without protective shoe gear, never putting sharp instruments to feet.    Routine foot care per attached note. Patient relates relief following the procedure. He will continue to monitor the areas daily, inspect his feet, wear protective shoe gear when ambulatory, moisturizer to maintain skin integrity.    Assisted by Ulysses Castillo DPM PGY-3    A portion of this note was generated by voice recognition software and may contain spelling and grammar errors.

## 2022-08-02 ENCOUNTER — TELEPHONE (OUTPATIENT)
Dept: CARDIOLOGY | Facility: CLINIC | Age: 87
End: 2022-08-02
Payer: MEDICARE

## 2022-08-02 DIAGNOSIS — I10 ESSENTIAL HYPERTENSION: ICD-10-CM

## 2022-08-02 DIAGNOSIS — I48.21 PERMANENT ATRIAL FIBRILLATION: ICD-10-CM

## 2022-08-02 DIAGNOSIS — E78.5 DYSLIPIDEMIA (HIGH LDL; LOW HDL): Primary | ICD-10-CM

## 2022-08-11 ENCOUNTER — LAB VISIT (OUTPATIENT)
Dept: LAB | Facility: HOSPITAL | Age: 87
End: 2022-08-11
Attending: FAMILY MEDICINE
Payer: MEDICARE

## 2022-08-11 DIAGNOSIS — I70.0 AORTIC ATHEROSCLEROSIS: ICD-10-CM

## 2022-08-11 DIAGNOSIS — N18.30 CKD STAGE 3 SECONDARY TO DIABETES: ICD-10-CM

## 2022-08-11 DIAGNOSIS — H90.3 SENSORINEURAL HEARING LOSS (SNHL) OF BOTH EARS: ICD-10-CM

## 2022-08-11 DIAGNOSIS — I10 PRIMARY HYPERTENSION: ICD-10-CM

## 2022-08-11 DIAGNOSIS — I70.0 ATHEROSCLEROSIS OF AORTA: ICD-10-CM

## 2022-08-11 DIAGNOSIS — L72.3 SEBACEOUS CYST: ICD-10-CM

## 2022-08-11 DIAGNOSIS — I48.19 PERSISTENT ATRIAL FIBRILLATION: ICD-10-CM

## 2022-08-11 DIAGNOSIS — Z91.81 RISK FOR FALLS: ICD-10-CM

## 2022-08-11 DIAGNOSIS — Z87.891 EX-SMOKER: ICD-10-CM

## 2022-08-11 DIAGNOSIS — Z79.01 CHRONIC ANTICOAGULATION: ICD-10-CM

## 2022-08-11 DIAGNOSIS — Z83.3 FAMILY HISTORY OF DIABETES MELLITUS: ICD-10-CM

## 2022-08-11 DIAGNOSIS — M54.40 LOW BACK PAIN WITH SCIATICA, SCIATICA LATERALITY UNSPECIFIED, UNSPECIFIED BACK PAIN LATERALITY, UNSPECIFIED CHRONICITY: ICD-10-CM

## 2022-08-11 DIAGNOSIS — R52 PAIN AGGRAVATED BY WALKING: ICD-10-CM

## 2022-08-11 DIAGNOSIS — G89.4 CHRONIC PAIN SYNDROME: ICD-10-CM

## 2022-08-11 DIAGNOSIS — Z00.00 PREVENTATIVE HEALTH CARE: ICD-10-CM

## 2022-08-11 DIAGNOSIS — E78.2 MIXED HYPERLIPIDEMIA: ICD-10-CM

## 2022-08-11 DIAGNOSIS — E11.22 CKD STAGE 3 SECONDARY TO DIABETES: ICD-10-CM

## 2022-08-11 DIAGNOSIS — E11.22 TYPE 2 DIABETES MELLITUS WITH CHRONIC KIDNEY DISEASE, WITHOUT LONG-TERM CURRENT USE OF INSULIN, UNSPECIFIED CKD STAGE: ICD-10-CM

## 2022-08-11 DIAGNOSIS — N18.30 STAGE 3 CHRONIC KIDNEY DISEASE, UNSPECIFIED WHETHER STAGE 3A OR 3B CKD: ICD-10-CM

## 2022-08-11 LAB
25(OH)D3+25(OH)D2 SERPL-MCNC: 19 NG/ML (ref 30–96)
ALBUMIN SERPL BCP-MCNC: 4.3 G/DL (ref 3.5–5.2)
ALP SERPL-CCNC: 52 U/L (ref 55–135)
ALT SERPL W/O P-5'-P-CCNC: 28 U/L (ref 10–44)
ANION GAP SERPL CALC-SCNC: 12 MMOL/L (ref 8–16)
AST SERPL-CCNC: 24 U/L (ref 10–40)
BASOPHILS # BLD AUTO: 0.05 K/UL (ref 0–0.2)
BASOPHILS NFR BLD: 0.7 % (ref 0–1.9)
BILIRUB SERPL-MCNC: 1 MG/DL (ref 0.1–1)
BUN SERPL-MCNC: 33 MG/DL (ref 8–23)
CALCIUM SERPL-MCNC: 10.2 MG/DL (ref 8.7–10.5)
CHLORIDE SERPL-SCNC: 103 MMOL/L (ref 95–110)
CHOLEST SERPL-MCNC: 182 MG/DL (ref 120–199)
CHOLEST/HDLC SERPL: 4.4 {RATIO} (ref 2–5)
CO2 SERPL-SCNC: 26 MMOL/L (ref 23–29)
CREAT SERPL-MCNC: 1.2 MG/DL (ref 0.5–1.4)
DIFFERENTIAL METHOD: ABNORMAL
EOSINOPHIL # BLD AUTO: 0.1 K/UL (ref 0–0.5)
EOSINOPHIL NFR BLD: 1.8 % (ref 0–8)
ERYTHROCYTE [DISTWIDTH] IN BLOOD BY AUTOMATED COUNT: 12.3 % (ref 11.5–14.5)
EST. GFR  (NO RACE VARIABLE): 58.9 ML/MIN/1.73 M^2
ESTIMATED AVG GLUCOSE: 151 MG/DL (ref 68–131)
GLUCOSE SERPL-MCNC: 143 MG/DL (ref 70–110)
HBA1C MFR BLD: 6.9 % (ref 4–5.6)
HCT VFR BLD AUTO: 50.6 % (ref 40–54)
HDLC SERPL-MCNC: 41 MG/DL (ref 40–75)
HDLC SERPL: 22.5 % (ref 20–50)
HGB BLD-MCNC: 17.6 G/DL (ref 14–18)
IMM GRANULOCYTES # BLD AUTO: 0.04 K/UL (ref 0–0.04)
IMM GRANULOCYTES NFR BLD AUTO: 0.5 % (ref 0–0.5)
LDLC SERPL CALC-MCNC: 106 MG/DL (ref 63–159)
LYMPHOCYTES # BLD AUTO: 3.5 K/UL (ref 1–4.8)
LYMPHOCYTES NFR BLD: 47.5 % (ref 18–48)
MCH RBC QN AUTO: 31.6 PG (ref 27–31)
MCHC RBC AUTO-ENTMCNC: 34.8 G/DL (ref 32–36)
MCV RBC AUTO: 91 FL (ref 82–98)
MONOCYTES # BLD AUTO: 0.7 K/UL (ref 0.3–1)
MONOCYTES NFR BLD: 8.9 % (ref 4–15)
NEUTROPHILS # BLD AUTO: 3 K/UL (ref 1.8–7.7)
NEUTROPHILS NFR BLD: 40.6 % (ref 38–73)
NONHDLC SERPL-MCNC: 141 MG/DL
NRBC BLD-RTO: 0 /100 WBC
PLATELET # BLD AUTO: 211 K/UL (ref 150–450)
PMV BLD AUTO: 13.4 FL (ref 9.2–12.9)
POTASSIUM SERPL-SCNC: 4 MMOL/L (ref 3.5–5.1)
PROT SERPL-MCNC: 7.5 G/DL (ref 6–8.4)
RBC # BLD AUTO: 5.57 M/UL (ref 4.6–6.2)
SODIUM SERPL-SCNC: 141 MMOL/L (ref 136–145)
T4 FREE SERPL-MCNC: 0.97 NG/DL (ref 0.71–1.51)
TRIGL SERPL-MCNC: 175 MG/DL (ref 30–150)
TSH SERPL DL<=0.005 MIU/L-ACNC: 1.63 UIU/ML (ref 0.4–4)
WBC # BLD AUTO: 7.29 K/UL (ref 3.9–12.7)

## 2022-08-11 PROCEDURE — 82306 VITAMIN D 25 HYDROXY: CPT | Performed by: FAMILY MEDICINE

## 2022-08-11 PROCEDURE — 36415 COLL VENOUS BLD VENIPUNCTURE: CPT | Mod: PO | Performed by: FAMILY MEDICINE

## 2022-08-11 PROCEDURE — 80053 COMPREHEN METABOLIC PANEL: CPT | Performed by: FAMILY MEDICINE

## 2022-08-11 PROCEDURE — 85025 COMPLETE CBC W/AUTO DIFF WBC: CPT | Performed by: FAMILY MEDICINE

## 2022-08-11 PROCEDURE — 80061 LIPID PANEL: CPT | Performed by: FAMILY MEDICINE

## 2022-08-11 PROCEDURE — 83036 HEMOGLOBIN GLYCOSYLATED A1C: CPT | Performed by: FAMILY MEDICINE

## 2022-08-11 PROCEDURE — 84443 ASSAY THYROID STIM HORMONE: CPT | Performed by: FAMILY MEDICINE

## 2022-08-11 PROCEDURE — 84439 ASSAY OF FREE THYROXINE: CPT | Performed by: FAMILY MEDICINE

## 2022-08-17 ENCOUNTER — PATIENT MESSAGE (OUTPATIENT)
Dept: INTERNAL MEDICINE | Facility: CLINIC | Age: 87
End: 2022-08-17

## 2022-08-17 ENCOUNTER — OFFICE VISIT (OUTPATIENT)
Dept: INTERNAL MEDICINE | Facility: CLINIC | Age: 87
End: 2022-08-17
Payer: MEDICARE

## 2022-08-17 VITALS
HEART RATE: 60 BPM | OXYGEN SATURATION: 95 % | WEIGHT: 156.94 LBS | BODY MASS INDEX: 26.15 KG/M2 | TEMPERATURE: 98 F | HEIGHT: 65 IN | SYSTOLIC BLOOD PRESSURE: 130 MMHG | DIASTOLIC BLOOD PRESSURE: 79 MMHG

## 2022-08-17 DIAGNOSIS — E78.2 MIXED HYPERLIPIDEMIA: Chronic | ICD-10-CM

## 2022-08-17 DIAGNOSIS — I70.0 ATHEROSCLEROSIS OF AORTA: ICD-10-CM

## 2022-08-17 DIAGNOSIS — I10 PRIMARY HYPERTENSION: Primary | Chronic | ICD-10-CM

## 2022-08-17 DIAGNOSIS — E11.22 TYPE 2 DIABETES MELLITUS WITH STAGE 3A CHRONIC KIDNEY DISEASE, WITHOUT LONG-TERM CURRENT USE OF INSULIN: ICD-10-CM

## 2022-08-17 DIAGNOSIS — E11.22 CKD STAGE 3 SECONDARY TO DIABETES: ICD-10-CM

## 2022-08-17 DIAGNOSIS — N18.30 CKD STAGE 3 SECONDARY TO DIABETES: ICD-10-CM

## 2022-08-17 DIAGNOSIS — N18.31 TYPE 2 DIABETES MELLITUS WITH STAGE 3A CHRONIC KIDNEY DISEASE, WITHOUT LONG-TERM CURRENT USE OF INSULIN: ICD-10-CM

## 2022-08-17 DIAGNOSIS — I48.19 PERSISTENT ATRIAL FIBRILLATION: Chronic | ICD-10-CM

## 2022-08-17 DIAGNOSIS — Z79.01 CHRONIC ANTICOAGULATION: ICD-10-CM

## 2022-08-17 PROCEDURE — 99213 OFFICE O/P EST LOW 20 MIN: CPT | Mod: PBBFAC,PO | Performed by: FAMILY MEDICINE

## 2022-08-17 PROCEDURE — 99215 PR OFFICE/OUTPT VISIT, EST, LEVL V, 40-54 MIN: ICD-10-PCS | Mod: S$PBB,,, | Performed by: FAMILY MEDICINE

## 2022-08-17 PROCEDURE — 99999 PR PBB SHADOW E&M-EST. PATIENT-LVL III: ICD-10-PCS | Mod: PBBFAC,,, | Performed by: FAMILY MEDICINE

## 2022-08-17 PROCEDURE — 99215 OFFICE O/P EST HI 40 MIN: CPT | Mod: S$PBB,,, | Performed by: FAMILY MEDICINE

## 2022-08-17 PROCEDURE — 99999 PR PBB SHADOW E&M-EST. PATIENT-LVL III: CPT | Mod: PBBFAC,,, | Performed by: FAMILY MEDICINE

## 2022-08-17 RX ORDER — METFORMIN HYDROCHLORIDE 500 MG/1
500 TABLET, EXTENDED RELEASE ORAL DAILY
Qty: 90 TABLET | Refills: 3 | Status: SHIPPED | OUTPATIENT
Start: 2022-08-17 | End: 2023-01-27 | Stop reason: SDUPTHER

## 2022-08-17 RX ORDER — VALSARTAN 80 MG/1
TABLET ORAL
COMMUNITY
Start: 2022-05-19 | End: 2022-08-17

## 2022-08-17 RX ORDER — HYDROCHLOROTHIAZIDE 25 MG/1
25 TABLET ORAL DAILY
Qty: 90 TABLET | Refills: 3 | Status: SHIPPED | OUTPATIENT
Start: 2022-08-17 | End: 2022-11-01

## 2022-08-17 NOTE — PROGRESS NOTES
Subjective:       Patient ID: Cliff Magaña is a 86 y.o. male.    Chief Complaint: Annual Exam    HPI 86-year-old white male presents to clinic today for follow-up of his general medical conditions.  He continues to be followed by Cardiology for treatment of hypertension and persistent atrial fibrillation which remains stable on Eliquis 2.5 mg b.i.d., metoprolol 75 mg daily, nifedipine 90 mg daily, and hydrochlorothiazide 25 mg daily.  Hypertriglyceridemia remains stable on fenofibrate 200 mg daily.  Diabetes is well controlled on metformin 500 mg daily.  He continues to be followed by nephrology for stage 3 chronic kidney disease which has improved over the past year.  He has a past surgical history of cholecystectomy, tonsillectomy, cataract repair, and sebaceous cyst removal.  He has a family history of his parents passing away in a motor vehicle accident in his and passing away from diabetes.  He is up-to-date with all vaccinations.  Review of Systems   Constitutional: Negative for appetite change, chills, fatigue and fever.   HENT: Negative for nasal congestion, ear pain, hearing loss, postnasal drip, rhinorrhea, sinus pressure/congestion, sore throat and tinnitus.    Eyes: Negative for redness, itching and visual disturbance.   Respiratory: Negative for cough, chest tightness and shortness of breath.    Cardiovascular: Negative for chest pain and palpitations.   Gastrointestinal: Negative for abdominal pain, constipation, diarrhea, nausea and vomiting.   Genitourinary: Negative for decreased urine volume, difficulty urinating, dysuria, frequency, hematuria and urgency.   Musculoskeletal: Negative for back pain, myalgias, neck pain and neck stiffness.   Integumentary:  Negative for rash.   Neurological: Negative for dizziness, light-headedness and headaches.   Psychiatric/Behavioral: Negative.          Objective:      Physical Exam  Vitals and nursing note reviewed.   Constitutional:       General: He is not  in acute distress.     Appearance: He is well-developed. He is not diaphoretic.   HENT:      Head: Normocephalic and atraumatic.      Right Ear: External ear normal.      Left Ear: External ear normal.      Nose: Nose normal.      Mouth/Throat:      Pharynx: No oropharyngeal exudate.   Eyes:      General: No scleral icterus.        Right eye: No discharge.         Left eye: No discharge.      Conjunctiva/sclera: Conjunctivae normal.      Pupils: Pupils are equal, round, and reactive to light.   Neck:      Thyroid: No thyromegaly.      Vascular: No JVD.      Trachea: No tracheal deviation.   Cardiovascular:      Rate and Rhythm: Normal rate and regular rhythm.      Heart sounds: Normal heart sounds. No murmur heard.    No friction rub. No gallop.   Pulmonary:      Effort: Pulmonary effort is normal. No respiratory distress.      Breath sounds: Normal breath sounds. No stridor. No wheezing or rales.   Abdominal:      General: Bowel sounds are normal. There is no distension.      Palpations: Abdomen is soft. There is no mass.      Tenderness: There is no abdominal tenderness. There is no guarding or rebound.   Musculoskeletal:         General: No tenderness. Normal range of motion.      Cervical back: Normal range of motion and neck supple.   Lymphadenopathy:      Cervical: No cervical adenopathy.   Skin:     General: Skin is warm and dry.      Coloration: Skin is not pale.      Findings: No erythema or rash.   Neurological:      Mental Status: He is alert and oriented to person, place, and time.   Psychiatric:         Behavior: Behavior normal.         Thought Content: Thought content normal.         Judgment: Judgment normal.         Assessment:       Problem List Items Addressed This Visit     HTN (hypertension) - Primary (Chronic)    Hyperlipidemia (Chronic)    Persistent atrial fibrillation (Chronic)    Atherosclerosis of aorta    Chronic anticoagulation    CKD stage 3 secondary to diabetes    Relevant Medications     metFORMIN (GLUCOPHAGE-XR) 500 MG ER 24hr tablet    Type 2 diabetes mellitus, without long-term current use of insulin    Relevant Medications    metFORMIN (GLUCOPHAGE-XR) 500 MG ER 24hr tablet          Plan:       1. Labs have been reviewed and are overall within normal limits.  2. Continue nifedipine 90 mg daily, metoprolol 75 mg daily, and hydrochlorothiazide 25 mg daily.  Hypertension is well controlled.  Continue follow-up with cardiology as scheduled.  3. Continue fenofibrate 200 mg daily.  Hyperlipidemia is stable.  4. Continue metformin 500 mg daily.  Type 2 diabetes is well controlled.  5. Continue follow-up nephrology as scheduled.  Stage 3 chronic kidney disease has improved.    6. Continue Eliquis 2.5 mg b.i.d., nifedipine 90 mg daily, metoprolol 75 mg daily, and hydrochlorothiazide 25 mg daily.  Atrial fibrillation is stable.  Continue follow-up with cardiology as scheduled.    7. Continue Eliquis 2.5 mg b.i.d..  Atherosclerosis is stable.    8. Return to clinic as needed or in 1 year for annual follow-up exam.

## 2022-08-25 ENCOUNTER — IMMUNIZATION (OUTPATIENT)
Dept: INTERNAL MEDICINE | Facility: CLINIC | Age: 87
End: 2022-08-25
Payer: MEDICARE

## 2022-08-25 DIAGNOSIS — Z23 NEED FOR VACCINATION: Primary | ICD-10-CM

## 2022-08-25 PROCEDURE — 0054A COVID-19, MRNA, LNP-S, PF, 30 MCG/0.3 ML DOSE VACCINE (PFIZER): CPT | Mod: PBBFAC,PO

## 2022-08-29 ENCOUNTER — TELEPHONE (OUTPATIENT)
Dept: CARDIOLOGY | Facility: CLINIC | Age: 87
End: 2022-08-29
Payer: MEDICARE

## 2022-09-10 ENCOUNTER — PATIENT MESSAGE (OUTPATIENT)
Dept: INTERNAL MEDICINE | Facility: CLINIC | Age: 87
End: 2022-09-10
Payer: MEDICARE

## 2022-09-10 NOTE — LETTER
September 12, 2022    Cliff Magaña  [address] [facility] [address]             [facility]  [address] [place] [address].  [facility] [address]  Phone: [phone]  Fax: [phone] To whom it May concern:    Mr. Magaña is in good health and is okay to travel without restrictions.  If you have any further questions or concerns, please do not hesitate to message.      Sincerely,    Munir Estrada MD

## 2022-09-12 ENCOUNTER — PATIENT MESSAGE (OUTPATIENT)
Dept: INTERNAL MEDICINE | Facility: CLINIC | Age: 87
End: 2022-09-12
Payer: MEDICARE

## 2022-09-12 NOTE — TELEPHONE ENCOUNTER
PT states that his  wife & him  are taking a 6-day trip to [place] in October with [employer]    PT is requesting a medical clearance to be able to go on the trip         LOV:  08/17/22

## 2022-09-19 NOTE — PROGRESS NOTES
Patient sent [employer] message stating he will be out of the country, [place]20[place]10[place]5 and will not be taking his BP device. Placed patient on pause for the dates above and notified clinician.

## 2022-10-04 ENCOUNTER — TELEPHONE (OUTPATIENT)
Dept: PODIATRY | Facility: CLINIC | Age: 87
End: 2022-10-04
Payer: MEDICARE

## 2022-10-04 NOTE — TELEPHONE ENCOUNTER
Attempted to reach out to Mr Magaña to let him know that I had to reschedule his appt from 10/31/22 at 11:30 am to 10/28/22 at 11:30am, but had to leave a voicemail. Encouraged him to please call if this appt does not work for him. Also sent reminder through the mail.

## 2022-10-17 ENCOUNTER — HOSPITAL ENCOUNTER (OUTPATIENT)
Dept: RADIOLOGY | Facility: HOSPITAL | Age: 87
Discharge: HOME | End: 2022-10-17
Attending: UROLOGY
Payer: MEDICARE

## 2022-10-17 DIAGNOSIS — N28.1 COMPLEX RENAL CYST: ICD-10-CM

## 2022-10-17 PROCEDURE — 76770 US EXAM ABDO BACK WALL COMP: CPT | Mod: TC

## 2022-10-17 PROCEDURE — 76770 US RETROPERITONEAL COMPLETE: ICD-10-PCS | Mod: 26,,, | Performed by: RADIOLOGY

## 2022-10-17 PROCEDURE — 76770 US EXAM ABDO BACK WALL COMP: CPT | Mod: 26,,, | Performed by: RADIOLOGY

## 2022-10-18 ENCOUNTER — OFFICE VISIT (OUTPATIENT)
Dept: UROLOGY | Facility: CLINIC | Age: 87
End: 2022-10-18
Payer: MEDICARE

## 2022-10-18 VITALS
WEIGHT: 148.13 LBS | HEIGHT: 65 IN | HEART RATE: 77 BPM | SYSTOLIC BLOOD PRESSURE: 157 MMHG | DIASTOLIC BLOOD PRESSURE: 77 MMHG | BODY MASS INDEX: 24.68 KG/M2

## 2022-10-18 DIAGNOSIS — N28.1 COMPLEX RENAL CYST: Primary | ICD-10-CM

## 2022-10-18 DIAGNOSIS — N28.1 BILATERAL RENAL CYSTS: ICD-10-CM

## 2022-10-18 DIAGNOSIS — N28.1 SIMPLE RENAL CYST: ICD-10-CM

## 2022-10-18 LAB
BILIRUB SERPL-MCNC: NORMAL MG/DL
BILIRUB SERPL-MCNC: NORMAL MG/DL
BLOOD URINE, POC: NORMAL
BLOOD URINE, POC: NORMAL
CLARITY, POC UA: CLEAR
CLARITY, POC UA: CLEAR
COLOR, POC UA: YELLOW
COLOR, POC UA: YELLOW
GLUCOSE UR QL STRIP: NORMAL
GLUCOSE UR QL STRIP: NORMAL
KETONES UR QL STRIP: NORMAL
KETONES UR QL STRIP: NORMAL
LEUKOCYTE ESTERASE URINE, POC: NORMAL
LEUKOCYTE ESTERASE URINE, POC: NORMAL
NITRITE, POC UA: NORMAL
NITRITE, POC UA: NORMAL
PH, POC UA: 5
PH, POC UA: 5
PROTEIN, POC: NORMAL
PROTEIN, POC: NORMAL
SPECIFIC GRAVITY, POC UA: 1.02
SPECIFIC GRAVITY, POC UA: 1.02
UROBILINOGEN, POC UA: NORMAL
UROBILINOGEN, POC UA: NORMAL

## 2022-10-18 PROCEDURE — 99213 OFFICE O/P EST LOW 20 MIN: CPT | Mod: PBBFAC | Performed by: NURSE PRACTITIONER

## 2022-10-18 PROCEDURE — 99999 PR PBB SHADOW E&M-EST. PATIENT-LVL III: CPT | Mod: PBBFAC,,, | Performed by: NURSE PRACTITIONER

## 2022-10-18 PROCEDURE — 81002 URINALYSIS NONAUTO W/O SCOPE: CPT | Mod: PBBFAC | Performed by: NURSE PRACTITIONER

## 2022-10-18 PROCEDURE — 99999 PR PBB SHADOW E&M-EST. PATIENT-LVL III: ICD-10-PCS | Mod: PBBFAC,,, | Performed by: NURSE PRACTITIONER

## 2022-10-18 PROCEDURE — 99214 OFFICE O/P EST MOD 30 MIN: CPT | Mod: S$PBB,,, | Performed by: NURSE PRACTITIONER

## 2022-10-18 PROCEDURE — 99214 PR OFFICE/OUTPT VISIT, EST, LEVL IV, 30-39 MIN: ICD-10-PCS | Mod: S$PBB,,, | Performed by: NURSE PRACTITIONER

## 2022-10-18 NOTE — PROGRESS NOTES
CHIEF COMPLAINT:    Cliff Magaña is a 87 y.o. male presents today for Renal Cyst     HISTORY OF PRESENTING ILLINESS:    Cliff Magaña is a 87 y.o. male with a history of complex renal cyst.  He was last seen in clinic with Dr. Moffett 10/20/2021.   This is a new patient to for me. I personally reviewed their recent medical records as well as their outside medical, surgical, family, & social history.   He has had multiple Renal US.     Here today for annual visit.   10/17/2022 Renal US reveals bilateral renal cysts:simple to mildly complex.   Largest 2.6cm left supior left kidney  No solid masses.   No issues with urination.   No abdominal, flank, bone pain.          REVIEW OF SYSTEMS:  Review of Systems   Constitutional: Negative.  Negative for chills and fever.   Eyes:  Negative for double vision.   Respiratory:  Negative for cough and shortness of breath.    Cardiovascular:  Negative for chest pain.   Gastrointestinal:  Negative for abdominal pain, constipation, diarrhea, nausea and vomiting.   Genitourinary: Negative.  Negative for dysuria, flank pain and hematuria.        Ok with urination.     Neurological:  Negative for dizziness and seizures.   Endo/Heme/Allergies:  Negative for polydipsia.       PATIENT HISTORY:    Past Medical History:   Diagnosis Date    *Atrial fibrillation     Chronic kidney disease     Hyperlipidemia     Hypertension     Metabolic syndrome     Type 2 diabetes mellitus, without long-term current use of insulin 12/13/2021       Past Surgical History:   Procedure Laterality Date    CATARACT EXTRACTION      CHOLECYSTECTOMY      EYE SURGERY      INJECTION OF FACET JOINT Bilateral 4/28/2021    Procedure: FACET JOINT INJECTION BILATERAL L4/L5 DIRECT REFERRAL;  Surgeon: Philipp Iyer MD;  Location: [facility];  Service: Pain Management;  Laterality: Bilateral;  NEEDS CONSENT, ELIQUIS CLEARANCE IN CHART    SKIN CANCER EXCISION      TONSILLECTOMY         Family History   Problem Relation  Age of Onset    Diabetes Maternal Aunt     Heart attack Neg Hx     Heart disease Neg Hx     Heart failure Neg Hx     Hyperlipidemia Neg Hx     Hypertension Neg Hx     Stroke Neg Hx        Social History     Socioeconomic History    Marital status:    Tobacco Use    Smoking status: Former     Packs/day: 2.00     Years: 20.00     Pack years: 40.00     Types: Cigarettes     Quit date: 1983     Years since quittin.4    Smokeless tobacco: Never   Substance and Sexual Activity    Alcohol use: Yes     Alcohol/week: 1.0 standard drink     Types: 1 Standard drinks or equivalent per week     Comment: 3 drinks per week    Drug use: No    Sexual activity: Yes     Partners: Female     Social Determinants of Health     Financial Resource Strain: Low Risk     Difficulty of Paying Living Expenses: Not hard at all   Food Insecurity: No Food Insecurity    Worried About Running Out of Food in the Last Year: Never true    Ran Out of Food in the Last Year: Never true   Transportation Needs: No Transportation Needs    Lack of Transportation (Medical): No    Lack of Transportation (Non-Medical): No   Physical Activity: Sufficiently Active    Days of Exercise per Week: 5 days    Minutes of Exercise per Session: 60 min   Stress: No Stress Concern Present    Feeling of Stress : Not at all   Social Connections: Unknown    Frequency of Communication with Friends and Family: Twice a week    Frequency of Social Gatherings with Friends and Family: Once a week    Active Member of Clubs or Organizations: Yes    Attends Club or Organization Meetings: More than 4 times per year    Marital Status:    Housing Stability: Low Risk     Unable to Pay for Housing in the Last Year: No    Number of Places Lived in the Last Year: 1    Unstable Housing in the Last Year: No       Allergies:  Niacin    Medications:    Current Outpatient Medications:     acetaminophen (TYLENOL) 650 MG TbSR, Take 650 mg by mouth every 8 (eight) hours., Disp:  , Rfl:     apixaban (ELIQUIS) 2.5 mg Tab, Take 1 tablet (2.5 mg total) by mouth 2 (two) times daily., Disp: 180 tablet, Rfl: 3    fenofibrate micronized (LOFIBRA) 200 MG Cap, TAKE 1 CAPSULE DAILY WITH BREAKFAST, Disp: 90 capsule, Rfl: 3    hydroCHLOROthiazide (HYDRODIURIL) 25 MG tablet, Take 1 tablet (25 mg total) by mouth once daily., Disp: 90 tablet, Rfl: 3    TRAMAINE BIOTIN ORAL, Take 5,000 mcg by mouth once daily., Disp: , Rfl:     metFORMIN (GLUCOPHAGE-XR) 500 MG ER 24hr tablet, Take 1 tablet (500 mg total) by mouth once daily., Disp: 90 tablet, Rfl: 3    metoprolol succinate (TOPROL-XL) 25 MG 24 hr tablet, TAKE 1 TABLET DAILY, Disp: 90 tablet, Rfl: 3    metoprolol succinate (TOPROL-XL) 50 MG 24 hr tablet, TAKE ONE AND ONE-HALF TABLETS ONCE DAILY, Disp: 135 tablet, Rfl: 3    NIFEdipine (PROCARDIA-XL) 90 MG (OSM) 24 hr tablet, Take 1 tablet (90 mg total) by mouth once daily., Disp: 90 tablet, Rfl: 6    PHYSICAL EXAMINATION:  Physical Exam  Vitals and nursing note reviewed.   Constitutional:       General: He is awake.      Appearance: Normal appearance.   HENT:      Head: Normocephalic.      Right Ear: External ear normal.      Left Ear: External ear normal.      Nose: Nose normal.   Cardiovascular:      Rate and Rhythm: Normal rate.   Pulmonary:      Effort: Pulmonary effort is normal. No respiratory distress.   Abdominal:      Tenderness: There is no abdominal tenderness. There is no right CVA tenderness or left CVA tenderness.   Genitourinary:     Penis: Normal.       Testes: Normal.   Musculoskeletal:         General: Normal range of motion.      Cervical back: Normal range of motion.   Skin:     General: Skin is warm and dry.   Neurological:      General: No focal deficit present.      Mental Status: He is alert and oriented to person, place, and time.   Psychiatric:         Mood and Affect: Mood normal.         Behavior: Behavior is cooperative.         LABS:      In office UA today was clear of active  infection and blood.        Lab Results   Component Value Date    PSA 0.43 06/06/2013    PSA 0.68 05/10/2012    PSA 0.54 05/24/2011    PSADIAG 0.44 06/05/2014             IMPRESSION:    Encounter Diagnoses   Name Primary?    Complex renal cyst Yes    Bilateral renal cysts     Simple renal cyst          Assessment:       1. Complex renal cyst    2. Bilateral renal cysts    3. Simple renal cyst          Plan:         I spent 30 minutes with the patient of which more than half was spent in direct consultation with the patient in regards to our treatment and plan.  We addressed the office findings and recent labs.   Education and recommendations of today's plan of care including home remedies and needed follow up with PCP.   We discussed the chief complaint/LUTS and the possible contributory factors.   Reviewed his previous US; reassurance.   Recommended lifestyle modifications with proper, healthy diet, good hydration if no fluid restrictions; reducing bladder irritants.   Benefits of regular exercise approved by PCP.  RTC one year with US

## 2022-10-20 ENCOUNTER — TELEPHONE (OUTPATIENT)
Dept: CARDIOLOGY | Facility: CLINIC | Age: 87
End: 2022-10-20
Payer: MEDICARE

## 2022-10-20 ENCOUNTER — LAB VISIT (OUTPATIENT)
Dept: LAB | Facility: HOSPITAL | Age: 87
End: 2022-10-20
Attending: INTERNAL MEDICINE
Payer: MEDICARE

## 2022-10-20 DIAGNOSIS — E78.5 DYSLIPIDEMIA (HIGH LDL; LOW HDL): ICD-10-CM

## 2022-10-20 DIAGNOSIS — I10 ESSENTIAL HYPERTENSION: ICD-10-CM

## 2022-10-20 DIAGNOSIS — I48.21 PERMANENT ATRIAL FIBRILLATION: ICD-10-CM

## 2022-10-20 LAB
ALBUMIN SERPL BCP-MCNC: 4.2 G/DL (ref 3.5–5.2)
ALP SERPL-CCNC: 55 U/L (ref 55–135)
ALT SERPL W/O P-5'-P-CCNC: 20 U/L (ref 10–44)
ANION GAP SERPL CALC-SCNC: 11 MMOL/L (ref 8–16)
ANION GAP SERPL CALC-SCNC: 11 MMOL/L (ref 8–16)
AST SERPL-CCNC: 24 U/L (ref 10–40)
BILIRUB SERPL-MCNC: 0.7 MG/DL (ref 0.1–1)
BUN SERPL-MCNC: 28 MG/DL (ref 8–23)
BUN SERPL-MCNC: 28 MG/DL (ref 8–23)
CALCIUM SERPL-MCNC: 10.1 MG/DL (ref 8.7–10.5)
CALCIUM SERPL-MCNC: 10.1 MG/DL (ref 8.7–10.5)
CHLORIDE SERPL-SCNC: 103 MMOL/L (ref 95–110)
CHLORIDE SERPL-SCNC: 103 MMOL/L (ref 95–110)
CHOLEST SERPL-MCNC: 171 MG/DL (ref 120–199)
CHOLEST/HDLC SERPL: 4.6 {RATIO} (ref 2–5)
CO2 SERPL-SCNC: 24 MMOL/L (ref 23–29)
CO2 SERPL-SCNC: 24 MMOL/L (ref 23–29)
CREAT SERPL-MCNC: 1.2 MG/DL (ref 0.5–1.4)
CREAT SERPL-MCNC: 1.2 MG/DL (ref 0.5–1.4)
EST. GFR  (NO RACE VARIABLE): 58.5 ML/MIN/1.73 M^2
EST. GFR  (NO RACE VARIABLE): 58.5 ML/MIN/1.73 M^2
GLUCOSE SERPL-MCNC: 137 MG/DL (ref 70–110)
GLUCOSE SERPL-MCNC: 137 MG/DL (ref 70–110)
HDLC SERPL-MCNC: 37 MG/DL (ref 40–75)
HDLC SERPL: 21.6 % (ref 20–50)
LDLC SERPL CALC-MCNC: 95 MG/DL (ref 63–159)
NONHDLC SERPL-MCNC: 134 MG/DL
POTASSIUM SERPL-SCNC: 3.9 MMOL/L (ref 3.5–5.1)
POTASSIUM SERPL-SCNC: 3.9 MMOL/L (ref 3.5–5.1)
PROT SERPL-MCNC: 7.6 G/DL (ref 6–8.4)
SODIUM SERPL-SCNC: 138 MMOL/L (ref 136–145)
SODIUM SERPL-SCNC: 138 MMOL/L (ref 136–145)
TRIGL SERPL-MCNC: 195 MG/DL (ref 30–150)
TSH SERPL DL<=0.005 MIU/L-ACNC: 2.16 UIU/ML (ref 0.4–4)

## 2022-10-20 PROCEDURE — 80053 COMPREHEN METABOLIC PANEL: CPT | Performed by: INTERNAL MEDICINE

## 2022-10-20 PROCEDURE — 80061 LIPID PANEL: CPT | Performed by: INTERNAL MEDICINE

## 2022-10-20 PROCEDURE — 84443 ASSAY THYROID STIM HORMONE: CPT | Performed by: INTERNAL MEDICINE

## 2022-10-20 PROCEDURE — 36415 COLL VENOUS BLD VENIPUNCTURE: CPT | Mod: PO | Performed by: INTERNAL MEDICINE

## 2022-10-20 NOTE — TELEPHONE ENCOUNTER
----- Message from Nereyda Birmingham MD sent at 10/20/2022  2:52 PM CDT -----  Please inform the patient that lipids are better, but overall no significant change.

## 2022-10-31 DIAGNOSIS — E78.2 MIXED HYPERLIPIDEMIA: Chronic | ICD-10-CM

## 2022-10-31 DIAGNOSIS — I48.19 PERSISTENT ATRIAL FIBRILLATION: Chronic | ICD-10-CM

## 2022-10-31 DIAGNOSIS — I10 ESSENTIAL HYPERTENSION: Chronic | ICD-10-CM

## 2022-10-31 DIAGNOSIS — E88.810 METABOLIC SYNDROME: Chronic | ICD-10-CM

## 2022-10-31 RX ORDER — METOPROLOL SUCCINATE 50 MG/1
TABLET, EXTENDED RELEASE ORAL
Qty: 135 TABLET | Refills: 3 | OUTPATIENT
Start: 2022-10-31

## 2022-11-01 ENCOUNTER — OFFICE VISIT (OUTPATIENT)
Dept: CARDIOLOGY | Facility: CLINIC | Age: 87
End: 2022-11-01
Payer: MEDICARE

## 2022-11-01 VITALS
BODY MASS INDEX: 24.43 KG/M2 | WEIGHT: 152 LBS | DIASTOLIC BLOOD PRESSURE: 68 MMHG | HEART RATE: 70 BPM | SYSTOLIC BLOOD PRESSURE: 125 MMHG | HEIGHT: 66 IN

## 2022-11-01 DIAGNOSIS — N18.31 TYPE 2 DIABETES MELLITUS WITH STAGE 3A CHRONIC KIDNEY DISEASE, WITHOUT LONG-TERM CURRENT USE OF INSULIN: ICD-10-CM

## 2022-11-01 DIAGNOSIS — I70.0 ATHEROSCLEROSIS OF AORTA: ICD-10-CM

## 2022-11-01 DIAGNOSIS — I10 PRIMARY HYPERTENSION: Primary | Chronic | ICD-10-CM

## 2022-11-01 DIAGNOSIS — N18.30 STAGE 3 CHRONIC KIDNEY DISEASE, UNSPECIFIED WHETHER STAGE 3A OR 3B CKD: ICD-10-CM

## 2022-11-01 DIAGNOSIS — I48.19 PERSISTENT ATRIAL FIBRILLATION: Chronic | ICD-10-CM

## 2022-11-01 DIAGNOSIS — E11.22 CKD STAGE 3 SECONDARY TO DIABETES: ICD-10-CM

## 2022-11-01 DIAGNOSIS — E78.2 MIXED HYPERLIPIDEMIA: Chronic | ICD-10-CM

## 2022-11-01 DIAGNOSIS — E11.22 TYPE 2 DIABETES MELLITUS WITH STAGE 3A CHRONIC KIDNEY DISEASE, WITHOUT LONG-TERM CURRENT USE OF INSULIN: ICD-10-CM

## 2022-11-01 DIAGNOSIS — N18.30 CKD STAGE 3 SECONDARY TO DIABETES: ICD-10-CM

## 2022-11-01 PROCEDURE — 99213 OFFICE O/P EST LOW 20 MIN: CPT | Mod: PBBFAC,PO | Performed by: INTERNAL MEDICINE

## 2022-11-01 PROCEDURE — 99999 PR PBB SHADOW E&M-EST. PATIENT-LVL III: CPT | Mod: PBBFAC,,, | Performed by: INTERNAL MEDICINE

## 2022-11-01 PROCEDURE — 99214 OFFICE O/P EST MOD 30 MIN: CPT | Mod: S$PBB,,, | Performed by: INTERNAL MEDICINE

## 2022-11-01 PROCEDURE — 99214 PR OFFICE/OUTPT VISIT, EST, LEVL IV, 30-39 MIN: ICD-10-PCS | Mod: S$PBB,,, | Performed by: INTERNAL MEDICINE

## 2022-11-01 PROCEDURE — 99999 PR PBB SHADOW E&M-EST. PATIENT-LVL III: ICD-10-PCS | Mod: PBBFAC,,, | Performed by: INTERNAL MEDICINE

## 2022-11-01 RX ORDER — FUROSEMIDE 20 MG/1
20 TABLET ORAL 2 TIMES DAILY
Qty: 60 TABLET | Refills: 3 | Status: SHIPPED | OUTPATIENT
Start: 2022-11-01 | End: 2023-03-15

## 2022-11-01 RX ORDER — FUROSEMIDE 20 MG/1
20 TABLET ORAL 2 TIMES DAILY
COMMUNITY
End: 2022-11-01 | Stop reason: SDUPTHER

## 2022-11-01 NOTE — PROGRESS NOTES
Subjective:   Patient ID:  Cliff Magaña is a 87 y.o. male who presents for follow-up of Aortic atherosclerosis and Follow-up      HPI: Recent history: taking medications as instructed, no medication side effects noted, no TIA's, no chest pain on exertion, no dyspnea on exertion, but  noted more swelling around the ankles.  He just returned from the [place] and reports relaxed diet especially carbs.    Creat 1.2, down from 1.5. Patient is still on 2.5mg Eliquis    Lab Results   Component Value Date     10/20/2022     10/20/2022    K 3.9 10/20/2022    K 3.9 10/20/2022     10/20/2022     10/20/2022    CO2 24 10/20/2022    CO2 24 10/20/2022    BUN 28 (H) 10/20/2022    BUN 28 (H) 10/20/2022    CREATININE 1.2 10/20/2022    CREATININE 1.2 10/20/2022     (H) 10/20/2022     (H) 10/20/2022    HGBA1C 6.9 (H) 08/11/2022    AST 24 10/20/2022    ALT 20 10/20/2022    ALBUMIN 4.2 10/20/2022    PROT 7.6 10/20/2022    BILITOT 0.7 10/20/2022    WBC 7.29 08/11/2022    HGB 17.6 08/11/2022    HCT 50.6 08/11/2022    MCV 91 08/11/2022     08/11/2022    INR 2.8 12/17/2013    INR 1.8 (H) 06/07/2011    PSA 0.43 06/06/2013    TSH 2.159 10/20/2022    CHOL 171 10/20/2022    HDL 37 (L) 10/20/2022    LDLCALC 95.0 10/20/2022    TRIG 195 (H) 10/20/2022       No results found in the last 24 hours.     Review of Systems   Constitutional: Negative.   HENT:  Positive for hearing loss.    Eyes: Negative.    Cardiovascular:  Positive for irregular heartbeat and leg swelling. Negative for chest pain, claudication, dyspnea on exertion, near-syncope, palpitations and syncope.   Respiratory: Negative.  Negative for cough, shortness of breath, snoring and wheezing.    Endocrine: Negative.  Negative for cold intolerance, heat intolerance, polydipsia, polyphagia and polyuria.   Skin: Negative.    Musculoskeletal:  Positive for arthritis, back pain and muscle cramps.   Gastrointestinal: Negative.   "  Genitourinary: Negative.    Neurological: Negative.    Psychiatric/Behavioral: Negative.       Objective:   Physical Exam  Vitals and nursing note reviewed.   Constitutional:       Appearance: He is well-developed.      Comments: /68   Pulse 70   Ht 5' 6" (1.676 m)   Wt 68.9 kg (152 lb 0.1 oz)   BMI 24.53 kg/m²      HENT:      Head: Normocephalic.   Eyes:      Pupils: Pupils are equal, round, and reactive to light.   Neck:      Thyroid: No thyromegaly.      Vascular: No carotid bruit.   Cardiovascular:      Rate and Rhythm: Normal rate. Rhythm irregularly irregular.      Pulses: Intact distal pulses.           Carotid pulses are 2+ on the right side and 2+ on the left side.       Radial pulses are 2+ on the right side and 2+ on the left side.        Femoral pulses are 2+ on the right side and 2+ on the left side.       Popliteal pulses are 2+ on the right side and 2+ on the left side.        Dorsalis pedis pulses are 2+ on the right side and 2+ on the left side.        Posterior tibial pulses are 2+ on the right side and 2+ on the left side.      Heart sounds: Normal heart sounds. No murmur heard.    No friction rub. No gallop.   Pulmonary:      Effort: Pulmonary effort is normal. No respiratory distress.      Breath sounds: Normal breath sounds. No wheezing or rales.   Chest:      Chest wall: No tenderness.   Abdominal:      General: Bowel sounds are normal.      Palpations: Abdomen is soft.   Musculoskeletal:         General: Normal range of motion.      Cervical back: Normal range of motion and neck supple.      Right lower leg: Edema present.      Left lower leg: Edema present.      Comments: 2 + bilaterally   Skin:     General: Skin is warm and dry.   Neurological:      Mental Status: He is alert and oriented to person, place, and time.         Assessment and Plan:     Problem List Items Addressed This Visit          Cardiology Problems    HTN (hypertension) - Primary (Chronic)    Relevant Orders    " Basic metabolic panel    Hyperlipidemia (Chronic)    Relevant Orders    Basic metabolic panel    Persistent atrial fibrillation (Chronic)    Atherosclerosis of aorta       Other    Stage 3 chronic kidney disease    Type 2 diabetes mellitus, without long-term current use of insulin    CKD stage 3 secondary to diabetes       Patient's Medications   New Prescriptions    No medications on file   Previous Medications    ACETAMINOPHEN (TYLENOL) 650 MG TBSR    Take 650 mg by mouth every 8 (eight) hours.    APIXABAN (ELIQUIS) 2.5 MG TAB    Take 1 tablet (2.5 mg total) by mouth 2 (two) times daily.    FENOFIBRATE MICRONIZED (LOFIBRA) 200 MG CAP    TAKE 1 CAPSULE DAILY WITH BREAKFAST    TRAMAINE BIOTIN ORAL    Take 5,000 mcg by mouth once daily.    METFORMIN (GLUCOPHAGE-XR) 500 MG ER 24HR TABLET    Take 1 tablet (500 mg total) by mouth once daily.    METOPROLOL SUCCINATE (TOPROL-XL) 25 MG 24 HR TABLET    TAKE 1 TABLET DAILY    METOPROLOL SUCCINATE (TOPROL-XL) 50 MG 24 HR TABLET    TAKE ONE AND ONE-HALF TABLETS ONCE DAILY    NIFEDIPINE (PROCARDIA-XL) 90 MG (OSM) 24 HR TABLET    Take 1 tablet (90 mg total) by mouth once daily.   Modified Medications    Modified Medication Previous Medication    FUROSEMIDE (LASIX) 20 MG TABLET furosemide (LASIX) 20 MG tablet       Take 1 tablet (20 mg total) by mouth 2 (two) times daily.    Take 20 mg by mouth 2 (two) times daily.   Discontinued Medications    HYDROCHLOROTHIAZIDE (HYDRODIURIL) 25 MG TABLET    Take 1 tablet (25 mg total) by mouth once daily.     Temporarily d/c Hctz and start Lasix 20 mg daily.  BMP next week. If better resume Hctz and if creat stable will increase Eliquis to 5 mg BID.  Follow up in about 6 months (around 5/1/2023).

## 2022-11-04 ENCOUNTER — PES CALL (OUTPATIENT)
Dept: ADMINISTRATIVE | Facility: CLINIC | Age: 87
End: 2022-11-04
Payer: MEDICARE

## 2022-11-08 ENCOUNTER — LAB VISIT (OUTPATIENT)
Dept: LAB | Facility: HOSPITAL | Age: 87
End: 2022-11-08
Attending: INTERNAL MEDICINE
Payer: MEDICARE

## 2022-11-08 DIAGNOSIS — I10 PRIMARY HYPERTENSION: Chronic | ICD-10-CM

## 2022-11-08 DIAGNOSIS — E78.2 MIXED HYPERLIPIDEMIA: Chronic | ICD-10-CM

## 2022-11-08 LAB
ANION GAP SERPL CALC-SCNC: 11 MMOL/L (ref 8–16)
BUN SERPL-MCNC: 30 MG/DL (ref 8–23)
CALCIUM SERPL-MCNC: 9.6 MG/DL (ref 8.7–10.5)
CHLORIDE SERPL-SCNC: 105 MMOL/L (ref 95–110)
CO2 SERPL-SCNC: 25 MMOL/L (ref 23–29)
CREAT SERPL-MCNC: 1.3 MG/DL (ref 0.5–1.4)
EST. GFR  (NO RACE VARIABLE): 53.2 ML/MIN/1.73 M^2
GLUCOSE SERPL-MCNC: 150 MG/DL (ref 70–110)
POTASSIUM SERPL-SCNC: 4 MMOL/L (ref 3.5–5.1)
SODIUM SERPL-SCNC: 141 MMOL/L (ref 136–145)

## 2022-11-08 PROCEDURE — 80048 BASIC METABOLIC PNL TOTAL CA: CPT | Performed by: INTERNAL MEDICINE

## 2022-11-08 PROCEDURE — 36415 COLL VENOUS BLD VENIPUNCTURE: CPT | Mod: PO | Performed by: INTERNAL MEDICINE

## 2022-11-09 ENCOUNTER — TELEPHONE (OUTPATIENT)
Dept: CARDIOLOGY | Facility: CLINIC | Age: 87
End: 2022-11-09
Payer: MEDICARE

## 2022-11-09 NOTE — TELEPHONE ENCOUNTER
----- Message from Nereyda Birmingham MD sent at 11/9/2022 11:44 AM CST -----  Please inform the patient that blood work is fine If his edema is better he can resume HCTZ and discontinue Lasix.

## 2022-11-09 NOTE — PROGRESS NOTES
Please inform the patient that blood work is fine If his edema is better he can resume HCTZ and discontinue Lasix.

## 2022-11-10 ENCOUNTER — PATIENT MESSAGE (OUTPATIENT)
Dept: CARDIOLOGY | Facility: CLINIC | Age: 87
End: 2022-11-10
Payer: MEDICARE

## 2022-11-15 ENCOUNTER — PES CALL (OUTPATIENT)
Dept: ADMINISTRATIVE | Facility: CLINIC | Age: 87
End: 2022-11-15
Payer: MEDICARE

## 2022-11-17 ENCOUNTER — OFFICE VISIT (OUTPATIENT)
Dept: PODIATRY | Facility: CLINIC | Age: 87
End: 2022-11-17
Payer: MEDICARE

## 2022-11-17 VITALS
SYSTOLIC BLOOD PRESSURE: 143 MMHG | WEIGHT: 152 LBS | HEART RATE: 73 BPM | DIASTOLIC BLOOD PRESSURE: 69 MMHG | BODY MASS INDEX: 24.43 KG/M2 | HEIGHT: 66 IN

## 2022-11-17 DIAGNOSIS — E11.49 TYPE 2 DIABETES MELLITUS WITH NEUROLOGICAL MANIFESTATIONS: Primary | ICD-10-CM

## 2022-11-17 DIAGNOSIS — B35.1 ONYCHOMYCOSIS: ICD-10-CM

## 2022-11-17 DIAGNOSIS — E11.51 TYPE 2 DIABETES MELLITUS WITH PERIPHERAL VASCULAR DISEASE: ICD-10-CM

## 2022-11-17 PROCEDURE — 99499 NO LOS: ICD-10-PCS | Mod: S$PBB,,, | Performed by: PODIATRIST

## 2022-11-17 PROCEDURE — 99999 PR PBB SHADOW E&M-EST. PATIENT-LVL III: CPT | Mod: PBBFAC,,, | Performed by: PODIATRIST

## 2022-11-17 PROCEDURE — 99999 PR PBB SHADOW E&M-EST. PATIENT-LVL III: ICD-10-PCS | Mod: PBBFAC,,, | Performed by: PODIATRIST

## 2022-11-17 PROCEDURE — 99499 UNLISTED E&M SERVICE: CPT | Mod: S$PBB,,, | Performed by: PODIATRIST

## 2022-11-17 PROCEDURE — 11721 ROUTINE FOOT CARE: ICD-10-PCS | Mod: Q8,S$PBB,, | Performed by: PODIATRIST

## 2022-11-17 PROCEDURE — 11721 DEBRIDE NAIL 6 OR MORE: CPT | Mod: Q8,PBBFAC,PN | Performed by: PODIATRIST

## 2022-11-17 PROCEDURE — 99213 OFFICE O/P EST LOW 20 MIN: CPT | Mod: PBBFAC,PN | Performed by: PODIATRIST

## 2022-11-17 NOTE — PROCEDURES
"Routine Foot Care    Date/Time: 11/17/2022 11:15 AM  Performed by: Bhupendra Brar DPM  Authorized by: Bhupendra Brar DPM     Time out: Immediately prior to procedure a "time out" was called to verify the correct patient, procedure, equipment, support staff and site/side marked as required.    Consent Done?:  Yes (Verbal)  Hyperkeratotic Skin Lesions?: No      Nail Care Type:  Debride  Location(s): All  (Left 1st Toe, Left 3rd Toe, Left 2nd Toe, Left 4th Toe, Left 5th Toe, Right 1st Toe, Right 2nd Toe, Right 3rd Toe, Right 4th Toe and Right 5th Toe)  Patient tolerance:  Patient tolerated the procedure well with no immediate complications     Used sterile nail nipper.      "

## 2022-11-17 NOTE — PROGRESS NOTES
Subjective:      Patient ID: Cliff Magaña is a 87 y.o. male.    Chief Complaint: Nail Care (3 month nail care f/u)    Cliff is a 87 y.o. male who presents to the clinic upon referral from Dr. Steff choi. provider found  for evaluation and treatment of diabetic feet. Cliff has a past medical history of *Atrial fibrillation, Chronic kidney disease, Hyperlipidemia, Hypertension, Metabolic syndrome, and Type 2 diabetes mellitus, without long-term current use of insulin (12/13/2021). Patient relates no major problem with feet. Only complaints today consist of thickened toenails that he has difficulty trimming.  He will sometimes get a pedicure.  He also relates he gets intermittent cramping to both legs at night.  History of chronic low back pain with right lower extremity symptoms.    04/29/2022: Returns for routine nail care. No new concerns.     07/29/2022: Returns for routine nail care. No new concerns.     11/17/2022:  Returns routine foot care.  No new concerns.    PCP: Munir Estrada MD    Date Last Seen by PCP:  08/17/2022    Current shoe gear: Casual shoes    Hemoglobin A1C   Date Value Ref Range Status   08/11/2022 6.9 (H) 4.0 - 5.6 % Final     Comment:     ADA Screening Guidelines:  5.7-6.4%  Consistent with prediabetes  >or=6.5%  Consistent with diabetes    High levels of fetal hemoglobin interfere with the HbA1C  assay. Heterozygous hemoglobin variants (HbS, HgC, etc)do  not significantly interfere with this assay.   However, presence of multiple variants may affect accuracy.     12/17/2021 6.3 (H) 4.0 - 5.6 % Final     Comment:     ADA Screening Guidelines:  5.7-6.4%  Consistent with prediabetes  >or=6.5%  Consistent with diabetes    High levels of fetal hemoglobin interfere with the HbA1C  assay. Heterozygous hemoglobin variants (HbS, HgC, etc)do  not significantly interfere with this assay.   However, presence of multiple variants may affect accuracy.     04/19/2021 6.2 (H) 4.0 - 5.6 % Final      "Comment:     ADA Screening Guidelines:  5.7-6.4%  Consistent with prediabetes  >or=6.5%  Consistent with diabetes    High levels of fetal hemoglobin interfere with the HbA1C  assay. Heterozygous hemoglobin variants (HbS, HgC, etc)do  not significantly interfere with this assay.   However, presence of multiple variants may affect accuracy.       Vitals:    22 1139   BP: (!) 143/69   Pulse: 73   Weight: 68.9 kg (152 lb)   Height: 5' 6" (1.676 m)   PainSc: 0-No pain      Past Medical History:   Diagnosis Date    *Atrial fibrillation     Chronic kidney disease     Hyperlipidemia     Hypertension     Metabolic syndrome     Type 2 diabetes mellitus, without long-term current use of insulin 2021       Past Surgical History:   Procedure Laterality Date    CATARACT EXTRACTION      CHOLECYSTECTOMY      EYE SURGERY      INJECTION OF FACET JOINT Bilateral 2021    Procedure: FACET JOINT INJECTION BILATERAL L4/L5 DIRECT REFERRAL;  Surgeon: Philipp Iyer MD;  Location: [facility];  Service: Pain Management;  Laterality: Bilateral;  NEEDS CONSENT, ELIQUIS CLEARANCE IN CHART    SKIN CANCER EXCISION      TONSILLECTOMY         Family History   Problem Relation Age of Onset    Diabetes Maternal Aunt     Heart attack Neg Hx     Heart disease Neg Hx     Heart failure Neg Hx     Hyperlipidemia Neg Hx     Hypertension Neg Hx     Stroke Neg Hx        Social History     Socioeconomic History    Marital status:    Tobacco Use    Smoking status: Former     Packs/day: 2.00     Years: 20.00     Pack years: 40.00     Types: Cigarettes     Quit date: 1983     Years since quittin.5    Smokeless tobacco: Never   Substance and Sexual Activity    Alcohol use: Yes     Alcohol/week: 1.0 standard drink     Types: 1 Standard drinks or equivalent per week     Comment: 3 drinks per week    Drug use: No    Sexual activity: Yes     Partners: Female     Social Determinants of Health     Financial Resource Strain: Low " Risk     Difficulty of Paying Living Expenses: Not hard at all   Food Insecurity: No Food Insecurity    Worried About Running Out of Food in the Last Year: Never true    Ran Out of Food in the Last Year: Never true   Transportation Needs: No Transportation Needs    Lack of Transportation (Medical): No    Lack of Transportation (Non-Medical): No   Physical Activity: Sufficiently Active    Days of Exercise per Week: 5 days    Minutes of Exercise per Session: 60 min   Stress: No Stress Concern Present    Feeling of Stress : Not at all   Social Connections: Unknown    Frequency of Communication with Friends and Family: Twice a week    Frequency of Social Gatherings with Friends and Family: Once a week    Active Member of Clubs or Organizations: Yes    Attends Club or Organization Meetings: More than 4 times per year    Marital Status:    Housing Stability: Low Risk     Unable to Pay for Housing in the Last Year: No    Number of Places Lived in the Last Year: 1    Unstable Housing in the Last Year: No       Current Outpatient Medications   Medication Sig Dispense Refill    acetaminophen (TYLENOL) 650 MG TbSR Take 650 mg by mouth every 8 (eight) hours.      apixaban (ELIQUIS) 2.5 mg Tab Take 1 tablet (2.5 mg total) by mouth 2 (two) times daily. 180 tablet 3    fenofibrate micronized (LOFIBRA) 200 MG Cap TAKE 1 CAPSULE DAILY WITH BREAKFAST 90 capsule 3    furosemide (LASIX) 20 MG tablet Take 1 tablet (20 mg total) by mouth 2 (two) times daily. 60 tablet 3    TRAMAINE BIOTIN ORAL Take 5,000 mcg by mouth once daily.      metFORMIN (GLUCOPHAGE-XR) 500 MG ER 24hr tablet Take 1 tablet (500 mg total) by mouth once daily. 90 tablet 3    metoprolol succinate (TOPROL-XL) 25 MG 24 hr tablet TAKE 1 TABLET DAILY 90 tablet 3    metoprolol succinate (TOPROL-XL) 50 MG 24 hr tablet TAKE ONE AND ONE-HALF TABLETS ONCE DAILY 135 tablet 3    NIFEdipine (PROCARDIA-XL) 90 MG (OSM) 24 hr tablet Take 1 tablet (90 mg total) by mouth once daily.  90 tablet 6     No current facility-administered medications for this visit.       Review of patient's allergies indicates:   Allergen Reactions    Niacin      Other reaction(s): Rash  Other reaction(s): Itching           Review of Systems   Constitutional: Negative for chills, fever and malaise/fatigue.   HENT:  Negative for congestion and hearing loss.    Cardiovascular:  Negative for chest pain, claudication and leg swelling.   Respiratory:  Negative for cough and shortness of breath.    Skin:  Positive for nail changes.   Musculoskeletal:  Positive for back pain. Negative for joint pain, muscle cramps and muscle weakness.   Gastrointestinal:  Negative for nausea and vomiting.   Neurological:  Positive for paresthesias. Negative for numbness and weakness.   Psychiatric/Behavioral:  Negative for altered mental status.          Objective:      Physical Exam  Constitutional:       General: He is not in acute distress.     Appearance: Normal appearance. He is not ill-appearing.   Cardiovascular:      Pulses:           Dorsalis pedis pulses are 0 on the right side and 0 on the left side.        Posterior tibial pulses are 0 on the right side and 0 on the left side.      Comments: Abnormal monophasic signal detect of the DP and PT bilateral with Doppler.  Patient has history of AFib which is apparent with the Doppler.    Mild nonpitting edema to lower extremity bilateral.  No hair growth bilateral lower extremity.  Mild rubor on dependency bilateral lower extremity.  Musculoskeletal:      Comments: No pain with ROM or MMT bilateral lower extremity.    No significant digital deformity bilateral.  Rectus appearing foot type bilateral.   Feet:      Right foot:      Protective Sensation: 10 sites tested.  9 sites sensed.      Skin integrity: Dry skin present.      Toenail Condition: Right toenails are abnormally thick and long.      Left foot:      Protective Sensation: 10 sites tested.  10 sites sensed.      Skin  integrity: Dry skin present.      Toenail Condition: Left toenails are abnormally thick and long.   Skin:     General: Skin is warm.      Capillary Refill: Capillary refill takes 2 to 3 seconds.      Findings: No ecchymosis or erythema.      Nails: There is no clubbing.      Comments: Second toenail bilateral is growing a superior direction, thickened and discolored yellow with some mild loosening.  Nails 1, 3, 4 and 5 bilateral are mildly elongated 2-3 mm, thickened with patchy yellow discoloration mild underlying debris.    No open lesions or macerations to lower extremity bilateral.    Skin is atrophied to lower extremity bilateral.   Neurological:      Mental Status: He is alert and oriented to person, place, and time.      Sensory: Sensory deficit present.      Motor: Motor function is intact.      Comments: Absent vibratory sensation right foot and decreased left foot.             Assessment:       Encounter Diagnoses   Name Primary?    Type 2 diabetes mellitus with neurological manifestations Yes    Type 2 diabetes mellitus with peripheral vascular disease     Onychomycosis          Plan:       Cliff was seen today for nail care.    Diagnoses and all orders for this visit:    Type 2 diabetes mellitus with neurological manifestations  -     Routine Foot Care    Type 2 diabetes mellitus with peripheral vascular disease  -     Routine Foot Care    Onychomycosis  -     Routine Foot Care    I counseled the patient on his conditions, their implications and medical management.    Shoe inspection. Diabetic Foot Education. Patient reminded of the importance of good nutrition and blood sugar control to help prevent podiatric complications of diabetes. Patient instructed on proper foot hygeine. We discussed wearing proper shoe gear, daily foot inspections, never walking without protective shoe gear, never putting sharp instruments to feet.    Routine foot care per attached note. Patient relates relief following the  procedure. He will continue to monitor the areas daily, inspect his feet, wear protective shoe gear when ambulatory, moisturizer to maintain skin integrity.    A portion of this note was generated by voice recognition software and may contain spelling and grammar errors.

## 2022-12-14 PROBLEM — E11.8 DM TYPE 2, CONTROLLED, WITH COMPLICATION: Status: ACTIVE | Noted: 2022-12-14

## 2022-12-14 PROBLEM — M54.50 LOW BACK PAIN: Status: RESOLVED | Noted: 2020-11-19 | Resolved: 2022-12-14

## 2022-12-14 PROBLEM — G89.29 CHRONIC PAIN: Status: RESOLVED | Noted: 2021-04-28 | Resolved: 2022-12-14

## 2022-12-14 PROBLEM — N28.1 RENAL CYST: Status: ACTIVE | Noted: 2022-12-14

## 2022-12-14 PROBLEM — E11.9 TYPE 2 DIABETES MELLITUS, WITHOUT LONG-TERM CURRENT USE OF INSULIN: Status: RESOLVED | Noted: 2021-12-13 | Resolved: 2022-12-14

## 2022-12-14 PROBLEM — R52 PAIN AGGRAVATED BY WALKING: Status: RESOLVED | Noted: 2019-08-20 | Resolved: 2022-12-14

## 2022-12-14 PROBLEM — L72.3 SEBACEOUS CYST: Status: RESOLVED | Noted: 2018-07-25 | Resolved: 2022-12-14

## 2022-12-19 ENCOUNTER — OFFICE VISIT (OUTPATIENT)
Dept: INTERNAL MEDICINE | Facility: CLINIC | Age: 87
End: 2022-12-19
Payer: MEDICARE

## 2022-12-19 VITALS
SYSTOLIC BLOOD PRESSURE: 138 MMHG | BODY MASS INDEX: 25.34 KG/M2 | DIASTOLIC BLOOD PRESSURE: 72 MMHG | WEIGHT: 152.13 LBS | HEART RATE: 70 BPM | RESPIRATION RATE: 14 BRPM | OXYGEN SATURATION: 98 % | HEIGHT: 65 IN

## 2022-12-19 DIAGNOSIS — N28.1 RENAL CYST: ICD-10-CM

## 2022-12-19 DIAGNOSIS — N18.30 CKD STAGE 3 SECONDARY TO DIABETES: ICD-10-CM

## 2022-12-19 DIAGNOSIS — E11.69 HYPERLIPIDEMIA ASSOCIATED WITH TYPE 2 DIABETES MELLITUS: ICD-10-CM

## 2022-12-19 DIAGNOSIS — E11.22 CKD STAGE 3 SECONDARY TO DIABETES: ICD-10-CM

## 2022-12-19 DIAGNOSIS — I70.0 ATHEROSCLEROSIS OF AORTA: ICD-10-CM

## 2022-12-19 DIAGNOSIS — E78.5 HYPERLIPIDEMIA ASSOCIATED WITH TYPE 2 DIABETES MELLITUS: ICD-10-CM

## 2022-12-19 DIAGNOSIS — I15.2 HYPERTENSION ASSOCIATED WITH DIABETES: ICD-10-CM

## 2022-12-19 DIAGNOSIS — I48.19 PERSISTENT ATRIAL FIBRILLATION: Chronic | ICD-10-CM

## 2022-12-19 DIAGNOSIS — N18.30 CKD STAGE 3 DUE TO TYPE 2 DIABETES MELLITUS: ICD-10-CM

## 2022-12-19 DIAGNOSIS — E11.8 DM TYPE 2, CONTROLLED, WITH COMPLICATION: ICD-10-CM

## 2022-12-19 DIAGNOSIS — E11.59 HYPERTENSION ASSOCIATED WITH DIABETES: ICD-10-CM

## 2022-12-19 DIAGNOSIS — H90.3 SENSORINEURAL HEARING LOSS (SNHL) OF BOTH EARS: ICD-10-CM

## 2022-12-19 DIAGNOSIS — Z79.01 CHRONIC ANTICOAGULATION: ICD-10-CM

## 2022-12-19 DIAGNOSIS — E11.22 CKD STAGE 3 DUE TO TYPE 2 DIABETES MELLITUS: ICD-10-CM

## 2022-12-19 DIAGNOSIS — Z00.00 ENCOUNTER FOR PREVENTIVE HEALTH EXAMINATION: Primary | ICD-10-CM

## 2022-12-19 PROCEDURE — 99999 PR PBB SHADOW E&M-EST. PATIENT-LVL IV: ICD-10-PCS | Mod: PBBFAC,,, | Performed by: NURSE PRACTITIONER

## 2022-12-19 PROCEDURE — 99999 PR PBB SHADOW E&M-EST. PATIENT-LVL IV: CPT | Mod: PBBFAC,,, | Performed by: NURSE PRACTITIONER

## 2022-12-19 PROCEDURE — G0439 PPPS, SUBSEQ VISIT: HCPCS | Mod: ,,, | Performed by: NURSE PRACTITIONER

## 2022-12-19 PROCEDURE — G0439 PR MEDICARE ANNUAL WELLNESS SUBSEQUENT VISIT: ICD-10-PCS | Mod: ,,, | Performed by: NURSE PRACTITIONER

## 2022-12-19 PROCEDURE — 99214 OFFICE O/P EST MOD 30 MIN: CPT | Mod: PBBFAC,PO | Performed by: NURSE PRACTITIONER

## 2022-12-19 RX ORDER — ACETAMINOPHEN 500 MG
TABLET ORAL DAILY
COMMUNITY

## 2022-12-19 RX ORDER — HYDROCHLOROTHIAZIDE 25 MG/1
25 TABLET ORAL DAILY
COMMUNITY
End: 2023-08-18

## 2022-12-19 NOTE — PATIENT INSTRUCTIONS
Counseling and Referral of Other Preventative  (Italic type indicates deductible and co-insurance are waived)    Patient Name: Cliff Magaña  Today's Date: 12/19/2022    Health Maintenance         Date Due Completion Date    Diabetes Urine Screening & other labs written on 4/12/2022 by nephrology-Jonathan NP  And F/U with Jonathan after labs are done   12/17/2022 12/17/2021    COVID-19 Vaccine (5 - Booster for Pfizer series) 01/03/2023 (Originally 10/20/2022) 8/25/2022    Hemoglobin A1c 02/11/2023 8/11/2022    Eye Exam external eye 08/19/2023 8/19/2022    Lipid Panel 10/20/2023 10/20/2022    TETANUS VACCINE 03/02/2025 3/2/2015          No orders of the defined types were placed in this encounter.      The following information is provided to all patients.  This information is to help you find resources for any of the problems found today that may be affecting your health:                Living healthy guide: www.[facility].gov      Understanding Diabetes: www.diabetes.org      Eating healthy: www.cdc.gov/healthyweight      CDC home safety checklist: www.cdc.gov/steadi/patient.html      Agency on Aging: www.goea.[place].gov      Alcoholics anonymous (AA): www.aa.org      Physical Activity: www.shayy.nih.gov/mp8oysj      Tobacco use: www.quitwithusla.org

## 2022-12-19 NOTE — PROGRESS NOTES
"Cliff Magaña presented for a  Medicare AWV and comprehensive Health Risk Assessment today. The following components were reviewed and updated:    Medical history  Family History  Social history  Allergies and Current Medications  Health Risk Assessment  Health Maintenance  Care Team     ** See Completed Assessments for Annual Wellness Visit within the encounter summary.**       The following assessments were completed:  Living Situation  CAGE  Depression Screening  Timed Get Up and Go  Whisper Test  Cognitive Function Screening  Nutrition Screening  ADL Screening  PAQ Screening    Vitals:    12/19/22 1253   BP: 138/72   BP Location: Left arm   Patient Position: Sitting   Pulse: 70   Resp: 14   SpO2: 98%   Weight: 69 kg (152 lb 1.6 oz)   Height: 5' 5" (1.651 m)     Body mass index is 25.31 kg/m².  Physical Exam  Constitutional:       Comments: Younger in appearance than age   HENT:      Right Ear: There is no impacted cerumen.      Left Ear: There is no impacted cerumen.   Eyes:      General: No scleral icterus.  Cardiovascular:      Rate and Rhythm: Normal rate and regular rhythm.   Pulmonary:      Effort: Pulmonary effort is normal.      Breath sounds: Normal breath sounds.   Abdominal:      Palpations: Abdomen is soft.   Musculoskeletal:         General: Swelling present. Normal range of motion.   Skin:     General: Skin is warm and dry.   Neurological:      Mental Status: He is alert and oriented to person, place, and time.   Psychiatric:         Mood and Affect: Mood normal.         Behavior: Behavior normal.         Thought Content: Thought content normal.         Judgment: Judgment normal.            Opioid screening has been done- patient denies taking opioid medication. Refer to the rooming section for pain assessment for today's visit.  Review for substance use disorder screen-  patient denies using substance medication.        Diagnoses and health risks identified today and associated " recommendations/orders:    1. Hypertension associated with diabetes  Stable followed by cardiology    2. Hyperlipidemia associated with type 2 diabetes mellitus  Stable followed by cardiology    3. CKD stage 3 due to type 2 diabetes mellitus  Stable followed by cardiology    4. Persistent atrial fibrillation  Stable followed by cardiology    5. Chronic anticoagulation  Stable followed by cardiology    6. Atherosclerosis of aorta  Stable followed by cardiology    7. CKD stage 3 secondary to diabetes  Stable followed by nephrology    8. Renal cyst  Stable followed by urology    9. DM type 2, controlled, with complication  Stable followed by PCP     10. Sensorineural hearing loss (SNHL) of both ears  Stable followed by audiology    11. Encounter for preventive health examination  Here for Health Risk Assessment/Annual Wellness Visit.  Health maintenance reviewed and updated. Follow up in one year.          Provided Cliff with a 5-10 year written screening schedule and personal prevention plan. Recommendations were developed using the USPSTF age appropriate recommendations. Education, counseling, and referrals were provided as needed. After Visit Summary printed and given to patient which includes a list of additional screenings\tests needed. Cognitive function clock drawing has been labeled with the patient's identification  & forwarded to medical records to be scanned into the patient's epic chart.    Follow up in about 1 year (around 12/19/2023) for HRA.    AMANDA Winters offered to discuss advanced care planning, including how to pick a person who would make decisions for you if you were unable to make them for yourself, called a health care power of , and what kind of decisions you might make such as use of life sustaining treatments such as ventilators and tube feeding when faced with a life limiting illness recorded on a living will that they will need to know. (How you want to be cared for as  you near the end of your natural life)     X  Patient has advanced directives on file, they would like to make changes. I provided information on how to revoke their previous directives and make new ones.

## 2023-01-27 ENCOUNTER — PATIENT MESSAGE (OUTPATIENT)
Dept: INTERNAL MEDICINE | Facility: CLINIC | Age: 88
End: 2023-01-27
Payer: MEDICARE

## 2023-01-27 DIAGNOSIS — E11.22 TYPE 2 DIABETES MELLITUS WITH STAGE 3A CHRONIC KIDNEY DISEASE, WITHOUT LONG-TERM CURRENT USE OF INSULIN: ICD-10-CM

## 2023-01-27 DIAGNOSIS — N18.31 TYPE 2 DIABETES MELLITUS WITH STAGE 3A CHRONIC KIDNEY DISEASE, WITHOUT LONG-TERM CURRENT USE OF INSULIN: ICD-10-CM

## 2023-01-27 NOTE — TELEPHONE ENCOUNTER
Annual visit 8-17-22    Pt is requesting Rx below  Thanks     Discussed pap at patients visit, she knows that she is due.

## 2023-01-27 NOTE — TELEPHONE ENCOUNTER
Care Due:                  Date            Visit Type   Department     Provider  --------------------------------------------------------------------------------                                EP -                              PRIMARY      MET INTERNAL  Last Visit: 08-      CARE ([facility])   MEDICINE       Munir Estrada  Next Visit: None Scheduled  None         None Found                                                            Last  Test          Frequency    Reason                     Performed    Due Date  --------------------------------------------------------------------------------    HBA1C.......  6 months...  metFORMIN................  08- 02-    [facility] Embedded Care Gaps. Reference number: [id number]. 1/27/2023   3:34:11 PM CST

## 2023-01-29 RX ORDER — METFORMIN HYDROCHLORIDE 500 MG/1
500 TABLET, EXTENDED RELEASE ORAL DAILY
Qty: 90 TABLET | Refills: 3 | Status: SHIPPED | OUTPATIENT
Start: 2023-01-29 | End: 2023-08-23 | Stop reason: SDUPTHER

## 2023-02-16 ENCOUNTER — OFFICE VISIT (OUTPATIENT)
Dept: PODIATRY | Facility: CLINIC | Age: 88
End: 2023-02-16
Payer: MEDICARE

## 2023-02-16 VITALS
BODY MASS INDEX: 25.33 KG/M2 | HEART RATE: 84 BPM | WEIGHT: 152 LBS | DIASTOLIC BLOOD PRESSURE: 79 MMHG | HEIGHT: 65 IN | SYSTOLIC BLOOD PRESSURE: 141 MMHG

## 2023-02-16 DIAGNOSIS — E11.51 TYPE 2 DIABETES MELLITUS WITH PERIPHERAL VASCULAR DISEASE: Primary | ICD-10-CM

## 2023-02-16 DIAGNOSIS — E11.49 TYPE 2 DIABETES MELLITUS WITH NEUROLOGICAL MANIFESTATIONS: ICD-10-CM

## 2023-02-16 DIAGNOSIS — B35.1 ONYCHOMYCOSIS: ICD-10-CM

## 2023-02-16 PROCEDURE — 99213 OFFICE O/P EST LOW 20 MIN: CPT | Mod: PBBFAC,25,PN | Performed by: PODIATRIST

## 2023-02-16 PROCEDURE — 99999 PR PBB SHADOW E&M-EST. PATIENT-LVL III: ICD-10-PCS | Mod: PBBFAC,,, | Performed by: PODIATRIST

## 2023-02-16 PROCEDURE — 11721 DEBRIDE NAIL 6 OR MORE: CPT | Mod: PBBFAC,PN | Performed by: PODIATRIST

## 2023-02-16 PROCEDURE — 99999 PR PBB SHADOW E&M-EST. PATIENT-LVL III: CPT | Mod: PBBFAC,,, | Performed by: PODIATRIST

## 2023-02-16 PROCEDURE — 99213 PR OFFICE/OUTPT VISIT, EST, LEVL III, 20-29 MIN: ICD-10-PCS | Mod: 25,S$PBB,, | Performed by: PODIATRIST

## 2023-02-16 PROCEDURE — 99213 OFFICE O/P EST LOW 20 MIN: CPT | Mod: 25,S$PBB,, | Performed by: PODIATRIST

## 2023-02-16 PROCEDURE — 11721 ROUTINE FOOT CARE: ICD-10-PCS | Mod: Q8,S$PBB,, | Performed by: PODIATRIST

## 2023-02-16 NOTE — PROCEDURES
"Routine Foot Care    Date/Time: 2/16/2023 11:00 AM  Performed by: Bhupendra Brar DPM  Authorized by: Bhupendra Brar DPM     Time out: Immediately prior to procedure a "time out" was called to verify the correct patient, procedure, equipment, support staff and site/side marked as required.    Consent Done?:  Yes (Verbal)  Hyperkeratotic Skin Lesions?: No      Nail Care Type:  Debride  Location(s): All  (Left 1st Toe, Left 3rd Toe, Left 2nd Toe, Left 4th Toe, Left 5th Toe, Right 1st Toe, Right 2nd Toe, Right 3rd Toe, Right 4th Toe and Right 5th Toe)  Patient tolerance:  Patient tolerated the procedure well with no immediate complications     Used sterile nail nipper.      "

## 2023-02-16 NOTE — PROGRESS NOTES
Subjective:      Patient ID: Cliff Magaña is a 87 y.o. male.    Chief Complaint: Nail Care (3 month nail care f/u)      Cliff is a 87 y.o. male who presents to the clinic upon referral from Dr. Steff choi. provider found  for evaluation and treatment of diabetic feet. Cliff has a past medical history of *Atrial fibrillation, Chronic kidney disease, Hyperlipidemia, Hypertension, Metabolic syndrome, and Type 2 diabetes mellitus, without long-term current use of insulin (12/13/2021). Patient relates no major problem with feet. Only complaints today consist of thickened toenails that he has difficulty trimming.  He will sometimes get a pedicure.  He also relates he gets intermittent cramping to both legs at night.  History of chronic low back pain with right lower extremity symptoms.    04/29/2022: Returns for routine nail care. No new concerns.     07/29/2022: Returns for routine nail care. No new concerns.     11/17/2022:  Returns routine foot care.  No new concerns.    02/16/2023:  Returns for routine foot care.  Due for annual foot exam.  Complains of intermittent cramping to the left foot that is brief in duration.  Denies any claudication symptoms. Followed by Cardiology.     PCP: MD Domitila Faust NP on 12/19/2022  Date Last Seen by PCP:  08/17/2022    Current shoe gear: Casual shoes    Hemoglobin A1C   Date Value Ref Range Status   08/11/2022 6.9 (H) 4.0 - 5.6 % Final     Comment:     ADA Screening Guidelines:  5.7-6.4%  Consistent with prediabetes  >or=6.5%  Consistent with diabetes    High levels of fetal hemoglobin interfere with the HbA1C  assay. Heterozygous hemoglobin variants (HbS, HgC, etc)do  not significantly interfere with this assay.   However, presence of multiple variants may affect accuracy.     12/17/2021 6.3 (H) 4.0 - 5.6 % Final     Comment:     ADA Screening Guidelines:  5.7-6.4%  Consistent with prediabetes  >or=6.5%  Consistent with diabetes    High levels of fetal  "hemoglobin interfere with the HbA1C  assay. Heterozygous hemoglobin variants (HbS, HgC, etc)do  not significantly interfere with this assay.   However, presence of multiple variants may affect accuracy.     2021 6.2 (H) 4.0 - 5.6 % Final     Comment:     ADA Screening Guidelines:  5.7-6.4%  Consistent with prediabetes  >or=6.5%  Consistent with diabetes    High levels of fetal hemoglobin interfere with the HbA1C  assay. Heterozygous hemoglobin variants (HbS, HgC, etc)do  not significantly interfere with this assay.   However, presence of multiple variants may affect accuracy.       Vitals:    23 1115   BP: (!) 141/79   Pulse: 84   Weight: 68.9 kg (152 lb)   Height: 5' 5" (1.651 m)   PainSc: 0-No pain      Past Medical History:   Diagnosis Date    *Atrial fibrillation     Chronic kidney disease     Hyperlipidemia     Hypertension     Metabolic syndrome     Type 2 diabetes mellitus, without long-term current use of insulin 2021       Past Surgical History:   Procedure Laterality Date    CATARACT EXTRACTION      CHOLECYSTECTOMY      EYE SURGERY      INJECTION OF FACET JOINT Bilateral 2021    Procedure: FACET JOINT INJECTION BILATERAL L4/L5 DIRECT REFERRAL;  Surgeon: Philipp Iyer MD;  Location: [facility];  Service: Pain Management;  Laterality: Bilateral;  NEEDS CONSENT, ELIQUIS CLEARANCE IN CHART    SKIN CANCER EXCISION      TONSILLECTOMY         Family History   Problem Relation Age of Onset    Diabetes Maternal Aunt     Heart attack Neg Hx     Heart disease Neg Hx     Heart failure Neg Hx     Hyperlipidemia Neg Hx     Hypertension Neg Hx     Stroke Neg Hx        Social History     Socioeconomic History    Marital status:    Tobacco Use    Smoking status: Former     Packs/day: 2.00     Years: 20.00     Pack years: 40.00     Types: Cigarettes     Quit date: 1983     Years since quittin.7    Smokeless tobacco: Never   Substance and Sexual Activity    Alcohol use: Yes     " Alcohol/week: 1.0 standard drink     Types: 1 Standard drinks or equivalent per week     Comment: 3 drinks per week    Drug use: No    Sexual activity: Yes     Partners: Female     Social Determinants of Health     Financial Resource Strain: Low Risk     Difficulty of Paying Living Expenses: Not hard at all   Food Insecurity: No Food Insecurity    Worried About Running Out of Food in the Last Year: Never true    Ran Out of Food in the Last Year: Never true   Transportation Needs: No Transportation Needs    Lack of Transportation (Medical): No    Lack of Transportation (Non-Medical): No   Physical Activity: Sufficiently Active    Days of Exercise per Week: 5 days    Minutes of Exercise per Session: 60 min   Stress: No Stress Concern Present    Feeling of Stress : Not at all   Social Connections: Unknown    Frequency of Communication with Friends and Family: Twice a week    Frequency of Social Gatherings with Friends and Family: Once a week    Active Member of Clubs or Organizations: Yes    Attends Club or Organization Meetings: More than 4 times per year    Marital Status:    Housing Stability: Low Risk     Unable to Pay for Housing in the Last Year: No    Number of Places Lived in the Last Year: 1    Unstable Housing in the Last Year: No       Current Outpatient Medications   Medication Sig Dispense Refill    acetaminophen (TYLENOL) 650 MG TbSR Take 650 mg by mouth every 8 (eight) hours.      apixaban (ELIQUIS) 2.5 mg Tab Take 1 tablet (2.5 mg total) by mouth 2 (two) times daily. 180 tablet 3    cholecalciferol, vitamin D3, (VITAMIN D3) 50 mcg (2,000 unit) Cap capsule Take by mouth once daily.      fenofibrate micronized (LOFIBRA) 200 MG Cap TAKE 1 CAPSULE DAILY WITH BREAKFAST 90 capsule 3    furosemide (LASIX) 20 MG tablet Take 1 tablet (20 mg total) by mouth 2 (two) times daily. 60 tablet 3    hydroCHLOROthiazide (HYDRODIURIL) 25 MG tablet Take 25 mg by mouth once daily.      TRAMAINE BIOTIN ORAL Take 5,000 mcg  by mouth once daily.      metFORMIN (GLUCOPHAGE-XR) 500 MG ER 24hr tablet Take 1 tablet (500 mg total) by mouth once daily. 90 tablet 3    metoprolol succinate (TOPROL-XL) 25 MG 24 hr tablet TAKE 1 TABLET DAILY 90 tablet 3    metoprolol succinate (TOPROL-XL) 50 MG 24 hr tablet TAKE ONE AND ONE-HALF TABLETS ONCE DAILY 135 tablet 3    NIFEdipine (PROCARDIA-XL) 90 MG (OSM) 24 hr tablet Take 1 tablet (90 mg total) by mouth once daily. 90 tablet 6     No current facility-administered medications for this visit.       Review of patient's allergies indicates:   Allergen Reactions    Niacin      Other reaction(s): Rash  Other reaction(s): Itching           Review of Systems   Constitutional: Negative for chills, fever and malaise/fatigue.   HENT:  Negative for congestion and hearing loss.    Cardiovascular:  Negative for chest pain, claudication and leg swelling.   Respiratory:  Negative for cough and shortness of breath.    Skin:  Positive for nail changes.   Musculoskeletal:  Positive for back pain and muscle cramps. Negative for joint pain and muscle weakness.   Gastrointestinal:  Negative for nausea and vomiting.   Neurological:  Positive for paresthesias. Negative for numbness and weakness.   Psychiatric/Behavioral:  Negative for altered mental status.          Objective:      Physical Exam  Constitutional:       General: He is not in acute distress.     Appearance: Normal appearance. He is not ill-appearing.   Cardiovascular:      Pulses:           Dorsalis pedis pulses are 0 on the right side and 0 on the left side.        Posterior tibial pulses are 0 on the right side and 0 on the left side.      Comments: Absent left PT per Doppler.  Monophasic DP bilateral and right PT with Doppler.  Patient has history of AFib which is apparent with the Doppler.    Mild nonpitting edema to lower extremity bilateral.  No hair growth bilateral lower extremity.  Mild rubor on dependency bilateral lower extremity.  Musculoskeletal:       Comments: No pain with ROM or MMT bilateral lower extremity.    No significant digital deformity bilateral.  Rectus appearing foot type bilateral.   Feet:      Right foot:      Protective Sensation: 10 sites tested.  9 sites sensed.      Skin integrity: Dry skin present.      Toenail Condition: Right toenails are abnormally thick and long.      Left foot:      Protective Sensation: 10 sites tested.  10 sites sensed.      Skin integrity: Dry skin present.      Toenail Condition: Left toenails are abnormally thick and long.   Skin:     General: Skin is warm.      Capillary Refill: Capillary refill takes 2 to 3 seconds.      Findings: No ecchymosis or erythema.      Nails: There is no clubbing.      Comments: Second toenail bilateral is growing a superior direction, thickened and discolored yellow with some mild loosening.  Nails 1, 3, 4 and 5 bilateral are mildly elongated 2-3 mm, thickened with patchy yellow discoloration mild underlying debris.    No open lesions or macerations to lower extremity bilateral.    Skin is atrophied to lower extremity bilateral.   Neurological:      Mental Status: He is alert and oriented to person, place, and time.      Sensory: Sensory deficit present.      Motor: Motor function is intact.      Comments: Absent vibratory sensation right foot and decreased left foot.             Assessment:       Encounter Diagnoses   Name Primary?    Type 2 diabetes mellitus with peripheral vascular disease Yes    Type 2 diabetes mellitus with neurological manifestations     Onychomycosis          Plan:       Cliff was seen today for nail care.    Diagnoses and all orders for this visit:    Type 2 diabetes mellitus with peripheral vascular disease  -     Routine Foot Care    Type 2 diabetes mellitus with neurological manifestations  -     Routine Foot Care    Onychomycosis  -     Routine Foot Care    I counseled the patient on his conditions, their implications and medical management.    Shoe  inspection. Diabetic Foot Education. Patient reminded of the importance of good nutrition and blood sugar control to help prevent podiatric complications of diabetes. Patient instructed on proper foot hygeine. We discussed wearing proper shoe gear, daily foot inspections, never walking without protective shoe gear, never putting sharp instruments to feet.    Routine foot care per attached note. Patient relates relief following the procedure. He will continue to monitor the areas daily, inspect his feet, wear protective shoe gear when ambulatory, moisturizer to maintain skin integrity.    Comprehensive annual diabetic foot exam completed today.  Patient is found to be overall intermediate risk for diabetic foot complication.    RTC within 4 months or p.r.n. as discussed.  Previous arterial ultrasound SHAYY completed 1 year ago.  Will reorder at next annual foot exam since patient is asymptomatic.    A portion of this note was generated by voice recognition software and may contain spelling and grammar errors.

## 2023-03-07 ENCOUNTER — PATIENT MESSAGE (OUTPATIENT)
Dept: ADMINISTRATIVE | Facility: OTHER | Age: 88
End: 2023-03-07
Payer: MEDICARE

## 2023-03-13 ENCOUNTER — TELEPHONE (OUTPATIENT)
Dept: CARDIOLOGY | Facility: CLINIC | Age: 88
End: 2023-03-13
Payer: MEDICARE

## 2023-03-13 ENCOUNTER — PATIENT MESSAGE (OUTPATIENT)
Dept: CARDIOLOGY | Facility: CLINIC | Age: 88
End: 2023-03-13
Payer: MEDICARE

## 2023-03-13 DIAGNOSIS — E78.2 MIXED HYPERLIPIDEMIA: ICD-10-CM

## 2023-03-13 DIAGNOSIS — E11.22 TYPE 2 DIABETES MELLITUS WITH STAGE 3A CHRONIC KIDNEY DISEASE, WITHOUT LONG-TERM CURRENT USE OF INSULIN: Primary | ICD-10-CM

## 2023-03-13 DIAGNOSIS — N18.31 TYPE 2 DIABETES MELLITUS WITH STAGE 3A CHRONIC KIDNEY DISEASE, WITHOUT LONG-TERM CURRENT USE OF INSULIN: Primary | ICD-10-CM

## 2023-03-14 ENCOUNTER — LAB VISIT (OUTPATIENT)
Dept: LAB | Facility: HOSPITAL | Age: 88
End: 2023-03-14
Payer: MEDICARE

## 2023-03-14 DIAGNOSIS — E11.22 TYPE 2 DIABETES MELLITUS WITH STAGE 3A CHRONIC KIDNEY DISEASE, WITHOUT LONG-TERM CURRENT USE OF INSULIN: ICD-10-CM

## 2023-03-14 DIAGNOSIS — N18.31 TYPE 2 DIABETES MELLITUS WITH STAGE 3A CHRONIC KIDNEY DISEASE, WITHOUT LONG-TERM CURRENT USE OF INSULIN: ICD-10-CM

## 2023-03-14 DIAGNOSIS — E78.2 MIXED HYPERLIPIDEMIA: ICD-10-CM

## 2023-03-14 LAB
ALBUMIN SERPL BCP-MCNC: 4.1 G/DL (ref 3.5–5.2)
ALP SERPL-CCNC: 54 U/L (ref 55–135)
ALT SERPL W/O P-5'-P-CCNC: 21 U/L (ref 10–44)
ANION GAP SERPL CALC-SCNC: 9 MMOL/L (ref 8–16)
AST SERPL-CCNC: 20 U/L (ref 10–40)
BILIRUB SERPL-MCNC: 0.7 MG/DL (ref 0.1–1)
BUN SERPL-MCNC: 37 MG/DL (ref 8–23)
CALCIUM SERPL-MCNC: 10 MG/DL (ref 8.7–10.5)
CHLORIDE SERPL-SCNC: 104 MMOL/L (ref 95–110)
CHOLEST SERPL-MCNC: 180 MG/DL (ref 120–199)
CHOLEST/HDLC SERPL: 4.5 {RATIO} (ref 2–5)
CO2 SERPL-SCNC: 27 MMOL/L (ref 23–29)
CREAT SERPL-MCNC: 1.4 MG/DL (ref 0.5–1.4)
EST. GFR  (NO RACE VARIABLE): 48.6 ML/MIN/1.73 M^2
ESTIMATED AVG GLUCOSE: 151 MG/DL (ref 68–131)
GLUCOSE SERPL-MCNC: 158 MG/DL (ref 70–110)
HBA1C MFR BLD: 6.9 % (ref 4–5.6)
HDLC SERPL-MCNC: 40 MG/DL (ref 40–75)
HDLC SERPL: 22.2 % (ref 20–50)
LDLC SERPL CALC-MCNC: 104 MG/DL (ref 63–159)
NONHDLC SERPL-MCNC: 140 MG/DL
POTASSIUM SERPL-SCNC: 3.8 MMOL/L (ref 3.5–5.1)
PROT SERPL-MCNC: 7.4 G/DL (ref 6–8.4)
SODIUM SERPL-SCNC: 140 MMOL/L (ref 136–145)
TRIGL SERPL-MCNC: 180 MG/DL (ref 30–150)
TSH SERPL DL<=0.005 MIU/L-ACNC: 2.31 UIU/ML (ref 0.4–4)

## 2023-03-14 PROCEDURE — 36415 COLL VENOUS BLD VENIPUNCTURE: CPT | Mod: PO | Performed by: INTERNAL MEDICINE

## 2023-03-14 PROCEDURE — 80053 COMPREHEN METABOLIC PANEL: CPT | Performed by: INTERNAL MEDICINE

## 2023-03-14 PROCEDURE — 84443 ASSAY THYROID STIM HORMONE: CPT | Performed by: INTERNAL MEDICINE

## 2023-03-14 PROCEDURE — 83036 HEMOGLOBIN GLYCOSYLATED A1C: CPT | Performed by: INTERNAL MEDICINE

## 2023-03-14 PROCEDURE — 80061 LIPID PANEL: CPT | Performed by: INTERNAL MEDICINE

## 2023-03-15 ENCOUNTER — OFFICE VISIT (OUTPATIENT)
Dept: CARDIOLOGY | Facility: CLINIC | Age: 88
End: 2023-03-15
Payer: MEDICARE

## 2023-03-15 VITALS
WEIGHT: 148.13 LBS | SYSTOLIC BLOOD PRESSURE: 139 MMHG | HEIGHT: 65 IN | HEART RATE: 86 BPM | BODY MASS INDEX: 24.68 KG/M2 | DIASTOLIC BLOOD PRESSURE: 71 MMHG

## 2023-03-15 DIAGNOSIS — E11.22 CKD STAGE 3 DUE TO TYPE 2 DIABETES MELLITUS: ICD-10-CM

## 2023-03-15 DIAGNOSIS — E78.5 HYPERLIPIDEMIA ASSOCIATED WITH TYPE 2 DIABETES MELLITUS: ICD-10-CM

## 2023-03-15 DIAGNOSIS — I70.0 ATHEROSCLEROSIS OF AORTA: ICD-10-CM

## 2023-03-15 DIAGNOSIS — Z79.01 CHRONIC ANTICOAGULATION: ICD-10-CM

## 2023-03-15 DIAGNOSIS — E11.59 HYPERTENSION ASSOCIATED WITH DIABETES: Primary | ICD-10-CM

## 2023-03-15 DIAGNOSIS — E11.8 DM TYPE 2, CONTROLLED, WITH COMPLICATION: ICD-10-CM

## 2023-03-15 DIAGNOSIS — I15.2 HYPERTENSION ASSOCIATED WITH DIABETES: Primary | ICD-10-CM

## 2023-03-15 DIAGNOSIS — N18.30 CKD STAGE 3 DUE TO TYPE 2 DIABETES MELLITUS: ICD-10-CM

## 2023-03-15 DIAGNOSIS — E11.69 HYPERLIPIDEMIA ASSOCIATED WITH TYPE 2 DIABETES MELLITUS: ICD-10-CM

## 2023-03-15 DIAGNOSIS — I48.19 PERSISTENT ATRIAL FIBRILLATION: Chronic | ICD-10-CM

## 2023-03-15 PROCEDURE — 99214 OFFICE O/P EST MOD 30 MIN: CPT | Mod: S$PBB,,, | Performed by: INTERNAL MEDICINE

## 2023-03-15 PROCEDURE — 99214 PR OFFICE/OUTPT VISIT, EST, LEVL IV, 30-39 MIN: ICD-10-PCS | Mod: S$PBB,,, | Performed by: INTERNAL MEDICINE

## 2023-03-15 PROCEDURE — 99999 PR PBB SHADOW E&M-EST. PATIENT-LVL IV: ICD-10-PCS | Mod: PBBFAC,,, | Performed by: INTERNAL MEDICINE

## 2023-03-15 PROCEDURE — 99214 OFFICE O/P EST MOD 30 MIN: CPT | Mod: PBBFAC,PO | Performed by: INTERNAL MEDICINE

## 2023-03-15 PROCEDURE — 99999 PR PBB SHADOW E&M-EST. PATIENT-LVL IV: CPT | Mod: PBBFAC,,, | Performed by: INTERNAL MEDICINE

## 2023-03-15 RX ORDER — LORATADINE 10 MG/1
10 TABLET ORAL DAILY
COMMUNITY

## 2023-03-15 NOTE — PROGRESS NOTES
Subjective:   Patient ID:  Cliff Magaña is a 87 y.o. male who presents for follow-up of Atrial Fibrillation      HPI: Recent history: taking medications as instructed, no medication side effects noted, no TIA's, no chest pain on exertion, no dyspnea on exertion and no swelling of ankles. Patient's symptoms have been unchanged. No medication side effects.    Blood work: elevated TG-C and BS, otherwise OK      Lab Results   Component Value Date     03/14/2023    K 3.8 03/14/2023     03/14/2023    CO2 27 03/14/2023    BUN 37 (H) 03/14/2023    CREATININE 1.4 03/14/2023     (H) 03/14/2023    HGBA1C 6.9 (H) 03/14/2023    AST 20 03/14/2023    ALT 21 03/14/2023    ALBUMIN 4.1 03/14/2023    PROT 7.4 03/14/2023    BILITOT 0.7 03/14/2023    WBC 7.29 08/11/2022    HGB 17.6 08/11/2022    HCT 50.6 08/11/2022    MCV 91 08/11/2022     08/11/2022    INR 2.8 12/17/2013    INR 1.8 (H) 06/07/2011    PSA 0.43 06/06/2013    TSH 2.307 03/14/2023    CHOL 180 03/14/2023    HDL 40 03/14/2023    LDLCALC 104.0 03/14/2023    TRIG 180 (H) 03/14/2023       No results found in the last 24 hours.     Review of Systems   Constitutional: Positive for weight loss.   HENT:  Positive for hearing loss.    Eyes: Negative.    Cardiovascular: Negative.  Negative for chest pain, claudication, dyspnea on exertion, irregular heartbeat, leg swelling, near-syncope, palpitations and syncope.   Respiratory: Negative.  Negative for cough, shortness of breath, snoring and wheezing.    Endocrine: Negative.  Negative for cold intolerance, heat intolerance, polydipsia, polyphagia and polyuria.   Skin: Negative.    Musculoskeletal:  Positive for arthritis, back pain and muscle cramps.   Gastrointestinal: Negative.    Genitourinary: Negative.    Neurological: Negative.    Psychiatric/Behavioral: Negative.       Objective:   Physical Exam  Vitals and nursing note reviewed.   Constitutional:       Appearance: He is well-developed.      Comments:  "/71 (BP Location: Left arm)   Pulse 86   Ht 5' 5" (1.651 m)   Wt 67.2 kg (148 lb 2.4 oz)   BMI 24.65 kg/m²      HENT:      Head: Normocephalic.   Eyes:      Pupils: Pupils are equal, round, and reactive to light.   Neck:      Thyroid: No thyromegaly.      Vascular: No carotid bruit.   Cardiovascular:      Rate and Rhythm: Normal rate. Rhythm irregularly irregular.      Pulses: Intact distal pulses.           Carotid pulses are 2+ on the right side and 2+ on the left side.       Radial pulses are 2+ on the right side and 2+ on the left side.        Femoral pulses are 2+ on the right side and 2+ on the left side.       Popliteal pulses are 2+ on the right side and 2+ on the left side.        Dorsalis pedis pulses are 2+ on the right side and 2+ on the left side.        Posterior tibial pulses are 2+ on the right side and 2+ on the left side.      Heart sounds: Normal heart sounds. No murmur heard.    No friction rub. No gallop.   Pulmonary:      Effort: Pulmonary effort is normal. No respiratory distress.      Breath sounds: Normal breath sounds. No wheezing or rales.   Chest:      Chest wall: No tenderness.   Abdominal:      General: Bowel sounds are normal.      Palpations: Abdomen is soft.   Musculoskeletal:         General: Normal range of motion.      Cervical back: Normal range of motion and neck supple.   Skin:     General: Skin is warm and dry.   Neurological:      Mental Status: He is alert and oriented to person, place, and time.         Assessment and Plan:     Problem List Items Addressed This Visit          Cardiology Problems    Hypertension associated with diabetes - Primary    Hyperlipidemia associated with type 2 diabetes mellitus    Persistent atrial fibrillation (Chronic)    Atherosclerosis of aorta       Other    CKD stage 3 due to type 2 diabetes mellitus    Chronic anticoagulation    DM type 2, controlled, with complication       Patient's Medications   New Prescriptions    No " medications on file   Previous Medications    ACETAMINOPHEN (TYLENOL) 650 MG TBSR    Take 650 mg by mouth every 8 (eight) hours.    APIXABAN (ELIQUIS) 2.5 MG TAB    Take 1 tablet (2.5 mg total) by mouth 2 (two) times daily.    CHOLECALCIFEROL, VITAMIN D3, (VITAMIN D3) 50 MCG (2,000 UNIT) CAP CAPSULE    Take by mouth once daily.    FENOFIBRATE MICRONIZED (LOFIBRA) 200 MG CAP    TAKE 1 CAPSULE DAILY WITH BREAKFAST    HYDROCHLOROTHIAZIDE (HYDRODIURIL) 25 MG TABLET    Take 25 mg by mouth once daily.    LORATADINE (CLARITIN) 10 MG TABLET    Take 10 mg by mouth once daily.    TRAMAINE BIOTIN ORAL    Take 5,000 mcg by mouth once daily.    METFORMIN (GLUCOPHAGE-XR) 500 MG ER 24HR TABLET    Take 1 tablet (500 mg total) by mouth once daily.    METOPROLOL SUCCINATE (TOPROL-XL) 25 MG 24 HR TABLET    TAKE 1 TABLET DAILY    METOPROLOL SUCCINATE (TOPROL-XL) 50 MG 24 HR TABLET    TAKE ONE AND ONE-HALF TABLETS ONCE DAILY    NIFEDIPINE (PROCARDIA-XL) 90 MG (OSM) 24 HR TABLET    Take 1 tablet (90 mg total) by mouth once daily.   Modified Medications    No medications on file   Discontinued Medications    FUROSEMIDE (LASIX) 20 MG TABLET    Take 1 tablet (20 mg total) by mouth 2 (two) times daily.     Consider SGLT2i (Jardiance 10 mg daily). Defer to Dr. Estrada.  Otherwise no change in the medical regimen.Dietary restrictions discussed at length.  Follow up in about 1 year (around 3/15/2024).

## 2023-03-20 PROBLEM — Z00.00 ENCOUNTER FOR PREVENTIVE HEALTH EXAMINATION: Chronic | Status: RESOLVED | Noted: 2022-12-19 | Resolved: 2023-03-20

## 2023-03-29 ENCOUNTER — TELEPHONE (OUTPATIENT)
Dept: AUDIOLOGY | Facility: CLINIC | Age: 88
End: 2023-03-29
Payer: MEDICARE

## 2023-03-29 DIAGNOSIS — H90.3 SENSORINEURAL HEARING LOSS, BILATERAL: Primary | ICD-10-CM

## 2023-04-04 ENCOUNTER — LAB VISIT (OUTPATIENT)
Dept: LAB | Facility: HOSPITAL | Age: 88
End: 2023-04-04
Payer: MEDICARE

## 2023-04-04 DIAGNOSIS — N18.30 STAGE 3 CHRONIC KIDNEY DISEASE, UNSPECIFIED WHETHER STAGE 3A OR 3B CKD: ICD-10-CM

## 2023-04-04 LAB
BACTERIA #/AREA URNS AUTO: ABNORMAL /HPF
BILIRUB UR QL STRIP: NEGATIVE
CLARITY UR REFRACT.AUTO: ABNORMAL
COLOR UR AUTO: YELLOW
CREAT UR-MCNC: 151 MG/DL (ref 23–375)
GLUCOSE UR QL STRIP: NEGATIVE
HGB UR QL STRIP: ABNORMAL
HYALINE CASTS UR QL AUTO: 14 /LPF
KETONES UR QL STRIP: NEGATIVE
LEUKOCYTE ESTERASE UR QL STRIP: ABNORMAL
MICROSCOPIC COMMENT: ABNORMAL
NITRITE UR QL STRIP: NEGATIVE
NON-SQ EPI CELLS #/AREA URNS AUTO: 4 /HPF
PH UR STRIP: 5 [PH] (ref 5–8)
PROT UR QL STRIP: ABNORMAL
PROT UR-MCNC: 48 MG/DL (ref 0–15)
PROT/CREAT UR: 0.32 MG/G{CREAT} (ref 0–0.2)
RBC #/AREA URNS AUTO: 21 /HPF (ref 0–4)
SP GR UR STRIP: 1.01 (ref 1–1.03)
SQUAMOUS #/AREA URNS AUTO: 0 /HPF
URN SPEC COLLECT METH UR: ABNORMAL
WBC #/AREA URNS AUTO: 71 /HPF (ref 0–5)
WBC CLUMPS UR QL AUTO: ABNORMAL
YEAST UR QL AUTO: ABNORMAL

## 2023-04-04 PROCEDURE — 82570 ASSAY OF URINE CREATININE: CPT | Performed by: NURSE PRACTITIONER

## 2023-04-04 PROCEDURE — 81001 URINALYSIS AUTO W/SCOPE: CPT | Performed by: NURSE PRACTITIONER

## 2023-04-05 ENCOUNTER — CLINICAL SUPPORT (OUTPATIENT)
Dept: AUDIOLOGY | Facility: CLINIC | Age: 88
End: 2023-04-05
Payer: MEDICARE

## 2023-04-05 DIAGNOSIS — H90.3 SENSORINEURAL HEARING LOSS, BILATERAL: Primary | ICD-10-CM

## 2023-04-05 PROCEDURE — 92567 TYMPANOMETRY: CPT | Mod: PBBFAC

## 2023-04-05 PROCEDURE — 99999 PR PBB SHADOW E&M-EST. PATIENT-LVL I: CPT | Mod: PBBFAC,,,

## 2023-04-05 PROCEDURE — 99211 OFF/OP EST MAY X REQ PHY/QHP: CPT | Mod: PBBFAC

## 2023-04-05 PROCEDURE — 99999 PR PBB SHADOW E&M-EST. PATIENT-LVL I: ICD-10-PCS | Mod: PBBFAC,,,

## 2023-04-05 PROCEDURE — 92557 COMPREHENSIVE HEARING TEST: CPT | Mod: PBBFAC

## 2023-04-05 NOTE — PROGRESS NOTES
HEARING AID FOLLOW-UP  Cliff Magaña, a 87 y.o. male, was seen today for a hearing aid cleaning and check.  The hearing aids were cleaned and checked.  Listening check confirmed the aids are working well.  The hearing aids were connected to the software via Z2 wireless.  The patient's hearing test from today was put into the [email or web address] software.  Recalculations were made based on the updated hearing test.  The patient was happy with the sound quality of the aids.  The settings were saved.  The patient was advised to return in a year for an annual hearing evaluation and hearing aid follow-up or sooner if needed.     Recommendations:  Wear hearing protection in noise  Continue daily use of binaural amplification  Annual hearing aid follow up or sooner if needed  Annual hearing evaluation or sooner if change in hearing is perceived     Hearing Aid Details  : [employer]  Model: Audeo M90-RT   Type: NI    Color: Champagne  Battery: Lithium ion  Tube/ length & power: #2M  Dome size & style: Phonak small vented dome  RT SN: [id number]  LT SN: [id number]  Warranty expiration: 02/17/2023   L and D expiration: 02/17/2023       serial number: [id number]

## 2023-04-05 NOTE — Clinical Note
Good afternoon Dr. Estrada,   Here are the results from Mr. Magaña's hearing evaluation. Please let me know if you need any further information or have any questions. Thanks!  Javan Barakat

## 2023-04-05 NOTE — PROGRESS NOTES
Cliff Magaña, a 87 y.o. male, was seen today in the clinic for an audiologic evaluation.  The patient has a history of a bilateral sensorineural hearing loss (SNHL).  Mr. Magaña wears binaural Phonak hearing aids.  The patient reported no change in hearing sensitivity since his last hearing evaluation on 11/12/2020.  The patient denied aural fullness, tinnitus, otalgia, and dizziness.    Tympanometry revealed Type As in the right ear and Type As in the left ear.  Audiogram results revealed a normal sloping to moderately-severe SNHL in the right ear and a mild sloping to severe SNHL in the left ear.  Speech reception thresholds were noted at 35 dB in the right ear and 35 dB in the left ear.  Speech discrimination scores were 80% in the right ear and 88% in the left ear.  There were no significant changes in hearing since the patient's last hearing evaluation.     Recommendations:  Otologic evaluation  Hearing protection when in noise  Continue daily use of binaural amplification  Annual hearing aid follow-up or sooner if needed  Annual audiogram or sooner if change in hearing is perceived

## 2023-04-11 ENCOUNTER — OFFICE VISIT (OUTPATIENT)
Dept: NEPHROLOGY | Facility: CLINIC | Age: 88
End: 2023-04-11
Payer: MEDICARE

## 2023-04-11 ENCOUNTER — PATIENT MESSAGE (OUTPATIENT)
Dept: NEPHROLOGY | Facility: CLINIC | Age: 88
End: 2023-04-11

## 2023-04-11 VITALS
HEART RATE: 78 BPM | BODY MASS INDEX: 25.05 KG/M2 | OXYGEN SATURATION: 98 % | WEIGHT: 150.38 LBS | DIASTOLIC BLOOD PRESSURE: 72 MMHG | HEIGHT: 65 IN | SYSTOLIC BLOOD PRESSURE: 140 MMHG

## 2023-04-11 DIAGNOSIS — R31.9 HEMATURIA, UNSPECIFIED TYPE: ICD-10-CM

## 2023-04-11 DIAGNOSIS — E11.22 TYPE 2 DIABETES MELLITUS WITH STAGE 3 CHRONIC KIDNEY DISEASE, UNSPECIFIED WHETHER LONG TERM INSULIN USE, UNSPECIFIED WHETHER STAGE 3A OR 3B CKD: ICD-10-CM

## 2023-04-11 DIAGNOSIS — N18.30 TYPE 2 DIABETES MELLITUS WITH STAGE 3 CHRONIC KIDNEY DISEASE, UNSPECIFIED WHETHER LONG TERM INSULIN USE, UNSPECIFIED WHETHER STAGE 3A OR 3B CKD: ICD-10-CM

## 2023-04-11 DIAGNOSIS — R82.998 URINE WBC INCREASED: ICD-10-CM

## 2023-04-11 DIAGNOSIS — N18.30 STAGE 3 CHRONIC KIDNEY DISEASE, UNSPECIFIED WHETHER STAGE 3A OR 3B CKD: Primary | ICD-10-CM

## 2023-04-11 DIAGNOSIS — R82.71 ASYMPTOMATIC BACTERIURIA: ICD-10-CM

## 2023-04-11 DIAGNOSIS — N28.1 COMPLEX RENAL CYST: ICD-10-CM

## 2023-04-11 DIAGNOSIS — I10 HYPERTENSION, UNSPECIFIED TYPE: ICD-10-CM

## 2023-04-11 PROCEDURE — 99215 OFFICE O/P EST HI 40 MIN: CPT | Mod: S$PBB,,, | Performed by: NURSE PRACTITIONER

## 2023-04-11 PROCEDURE — 99999 PR PBB SHADOW E&M-EST. PATIENT-LVL IV: ICD-10-PCS | Mod: PBBFAC,,, | Performed by: NURSE PRACTITIONER

## 2023-04-11 PROCEDURE — 99999 PR PBB SHADOW E&M-EST. PATIENT-LVL IV: CPT | Mod: PBBFAC,,, | Performed by: NURSE PRACTITIONER

## 2023-04-11 PROCEDURE — 99214 OFFICE O/P EST MOD 30 MIN: CPT | Mod: PBBFAC | Performed by: NURSE PRACTITIONER

## 2023-04-11 PROCEDURE — 99215 PR OFFICE/OUTPT VISIT, EST, LEVL V, 40-54 MIN: ICD-10-PCS | Mod: S$PBB,,, | Performed by: NURSE PRACTITIONER

## 2023-04-11 NOTE — Clinical Note
Dalia Funes, [place] pt. You follow him for complex cysts. He has new persistent microhematuria. I performed urine microscopy,  and all the RBCs I saw were normocytic. He is due to see you again in October. I'll defer to you to determine if you want further workup/ if he needs to be seen sooner.  Thanks, Thi

## 2023-04-11 NOTE — PATIENT INSTRUCTIONS
Please report symptoms of urinary tract infection (burning when you urinate, pain over your bladder, increased urgency and/or frequency, increased confusion).

## 2023-04-11 NOTE — PROGRESS NOTES
Subjective:       Patient ID: Cliff Magaña is a 87 y.o. white male who presents for f/u evaluation of   CKD    HPI     Patient is new to me. New to clinic.  Prior pertinent chart reviewed since this is patient's first appointment with me.    Patient presents for new evaluation of CKD and renal cysts.  Baseline creatinine of 1.3-1.5 mg/dL.    Had MRI for back pain to determine placment of injections; incidentlally found cysts on kidneys at that time.    Significant hx includes HTN since before Kay[place], afib, HLD, metabolic syndrome, aortic atheroslcerosis, T2DM      The patient denies taking NSAIDs, herbal supplements, or new antibiotics, recreational drugs, recent episode of dehydration, diarrhea, nausea or vomiting, acute illness, hospitalization or exposure to IV radiocontrast.     Significant family hx includes: No known kidney disease.    Last renal US: April 2021, reviewed.    Update 10/5/21:  Last seen by me in May 2021.  Baseline sCr 1.3-1.5 mg/dL.  Home BPs: fluctuate from low 100s-140s/60-70s    Denies recent hospitalizations or NSAID use.    Update 4/12/22:  Returns for f/u of CKD.  Baseline sCr 1.3-1.5 mg/dL.  Home BPs: 110s-130s/60s-70s on digital monitoring  Denies recent hospitalizations or NSAID use.    Update 5/17/22:  Returns for f/u of CKD.  Now has an JER.  Baseline sCr 1.3-1.5 mg/dL. sCr 2.0 recently.  Says he has h/o nephrolithiasis; never had any surgically removed.  Eliquis decreased by cardiology.  Home BPs: 110s-130s/60s-70s on digital monitoring; occassional SBP in 140s or less than 110  Denies recent NSAID use, new antibiotics, illnesses.    Update 4/11/23:  Returns for f/u of CKD.  Baseline sCr 1.3-1.5 mg/dL.  Home BPs: 120s-140s/70s  Valsartan previously d/c due to JER  No recent hospitalizations.    Review of Systems   Respiratory:  Negative for shortness of breath.    Cardiovascular:  Negative for leg swelling.   Gastrointestinal:  Negative for diarrhea, nausea and vomiting.  "  Genitourinary:  Negative for difficulty urinating, dysuria, frequency, hematuria and urgency.   Musculoskeletal:  Positive for back pain (near beltline).   Neurological:  Negative for dizziness.     Objective:       Blood pressure (!) 140/72, pulse 78, height 5' 5" (1.651 m), weight 68.2 kg (150 lb 5.7 oz), SpO2 98 %.  Physical Exam  Vitals reviewed.   Constitutional:       General: He is not in acute distress.     Appearance: Normal appearance. He is well-developed.   Eyes:      Conjunctiva/sclera: Conjunctivae normal.   Cardiovascular:      Rate and Rhythm: Normal rate.   Pulmonary:      Effort: Pulmonary effort is normal. No respiratory distress.      Breath sounds: Normal breath sounds.   Abdominal:      Tenderness: There is no right CVA tenderness or left CVA tenderness.   Musculoskeletal:      Cervical back: Neck supple.      Right lower leg: No edema.      Left lower leg: No edema.   Skin:     General: Skin is warm and dry.      Findings: No lesion or rash.   Neurological:      Mental Status: He is alert and oriented to person, place, and time.   Psychiatric:         Mood and Affect: Mood normal.         Behavior: Behavior normal.         Thought Content: Thought content normal.         Judgment: Judgment normal.         Lab Results   Component Value Date    CREATININE 1.3 04/04/2023     Prot/Creat Ratio, Urine   Date Value Ref Range Status   04/04/2023 0.32 (H) 0.00 - 0.20 Final   05/17/2022 Unable to calculate 0.00 - 0.20 Final   04/06/2022 Unable to calculate 0.00 - 0.20 Final     Lab Results   Component Value Date     04/04/2023    K 3.9 04/04/2023    CO2 27 04/04/2023     04/04/2023     Lab Results   Component Value Date    PTH 58.3 04/04/2023    CALCIUM 9.9 04/04/2023    PHOS 2.9 04/04/2023     Lab Results   Component Value Date    HGB 16.2 04/04/2023    WBC 7.29 04/04/2023    HCT 48.4 04/04/2023      Lab Results   Component Value Date    HGBA1C 6.9 (H) 03/14/2023     04/04/2023    " "BUN 27 (H) 04/04/2023     Lab Results   Component Value Date    LDLCALC 104.0 03/14/2023         Assessment:       1. Stage 3 chronic kidney disease, unspecified whether stage 3a or 3b CKD    2. Hematuria, unspecified type    3. Complex renal cyst    4. Hypertension, unspecified type    5. Type 2 diabetes mellitus with stage 3 chronic kidney disease, unspecified whether long term insulin use, unspecified whether stage 3a or 3b CKD    6. Asymptomatic bacteriuria    7. Urine WBC increased          Plan:   CKD stage 3A c eGFR 45-49 mL/min clinically 2/2 age-related nephron loss coupled with persistent afib.  Less likely that renal cysts are contributing. Educated patient to control BP, BG, remain well-hydrated, and avoid NSAIDs to prevent progression of CKD.    UPCR Albuminuric in 8978-9461 but not recently. Off ARB due to previous JER.  UPCR slightly elevated recently in setting of hematuria and leukocyturia. Repeat.   Acid-base WNL   Renal osteodystrophy Ca okay. Phos, PTH okay. Vit D low in August 2022;   Anemia WNL   DM Well-controlled.    Lipid Management On fenofibrate since at least 2012.   ESRD planning  Reassurance given.     HTN - WNL on digital monitoring record. On amlodipine 10 mg, Hctz 25 mg, metoprolol succinate 75 mg, nifedipine 90 mg  - Valsartan previously d/c in setting of JER    Renal cysts - complex cyst reviewed by Dr. Moffett in October 2021. He classified it has "minimally complex" and recommended 1 year follow up with [facility]. Will defer to urology for further management of renal cysts. Has US scheduled in October 2023.    Hematuria/ leukocyturia - repeat UA, UPCR  Urine microscopy - occasional normocytic RBCs  - UA c persistent microscopic hematuria. Will message his urology team for further evaluation.    Asymptomatic bacteriuria - educated on symptoms to report    All questions patient had were answered.  Asked if further questions. None. F/u in clinic in 6 with labs and urine prior to next visit " or sooner if needed.  ER for emergency concerns.    Summary of Plan:  1. UA, UPCR  2. avoid NSAID/ bactrim/ IV contrast/ gadolinium/ aminoglycoside where possible  3. RTC in 6 mos     40 minutes of total time spent on the encounter, which includes face to face time and non-face to face time preparing to see the patient (eg, review of tests), Obtaining and/or reviewing separately obtained history, documenting clinical information in the electronic or other health record, independently interpreting results (not separately reported) and communicating results to the patient/family/caregiver, or Care coordination (not separately reported).

## 2023-04-19 ENCOUNTER — PATIENT MESSAGE (OUTPATIENT)
Dept: OTHER | Facility: OTHER | Age: 88
End: 2023-04-19
Payer: MEDICARE

## 2023-05-13 ENCOUNTER — PATIENT MESSAGE (OUTPATIENT)
Dept: CARDIOLOGY | Facility: CLINIC | Age: 88
End: 2023-05-13
Payer: MEDICARE

## 2023-05-18 ENCOUNTER — DOCUMENTATION ONLY (OUTPATIENT)
Dept: AUDIOLOGY | Facility: CLINIC | Age: 88
End: 2023-05-18
Payer: MEDICARE

## 2023-05-18 ENCOUNTER — OFFICE VISIT (OUTPATIENT)
Dept: PODIATRY | Facility: CLINIC | Age: 88
End: 2023-05-18
Payer: MEDICARE

## 2023-05-18 VITALS
HEART RATE: 78 BPM | BODY MASS INDEX: 24.99 KG/M2 | SYSTOLIC BLOOD PRESSURE: 147 MMHG | DIASTOLIC BLOOD PRESSURE: 83 MMHG | HEIGHT: 65 IN | WEIGHT: 150 LBS

## 2023-05-18 DIAGNOSIS — B35.1 ONYCHOMYCOSIS: ICD-10-CM

## 2023-05-18 DIAGNOSIS — E11.51 TYPE 2 DIABETES MELLITUS WITH PERIPHERAL VASCULAR DISEASE: Primary | ICD-10-CM

## 2023-05-18 DIAGNOSIS — E11.49 TYPE 2 DIABETES MELLITUS WITH NEUROLOGICAL MANIFESTATIONS: ICD-10-CM

## 2023-05-18 PROCEDURE — 11721 DEBRIDE NAIL 6 OR MORE: CPT | Mod: Q9,PBBFAC,PN | Performed by: PODIATRIST

## 2023-05-18 PROCEDURE — 99499 UNLISTED E&M SERVICE: CPT | Mod: S$PBB,,, | Performed by: PODIATRIST

## 2023-05-18 PROCEDURE — 99999 PR PBB SHADOW E&M-EST. PATIENT-LVL III: CPT | Mod: PBBFAC,,, | Performed by: PODIATRIST

## 2023-05-18 PROCEDURE — 99213 OFFICE O/P EST LOW 20 MIN: CPT | Mod: 25,PBBFAC,PN | Performed by: PODIATRIST

## 2023-05-18 PROCEDURE — 11721 ROUTINE FOOT CARE: ICD-10-PCS | Mod: Q8,S$PBB,, | Performed by: PODIATRIST

## 2023-05-18 PROCEDURE — 99499 NO LOS: ICD-10-PCS | Mod: S$PBB,,, | Performed by: PODIATRIST

## 2023-05-18 PROCEDURE — 99999 PR PBB SHADOW E&M-EST. PATIENT-LVL III: ICD-10-PCS | Mod: PBBFAC,,, | Performed by: PODIATRIST

## 2023-05-18 NOTE — PROCEDURES
"Routine Foot Care    Date/Time: 5/18/2023 11:00 AM  Performed by: Bhupendra Brar DPM  Authorized by: Bhupendra Brar DPM     Time out: Immediately prior to procedure a "time out" was called to verify the correct patient, procedure, equipment, support staff and site/side marked as required.    Consent Done?:  Yes (Verbal)  Hyperkeratotic Skin Lesions?: No      Nail Care Type:  Debride  Location(s): All  (Left 1st Toe, Left 3rd Toe, Left 2nd Toe, Left 4th Toe, Left 5th Toe, Right 1st Toe, Right 2nd Toe, Right 3rd Toe, Right 4th Toe and Right 5th Toe)  Patient tolerance:  Patient tolerated the procedure well with no immediate complications     Used sterile nail nipper.      "

## 2023-05-18 NOTE — PROGRESS NOTES
[facility] stating that his right hearing aid was making a static sound.  Hearing aid mics were brushed,  was replaced, wax filter was replaced.  Hearing aid still had a static sound to it.  Patient was informed that the hearing aid will need to go in for repair.  Out of warranty cost was given to him.   Patient is scheduled to see Javan on 5/31/23 for .      Invoice Date: 11/20/2019  Hearing Aid Details  : Phonak  Model: Audeo M90-RT   Type: NI    Color: Champagne  Battery: Lithium ion  Tube/ length & power: #2M  Dome size & style: Phonak small vented dome  RT SN: [id number]  Warranty expiration: 02/17/2023   L and D expiration: 02/17/2023

## 2023-05-18 NOTE — PROGRESS NOTES
Subjective:      Patient ID: Cliff Magaña is a 87 y.o. male.    Chief Complaint: Nail Care (3 month nail care f/u )      Cliff is a 87 y.o. male who presents to the clinic upon referral from Dr. Steff choi. provider found  for evaluation and treatment of diabetic feet. Cliff has a past medical history of *Atrial fibrillation, Chronic kidney disease, Hyperlipidemia, Hypertension, Metabolic syndrome, and Type 2 diabetes mellitus, without long-term current use of insulin (12/13/2021). Patient relates no major problem with feet. Only complaints today consist of thickened toenails that he has difficulty trimming.  He will sometimes get a pedicure.  He also relates he gets intermittent cramping to both legs at night.  History of chronic low back pain with right lower extremity symptoms.    04/29/2022: Returns for routine nail care. No new concerns.     07/29/2022: Returns for routine nail care. No new concerns.     11/17/2022:  Returns routine foot care.  No new concerns.    02/16/2023:  Returns for routine foot care.  Due for annual foot exam.  Complains of intermittent cramping to the left foot that is brief in duration.  Denies any claudication symptoms. Followed by Cardiology.     05/18/23: Returns for routine foot care.  No new concerns.  Accompanied by his wife.    PCP: MD Domitila Faust NP on 12/19/2022  Date Last Seen by PCP:  08/17/2022    Current shoe gear: Casual shoes    Hemoglobin A1C   Date Value Ref Range Status   03/14/2023 6.9 (H) 4.0 - 5.6 % Final     Comment:     ADA Screening Guidelines:  5.7-6.4%  Consistent with prediabetes  >or=6.5%  Consistent with diabetes    High levels of fetal hemoglobin interfere with the HbA1C  assay. Heterozygous hemoglobin variants (HbS, HgC, etc)do  not significantly interfere with this assay.   However, presence of multiple variants may affect accuracy.     08/11/2022 6.9 (H) 4.0 - 5.6 % Final     Comment:     ADA Screening Guidelines:  5.7-6.4%   "Consistent with prediabetes  >or=6.5%  Consistent with diabetes    High levels of fetal hemoglobin interfere with the HbA1C  assay. Heterozygous hemoglobin variants (HbS, HgC, etc)do  not significantly interfere with this assay.   However, presence of multiple variants may affect accuracy.     2021 6.3 (H) 4.0 - 5.6 % Final     Comment:     ADA Screening Guidelines:  5.7-6.4%  Consistent with prediabetes  >or=6.5%  Consistent with diabetes    High levels of fetal hemoglobin interfere with the HbA1C  assay. Heterozygous hemoglobin variants (HbS, HgC, etc)do  not significantly interfere with this assay.   However, presence of multiple variants may affect accuracy.       Vitals:    23 1106   BP: (!) 147/83   Pulse: 78   Weight: 68 kg (150 lb)   Height: 5' 5" (1.651 m)   PainSc: 0-No pain      Past Medical History:   Diagnosis Date    *Atrial fibrillation     Chronic kidney disease     Hyperlipidemia     Hypertension     Metabolic syndrome     Type 2 diabetes mellitus, without long-term current use of insulin 2021       Past Surgical History:   Procedure Laterality Date    CATARACT EXTRACTION      CHOLECYSTECTOMY      EYE SURGERY      INJECTION OF FACET JOINT Bilateral 2021    Procedure: FACET JOINT INJECTION BILATERAL L4/L5 DIRECT REFERRAL;  Surgeon: Philipp Iyer MD;  Location: [facility];  Service: Pain Management;  Laterality: Bilateral;  NEEDS CONSENT, ELIQUIS CLEARANCE IN CHART    SKIN CANCER EXCISION      TONSILLECTOMY         Family History   Problem Relation Age of Onset    Diabetes Maternal Aunt     Heart attack Neg Hx     Heart disease Neg Hx     Heart failure Neg Hx     Hyperlipidemia Neg Hx     Hypertension Neg Hx     Stroke Neg Hx        Social History     Socioeconomic History    Marital status:    Tobacco Use    Smoking status: Former     Packs/day: 2.00     Years: 20.00     Pack years: 40.00     Types: Cigarettes     Quit date: 1983     Years since quittin.0 "    Smokeless tobacco: Never   Substance and Sexual Activity    Alcohol use: Yes     Alcohol/week: 1.0 standard drink     Types: 1 Standard drinks or equivalent per week     Comment: 3 drinks per week    Drug use: No    Sexual activity: Not Currently     Partners: Female     Social Determinants of Health     Financial Resource Strain: Low Risk     Difficulty of Paying Living Expenses: Not hard at all   Food Insecurity: No Food Insecurity    Worried About Running Out of Food in the Last Year: Never true    Ran Out of Food in the Last Year: Never true   Transportation Needs: No Transportation Needs    Lack of Transportation (Medical): No    Lack of Transportation (Non-Medical): No   Physical Activity: Sufficiently Active    Days of Exercise per Week: 5 days    Minutes of Exercise per Session: 60 min   Stress: No Stress Concern Present    Feeling of Stress : Only a little   Social Connections: Unknown    Frequency of Communication with Friends and Family: More than three times a week    Frequency of Social Gatherings with Friends and Family: Three times a week    Active Member of Clubs or Organizations: Yes    Attends Club or Organization Meetings: More than 4 times per year    Marital Status:    Housing Stability: Low Risk     Unable to Pay for Housing in the Last Year: No    Number of Places Lived in the Last Year: 1    Unstable Housing in the Last Year: No       Current Outpatient Medications   Medication Sig Dispense Refill    acetaminophen (TYLENOL) 650 MG TbSR Take 650 mg by mouth every 8 (eight) hours.      apixaban (ELIQUIS) 2.5 mg Tab Take 1 tablet (2.5 mg total) by mouth 2 (two) times daily. 180 tablet 3    cholecalciferol, vitamin D3, (VITAMIN D3) 50 mcg (2,000 unit) Cap capsule Take by mouth once daily.      fenofibrate micronized (LOFIBRA) 200 MG Cap TAKE 1 CAPSULE DAILY WITH BREAKFAST 90 capsule 3    hydroCHLOROthiazide (HYDRODIURIL) 25 MG tablet Take 25 mg by mouth once daily.      loratadine  (CLARITIN) 10 mg tablet Take 10 mg by mouth once daily.      TRAMAINE BIOTIN ORAL Take 5,000 mcg by mouth once daily.      metFORMIN (GLUCOPHAGE-XR) 500 MG ER 24hr tablet Take 1 tablet (500 mg total) by mouth once daily. 90 tablet 3    metoprolol succinate (TOPROL-XL) 25 MG 24 hr tablet TAKE 1 TABLET DAILY 90 tablet 3    metoprolol succinate (TOPROL-XL) 50 MG 24 hr tablet TAKE ONE AND ONE-HALF TABLETS ONCE DAILY (Patient taking differently: 50 mg once daily.) 135 tablet 3    NIFEdipine (PROCARDIA-XL) 90 MG (OSM) 24 hr tablet Take 1 tablet (90 mg total) by mouth once daily. 90 tablet 6     No current facility-administered medications for this visit.       Review of patient's allergies indicates:   Allergen Reactions    Niacin      Other reaction(s): Rash  Other reaction(s): Itching           Review of Systems   Constitutional: Negative for chills, fever and malaise/fatigue.   HENT:  Negative for congestion and hearing loss.    Cardiovascular:  Negative for chest pain, claudication and leg swelling.   Respiratory:  Negative for cough and shortness of breath.    Skin:  Positive for nail changes.   Musculoskeletal:  Positive for back pain and muscle cramps. Negative for joint pain and muscle weakness.   Gastrointestinal:  Negative for nausea and vomiting.   Neurological:  Positive for paresthesias. Negative for numbness and weakness.   Psychiatric/Behavioral:  Negative for altered mental status.          Objective:      Physical Exam  Constitutional:       General: He is not in acute distress.     Appearance: Normal appearance. He is not ill-appearing.   Cardiovascular:      Pulses:           Dorsalis pedis pulses are 0 on the right side and 0 on the left side.        Posterior tibial pulses are 0 on the right side and 0 on the left side.      Comments: Absent left PT per Doppler.  Monophasic DP bilateral and right PT with Doppler.  Patient has history of AFib which is apparent with the Doppler.    Mild nonpitting edema to  lower extremity bilateral.  No hair growth bilateral lower extremity.  Mild rubor on dependency bilateral lower extremity.  Musculoskeletal:      Comments: No pain with ROM or MMT bilateral lower extremity.    No significant digital deformity bilateral.  Rectus appearing foot type bilateral.   Feet:      Right foot:      Protective Sensation: 10 sites tested.  9 sites sensed.      Skin integrity: Dry skin present.      Toenail Condition: Right toenails are abnormally thick and long.      Left foot:      Protective Sensation: 10 sites tested.  10 sites sensed.      Skin integrity: Dry skin present.      Toenail Condition: Left toenails are abnormally thick and long.   Skin:     General: Skin is warm.      Capillary Refill: Capillary refill takes 2 to 3 seconds.      Findings: No ecchymosis or erythema.      Nails: There is no clubbing.      Comments: Second toenail bilateral is growing a superior direction, thickened and discolored yellow with some mild loosening.  Nails 1, 3, 4 and 5 bilateral are mildly elongated 2-3 mm, thickened with patchy yellow discoloration mild underlying debris.    No open lesions or macerations to lower extremity bilateral.    Skin is atrophied to lower extremity bilateral.   Neurological:      Mental Status: He is alert and oriented to person, place, and time.      Sensory: Sensory deficit present.      Motor: Motor function is intact.      Comments: Absent vibratory sensation right foot and decreased left foot.             Assessment:       Encounter Diagnoses   Name Primary?    Type 2 diabetes mellitus with peripheral vascular disease Yes    Type 2 diabetes mellitus with neurological manifestations     Onychomycosis          Plan:       Cliff was seen today for nail care.    Diagnoses and all orders for this visit:    Type 2 diabetes mellitus with peripheral vascular disease  -     Routine Foot Care    Type 2 diabetes mellitus with neurological manifestations  -     Routine Foot  Care    Onychomycosis  -     Routine Foot Care      I counseled the patient on his conditions, their implications and medical management.    Shoe inspection. Diabetic Foot Education. Patient reminded of the importance of good nutrition and blood sugar control to help prevent podiatric complications of diabetes. Patient instructed on proper foot hygeine. We discussed wearing proper shoe gear, daily foot inspections, never walking without protective shoe gear, never putting sharp instruments to feet.    Routine foot care per attached note. Patient relates relief following the procedure. He will continue to monitor the areas daily, inspect his feet, wear protective shoe gear when ambulatory, moisturizer to maintain skin integrity.    RTC within 4 months or p.r.n. as discussed.    A portion of this note was generated by voice recognition software and may contain spelling and grammar errors.

## 2023-05-30 ENCOUNTER — PATIENT MESSAGE (OUTPATIENT)
Dept: CARDIOLOGY | Facility: CLINIC | Age: 88
End: 2023-05-30
Payer: MEDICARE

## 2023-05-30 DIAGNOSIS — I48.19 PERSISTENT ATRIAL FIBRILLATION: ICD-10-CM

## 2023-05-31 ENCOUNTER — CLINICAL SUPPORT (OUTPATIENT)
Dept: AUDIOLOGY | Facility: CLINIC | Age: 88
End: 2023-05-31
Payer: MEDICARE

## 2023-05-31 DIAGNOSIS — H90.3 SENSORINEURAL HEARING LOSS, BILATERAL: Primary | ICD-10-CM

## 2023-05-31 NOTE — PROGRESS NOTES
HEARING AID FOLLOW-UP  Cliff Magaña, a 87 y.o. male, was seen today to  his right hearing aid from an out of warranty repair.  Both hearing aids were connected to the software via [employer] wireless.  The hearing aids were programed to the settings from the most recent session.  The patient was happy with the sound quality of the aids and the settings were saved.  The patient paid the out of warranty repair charge.     Recommendations:  Wear hearing protection in noise  Continue daily use of binaural amplification  Annual hearing aid follow up or sooner if needed  Annual hearing evaluation or sooner if change in hearing is perceived     Hearing Aid Details  : [employer] [place] [place]  Model: Audeo M90-RT   Type: NI    Color: Champagne  Battery: Lithium ion  Tube/ length & power: #2M  Dome size & style: Phonak small vented dome  RT SN: [id number]  LT SN: [id number]  Left ear warranty expiration: 02/17/2023   Left ear L and D expiration: 02/17/2023   Right ear warranty expiration: 05/22/2024      serial number: [id number]    HA Repair  Action Taken: Replaced electronics, housing,  and overhauled - cleaned, checked and tested

## 2023-06-15 ENCOUNTER — TELEPHONE (OUTPATIENT)
Dept: INTERNAL MEDICINE | Facility: CLINIC | Age: 88
End: 2023-06-15
Payer: MEDICARE

## 2023-06-15 DIAGNOSIS — E11.22 TYPE 2 DIABETES MELLITUS WITH STAGE 3A CHRONIC KIDNEY DISEASE, WITHOUT LONG-TERM CURRENT USE OF INSULIN: Primary | ICD-10-CM

## 2023-06-15 DIAGNOSIS — N18.31 TYPE 2 DIABETES MELLITUS WITH STAGE 3A CHRONIC KIDNEY DISEASE, WITHOUT LONG-TERM CURRENT USE OF INSULIN: Primary | ICD-10-CM

## 2023-06-15 NOTE — TELEPHONE ENCOUNTER
----- Message from Ricarda Thrasher sent at 6/15/2023  2:39 PM CDT -----  Contact: [phone]  type: Lab    Caller is requesting to schedule their Lab appointment prior to annual appointment.  Order is not listed in EPIC.  Please enter order and contact patient to schedule.    Name of Caller: wife    Preferred Date and Time of Labs: 8/14/2023 @ 7:30 am     Date of Annual Physical Appointment:8/18/2023    Where would they like the lab performed?[facility]

## 2023-06-30 ENCOUNTER — PATIENT OUTREACH (OUTPATIENT)
Dept: ADMINISTRATIVE | Facility: HOSPITAL | Age: 88
End: 2023-06-30
Payer: MEDICARE

## 2023-07-19 RX ORDER — FENOFIBRATE 200 MG/1
CAPSULE ORAL
Qty: 90 CAPSULE | Refills: 0 | Status: SHIPPED | OUTPATIENT
Start: 2023-07-19 | End: 2023-08-04 | Stop reason: SDUPTHER

## 2023-07-19 NOTE — TELEPHONE ENCOUNTER
Care Due:                  Date            Visit Type   Department     Provider  --------------------------------------------------------------------------------                                EP -                              PRIMARY      [facility] INTERNAL  Last Visit: 08-      CARE ([facility])   BARRY Estrada                              EP -                              PRIMARY      [facility] INTERNAL  Next Visit: 08-      CARE ([facility])   BARRY Estrada                                                            Last  Test          Frequency    Reason                     Performed    Due Date  --------------------------------------------------------------------------------    HBA1C.......  6 months...  metFORMIN................  03-   09-    [facility] Embedded Care Due Messages. Reference number: [id number].   7/19/2023 2:10:01 AM CDT

## 2023-07-19 NOTE — TELEPHONE ENCOUNTER
Refill Decision Note   Cliff Magaña  is requesting a refill authorization.  Brief Assessment and Rationale for Refill:  Approve     Medication Therapy Plan:  FLOS      Comments:     Note composed:7:26 AM 07/19/2023             Appointments     Last Visit   8/17/2022 Munir Estrada MD   Next Visit   8/18/2023 Munir Estraad MD

## 2023-08-03 ENCOUNTER — PATIENT MESSAGE (OUTPATIENT)
Dept: INTERNAL MEDICINE | Facility: CLINIC | Age: 88
End: 2023-08-03
Payer: MEDICARE

## 2023-08-04 ENCOUNTER — PATIENT MESSAGE (OUTPATIENT)
Dept: INTERNAL MEDICINE | Facility: CLINIC | Age: 88
End: 2023-08-04
Payer: MEDICARE

## 2023-08-04 NOTE — TELEPHONE ENCOUNTER
No care due was identified.  [facility] Embedded Care Due Messages. Reference number: [id number].   8/04/2023 3:57:49 PM CDT

## 2023-08-07 RX ORDER — FENOFIBRATE 200 MG/1
CAPSULE ORAL
Qty: 30 CAPSULE | Refills: 11 | Status: SHIPPED | OUTPATIENT
Start: 2023-08-07 | End: 2023-11-13 | Stop reason: SDUPTHER

## 2023-08-07 NOTE — TELEPHONE ENCOUNTER
Refill Routing Note   Medication(s) are not appropriate for processing by [facility] for the following reason(s):      Drug-disease interaction    [facility] action(s):  Defer Care Due:  None identified     Medication Therapy Plan: Drug-Disease: fenofibrate micronized and CKD stage 3 due to type 2 diabetes mellitus        Appointments  past 12m or future 3m with PCP    Date Provider   Last Visit   8/17/2022 Munir Estrada MD   Next Visit   8/18/2023 Munir Estrada MD   ED visits in past 90 days: 0        Note composed:11:48 PM 08/06/2023

## 2023-08-14 ENCOUNTER — LAB VISIT (OUTPATIENT)
Dept: LAB | Facility: HOSPITAL | Age: 88
End: 2023-08-14
Payer: MEDICARE

## 2023-08-14 DIAGNOSIS — E11.22 TYPE 2 DIABETES MELLITUS WITH STAGE 3A CHRONIC KIDNEY DISEASE, WITHOUT LONG-TERM CURRENT USE OF INSULIN: ICD-10-CM

## 2023-08-14 DIAGNOSIS — N18.31 TYPE 2 DIABETES MELLITUS WITH STAGE 3A CHRONIC KIDNEY DISEASE, WITHOUT LONG-TERM CURRENT USE OF INSULIN: ICD-10-CM

## 2023-08-14 LAB
ALBUMIN SERPL BCP-MCNC: 4.1 G/DL (ref 3.5–5.2)
ALP SERPL-CCNC: 48 U/L (ref 55–135)
ALT SERPL W/O P-5'-P-CCNC: 22 U/L (ref 10–44)
ANION GAP SERPL CALC-SCNC: 13 MMOL/L (ref 8–16)
AST SERPL-CCNC: 18 U/L (ref 10–40)
BACTERIA #/AREA URNS AUTO: ABNORMAL /HPF
BASOPHILS # BLD AUTO: 0.05 K/UL (ref 0–0.2)
BASOPHILS NFR BLD: 0.5 % (ref 0–1.9)
BILIRUB SERPL-MCNC: 1 MG/DL (ref 0.1–1)
BILIRUB UR QL STRIP: NEGATIVE
BUN SERPL-MCNC: 28 MG/DL (ref 8–23)
CALCIUM SERPL-MCNC: 9.9 MG/DL (ref 8.7–10.5)
CHLORIDE SERPL-SCNC: 101 MMOL/L (ref 95–110)
CHOLEST SERPL-MCNC: 194 MG/DL (ref 120–199)
CHOLEST/HDLC SERPL: 5 {RATIO} (ref 2–5)
CLARITY UR REFRACT.AUTO: CLEAR
CO2 SERPL-SCNC: 25 MMOL/L (ref 23–29)
COLOR UR AUTO: YELLOW
CREAT SERPL-MCNC: 1.2 MG/DL (ref 0.5–1.4)
DIFFERENTIAL METHOD: ABNORMAL
EOSINOPHIL # BLD AUTO: 0.1 K/UL (ref 0–0.5)
EOSINOPHIL NFR BLD: 1 % (ref 0–8)
ERYTHROCYTE [DISTWIDTH] IN BLOOD BY AUTOMATED COUNT: 12.9 % (ref 11.5–14.5)
EST. GFR  (NO RACE VARIABLE): 58.5 ML/MIN/1.73 M^2
ESTIMATED AVG GLUCOSE: 200 MG/DL (ref 68–131)
GLUCOSE SERPL-MCNC: 170 MG/DL (ref 70–110)
GLUCOSE UR QL STRIP: NEGATIVE
HBA1C MFR BLD: 8.6 % (ref 4–5.6)
HCT VFR BLD AUTO: 50.3 % (ref 40–54)
HDLC SERPL-MCNC: 39 MG/DL (ref 40–75)
HDLC SERPL: 20.1 % (ref 20–50)
HGB BLD-MCNC: 17.3 G/DL (ref 14–18)
HGB UR QL STRIP: ABNORMAL
HYALINE CASTS UR QL AUTO: 0 /LPF
IMM GRANULOCYTES # BLD AUTO: 0.05 K/UL (ref 0–0.04)
IMM GRANULOCYTES NFR BLD AUTO: 0.5 % (ref 0–0.5)
KETONES UR QL STRIP: NEGATIVE
LDLC SERPL CALC-MCNC: 108 MG/DL (ref 63–159)
LEUKOCYTE ESTERASE UR QL STRIP: ABNORMAL
LYMPHOCYTES # BLD AUTO: 3.5 K/UL (ref 1–4.8)
LYMPHOCYTES NFR BLD: 34.6 % (ref 18–48)
MCH RBC QN AUTO: 31.7 PG (ref 27–31)
MCHC RBC AUTO-ENTMCNC: 34.4 G/DL (ref 32–36)
MCV RBC AUTO: 92 FL (ref 82–98)
MICROSCOPIC COMMENT: ABNORMAL
MONOCYTES # BLD AUTO: 0.8 K/UL (ref 0.3–1)
MONOCYTES NFR BLD: 7.6 % (ref 4–15)
NEUTROPHILS # BLD AUTO: 5.7 K/UL (ref 1.8–7.7)
NEUTROPHILS NFR BLD: 55.8 % (ref 38–73)
NITRITE UR QL STRIP: NEGATIVE
NONHDLC SERPL-MCNC: 155 MG/DL
NRBC BLD-RTO: 0 /100 WBC
PH UR STRIP: 6 [PH] (ref 5–8)
PLATELET # BLD AUTO: 256 K/UL (ref 150–450)
PMV BLD AUTO: 13 FL (ref 9.2–12.9)
POTASSIUM SERPL-SCNC: 4.1 MMOL/L (ref 3.5–5.1)
PROT SERPL-MCNC: 7.4 G/DL (ref 6–8.4)
PROT UR QL STRIP: ABNORMAL
RBC # BLD AUTO: 5.46 M/UL (ref 4.6–6.2)
RBC #/AREA URNS AUTO: 11 /HPF (ref 0–4)
SODIUM SERPL-SCNC: 139 MMOL/L (ref 136–145)
SP GR UR STRIP: 1.01 (ref 1–1.03)
SQUAMOUS #/AREA URNS AUTO: 0 /HPF
TRIGL SERPL-MCNC: 235 MG/DL (ref 30–150)
TSH SERPL DL<=0.005 MIU/L-ACNC: 0.86 UIU/ML (ref 0.4–4)
URN SPEC COLLECT METH UR: ABNORMAL
WBC # BLD AUTO: 10.16 K/UL (ref 3.9–12.7)
WBC #/AREA URNS AUTO: 6 /HPF (ref 0–5)

## 2023-08-14 PROCEDURE — 83036 HEMOGLOBIN GLYCOSYLATED A1C: CPT | Performed by: FAMILY MEDICINE

## 2023-08-14 PROCEDURE — 80053 COMPREHEN METABOLIC PANEL: CPT | Performed by: FAMILY MEDICINE

## 2023-08-14 PROCEDURE — 81001 URINALYSIS AUTO W/SCOPE: CPT | Performed by: FAMILY MEDICINE

## 2023-08-14 PROCEDURE — 36415 COLL VENOUS BLD VENIPUNCTURE: CPT | Mod: PO | Performed by: FAMILY MEDICINE

## 2023-08-14 PROCEDURE — 80061 LIPID PANEL: CPT | Performed by: FAMILY MEDICINE

## 2023-08-14 PROCEDURE — 85025 COMPLETE CBC W/AUTO DIFF WBC: CPT | Performed by: FAMILY MEDICINE

## 2023-08-14 PROCEDURE — 84443 ASSAY THYROID STIM HORMONE: CPT | Performed by: FAMILY MEDICINE

## 2023-08-17 ENCOUNTER — OFFICE VISIT (OUTPATIENT)
Dept: PODIATRY | Facility: CLINIC | Age: 88
End: 2023-08-17
Payer: MEDICARE

## 2023-08-17 VITALS
BODY MASS INDEX: 24.99 KG/M2 | DIASTOLIC BLOOD PRESSURE: 74 MMHG | SYSTOLIC BLOOD PRESSURE: 147 MMHG | HEART RATE: 62 BPM | WEIGHT: 150 LBS | HEIGHT: 65 IN

## 2023-08-17 DIAGNOSIS — E11.51 TYPE 2 DIABETES MELLITUS WITH PERIPHERAL VASCULAR DISEASE: Primary | ICD-10-CM

## 2023-08-17 DIAGNOSIS — E11.49 TYPE 2 DIABETES MELLITUS WITH NEUROLOGICAL MANIFESTATIONS: ICD-10-CM

## 2023-08-17 DIAGNOSIS — B35.1 ONYCHOMYCOSIS: ICD-10-CM

## 2023-08-17 PROCEDURE — 99499 UNLISTED E&M SERVICE: CPT | Mod: S$PBB,,, | Performed by: PODIATRIST

## 2023-08-17 PROCEDURE — 11721 ROUTINE FOOT CARE: ICD-10-PCS | Mod: Q8,S$PBB,, | Performed by: PODIATRIST

## 2023-08-17 PROCEDURE — 99999 PR PBB SHADOW E&M-EST. PATIENT-LVL III: CPT | Mod: PBBFAC,,, | Performed by: PODIATRIST

## 2023-08-17 PROCEDURE — 99499 NO LOS: ICD-10-PCS | Mod: S$PBB,,, | Performed by: PODIATRIST

## 2023-08-17 PROCEDURE — 11721 DEBRIDE NAIL 6 OR MORE: CPT | Mod: PBBFAC,PN | Performed by: PODIATRIST

## 2023-08-17 PROCEDURE — 99213 OFFICE O/P EST LOW 20 MIN: CPT | Mod: PBBFAC,PN | Performed by: PODIATRIST

## 2023-08-17 PROCEDURE — 99999 PR PBB SHADOW E&M-EST. PATIENT-LVL III: ICD-10-PCS | Mod: PBBFAC,,, | Performed by: PODIATRIST

## 2023-08-17 NOTE — PROGRESS NOTES
Subjective:      Patient ID: Cliff Magaña is a 87 y.o. male.    Chief Complaint: Diabetic Foot Exam and Nail Care      Cliff is a 87 y.o. male who presents to the clinic upon referral from Dr. Steff choi. provider found  for evaluation and treatment of diabetic feet. Cliff has a past medical history of *Atrial fibrillation, Chronic kidney disease, Hyperlipidemia, Hypertension, Metabolic syndrome, and Type 2 diabetes mellitus, without long-term current use of insulin (12/13/2021). Patient relates no major problem with feet. Only complaints today consist of thickened toenails that he has difficulty trimming.  He will sometimes get a pedicure.  He also relates he gets intermittent cramping to both legs at night.  History of chronic low back pain with right lower extremity symptoms.    04/29/2022: Returns for routine nail care. No new concerns.     07/29/2022: Returns for routine nail care. No new concerns.     11/17/2022:  Returns routine foot care.  No new concerns.    02/16/2023:  Returns for routine foot care.  Due for annual foot exam.  Complains of intermittent cramping to the left foot that is brief in duration.  Denies any claudication symptoms. Followed by Cardiology.     05/18/23: Returns for routine foot care.  No new concerns.  Accompanied by his wife.    08/17/2023: Returns for routine foot care.  No new concerns.    PCP: Munir Estrada MD    Date Last Seen by PCP:  08/18/2023    Current shoe gear: Casual shoes    Hemoglobin A1C   Date Value Ref Range Status   08/14/2023 8.6 (H) 4.0 - 5.6 % Final     Comment:     ADA Screening Guidelines:  5.7-6.4%  Consistent with prediabetes  >or=6.5%  Consistent with diabetes    High levels of fetal hemoglobin interfere with the HbA1C  assay. Heterozygous hemoglobin variants (HbS, HgC, etc)do  not significantly interfere with this assay.   However, presence of multiple variants may affect accuracy.     03/14/2023 6.9 (H) 4.0 - 5.6 % Final     Comment:     ADA  "Screening Guidelines:  5.7-6.4%  Consistent with prediabetes  >or=6.5%  Consistent with diabetes    High levels of fetal hemoglobin interfere with the HbA1C  assay. Heterozygous hemoglobin variants (HbS, HgC, etc)do  not significantly interfere with this assay.   However, presence of multiple variants may affect accuracy.     08/11/2022 6.9 (H) 4.0 - 5.6 % Final     Comment:     ADA Screening Guidelines:  5.7-6.4%  Consistent with prediabetes  >or=6.5%  Consistent with diabetes    High levels of fetal hemoglobin interfere with the HbA1C  assay. Heterozygous hemoglobin variants (HbS, HgC, etc)do  not significantly interfere with this assay.   However, presence of multiple variants may affect accuracy.       Vitals:    08/17/23 1024   BP: (!) 147/74   Pulse: 62   Weight: 68 kg (150 lb)   Height: 5' 5" (1.651 m)   PainSc: 0-No pain      Past Medical History:   Diagnosis Date    *Atrial fibrillation     Chronic kidney disease     Hyperlipidemia     Hypertension     Metabolic syndrome     Type 2 diabetes mellitus, without long-term current use of insulin 12/13/2021       Past Surgical History:   Procedure Laterality Date    CATARACT EXTRACTION      CHOLECYSTECTOMY      EYE SURGERY      INJECTION OF FACET JOINT Bilateral 4/28/2021    Procedure: FACET JOINT INJECTION BILATERAL L4/L5 DIRECT REFERRAL;  Surgeon: Philipp Iyer MD;  Location: [facility];  Service: Pain Management;  Laterality: Bilateral;  NEEDS CONSENT, ELIQUIS CLEARANCE IN CHART    SKIN CANCER EXCISION      TONSILLECTOMY         Family History   Problem Relation Age of Onset    Diabetes Maternal Aunt     Heart attack Neg Hx     Heart disease Neg Hx     Heart failure Neg Hx     Hyperlipidemia Neg Hx     Hypertension Neg Hx     Stroke Neg Hx        Social History     Socioeconomic History    Marital status:    Tobacco Use    Smoking status: Former     Current packs/day: 0.00     Average packs/day: 2.0 packs/day for 20.0 years (40.0 ttl pk-yrs)     " Types: Cigarettes     Start date: 1963     Quit date: 1983     Years since quittin.2    Smokeless tobacco: Never   Substance and Sexual Activity    Alcohol use: Yes     Alcohol/week: 1.0 standard drink of alcohol     Types: 1 Standard drinks or equivalent per week     Comment: 3 drinks per week    Drug use: No    Sexual activity: Not Currently     Partners: Female     Social Determinants of Health     Financial Resource Strain: Low Risk  (8/15/2023)    Overall Financial Resource Strain (CARDIA)     Difficulty of Paying Living Expenses: Not hard at all   Food Insecurity: No Food Insecurity (8/15/2023)    Hunger Vital Sign     Worried About Running Out of Food in the Last Year: Never true     Ran Out of Food in the Last Year: Never true   Transportation Needs: No Transportation Needs (8/15/2023)    PRAPARE - Transportation     Lack of Transportation (Medical): No     Lack of Transportation (Non-Medical): No   Physical Activity: Sufficiently Active (8/15/2023)    Exercise Vital Sign     Days of Exercise per Week: 6 days     Minutes of Exercise per Session: 60 min   Stress: No Stress Concern Present (8/15/2023)    [employer] [place] [employer] - Occupational Stress Questionnaire     Feeling of Stress : Only a little   Social Connections: Unknown (8/15/2023)    Social Connection and Isolation Panel [NHANES]     Frequency of Communication with Friends and Family: Twice a week     Frequency of Social Gatherings with Friends and Family: Twice a week     Active Member of Clubs or Organizations: Yes     Attends Club or Organization Meetings: More than 4 times per year     Marital Status:    Housing Stability: Low Risk  (8/15/2023)    Housing Stability Vital Sign     Unable to Pay for Housing in the Last Year: No     Number of Places Lived in the Last Year: 1     Unstable Housing in the Last Year: No       Current Outpatient Medications   Medication Sig Dispense Refill    acetaminophen  (TYLENOL) 650 MG TbSR Take 650 mg by mouth every 8 (eight) hours.      apixaban (ELIQUIS) 5 mg Tab Take 1 tablet (5 mg total) by mouth 2 (two) times daily. 180 tablet 3    cholecalciferol, vitamin D3, (VITAMIN D3) 50 mcg (2,000 unit) Cap capsule Take by mouth once daily.      fenofibrate micronized (LOFIBRA) 200 MG Cap TAKE 1 CAPSULE DAILY WITH BREAKFAST 30 capsule 11    hydroCHLOROthiazide (HYDRODIURIL) 25 MG tablet Take 25 mg by mouth once daily.      loratadine (CLARITIN) 10 mg tablet Take 10 mg by mouth once daily.      TRAMAINE BIOTIN ORAL Take 5,000 mcg by mouth once daily.      metFORMIN (GLUCOPHAGE-XR) 500 MG ER 24hr tablet Take 1 tablet (500 mg total) by mouth once daily. 90 tablet 3    metoprolol succinate (TOPROL-XL) 25 MG 24 hr tablet TAKE 1 TABLET DAILY 90 tablet 3    metoprolol succinate (TOPROL-XL) 50 MG 24 hr tablet TAKE ONE AND ONE-HALF TABLETS ONCE DAILY (Patient taking differently: 50 mg once daily.) 135 tablet 3    NIFEdipine (PROCARDIA-XL) 90 MG (OSM) 24 hr tablet Take 1 tablet (90 mg total) by mouth once daily. 90 tablet 6     No current facility-administered medications for this visit.       Review of patient's allergies indicates:   Allergen Reactions    Niacin      Other reaction(s): Rash  Other reaction(s): Itching           Review of Systems   Constitutional: Negative for chills, fever and malaise/fatigue.   HENT:  Negative for congestion and hearing loss.    Cardiovascular:  Negative for chest pain, claudication and leg swelling.   Respiratory:  Negative for cough and shortness of breath.    Skin:  Positive for nail changes.   Musculoskeletal:  Positive for back pain and muscle cramps. Negative for joint pain and muscle weakness.   Gastrointestinal:  Negative for nausea and vomiting.   Neurological:  Positive for paresthesias. Negative for numbness and weakness.   Psychiatric/Behavioral:  Negative for altered mental status.            Objective:      Physical Exam  Constitutional:        General: He is not in acute distress.     Appearance: Normal appearance. He is not ill-appearing.   Cardiovascular:      Pulses:           Dorsalis pedis pulses are 0 on the right side and 0 on the left side.        Posterior tibial pulses are 0 on the right side and 0 on the left side.      Comments: Absent left PT per Doppler.  Monophasic DP bilateral and right PT with Doppler.  Patient has history of AFib which is apparent with the Doppler.    Mild nonpitting edema to lower extremity bilateral.  No hair growth bilateral lower extremity.  Mild rubor on dependency bilateral lower extremity.  Musculoskeletal:      Comments: No pain with ROM or MMT bilateral lower extremity.    No significant digital deformity bilateral.  Rectus appearing foot type bilateral.   Feet:      Right foot:      Protective Sensation: 10 sites tested.  9 sites sensed.      Skin integrity: Dry skin present.      Toenail Condition: Right toenails are abnormally thick and long.      Left foot:      Protective Sensation: 10 sites tested.  10 sites sensed.      Skin integrity: Dry skin present.      Toenail Condition: Left toenails are abnormally thick and long.   Skin:     General: Skin is warm.      Capillary Refill: Capillary refill takes 2 to 3 seconds.      Findings: No ecchymosis or erythema.      Nails: There is no clubbing.      Comments: Second toenail bilateral is growing a superior direction, thickened and discolored yellow with some mild loosening.  Nails 1, 3, 4 and 5 bilateral are mildly elongated 2-3 mm, thickened with patchy yellow discoloration mild underlying debris.    No open lesions or macerations to lower extremity bilateral.    Skin is atrophied to lower extremity bilateral.   Neurological:      Mental Status: He is alert and oriented to person, place, and time.      Sensory: Sensory deficit present.      Motor: Motor function is intact.      Comments: Absent vibratory sensation right foot and decreased left foot.                Assessment:       Encounter Diagnoses   Name Primary?    Type 2 diabetes mellitus with peripheral vascular disease Yes    Type 2 diabetes mellitus with neurological manifestations     Onychomycosis          Plan:       Cliff was seen today for diabetic foot exam and nail care.    Diagnoses and all orders for this visit:    Type 2 diabetes mellitus with peripheral vascular disease  -     Routine Foot Care    Type 2 diabetes mellitus with neurological manifestations  -     Routine Foot Care    Onychomycosis  -     Routine Foot Care      I counseled the patient on his conditions, their implications and medical management.    Shoe inspection. Diabetic Foot Education. Patient reminded of the importance of good nutrition and blood sugar control to help prevent podiatric complications of diabetes. Patient instructed on proper foot hygeine. We discussed wearing proper shoe gear, daily foot inspections, never walking without protective shoe gear, never putting sharp instruments to feet.    Routine foot care per attached note. Patient relates relief following the procedure. He will continue to monitor the areas daily, inspect his feet, wear protective shoe gear when ambulatory, moisturizer to maintain skin integrity.    RTC within 4 months or p.r.n. as discussed.      A portion of this note was generated by voice recognition software and may contain spelling and grammar errors.

## 2023-08-18 ENCOUNTER — OFFICE VISIT (OUTPATIENT)
Dept: INTERNAL MEDICINE | Facility: CLINIC | Age: 88
End: 2023-08-18
Payer: MEDICARE

## 2023-08-18 VITALS
WEIGHT: 153 LBS | OXYGEN SATURATION: 95 % | HEART RATE: 77 BPM | DIASTOLIC BLOOD PRESSURE: 70 MMHG | HEIGHT: 66 IN | SYSTOLIC BLOOD PRESSURE: 130 MMHG | BODY MASS INDEX: 24.59 KG/M2 | TEMPERATURE: 97 F

## 2023-08-18 DIAGNOSIS — E11.8 DM TYPE 2, CONTROLLED, WITH COMPLICATION: ICD-10-CM

## 2023-08-18 DIAGNOSIS — Z79.01 CHRONIC ANTICOAGULATION: ICD-10-CM

## 2023-08-18 DIAGNOSIS — I15.2 HYPERTENSION ASSOCIATED WITH DIABETES: ICD-10-CM

## 2023-08-18 DIAGNOSIS — E78.5 HYPERLIPIDEMIA ASSOCIATED WITH TYPE 2 DIABETES MELLITUS: ICD-10-CM

## 2023-08-18 DIAGNOSIS — Z09 FOLLOW-UP EXAM, MORE THAN 1 YEAR SINCE PREVIOUS EXAM: Primary | ICD-10-CM

## 2023-08-18 DIAGNOSIS — N18.30 CKD STAGE 3 DUE TO TYPE 2 DIABETES MELLITUS: ICD-10-CM

## 2023-08-18 DIAGNOSIS — I70.0 ATHEROSCLEROSIS OF AORTA: ICD-10-CM

## 2023-08-18 DIAGNOSIS — E11.22 CKD STAGE 3 DUE TO TYPE 2 DIABETES MELLITUS: ICD-10-CM

## 2023-08-18 DIAGNOSIS — E11.69 HYPERLIPIDEMIA ASSOCIATED WITH TYPE 2 DIABETES MELLITUS: ICD-10-CM

## 2023-08-18 DIAGNOSIS — E11.59 HYPERTENSION ASSOCIATED WITH DIABETES: ICD-10-CM

## 2023-08-18 DIAGNOSIS — I48.19 PERSISTENT ATRIAL FIBRILLATION: Chronic | ICD-10-CM

## 2023-08-18 PROCEDURE — 99214 OFFICE O/P EST MOD 30 MIN: CPT | Mod: PBBFAC,PO | Performed by: FAMILY MEDICINE

## 2023-08-18 PROCEDURE — 99215 PR OFFICE/OUTPT VISIT, EST, LEVL V, 40-54 MIN: ICD-10-PCS | Mod: S$PBB,,, | Performed by: FAMILY MEDICINE

## 2023-08-18 PROCEDURE — 99999 PR PBB SHADOW E&M-EST. PATIENT-LVL IV: CPT | Mod: PBBFAC,,, | Performed by: FAMILY MEDICINE

## 2023-08-18 PROCEDURE — 99215 OFFICE O/P EST HI 40 MIN: CPT | Mod: S$PBB,,, | Performed by: FAMILY MEDICINE

## 2023-08-18 PROCEDURE — 99999 PR PBB SHADOW E&M-EST. PATIENT-LVL IV: ICD-10-PCS | Mod: PBBFAC,,, | Performed by: FAMILY MEDICINE

## 2023-08-18 NOTE — PROGRESS NOTES
Subjective     Patient ID: Cliff Magaña is a 87 y.o. male.    Chief Complaint: Annual Exam    HPI 87-year-old white male with atrial fibrillation, hypertension, hyperlipidemia, aortic atherosclerosis, type 2 diabetes, stage 3 chronic kidney disease presents to clinic today accompanied by his wife for follow-up of his general medical conditions.  He continues to be followed by Cardiology for treatment of hypertension and persistent atrial fibrillation which remains stable on Eliquis 5 mg b.i.d., metoprolol 75 mg daily, nifedipine 90 mg daily, and hydrochlorothiazide 25 mg daily.  Hypertriglyceridemia remains stable on fenofibrate 200 mg daily.  He continues to be treated for type 2 diabetes with metformin 500 mg daily; however, recent hemoglobin A1c has increased to 8.6.  I have recommended discontinuation of hydrochlorothiazide and starting Jardiance 10 mg daily instead for further cardiac protection and diabetic treatment.  He continues to be followed by nephrology for treatment of stage 3 chronic kidney disease which remains stable.  He has a past surgical history of cholecystectomy, tonsillectomy, cataract repair, and sebaceous cyst removal.  He has a family history of his parents passing away in a motor vehicle accident.  He also has a family history of diabetes.  He is up-to-date with all vaccinations and screening exams.  Review of Systems   Constitutional:  Negative for appetite change, chills, fatigue and fever.   HENT:  Negative for nasal congestion, ear pain, hearing loss, postnasal drip, rhinorrhea, sinus pressure/congestion, sore throat and tinnitus.    Eyes:  Negative for redness, itching and visual disturbance.   Respiratory:  Negative for cough, chest tightness and shortness of breath.    Cardiovascular:  Negative for chest pain and palpitations.   Gastrointestinal:  Negative for abdominal pain, constipation, diarrhea, nausea and vomiting.   Genitourinary:  Negative for decreased urine volume,  difficulty urinating, dysuria, frequency, hematuria and urgency.   Musculoskeletal:  Negative for back pain, myalgias, neck pain and neck stiffness.   Integumentary:  Negative for rash.   Neurological:  Negative for dizziness, light-headedness and headaches.   Psychiatric/Behavioral: Negative.            Objective     Physical Exam  Vitals and nursing note reviewed.   Constitutional:       General: He is not in acute distress.     Appearance: Normal appearance. He is well-developed. He is not diaphoretic.   HENT:      Head: Normocephalic and atraumatic.      Right Ear: External ear normal.      Left Ear: External ear normal.      Nose: Nose normal.      Mouth/Throat:      Pharynx: No oropharyngeal exudate.   Eyes:      General: No scleral icterus.        Right eye: No discharge.         Left eye: No discharge.      Conjunctiva/sclera: Conjunctivae normal.      Pupils: Pupils are equal, round, and reactive to light.   Neck:      Thyroid: No thyromegaly.      Vascular: No JVD.      Trachea: No tracheal deviation.   Cardiovascular:      Rate and Rhythm: Normal rate and regular rhythm.      Heart sounds: Normal heart sounds. No murmur heard.     No friction rub. No gallop.   Pulmonary:      Effort: Pulmonary effort is normal. No respiratory distress.      Breath sounds: Normal breath sounds. No stridor. No wheezing, rhonchi or rales.   Chest:      Chest wall: No tenderness.   Abdominal:      General: Bowel sounds are normal. There is no distension.      Palpations: Abdomen is soft. There is no mass.      Tenderness: There is no abdominal tenderness. There is no guarding or rebound.   Musculoskeletal:         General: No tenderness. Normal range of motion.      Cervical back: Normal range of motion and neck supple.   Lymphadenopathy:      Cervical: No cervical adenopathy.   Skin:     General: Skin is warm and dry.      Coloration: Skin is not pale.      Findings: No erythema or rash.   Neurological:      Mental Status:  He is alert and oriented to person, place, and time.   Psychiatric:         Mood and Affect: Mood normal.         Behavior: Behavior normal.         Thought Content: Thought content normal.         Judgment: Judgment normal.            Assessment and Plan     1. Follow-up exam, more than 1 year since previous exam    2. Persistent atrial fibrillation    3. Chronic anticoagulation    4. Hypertension associated with diabetes  -     empagliflozin (JARDIANCE) 10 mg tablet; Take 1 tablet (10 mg total) by mouth once daily.  Dispense: 90 tablet; Refill: 3    5. Hyperlipidemia associated with type 2 diabetes mellitus    6. Atherosclerosis of aorta  Overview:  Lumbar spine results      7. DM type 2, controlled, with complication  -     empagliflozin (JARDIANCE) 10 mg tablet; Take 1 tablet (10 mg total) by mouth once daily.  Dispense: 90 tablet; Refill: 3  -     Comprehensive Metabolic Panel; Future; Expected date: 11/18/2023  -     Hemoglobin A1C; Future; Expected date: 11/18/2023    8. CKD stage 3 due to type 2 diabetes mellitus  -     empagliflozin (JARDIANCE) 10 mg tablet; Take 1 tablet (10 mg total) by mouth once daily.  Dispense: 90 tablet; Refill: 3        1. Labs have been reviewed and are overall within normal limits except for an elevated hemoglobin A1c.  2. Continue Eliquis 5 mg b.i.d., metformin 75 mg daily, and nifedipine 90 mg daily.  Atrial fibrillation and hypertension are well controlled.  3. Continue fenofibrate 200 mg daily.  Hyperlipidemia and atherosclerosis remained stable.    4. Continue metformin 500 mg daily and start Jardiance 10 mg daily for further diabetic treatment and cardiac protection.  Repeat CMP and A1c in 3 months.  5. Continue follow-up with nephrology as scheduled.  Stage 3 chronic kidney disease is stable.    6. Return to clinic as needed or in 1 year for follow-up.    40 minutes have been spent on this clinic visit.           Follow up in about 1 year (around 8/18/2024), or if symptoms  worsen or fail to improve, for Annual exam.

## 2023-08-23 DIAGNOSIS — E11.22 TYPE 2 DIABETES MELLITUS WITH STAGE 3A CHRONIC KIDNEY DISEASE, WITHOUT LONG-TERM CURRENT USE OF INSULIN: ICD-10-CM

## 2023-08-23 DIAGNOSIS — N18.31 TYPE 2 DIABETES MELLITUS WITH STAGE 3A CHRONIC KIDNEY DISEASE, WITHOUT LONG-TERM CURRENT USE OF INSULIN: ICD-10-CM

## 2023-08-23 RX ORDER — METFORMIN HYDROCHLORIDE 500 MG/1
500 TABLET, EXTENDED RELEASE ORAL DAILY
Qty: 90 TABLET | Refills: 1 | Status: SHIPPED | OUTPATIENT
Start: 2023-08-23 | End: 2024-03-07

## 2023-08-23 RX ORDER — METFORMIN HYDROCHLORIDE 500 MG/1
500 TABLET, EXTENDED RELEASE ORAL DAILY
Refills: 0 | OUTPATIENT
Start: 2023-08-23

## 2023-08-23 NOTE — TELEPHONE ENCOUNTER
No care due was identified.  [facility] Embedded Care Due Messages. Reference number: [id number].   8/23/2023 1:27:46 PM CDT

## 2023-08-23 NOTE — TELEPHONE ENCOUNTER
Refill Decision Note   Cliff Magaña  is requesting a refill authorization.  Brief Assessment and Rationale for Refill:  Quick Discontinue  Approve     Medication Therapy Plan:         Pharmacist review requested: Yes   Extended chart review required: Yes   Comments:     No Care Gaps recommended.     Note composed:5:15 PM 08/23/2023

## 2023-08-23 NOTE — TELEPHONE ENCOUNTER
Refill Routing Note   Medication(s) are not appropriate for processing by [facility] for the following reason(s):      - Drug-Drug Interaction (CKD stage 3 due to type 2 diabetes mellitus; Type 2 diabetes mellitus with stage 3a chronic kidney disease, without long-term current use of insulin)    [facility] action(s):  Quick Discontinue  Defer       Medication Therapy Plan: MEDICATIONS REFUSED DUE TO ERROR IN REQUEST AND PENDED MANUALLY FOR YOUR REVIEW; PT. NORMALLY GETS SCRIPTS FILLED WITH [facility]; APPROVING 6 MONTHS  --->Care Gap information included in message below if applicable.   Medication reconciliation completed: No   Automatic Epic Generated Protocol Data:        Requested Prescriptions   Pending Prescriptions Disp Refills    metFORMIN (GLUCOPHAGE-XR) 500 MG ER 24hr tablet 90 tablet 1     Sig: Take 1 tablet (500 mg total) by mouth once daily.       There is no refill protocol information for this order      Refused Prescriptions Disp Refills    metFORMIN (GLUCOPHAGE-XR) 500 MG ER 24hr tablet [Pharmacy Med Name: METFORMIN HCL ER TABS 500MG]  0       Endocrinology:  Diabetes - Biguanides Failed - 8/23/2023  1:27 PM        Failed - Matches previous order       Previous Authorizing Provider: Munir Estrada MD (metFORMIN (GLUCOPHAGE-XR) 500 MG ER 24hr tablet)  Previous Pharmacy: [facility]/pharmacy #[id number] - [place] - [address]            Passed - Patient is at least 18 years old        Passed - Valid encounter within last 15 months     Recent Visits  Date Type Provider Dept   08/18/23 Office Visit Munir Estrada MD [facility] Internal Medicine   08/17/22 Office Visit Munir Estrada MD [facility] Internal Medicine   Showing recent visits within past 720 days and meeting all other requirements  Future Appointments  No visits were found meeting these conditions.  Showing future appointments within next 150 days and meeting all other requirements        Future Appointments                 In 1 month [facility], Imaging Ctr    In 1 month LAB, APPOINTMENT [facility] [address] [facility] - Primary Care Bldg, [facility]    In 1 month LAB, SPECIMEN [facility] - Primary Care Bldg, [facility]    In 2 months Thi Castillo, NP [facility] - Nephrology 5th Fl, [facility]    In 2 months Bhupendra Brar, A[facility]N [facility]             Passed - No ED/Admission Since Last PCP visit               Passed - Cr is 1.4 or below and within 360 days     Lab Results   Component Value Date    CREATININE 1.2 08/14/2023    CREATININE 1.3 04/04/2023    CREATININE 1.4 03/14/2023              Passed - HBA1C within 180 days     Lab Results   Component Value Date    HGBA1C 8.6 (H) 08/14/2023    HGBA1C 6.9 (H) 03/14/2023    HGBA1C 6.9 (H) 08/11/2022              Passed - eGFR is 45 or above and within 360 days     Lab Results   Component Value Date    EGFRNORACEVR 58.5 (A) 08/14/2023    EGFRNORACEVR 53.2 (A) 04/04/2023    EGFRNORACEVR 48.6 (A) 03/14/2023    EGFRNONAA 41.6 (A) 05/20/2022    EGFRNONAA 41.6 (A) 05/17/2022    EGFRNONAA 29.3 (A) 05/06/2022                      Appointments  past 12m or future 3m with PCP    Date Provider   Last Visit   8/18/2023 Munir Estrada MD   Next Visit   Visit date not found Munir Estrada MD   ED visits in past 90 days: 0        Note composed:3:05 PM 08/23/2023

## 2023-09-20 ENCOUNTER — TELEPHONE (OUTPATIENT)
Dept: NEPHROLOGY | Facility: CLINIC | Age: 88
End: 2023-09-20
Payer: MEDICARE

## 2023-09-27 ENCOUNTER — PATIENT MESSAGE (OUTPATIENT)
Dept: INTERNAL MEDICINE | Facility: CLINIC | Age: 88
End: 2023-09-27
Payer: MEDICARE

## 2023-10-01 ENCOUNTER — PATIENT MESSAGE (OUTPATIENT)
Dept: ADMINISTRATIVE | Facility: OTHER | Age: 88
End: 2023-10-01
Payer: MEDICARE

## 2023-10-17 ENCOUNTER — HOSPITAL ENCOUNTER (OUTPATIENT)
Dept: RADIOLOGY | Facility: HOSPITAL | Age: 88
Discharge: HOME | End: 2023-10-17
Attending: NURSE PRACTITIONER
Payer: MEDICARE

## 2023-10-17 DIAGNOSIS — N28.1 COMPLEX RENAL CYST: ICD-10-CM

## 2023-10-17 DIAGNOSIS — N28.1 BILATERAL RENAL CYSTS: ICD-10-CM

## 2023-10-17 DIAGNOSIS — N28.1 SIMPLE RENAL CYST: ICD-10-CM

## 2023-10-17 PROCEDURE — 76770 US RETROPERITONEAL COMPLETE: ICD-10-PCS | Mod: 26,,, | Performed by: RADIOLOGY

## 2023-10-17 PROCEDURE — 76770 US EXAM ABDO BACK WALL COMP: CPT | Mod: TC

## 2023-10-17 PROCEDURE — 76770 US EXAM ABDO BACK WALL COMP: CPT | Mod: 26,,, | Performed by: RADIOLOGY

## 2023-10-19 ENCOUNTER — OFFICE VISIT (OUTPATIENT)
Dept: UROLOGY | Facility: CLINIC | Age: 88
End: 2023-10-19
Payer: MEDICARE

## 2023-10-19 VITALS
WEIGHT: 149.94 LBS | DIASTOLIC BLOOD PRESSURE: 88 MMHG | HEIGHT: 66 IN | HEART RATE: 77 BPM | SYSTOLIC BLOOD PRESSURE: 138 MMHG | BODY MASS INDEX: 24.1 KG/M2

## 2023-10-19 DIAGNOSIS — N32.9 LESION OF BLADDER: ICD-10-CM

## 2023-10-19 DIAGNOSIS — N18.31 STAGE 3A CHRONIC KIDNEY DISEASE: ICD-10-CM

## 2023-10-19 DIAGNOSIS — N18.30 CKD STAGE 3 DUE TO TYPE 2 DIABETES MELLITUS: ICD-10-CM

## 2023-10-19 DIAGNOSIS — R31.29 MICROSCOPIC HEMATURIA: ICD-10-CM

## 2023-10-19 DIAGNOSIS — N28.1 BILATERAL RENAL CYSTS: Primary | ICD-10-CM

## 2023-10-19 DIAGNOSIS — E11.22 CKD STAGE 3 DUE TO TYPE 2 DIABETES MELLITUS: ICD-10-CM

## 2023-10-19 DIAGNOSIS — N28.1 COMPLEX RENAL CYST: ICD-10-CM

## 2023-10-19 PROCEDURE — 99999 PR PBB SHADOW E&M-EST. PATIENT-LVL III: ICD-10-PCS | Mod: PBBFAC,,, | Performed by: NURSE PRACTITIONER

## 2023-10-19 PROCEDURE — 99214 OFFICE O/P EST MOD 30 MIN: CPT | Mod: S$PBB,,, | Performed by: NURSE PRACTITIONER

## 2023-10-19 PROCEDURE — 88112 CYTOPATH CELL ENHANCE TECH: CPT | Performed by: PATHOLOGY

## 2023-10-19 PROCEDURE — 99213 OFFICE O/P EST LOW 20 MIN: CPT | Mod: PBBFAC | Performed by: NURSE PRACTITIONER

## 2023-10-19 PROCEDURE — 99999 PR PBB SHADOW E&M-EST. PATIENT-LVL III: CPT | Mod: PBBFAC,,, | Performed by: NURSE PRACTITIONER

## 2023-10-19 PROCEDURE — 88112 CYTOPATH CELL ENHANCE TECH: CPT | Mod: 26,,, | Performed by: PATHOLOGY

## 2023-10-19 PROCEDURE — 88112 PR  CYTOPATH, CELL ENHANCE TECH: ICD-10-PCS | Mod: 26,,, | Performed by: PATHOLOGY

## 2023-10-19 PROCEDURE — 99214 PR OFFICE/OUTPT VISIT, EST, LEVL IV, 30-39 MIN: ICD-10-PCS | Mod: S$PBB,,, | Performed by: NURSE PRACTITIONER

## 2023-10-19 NOTE — H&P (VIEW-ONLY)
CHIEF COMPLAINT:    Cliff Magaña is a 88 y.o. male presents today for Annual Follow Up.    HISTORY OF PRESENTING ILLINESS:    Cliff Magaña is a 88 y.o. Type 2 DIABETIC male who is an established patient of our clinic. He as a history of Renal Cysts; both simple and complex. Largest complex was 2.6cm on left.   He was initially seen 10/21/2021 with Dr. Moffett.  Last clinic visit was 10/18/2022.     Here today for annual visit.   10/17/2023 Renal US:  - Bilateral cysts are stable.   - Two indeterminate lesions that project into the bladder lumen   - Lesion on the right is non mobile, measures 1.1 x 1.2 x 1.4 cm and has internal vascularity   -  Left lesion measures 1.0 x 1.2 x 1.0 cm and is normal.     He denies any urinary complaints.   FOS is good.   Nocturia x 2.  Has never seen blood in his urine.     08/14/2023 Hgb A1c was 8.6.  Past history of smoking.         REVIEW OF SYSTEMS:  Review of Systems   Constitutional: Negative.  Negative for chills and fever.   Eyes:  Negative for double vision.   Respiratory:  Negative for cough and shortness of breath.    Cardiovascular:  Negative for chest pain.   Gastrointestinal:  Negative for abdominal pain, constipation, diarrhea, nausea and vomiting.   Genitourinary:  Positive for frequency. Negative for dysuria, flank pain and hematuria.        Ok with urination.  Good Bladder control.   Nocturia x 2     Neurological:  Negative for dizziness and seizures.   Endo/Heme/Allergies:  Negative for polydipsia.         PATIENT HISTORY:    Past Medical History:   Diagnosis Date    *Atrial fibrillation     Chronic kidney disease     Hyperlipidemia     Hypertension     Metabolic syndrome     Type 2 diabetes mellitus, without long-term current use of insulin 12/13/2021       Past Surgical History:   Procedure Laterality Date    CATARACT EXTRACTION      CHOLECYSTECTOMY      EYE SURGERY      INJECTION OF FACET JOINT Bilateral 4/28/2021    Procedure: FACET JOINT INJECTION BILATERAL  L4/L5 DIRECT REFERRAL;  Surgeon: Philipp Iyer MD;  Location: [facility];  Service: Pain Management;  Laterality: Bilateral;  NEEDS CONSENT, ELIQUIS CLEARANCE IN CHART    SKIN CANCER EXCISION      TONSILLECTOMY         Family History   Problem Relation Age of Onset    Diabetes Maternal Aunt     Heart attack Neg Hx     Heart disease Neg Hx     Heart failure Neg Hx     Hyperlipidemia Neg Hx     Hypertension Neg Hx     Stroke Neg Hx        Social History     Socioeconomic History    Marital status:    Tobacco Use    Smoking status: Former     Current packs/day: 0.00     Average packs/day: 2.0 packs/day for 20.0 years (40.0 ttl pk-yrs)     Types: Cigarettes     Start date: 1963     Quit date: 1983     Years since quittin.4     Passive exposure: Never    Smokeless tobacco: Never   Substance and Sexual Activity    Alcohol use: Yes     Alcohol/week: 1.0 standard drink of alcohol     Types: 1 Standard drinks or equivalent per week     Comment: 3 drinks per week    Drug use: No    Sexual activity: Not Currently     Partners: Female     Social Determinants of Health     Financial Resource Strain: Low Risk  (10/15/2023)    Overall Financial Resource Strain (CARDIA)     Difficulty of Paying Living Expenses: Not hard at all   Food Insecurity: No Food Insecurity (10/15/2023)    Hunger Vital Sign     Worried About Running Out of Food in the Last Year: Never true     Ran Out of Food in the Last Year: Never true   Transportation Needs: No Transportation Needs (10/15/2023)    PRAPARE - Transportation     Lack of Transportation (Medical): No     Lack of Transportation (Non-Medical): No   Physical Activity: Sufficiently Active (10/15/2023)    Exercise Vital Sign     Days of Exercise per Week: 6 days     Minutes of Exercise per Session: 70 min   Stress: No Stress Concern Present (10/15/2023)    [employer] - Occupational Stress Questionnaire     Feeling of Stress : Only a little    Social Connections: Unknown (10/15/2023)    Social Connection and Isolation Panel [NHANES]     Frequency of Communication with Friends and Family: Three times a week     Frequency of Social Gatherings with Friends and Family: Twice a week     Active Member of Clubs or Organizations: Yes     Attends Club or Organization Meetings: More than 4 times per year     Marital Status:    Housing Stability: Low Risk  (10/15/2023)    Housing Stability Vital Sign     Unable to Pay for Housing in the Last Year: No     Number of Places Lived in the Last Year: 1     Unstable Housing in the Last Year: No       Allergies:  Niacin    Medications:    Current Outpatient Medications:     acetaminophen (TYLENOL) 650 MG TbSR, Take 650 mg by mouth every 8 (eight) hours., Disp: , Rfl:     apixaban (ELIQUIS) 5 mg Tab, Take 1 tablet (5 mg total) by mouth 2 (two) times daily., Disp: 180 tablet, Rfl: 3    cholecalciferol, vitamin D3, (VITAMIN D3) 50 mcg (2,000 unit) Cap capsule, Take by mouth once daily., Disp: , Rfl:     empagliflozin (JARDIANCE) 10 mg tablet, Take 1 tablet (10 mg total) by mouth once daily., Disp: 90 tablet, Rfl: 3    fenofibrate micronized (LOFIBRA) 200 MG Cap, TAKE 1 CAPSULE DAILY WITH BREAKFAST, Disp: 30 capsule, Rfl: 11    krill oil/hyaluronic/astaxanth (MEGARED JOINT CARE ORAL), , Disp: , Rfl:     loratadine (CLARITIN) 10 mg tablet, Take 10 mg by mouth once daily., Disp: , Rfl:     TRAMAINE BIOTIN ORAL, Take 5,000 mcg by mouth once daily., Disp: , Rfl:     metFORMIN (GLUCOPHAGE-XR) 500 MG ER 24hr tablet, Take 1 tablet (500 mg total) by mouth once daily., Disp: 90 tablet, Rfl: 1    metoprolol succinate (TOPROL-XL) 25 MG 24 hr tablet, TAKE 1 TABLET DAILY, Disp: 90 tablet, Rfl: 3    metoprolol succinate (TOPROL-XL) 50 MG 24 hr tablet, TAKE ONE AND ONE-HALF TABLETS ONCE DAILY (Patient taking differently: 50 mg once daily.), Disp: 135 tablet, Rfl: 3    NIFEdipine (PROCARDIA-XL) 90 MG (OSM) 24 hr tablet, Take 1 tablet (90  mg total) by mouth once daily., Disp: 90 tablet, Rfl: 6    PHYSICAL EXAMINATION:  Physical Exam  Vitals and nursing note reviewed.   Constitutional:       General: He is awake.      Appearance: Normal appearance.   HENT:      Head: Normocephalic.      Right Ear: External ear normal.      Left Ear: External ear normal.      Nose: Nose normal.   Cardiovascular:      Rate and Rhythm: Normal rate.   Pulmonary:      Effort: Pulmonary effort is normal. No respiratory distress.   Abdominal:      Tenderness: There is no abdominal tenderness. There is no right CVA tenderness or left CVA tenderness.   Genitourinary:     Penis: Normal and uncircumcised. No phimosis, paraphimosis, hypospadias or erythema.       Testes: Normal.         Right: Mass, tenderness or swelling not present.         Left: Mass, tenderness or swelling not present.      Prostate: Enlarged (~30gms;). Not tender and no nodules present.      Rectum: Normal.   Musculoskeletal:         General: Normal range of motion.      Cervical back: Normal range of motion.   Skin:     General: Skin is warm and dry.   Neurological:      General: No focal deficit present.      Mental Status: He is alert and oriented to person, place, and time.   Psychiatric:         Mood and Affect: Mood normal.         Behavior: Behavior is cooperative.           LABS:      In office UA today was clear of active infection and GH.       Lab Results   Component Value Date    PSA 0.43 06/06/2013    PSA 0.68 05/10/2012    PSA 0.54 05/24/2011    PSADIAG 0.44 06/05/2014       Lab Results   Component Value Date    CREATININE 1.4 10/17/2023    EGFRNORACEVR 48.3 (A) 10/17/2023             IMPRESSION:    Encounter Diagnoses   Name Primary?    Bilateral renal cysts Yes    Complex renal cyst     Lesion of bladder     Stage 3a chronic kidney disease     CKD stage 3 due to type 2 diabetes mellitus     Microscopic hematuria          Assessment:       1. Bilateral renal cysts    2. Complex renal cyst     3. Lesion of bladder    4. Stage 3a chronic kidney disease    5. CKD stage 3 due to type 2 diabetes mellitus    6. Microscopic hematuria        Plan:         I spent 40 minutes with the patient of which more than half was spent in direct consultation with the patient in regards to our treatment and plan.  We addressed the office findings and recent labs.   Education and recommendations of today's plan of care including home remedies and needed follow up with PCP.   We discussed the chief complaint; reviewed the LUTS and the possible contributory factors microscopic hematuria; ? Bladder lesions.  Reassurance no infection and no visible blood.  Reviewed management  Recommended lifestyle modifications with a proper, healthy Diabetic diet, good hydration but during the day. Reducing bladder irritants.   Benefits of regular exercise.  We discussed workup.   Urine sent for cytology.   Cysto/RPG with bilateral bx.   Will discuss with Dr. Moffett then let them know.

## 2023-10-19 NOTE — Clinical Note
We have been following him for complex renal cysts. This year his US reporting 2 bladder lesions. No gross hematuria, but newly seen microscopic this year. He is uncontrolled DM, CKD 3.  I discussed possible workup including straight to cysto with RPG/biopsy. Let me know what you think. Thanks

## 2023-10-19 NOTE — PROGRESS NOTES
CHIEF COMPLAINT:    Cliff Magaña is a 88 y.o. male presents today for Annual Follow Up.    HISTORY OF PRESENTING ILLINESS:    Cliff Magaña is a 88 y.o. Type 2 DIABETIC male who is an established patient of our clinic. He as a history of Renal Cysts; both simple and complex. Largest complex was 2.6cm on left.   He was initially seen 10/21/2021 with Dr. Moffett.  Last clinic visit was 10/18/2022.     Here today for annual visit.   10/17/2023 Renal US:  - Bilateral cysts are stable.   - Two indeterminate lesions that project into the bladder lumen   - Lesion on the right is non mobile, measures 1.1 x 1.2 x 1.4 cm and has internal vascularity   -  Left lesion measures 1.0 x 1.2 x 1.0 cm and is normal.     He denies any urinary complaints.   FOS is good.   Nocturia x 2.  Has never seen blood in his urine.     08/14/2023 Hgb A1c was 8.6.  Past history of smoking.         REVIEW OF SYSTEMS:  Review of Systems   Constitutional: Negative.  Negative for chills and fever.   Eyes:  Negative for double vision.   Respiratory:  Negative for cough and shortness of breath.    Cardiovascular:  Negative for chest pain.   Gastrointestinal:  Negative for abdominal pain, constipation, diarrhea, nausea and vomiting.   Genitourinary:  Positive for frequency. Negative for dysuria, flank pain and hematuria.        Ok with urination.  Good Bladder control.   Nocturia x 2     Neurological:  Negative for dizziness and seizures.   Endo/Heme/Allergies:  Negative for polydipsia.         PATIENT HISTORY:    Past Medical History:   Diagnosis Date    *Atrial fibrillation     Chronic kidney disease     Hyperlipidemia     Hypertension     Metabolic syndrome     Type 2 diabetes mellitus, without long-term current use of insulin 12/13/2021       Past Surgical History:   Procedure Laterality Date    CATARACT EXTRACTION      CHOLECYSTECTOMY      EYE SURGERY      INJECTION OF FACET JOINT Bilateral 4/28/2021    Procedure: FACET JOINT INJECTION BILATERAL  L4/L5 DIRECT REFERRAL;  Surgeon: Philipp Iyer MD;  Location: [facility];  Service: Pain Management;  Laterality: Bilateral;  NEEDS CONSENT, ELIQUIS CLEARANCE IN CHART    SKIN CANCER EXCISION      TONSILLECTOMY         Family History   Problem Relation Age of Onset    Diabetes Maternal Aunt     Heart attack Neg Hx     Heart disease Neg Hx     Heart failure Neg Hx     Hyperlipidemia Neg Hx     Hypertension Neg Hx     Stroke Neg Hx        Social History     Socioeconomic History    Marital status:    Tobacco Use    Smoking status: Former     Current packs/day: 0.00     Average packs/day: 2.0 packs/day for 20.0 years (40.0 ttl pk-yrs)     Types: Cigarettes     Start date: 1963     Quit date: 1983     Years since quittin.4     Passive exposure: Never    Smokeless tobacco: Never   Substance and Sexual Activity    Alcohol use: Yes     Alcohol/week: 1.0 standard drink of alcohol     Types: 1 Standard drinks or equivalent per week     Comment: 3 drinks per week    Drug use: No    Sexual activity: Not Currently     Partners: Female     Social Determinants of Health     Financial Resource Strain: Low Risk  (10/15/2023)    Overall Financial Resource Strain (CARDIA)     Difficulty of Paying Living Expenses: Not hard at all   Food Insecurity: No Food Insecurity (10/15/2023)    Hunger Vital Sign     Worried About Running Out of Food in the Last Year: Never true     Ran Out of Food in the Last Year: Never true   Transportation Needs: No Transportation Needs (10/15/2023)    PRAPARE - Transportation     Lack of Transportation (Medical): No     Lack of Transportation (Non-Medical): No   Physical Activity: Sufficiently Active (10/15/2023)    Exercise Vital Sign     Days of Exercise per Week: 6 days     Minutes of Exercise per Session: 70 min   Stress: No Stress Concern Present (10/15/2023)    [employer] - Occupational Stress Questionnaire     Feeling of Stress : Only a little    Social Connections: Unknown (10/15/2023)    Social Connection and Isolation Panel [NHANES]     Frequency of Communication with Friends and Family: Three times a week     Frequency of Social Gatherings with Friends and Family: Twice a week     Active Member of Clubs or Organizations: Yes     Attends Club or Organization Meetings: More than 4 times per year     Marital Status:    Housing Stability: Low Risk  (10/15/2023)    Housing Stability Vital Sign     Unable to Pay for Housing in the Last Year: No     Number of Places Lived in the Last Year: 1     Unstable Housing in the Last Year: No       Allergies:  Niacin    Medications:    Current Outpatient Medications:     acetaminophen (TYLENOL) 650 MG TbSR, Take 650 mg by mouth every 8 (eight) hours., Disp: , Rfl:     apixaban (ELIQUIS) 5 mg Tab, Take 1 tablet (5 mg total) by mouth 2 (two) times daily., Disp: 180 tablet, Rfl: 3    cholecalciferol, vitamin D3, (VITAMIN D3) 50 mcg (2,000 unit) Cap capsule, Take by mouth once daily., Disp: , Rfl:     empagliflozin (JARDIANCE) 10 mg tablet, Take 1 tablet (10 mg total) by mouth once daily., Disp: 90 tablet, Rfl: 3    fenofibrate micronized (LOFIBRA) 200 MG Cap, TAKE 1 CAPSULE DAILY WITH BREAKFAST, Disp: 30 capsule, Rfl: 11    krill oil/hyaluronic/astaxanth (MEGARED JOINT CARE ORAL), , Disp: , Rfl:     loratadine (CLARITIN) 10 mg tablet, Take 10 mg by mouth once daily., Disp: , Rfl:     TRAMAINE BIOTIN ORAL, Take 5,000 mcg by mouth once daily., Disp: , Rfl:     metFORMIN (GLUCOPHAGE-XR) 500 MG ER 24hr tablet, Take 1 tablet (500 mg total) by mouth once daily., Disp: 90 tablet, Rfl: 1    metoprolol succinate (TOPROL-XL) 25 MG 24 hr tablet, TAKE 1 TABLET DAILY, Disp: 90 tablet, Rfl: 3    metoprolol succinate (TOPROL-XL) 50 MG 24 hr tablet, TAKE ONE AND ONE-HALF TABLETS ONCE DAILY (Patient taking differently: 50 mg once daily.), Disp: 135 tablet, Rfl: 3    NIFEdipine (PROCARDIA-XL) 90 MG (OSM) 24 hr tablet, Take 1 tablet (90  mg total) by mouth once daily., Disp: 90 tablet, Rfl: 6    PHYSICAL EXAMINATION:  Physical Exam  Vitals and nursing note reviewed.   Constitutional:       General: He is awake.      Appearance: Normal appearance.   HENT:      Head: Normocephalic.      Right Ear: External ear normal.      Left Ear: External ear normal.      Nose: Nose normal.   Cardiovascular:      Rate and Rhythm: Normal rate.   Pulmonary:      Effort: Pulmonary effort is normal. No respiratory distress.   Abdominal:      Tenderness: There is no abdominal tenderness. There is no right CVA tenderness or left CVA tenderness.   Genitourinary:     Penis: Normal and uncircumcised. No phimosis, paraphimosis, hypospadias or erythema.       Testes: Normal.         Right: Mass, tenderness or swelling not present.         Left: Mass, tenderness or swelling not present.      Prostate: Enlarged (~30gms;). Not tender and no nodules present.      Rectum: Normal.   Musculoskeletal:         General: Normal range of motion.      Cervical back: Normal range of motion.   Skin:     General: Skin is warm and dry.   Neurological:      General: No focal deficit present.      Mental Status: He is alert and oriented to person, place, and time.   Psychiatric:         Mood and Affect: Mood normal.         Behavior: Behavior is cooperative.           LABS:      In office UA today was clear of active infection and GH.       Lab Results   Component Value Date    PSA 0.43 06/06/2013    PSA 0.68 05/10/2012    PSA 0.54 05/24/2011    PSADIAG 0.44 06/05/2014       Lab Results   Component Value Date    CREATININE 1.4 10/17/2023    EGFRNORACEVR 48.3 (A) 10/17/2023             IMPRESSION:    Encounter Diagnoses   Name Primary?    Bilateral renal cysts Yes    Complex renal cyst     Lesion of bladder     Stage 3a chronic kidney disease     CKD stage 3 due to type 2 diabetes mellitus     Microscopic hematuria          Assessment:       1. Bilateral renal cysts    2. Complex renal cyst     3. Lesion of bladder    4. Stage 3a chronic kidney disease    5. CKD stage 3 due to type 2 diabetes mellitus    6. Microscopic hematuria        Plan:         I spent 40 minutes with the patient of which more than half was spent in direct consultation with the patient in regards to our treatment and plan.  We addressed the office findings and recent labs.   Education and recommendations of today's plan of care including home remedies and needed follow up with PCP.   We discussed the chief complaint; reviewed the LUTS and the possible contributory factors microscopic hematuria; ? Bladder lesions.  Reassurance no infection and no visible blood.  Reviewed management  Recommended lifestyle modifications with a proper, healthy Diabetic diet, good hydration but during the day. Reducing bladder irritants.   Benefits of regular exercise.  We discussed workup.   Urine sent for cytology.   Cysto/RPG with bilateral bx.   Will discuss with Dr. Moffett then let them know.

## 2023-10-20 LAB
FINAL PATHOLOGIC DIAGNOSIS: ABNORMAL
Lab: ABNORMAL

## 2023-10-20 RX ORDER — CIPROFLOXACIN 500 MG/1
500 TABLET ORAL ONCE
Status: CANCELLED | OUTPATIENT
Start: 2023-10-20 | End: 2023-10-20

## 2023-10-20 RX ORDER — LIDOCAINE HYDROCHLORIDE 20 MG/ML
JELLY TOPICAL ONCE
Status: CANCELLED | OUTPATIENT
Start: 2023-10-20 | End: 2023-10-20

## 2023-10-24 ENCOUNTER — TELEPHONE (OUTPATIENT)
Dept: UROLOGY | Facility: CLINIC | Age: 88
End: 2023-10-24
Payer: MEDICARE

## 2023-10-24 NOTE — TELEPHONE ENCOUNTER
Scheduled already per below.  Thank you.     RYAN HALL MRN: [id number]   Date: 11/1/2023 Status: [place]   Appt Time: 1:45 PM Length: 45   Visit Type: CYSTOSCOPY PROC [2584] Copay: $0.00   Provider: PROCEDURE, UROLOGY ROOM 2       ----- Message from Marcelino Moffett MD sent at 10/24/2023  7:06 AM CDT -----  Regarding: RE: (+) cytology  I still want to know what I'm getting into in the OR prior to being in the OR.  I would schedule cystoscopy.     MM  ----- Message -----  From: Shantel Dalton NP  Sent: 10/23/2023   8:51 AM CDT  To: Marcelino Moffett MD  Subject: (+) cytology                                     We had discussed him earlier.   I have not schedule his cysto yet if this would change the plan.  Thanks.     TC    ----- Message -----  From: Maximus, [facility]  Sent: 10/20/2023   5:40 PM CDT  To: Shantel Dalton NP

## 2023-10-30 DIAGNOSIS — I48.19 PERSISTENT ATRIAL FIBRILLATION: Chronic | ICD-10-CM

## 2023-10-30 DIAGNOSIS — E88.810 METABOLIC SYNDROME: Chronic | ICD-10-CM

## 2023-10-30 DIAGNOSIS — E78.2 MIXED HYPERLIPIDEMIA: Chronic | ICD-10-CM

## 2023-10-30 DIAGNOSIS — I10 ESSENTIAL HYPERTENSION: Chronic | ICD-10-CM

## 2023-10-30 RX ORDER — HYDROCHLOROTHIAZIDE 25 MG/1
25 TABLET ORAL
Qty: 90 TABLET | Refills: 3 | OUTPATIENT
Start: 2023-10-30

## 2023-10-30 RX ORDER — METOPROLOL SUCCINATE 50 MG/1
TABLET, EXTENDED RELEASE ORAL
Qty: 135 TABLET | Refills: 3 | Status: ON HOLD | OUTPATIENT
Start: 2023-10-30 | End: 2023-11-24 | Stop reason: HOSPADM

## 2023-10-30 NOTE — TELEPHONE ENCOUNTER
No care due was identified.  [facility] Embedded Care Due Messages. Reference number: [id number].   10/30/2023 12:43:41 PM CDT

## 2023-10-31 NOTE — TELEPHONE ENCOUNTER
Refill Decision Note   Cliff Magaña  is requesting a refill authorization.  Brief Assessment and Rationale for Refill:  Quick Discontinue     Medication Therapy Plan:  The original prescription was discontinued on 11/1/2022 by Betty Bose MA for the following reason: Patient no longer taking. Renewing this prescription may not be appropriate.      Comments:     Note composed:8:01 PM 10/30/2023

## 2023-11-01 ENCOUNTER — PROCEDURE VISIT (OUTPATIENT)
Dept: UROLOGY | Facility: CLINIC | Age: 88
End: 2023-11-01
Payer: MEDICARE

## 2023-11-01 ENCOUNTER — PATIENT MESSAGE (OUTPATIENT)
Dept: UROLOGY | Facility: CLINIC | Age: 88
End: 2023-11-01
Payer: MEDICARE

## 2023-11-01 ENCOUNTER — TELEPHONE (OUTPATIENT)
Dept: UROLOGY | Facility: CLINIC | Age: 88
End: 2023-11-01
Payer: MEDICARE

## 2023-11-01 VITALS
BODY MASS INDEX: 23.49 KG/M2 | TEMPERATURE: 98 F | DIASTOLIC BLOOD PRESSURE: 84 MMHG | WEIGHT: 146.19 LBS | SYSTOLIC BLOOD PRESSURE: 141 MMHG | RESPIRATION RATE: 18 BRPM | HEART RATE: 101 BPM | HEIGHT: 66 IN

## 2023-11-01 DIAGNOSIS — D49.4 BLADDER TUMOR: Primary | ICD-10-CM

## 2023-11-01 DIAGNOSIS — R31.29 MICROSCOPIC HEMATURIA: ICD-10-CM

## 2023-11-01 DIAGNOSIS — N32.9 LESION OF BLADDER: ICD-10-CM

## 2023-11-01 PROCEDURE — 87186 SC STD MICRODIL/AGAR DIL: CPT | Performed by: UROLOGY

## 2023-11-01 PROCEDURE — 87088 URINE BACTERIA CULTURE: CPT | Performed by: UROLOGY

## 2023-11-01 PROCEDURE — 52000 CYSTOURETHROSCOPY: CPT | Mod: PBBFAC | Performed by: UROLOGY

## 2023-11-01 PROCEDURE — 52000 PR CYSTOURETHROSCOPY: ICD-10-PCS | Mod: S$PBB,,, | Performed by: UROLOGY

## 2023-11-01 PROCEDURE — 52000 CYSTOURETHROSCOPY: CPT | Mod: S$PBB,,, | Performed by: UROLOGY

## 2023-11-01 PROCEDURE — 87077 CULTURE AEROBIC IDENTIFY: CPT | Performed by: UROLOGY

## 2023-11-01 PROCEDURE — 87086 URINE CULTURE/COLONY COUNT: CPT | Performed by: UROLOGY

## 2023-11-01 RX ORDER — CIPROFLOXACIN 500 MG/1
500 TABLET ORAL ONCE
Status: DISCONTINUED | OUTPATIENT
Start: 2023-11-01 | End: 2023-11-10

## 2023-11-01 RX ORDER — LIDOCAINE HYDROCHLORIDE 20 MG/ML
JELLY TOPICAL ONCE
Status: COMPLETED | OUTPATIENT
Start: 2023-11-01 | End: 2023-11-01

## 2023-11-01 RX ADMIN — LIDOCAINE HYDROCHLORIDE: 20 JELLY TOPICAL at 01:11

## 2023-11-01 NOTE — PROCEDURES
Procedures: Flexible cystourethroscopy    Pre Procedure Diagnosis:bladder mass  Patient Active Problem List    Diagnosis Date Noted    Renal cyst 12/14/2022    DM type 2, controlled, with complication 12/14/2022    Risk for falls 12/15/2021    Sensorineural hearing loss (SNHL) of both ears 11/19/2020    Atherosclerosis of aorta 11/18/2020    Persistent atrial fibrillation 06/11/2014    Hypertension associated with diabetes 05/22/2013    Hyperlipidemia associated with type 2 diabetes mellitus 05/22/2013    CKD stage 3 due to type 2 diabetes mellitus 11/23/2012    Chronic anticoagulation 11/23/2012         Post Procedure Diagnosis:Normal cystoscopy    Surgeon: Marcelino Moffett MD    Anesthesia: 2% uro-jet lidocaine jelly for local analgesia    Flexible cysto-urethroscopy was performed after consent was obtained.  The risks and benefits were explained.    2% lidocaine urojet was used for local analgesia.  The genitalia was prepped and draped in the sterile fashion with hibiclens.    The flexible scope was advanced into the urethra.  No urethral strictures were noted.  The prostate showed Mild hypertrophy.  There was No median lobe present.  Bilateral ureteral orifices were evaluated and noted to be normal with clear efflux.  The bladder was completely surveyed in a systematic fashion.   Multiple areas of erythematous patches throughout the bladder possibly c/w CIS.  One obvious nodular mass at the right anterior wall, another right posterolateral 3cm papillary mass c/w urothelial CA.       The patient tolerated the procedure well without complication.    A urine cytology was previously sent and was c/w HG urothelial CA.  A urine culture will be sent today.     They will follow up in 1-2 week(s) for a cystoscopy, bilateral retrograde pyelogram and TURBT.  He will need to be off his eliquis periprocedurally so we will reach out to Dr. Birmingham to inquire about the safety of a brief eliquis hold.     He is on this for  atrial fibrillation.  He denies any chest pain or shortness of breath.  He is very active---rides his stationary bike for an hour every day.

## 2023-11-01 NOTE — TELEPHONE ENCOUNTER
----- Message from Marcelino Moffett MD sent at 11/1/2023  2:09 PM CDT -----  Thank you for your prompt response.     Marcelino     ----- Message -----  From: Nereyda Birmingham MD  Sent: 11/1/2023   1:57 PM CDT  To: Marcelino Moffett MD; Kathia Goodrich, RN; #    I am so sorry to hear that. He is a very dear patient of ine. Of course, you can hold Eliquis for 3 days (72hours) before the surgery and restart ASAP. Usually within 24 hours, but it will all depend on your recommendations after the surgery.    YG  ----- Message -----  From: Marcelino Moffett MD  Sent: 11/1/2023   1:47 PM CDT  To: Nereyda Birmingham MD; Kathia Goodrich RN; #    Dr. Birmingham,   I did a cystoscopy on our mutual patient, Mr. Magaña, today which revealed multiple worrisome bladder tumors.  He needs to undergo a cystoscopy with trans urethral resection of these bladder tumors.  He is on Eliquis for atrial fibrillation.  Despite his advanced age, he is very active and rides a stationary bike without issue for an hour every day.    I suspect it would be safe or at least worth the risk benefit analysis to hold his Eliquis in order to allow for this procedure in the near future.  I wanted to reach out since your the prescribing physician and you saw him this year to ask if you feel that is reasonable and if so how long preoperatively which you hold the Eliquis?    Thank you in advance.     Marcelino Moffett

## 2023-11-01 NOTE — TELEPHONE ENCOUNTER
I spoke with patient Cliff and his wife Nereyda now.   Patient will have procedure on Thursday November 16th 2023.  Patient will hold his Eliquis for 72 hours as per below.  All preop instructions covered as well as location and time that is tentative until the day before surgery.  Patient and wife verbalized understanding.  Thank you.    ----- Message from Marcelino Moffett MD sent at 11/1/2023  2:09 PM CDT -----  Thank you for your prompt response.     Marcelino     ----- Message -----  From: Nereyda Birmingham MD  Sent: 11/1/2023   1:57 PM CDT  To: Marcelino Moffett MD; Kathia Goodrich, RN; #    I am so sorry to hear that. He is a very dear patient of ine. Of course, you can hold Eliquis for 3 days (72hours) before the surgery and restart ASAP. Usually within 24 hours, but it will all depend on your recommendations after the surgery.    YG  ----- Message -----  From: Marcelino Moffett MD  Sent: 11/1/2023   1:47 PM CDT  To: Nereyda Birmingham MD; Kathia Goodrich, RN; #    Dr. Birmingham,   I did a cystoscopy on our mutual patient, Mr. Magaña, today which revealed multiple worrisome bladder tumors.  He needs to undergo a cystoscopy with trans urethral resection of these bladder tumors.  He is on Eliquis for atrial fibrillation.  Despite his advanced age, he is very active and rides a stationary bike without issue for an hour every day.    I suspect it would be safe or at least worth the risk benefit analysis to hold his Eliquis in order to allow for this procedure in the near future.  I wanted to reach out since your the prescribing physician and you saw him this year to ask if you feel that is reasonable and if so how long preoperatively which you hold the Eliquis?    Thank you in advance.     Marcelino Moffett

## 2023-11-02 ENCOUNTER — TELEPHONE (OUTPATIENT)
Dept: PODIATRY | Facility: CLINIC | Age: 88
End: 2023-11-02
Payer: MEDICARE

## 2023-11-02 ENCOUNTER — TELEPHONE (OUTPATIENT)
Dept: UROLOGY | Facility: CLINIC | Age: 88
End: 2023-11-02
Payer: MEDICARE

## 2023-11-02 RX ORDER — AMOXICILLIN AND CLAVULANATE POTASSIUM 875; 125 MG/1; MG/1
1 TABLET, FILM COATED ORAL 2 TIMES DAILY
Qty: 28 TABLET | Refills: 0 | Status: SHIPPED | OUTPATIENT
Start: 2023-11-02 | End: 2023-11-16

## 2023-11-02 NOTE — TELEPHONE ENCOUNTER
I spoke with patient Cliff and his wife Nreeyda now.  They prefer the [facility] for the  benefits.  However, they will pick this Augmentin up at [facility] and begin tomorrow.  Cliff is still scheduled for his TURBT on Thursday November 16th 2023.  Patient verbalized understanding and expressed his thanks.    ----- Message from Macrelino Moffett MD sent at 11/2/2023  5:08 PM CDT -----  Ordering augmentin.  Please call patient and notify of positive culture and antibiotics.  May have to change abx when culture result final.  Sent to [facility] pharmacy listed on his chart.

## 2023-11-02 NOTE — TELEPHONE ENCOUNTER
Spoke with Mr Magaña's wife Nereyda to offer her  an earlier appt time. Accepted earlier appt date and time of 11/9/23 at 9 am. No other needs voiced. Encouraged call back if needed. Appt reminder sent through mail as well

## 2023-11-03 ENCOUNTER — DOCUMENTATION ONLY (OUTPATIENT)
Dept: AUDIOLOGY | Facility: CLINIC | Age: 88
End: 2023-11-03
Payer: MEDICARE

## 2023-11-03 LAB — BACTERIA UR CULT: ABNORMAL

## 2023-11-03 NOTE — PROGRESS NOTES
[facility] window to have his right hearing aid checked.  He accidentally took a shower with the hearing aid in.  I cleaned and tested the hearing aid and there was no sound except for static.  Hearing aid  was replaced and I still only heard static.  Patient decided to send hearing aid in for repair.  Appointment with Javan is scheduled in 2 weeks for .    Purchase Date: 11-  Hearing Aid Details  : [employer]  Model: Audeo M90-RT   Type: NI    Color: Champagne  Battery: Lithium ion  Tube/ length & power: #2M  Dome size & style: Phonak small vented dome  RT SN: [id number]  Right ear warranty expiration: 05/22/2024

## 2023-11-08 ENCOUNTER — DOCUMENTATION ONLY (OUTPATIENT)
Dept: AUDIOLOGY | Facility: CLINIC | Age: 88
End: 2023-11-08
Payer: MEDICARE

## 2023-11-08 NOTE — PROGRESS NOTES
Programmed loaner hearing aids for patient and gave them to Raven Bardales to give to patient to use while hearing aids are out for repair.

## 2023-11-08 NOTE — PROGRESS NOTES
Patient dropped off Left hearing aid to be sent in due to static after showering with hearing aids in.  Patient was informed that the hearing aid is out of warranty and cost was given to him.  Patient was given Epuls hearing aids (no ) while his are being sent out for repair.      Loaner SN's: [id number] and [id number].          Purchase Date: 11/20/2019  Hearing Aid Details  : [employer]  Model: Audeo M90-RT   Type: NI    Color: Champagne  Battery: Lithium ion  Tube/ length & power: #2M  Dome size & style: Phonak small vented dome  LT SN: [id number]  Left ear warranty and L/D expiration: 02/17/2023

## 2023-11-09 ENCOUNTER — TELEPHONE (OUTPATIENT)
Dept: UROLOGY | Facility: CLINIC | Age: 88
End: 2023-11-09
Payer: MEDICARE

## 2023-11-09 ENCOUNTER — TELEPHONE (OUTPATIENT)
Dept: PREADMISSION TESTING | Facility: HOSPITAL | Age: 88
End: 2023-11-09
Payer: MEDICARE

## 2023-11-09 ENCOUNTER — OFFICE VISIT (OUTPATIENT)
Dept: PODIATRY | Facility: CLINIC | Age: 88
End: 2023-11-09
Payer: MEDICARE

## 2023-11-09 ENCOUNTER — TELEPHONE (OUTPATIENT)
Dept: INTERNAL MEDICINE | Facility: CLINIC | Age: 88
End: 2023-11-09
Payer: MEDICARE

## 2023-11-09 VITALS
HEART RATE: 81 BPM | HEIGHT: 65 IN | WEIGHT: 146 LBS | DIASTOLIC BLOOD PRESSURE: 79 MMHG | BODY MASS INDEX: 24.32 KG/M2 | SYSTOLIC BLOOD PRESSURE: 145 MMHG

## 2023-11-09 DIAGNOSIS — B35.1 ONYCHOMYCOSIS: ICD-10-CM

## 2023-11-09 DIAGNOSIS — E11.51 TYPE 2 DIABETES MELLITUS WITH PERIPHERAL VASCULAR DISEASE: Primary | ICD-10-CM

## 2023-11-09 DIAGNOSIS — E11.49 TYPE 2 DIABETES MELLITUS WITH NEUROLOGICAL MANIFESTATIONS: ICD-10-CM

## 2023-11-09 PROCEDURE — 99499 NO LOS: ICD-10-PCS | Mod: S$PBB,,, | Performed by: PODIATRIST

## 2023-11-09 PROCEDURE — 99999 PR PBB SHADOW E&M-EST. PATIENT-LVL III: CPT | Mod: PBBFAC,,, | Performed by: PODIATRIST

## 2023-11-09 PROCEDURE — 99213 OFFICE O/P EST LOW 20 MIN: CPT | Mod: 25,PBBFAC,PN | Performed by: PODIATRIST

## 2023-11-09 PROCEDURE — 99499 UNLISTED E&M SERVICE: CPT | Mod: S$PBB,,, | Performed by: PODIATRIST

## 2023-11-09 PROCEDURE — 99999 PR PBB SHADOW E&M-EST. PATIENT-LVL III: ICD-10-PCS | Mod: PBBFAC,,, | Performed by: PODIATRIST

## 2023-11-09 PROCEDURE — 11721 ROUTINE FOOT CARE: ICD-10-PCS | Mod: Q8,S$PBB,, | Performed by: PODIATRIST

## 2023-11-09 PROCEDURE — 11721 DEBRIDE NAIL 6 OR MORE: CPT | Mod: PBBFAC,PN | Performed by: PODIATRIST

## 2023-11-09 NOTE — TELEPHONE ENCOUNTER
----- Message from Naren Quintero RN sent at 11/9/2023  9:22 AM CST -----  11/16/23 surgery Zuhair    Please schedule for NP, ekg, lab.

## 2023-11-09 NOTE — TELEPHONE ENCOUNTER
----- Message from Marcelino Moffett MD sent at 11/9/2023  9:48 AM CST -----  That is fine, but I don't see a scenario in which we would cancel or postpone the case in this scenario given the indication and lack of incision/need for healing, etc.     MM    ----- Message -----  From: Naren Quintero RN  Sent: 11/9/2023   9:28 AM CST  To: Marcelino Moffett MD; Zuhair Benson Staff    Dr. Moffett, abhinav pt's last A1C on 08/14/23 was 8.6, up from 6.9 on 03/14/23. PCP saw pt on 08/18/23 and added jardiance to his metformin. Was to have A1C rechecked on 11/18/23 but this would be after surgery. Spoke with anesth Dr. Edwards and she told me to go ahead and order it now so it would be done prior to procedure. Just wanted to inform you.     Thank you,  Naren Quintero RN  [facility] [facility] [place]

## 2023-11-09 NOTE — PROGRESS NOTES
Subjective:      Patient ID: Cliff Magaña is a 88 y.o. male.    Chief Complaint: Diabetes Mellitus and Diabetic Foot Exam      Cliff is a 88 y.o. male who presents to the clinic upon referral from Dr. Steff choi. provider found  for evaluation and treatment of diabetic feet. Cliff has a past medical history of *Atrial fibrillation, Chronic kidney disease, Hyperlipidemia, Hypertension, Metabolic syndrome, and Type 2 diabetes mellitus, without long-term current use of insulin (12/13/2021). Patient relates no major problem with feet. Only complaints today consist of thickened toenails that he has difficulty trimming.  He will sometimes get a pedicure.  He also relates he gets intermittent cramping to both legs at night.  History of chronic low back pain with right lower extremity symptoms.    04/29/2022: Returns for routine nail care. No new concerns.     07/29/2022: Returns for routine nail care. No new concerns.     11/17/2022:  Returns routine foot care.  No new concerns.    02/16/2023:  Returns for routine foot care.  Due for annual foot exam.  Complains of intermittent cramping to the left foot that is brief in duration.  Denies any claudication symptoms. Followed by Cardiology.     05/18/23: Returns for routine foot care.  No new concerns.  Accompanied by his wife.    08/17/2023: Returns for routine foot care.  No new concerns.    11/09/2023:  Returns for routine foot care.  Accompanied by his wife.  No new concerns.    PCP: Munir Estrada MD    Date Last Seen by PCP:  08/18/2023    Current shoe gear: Casual shoes    Hemoglobin A1C   Date Value Ref Range Status   08/14/2023 8.6 (H) 4.0 - 5.6 % Final     Comment:     ADA Screening Guidelines:  5.7-6.4%  Consistent with prediabetes  >or=6.5%  Consistent with diabetes    High levels of fetal hemoglobin interfere with the HbA1C  assay. Heterozygous hemoglobin variants (HbS, HgC, etc)do  not significantly interfere with this assay.   However, presence of  "multiple variants may affect accuracy.     03/14/2023 6.9 (H) 4.0 - 5.6 % Final     Comment:     ADA Screening Guidelines:  5.7-6.4%  Consistent with prediabetes  >or=6.5%  Consistent with diabetes    High levels of fetal hemoglobin interfere with the HbA1C  assay. Heterozygous hemoglobin variants (HbS, HgC, etc)do  not significantly interfere with this assay.   However, presence of multiple variants may affect accuracy.     08/11/2022 6.9 (H) 4.0 - 5.6 % Final     Comment:     ADA Screening Guidelines:  5.7-6.4%  Consistent with prediabetes  >or=6.5%  Consistent with diabetes    High levels of fetal hemoglobin interfere with the HbA1C  assay. Heterozygous hemoglobin variants (HbS, HgC, etc)do  not significantly interfere with this assay.   However, presence of multiple variants may affect accuracy.       Vitals:    11/09/23 0847   BP: (!) 145/79   Pulse: 81   Weight: 66.2 kg (146 lb)   Height: 5' 5" (1.651 m)   PainSc: 0-No pain      Past Medical History:   Diagnosis Date    *Atrial fibrillation     Chronic kidney disease     Hyperlipidemia     Hypertension     Metabolic syndrome     Type 2 diabetes mellitus, without long-term current use of insulin 12/13/2021       Past Surgical History:   Procedure Laterality Date    CATARACT EXTRACTION      CHOLECYSTECTOMY      EYE SURGERY      INJECTION OF FACET JOINT Bilateral 4/28/2021    Procedure: FACET JOINT INJECTION BILATERAL L4/L5 DIRECT REFERRAL;  Surgeon: Philipp Iyer MD;  Location: [facility];  Service: Pain Management;  Laterality: Bilateral;  NEEDS CONSENT, ELIQUIS CLEARANCE IN CHART    SKIN CANCER EXCISION      TONSILLECTOMY         Family History   Problem Relation Age of Onset    Diabetes Maternal Aunt     Heart attack Neg Hx     Heart disease Neg Hx     Heart failure Neg Hx     Hyperlipidemia Neg Hx     Hypertension Neg Hx     Stroke Neg Hx        Social History     Socioeconomic History    Marital status:    Tobacco Use    Smoking status: Former "     Current packs/day: 0.00     Average packs/day: 2.0 packs/day for 20.0 years (40.0 ttl pk-yrs)     Types: Cigarettes     Start date: 1963     Quit date: 1983     Years since quittin.4     Passive exposure: Never    Smokeless tobacco: Never   Substance and Sexual Activity    Alcohol use: Yes     Alcohol/week: 1.0 standard drink of alcohol     Types: 1 Standard drinks or equivalent per week     Comment: 3 drinks per week    Drug use: No    Sexual activity: Not Currently     Partners: Female     Social Determinants of Health     Financial Resource Strain: Low Risk  (10/15/2023)    Overall Financial Resource Strain (CARDIA)     Difficulty of Paying Living Expenses: Not hard at all   Food Insecurity: No Food Insecurity (10/15/2023)    Hunger Vital Sign     Worried About Running Out of Food in the Last Year: Never true     Ran Out of Food in the Last Year: Never true   Transportation Needs: No Transportation Needs (10/15/2023)    PRAPARE - Transportation     Lack of Transportation (Medical): No     Lack of Transportation (Non-Medical): No   Physical Activity: Sufficiently Active (10/15/2023)    Exercise Vital Sign     Days of Exercise per Week: 6 days     Minutes of Exercise per Session: 70 min   Stress: No Stress Concern Present (10/15/2023)    [employer] [place] [employer] - Occupational Stress Questionnaire     Feeling of Stress : Only a little   Social Connections: Unknown (10/15/2023)    Social Connection and Isolation Panel [NHANES]     Frequency of Communication with Friends and Family: Three times a week     Frequency of Social Gatherings with Friends and Family: Twice a week     Active Member of Clubs or Organizations: Yes     Attends Club or Organization Meetings: More than 4 times per year     Marital Status:    Housing Stability: Low Risk  (10/15/2023)    Housing Stability Vital Sign     Unable to Pay for Housing in the Last Year: No     Number of Places Lived in the Last  Year: 1     Unstable Housing in the Last Year: No       Current Outpatient Medications   Medication Sig Dispense Refill    acetaminophen (TYLENOL) 650 MG TbSR Take 650 mg by mouth every 8 (eight) hours.      amoxicillin-clavulanate 875-125mg (AUGMENTIN) 875-125 mg per tablet Take 1 tablet by mouth 2 (two) times daily. for 14 days 28 tablet 0    apixaban (ELIQUIS) 5 mg Tab Take 1 tablet (5 mg total) by mouth 2 (two) times daily. 180 tablet 3    cholecalciferol, vitamin D3, (VITAMIN D3) 50 mcg (2,000 unit) Cap capsule Take by mouth once daily.      empagliflozin (JARDIANCE) 10 mg tablet Take 1 tablet (10 mg total) by mouth once daily. 90 tablet 3    fenofibrate micronized (LOFIBRA) 200 MG Cap TAKE 1 CAPSULE DAILY WITH BREAKFAST 30 capsule 11    krill oil/hyaluronic/astaxanth (MEGARED JOINT CARE ORAL)       loratadine (CLARITIN) 10 mg tablet Take 10 mg by mouth once daily.      TRAMAINE BIOTIN ORAL Take 5,000 mcg by mouth once daily.      metFORMIN (GLUCOPHAGE-XR) 500 MG ER 24hr tablet Take 1 tablet (500 mg total) by mouth once daily. 90 tablet 1    metoprolol succinate (TOPROL-XL) 25 MG 24 hr tablet TAKE 1 TABLET DAILY 90 tablet 3    metoprolol succinate (TOPROL-XL) 50 MG 24 hr tablet TAKE ONE AND ONE-HALF TABLETS ONCE DAILY 135 tablet 3    NIFEdipine (PROCARDIA-XL) 90 MG (OSM) 24 hr tablet Take 1 tablet (90 mg total) by mouth once daily. 90 tablet 6     Current Facility-Administered Medications   Medication Dose Route Frequency Provider Last Rate Last Admin    ciprofloxacin HCl tablet 500 mg  500 mg Oral Once Shantel Dalton NP           Review of patient's allergies indicates:   Allergen Reactions    Niacin      Other reaction(s): Rash  Other reaction(s): Itching           Review of Systems   Constitutional: Negative for chills, fever and malaise/fatigue.   HENT:  Negative for congestion and hearing loss.    Cardiovascular:  Negative for chest pain, claudication and leg swelling.   Respiratory:  Negative for cough  and shortness of breath.    Skin:  Positive for nail changes.   Musculoskeletal:  Positive for back pain and muscle cramps. Negative for joint pain and muscle weakness.   Gastrointestinal:  Negative for nausea and vomiting.   Neurological:  Positive for paresthesias. Negative for numbness and weakness.   Psychiatric/Behavioral:  Negative for altered mental status.            Objective:      Physical Exam  Constitutional:       General: He is not in acute distress.     Appearance: Normal appearance. He is not ill-appearing.   Cardiovascular:      Pulses:           Dorsalis pedis pulses are 0 on the right side and 0 on the left side.        Posterior tibial pulses are 0 on the right side and 0 on the left side.      Comments: Absent left PT per Doppler.  Monophasic DP bilateral and right PT with Doppler.  Patient has history of AFib which is apparent with the Doppler.    Mild nonpitting edema to lower extremity bilateral.  No hair growth bilateral lower extremity.  Mild rubor on dependency bilateral lower extremity.  Musculoskeletal:      Comments: No pain with ROM or MMT bilateral lower extremity.    No significant digital deformity bilateral.  Rectus appearing foot type bilateral.   Feet:      Right foot:      Protective Sensation: 10 sites tested.  9 sites sensed.      Skin integrity: Dry skin present.      Toenail Condition: Right toenails are abnormally thick and long.      Left foot:      Protective Sensation: 10 sites tested.  10 sites sensed.      Skin integrity: Dry skin present.      Toenail Condition: Left toenails are abnormally thick and long.   Skin:     General: Skin is warm.      Capillary Refill: Capillary refill takes 2 to 3 seconds.      Findings: No ecchymosis or erythema.      Nails: There is no clubbing.      Comments: Second toenail bilateral is growing a superior direction, thickened and discolored yellow with some mild loosening.  Nails 1, 3, 4 and 5 bilateral are mildly elongated 2-3 mm,  thickened with patchy yellow discoloration mild underlying debris.    No open lesions or macerations to lower extremity bilateral.    Skin is atrophied to lower extremity bilateral.   Neurological:      Mental Status: He is alert and oriented to person, place, and time.      Sensory: Sensory deficit present.      Motor: Motor function is intact.      Comments: Absent vibratory sensation right foot and decreased left foot.               Assessment:       Encounter Diagnoses   Name Primary?    Type 2 diabetes mellitus with peripheral vascular disease Yes    Type 2 diabetes mellitus with neurological manifestations     Onychomycosis          Plan:       Cliff was seen today for diabetes mellitus and diabetic foot exam.    Diagnoses and all orders for this visit:    Type 2 diabetes mellitus with peripheral vascular disease  -     Routine Foot Care    Type 2 diabetes mellitus with neurological manifestations  -     Routine Foot Care    Onychomycosis  -     Routine Foot Care      I counseled the patient on his conditions, their implications and medical management.    Shoe inspection. Diabetic Foot Education. Patient reminded of the importance of good nutrition and blood sugar control to help prevent podiatric complications of diabetes. Patient instructed on proper foot hygeine. We discussed wearing proper shoe gear, daily foot inspections, never walking without protective shoe gear, never putting sharp instruments to feet.    Routine foot care per attached note. Patient relates relief following the procedure. He will continue to monitor the areas daily, inspect his feet, wear protective shoe gear when ambulatory, moisturizer to maintain skin integrity.    RTC within 4 months or p.r.n. as discussed.      A portion of this note was generated by voice recognition software and may contain spelling and grammar errors.

## 2023-11-09 NOTE — ASSESSMENT & PLAN NOTE
Current /78  today.    Taking: Metoprolol Nifedipine  On digital HTN program    BP acceptable for surgery. I recommend monitoring BP during perioperative period as uncontrolled pain can elevate blood pressure.

## 2023-11-09 NOTE — ANESTHESIA PAT ROS NOTE
11/09/2023  Cliff Magaña is a 88 y.o., male.      Pre-op Assessment          Review of Systems           Anesthesia Assessment: Preoperative EQUATION    Planned Procedure: Procedure(s) (LRB):  TURBT (TRANSURETHRAL RESECTION OF BLADDER TUMOR) (N/A)  CYSTOSCOPY (N/A)  PYELOGRAM, RETROGRADE (Bilateral)  Requested Anesthesia Type:General  Surgeon: Marcelino Moffett MD  Service: Urology  Known or anticipated Date of Surgery:11/16/2023    Surgeon notes: reviewed    Electronic QUestionnaire Assessment completed via nurse interview with patient.        Triage considerations:       Previous anesthesia records:Not available    Last PCP note: within 3 months , within [facility]   Subspecialty notes: Cardiology: General, Nephrology, Urology    Other important co-morbidities: A FIB, DM2, HLD, HTN, and CKD III, renal cysts, bladder tumor       Tests already available:  Available tests,  within 1 month , within 3 months , 3-6 months ago , within [facility] .   10/17/23 CBC, UA, renal fx panel  08/14/23 A1C (8.6)            Instructions     Optimization:  Anesthesia Preop Clinic Assessment  Indicated    Medical Opinion Indicated       Plan:    Testing:  A1C, BMP, and EKG   Pre-anesthesia  visit       Visit focus: concerns in complex and/or prolonged anesthesia     Consultation:IM Perioperative Hospitalist         Navigation: Tests Scheduled.              Consults scheduled.             Results will be tracked by Preop Clinic.                   11/09/23 A1C of 8.6 from 08/14/23 reported to Dr. Edwards and order received to check it again before procedure (was scheduled to have it rechecked on 11/18/23 which would be after procedure). Dr. Moffett also informed.  - PCP saw pt on 08/18/23 and addressed increased A1C by adding jardiance to his metformin    11/10/23 A1C down to 6.9

## 2023-11-09 NOTE — PROCEDURES
"Routine Foot Care    Date/Time: 11/9/2023 9:00 AM    Performed by: Bhupendra Brar DPM  Authorized by: Bhupendra Brar DPM    Time out: Immediately prior to procedure a "time out" was called to verify the correct patient, procedure, equipment, support staff and site/side marked as required.    Consent Done?:  Yes (Verbal)  Hyperkeratotic Skin Lesions?: No      Nail Care Type:  Debride  Location(s): All  (Left 1st Toe, Left 3rd Toe, Left 2nd Toe, Left 4th Toe, Left 5th Toe, Right 1st Toe, Right 2nd Toe, Right 3rd Toe, Right 4th Toe and Right 5th Toe)  Patient tolerance:  Patient tolerated the procedure well with no immediate complications     Used sterile nail nipper.        "

## 2023-11-09 NOTE — ASSESSMENT & PLAN NOTE
GFR 41.6    Stages of CKD discussed  Deleterious effects NSAID's , Beneficial effects of Hydration discussed   Tylenol as needed for pain   I suggest monitoring renal function, in put and out put status mitzi-operatively. I  suggest avoiding nephrotoxic medication including NSAIDs, COX2 inhibitors, intravenous contrast agent,avoiding hypotension to prevent further renal impairment.

## 2023-11-09 NOTE — ASSESSMENT & PLAN NOTE
DM x 5  years.   Currently takes:   Metformin  Jardiance  Most recent A1c is  6.9.    No accuchecks at this time- does not have glucometer- referred to DM nurse  Reports peripheral neuropathy.  Denies visual changes.

## 2023-11-10 ENCOUNTER — HOSPITAL ENCOUNTER (OUTPATIENT)
Dept: CARDIOLOGY | Facility: CLINIC | Age: 88
Discharge: HOME | End: 2023-11-10
Payer: MEDICARE

## 2023-11-10 ENCOUNTER — OFFICE VISIT (OUTPATIENT)
Dept: INTERNAL MEDICINE | Facility: CLINIC | Age: 88
End: 2023-11-10
Payer: MEDICARE

## 2023-11-10 VITALS
HEIGHT: 66 IN | SYSTOLIC BLOOD PRESSURE: 153 MMHG | HEART RATE: 81 BPM | WEIGHT: 145.5 LBS | DIASTOLIC BLOOD PRESSURE: 78 MMHG | BODY MASS INDEX: 23.38 KG/M2 | OXYGEN SATURATION: 98 % | TEMPERATURE: 98 F

## 2023-11-10 DIAGNOSIS — E11.22 CKD STAGE 3 DUE TO TYPE 2 DIABETES MELLITUS: ICD-10-CM

## 2023-11-10 DIAGNOSIS — I15.2 HYPERTENSION ASSOCIATED WITH DIABETES: ICD-10-CM

## 2023-11-10 DIAGNOSIS — N18.30 CKD STAGE 3 DUE TO TYPE 2 DIABETES MELLITUS: ICD-10-CM

## 2023-11-10 DIAGNOSIS — I48.19 PERSISTENT ATRIAL FIBRILLATION: Chronic | ICD-10-CM

## 2023-11-10 DIAGNOSIS — Z79.01 CHRONIC ANTICOAGULATION: ICD-10-CM

## 2023-11-10 DIAGNOSIS — E11.8 DM TYPE 2, CONTROLLED, WITH COMPLICATION: ICD-10-CM

## 2023-11-10 DIAGNOSIS — Z01.818 PREOPERATIVE TESTING: ICD-10-CM

## 2023-11-10 DIAGNOSIS — E11.59 HYPERTENSION ASSOCIATED WITH DIABETES: ICD-10-CM

## 2023-11-10 PROCEDURE — 99999 PR PBB SHADOW E&M-EST. PATIENT-LVL IV: CPT | Mod: PBBFAC,,, | Performed by: NURSE PRACTITIONER

## 2023-11-10 PROCEDURE — 99215 OFFICE O/P EST HI 40 MIN: CPT | Mod: S$PBB,,, | Performed by: NURSE PRACTITIONER

## 2023-11-10 PROCEDURE — 99999 PR PBB SHADOW E&M-EST. PATIENT-LVL IV: ICD-10-PCS | Mod: PBBFAC,,, | Performed by: NURSE PRACTITIONER

## 2023-11-10 PROCEDURE — 93005 ELECTROCARDIOGRAM TRACING: CPT | Mod: PBBFAC | Performed by: INTERNAL MEDICINE

## 2023-11-10 PROCEDURE — 99215 PR OFFICE/OUTPT VISIT, EST, LEVL V, 40-54 MIN: ICD-10-PCS | Mod: S$PBB,,, | Performed by: NURSE PRACTITIONER

## 2023-11-10 PROCEDURE — 93010 EKG 12-LEAD: ICD-10-PCS | Mod: S$PBB,,, | Performed by: INTERNAL MEDICINE

## 2023-11-10 PROCEDURE — 93010 ELECTROCARDIOGRAM REPORT: CPT | Mod: S$PBB,,, | Performed by: INTERNAL MEDICINE

## 2023-11-10 PROCEDURE — 99214 OFFICE O/P EST MOD 30 MIN: CPT | Mod: PBBFAC | Performed by: NURSE PRACTITIONER

## 2023-11-10 NOTE — DISCHARGE INSTRUCTIONS
Your surgery has been scheduled for:________11/16/23__________________________________    You should report to:  __X__[facility], located on the [place] side of the first floor of the           [facility] ([phone])  ___[facility], located on the [facility] side of the            Second floor of the [facility] ([phone])  ____3rd Floor [facility] located on the [facility] side of the [facility] [phone]  ____[facility]: located at [address] [place] [address]. [address].     Please Note   Tell your doctor if you take Aspirin, products containing Aspirin, herbal medications  or blood thinners, such as Coumadin, Ticlid, or Plavix.  (Consult your provider regarding holding or stopping before surgery).  Arrange for someone to drive you home following surgery.  You will not be allowed to leave the surgical facility alone or drive yourself home following sedation and anesthesia.    Before Surgery  Stop taking all herbal medications, vitamins, and supplements 7 days prior to surgery  No Motrin/Advil (Ibuprofen) 7 days before surgery  No Aleve (Naproxen) 7 days before surgery  Stop Taking Asprin, products containing Aspirin __7___days before surgery   No Goody's/BC Powder 7 days before surgery  Refrain from drinking alcoholic beverages for 24 hours before and after surgery  Stop or limit smoking at least 24 hours prior to surgery  You may take Tylenol for pain    Night before Surgery  Do not eat or drink after midnight  Take a shower or bath (shower is recommended).  Bathe with Hibiclens soap or an antibacterial soap from the neck down.  If not supplied by your surgeon, hibiclens soap will need to be purchased over the counter in pharmacy.  Rinse soap off thoroughly.  Shampoo your hair with your regular shampoo    The Day of Surgery  Take another bath or shower with hibiclens  or any antibacterial soap, to reduce the chance of infection.  Take heart and blood pressure medications with a small sip of water, as advised by the perioperative team.  Do not take fluid pills  You may brush your teeth and rinse your mouth, but do not swallow any additional water.   Do not apply perfumes, powder, body lotions or deodorant on the day of surgery.  Nail polish should be removed.  Do not wear makeup or moisturizer  Wear comfortable clothes, such as a button front shirt and loose fitting pants.  Leave all jewelry, including body piercings, and valuables at home.    Bring any devices you will neeed after surgery such as crutches or canes.  If you have sleep apnea, please bring your CPAP machine  In the event that your physical condition changes including the onset of a cold or respiratory illness, or if you have to delay or cancel your surgery, please notify your surgeon.

## 2023-11-10 NOTE — OUTPATIENT SUBJECTIVE & OBJECTIVE
Outpatient Subjective & Objective      Chief Complaint: Preoperative evaulation, perioperative medical management, and complication reduction plan.     Functional Capacity:  Able to climb a flight of stairs without CP SOB or Syncope.  Able to meet 4 METs      Anesthesia issues: None    Difficulty mouth opening: none    Steroid use in the last 12 months: none      Dental Issues: none    Family anesthesia difficulty: None     Family Hx of Thrombosis none known    Past Medical History:   Diagnosis Date    *Atrial fibrillation     Chronic kidney disease     Deep vein thrombosis     Hyperlipidemia     Hypertension     Metabolic syndrome     Type 2 diabetes mellitus, without long-term current use of insulin 12/13/2021       Past Surgical History:   Procedure Laterality Date    CATARACT EXTRACTION      CHOLECYSTECTOMY      EYE SURGERY      INJECTION OF FACET JOINT Bilateral 4/28/2021    Procedure: FACET JOINT INJECTION BILATERAL L4/L5 DIRECT REFERRAL;  Surgeon: Philipp Iyer MD;  Location: [facility];  Service: Pain Management;  Laterality: Bilateral;  NEEDS CONSENT, ELIQUIS CLEARANCE IN CHART    SKIN CANCER EXCISION      TONSILLECTOMY         Review of Systems   Constitutional:  Negative for chills, fatigue, fever and unexpected weight change.   HENT:  Negative for trouble swallowing and voice change.    Eyes:  Negative for photophobia and visual disturbance.        No acute visual changes   Respiratory:  Negative for cough, shortness of breath and wheezing.         STOP bang  Score 3    High risk ANTHONY   Cardiovascular:  Negative for chest pain, palpitations and leg swelling.   Gastrointestinal:  Negative for abdominal pain, blood in stool, constipation, diarrhea, nausea and vomiting.        No FLD, Hepatitis, Cirrhosis  No BRB or black tarry stool   Genitourinary:  Negative for difficulty urinating, dysuria, frequency, hematuria and urgency.        Nocturia 2-3   Musculoskeletal:  Positive for back pain. Negative  "for arthralgias, gait problem, myalgias, neck pain and neck stiffness.   Neurological:  Negative for dizziness, tremors, seizures, syncope, weakness, light-headedness, numbness and headaches.   Psychiatric/Behavioral:  Negative for agitation, behavioral problems, confusion, decreased concentration, dysphoric mood, hallucinations, self-injury, sleep disturbance and suicidal ideas. The patient is not nervous/anxious and is not hyperactive.         VITALS  Visit Vitals  BP (!) 153/78 (BP Location: Left arm, Patient Position: Sitting)   Pulse 81   Temp 97.8 °F (36.6 °C) (Oral)   Ht 5' 5.5" (1.664 m)   Wt 66 kg (145 lb 8.1 oz)   SpO2 98%   BMI 23.84 kg/m²      Physical Exam  Constitutional:       General: He is not in acute distress.     Appearance: He is well-developed. He is not diaphoretic.   HENT:      Head: Normocephalic.      Right Ear: Hearing normal.      Left Ear: Hearing normal.      Nose: Nose normal.      Mouth/Throat:      Lips: Pink.      Mouth: Mucous membranes are moist.   Eyes:      General: Lids are normal.      Conjunctiva/sclera: Conjunctivae normal.      Pupils: Pupils are equal, round, and reactive to light.   Neck:      Vascular: No carotid bruit.      Trachea: Trachea and phonation normal.   Cardiovascular:      Rate and Rhythm: Normal rate and regular rhythm.      Pulses:           Carotid pulses are 2+ on the right side and 2+ on the left side.       Radial pulses are 2+ on the right side and 2+ on the left side.        Posterior tibial pulses are 2+ on the right side and 2+ on the left side.      Heart sounds: Normal heart sounds. No murmur heard.     No friction rub. No gallop.   Pulmonary:      Effort: Pulmonary effort is normal.      Breath sounds: Normal breath sounds.      Comments: Clear and equal  Anterior and Posterior BBS  Abdominal:      General: Abdomen is protuberant. Bowel sounds are normal. There is no distension.      Palpations: Abdomen is soft.      Tenderness: There is no " abdominal tenderness.   Musculoskeletal:         General: No tenderness or deformity. Normal range of motion.      Cervical back: Normal range of motion.      Right lower leg: No edema.      Left lower leg: No edema.   Lymphadenopathy:      Head:      Right side of head: No submental, submandibular, tonsillar, preauricular, posterior auricular or occipital adenopathy.      Left side of head: No submental, submandibular, tonsillar, preauricular, posterior auricular or occipital adenopathy.      Cervical:      Right cervical: No superficial cervical adenopathy.     Left cervical: No superficial cervical adenopathy.   Skin:     General: Skin is warm and dry.      Capillary Refill: Capillary refill takes 2 to 3 seconds.      Coloration: Skin is not pale.      Findings: No erythema or rash.   Neurological:      Mental Status: He is alert and oriented to person, place, and time.      GCS: GCS eye subscore is 4. GCS verbal subscore is 5. GCS motor subscore is 6.      Motor: No abnormal muscle tone.      Coordination: Coordination normal.   Psychiatric:         Attention and Perception: Attention and perception normal.         Mood and Affect: Mood and affect normal.         Speech: Speech normal.         Behavior: Behavior normal. Behavior is cooperative.         Thought Content: Thought content normal.         Cognition and Memory: Cognition and memory normal.         Judgment: Judgment normal.          Significant Labs:  Lab Results   Component Value Date    WBC 8.16 10/17/2023    HGB 17.9 10/17/2023    HCT 53.3 10/17/2023     10/17/2023    CHOL 194 08/14/2023    TRIG 235 (H) 08/14/2023    HDL 39 (L) 08/14/2023    ALT 22 08/14/2023    AST 18 08/14/2023     10/17/2023    K 4.6 10/17/2023     10/17/2023    CREATININE 1.4 10/17/2023    BUN 25 (H) 10/17/2023    CO2 21 (L) 10/17/2023    TSH 0.864 08/14/2023    PSA 0.43 06/06/2013    INR 2.8 12/17/2013    HGBA1C 6.9 (H) 11/10/2023       Diagnostic Studies:  No relevant studies.    EKG:   Results for orders placed or performed during the hospital encounter of 11/10/23   EKG 12-lead    Collection Time: 11/10/23 12:24 PM    Narrative    Test Reason : Z01.818,    Vent. Rate : 085 BPM     Atrial Rate : 000 BPM     P-R Int : 000 ms          QRS Dur : 118 ms      QT Int : 356 ms       P-R-T Axes : 000 -64 115 degrees     QTc Int : 423 ms    Atrial fibrillation  Left axis deviation  LVH with secondary ST-T wave changes  Intraventricular conduction delay  Possible Anterolateral infarct  Abnormal ECG  When compared with ECG of 04-APR-2022 09:22,  No significant change was found  Confirmed by Jf Garcia MD ([id number]) on 11/10/2023 1:41:47 PM    Referred By: JANEL SY           Confirmed By:Jf Garcia MD       2D ECHO:  TTE:  Results for orders placed or performed during the hospital encounter of 06/14/21   Echo   Result Value Ref Range    Ascending aorta 3.59 cm    STJ 2.78 cm    AV mean gradient 6 mmHg    Ao peak jodee 1.55 m/s    Ao VTI 32.63 cm    IVRT 94.20 msec    IVS 0.85 0.6 - 1.1 cm    LA size 4.29 cm    Left Atrium Major Axis 6.21 cm    Left Atrium Minor Axis 6.06 cm    LVIDd 4.82 3.5 - 6.0 cm    LVIDs 3.34 2.1 - 4.0 cm    LVOT diameter 2.12 cm    LVOT peak VTI 21.72 cm    Posterior Wall 0.89 0.6 - 1.1 cm    MV Peak A Jodee 0.44 m/s    E wave deceleration time 175.62 msec    MV Peak E Jodee 1.11 m/s    PV Peak D Jodee 0.83 m/s    PV Peak S Jodee 0.19 m/s    RA Major Axis 5.61 cm    RA Width 3.95 cm    RVDD 3.00 cm    Sinus 3.10 cm    TAPSE 1.40 cm    TR Max Jodee 2.86 m/s    TDI LATERAL 0.08 m/s    TDI SEPTAL 0.05 m/s    LA WIDTH 4.28 cm    MV stenosis pressure 1/2 time 50.93 ms    LV Diastolic Volume 108.81 mL    LV Systolic Volume 45.43 mL    LVOT peak jodee 0.98 m/s    LA volume (mod) 74.13 cm3    LV LATERAL E/E' RATIO 13.88 m/s    LV SEPTAL E/E' RATIO 22.20 m/s    FS 31 %    LA volume 95.73 cm3    LV mass 142.31 g    Left Ventricle Relative Wall Thickness 0.37 cm    AV  valve area 2.35 cm2    AV Velocity Ratio 0.63     AV index (prosthetic) 0.67     MV valve area p 1/2 method 4.32 cm2    E/A ratio 2.52     Mean e' 0.07 m/s    Pulm vein S/D ratio 0.23     LVOT area 3.5 cm2    LVOT stroke volume 76.63 cm3    AV peak gradient 10 mmHg    E/E' ratio 17.08 m/s    Triscuspid Valve Regurgitation Peak Gradient 33 mmHg    BSA 1.74 m2    LV Systolic Volume Index 26.3 mL/m2    LV Diastolic Volume Index 62.90 mL/m2    LA Volume Index 55.3 mL/m2    LV Mass Index 82 g/m2    LA Volume Index (Mod) 42.8 mL/m2    Right Atrial Pressure (from IVC) 3 mmHg    EF 55 %    TV resting pulmonary artery pressure 36 mmHg    Narrative    · The left ventricle is normal in size. The estimated ejection fraction is   55%.  · Normal right ventricular size with normal right ventricular systolic   function.  · Severe biatrial enlargement.  · Mild mitral regurgitation.  · Mild tricuspid regurgitation.  · The estimated PA systolic pressure is 36 mmHg.  · Normal central venous pressure (3 mmHg).  · Atrial fibrillation observed.          BEENA:  No results found for this or any previous visit.     Imaging     Active Cardiac Conditions: None      Revised Cardiac Risk Index   High -Risk Surgery  Intraperitoneal; Intrathoracic; suprainguinal vascular Yes- + 1 No- 0   History of Ischemic Heart Disease   (Hx of MI/positive exercise test/current chest pain due to ischemia/use of nitrate therapy/EKG with pathological Q waves) Yes- + 1 No- 0   History of CHF  (Pulmonary edema/bilateral rales or S3 gallop/PND/CXR showing pulmonary vascular redistribution) Yes- + 1 No- 0   History of CVA   (Prior stroke or TIA) Yes- + 1 No- 0   Pre-operative treatment with insulin Yes- + 1 No- 0   Pre-operative creatinine > 2mg/dl Yes- + 1 No- 0   Total:      Risk Status:  Estimated risk of cardiac complications after non-cardiac surgery using the Revised Cardiac Risk Index for Preoperative risk is 3.9 %      ARISCAT (Canet) risk index: Low: 1.6% risk  of post-op pulmonary complications.    American Society of Anesthesiologists Physical Status classification (ASA): 3         No further cardiac workup needed prior to surgery.    Outpatient Subjective & Objective

## 2023-11-10 NOTE — HPI
This is a 88 y.o. male  who presents today for a preoperative evaluation in preparation for TURBT, Cystoscopy, Pyelogram  Surgery is indicated for  bladder tumor   .   Patient is new to me.    The history has been obtained by speaking with the patient and reviewing the electronic medical record and/or outside health information. Significant health conditions for the perioperative period are discussed below in assessment and plan.     Patient reports current health status to be Excellent.  Denies any new symptoms before surgery.

## 2023-11-10 NOTE — PROGRESS NOTES
[facility]  Progress Note    Patient Name: Cliff Magaña  MRN: [id number]  Date of Evaluation- 11/13/2023  PCP- Munir Estrada MD    Future cases for Cliff Magaña [[id number]]       Case ID Status Date Time Gui Procedure Provider Location    [id number] [facility] 11/16/2023  1:00  TURBT (TRANSURETHRAL RESECTION OF BLADDER TUMOR) Marcelino Moffett MD [[id number]] [facility] OR 1ST FLR            HPI:  This is a 88 y.o. male  who presents today for a preoperative evaluation in preparation for TURBT, Cystoscopy, Pyelogram  Surgery is indicated for  bladder tumor   .   Patient is new to me.    The history has been obtained by speaking with the patient and reviewing the electronic medical record and/or outside health information. Significant health conditions for the perioperative period are discussed below in assessment and plan.     Patient reports current health status to be Excellent.  Denies any new symptoms before surgery.       Subjective/ Objective:     Chief Complaint: Preoperative evaulation, perioperative medical management, and complication reduction plan.     Functional Capacity:  Able to climb a flight of stairs without CP SOB or Syncope.  Able to meet 4 METs      Anesthesia issues: None    Difficulty mouth opening: none    Steroid use in the last 12 months: none      Dental Issues: none    Family anesthesia difficulty: None     Family Hx of Thrombosis none known    Past Medical History:   Diagnosis Date    *Atrial fibrillation     Chronic kidney disease     Deep vein thrombosis     Hyperlipidemia     Hypertension     Metabolic syndrome     Type 2 diabetes mellitus, without long-term current use of insulin 12/13/2021       Past Surgical History:   Procedure Laterality Date    CATARACT EXTRACTION      CHOLECYSTECTOMY      EYE SURGERY      INJECTION OF FACET JOINT Bilateral 4/28/2021    Procedure: FACET JOINT INJECTION BILATERAL L4/L5 DIRECT REFERRAL;  Surgeon: Philipp Iyer MD;  Location: [facility]  "PAIN MGT;  Service: Pain Management;  Laterality: Bilateral;  NEEDS CONSENT, VIKAS CLEARANCE IN CHART    SKIN CANCER EXCISION      TONSILLECTOMY         Review of Systems   Constitutional:  Negative for chills, fatigue, fever and unexpected weight change.   HENT:  Negative for trouble swallowing and voice change.    Eyes:  Negative for photophobia and visual disturbance.        No acute visual changes   Respiratory:  Negative for cough, shortness of breath and wheezing.         STOP bang  Score 3    High risk ANTHONY   Cardiovascular:  Negative for chest pain, palpitations and leg swelling.   Gastrointestinal:  Negative for abdominal pain, blood in stool, constipation, diarrhea, nausea and vomiting.        No FLD, Hepatitis, Cirrhosis  No BRB or black tarry stool   Genitourinary:  Negative for difficulty urinating, dysuria, frequency, hematuria and urgency.        Nocturia 2-3   Musculoskeletal:  Positive for back pain. Negative for arthralgias, gait problem, myalgias, neck pain and neck stiffness.   Neurological:  Negative for dizziness, tremors, seizures, syncope, weakness, light-headedness, numbness and headaches.   Psychiatric/Behavioral:  Negative for agitation, behavioral problems, confusion, decreased concentration, dysphoric mood, hallucinations, self-injury, sleep disturbance and suicidal ideas. The patient is not nervous/anxious and is not hyperactive.         VITALS  Visit Vitals  BP (!) 153/78 (BP Location: Left arm, Patient Position: Sitting)   Pulse 81   Temp 97.8 °F (36.6 °C) (Oral)   Ht 5' 5.5" (1.664 m)   Wt 66 kg (145 lb 8.1 oz)   SpO2 98%   BMI 23.84 kg/m²      Physical Exam  Constitutional:       General: He is not in acute distress.     Appearance: He is well-developed. He is not diaphoretic.   HENT:      Head: Normocephalic.      Right Ear: Hearing normal.      Left Ear: Hearing normal.      Nose: Nose normal.      Mouth/Throat:      Lips: Pink.      Mouth: Mucous membranes are moist.   Eyes:     "  General: Lids are normal.      Conjunctiva/sclera: Conjunctivae normal.      Pupils: Pupils are equal, round, and reactive to light.   Neck:      Vascular: No carotid bruit.      Trachea: Trachea and phonation normal.   Cardiovascular:      Rate and Rhythm: Normal rate and regular rhythm.      Pulses:           Carotid pulses are 2+ on the right side and 2+ on the left side.       Radial pulses are 2+ on the right side and 2+ on the left side.        Posterior tibial pulses are 2+ on the right side and 2+ on the left side.      Heart sounds: Normal heart sounds. No murmur heard.     No friction rub. No gallop.   Pulmonary:      Effort: Pulmonary effort is normal.      Breath sounds: Normal breath sounds.      Comments: Clear and equal  Anterior and Posterior BBS  Abdominal:      General: Abdomen is protuberant. Bowel sounds are normal. There is no distension.      Palpations: Abdomen is soft.      Tenderness: There is no abdominal tenderness.   Musculoskeletal:         General: No tenderness or deformity. Normal range of motion.      Cervical back: Normal range of motion.      Right lower leg: No edema.      Left lower leg: No edema.   Lymphadenopathy:      Head:      Right side of head: No submental, submandibular, tonsillar, preauricular, posterior auricular or occipital adenopathy.      Left side of head: No submental, submandibular, tonsillar, preauricular, posterior auricular or occipital adenopathy.      Cervical:      Right cervical: No superficial cervical adenopathy.     Left cervical: No superficial cervical adenopathy.   Skin:     General: Skin is warm and dry.      Capillary Refill: Capillary refill takes 2 to 3 seconds.      Coloration: Skin is not pale.      Findings: No erythema or rash.   Neurological:      Mental Status: He is alert and oriented to person, place, and time.      GCS: GCS eye subscore is 4. GCS verbal subscore is 5. GCS motor subscore is 6.      Motor: No abnormal muscle tone.       Coordination: Coordination normal.   Psychiatric:         Attention and Perception: Attention and perception normal.         Mood and Affect: Mood and affect normal.         Speech: Speech normal.         Behavior: Behavior normal. Behavior is cooperative.         Thought Content: Thought content normal.         Cognition and Memory: Cognition and memory normal.         Judgment: Judgment normal.          Significant Labs:  Lab Results   Component Value Date    WBC 8.16 10/17/2023    HGB 17.9 10/17/2023    HCT 53.3 10/17/2023     10/17/2023    CHOL 194 08/14/2023    TRIG 235 (H) 08/14/2023    HDL 39 (L) 08/14/2023    ALT 22 08/14/2023    AST 18 08/14/2023     10/17/2023    K 4.6 10/17/2023     10/17/2023    CREATININE 1.4 10/17/2023    BUN 25 (H) 10/17/2023    CO2 21 (L) 10/17/2023    TSH 0.864 08/14/2023    PSA 0.43 06/06/2013    INR 2.8 12/17/2013    HGBA1C 6.9 (H) 11/10/2023       Diagnostic Studies: No relevant studies.    EKG:   Results for orders placed or performed during the hospital encounter of 11/10/23   EKG 12-lead    Collection Time: 11/10/23 12:24 PM    Narrative    Test Reason : Z01.818,    Vent. Rate : 085 BPM     Atrial Rate : 000 BPM     P-R Int : 000 ms          QRS Dur : 118 ms      QT Int : 356 ms       P-R-T Axes : 000 -64 115 degrees     QTc Int : 423 ms    Atrial fibrillation  Left axis deviation  LVH with secondary ST-T wave changes  Intraventricular conduction delay  Possible Anterolateral infarct  Abnormal ECG  When compared with ECG of 04-APR-2022 09:22,  No significant change was found  Confirmed by Jf Garcia MD ([id number]) on 11/10/2023 1:41:47 PM    Referred By: JANEL SY           Confirmed By:Jf Garcia MD       2D ECHO:  TTE:  Results for orders placed or performed during the hospital encounter of 06/14/21   Echo   Result Value Ref Range    Ascending aorta 3.59 cm    STJ 2.78 cm    AV mean gradient 6 mmHg    Ao peak jodee 1.55 m/s    Ao VTI 32.63 cm    IVRT  94.20 msec    IVS 0.85 0.6 - 1.1 cm    LA size 4.29 cm    Left Atrium Major Axis 6.21 cm    Left Atrium Minor Axis 6.06 cm    LVIDd 4.82 3.5 - 6.0 cm    LVIDs 3.34 2.1 - 4.0 cm    LVOT diameter 2.12 cm    LVOT peak VTI 21.72 cm    Posterior Wall 0.89 0.6 - 1.1 cm    MV Peak A Puneet 0.44 m/s    E wave deceleration time 175.62 msec    MV Peak E Puneet 1.11 m/s    PV Peak D Puneet 0.83 m/s    PV Peak S Puneet 0.19 m/s    RA Major Axis 5.61 cm    RA Width 3.95 cm    RVDD 3.00 cm    Sinus 3.10 cm    TAPSE 1.40 cm    TR Max Puneet 2.86 m/s    TDI LATERAL 0.08 m/s    TDI SEPTAL 0.05 m/s    LA WIDTH 4.28 cm    MV stenosis pressure 1/2 time 50.93 ms    LV Diastolic Volume 108.81 mL    LV Systolic Volume 45.43 mL    LVOT peak puneet 0.98 m/s    LA volume (mod) 74.13 cm3    LV LATERAL E/E' RATIO 13.88 m/s    LV SEPTAL E/E' RATIO 22.20 m/s    FS 31 %    LA volume 95.73 cm3    LV mass 142.31 g    Left Ventricle Relative Wall Thickness 0.37 cm    AV valve area 2.35 cm2    AV Velocity Ratio 0.63     AV index (prosthetic) 0.67     MV valve area p 1/2 method 4.32 cm2    E/A ratio 2.52     Mean e' 0.07 m/s    Pulm vein S/D ratio 0.23     LVOT area 3.5 cm2    LVOT stroke volume 76.63 cm3    AV peak gradient 10 mmHg    E/E' ratio 17.08 m/s    Triscuspid Valve Regurgitation Peak Gradient 33 mmHg    BSA 1.74 m2    LV Systolic Volume Index 26.3 mL/m2    LV Diastolic Volume Index 62.90 mL/m2    LA Volume Index 55.3 mL/m2    LV Mass Index 82 g/m2    LA Volume Index (Mod) 42.8 mL/m2    Right Atrial Pressure (from IVC) 3 mmHg    EF 55 %    TV resting pulmonary artery pressure 36 mmHg    Narrative    · The left ventricle is normal in size. The estimated ejection fraction is   55%.  · Normal right ventricular size with normal right ventricular systolic   function.  · Severe biatrial enlargement.  · Mild mitral regurgitation.  · Mild tricuspid regurgitation.  · The estimated PA systolic pressure is 36 mmHg.  · Normal central venous pressure (3 mmHg).  · Atrial  fibrillation observed.          BEENA:  No results found for this or any previous visit.     Imaging     Active Cardiac Conditions: None      Revised Cardiac Risk Index   High -Risk Surgery  Intraperitoneal; Intrathoracic; suprainguinal vascular Yes- + 1 No- 0   History of Ischemic Heart Disease   (Hx of MI/positive exercise test/current chest pain due to ischemia/use of nitrate therapy/EKG with pathological Q waves) Yes- + 1 No- 0   History of CHF  (Pulmonary edema/bilateral rales or S3 gallop/PND/CXR showing pulmonary vascular redistribution) Yes- + 1 No- 0   History of CVA   (Prior stroke or TIA) Yes- + 1 No- 0   Pre-operative treatment with insulin Yes- + 1 No- 0   Pre-operative creatinine > 2mg/dl Yes- + 1 No- 0   Total:      Risk Status:  Estimated risk of cardiac complications after non-cardiac surgery using the Revised Cardiac Risk Index for Preoperative risk is 3.9 %      ARISCAT (Canet) risk index: Low: 1.6% risk of post-op pulmonary complications.    American Society of Anesthesiologists Physical Status classification (ASA): 3         No further cardiac workup needed prior to surgery.        Preoperative cardiac risk assessment-  The patient does not have any active cardiac conditions . Revised cardiac risk index predictors- ---.Functional capacity is more than 4 Mets. He will be undergoing a Urological procedure that carries a Moderate Risk risk     The estimated risk of the rate of adverse cardiac outcomes  3.9    No further cardiac work up is indicated prior to proceeding with the surgery          American Society of Anesthesiologists Physical status classification ( ASA ) class: 3.9     Postoperative pulmonary complication risk assessment: 1.6     ARISCAT ( Canet) risk index- risk class -  Low, if duration of surgery is under 3 hours, intermediate, if duration of surgery is over 3 hours          Assessment/Plan:     Hypertension associated with diabetes  Current /78  today.    Taking: Metoprolol  Nifedipine  On digital HTN program    BP acceptable for surgery. I recommend monitoring BP during perioperative period as uncontrolled pain can elevate blood pressure.           Persistent atrial fibrillation  Eliquis, Metoprolol    CKD stage 3 due to type 2 diabetes mellitus  GFR 41.6    Stages of CKD discussed  Deleterious effects NSAID's , Beneficial effects of Hydration discussed   Tylenol as needed for pain   I suggest monitoring renal function, in put and out put status mitzi-operatively. I  suggest avoiding nephrotoxic medication including NSAIDs, COX2 inhibitors, intravenous contrast agent,avoiding hypotension to prevent further renal impairment.     Chronic anticoagulation  Eliquis for afib    DM type 2, controlled, with complication  DM x 5  years.   Currently takes:   Metformin  Jardiance  Most recent A1c is  6.9.    No accuchecks at this time- does not have glucometer- referred to DM nurse  Reports peripheral neuropathy.  Denies visual changes.          Preventive perioperative care    Thromboembolic prophylaxis:  His risk factors for thrombosis include surgical procedure, previous history of thrombosis, age, and reduced mobility.I suggest  thromboembolic prophylaxis ( mechanical/pharmacological, weighing the risk benefits of pharmacological agent use considering mitzi procedural bleeding )  during the perioperative period.I suggested being active in the post operative period.      Postoperative pulmonary complication prophylaxis-Risk factors for post operative pulmonary complications include age over 65 years and ASA class >2- I suggest incentive spirometry use, early ambulation, and pain control so as to avoid diaphragmatic splinting  Brush teeth twice per day, oral rinses, sleep with the head of the bed up 30 degrees     Renal complication prophylaxis-Risk factors for renal complications include pre-existing renal disease, age, diabetes mellitus, and hypertension . I suggest keeping him well hydrated  and avoidance/ minimizing the use of  NSAID's,TIPTON 2 Inhibitors ,IV contrast if possible in the perioperative period.I suggested drinking 2 litre's of water a day      Surgical site Infection Prophylaxis-I  suggest appropriate antibiotic for Prophylaxis against Surgical site infections Shower with Hibiclens in the night before surgery and the morning of surgery     Delirium prophylaxis-Risk factors - Advanced Age and Reduced Mobility - I suggest avoidance / minimizing the use of  Benzodiazepines ( unless the patient has been taking it on a regular basis ),Anticholinergic medication,Antihistamines ( like  Benadryl).I suggest minimizing the use of opioid medication and use of IV tylenol,if it is appropriate. I suggest using the lowest possible dose of opioids for the shortest duration possible in the perioperative period. I suggest to Keep shades/blinds open during the day, lights off and shades closed at night to encourage normal sleep/wake cycle.I encourage the presence of the family member with the patient at all times, if at all possible as mental status changes can be picked up early by the family members and they help with reorientation. I encouraged the presence of family to help with orientation in the perioperative period. Benadryl avoidance suggested      In view of BPH and urological procedure the patient  is at risk of postoperative urinary retention.  I suggest avoidance / minimizing the of  Benzodiazepines,Anticholinergic medication,antihistamines ( Benadryl) , if possible in the perioperative period. I suggest using the minimum possible use of opioids for the minimum period of time in the perioperative period. Benadryl avoidance suggested      This visit was focused on Preoperative evaluation, Perioperative Medical management, complication reduction plans. I suggest that the patient follows up with primary care or relevant sub specialists for ongoing health care.    I appreciate the opportunity to be  involved in this patients care. Please feel free to contact me if there were any questions about this consultation.    Patient is optimized      I spent a total of 60 minutes on the day of the visit.This includes face to face time and non-face to face time preparing to see the patient (eg, review of tests), obtaining and/or reviewing separately obtained history, documenting clinical information in the electronic or other health record, independently interpreting results and communicating results to the patient/family/caregiver, or care coordinator.     Laith Veliz NP  Perioperative Medicine  [facility]   Pager [phone]

## 2023-11-11 DIAGNOSIS — I10 HYPERTENSION, UNSPECIFIED TYPE: ICD-10-CM

## 2023-11-11 RX ORDER — FENOFIBRATE 200 MG/1
CAPSULE ORAL
Refills: 0 | OUTPATIENT
Start: 2023-11-11

## 2023-11-11 NOTE — TELEPHONE ENCOUNTER
Refill Decision Note   Cliff Magaña  is requesting a refill authorization.  Brief Assessment and Rationale for Refill:  Quick Discontinue     Medication Therapy Plan: Pharmacy is requesting new scripts for the following medications without required information, (sig/ frequency/qty/etc)     Medication Reconciliation Completed: No   Comments:     No Care Gaps recommended.     Note composed:4:41 PM 11/11/2023

## 2023-11-11 NOTE — TELEPHONE ENCOUNTER
No care due was identified.  [facility] Embedded Care Due Messages. Reference number: [id number].   11/11/2023 12:35:52 PM CST

## 2023-11-13 RX ORDER — NIFEDIPINE 90 MG/1
90 TABLET, EXTENDED RELEASE ORAL DAILY
Qty: 90 TABLET | Refills: 6 | Status: SHIPPED | OUTPATIENT
Start: 2023-11-13 | End: 2024-01-03

## 2023-11-13 NOTE — TELEPHONE ENCOUNTER
No care due was identified.  [facility] Embedded Care Due Messages. Reference number: [id number].   11/13/2023 3:26:23 PM CST

## 2023-11-14 ENCOUNTER — OFFICE VISIT (OUTPATIENT)
Dept: OTOLARYNGOLOGY | Facility: CLINIC | Age: 88
End: 2023-11-14
Payer: MEDICARE

## 2023-11-14 VITALS — DIASTOLIC BLOOD PRESSURE: 93 MMHG | SYSTOLIC BLOOD PRESSURE: 190 MMHG

## 2023-11-14 DIAGNOSIS — H61.23 IMPACTED CERUMEN OF BOTH EARS: ICD-10-CM

## 2023-11-14 DIAGNOSIS — H93.8X3 EAR FULLNESS, BILATERAL: Primary | ICD-10-CM

## 2023-11-14 PROCEDURE — 99203 PR OFFICE/OUTPT VISIT, NEW, LEVL III, 30-44 MIN: ICD-10-PCS | Mod: 25,S$PBB,, | Performed by: OTOLARYNGOLOGY

## 2023-11-14 PROCEDURE — 69210 REMOVE IMPACTED EAR WAX UNI: CPT | Mod: S$PBB,,, | Performed by: OTOLARYNGOLOGY

## 2023-11-14 PROCEDURE — 99203 OFFICE O/P NEW LOW 30 MIN: CPT | Mod: 25,S$PBB,, | Performed by: OTOLARYNGOLOGY

## 2023-11-14 PROCEDURE — 99999 PR PBB SHADOW E&M-EST. PATIENT-LVL III: CPT | Mod: PBBFAC,,, | Performed by: OTOLARYNGOLOGY

## 2023-11-14 PROCEDURE — 69210 PR REMOVAL IMPACTED CERUMEN REQUIRING INSTRUMENTATION, UNILATERAL: ICD-10-PCS | Mod: S$PBB,,, | Performed by: OTOLARYNGOLOGY

## 2023-11-14 PROCEDURE — 99213 OFFICE O/P EST LOW 20 MIN: CPT | Mod: 25,PBBFAC | Performed by: OTOLARYNGOLOGY

## 2023-11-14 PROCEDURE — 69210 REMOVE IMPACTED EAR WAX UNI: CPT | Mod: 50,PBBFAC | Performed by: OTOLARYNGOLOGY

## 2023-11-14 PROCEDURE — 99999 PR PBB SHADOW E&M-EST. PATIENT-LVL III: ICD-10-PCS | Mod: PBBFAC,,, | Performed by: OTOLARYNGOLOGY

## 2023-11-14 RX ORDER — FENOFIBRATE 200 MG/1
CAPSULE ORAL
Qty: 90 CAPSULE | Refills: 2 | Status: SHIPPED | OUTPATIENT
Start: 2023-11-14 | End: 2023-12-13

## 2023-11-14 NOTE — PROGRESS NOTES
Subjective:       Patient ID: Cliff Magaña is a 88 y.o. male.    Chief Complaint: Ear Fullness    Ear Fullness   There is pain in both ears. This is a recurrent problem. The current episode started more than 1 year ago (Last visit for ear cleaning in 2020.). The problem has been gradually worsening. There has been no fever. The patient is experiencing no pain. Associated symptoms include hearing loss. Pertinent negatives include no coughing, diarrhea, ear discharge, headaches, neck pain, rash, rhinorrhea or vomiting. He has tried nothing for the symptoms. His past medical history is significant for hearing loss.     Review of Systems   Constitutional:  Negative for activity change, appetite change and fever.   HENT:  Positive for hearing loss. Negative for congestion, ear discharge, ear pain, nosebleeds, postnasal drip, rhinorrhea and sneezing.    Eyes:  Negative for redness and visual disturbance.   Respiratory:  Negative for apnea, cough, shortness of breath and wheezing.    Cardiovascular:  Negative for chest pain and palpitations.   Gastrointestinal:  Negative for diarrhea and vomiting.   Genitourinary:  Negative for difficulty urinating and frequency.   Musculoskeletal:  Negative for arthralgias, back pain, gait problem and neck pain.   Skin:  Negative for color change and rash.   Neurological:  Negative for dizziness, speech difficulty, weakness and headaches.   Hematological:  Negative for adenopathy. Does not bruise/bleed easily.   Psychiatric/Behavioral:  Negative for agitation and behavioral problems.        Objective:        Constitutional:   Vital signs are normal. He appears well-developed and well-nourished. He is active. Normal speech.      Head:  Normocephalic and atraumatic. Salivary glands normal.  Facial strength is normal.      Ears:    Right Ear: Decreased hearing is noted.   Left Ear: Decreased hearing is noted.   Obstructing impacted cerumen in both EACs with no normal anatomic elements able  to be visualized.     PROCEDURE NOTE:  Obstructing/impacted ceruminosis is noted in both EACs.  Wax was removed by manual debridement and suctioning utilizing the assistance of binocular microscopy revealing EACs and TMs WNL. The patient tolerated this procedure without difficulty. The subjective decrease noted in hearing pre-cleaning was resolved post-cleaning.         Nose:  Nose normal including turbinates, nasal mucosa, sinuses and nasal septum.     Mouth/Throat  Oropharynx clear and moist without lesions or asymmetry and normal uvula midline.     Neck:  Neck normal without thyromegaly masses, asymmetry, normal tracheal structure, crepitus, and tenderness, thyroid normal, trachea normal, phonation normal and full range of motion with neck supple.     Psychiatric:   He has a normal mood and affect. His speech is normal and behavior is normal.     Neurological:   He has neurological normal, alert and oriented.     Skin:   No abrasions, lacerations, lesions, or rashes.       Assessment:       1. Ear fullness, bilateral    2. Impacted cerumen of both ears        Plan:       1. Hearing conservation strongly recommended.  2. Trial of amplification bilaterally also recommended (patient not interested).  3. Re-check of hearing in 18-24 months or sooner if subjective change noted.  4. F/U with PCP as per schedule.   5. RTC for ear cleaning in 3-6 months or sooner.

## 2023-11-14 NOTE — TELEPHONE ENCOUNTER
Refill Decision Note   Cliff Magaña  is requesting a refill authorization.  Brief Assessment and Rationale for Refill:  Approve     Medication Therapy Plan:         Comments:     Note composed:2:41 AM 11/14/2023

## 2023-11-15 ENCOUNTER — TELEPHONE (OUTPATIENT)
Dept: UROLOGY | Facility: CLINIC | Age: 88
End: 2023-11-15
Payer: MEDICARE

## 2023-11-15 NOTE — TELEPHONE ENCOUNTER
You are scheduled for surgery with  on 11/16/2023. Your arrival time is 0845 am. You are to report to the [facility] located in the back of the [facility] on the [place] side of the building right behind [facility]. You will need someone to drive you home following your surgery.  No alcohol 24 hours before and after and no smoking a few days prior. NOTHING TO EAT OR DRINK AFTER MIDNIGHT THE NIGHT PRIOR TO THE SURGERY INCLUDING GUM, CANDY AND MINTS. Take a shower the night before and the morning of with Hibiclens Antibacterial soap and no lotion, cologne, deodorant or powder in the morning.

## 2023-11-16 ENCOUNTER — ANESTHESIA (OUTPATIENT)
Dept: SURGERY | Facility: HOSPITAL | Age: 88
End: 2023-11-16
Payer: MEDICARE

## 2023-11-16 ENCOUNTER — HOSPITAL ENCOUNTER (OUTPATIENT)
Facility: HOSPITAL | Age: 88
Discharge: HOME | End: 2023-11-16
Attending: UROLOGY | Admitting: UROLOGY
Payer: MEDICARE

## 2023-11-16 ENCOUNTER — ANESTHESIA EVENT (OUTPATIENT)
Dept: SURGERY | Facility: HOSPITAL | Age: 88
End: 2023-11-16
Payer: MEDICARE

## 2023-11-16 ENCOUNTER — TELEPHONE (OUTPATIENT)
Dept: UROLOGY | Facility: CLINIC | Age: 88
End: 2023-11-16
Payer: MEDICARE

## 2023-11-16 VITALS
HEART RATE: 58 BPM | BODY MASS INDEX: 23.43 KG/M2 | OXYGEN SATURATION: 96 % | RESPIRATION RATE: 20 BRPM | DIASTOLIC BLOOD PRESSURE: 56 MMHG | SYSTOLIC BLOOD PRESSURE: 118 MMHG | WEIGHT: 143 LBS | TEMPERATURE: 98 F

## 2023-11-16 DIAGNOSIS — D49.4 BLADDER TUMOR: Primary | ICD-10-CM

## 2023-11-16 DIAGNOSIS — R31.29 MICROHEMATURIA: ICD-10-CM

## 2023-11-16 DIAGNOSIS — Z01.818 PREOPERATIVE TESTING: ICD-10-CM

## 2023-11-16 DIAGNOSIS — E11.8 DM TYPE 2, CONTROLLED, WITH COMPLICATION: ICD-10-CM

## 2023-11-16 LAB
POCT GLUCOSE: 122 MG/DL (ref 70–110)
POCT GLUCOSE: 133 MG/DL (ref 70–110)

## 2023-11-16 PROCEDURE — 71000044 HC DOSC ROUTINE RECOVERY FIRST HOUR: Performed by: UROLOGY

## 2023-11-16 PROCEDURE — 88342 IMHCHEM/IMCYTCHM 1ST ANTB: CPT | Mod: 59 | Performed by: STUDENT IN AN ORGANIZED HEALTH CARE EDUCATION/TRAINING PROGRAM

## 2023-11-16 PROCEDURE — D9220A PRA ANESTHESIA: Mod: ANES,,, | Performed by: ANESTHESIOLOGY

## 2023-11-16 PROCEDURE — 74420 UROGRAPHY RTRGR +-KUB: CPT | Mod: 26,,, | Performed by: UROLOGY

## 2023-11-16 PROCEDURE — D9220A PRA ANESTHESIA: Mod: CRNA,,, | Performed by: NURSE ANESTHETIST, CERTIFIED REGISTERED

## 2023-11-16 PROCEDURE — D9220A PRA ANESTHESIA: ICD-10-PCS | Mod: ANES,,, | Performed by: ANESTHESIOLOGY

## 2023-11-16 PROCEDURE — 88342 CHG IMMUNOCYTOCHEMISTRY: ICD-10-PCS | Mod: 26,,, | Performed by: STUDENT IN AN ORGANIZED HEALTH CARE EDUCATION/TRAINING PROGRAM

## 2023-11-16 PROCEDURE — 25000003 PHARM REV CODE 250

## 2023-11-16 PROCEDURE — 37000008 HC ANESTHESIA 1ST 15 MINUTES: Performed by: UROLOGY

## 2023-11-16 PROCEDURE — 88307 PR  SURG PATH,LEVEL V: ICD-10-PCS | Mod: 26,,, | Performed by: STUDENT IN AN ORGANIZED HEALTH CARE EDUCATION/TRAINING PROGRAM

## 2023-11-16 PROCEDURE — 71000015 HC POSTOP RECOV 1ST HR: Performed by: UROLOGY

## 2023-11-16 PROCEDURE — 27201423 OPTIME MED/SURG SUP & DEVICES STERILE SUPPLY: Performed by: UROLOGY

## 2023-11-16 PROCEDURE — 88307 TISSUE EXAM BY PATHOLOGIST: CPT | Performed by: STUDENT IN AN ORGANIZED HEALTH CARE EDUCATION/TRAINING PROGRAM

## 2023-11-16 PROCEDURE — D9220A PRA ANESTHESIA: ICD-10-PCS | Mod: CRNA,,, | Performed by: NURSE ANESTHETIST, CERTIFIED REGISTERED

## 2023-11-16 PROCEDURE — 88341 IMHCHEM/IMCYTCHM EA ADD ANTB: CPT | Mod: 26,,, | Performed by: STUDENT IN AN ORGANIZED HEALTH CARE EDUCATION/TRAINING PROGRAM

## 2023-11-16 PROCEDURE — 71000016 HC POSTOP RECOV ADDL HR: Performed by: UROLOGY

## 2023-11-16 PROCEDURE — 74420 PR  X-RAY RETROGRADE PYELOGRAM: ICD-10-PCS | Mod: 26,,, | Performed by: UROLOGY

## 2023-11-16 PROCEDURE — C1758 CATHETER, URETERAL: HCPCS | Performed by: UROLOGY

## 2023-11-16 PROCEDURE — 25000003 PHARM REV CODE 250: Performed by: NURSE ANESTHETIST, CERTIFIED REGISTERED

## 2023-11-16 PROCEDURE — 88342 IMHCHEM/IMCYTCHM 1ST ANTB: CPT | Mod: 26,,, | Performed by: STUDENT IN AN ORGANIZED HEALTH CARE EDUCATION/TRAINING PROGRAM

## 2023-11-16 PROCEDURE — 63600175 PHARM REV CODE 636 W HCPCS: Performed by: NURSE ANESTHETIST, CERTIFIED REGISTERED

## 2023-11-16 PROCEDURE — 25500020 PHARM REV CODE 255: Performed by: UROLOGY

## 2023-11-16 PROCEDURE — 88341 PR IHC OR ICC EACH ADD'L SINGLE ANTIBODY  STAINPR: ICD-10-PCS | Mod: 26,,, | Performed by: STUDENT IN AN ORGANIZED HEALTH CARE EDUCATION/TRAINING PROGRAM

## 2023-11-16 PROCEDURE — 36000707: Performed by: UROLOGY

## 2023-11-16 PROCEDURE — 88341 IMHCHEM/IMCYTCHM EA ADD ANTB: CPT | Performed by: STUDENT IN AN ORGANIZED HEALTH CARE EDUCATION/TRAINING PROGRAM

## 2023-11-16 PROCEDURE — 52235 PR CYSTOURETHROSCOPY,FULGUR 2-5 CM LESN: ICD-10-PCS | Mod: ,,, | Performed by: UROLOGY

## 2023-11-16 PROCEDURE — 88307 TISSUE EXAM BY PATHOLOGIST: CPT | Mod: 26,,, | Performed by: STUDENT IN AN ORGANIZED HEALTH CARE EDUCATION/TRAINING PROGRAM

## 2023-11-16 PROCEDURE — 36000706: Performed by: UROLOGY

## 2023-11-16 PROCEDURE — 52235 CYSTOSCOPY AND TREATMENT: CPT | Mod: ,,, | Performed by: UROLOGY

## 2023-11-16 PROCEDURE — 63600175 PHARM REV CODE 636 W HCPCS

## 2023-11-16 PROCEDURE — 37000009 HC ANESTHESIA EA ADD 15 MINS: Performed by: UROLOGY

## 2023-11-16 RX ORDER — OXYBUTYNIN CHLORIDE 5 MG/1
5 TABLET ORAL 3 TIMES DAILY PRN
Qty: 30 TABLET | Refills: 0 | Status: SHIPPED | OUTPATIENT
Start: 2023-11-16 | End: 2023-12-11

## 2023-11-16 RX ORDER — FENTANYL CITRATE 50 UG/ML
INJECTION, SOLUTION INTRAMUSCULAR; INTRAVENOUS
Status: DISCONTINUED | OUTPATIENT
Start: 2023-11-16 | End: 2023-11-16

## 2023-11-16 RX ORDER — OXYCODONE AND ACETAMINOPHEN 5; 325 MG/1; MG/1
1 TABLET ORAL
Status: DISCONTINUED | OUTPATIENT
Start: 2023-11-16 | End: 2023-11-16 | Stop reason: HOSPADM

## 2023-11-16 RX ORDER — PHENAZOPYRIDINE HYDROCHLORIDE 100 MG/1
100 TABLET, FILM COATED ORAL 3 TIMES DAILY PRN
Qty: 21 TABLET | Refills: 0 | Status: ON HOLD | OUTPATIENT
Start: 2023-11-16 | End: 2023-11-24

## 2023-11-16 RX ORDER — OXYBUTYNIN CHLORIDE 5 MG/1
5 TABLET ORAL ONCE
Status: COMPLETED | OUTPATIENT
Start: 2023-11-16 | End: 2023-11-16

## 2023-11-16 RX ORDER — ONDANSETRON 2 MG/ML
INJECTION INTRAMUSCULAR; INTRAVENOUS
Status: DISCONTINUED | OUTPATIENT
Start: 2023-11-16 | End: 2023-11-16

## 2023-11-16 RX ORDER — LIDOCAINE HYDROCHLORIDE 20 MG/ML
INJECTION INTRAVENOUS
Status: DISCONTINUED | OUTPATIENT
Start: 2023-11-16 | End: 2023-11-16

## 2023-11-16 RX ORDER — SODIUM CHLORIDE 0.9 % (FLUSH) 0.9 %
3 SYRINGE (ML) INJECTION
Status: DISCONTINUED | OUTPATIENT
Start: 2023-11-16 | End: 2023-11-16 | Stop reason: HOSPADM

## 2023-11-16 RX ORDER — ONDANSETRON 4 MG/1
4 TABLET, ORALLY DISINTEGRATING ORAL EVERY 6 HOURS PRN
Qty: 10 TABLET | Refills: 0 | Status: SHIPPED | OUTPATIENT
Start: 2023-11-16 | End: 2023-12-11

## 2023-11-16 RX ORDER — PHENAZOPYRIDINE HYDROCHLORIDE 100 MG/1
100 TABLET, FILM COATED ORAL ONCE
Status: COMPLETED | OUTPATIENT
Start: 2023-11-16 | End: 2023-11-16

## 2023-11-16 RX ORDER — DEXMEDETOMIDINE HYDROCHLORIDE 100 UG/ML
INJECTION, SOLUTION INTRAVENOUS
Status: DISCONTINUED | OUTPATIENT
Start: 2023-11-16 | End: 2023-11-16

## 2023-11-16 RX ORDER — HYDROMORPHONE HYDROCHLORIDE 1 MG/ML
0.2 INJECTION, SOLUTION INTRAMUSCULAR; INTRAVENOUS; SUBCUTANEOUS EVERY 5 MIN PRN
Status: DISCONTINUED | OUTPATIENT
Start: 2023-11-16 | End: 2023-11-16 | Stop reason: HOSPADM

## 2023-11-16 RX ORDER — ROCURONIUM BROMIDE 10 MG/ML
INJECTION, SOLUTION INTRAVENOUS
Status: DISCONTINUED | OUTPATIENT
Start: 2023-11-16 | End: 2023-11-16

## 2023-11-16 RX ORDER — HALOPERIDOL 5 MG/ML
0.5 INJECTION INTRAMUSCULAR EVERY 10 MIN PRN
Status: DISCONTINUED | OUTPATIENT
Start: 2023-11-16 | End: 2023-11-16 | Stop reason: HOSPADM

## 2023-11-16 RX ORDER — PHENYLEPHRINE HYDROCHLORIDE 10 MG/ML
INJECTION INTRAVENOUS
Status: DISCONTINUED | OUTPATIENT
Start: 2023-11-16 | End: 2023-11-16

## 2023-11-16 RX ORDER — ONDANSETRON 2 MG/ML
4 INJECTION INTRAMUSCULAR; INTRAVENOUS DAILY PRN
Status: DISCONTINUED | OUTPATIENT
Start: 2023-11-16 | End: 2023-11-16 | Stop reason: HOSPADM

## 2023-11-16 RX ORDER — SODIUM CHLORIDE 9 MG/ML
INJECTION, SOLUTION INTRAVENOUS CONTINUOUS
Status: DISCONTINUED | OUTPATIENT
Start: 2023-11-16 | End: 2023-11-16 | Stop reason: HOSPADM

## 2023-11-16 RX ORDER — KETOROLAC TROMETHAMINE 10 MG/1
10 TABLET, FILM COATED ORAL EVERY 6 HOURS PRN
Qty: 12 TABLET | Refills: 0 | Status: ON HOLD | OUTPATIENT
Start: 2023-11-16 | End: 2023-11-24 | Stop reason: HOSPADM

## 2023-11-16 RX ORDER — PROPOFOL 10 MG/ML
VIAL (ML) INTRAVENOUS
Status: DISCONTINUED | OUTPATIENT
Start: 2023-11-16 | End: 2023-11-16

## 2023-11-16 RX ADMIN — LIDOCAINE HYDROCHLORIDE 100 MG: 20 INJECTION INTRAVENOUS at 10:11

## 2023-11-16 RX ADMIN — DEXMEDETOMIDINE 8 MCG: 100 INJECTION, SOLUTION, CONCENTRATE INTRAVENOUS at 11:11

## 2023-11-16 RX ADMIN — DEXMEDETOMIDINE 8 MCG: 100 INJECTION, SOLUTION, CONCENTRATE INTRAVENOUS at 10:11

## 2023-11-16 RX ADMIN — ROCURONIUM BROMIDE 50 MG: 10 INJECTION, SOLUTION INTRAVENOUS at 10:11

## 2023-11-16 RX ADMIN — PHENAZOPYRIDINE HYDROCHLORIDE 100 MG: 100 TABLET ORAL at 02:11

## 2023-11-16 RX ADMIN — FENTANYL CITRATE 50 MCG: 50 INJECTION, SOLUTION INTRAMUSCULAR; INTRAVENOUS at 11:11

## 2023-11-16 RX ADMIN — OXYBUTYNIN CHLORIDE 5 MG: 5 TABLET ORAL at 02:11

## 2023-11-16 RX ADMIN — PROPOFOL 120 MG: 10 INJECTION, EMULSION INTRAVENOUS at 10:11

## 2023-11-16 RX ADMIN — SODIUM CHLORIDE: 9 INJECTION, SOLUTION INTRAVENOUS at 10:11

## 2023-11-16 RX ADMIN — FENTANYL CITRATE 50 MCG: 50 INJECTION, SOLUTION INTRAMUSCULAR; INTRAVENOUS at 10:11

## 2023-11-16 RX ADMIN — CEFAZOLIN 2 G: 2 INJECTION, POWDER, FOR SOLUTION INTRAMUSCULAR; INTRAVENOUS at 10:11

## 2023-11-16 RX ADMIN — SUGAMMADEX 200 MG: 100 INJECTION, SOLUTION INTRAVENOUS at 11:11

## 2023-11-16 RX ADMIN — PHENYLEPHRINE HYDROCHLORIDE 200 MCG: 10 INJECTION INTRAVENOUS at 10:11

## 2023-11-16 RX ADMIN — ONDANSETRON 4 MG: 2 INJECTION INTRAMUSCULAR; INTRAVENOUS at 11:11

## 2023-11-16 NOTE — INTERVAL H&P NOTE
The patient has been examined and the H&P has been reviewed:    I concur with the findings and no changes have occurred since H&P was written.    Surgery risks, benefits and alternative options discussed and understood by patient/family.      I have explained the indication, risks, benefits, and alternatives of the procedure in detail.  The patient voices understanding and all questions have been answered.  The patient agrees to proceed as planned with bipolar TURBT.  Risks including but not limited to bleeding, infection, injury to urethra, bladder, ureters, and rectum, possible bladder neck contracture or urethral stricture, clot retention, need for additional procedure, erectile dysfunction, and urinary incontinence were explained to the patient.          There are no hospital problems to display for this patient.

## 2023-11-16 NOTE — PROGRESS NOTES
Dr. Aparicio called to bs to evaluate pt penis. Distal penis red/dark pink, oozing drainage from around catheter insertion site.   1205: Dr. Aparicio at bs to evaluate pt. Pt foreskin retracted. Dr. Aparicio corrected foreskin placement. RN will monitor pt in  recovery for status of redness, drainage, urine out put, pt discomfort. Pt tolerated well. Resting on stretcher, no distress noted at this time.

## 2023-11-16 NOTE — PROGRESS NOTES
Ynes ROYAL emptied Jernigan catheter when changing bags from large drainage bag to the leg bag. Unknown amount of urine was emptied.

## 2023-11-16 NOTE — ANESTHESIA PREPROCEDURE EVALUATION
11/16/2023  Cliff Magaña is a 88 y.o., male.      Bladder mass found on surveillance of renal lcyst    Procedures:       TURBT (TRANSURETHRAL RESECTION OF BLADDER TUMOR) - 90 minutes       CYSTOSCOPY (Abdomen) - 90 minutes       PYELOGRAM, RETROGRADE (Bilateral: Perineum) - 90 minutes Anesthesia type: General Diagnosis: Bladder tumor [D49.4]       Pre-operative evaluation for Procedure(s) (LRB):  TURBT (TRANSURETHRAL RESECTION OF BLADDER TUMOR) (N/A)  CYSTOSCOPY (N/A)  PYELOGRAM, RETROGRADE (Bilateral)    @[id number]    Encounter Diagnoses   Name Primary?    Preoperative testing Yes    DM type 2, controlled, with complication     Microhematuria        Review of patient's allergies indicates:   Allergen Reactions    Niacin      Other reaction(s): Rash  Other reaction(s): Itching       Medications Prior to Admission   Medication Sig Dispense Refill Last Dose    empagliflozin (JARDIANCE) 10 mg tablet Take 1 tablet (10 mg total) by mouth once daily. 90 tablet 3 11/15/2023 at 0900    metoprolol succinate (TOPROL-XL) 25 MG 24 hr tablet TAKE 1 TABLET DAILY 90 tablet 3 11/16/2023 at 0600    metoprolol succinate (TOPROL-XL) 50 MG 24 hr tablet TAKE ONE AND ONE-HALF TABLETS ONCE DAILY 135 tablet 3 11/16/2023 at 0600    NIFEdipine (PROCARDIA-XL) 90 MG (OSM) 24 hr tablet Take 1 tablet (90 mg total) by mouth once daily. 90 tablet 6 11/16/2023 at 0600    acetaminophen (TYLENOL) 650 MG TbSR Take 650 mg by mouth every 8 (eight) hours.       amoxicillin-clavulanate 875-125mg (AUGMENTIN) 875-125 mg per tablet Take 1 tablet by mouth 2 (two) times daily. for 14 days 28 tablet 0     apixaban (ELIQUIS) 5 mg Tab Take 1 tablet (5 mg total) by mouth 2 (two) times daily. 180 tablet 3 11/12/2023    cholecalciferol, vitamin D3, (VITAMIN D3) 50 mcg (2,000 unit) Cap capsule Take by mouth once daily.       fenofibrate micronized  (LOFIBRA) 200 MG Cap TAKE 1 CAPSULE DAILY WITH BREAKFAST 90 capsule 2     krill oil/hyaluronic/astaxanth (MEGARED JOINT CARE ORAL)        loratadine (CLARITIN) 10 mg tablet Take 10 mg by mouth once daily.       TRAMAINE BIOTIN ORAL Take 5,000 mcg by mouth once daily.       metFORMIN (GLUCOPHAGE-XR) 500 MG ER 24hr tablet Take 1 tablet (500 mg total) by mouth once daily. 90 tablet 1 11/12/2023         sodium chloride 0.9%         No current facility-administered medications on file prior to encounter.     Current Outpatient Medications on File Prior to Encounter   Medication Sig Dispense Refill    empagliflozin (JARDIANCE) 10 mg tablet Take 1 tablet (10 mg total) by mouth once daily. 90 tablet 3    metoprolol succinate (TOPROL-XL) 25 MG 24 hr tablet TAKE 1 TABLET DAILY 90 tablet 3    metoprolol succinate (TOPROL-XL) 50 MG 24 hr tablet TAKE ONE AND ONE-HALF TABLETS ONCE DAILY 135 tablet 3    acetaminophen (TYLENOL) 650 MG TbSR Take 650 mg by mouth every 8 (eight) hours.      apixaban (ELIQUIS) 5 mg Tab Take 1 tablet (5 mg total) by mouth 2 (two) times daily. 180 tablet 3    cholecalciferol, vitamin D3, (VITAMIN D3) 50 mcg (2,000 unit) Cap capsule Take by mouth once daily.      krill oil/hyaluronic/astaxanth (MEGARED JOINT CARE ORAL)       loratadine (CLARITIN) 10 mg tablet Take 10 mg by mouth once daily.      TRAMAINE BIOTIN ORAL Take 5,000 mcg by mouth once daily.      metFORMIN (GLUCOPHAGE-XR) 500 MG ER 24hr tablet Take 1 tablet (500 mg total) by mouth once daily. 90 tablet 1       Past Medical History:  No date: *Atrial fibrillation  No date: Chronic kidney disease  No date: Deep vein thrombosis  No date: Hyperlipidemia  No date: Hypertension  No date: Metabolic syndrome  12/13/2021: Type 2 diabetes mellitus, without long-term current use   of insulin    Past Surgical History:   Procedure Laterality Date    CATARACT EXTRACTION      CHOLECYSTECTOMY      EYE SURGERY      INJECTION OF FACET JOINT Bilateral 4/28/2021     "Procedure: FACET JOINT INJECTION BILATERAL L4/L5 DIRECT REFERRAL;  Surgeon: Philipp Iyer MD;  [facility];  Service: Pain Management;  Laterality: Bilateral;  NEEDS CONSENT, ELIQUIS CLEARANCE IN CHART    SKIN CANCER EXCISION      TONSILLECTOMY         Social History     Tobacco Use   Smoking Status Former    Current packs/day: 0.00    Average packs/day: 2.0 packs/day for 20.0 years (40.0 ttl pk-yrs)    Types: Cigarettes    Start date: 1963    Quit date: 1983    Years since quittin.5    Passive exposure: Never   Smokeless Tobacco Never       Social History     Substance and Sexual Activity   Alcohol Use Yes    Alcohol/week: 1.0 standard drink of alcohol    Types: 1 Standard drinks or equivalent per week    Comment: 3 drinks per week       Physical Activity: Sufficiently Active (2023)    Exercise Vital Sign     Days of Exercise per Week: 6 days     Minutes of Exercise per Session: 60 min         No results for input(s): "HCT" in the last 72 hours.  No results for input(s): "PLT" in the last 72 hours.  No results for input(s): "K" in the last 72 hours.  No results for input(s): "CREATININE" in the last 72 hours.  No results for input(s): "GLU" in the last 72 hours.  No results for input(s): "PT" in the last 72 hours.                    Pre-op Assessment          Review of Systems  Anesthesia Hx:  No problems with previous Anesthesia                Hematology/Oncology:  Hematology Normal   Oncology Normal                Hematology Comments: On eliquis for afib, took                   Oncology Comments: Minor skin cured by excision     Cardiovascular:     Hypertension, well controlled   Denies MI.        Denies Angina.         One hour exercycle bike every day                         Pulmonary:  Pulmonary Normal   Denies COPD.  Denies Asthma.   Denies Shortness of breath.                  Renal/:  Chronic Renal Disease (cyst followed)                Hepatic/GI:      Denies " Liver Disease.            Neurological:  Denies TIA.  Denies CVA.    Denies Seizures.                                Endocrine:  Diabetes, type 2               Physical Exam  General: Well nourished, Cooperative, Alert and Oriented    Airway:  Mallampati: II   Mouth Opening: Normal  TM Distance: Normal  Tongue: Normal  Neck ROM: Normal ROM        Anesthesia Plan  Type of Anesthesia, risks & benefits discussed:    Anesthesia Type: Gen Natural Airway, Gen ETT  Intra-op Monitoring Plan: Standard ASA Monitors  Post Op Pain Control Plan: IV/PO Opioids PRN  Induction:  IV  Informed Consent: Informed consent signed with the Patient and all parties understand the risks and agree with anesthesia plan.  All questions answered.   ASA Score: 3  Day of Surgery Review of History & Physical: H&P Update referred to the surgeon/provider.    Ready For Surgery From Anesthesia Perspective.     .

## 2023-11-16 NOTE — TELEPHONE ENCOUNTER
I SWP Vic and scheduled appt per below.  Thank you.    CLIFF HALL MRN: [id number]    Date: 11/21/2023 Status: Chucho   Appt Time: 8:00 AM Length: 15   Visit Type: POST-OP [2380] Copay: $0.00   Provider: Marcelino Moffett MD       ----- Message from Matt Aparicio MD sent at 11/16/2023 11:50 AM CST -----  Regarding: po fu  Please schedule Cliff GIACOMO Archana for a VT and PO f/u with Dr. Moffett on 11/21.    Thank you,  NM

## 2023-11-16 NOTE — PLAN OF CARE
Pt alert and orientated. Daughter and spouse at bs. VSS. No distress noted. Foreskin on penis remained pulled forward in proper position during recovery period, skin color is improving. Pt is aware to watch for swelling and worsening redness and inability to urinate. Pt producing urine. Leg bag attached, large urine bag sent home with patient, pt and spouse verbalize understanding of instructions and were shown demonstrations. Pt denies N/V/D. Pt states they are ready for discharge.

## 2023-11-16 NOTE — ANESTHESIA PROCEDURE NOTES
Intubation    Date/Time: 11/16/2023 10:49 AM    Performed by: Earle Spicer CRNA  Authorized by: Adolfo Jade Jr., MD    Intubation:     Induction:  Intravenous    Intubated:  Postinduction    Mask Ventilation:  Easy mask    Attempts:  1    Attempted By:  CRNA    Method of Intubation:  Direct    Blade:  [place] 3    Laryngeal View Grade: Grade IIA - cords partially seen      Difficult Airway Encountered?: No      Complications:  None    Airway Device:  Oral endotracheal tube    Airway Device Size:  7.5    Style/Cuff Inflation:  Cuffed (inflated to minimal occlusive pressure)    Tube secured:  22    Secured at:  The lips    Placement Verified By:  Capnometry    Complicating Factors:  None    Findings Post-Intubation:  BS equal bilateral

## 2023-11-16 NOTE — DISCHARGE INSTRUCTIONS
Post Cystoscopy and Transurethral resection of Bladder Tumor Instructions  Do not strain to have a bowel movement  No strenuous exercise x 7 days  No driving while you are on narcotic pain medications or if your jaquez  catheter is in place    You can expect:  To see blood in your urine.    Go to the ER if:  Your catheter stops draining  You are having severe abdominal pain  Inability to void if you do not have a catheter    Call the doctor if:  Temperature is greater than 101F  Persistent vomiting and inability to keep food down   
Patient

## 2023-11-16 NOTE — DISCHARGE SUMMARY
[facility] - [facility] (1st Fl)  Discharge Note  Short Stay    Procedure(s) (LRB):  TURBT (TRANSURETHRAL RESECTION OF BLADDER TUMOR) (N/A)  CYSTOSCOPY (N/A)  PYELOGRAM, RETROGRADE (Bilateral)      OUTCOME: Patient tolerated treatment/procedure well without complication and is now ready for discharge.    DISPOSITION: Home or Self Care    FINAL DIAGNOSIS:  Bladder tumor    FOLLOWUP: In clinic    DISCHARGE INSTRUCTIONS:    Discharge Procedure Orders   Basic Metabolic Panel   Standing Status: Future Number of Occurrences: 1 Standing Exp. Date: 01/07/25     Hemoglobin A1C   Standing Status: Future Number of Occurrences: 1 Standing Exp. Date: 01/07/25     Lifting restrictions   Order Comments: Do not drive while taking pain medications. No strenuous activity or lifting greater than 10 pounds for 4 weeks.     No dressing needed   Order Comments: Do not soak incisions in tub or bath and do not scrub incisions. You may shower over incisions. If you have surgical glue in place, the glue will come off over the next few weeks.     Notify your health care provider if you experience any of the following:  temperature >100.4     Notify your health care provider if you experience any of the following:  persistent nausea and vomiting or diarrhea     Notify your health care provider if you experience any of the following:  severe uncontrolled pain     Notify your health care provider if you experience any of the following:  redness, tenderness, or signs of infection (pain, swelling, redness, odor or green/yellow discharge around incision site)     Notify your health care provider if you experience any of the following:  difficulty breathing or increased cough     Notify your health care provider if you experience any of the following:  persistent dizziness, light-headedness, or visual disturbances     EKG 12-lead   Standing Status: Future Number of Occurrences: 1 Standing Exp. Date: 11/09/24        TIME SPENT ON DISCHARGE: 15 minutes

## 2023-11-16 NOTE — ANESTHESIA POSTPROCEDURE EVALUATION
Anesthesia Post Evaluation    Patient: Cliff R Pond    Procedure(s) Performed: Procedure(s) (LRB):  TURBT (TRANSURETHRAL RESECTION OF BLADDER TUMOR) (N/A)  CYSTOSCOPY (N/A)  PYELOGRAM, RETROGRADE (Bilateral)    Final Anesthesia Type: general      Patient location during evaluation: PACU  Patient participation: Yes- Able to Participate  Level of consciousness: awake and alert and oriented  Post-procedure vital signs: reviewed and stable  Pain management: adequate  Airway patency: patent    PONV status at discharge: No PONV  Anesthetic complications: no      Cardiovascular status: stable  Respiratory status: unassisted, spontaneous ventilation and room air  Hydration status: euvolemic  Follow-up not needed.          Vitals Value Taken Time   /67 11/16/23 1201   Temp  11/16/23 1211   Pulse 70 11/16/23 1211   Resp 18 11/16/23 1211   SpO2 99 % 11/16/23 1211   Vitals shown include unvalidated device data.      No case tracking events are documented in the log.      Pain/Shweta Score: No data recorded

## 2023-11-16 NOTE — OP NOTE
[facility]  Operative Note    Date: 11/16/2023    Pre-Op Diagnosis: Bladder tumor    Patient Active Problem List    Diagnosis Date Noted    Bladder tumor 11/16/2023    Renal cyst 12/14/2022    DM type 2, controlled, with complication 12/14/2022    Risk for falls 12/15/2021    Sensorineural hearing loss (SNHL) of both ears 11/19/2020    Atherosclerosis of aorta 11/18/2020    Persistent atrial fibrillation 06/11/2014    Hypertension associated with diabetes 05/22/2013    Hyperlipidemia associated with type 2 diabetes mellitus 05/22/2013    CKD stage 3 due to type 2 diabetes mellitus 11/23/2012    Chronic anticoagulation 11/23/2012       Post-Op Diagnosis: same    Procedure(s) Performed:   1. TURBT of medium (2.0-5.0 cm) sized bladder tumor  2. Bilateral retrograde pyelograms  3. Fluoro < 1 hr    Specimen(s): right postero-lateral bladder wall, right anterior bladder wall    Staff Surgeon: Marcelino Moffett MD    Assistant Surgeon: Matt Aparicio MD    Anesthesia: General endotracheal anesthesia    Indications: Cliff Magaña is a 88 y.o. male with a bladder tumor. He presents today for surgical resection.      Findings:   1. Right postero-lateral papillary bladder wall tumor (3-4cm) with diffusely erythematous and nodular surrounding tissue, right <1cm papillary bladder wall tumor immediately lateral to the larger postero-lateral papillary tumor, and a right nodular 4cm bladder tumor with high grade and possibly invasive appearance. Tumors resected, hemostasis achieved.  Several other patches of erythema were diffusely identified on the left lateral bladder wall.   2. Bimanual exam post-TURBT showed freely mobile bladder without abnormalities  3. Bilateral retrograde pyelogram showed delicate calices with no evidence of hydronephrosis, no filling defects, and ureter normal in course and caliber, bilaterally    Estimated Blood Loss: min    Drains: 18 Fr Jernigan catheter    Procedure in detail:  After  risks, benefits, and possible complications were explained, the patient elected to undergo the procedure and informed consent was obtained. All questions were answered in the mitzi-operative area. The patient was transferred to the cystoscopy suite and placed in the supine position. SCDs were applied and working. Anesthesia was administered. The patient was placed in the dorsal lithotomy position and prepped and draped in the usual sterile fashion. Time out was performed, and mitzi-procedural antibiotics were confirmed.     A resectoscope in a 26 Fr sheath was assembled with the DaxaSplore visual obturator. This was introduced into the bladder per urethra, which passed easily. The entire urethra was visualized which showed no masses or strictures. The right and left ureteral orifices were identified in the normal anatomic position. Formal cystoscopy was performed which revealed a right postero-lateral papillary bladder wall tumor (3-4cm) with diffusely erythematous and nodular surrounding tissue, right <1cm papillary bladder wall tumor immediately lateral to the larger postero-lateral papillary tumor, and a right anterior nodular 4cm bladder tumor (in the 11 o'clock position) with high grade and possibly invasive appearance. Several other patches of erythema were diffusely identified on the left lateral bladder wall. There were no trabeculations, bladder stones, or bladder diverticula.    The right ureteral orifice was identified and cannulated with a 5 Fr open-ended ureteral catheter. A retrograde pyelogram was performed which showed the above. This was then repeated on the left side, revealing the above.      The visual obturator was exchanged for the resecting mechanism. The tumors were then resected superficially until the base was identified. Specimens were then removed and passed off the field for pathologic analysis. The bladder was drained and hemostasis was achieved. The resectoscope was removed. A 18 Fr Jernigan  catheter was placed with 10 mL of sterile water in the balloon. The bladder was then irrigated. Post-resection bimanual exam revealed a freely mobile bladder.      The patient tolerated the procedure well and was transferred to recovery in stable condition.    Disposition:  The patient will be discharged home from PACU with a VT in 5 days.     Matt Aparicio MD     I have reviewed the operative note performed by Dr. Aparicio, and I concur with her/his documentation of Cliff Magaña. I was present for the critical or key portions of the procedure.

## 2023-11-16 NOTE — PROGRESS NOTES
Called Dr. Jade called to bs to evaluate pt r/t Afib HR an BP and pain relief. Order Ditorpan 5mg and Pyridium 100 mg to be given in [facility] Recovery. Monitor pt in [facility] recovery.

## 2023-11-20 ENCOUNTER — TELEPHONE (OUTPATIENT)
Dept: UROLOGY | Facility: CLINIC | Age: 88
End: 2023-11-20
Payer: MEDICARE

## 2023-11-20 NOTE — TELEPHONE ENCOUNTER
I spoke with patient and wife Blanca now.  I explained that it is normal to pass a small clot or more through the catheter.  Patient here tomorrow for voiding trial and postop appointment.  Thank you.    ----- Message from Kerry Burger sent at 11/20/2023 10:33 AM CST -----  Regarding: Patient Advice  Contact: Nereyda  [phone]  Nereyda/ wife is calling to states around midnight pt had a small blood clot in bag please call

## 2023-11-21 ENCOUNTER — DOCUMENTATION ONLY (OUTPATIENT)
Dept: AUDIOLOGY | Facility: CLINIC | Age: 88
End: 2023-11-21
Payer: MEDICARE

## 2023-11-21 ENCOUNTER — PATIENT MESSAGE (OUTPATIENT)
Dept: CARDIOLOGY | Facility: CLINIC | Age: 88
End: 2023-11-21
Payer: MEDICARE

## 2023-11-21 ENCOUNTER — OFFICE VISIT (OUTPATIENT)
Dept: UROLOGY | Facility: CLINIC | Age: 88
DRG: 689 | End: 2023-11-21
Payer: MEDICARE

## 2023-11-21 ENCOUNTER — TELEPHONE (OUTPATIENT)
Dept: UROLOGY | Facility: CLINIC | Age: 88
End: 2023-11-21

## 2023-11-21 ENCOUNTER — HOSPITAL ENCOUNTER (INPATIENT)
Facility: HOSPITAL | Age: 88
LOS: 3 days | Discharge: HOME | DRG: 689 | End: 2023-11-24
Attending: STUDENT IN AN ORGANIZED HEALTH CARE EDUCATION/TRAINING PROGRAM | Admitting: STUDENT IN AN ORGANIZED HEALTH CARE EDUCATION/TRAINING PROGRAM
Payer: MEDICARE

## 2023-11-21 VITALS
HEIGHT: 66 IN | BODY MASS INDEX: 22.99 KG/M2 | HEART RATE: 130 BPM | WEIGHT: 143.06 LBS | DIASTOLIC BLOOD PRESSURE: 89 MMHG | SYSTOLIC BLOOD PRESSURE: 142 MMHG

## 2023-11-21 DIAGNOSIS — C68.9 UROTHELIAL CANCER: ICD-10-CM

## 2023-11-21 DIAGNOSIS — R09.02 HYPOXIA: ICD-10-CM

## 2023-11-21 DIAGNOSIS — D49.4 BLADDER TUMOR: Primary | ICD-10-CM

## 2023-11-21 DIAGNOSIS — E88.810 METABOLIC SYNDROME: Chronic | ICD-10-CM

## 2023-11-21 DIAGNOSIS — E78.2 MIXED HYPERLIPIDEMIA: Chronic | ICD-10-CM

## 2023-11-21 DIAGNOSIS — I49.9 IRREGULAR HEART BEAT: ICD-10-CM

## 2023-11-21 DIAGNOSIS — I15.2 HYPERTENSION ASSOCIATED WITH DIABETES: ICD-10-CM

## 2023-11-21 DIAGNOSIS — E11.59 HYPERTENSION ASSOCIATED WITH DIABETES: ICD-10-CM

## 2023-11-21 DIAGNOSIS — I10 ESSENTIAL HYPERTENSION: Chronic | ICD-10-CM

## 2023-11-21 DIAGNOSIS — C67.3 MALIGNANT NEOPLASM OF ANTERIOR WALL OF URINARY BLADDER: Primary | ICD-10-CM

## 2023-11-21 DIAGNOSIS — I48.91 A-FIB: ICD-10-CM

## 2023-11-21 DIAGNOSIS — J81.0 ACUTE PULMONARY EDEMA: ICD-10-CM

## 2023-11-21 DIAGNOSIS — I48.19 PERSISTENT ATRIAL FIBRILLATION: Chronic | ICD-10-CM

## 2023-11-21 DIAGNOSIS — N30.01 ACUTE CYSTITIS WITH HEMATURIA: Primary | ICD-10-CM

## 2023-11-21 DIAGNOSIS — R00.0 TACHYCARDIA: ICD-10-CM

## 2023-11-21 PROBLEM — N39.0 UTI (URINARY TRACT INFECTION): Status: ACTIVE | Noted: 2023-11-21

## 2023-11-21 PROBLEM — J96.01 ACUTE HYPOXIC RESPIRATORY FAILURE: Status: ACTIVE | Noted: 2023-11-21

## 2023-11-21 PROBLEM — R79.89 ELEVATED TROPONIN: Status: ACTIVE | Noted: 2023-11-21

## 2023-11-21 PROBLEM — E04.2 MULTIPLE THYROID NODULES: Status: ACTIVE | Noted: 2023-11-21

## 2023-11-21 LAB
ANION GAP SERPL CALC-SCNC: 17 MMOL/L (ref 8–16)
ASCENDING AORTA: 3.06 CM
AV INDEX (PROSTH): 0.95
AV MEAN GRADIENT: 3 MMHG
AV PEAK GRADIENT: 4 MMHG
AV VALVE AREA BY VELOCITY RATIO: 3.05 CM²
AV VALVE AREA: 2.88 CM²
AV VELOCITY RATIO: 1
BACTERIA #/AREA URNS AUTO: ABNORMAL /HPF
BASOPHILS # BLD AUTO: 0.04 K/UL (ref 0–0.2)
BASOPHILS NFR BLD: 0.3 % (ref 0–1.9)
BILIRUB UR QL STRIP: NEGATIVE
BNP SERPL-MCNC: 223 PG/ML (ref 0–99)
BSA FOR ECHO PROCEDURE: 1.72 M2
BUN SERPL-MCNC: 22 MG/DL (ref 8–23)
CALCIUM SERPL-MCNC: 10.2 MG/DL (ref 8.7–10.5)
CHLORIDE SERPL-SCNC: 100 MMOL/L (ref 95–110)
CLARITY UR REFRACT.AUTO: ABNORMAL
CO2 SERPL-SCNC: 17 MMOL/L (ref 23–29)
COLOR UR AUTO: ABNORMAL
COMMENT: NORMAL
CREAT SERPL-MCNC: 1.1 MG/DL (ref 0.5–1.4)
CV ECHO LV RWT: 0.67 CM
DIFFERENTIAL METHOD: ABNORMAL
DOP CALC AO PEAK VEL: 1.06 M/S
DOP CALC AO VTI: 16.08 CM
DOP CALC LVOT AREA: 3 CM2
DOP CALC LVOT DIAMETER: 1.97 CM
DOP CALC LVOT PEAK VEL: 1.06 M/S
DOP CALC LVOT STROKE VOLUME: 46.34 CM3
DOP CALCLVOT PEAK VEL VTI: 15.21 CM
ECHO LV POSTERIOR WALL: 1.5 CM (ref 0.6–1.1)
EOSINOPHIL # BLD AUTO: 0.1 K/UL (ref 0–0.5)
EOSINOPHIL NFR BLD: 0.5 % (ref 0–8)
ERYTHROCYTE [DISTWIDTH] IN BLOOD BY AUTOMATED COUNT: 13 % (ref 11.5–14.5)
EST. GFR  (NO RACE VARIABLE): >60 ML/MIN/1.73 M^2
FINAL PATHOLOGIC DIAGNOSIS: NORMAL
FRACTIONAL SHORTENING: 29 % (ref 28–44)
GLUCOSE SERPL-MCNC: 123 MG/DL (ref 70–110)
GLUCOSE UR QL STRIP: ABNORMAL
GROSS: NORMAL
HCT VFR BLD AUTO: 44 % (ref 40–54)
HCV AB SERPL QL IA: NORMAL
HGB BLD-MCNC: 14.7 G/DL (ref 14–18)
HGB UR QL STRIP: ABNORMAL
HIV 1+2 AB+HIV1 P24 AG SERPL QL IA: NORMAL
HYALINE CASTS UR QL AUTO: 0 /LPF
IMM GRANULOCYTES # BLD AUTO: 0.08 K/UL (ref 0–0.04)
IMM GRANULOCYTES NFR BLD AUTO: 0.6 % (ref 0–0.5)
INTERVENTRICULAR SEPTUM: 1.13 CM (ref 0.6–1.1)
KETONES UR QL STRIP: NEGATIVE
LA MAJOR: 6.79 CM
LA MINOR: 6.99 CM
LA WIDTH: 3.35 CM
LEFT ATRIUM SIZE: 4.89 CM
LEFT ATRIUM VOLUME INDEX: 55.8 ML/M2
LEFT ATRIUM VOLUME: 95.92 CM3
LEFT INTERNAL DIMENSION IN SYSTOLE: 3.18 CM (ref 2.1–4)
LEFT VENTRICLE DIASTOLIC VOLUME INDEX: 52.74 ML/M2
LEFT VENTRICLE DIASTOLIC VOLUME: 90.71 ML
LEFT VENTRICLE MASS INDEX: 130 G/M2
LEFT VENTRICLE SYSTOLIC VOLUME INDEX: 23.5 ML/M2
LEFT VENTRICLE SYSTOLIC VOLUME: 40.42 ML
LEFT VENTRICULAR INTERNAL DIMENSION IN DIASTOLE: 4.46 CM (ref 3.5–6)
LEFT VENTRICULAR MASS: 223.31 G
LEUKOCYTE ESTERASE UR QL STRIP: NEGATIVE
LYMPHOCYTES # BLD AUTO: 1.3 K/UL (ref 1–4.8)
LYMPHOCYTES NFR BLD: 10.1 % (ref 18–48)
Lab: NORMAL
MCH RBC QN AUTO: 31.3 PG (ref 27–31)
MCHC RBC AUTO-ENTMCNC: 33.4 G/DL (ref 32–36)
MCV RBC AUTO: 94 FL (ref 82–98)
MICROSCOPIC COMMENT: ABNORMAL
MICROSCOPIC EXAM: NORMAL
MONOCYTES # BLD AUTO: 1.1 K/UL (ref 0.3–1)
MONOCYTES NFR BLD: 8.4 % (ref 4–15)
NEUTROPHILS # BLD AUTO: 10.1 K/UL (ref 1.8–7.7)
NEUTROPHILS NFR BLD: 80.1 % (ref 38–73)
NITRITE UR QL STRIP: POSITIVE
NRBC BLD-RTO: 0 /100 WBC
PH UR STRIP: 5 [PH] (ref 5–8)
PISA TR MAX VEL: 2.91 M/S
PLATELET # BLD AUTO: 282 K/UL (ref 150–450)
PMV BLD AUTO: 11.9 FL (ref 9.2–12.9)
POTASSIUM SERPL-SCNC: 4.3 MMOL/L (ref 3.5–5.1)
PROT UR QL STRIP: ABNORMAL
RA MAJOR: 5.5 CM
RA PRESSURE ESTIMATED: 3 MMHG
RA WIDTH: 3.6 CM
RBC # BLD AUTO: 4.69 M/UL (ref 4.6–6.2)
RBC #/AREA URNS AUTO: >100 /HPF (ref 0–4)
RIGHT VENTRICULAR END-DIASTOLIC DIMENSION: 3.05 CM
RV TB RVSP: 6 MMHG
SINUS: 2.98 CM
SODIUM SERPL-SCNC: 134 MMOL/L (ref 136–145)
SP GR UR STRIP: 1.02 (ref 1–1.03)
SQUAMOUS #/AREA URNS AUTO: 1 /HPF
STJ: 2.85 CM
TDI LATERAL: 0.12 M/S
TDI SEPTAL: 0.07 M/S
TDI: 0.1 M/S
TR MAX PG: 34 MMHG
TROPONIN I SERPL DL<=0.01 NG/ML-MCNC: 0.26 NG/ML (ref 0–0.03)
TROPONIN I SERPL DL<=0.01 NG/ML-MCNC: 0.97 NG/ML (ref 0–0.03)
TROPONIN I SERPL DL<=0.01 NG/ML-MCNC: 1.26 NG/ML (ref 0–0.03)
TV REST PULMONARY ARTERY PRESSURE: 37 MMHG
URN SPEC COLLECT METH UR: ABNORMAL
WBC # BLD AUTO: 12.61 K/UL (ref 3.9–12.7)
WBC #/AREA URNS AUTO: >100 /HPF (ref 0–5)
WBC CLUMPS UR QL AUTO: ABNORMAL
YEAST UR QL AUTO: ABNORMAL
Z-SCORE OF LEFT VENTRICULAR DIMENSION IN END DIASTOLE: -0.67
Z-SCORE OF LEFT VENTRICULAR DIMENSION IN END SYSTOLE: 0.58

## 2023-11-21 PROCEDURE — 93010 EKG 12-LEAD: ICD-10-PCS | Mod: ,,, | Performed by: INTERNAL MEDICINE

## 2023-11-21 PROCEDURE — 93005 ELECTROCARDIOGRAM TRACING: CPT

## 2023-11-21 PROCEDURE — 81001 URINALYSIS AUTO W/SCOPE: CPT | Performed by: STUDENT IN AN ORGANIZED HEALTH CARE EDUCATION/TRAINING PROGRAM

## 2023-11-21 PROCEDURE — 93010 ELECTROCARDIOGRAM REPORT: CPT | Mod: ,,, | Performed by: INTERNAL MEDICINE

## 2023-11-21 PROCEDURE — 87186 SC STD MICRODIL/AGAR DIL: CPT | Performed by: STUDENT IN AN ORGANIZED HEALTH CARE EDUCATION/TRAINING PROGRAM

## 2023-11-21 PROCEDURE — 25000003 PHARM REV CODE 250: Performed by: STUDENT IN AN ORGANIZED HEALTH CARE EDUCATION/TRAINING PROGRAM

## 2023-11-21 PROCEDURE — 96375 TX/PRO/DX INJ NEW DRUG ADDON: CPT

## 2023-11-21 PROCEDURE — 87077 CULTURE AEROBIC IDENTIFY: CPT | Performed by: STUDENT IN AN ORGANIZED HEALTH CARE EDUCATION/TRAINING PROGRAM

## 2023-11-21 PROCEDURE — 83880 ASSAY OF NATRIURETIC PEPTIDE: CPT | Performed by: STUDENT IN AN ORGANIZED HEALTH CARE EDUCATION/TRAINING PROGRAM

## 2023-11-21 PROCEDURE — 96374 THER/PROPH/DIAG INJ IV PUSH: CPT

## 2023-11-21 PROCEDURE — 87086 URINE CULTURE/COLONY COUNT: CPT | Performed by: STUDENT IN AN ORGANIZED HEALTH CARE EDUCATION/TRAINING PROGRAM

## 2023-11-21 PROCEDURE — 36415 COLL VENOUS BLD VENIPUNCTURE: CPT

## 2023-11-21 PROCEDURE — 25000003 PHARM REV CODE 250

## 2023-11-21 PROCEDURE — 86803 HEPATITIS C AB TEST: CPT | Performed by: PHYSICIAN ASSISTANT

## 2023-11-21 PROCEDURE — 25500020 PHARM REV CODE 255: Performed by: STUDENT IN AN ORGANIZED HEALTH CARE EDUCATION/TRAINING PROGRAM

## 2023-11-21 PROCEDURE — 84484 ASSAY OF TROPONIN QUANT: CPT | Mod: 91 | Performed by: STUDENT IN AN ORGANIZED HEALTH CARE EDUCATION/TRAINING PROGRAM

## 2023-11-21 PROCEDURE — 85025 COMPLETE CBC W/AUTO DIFF WBC: CPT | Performed by: STUDENT IN AN ORGANIZED HEALTH CARE EDUCATION/TRAINING PROGRAM

## 2023-11-21 PROCEDURE — 99999 PR PBB SHADOW E&M-EST. PATIENT-LVL III: CPT | Mod: PBBFAC,,, | Performed by: UROLOGY

## 2023-11-21 PROCEDURE — 87088 URINE BACTERIA CULTURE: CPT | Performed by: STUDENT IN AN ORGANIZED HEALTH CARE EDUCATION/TRAINING PROGRAM

## 2023-11-21 PROCEDURE — 99214 OFFICE O/P EST MOD 30 MIN: CPT | Mod: S$PBB,,, | Performed by: UROLOGY

## 2023-11-21 PROCEDURE — 99214 PR OFFICE/OUTPT VISIT, EST, LEVL IV, 30-39 MIN: ICD-10-PCS | Mod: S$PBB,,, | Performed by: UROLOGY

## 2023-11-21 PROCEDURE — 63600175 PHARM REV CODE 636 W HCPCS

## 2023-11-21 PROCEDURE — 80048 BASIC METABOLIC PNL TOTAL CA: CPT | Performed by: STUDENT IN AN ORGANIZED HEALTH CARE EDUCATION/TRAINING PROGRAM

## 2023-11-21 PROCEDURE — 87389 HIV-1 AG W/HIV-1&-2 AB AG IA: CPT | Performed by: PHYSICIAN ASSISTANT

## 2023-11-21 PROCEDURE — 21400001 HC TELEMETRY ROOM

## 2023-11-21 PROCEDURE — 99213 OFFICE O/P EST LOW 20 MIN: CPT | Mod: PBBFAC,25 | Performed by: UROLOGY

## 2023-11-21 PROCEDURE — 84484 ASSAY OF TROPONIN QUANT: CPT | Mod: 91

## 2023-11-21 PROCEDURE — 63600175 PHARM REV CODE 636 W HCPCS: Performed by: STUDENT IN AN ORGANIZED HEALTH CARE EDUCATION/TRAINING PROGRAM

## 2023-11-21 PROCEDURE — 99999 PR PBB SHADOW E&M-EST. PATIENT-LVL III: ICD-10-PCS | Mod: PBBFAC,,, | Performed by: UROLOGY

## 2023-11-21 PROCEDURE — 99285 EMERGENCY DEPT VISIT HI MDM: CPT | Mod: 25,27

## 2023-11-21 RX ORDER — SODIUM CHLORIDE 0.9 % (FLUSH) 0.9 %
10 SYRINGE (ML) INJECTION
Status: DISCONTINUED | OUTPATIENT
Start: 2023-11-21 | End: 2023-11-24 | Stop reason: HOSPADM

## 2023-11-21 RX ORDER — METOPROLOL SUCCINATE 50 MG/1
50 TABLET, EXTENDED RELEASE ORAL DAILY
Status: DISCONTINUED | OUTPATIENT
Start: 2023-11-22 | End: 2023-11-21

## 2023-11-21 RX ORDER — ENOXAPARIN SODIUM 100 MG/ML
40 INJECTION SUBCUTANEOUS EVERY 24 HOURS
Status: DISCONTINUED | OUTPATIENT
Start: 2023-11-21 | End: 2023-11-21

## 2023-11-21 RX ORDER — FUROSEMIDE 10 MG/ML
40 INJECTION INTRAMUSCULAR; INTRAVENOUS
Status: COMPLETED | OUTPATIENT
Start: 2023-11-21 | End: 2023-11-21

## 2023-11-21 RX ORDER — METOPROLOL SUCCINATE 100 MG/1
100 TABLET, EXTENDED RELEASE ORAL DAILY
Status: DISCONTINUED | OUTPATIENT
Start: 2023-11-22 | End: 2023-11-22

## 2023-11-21 RX ORDER — TALC
6 POWDER (GRAM) TOPICAL NIGHTLY PRN
Status: DISCONTINUED | OUTPATIENT
Start: 2023-11-21 | End: 2023-11-24 | Stop reason: HOSPADM

## 2023-11-21 RX ORDER — METOPROLOL SUCCINATE 25 MG/1
25 TABLET, EXTENDED RELEASE ORAL DAILY
Status: DISCONTINUED | OUTPATIENT
Start: 2023-11-22 | End: 2023-11-21

## 2023-11-21 RX ORDER — OXYBUTYNIN CHLORIDE 5 MG/1
5 TABLET ORAL 3 TIMES DAILY PRN
Status: DISCONTINUED | OUTPATIENT
Start: 2023-11-21 | End: 2023-11-24 | Stop reason: HOSPADM

## 2023-11-21 RX ORDER — FUROSEMIDE 10 MG/ML
40 INJECTION INTRAMUSCULAR; INTRAVENOUS DAILY
Status: DISCONTINUED | OUTPATIENT
Start: 2023-11-22 | End: 2023-11-21

## 2023-11-21 RX ADMIN — APIXABAN 5 MG: 5 TABLET, FILM COATED ORAL at 08:11

## 2023-11-21 RX ADMIN — FUROSEMIDE 40 MG: 10 INJECTION, SOLUTION INTRAVENOUS at 11:11

## 2023-11-21 RX ADMIN — APIXABAN 5 MG: 5 TABLET, FILM COATED ORAL at 02:11

## 2023-11-21 RX ADMIN — CEFTRIAXONE 1 G: 1 INJECTION, POWDER, FOR SOLUTION INTRAMUSCULAR; INTRAVENOUS at 11:11

## 2023-11-21 RX ADMIN — SODIUM CHLORIDE, POTASSIUM CHLORIDE, SODIUM LACTATE AND CALCIUM CHLORIDE 1000 ML: 600; 310; 30; 20 INJECTION, SOLUTION INTRAVENOUS at 07:11

## 2023-11-21 RX ADMIN — IOHEXOL 75 ML: 350 INJECTION, SOLUTION INTRAVENOUS at 10:11

## 2023-11-21 NOTE — TELEPHONE ENCOUNTER
I reviewed the chart. Increased heart rate is likely secondary to the stress of surgery and possibly infection. This led to decompensated heart failure. I agree with admission and treatment. Will stop by and visit.

## 2023-11-21 NOTE — ASSESSMENT & PLAN NOTE
Baseline Cr ~1.4. Cr 1.1 on admission     - Daily BMP to monitor renal function and electrolytes  - Avoid nephrotoxins (NSAIDs, ACEi/ARB, IV radiocontrast, gadolinium, etc.)  - Renally dose meds

## 2023-11-21 NOTE — ASSESSMENT & PLAN NOTE
Last A1C 6.9 on 11/10/2023  - Patient on home Jardiance 10mg QD and metformin 500 QD  - Will hold home antiglycemics whilst admitted. Start LDSSI TIDWM PRN if needed to maintain inpatient BG goal of 140-180

## 2023-11-21 NOTE — ASSESSMENT & PLAN NOTE
88 year old male w/ a history of Afib on Eliquis, HTN, HLD, CKD3, T2DM and bladder tumor and renal cysts s/p TURBT and BL RPG on 11/16 w/ jaquez catheter removal on 11/21 who presents for tachycardia associated with increased work of breathing. Normotensive, hypoxic w/ SpO2 88% on RA and in Afib in the ED. Trop 0.262 and . CTA w/ cardiomegaly and pulmonary congestion and b/l pleural effusions. Patient received 1 x IV lasix 40 and Rocephin in the ED. Admitted to  for further evaluation. High suspicion for new onset heart failure given hypoxia, elevated BNP and CT findings. Last TTE in 6/2021 showed an EF of 55%.     - IV diuresis as tolerated w/ plans to transition to PO  - strict ins/outs  - daily weights  - low Na diet  - TTE ordered

## 2023-11-21 NOTE — ASSESSMENT & PLAN NOTE
CTA chest from admission w/ incidental findings of enlarged and heterogeneous thyroid lobes suggestive of underlying nodules  - Will order TSH and evaluate need for inpatient thyroid US. If thyroid studies abnormal, will consider further evaluation as Afib RVR/new heart failure could be secondary to hyperthyroid disease.

## 2023-11-21 NOTE — ED NOTES
Telemetry Verification   Patient placed on Telemetry Box  Verified on ED monitor  [id number]   Monitor Tech Noble    Rate 96   Rhythm A fib

## 2023-11-21 NOTE — SUBJECTIVE & OBJECTIVE
Past Medical History:   Diagnosis Date    *Atrial fibrillation     Chronic kidney disease     Deep vein thrombosis     Hyperlipidemia     Hypertension     Metabolic syndrome     Type 2 diabetes mellitus, without long-term current use of insulin 12/13/2021       Past Surgical History:   Procedure Laterality Date    CATARACT EXTRACTION      CHOLECYSTECTOMY      CYSTOSCOPY N/A 11/16/2023    Procedure: CYSTOSCOPY;  Surgeon: Marcelino Moffett MD;  Location: [address];  Service: Urology;  Laterality: N/A;  90 minutes    EYE SURGERY      INJECTION OF FACET JOINT Bilateral 4/28/2021    Procedure: FACET JOINT INJECTION BILATERAL L4/L5 DIRECT REFERRAL;  Surgeon: Philipp Iyer MD;  Location: [facility];  Service: Pain Management;  Laterality: Bilateral;  NEEDS CONSENT, ELIQUIS CLEARANCE IN CHART    RETROGRADE PYELOGRAPHY Bilateral 11/16/2023    Procedure: PYELOGRAM, RETROGRADE;  Surgeon: Marcelino Moffett MD;  Location: [address];  Service: Urology;  Laterality: Bilateral;  90 minutes    SKIN CANCER EXCISION      TONSILLECTOMY      TURBT (TRANSURETHRAL RESECTION OF BLADDER TUMOR) N/A 11/16/2023    Procedure: TURBT (TRANSURETHRAL RESECTION OF BLADDER TUMOR);  Surgeon: Marcelino Moffett MD;  Location: [address];  Service: Urology;  Laterality: N/A;  90 minutes       Review of patient's allergies indicates:   Allergen Reactions    Niacin      Other reaction(s): Rash  Other reaction(s): Itching       No current facility-administered medications on file prior to encounter.     Current Outpatient Medications on File Prior to Encounter   Medication Sig    acetaminophen (TYLENOL) 650 MG TbSR Take 650 mg by mouth every 8 (eight) hours.    apixaban (ELIQUIS) 5 mg Tab Take 1 tablet (5 mg total) by mouth 2 (two) times daily.    cholecalciferol, vitamin D3, (VITAMIN D3) 50 mcg (2,000 unit) Cap capsule Take by mouth once daily.    empagliflozin (JARDIANCE) 10 mg tablet Take 1 tablet (10 mg total) by mouth once  daily.    fenofibrate micronized (LOFIBRA) 200 MG Cap TAKE 1 CAPSULE DAILY WITH BREAKFAST    ketorolac (TORADOL) 10 mg tablet Take 1 tablet (10 mg total) by mouth every 6 (six) hours as needed for Pain.    krill oil/hyaluronic/astaxanth (MEGARED JOINT CARE ORAL)     loratadine (CLARITIN) 10 mg tablet Take 10 mg by mouth once daily.    TRAMAINE BIOTIN ORAL Take 5,000 mcg by mouth once daily.    metFORMIN (GLUCOPHAGE-XR) 500 MG ER 24hr tablet Take 1 tablet (500 mg total) by mouth once daily.    metoprolol succinate (TOPROL-XL) 25 MG 24 hr tablet TAKE 1 TABLET DAILY    metoprolol succinate (TOPROL-XL) 50 MG 24 hr tablet TAKE ONE AND ONE-HALF TABLETS ONCE DAILY    NIFEdipine (PROCARDIA-XL) 90 MG (OSM) 24 hr tablet Take 1 tablet (90 mg total) by mouth once daily.    ondansetron (ZOFRAN-ODT) 4 MG TbDL Dissolve 1 tablet (4 mg total) by mouth every 6 (six) hours as needed (nausea or vomitting).    oxybutynin (DITROPAN) 5 MG Tab Take 1 tablet (5 mg total) by mouth 3 (three) times daily as needed (bladder spasm).    phenazopyridine (PYRIDIUM) 100 MG tablet Take 1 tablet (100 mg total) by mouth 3 (three) times daily as needed for Pain.     Family History       Problem Relation (Age of Onset)    Diabetes Maternal Aunt          Tobacco Use    Smoking status: Former     Current packs/day: 0.00     Average packs/day: 2.0 packs/day for 20.0 years (40.0 ttl pk-yrs)     Types: Cigarettes     Start date: 1963     Quit date: 1983     Years since quittin.5     Passive exposure: Never    Smokeless tobacco: Never   Substance and Sexual Activity    Alcohol use: Yes     Alcohol/week: 1.0 standard drink of alcohol     Types: 1 Standard drinks or equivalent per week     Comment: 3 drinks per week    Drug use: No    Sexual activity: Not Currently     Partners: Female     Review of Systems   Constitutional:  Negative for activity change, appetite change, chills and fever.   Respiratory:  Positive for shortness of breath. Negative for  cough.    Gastrointestinal:  Negative for abdominal pain, nausea and vomiting.   Genitourinary:  Positive for dysuria. Negative for difficulty urinating.   Skin:  Negative for color change and pallor.   Neurological:  Negative for dizziness, light-headedness and headaches.   Psychiatric/Behavioral:  Negative for agitation and behavioral problems.      Objective:     Vital Signs (Most Recent):  Temp: 98.7 °F (37.1 °C) (11/21/23 1145)  Pulse: 104 (11/21/23 1024)  Resp: 12 (11/21/23 0909)  BP: (!) 151/69 (11/21/23 1024)  SpO2: (!) 90 % (11/21/23 1002) Vital Signs (24h Range):  Temp:  [97.7 °F (36.5 °C)-98.7 °F (37.1 °C)] 98.7 °F (37.1 °C)  Pulse:  [104-130] 104  Resp:  [12-20] 12  SpO2:  [90 %-94 %] 90 %  BP: (132-151)/(66-89) 151/69     Weight: 64.9 kg (143 lb)  Body mass index is 23.8 kg/m².     Physical Exam  Constitutional:       Appearance: Normal appearance.   HENT:      Head: Normocephalic and atraumatic.      Mouth/Throat:      Mouth: Mucous membranes are moist.      Pharynx: Oropharynx is clear.   Eyes:      Extraocular Movements: Extraocular movements intact.      Conjunctiva/sclera: Conjunctivae normal.   Cardiovascular:      Rate and Rhythm: Tachycardia present. Rhythm irregular.      Heart sounds: Normal heart sounds. No murmur heard.  Pulmonary:      Comments: Increased respiratory effort  Bibasilar crackles   Abdominal:      General: Abdomen is flat.      Palpations: Abdomen is soft.      Tenderness: There is no abdominal tenderness.   Musculoskeletal:      Right lower leg: Edema present.      Left lower leg: Edema present.      Comments: Bilateral 2+ pitting edema up to chins    Skin:     Capillary Refill: Capillary refill takes less than 2 seconds.   Neurological:      General: No focal deficit present.      Mental Status: He is alert and oriented to person, place, and time.                Significant Labs: All pertinent labs within the past 24 hours have been reviewed.    Significant Imaging: I have  reviewed all pertinent imaging results/findings within the past 24 hours.    ED US Echo  Exam : Karey Bingham  POCUS_Cardiac:    Exam Information:  Exam category:  Diagnostic  Consent obtained?:  Yes    Indication(s) for Exam:  Initial exam, Dyspnea    Views Obtained:  Parasternal long-axis, Parasternal short-axis, Subxiphoid, Apical 4-chamber, IVC view    Findings:  Left Ventricle Ejection Fraction:  Depressed (30-55% EF)  Pericardial Effusion:  Absent  Gross Guy (RV:LV):  Normal  IVC diameter:  <2 cm  IVC collapsibility:  Low collapsibility (<50%)  Aortic root diameter:  Normal    Interpretation:  Diminished LV function  Other:  Dilated LA and mitral regurg    Electronically signed by Karey Bingham on Tuesday, November 21, 2023 at 12:31 PM  CTA Chest Non-Coronary (PE Studies)  Narrative: EXAMINATION:  CTA CHEST NON CORONARY (PE STUDIES)    CLINICAL HISTORY:  Pulmonary embolism (PE) suspected, high prob;    TECHNIQUE:  Low dose axial images, sagittal and coronal reformations were obtained from the thoracic inlet to the lung bases following the IV administration of 75 mL of Omnipaque 350.  Contrast timing was optimized to evaluate the pulmonary arteries.  MIP images were performed.    COMPARISON:  Chest radiograph 05/10/2012, renal ultrasound 10/17/2022, lumbar spine series 04/14/2021    FINDINGS:  Beam hardening with streak artifact from overlying monitoring leads and contrast bolus within the SVC as well as respiratory motion somewhat limits evaluation.  Timing of contrast bolus appears adequate.  No saddle embolus.  No convincing evidence of pulmonary thromboembolism through the proximal segmental levels.  Heterogeneous appearance of several distal segmental and subsegmental pulmonary arterial branches to the bilateral lower lobes.  Pulmonary trunk is within normal limits.  Pulmonary arteries distribute normally.  Four main pulmonary veins draining to the left atrium.    Three vessel left-sided  arch.  Scattered calcific atherosclerosis of the thoracic aorta which is slightly tortuous.  No aortic aneurysm or dissection.    Heart is enlarged without significant pericardial fluid.  Multi-vessel coronary arterial with aortic annular and mitral calcifications noted.  No cardiac thrombus seen.    Upper esophagus is mildly patulous without wall thickening or adjacent inflammatory change.    No mediastinal, hilar or axillary lymphadenopathy by CT criteria.    Trachea is relatively midline.  Proximal airways are patent.    Small layering bilateral pleural effusions with associated mild overlying compressive atelectasis.  Few scattered calcified granulomas in the left lung.  There is bilateral mild nonspecific pulmonary mosaic attenuation with a somewhat centrilobular distribution and most prominent in the lung bases.  Few scattered linear opacities consistent with platelike scarring versus atelectasis.  No consolidation or pneumothorax.  No concerning pulmonary nodule seen.  Overall mild degree of scattered nonspecific calcified pleural plaques, more so on the right.    Structures at the base of the neck show enlarged and heterogeneous right thyroid lobe suggesting nodules, with the largest measuring 2.3 cm.  Partially imaged extrathoracic soft tissues are within normal limits.  Imaged upper abdomen is without acute abnormality.  Partially imaged 2.1 cm fluid density structure at the left upper quadrant likely representing previously noted exophytic mildly complex cyst arising from the upper pole on prior ultrasound.  Osseous structures show minimal to mild degenerative change and generalized osteopenia without acute or destructive process seen.  Impression: 1. Technically limited study as above.  No central PE, pulmonary infarction or convincing evidence of pulmonary thromboembolism through the proximal segmental levels.  Further evaluation/follow-up as warranted.  2. Findings suggesting mild pulmonary edema/CHF  pattern including cardiomegaly with centrilobular and basilar predominant nonspecific pulmonary mosaic attenuation and small layering bilateral pleural effusions.  3. Enlarged and heterogeneous thyroid lobes suggesting underlying nodules.  Further evaluation with elective/nonemergent thyroid ultrasound can be obtained as warranted.  4. Coronary and systemic atherosclerosis.  5. Few additional findings as above.  This report was flagged in Epic as abnormal.  This report was flagged in Epic as containing an incidental finding.    Electronically signed by: Oliver Reyes MD  Date:    11/21/2023  Time:    10:48  X-Ray Chest PA And Lateral  Narrative: EXAMINATION:  XR CHEST PA AND LATERAL    CLINICAL HISTORY:  Unspecified atrial fibrillation    TECHNIQUE:  PA and lateral views of the chest were performed.    COMPARISON:  None    FINDINGS:  Mild cardiomegaly and slight diffuse accentuation of the interstitial markings and/or edema more at the lung bases.  There are small ill-defined opacities noted in the right mid lung field..  No significant airspace consolidation identified.  Slightly blunted costophrenic angles noted posteriorly which could be related to trace pleural effusion.  Impression: See above    Electronically signed by: Deniz Serrano MD  Date:    11/21/2023  Time:    10:47

## 2023-11-21 NOTE — PROGRESS NOTES
Received repair from [employer]    Action Taken:  Replaced: electronics, housing and       Purchase Date: 11/20/2019  Hearing Aid Details  : [employer]  Model: [employer] M90-RT   Type: NI    Color: Champagne  Battery: Lithium ion  Tube/ length & power: #2M  Dome size & style: [employer] small vented dome  LT SN: [id number]  Left ear warranty expiration: 11/14/2024   RT SN: [id number]  Right ear warranty expiration: 05/22/2024

## 2023-11-21 NOTE — ASSESSMENT & PLAN NOTE
- Patient w/ dysuria since placement of jaquez catheter (removed 11/21) and UA suggestive of UTI  - Will continue empiric Rocephin and de-escalate pending culture results

## 2023-11-21 NOTE — HPI
Mr. Magaña is an 88 year old male w/ a history of Afib on Eliquis, HTN, HLD, CKD3, T2DM and bladder tumor and renal cysts s/p TURBT and BL RPG on 11/16 w/ jaquez catheter removal on 11/21 who presents for tachycardia. He was see for a post op visit this morning by Dr. Moffett and had his jaquez catheter removed after passing a voiding trial. He was however found to be tachycardic w/ concern for Afib RVR and was instructed to go to the ED for further evaluation. History was gathered from the patient and his family. Patient's wife reports that patient has increased work of breathing w/ associated bilateral LE edema x 1 week. Additionally, patient endorses dysuria. Denies HA, vision changes, lightheadedness, dizziness, cough, abdominal fullness, abdominal pain, decreased PO intake, decreased urinary output or stool changes. In the ED, patient normotensive w/ HR 110s. He was found to be hypoxic on RA w/ SpO2 ~88% which subsequently improved w/ 2L NC. Labs notable for Na 134, CO2 17, Cr 1.1 (baseline). Trop 0.262 and . UA concerning for UTI. CTA did not show a pulmonary embolism but did show cardiomegaly w/ pulmonary congestion and b/l pleural effusions. Patient received 1 x IV lasix 40 and Rocephin in the ED. Admitted to  for further evaluation.

## 2023-11-21 NOTE — ASSESSMENT & PLAN NOTE
Initial troponin 0.262 w/o acute ischemic changes on EKG  - Likely demand ischemia in setting of Afib w/ intermittent RVR and hypoxia  - Will trend troponin to peak and monitor

## 2023-11-21 NOTE — PROGRESS NOTES
Patient's repaired hearing aids were programmed to previous settings and left in hearing aid office for patient or his wife to come .       Isabeljosh Tolentinojuan pablo M90-RT   Champagne   Lt SN: [id number]   Rt SN: [id number]   Lt Warranty Exp: 11/14/2024   Rt Warrant Exp: 05/22/2024     Lt Action Taken: replaced electronics, housing, , and dome   Rt Action Taken: cleaned wax, replaced electronics, housing, , and dome

## 2023-11-21 NOTE — ASSESSMENT & PLAN NOTE
Patient w/ long standing history of Afib on Eliquis 5mg BID. [id number] Score is 4    - continue Eliquis and Toprol 75mg QD  - cardiac telemetry  - K>4 and Mg>2

## 2023-11-21 NOTE — H&P
[facility] - Emergency Dept  [facility] Medicine  History & Physical    Patient Name: Cliff Magaña  MRN: [id number]  Patient Class: IP- Inpatient  Admission Date: 11/21/2023  Attending Physician: Turner Kumar MD   Primary Care Provider: Munir Estrada MD         Patient information was obtained from patient and ER records.     Subjective:     Principal Problem:Acute hypoxic respiratory failure    Chief Complaint:   Chief Complaint   Patient presents with    Post-op Problem     underwent a TURBT and BL RPG on 11/16/2023. Sent in By Dr. Moffett for irregular HR since procedure. Hx of afib. Patient denies any complaints at this time         HPI: Mr. Magaña is an 88 year old male w/ a history of Afib on Eliquis, HTN, HLD, CKD3, T2DM and bladder tumor and renal cysts s/p TURBT and BL RPG on 11/16 w/ jaquez catheter removal on 11/21 who presents for tachycardia. He was see for a post op visit this morning by Dr. Moffett and had his jaquez catheter removed after passing a voiding trial. He was however found to be tachycardic w/ concern for Afib RVR and was instructed to go to the ED for further evaluation. History was gathered from the patient and his family. Patient's wife reports that patient has increased work of breathing w/ associated bilateral LE edema x 1 week. Additionally, patient endorses dysuria. Denies HA, vision changes, lightheadedness, dizziness, cough, abdominal fullness, abdominal pain, decreased PO intake, decreased urinary output or stool changes. In the ED, patient normotensive w/ HR 110s. He was found to be hypoxic on RA w/ SpO2 ~88% which subsequently improved w/ 2L NC. Labs notable for Na 134, CO2 17, Cr 1.1 (baseline). Trop 0.262 and . UA concerning for UTI. CTA did not show a pulmonary embolism but did show cardiomegaly w/ pulmonary congestion and b/l pleural effusions. Patient received 1 x IV lasix 40 and Rocephin in the ED. Admitted to  for further evaluation.     Past Medical History:    Diagnosis Date    *Atrial fibrillation     Chronic kidney disease     Deep vein thrombosis     Hyperlipidemia     Hypertension     Metabolic syndrome     Type 2 diabetes mellitus, without long-term current use of insulin 12/13/2021       Past Surgical History:   Procedure Laterality Date    CATARACT EXTRACTION      CHOLECYSTECTOMY      CYSTOSCOPY N/A 11/16/2023    Procedure: CYSTOSCOPY;  Surgeon: Marcelino Moffett MD;  Location: [address];  Service: Urology;  Laterality: N/A;  90 minutes    EYE SURGERY      INJECTION OF FACET JOINT Bilateral 4/28/2021    Procedure: FACET JOINT INJECTION BILATERAL L4/L5 DIRECT REFERRAL;  Surgeon: Philipp Iyer MD;  Location: [facility];  Service: Pain Management;  Laterality: Bilateral;  NEEDS CONSENT, ELIQUIS CLEARANCE IN CHART    RETROGRADE PYELOGRAPHY Bilateral 11/16/2023    Procedure: PYELOGRAM, RETROGRADE;  Surgeon: Marcelino Moffett MD;  Location: [facility] OR [address] [place] [address];  Service: Urology;  Laterality: Bilateral;  90 minutes    SKIN CANCER EXCISION      TONSILLECTOMY      TURBT (TRANSURETHRAL RESECTION OF BLADDER TUMOR) N/A 11/16/2023    Procedure: TURBT (TRANSURETHRAL RESECTION OF BLADDER TUMOR);  Surgeon: Marcelino Moffett MD;  Location: [address];  Service: Urology;  Laterality: N/A;  90 minutes       Review of patient's allergies indicates:   Allergen Reactions    Niacin      Other reaction(s): Rash  Other reaction(s): Itching       No current facility-administered medications on file prior to encounter.     Current Outpatient Medications on File Prior to Encounter   Medication Sig    acetaminophen (TYLENOL) 650 MG TbSR Take 650 mg by mouth every 8 (eight) hours.    apixaban (ELIQUIS) 5 mg Tab Take 1 tablet (5 mg total) by mouth 2 (two) times daily.    cholecalciferol, vitamin D3, (VITAMIN D3) 50 mcg (2,000 unit) Cap capsule Take by mouth once daily.    empagliflozin (JARDIANCE) 10 mg tablet Take 1 tablet (10 mg total) by mouth once daily.    fenofibrate  micronized (LOFIBRA) 200 MG Cap TAKE 1 CAPSULE DAILY WITH BREAKFAST    ketorolac (TORADOL) 10 mg tablet Take 1 tablet (10 mg total) by mouth every 6 (six) hours as needed for Pain.    krill oil/hyaluronic/astaxanth (MEGARED JOINT CARE ORAL)     loratadine (CLARITIN) 10 mg tablet Take 10 mg by mouth once daily.    TRAMAINE BIOTIN ORAL Take 5,000 mcg by mouth once daily.    metFORMIN (GLUCOPHAGE-XR) 500 MG ER 24hr tablet Take 1 tablet (500 mg total) by mouth once daily.    metoprolol succinate (TOPROL-XL) 25 MG 24 hr tablet TAKE 1 TABLET DAILY    metoprolol succinate (TOPROL-XL) 50 MG 24 hr tablet TAKE ONE AND ONE-HALF TABLETS ONCE DAILY    NIFEdipine (PROCARDIA-XL) 90 MG (OSM) 24 hr tablet Take 1 tablet (90 mg total) by mouth once daily.    ondansetron (ZOFRAN-ODT) 4 MG TbDL Dissolve 1 tablet (4 mg total) by mouth every 6 (six) hours as needed (nausea or vomitting).    oxybutynin (DITROPAN) 5 MG Tab Take 1 tablet (5 mg total) by mouth 3 (three) times daily as needed (bladder spasm).    phenazopyridine (PYRIDIUM) 100 MG tablet Take 1 tablet (100 mg total) by mouth 3 (three) times daily as needed for Pain.     Family History       Problem Relation (Age of Onset)    Diabetes Maternal Aunt          Tobacco Use    Smoking status: Former     Current packs/day: 0.00     Average packs/day: 2.0 packs/day for 20.0 years (40.0 ttl pk-yrs)     Types: Cigarettes     Start date: 1963     Quit date: 1983     Years since quittin.5     Passive exposure: Never    Smokeless tobacco: Never   Substance and Sexual Activity    Alcohol use: Yes     Alcohol/week: 1.0 standard drink of alcohol     Types: 1 Standard drinks or equivalent per week     Comment: 3 drinks per week    Drug use: No    Sexual activity: Not Currently     Partners: Female     Review of Systems   Constitutional:  Negative for activity change, appetite change, chills and fever.   Respiratory:  Positive for shortness of breath. Negative for cough.     Gastrointestinal:  Negative for abdominal pain, nausea and vomiting.   Genitourinary:  Positive for dysuria. Negative for difficulty urinating.   Skin:  Negative for color change and pallor.   Neurological:  Negative for dizziness, light-headedness and headaches.   Psychiatric/Behavioral:  Negative for agitation and behavioral problems.      Objective:     Vital Signs (Most Recent):  Temp: 98.7 °F (37.1 °C) (11/21/23 1145)  Pulse: 104 (11/21/23 1024)  Resp: 12 (11/21/23 0909)  BP: (!) 151/69 (11/21/23 1024)  SpO2: (!) 90 % (11/21/23 1002) Vital Signs (24h Range):  Temp:  [97.7 °F (36.5 °C)-98.7 °F (37.1 °C)] 98.7 °F (37.1 °C)  Pulse:  [104-130] 104  Resp:  [12-20] 12  SpO2:  [90 %-94 %] 90 %  BP: (132-151)/(66-89) 151/69     Weight: 64.9 kg (143 lb)  Body mass index is 23.8 kg/m².     Physical Exam  Constitutional:       Appearance: Normal appearance.   HENT:      Head: Normocephalic and atraumatic.      Mouth/Throat:      Mouth: Mucous membranes are moist.      Pharynx: Oropharynx is clear.   Eyes:      Extraocular Movements: Extraocular movements intact.      Conjunctiva/sclera: Conjunctivae normal.   Cardiovascular:      Rate and Rhythm: Tachycardia present. Rhythm irregular.      Heart sounds: Normal heart sounds. No murmur heard.  Pulmonary:      Comments: Increased respiratory effort  Bibasilar crackles   Abdominal:      General: Abdomen is flat.      Palpations: Abdomen is soft.      Tenderness: There is no abdominal tenderness.   Musculoskeletal:      Right lower leg: Edema present.      Left lower leg: Edema present.      Comments: Bilateral 2+ pitting edema up to chins    Skin:     Capillary Refill: Capillary refill takes less than 2 seconds.   Neurological:      General: No focal deficit present.      Mental Status: He is alert and oriented to person, place, and time.                Significant Labs: All pertinent labs within the past 24 hours have been reviewed.    Significant Imaging: I have reviewed  all pertinent imaging results/findings within the past 24 hours.    ED US Echo  Exam : Karey Bingham  POCUS_Cardiac:    Exam Information:  Exam category:  Diagnostic  Consent obtained?:  Yes    Indication(s) for Exam:  Initial exam, Dyspnea    Views Obtained:  Parasternal long-axis, Parasternal short-axis, Subxiphoid, Apical 4-chamber, IVC view    Findings:  Left Ventricle Ejection Fraction:  Depressed (30-55% EF)  Pericardial Effusion:  Absent  Gross [place] (RV:LV):  Normal  IVC diameter:  <2 cm  IVC collapsibility:  Low collapsibility (<50%)  Aortic root diameter:  Normal    Interpretation:  Diminished LV function  Other:  Dilated LA and mitral regurg    Electronically signed by Karey Bingham on Tuesday, November 21, 2023 at 12:31 PM  CTA Chest Non-Coronary (PE Studies)  Narrative: EXAMINATION:  CTA CHEST NON CORONARY (PE STUDIES)    CLINICAL HISTORY:  Pulmonary embolism (PE) suspected, high prob;    TECHNIQUE:  Low dose axial images, sagittal and coronal reformations were obtained from the thoracic inlet to the lung bases following the IV administration of 75 mL of Omnipaque 350.  Contrast timing was optimized to evaluate the pulmonary arteries.  MIP images were performed.    COMPARISON:  Chest radiograph 05/10/2012, renal ultrasound 10/17/2022, lumbar spine series 04/14/2021    FINDINGS:  Beam hardening with streak artifact from overlying monitoring leads and contrast bolus within the SVC as well as respiratory motion somewhat limits evaluation.  Timing of contrast bolus appears adequate.  No saddle embolus.  No convincing evidence of pulmonary thromboembolism through the proximal segmental levels.  Heterogeneous appearance of several distal segmental and subsegmental pulmonary arterial branches to the bilateral lower lobes.  Pulmonary trunk is within normal limits.  Pulmonary arteries distribute normally.  Four main pulmonary veins draining to the left atrium.    Three vessel left-sided arch.   Scattered calcific atherosclerosis of the thoracic aorta which is slightly tortuous.  No aortic aneurysm or dissection.    Heart is enlarged without significant pericardial fluid.  Multi-vessel coronary arterial with aortic annular and mitral calcifications noted.  No cardiac thrombus seen.    Upper esophagus is mildly patulous without wall thickening or adjacent inflammatory change.    No mediastinal, hilar or axillary lymphadenopathy by CT criteria.    Trachea is relatively midline.  Proximal airways are patent.    Small layering bilateral pleural effusions with associated mild overlying compressive atelectasis.  Few scattered calcified granulomas in the left lung.  There is bilateral mild nonspecific pulmonary mosaic attenuation with a somewhat centrilobular distribution and most prominent in the lung bases.  Few scattered linear opacities consistent with platelike scarring versus atelectasis.  No consolidation or pneumothorax.  No concerning pulmonary nodule seen.  Overall mild degree of scattered nonspecific calcified pleural plaques, more so on the right.    Structures at the base of the neck show enlarged and heterogeneous right thyroid lobe suggesting nodules, with the largest measuring 2.3 cm.  Partially imaged extrathoracic soft tissues are within normal limits.  Imaged upper abdomen is without acute abnormality.  Partially imaged 2.1 cm fluid density structure at the left upper quadrant likely representing previously noted exophytic mildly complex cyst arising from the upper pole on prior ultrasound.  Osseous structures show minimal to mild degenerative change and generalized osteopenia without acute or destructive process seen.  Impression: 1. Technically limited study as above.  No central PE, pulmonary infarction or convincing evidence of pulmonary thromboembolism through the proximal segmental levels.  Further evaluation/follow-up as warranted.  2. Findings suggesting mild pulmonary edema/CHF pattern  including cardiomegaly with centrilobular and basilar predominant nonspecific pulmonary mosaic attenuation and small layering bilateral pleural effusions.  3. Enlarged and heterogeneous thyroid lobes suggesting underlying nodules.  Further evaluation with elective/nonemergent thyroid ultrasound can be obtained as warranted.  4. Coronary and systemic atherosclerosis.  5. Few additional findings as above.  This report was flagged in Epic as abnormal.  This report was flagged in Epic as containing an incidental finding.    Electronically signed by: Oliver Reyes MD  Date:    11/21/2023  Time:    10:48  X-Ray Chest PA And Lateral  Narrative: EXAMINATION:  XR CHEST PA AND LATERAL    CLINICAL HISTORY:  Unspecified atrial fibrillation    TECHNIQUE:  PA and lateral views of the chest were performed.    COMPARISON:  None    FINDINGS:  Mild cardiomegaly and slight diffuse accentuation of the interstitial markings and/or edema more at the lung bases.  There are small ill-defined opacities noted in the right mid lung field..  No significant airspace consolidation identified.  Slightly blunted costophrenic angles noted posteriorly which could be related to trace pleural effusion.  Impression: See above    Electronically signed by: Deniz Serrano MD  Date:    11/21/2023  Time:    10:47      Assessment/Plan:     * Acute hypoxic respiratory failure  88 year old male w/ a history of Afib on Eliquis, HTN, HLD, CKD3, T2DM and bladder tumor and renal cysts s/p TURBT and BL RPG on 11/16 w/ jaquez catheter removal on 11/21 who presents for tachycardia associated with increased work of breathing. Normotensive, hypoxic w/ SpO2 88% on RA and in Afib in the ED. Trop 0.262 and . EKG w/ Afib. CTA w/ cardiomegaly and pulmonary congestion and b/l pleural effusions. Patient received 1 x IV lasix 40 and Rocephin in the ED. Admitted to  for further evaluation. High suspicion for new onset heart failure given hypoxia, elevated BNP and CT  findings. Last TTE in 6/2021 showed an EF of 55%.     - IV diuresis as tolerated w/ plans to transition to PO  - strict ins/outs  - daily weights  - low Na diet  - TTE ordered     Persistent atrial fibrillation  Chronic anticoagulation  Patient w/ long standing history of Afib on Eliquis 5mg BID. [id number] Score is 4  - continue Eliquis and Toprol 75mg QD  - cardiac telemetry  - K>4 and Mg>2    UTI (urinary tract infection)  - Patient w/ dysuria since placement of jaquez catheter (removed 11/21) and UA suggestive of UTI  - Will continue empiric Rocephin and de-escalate pending culture results       Thyroid nodules  CTA chest from admission w/ incidental findings of enlarged and heterogeneous thyroid lobes suggestive of underlying nodules  - Will order TSH and evaluate need for inpatient thyroid US. If thyroid studies abnormal, will consider further evaluation as Afib RVR/new heart failure could be secondary to hyperthyroid disease.       Elevated troponin  Initial troponin 0.262 w/o acute ischemic changes on EKG  - Likely demand ischemia in setting of Afib w/ intermittent RVR and hypoxia  - Will trend troponin to peak and monitor      Bladder tumor  - Underwent TURBT and BL RPG on 11/16/2023 with urology. Pathology still pending       DM type 2, controlled, with complication  Last A1C 6.9 on 11/10/2023  - Patient on home Jardiance 10mg QD and metformin 500 QD  - Will hold home antiglycemics whilst admitted. Start LDSSI TIDWM PRN if needed to maintain inpatient BG goal of 140-180      Hypertension associated with diabetes  - continue home antihypertensives as clinically indicated     CKD stage 3 due to type 2 diabetes mellitus  Baseline Cr ~1.4. Cr 1.1 on admission     - Daily BMP to monitor renal function and electrolytes  - Avoid nephrotoxins (NSAIDs, ACEi/ARB, IV radiocontrast, gadolinium, etc.)  - Renally dose meds        VTE Risk Mitigation (From admission, onward)           Ordered     apixaban tablet 5 mg  2  times daily         11/21/23 1150     IP VTE HIGH RISK PATIENT  Once         11/21/23 1146     Place sequential compression device  Until discontinued         11/21/23 1146                           Barbra King MD  Department of Hospital Medicine  [facility] - Emergency Dept    COMPLETED  Family history is reviewed and has not changed   Pertinent information:

## 2023-11-21 NOTE — ED PROVIDER NOTES
Chief Complaint   Post-op Problem (underwent a TURBT and BL RPG on 11/16/2023. Sent in By Dr. Moffett for irregular HR since procedure. Hx of afib. Patient denies any complaints at this time )      History Of Present Illness   Cliff Magaña is a 88 y.o. male with a PMHx including afib, bladder masses  presenting with tachycardia. Patient underwent TURBT and BL RPG on 11/16/2023.  He followed up today in the urology clinic for a voiding trial.  As successfully completed voiding trial and has the Jernigan removed.  Patient has a history of AFib and was noted to be tachycardic in the clinic today.  Advised to come to the ED for further evaluation.  Patient notes no chest pain, shortness of breath, lightheadedness, dizziness, loss of consciousness, fevers, or chills.  He reports urethral discomfort when he had the Jernigan in but otherwise states that he has been urinating well.  He reports no abdominal pain.  Note bilateral lower extremity swelling that is atypical for him.  Reports no known history of heart failure.  Reports no history of DVT or PE.  States he has been taking his Eliquis and metoprolol as prescribed.     Independent Historian: Yes  Other Historian or Collateral:  Wife and daughter at bedside  Interpretor: No      Review of patient's allergies indicates:   Allergen Reactions    Niacin      Other reaction(s): Rash  Other reaction(s): Itching       No current facility-administered medications on file prior to encounter.     Current Outpatient Medications on File Prior to Encounter   Medication Sig Dispense Refill    acetaminophen (TYLENOL) 650 MG TbSR Take 650 mg by mouth every 8 (eight) hours.      apixaban (ELIQUIS) 5 mg Tab Take 1 tablet (5 mg total) by mouth 2 (two) times daily. 180 tablet 3    cholecalciferol, vitamin D3, (VITAMIN D3) 50 mcg (2,000 unit) Cap capsule Take by mouth once daily.      empagliflozin (JARDIANCE) 10 mg tablet Take 1 tablet (10 mg total) by mouth once daily. 90 tablet 3     fenofibrate micronized (LOFIBRA) 200 MG Cap TAKE 1 CAPSULE DAILY WITH BREAKFAST 90 capsule 2    ketorolac (TORADOL) 10 mg tablet Take 1 tablet (10 mg total) by mouth every 6 (six) hours as needed for Pain. 12 tablet 0    krill oil/hyaluronic/astaxanth (MEGARED JOINT CARE ORAL)       loratadine (CLARITIN) 10 mg tablet Take 10 mg by mouth once daily.      TRAMAINE BIOTIN ORAL Take 5,000 mcg by mouth once daily.      metFORMIN (GLUCOPHAGE-XR) 500 MG ER 24hr tablet Take 1 tablet (500 mg total) by mouth once daily. 90 tablet 1    metoprolol succinate (TOPROL-XL) 25 MG 24 hr tablet TAKE 1 TABLET DAILY 90 tablet 3    metoprolol succinate (TOPROL-XL) 50 MG 24 hr tablet TAKE ONE AND ONE-HALF TABLETS ONCE DAILY 135 tablet 3    NIFEdipine (PROCARDIA-XL) 90 MG (OSM) 24 hr tablet Take 1 tablet (90 mg total) by mouth once daily. 90 tablet 6    ondansetron (ZOFRAN-ODT) 4 MG TbDL Dissolve 1 tablet (4 mg total) by mouth every 6 (six) hours as needed (nausea or vomitting). 10 tablet 0    oxybutynin (DITROPAN) 5 MG Tab Take 1 tablet (5 mg total) by mouth 3 (three) times daily as needed (bladder spasm). 30 tablet 0    phenazopyridine (PYRIDIUM) 100 MG tablet Take 1 tablet (100 mg total) by mouth 3 (three) times daily as needed for Pain. 21 tablet 0       Past History   As per HPI and below:  Past Medical History:   Diagnosis Date    *Atrial fibrillation     Chronic kidney disease     Deep vein thrombosis     Hyperlipidemia     Hypertension     Metabolic syndrome     Type 2 diabetes mellitus, without long-term current use of insulin 12/13/2021     Past Surgical History:   Procedure Laterality Date    CATARACT EXTRACTION      CHOLECYSTECTOMY      CYSTOSCOPY N/A 11/16/2023    Procedure: CYSTOSCOPY;  Surgeon: Marcelino Moffett MD;  Location: [facility] OR [address] [place] [address];  Service: Urology;  Laterality: N/A;  90 minutes    EYE SURGERY      INJECTION OF FACET JOINT Bilateral 4/28/2021    Procedure: FACET JOINT INJECTION BILATERAL L4/L5 DIRECT REFERRAL;   Surgeon: Philipp Iyer MD;  Location: [facility] PAIN [facility]GT;  Service: Pain Management;  Laterality: Bilateral;  NEEDS CONSENT, ELIQUIS CLEARANCE IN CHART    RETROGRADE PYELOGRAPHY Bilateral 2023    Procedure: PYELOGRAM, RETROGRADE;  Surgeon: Marcelino Moffett MD;  Location: [facility] OR [address] [place] [address];  Service: Urology;  Laterality: Bilateral;  90 minutes    SKIN CANCER EXCISION      TONSILLECTOMY      TURBT (TRANSURETHRAL RESECTION OF BLADDER TUMOR) N/A 2023    Procedure: TURBT (TRANSURETHRAL RESECTION OF BLADDER TUMOR);  Surgeon: Marcelino Moffett MD;  Location: [facility] OR [address] [place] [address];  Service: Urology;  Laterality: N/A;  90 minutes       Social History     Socioeconomic History    Marital status:      Spouse name: Nereyda    Number of children: 4   Tobacco Use    Smoking status: Former     Current packs/day: 0.00     Average packs/day: 2.0 packs/day for 20.0 years (40.0 ttl pk-yrs)     Types: Cigarettes     Start date: 1963     Quit date: 1983     Years since quittin.5     Passive exposure: Never    Smokeless tobacco: Never   Substance and Sexual Activity    Alcohol use: Yes     Alcohol/week: 1.0 standard drink of alcohol     Types: 1 Standard drinks or equivalent per week     Comment: 3 drinks per week    Drug use: No    Sexual activity: Not Currently     Partners: Female     Social Determinants of Health     Financial Resource Strain: Low Risk  (2023)    Overall Financial Resource Strain (CARDIA)     Difficulty of Paying Living Expenses: Not hard at all   Food Insecurity: No Food Insecurity (2023)    Hunger Vital Sign     Worried About Running Out of Food in the Last Year: Never true     Ran Out of Food in the Last Year: Never true   Transportation Needs: No Transportation Needs (2023)    PRAPARE - Transportation     Lack of Transportation (Medical): No     Lack of Transportation (Non-Medical): No   Physical Activity: Sufficiently Active (2023)    Exercise Vital Sign     Days  of Exercise per Week: 6 days     Minutes of Exercise per Session: 60 min   Stress: No Stress Concern Present (11/9/2023)    [employer] - Occupational Stress Questionnaire     Feeling of Stress : Only a little   Social Connections: Unknown (11/9/2023)    Social Connection and Isolation Panel [NHANES]     Frequency of Communication with Friends and Family: More than three times a week     Frequency of Social Gatherings with Friends and Family: Twice a week     Active Member of Clubs or Organizations: Yes     Attends Club or Organization Meetings: More than 4 times per year     Marital Status:    Housing Stability: Low Risk  (11/9/2023)    Housing Stability Vital Sign     Unable to Pay for Housing in the Last Year: No     Number of Places Lived in the Last Year: 1     Unstable Housing in the Last Year: No       Family History   Problem Relation Age of Onset    Diabetes Maternal Aunt     Heart attack Neg Hx     Heart disease Neg Hx     Heart failure Neg Hx     Hyperlipidemia Neg Hx     Hypertension Neg Hx     Stroke Neg Hx        Physical Exam     Vitals:    11/21/23 0952 11/21/23 1002 11/21/23 1024 11/21/23 1145   BP:   (!) 151/69    Pulse:   104    Resp:       Temp:    98.7 °F (37.1 °C)   TempSrc:    Oral   SpO2: (!) 93% (!) 90%     Weight:       Height:           Physical Exam  Constitutional:       General: He is not in acute distress.     Appearance: He is well-developed. He is not toxic-appearing or diaphoretic.   HENT:      Head: Normocephalic and atraumatic.      Nose: No congestion.      Mouth/Throat:      Mouth: Mucous membranes are moist.      Pharynx: Oropharynx is clear.   Eyes:      Extraocular Movements: Extraocular movements intact.      Pupils: Pupils are equal, round, and reactive to light.   Cardiovascular:      Rate and Rhythm: Tachycardia present. Rhythm irregular.   Pulmonary:      Effort: No respiratory distress.      Breath sounds: Rales (bilateral bases)  present. No wheezing or rhonchi.   Abdominal:      General: There is no distension.      Tenderness: There is no abdominal tenderness. There is no guarding.   Musculoskeletal:         General: No deformity.      Cervical back: No rigidity.      Right lower leg: Edema (2+ pitting edema up to mid shin) present.      Left lower leg: Edema (2+ pitting edema up to mid shin) present.   Skin:     General: Skin is warm and dry.      Capillary Refill: Capillary refill takes less than 2 seconds.   Neurological:      General: No focal deficit present.      Mental Status: He is alert and oriented to person, place, and time.             Results     Labs Reviewed   CBC W/ AUTO DIFFERENTIAL - Abnormal; Notable for the following components:       Result Value    MCH 31.3 (*)     Immature Granulocytes 0.6 (*)     Gran # (ANC) 10.1 (*)     Immature Grans (Abs) 0.08 (*)     Mono # 1.1 (*)     Gran % 80.1 (*)     Lymph % 10.1 (*)     All other components within normal limits   BASIC METABOLIC PANEL - Abnormal; Notable for the following components:    Sodium 134 (*)     CO2 17 (*)     Glucose 123 (*)     Anion Gap 17 (*)     All other components within normal limits   TROPONIN I - Abnormal; Notable for the following components:    Troponin I 0.262 (*)     All other components within normal limits   B-TYPE NATRIURETIC PEPTIDE - Abnormal; Notable for the following components:     (*)     All other components within normal limits   URINALYSIS, REFLEX TO URINE CULTURE - Abnormal; Notable for the following components:    Appearance, UA Hazy (*)     Protein, UA 2+ (*)     Glucose, UA 3+ (*)     Occult Blood UA 3+ (*)     Nitrite, UA Positive (*)     All other components within normal limits    Narrative:     Specimen Source->Urine   URINALYSIS MICROSCOPIC - Abnormal; Notable for the following components:    RBC, UA >100 (*)     WBC, UA >100 (*)     WBC Clumps, UA Moderate (*)     All other components within normal limits    Narrative:      Specimen Source->Urine   CULTURE, URINE   HIV 1 / 2 ANTIBODY    Narrative:     Release to patient->Immediate   HEPATITIS C ANTIBODY    Narrative:     Release to patient->Immediate   TROPONIN I       Imaging Results              X-Ray Chest PA And Lateral (Final result)  Result time 11/21/23 10:47:34      Final result by Deniz Serrano MD (11/21/23 10:47:34)                   Impression:      See above      Electronically signed by: Deniz Serrano MD  Date:    11/21/2023  Time:    10:47               Narrative:    EXAMINATION:  XR CHEST PA AND LATERAL    CLINICAL HISTORY:  Unspecified atrial fibrillation    TECHNIQUE:  PA and lateral views of the chest were performed.    COMPARISON:  None    FINDINGS:  Mild cardiomegaly and slight diffuse accentuation of the interstitial markings and/or edema more at the lung bases.  There are small ill-defined opacities noted in the right mid lung field..  No significant airspace consolidation identified.  Slightly blunted costophrenic angles noted posteriorly which could be related to trace pleural effusion.                                        CTA Chest Non-Coronary (PE Studies) (Final result)  Result time 11/21/23 10:48:04      Final result by Oliver Reyes MD (11/21/23 10:48:04)                   Impression:      1. Technically limited study as above.  No central PE, pulmonary infarction or convincing evidence of pulmonary thromboembolism through the proximal segmental levels.  Further evaluation/follow-up as warranted.  2. Findings suggesting mild pulmonary edema/CHF pattern including cardiomegaly with centrilobular and basilar predominant nonspecific pulmonary mosaic attenuation and small layering bilateral pleural effusions.  3. Enlarged and heterogeneous thyroid lobes suggesting underlying nodules.  Further evaluation with elective/nonemergent thyroid ultrasound can be obtained as warranted.  4. Coronary and systemic atherosclerosis.  5. Few additional findings as  above.  This report was flagged in Epic as abnormal.  This report was flagged in Epic as containing an incidental finding.      Electronically signed by: Oliver Reyes MD  Date:    11/21/2023  Time:    10:48               Narrative:    EXAMINATION:  CTA CHEST NON CORONARY (PE STUDIES)    CLINICAL HISTORY:  Pulmonary embolism (PE) suspected, high prob;    TECHNIQUE:  Low dose axial images, sagittal and coronal reformations were obtained from the thoracic inlet to the lung bases following the IV administration of 75 mL of Omnipaque 350.  Contrast timing was optimized to evaluate the pulmonary arteries.  MIP images were performed.    COMPARISON:  Chest radiograph 05/10/2012, renal ultrasound 10/17/2022, lumbar spine series 04/14/2021    FINDINGS:  Beam hardening with streak artifact from overlying monitoring leads and contrast bolus within the SVC as well as respiratory motion somewhat limits evaluation.  Timing of contrast bolus appears adequate.  No saddle embolus.  No convincing evidence of pulmonary thromboembolism through the proximal segmental levels.  Heterogeneous appearance of several distal segmental and subsegmental pulmonary arterial branches to the bilateral lower lobes.  Pulmonary trunk is within normal limits.  Pulmonary arteries distribute normally.  Four main pulmonary veins draining to the left atrium.    Three vessel left-sided arch.  Scattered calcific atherosclerosis of the thoracic aorta which is slightly tortuous.  No aortic aneurysm or dissection.    Heart is enlarged without significant pericardial fluid.  Multi-vessel coronary arterial with aortic annular and mitral calcifications noted.  No cardiac thrombus seen.    Upper esophagus is mildly patulous without wall thickening or adjacent inflammatory change.    No mediastinal, hilar or axillary lymphadenopathy by CT criteria.    Trachea is relatively midline.  Proximal airways are patent.    Small layering bilateral pleural effusions with  associated mild overlying compressive atelectasis.  Few scattered calcified granulomas in the left lung.  There is bilateral mild nonspecific pulmonary mosaic attenuation with a somewhat centrilobular distribution and most prominent in the lung bases.  Few scattered linear opacities consistent with platelike scarring versus atelectasis.  No consolidation or pneumothorax.  No concerning pulmonary nodule seen.  Overall mild degree of scattered nonspecific calcified pleural plaques, more so on the right.    Structures at the base of the neck show enlarged and heterogeneous right thyroid lobe suggesting nodules, with the largest measuring 2.3 cm.  Partially imaged extrathoracic soft tissues are within normal limits.  Imaged upper abdomen is without acute abnormality.  Partially imaged 2.1 cm fluid density structure at the left upper quadrant likely representing previously noted exophytic mildly complex cyst arising from the upper pole on prior ultrasound.  Osseous structures show minimal to mild degenerative change and generalized osteopenia without acute or destructive process seen.                                            Initial MDM   Medical Decision Making  Patient is an 88-year-old male presenting with tachycardia.  Patient has a history of AFib that is usually rate controlled on metoprolol.  She reports no missed doses.  Patient is tachycardic in the ED initially to about 120.  Patient also has bilateral lower extremity edema and rales in his bilateral lung bases.  Consider new heart failure leading to patient's current presentation of AFib RVR. Consider afib RVR 2/2 to infection or medication changes.  Lower suspicion for PE but he is certainly at risk given his recent procedure and concern for cancer (pathology pending).  Consider ACS as well.  We will get workup including CTA chest, EKG, and labs including UA and troponin.  We will hold on giving further rate control or IV fluids at this time.  On initial  evaluation, patient was already on 2 L oxygen.  Turned the oxygen off during exam and patient desatted to 88% on room air.  Put back on 2 L nasal cannula with improvement to 95% O2.    Amount and/or Complexity of Data Reviewed  Labs: ordered.  Radiology: ordered.    Risk  Prescription drug management.  Decision regarding hospitalization.               Medications Given / Interventions     Medications   sodium chloride 0.9% flush 10 mL (has no administration in time range)   apixaban tablet 5 mg (has no administration in time range)   NIFEdipine 24 hr tablet 90 mg (has no administration in time range)   iohexoL (OMNIPAQUE 350) injection 100 mL (75 mLs Intravenous Given 11/21/23 1017)   cefTRIAXone (ROCEPHIN) 1 g in dextrose 5 % in water (D5W) 100 mL IVPB (MB+) (1 g Intravenous New Bag 11/21/23 1136)   furosemide injection 40 mg (40 mg Intravenous Given 11/21/23 1136)       Procedures     ED POCUS Performed: yes - cardiac - Mildly decreased EF, no pericardial effusion    Reassessment and ED Course     ED Course as of 11/21/23 1258   Tue Nov 21, 2023   1257 UA with positive nitrites, RBCs, WBCs.  We will start on Rocephin.  Troponin and BNP elevated as well.  CTA does not show a PE but consistent with pulmonary edema.  Discussed results with the patient.  Discussed antibiotics and IV Lasix.  Bedside echo performed which shows mildly decreased EF, mitral regurgitation, and no pericardial effusion.    Discussed with hospital medicine for admission for further evaluation. [CH]      ED Course User Index  [CH] Karey Bingham MD              Final diagnoses:  [I49.9] Irregular heart beat  [I48.91] A-fib           Dispo      ED Disposition Condition    Admit                              Karey Bingham MD  11/21/23 2096

## 2023-11-21 NOTE — ED NOTES
Cliff Magaña, a 88 y.o. male presents to the ED w/ complaint of irregular heart rate and rhythm, sent from doctors office across the street    Triage note:  Chief Complaint   Patient presents with    Post-op Problem     underwent a TURBT and BL RPG on 11/16/2023. Sent in By Dr. Moffett for irregular HR since procedure. Hx of afib. Patient denies any complaints at this time      Review of patient's allergies indicates:   Allergen Reactions    Niacin      Other reaction(s): Rash  Other reaction(s): Itching     Past Medical History:   Diagnosis Date    *Atrial fibrillation     Chronic kidney disease     Deep vein thrombosis     Hyperlipidemia     Hypertension     Metabolic syndrome     Type 2 diabetes mellitus, without long-term current use of insulin 12/13/2021

## 2023-11-21 NOTE — TELEPHONE ENCOUNTER
Thank you for letting us know that he did void after the voiding trial this morning.     ----- Message from Shalini Warren LPN sent at 11/21/2023  3:54 PM CST -----    ----- Message -----  From: Brigida Kwong  Sent: 11/21/2023   1:04 PM CST  To: [facility] Staff    Needs advice from nurse:      Who Called:wife-  Regarding:pt is currently in the ER/and is peeing   Would the patient rather a call back or [facility]IA [facility]?  Best Call Back number: [phone]  Additional Info:

## 2023-11-21 NOTE — PROGRESS NOTES
CHIEF COMPLAINT:    Mr. Magaña is a 88 y.o. male presenting for a consultation bladder tumor.     PRESENTING ILLNESS:    Cliff Magaña is a 88 y.o. male with a history of a bladder tumor and renal cysts.   He is under surveillance for his renal cysts.   He was seen by Shantel Dalton NP on 10/19/2023 which showed stable left renal cysts. His ultrasound at that time showed bladder masses.  He underwent a TURBT and BL RPG on 11/16/2023.   His path is still pending. He presents for a VT today.      He is doing well post op.  He is eager to get his Jernigan catheter out.  He is on his blood thinners.  Today in the office, his heart rate was highly variable and elevated.  He does have atrial fibrillation.  He is asymptomatic in his blood pressure was normal.  He denied any chest pain or shortness of breath.        REVIEW OF SYSTEMS:    Cliff Magaña denies headache, blurred vision, fever, nausea, vomiting, chills, abdominal pain, chest pain, sore throat, bleeding per rectum, cough, SOB, recent loss of consciousness, recent mental status changes, seizures, dizziness, or upper or lower extremity weakness.      PATIENT HISTORY:    Past Medical History:   Diagnosis Date    *Atrial fibrillation     Chronic kidney disease     Deep vein thrombosis     Hyperlipidemia     Hypertension     Metabolic syndrome     Type 2 diabetes mellitus, without long-term current use of insulin 12/13/2021       Past Surgical History:   Procedure Laterality Date    CATARACT EXTRACTION      CHOLECYSTECTOMY      CYSTOSCOPY N/A 11/16/2023    Procedure: CYSTOSCOPY;  Surgeon: Marcelino Moffett MD;  Location: [facility] OR [address] [place] [address];  Service: Urology;  Laterality: N/A;  90 minutes    EYE SURGERY      INJECTION OF FACET JOINT Bilateral 4/28/2021    Procedure: FACET JOINT INJECTION BILATERAL L4/L5 DIRECT REFERRAL;  Surgeon: Philipp Iyer MD;  Location: [facility];  Service: Pain Management;  Laterality: Bilateral;  NEEDS CONSENT, ELIQUIS CLEARANCE IN CHART     RETROGRADE PYELOGRAPHY Bilateral 2023    Procedure: PYELOGRAM, RETROGRADE;  Surgeon: Marcelino Moffett MD;  Location: [facility] OR [address] [place] [address];  Service: Urology;  Laterality: Bilateral;  90 minutes    SKIN CANCER EXCISION      TONSILLECTOMY      TURBT (TRANSURETHRAL RESECTION OF BLADDER TUMOR) N/A 2023    Procedure: TURBT (TRANSURETHRAL RESECTION OF BLADDER TUMOR);  Surgeon: Marcelino Moffett MD;  Location: [facility] OR [address] [place] [address];  Service: Urology;  Laterality: N/A;  90 minutes       Family History   Problem Relation Age of Onset    Diabetes Maternal Aunt     Heart attack Neg Hx     Heart disease Neg Hx     Heart failure Neg Hx     Hyperlipidemia Neg Hx     Hypertension Neg Hx     Stroke Neg Hx        Social History     Socioeconomic History    Marital status:      Spouse name: Nereyda    Number of children: 4   Tobacco Use    Smoking status: Former     Current packs/day: 0.00     Average packs/day: 2.0 packs/day for 20.0 years (40.0 ttl pk-yrs)     Types: Cigarettes     Start date: 1963     Quit date: 1983     Years since quittin.5     Passive exposure: Never    Smokeless tobacco: Never   Substance and Sexual Activity    Alcohol use: Yes     Alcohol/week: 1.0 standard drink of alcohol     Types: 1 Standard drinks or equivalent per week     Comment: 3 drinks per week    Drug use: No    Sexual activity: Not Currently     Partners: Female     Social Determinants of Health     Financial Resource Strain: Low Risk  (2023)    Overall Financial Resource Strain (CARDIA)     Difficulty of Paying Living Expenses: Not hard at all   Food Insecurity: No Food Insecurity (2023)    Hunger Vital Sign     Worried About Running Out of Food in the Last Year: Never true     Ran Out of Food in the Last Year: Never true   Transportation Needs: No Transportation Needs (2023)    PRAPARE - Transportation     Lack of Transportation (Medical): No     Lack of Transportation (Non-Medical): No   Physical  Activity: Sufficiently Active (11/9/2023)    Exercise Vital Sign     Days of Exercise per Week: 6 days     Minutes of Exercise per Session: 60 min   Stress: No Stress Concern Present (11/9/2023)    [employer] - Occupational Stress Questionnaire     Feeling of Stress : Only a little   Social Connections: Unknown (11/9/2023)    Social Connection and Isolation Panel [NHANES]     Frequency of Communication with Friends and Family: More than three times a week     Frequency of Social Gatherings with Friends and Family: Twice a week     Active Member of Clubs or Organizations: Yes     Attends Club or Organization Meetings: More than 4 times per year     Marital Status:    Housing Stability: Low Risk  (11/9/2023)    Housing Stability Vital Sign     Unable to Pay for Housing in the Last Year: No     Number of Places Lived in the Last Year: 1     Unstable Housing in the Last Year: No       Allergies:  Niacin    Medications:    Current Outpatient Medications:     acetaminophen (TYLENOL) 650 MG TbSR, Take 650 mg by mouth every 8 (eight) hours., Disp: , Rfl:     apixaban (ELIQUIS) 5 mg Tab, Take 1 tablet (5 mg total) by mouth 2 (two) times daily., Disp: 180 tablet, Rfl: 3    cholecalciferol, vitamin D3, (VITAMIN D3) 50 mcg (2,000 unit) Cap capsule, Take by mouth once daily., Disp: , Rfl:     empagliflozin (JARDIANCE) 10 mg tablet, Take 1 tablet (10 mg total) by mouth once daily., Disp: 90 tablet, Rfl: 3    fenofibrate micronized (LOFIBRA) 200 MG Cap, TAKE 1 CAPSULE DAILY WITH BREAKFAST, Disp: 90 capsule, Rfl: 2    ketorolac (TORADOL) 10 mg tablet, Take 1 tablet (10 mg total) by mouth every 6 (six) hours as needed for Pain., Disp: 12 tablet, Rfl: 0    krill oil/hyaluronic/astaxanth (MEGARED JOINT CARE ORAL), , Disp: , Rfl:     loratadine (CLARITIN) 10 mg tablet, Take 10 mg by mouth once daily., Disp: , Rfl:     TRAMAINE BIOTIN ORAL, Take 5,000 mcg by mouth once daily., Disp: , Rfl:     metFORMIN  (GLUCOPHAGE-XR) 500 MG ER 24hr tablet, Take 1 tablet (500 mg total) by mouth once daily., Disp: 90 tablet, Rfl: 1    metoprolol succinate (TOPROL-XL) 25 MG 24 hr tablet, TAKE 1 TABLET DAILY, Disp: 90 tablet, Rfl: 3    metoprolol succinate (TOPROL-XL) 50 MG 24 hr tablet, TAKE ONE AND ONE-HALF TABLETS ONCE DAILY, Disp: 135 tablet, Rfl: 3    NIFEdipine (PROCARDIA-XL) 90 MG (OSM) 24 hr tablet, Take 1 tablet (90 mg total) by mouth once daily., Disp: 90 tablet, Rfl: 6    ondansetron (ZOFRAN-ODT) 4 MG TbDL, Dissolve 1 tablet (4 mg total) by mouth every 6 (six) hours as needed (nausea or vomitting)., Disp: 10 tablet, Rfl: 0    oxybutynin (DITROPAN) 5 MG Tab, Take 1 tablet (5 mg total) by mouth 3 (three) times daily as needed (bladder spasm)., Disp: 30 tablet, Rfl: 0    phenazopyridine (PYRIDIUM) 100 MG tablet, Take 1 tablet (100 mg total) by mouth 3 (three) times daily as needed for Pain., Disp: 21 tablet, Rfl: 0    PHYSICAL EXAMINATION:    The patient generally appears in good health, is appropriately interactive, and is in no apparent distress.     Eyes: anicteric sclerae, moist conjunctivae; no lid-lag; PERRLA     HENT: Atraumatic; oropharynx clear with moist mucous membranes and no mucosal ulcerations;normal hard and soft palate.  No evidence of lymphadenopathy.    Neck: Trachea midline.  No thyromegaly.    Skin: No lesions.    Mental: Cooperative with normal affect.  Is oriented to time, place, and person.    Neuro: Grossly intact.    Chest: Normal inspiratory effort.   No accessory muscles.  No audible wheezes.  Respirations symmetric on inspiration and expiration.    Heart: Regular rhythm.      Abdomen:  Soft, non-tender. No masses or organomegaly. Bladder is not palpable. No evidence of flank discomfort. No evidence of inguinal hernia.        Extremities: No clubbing, cyanosis, or edema      LABS:      Lab Results   Component Value Date    PSA 0.43 06/06/2013    PSA 0.68 05/10/2012    PSA 0.54 05/24/2011    PSADIAG  0.44 06/05/2014       IMPRESSION:    Encounter Diagnoses   Name Primary?    Bladder tumor Yes    Hypertension associated with diabetes          PLAN:    -Patient to go to the emergency room to get evaluated for rapid atrial fibrillation most likely    -void trial successful today; advised the patient to return to the clinic or the emergency room if he is unable to void in 6-8 hours.    -follow-up in 2 weeks to review pathology.    --BP reviewed  -stable, continue meds and f/u with PCP         The patient was seen and examined with the resident physician.  All questions were answered.  The plan was discussed with the patient and I concur with the resident physician's documentation.

## 2023-11-22 LAB
ALBUMIN SERPL BCP-MCNC: 2.7 G/DL (ref 3.5–5.2)
ALP SERPL-CCNC: 248 U/L (ref 55–135)
ALT SERPL W/O P-5'-P-CCNC: 13 U/L (ref 10–44)
ANION GAP SERPL CALC-SCNC: 16 MMOL/L (ref 8–16)
AST SERPL-CCNC: 51 U/L (ref 10–40)
BASOPHILS # BLD AUTO: 0.04 K/UL (ref 0–0.2)
BASOPHILS NFR BLD: 0.4 % (ref 0–1.9)
BILIRUB SERPL-MCNC: 0.8 MG/DL (ref 0.1–1)
BUN SERPL-MCNC: 27 MG/DL (ref 8–23)
CALCIUM SERPL-MCNC: 9.7 MG/DL (ref 8.7–10.5)
CHLORIDE SERPL-SCNC: 100 MMOL/L (ref 95–110)
CO2 SERPL-SCNC: 19 MMOL/L (ref 23–29)
CREAT SERPL-MCNC: 1.2 MG/DL (ref 0.5–1.4)
DIFFERENTIAL METHOD: ABNORMAL
EOSINOPHIL # BLD AUTO: 0.1 K/UL (ref 0–0.5)
EOSINOPHIL NFR BLD: 0.9 % (ref 0–8)
ERYTHROCYTE [DISTWIDTH] IN BLOOD BY AUTOMATED COUNT: 13.1 % (ref 11.5–14.5)
EST. GFR  (NO RACE VARIABLE): 58.2 ML/MIN/1.73 M^2
GLUCOSE SERPL-MCNC: 127 MG/DL (ref 70–110)
HCT VFR BLD AUTO: 42.6 % (ref 40–54)
HGB BLD-MCNC: 14.4 G/DL (ref 14–18)
IMM GRANULOCYTES # BLD AUTO: 0.05 K/UL (ref 0–0.04)
IMM GRANULOCYTES NFR BLD AUTO: 0.4 % (ref 0–0.5)
LYMPHOCYTES # BLD AUTO: 1.4 K/UL (ref 1–4.8)
LYMPHOCYTES NFR BLD: 12.3 % (ref 18–48)
MAGNESIUM SERPL-MCNC: 1.9 MG/DL (ref 1.6–2.6)
MCH RBC QN AUTO: 32.1 PG (ref 27–31)
MCHC RBC AUTO-ENTMCNC: 33.8 G/DL (ref 32–36)
MCV RBC AUTO: 95 FL (ref 82–98)
MONOCYTES # BLD AUTO: 1.1 K/UL (ref 0.3–1)
MONOCYTES NFR BLD: 9.8 % (ref 4–15)
NEUTROPHILS # BLD AUTO: 8.6 K/UL (ref 1.8–7.7)
NEUTROPHILS NFR BLD: 76.2 % (ref 38–73)
NRBC BLD-RTO: 0 /100 WBC
PLATELET # BLD AUTO: 294 K/UL (ref 150–450)
PMV BLD AUTO: 12.5 FL (ref 9.2–12.9)
POTASSIUM SERPL-SCNC: 3.8 MMOL/L (ref 3.5–5.1)
PROT SERPL-MCNC: 7.5 G/DL (ref 6–8.4)
RBC # BLD AUTO: 4.49 M/UL (ref 4.6–6.2)
SODIUM SERPL-SCNC: 135 MMOL/L (ref 136–145)
TROPONIN I SERPL DL<=0.01 NG/ML-MCNC: 1.01 NG/ML (ref 0–0.03)
TROPONIN I SERPL DL<=0.01 NG/ML-MCNC: 1.36 NG/ML (ref 0–0.03)
TSH SERPL DL<=0.005 MIU/L-ACNC: 1.02 UIU/ML (ref 0.4–4)
WBC # BLD AUTO: 11.31 K/UL (ref 3.9–12.7)

## 2023-11-22 PROCEDURE — 25000003 PHARM REV CODE 250

## 2023-11-22 PROCEDURE — 21400001 HC TELEMETRY ROOM

## 2023-11-22 PROCEDURE — 84443 ASSAY THYROID STIM HORMONE: CPT

## 2023-11-22 PROCEDURE — 83735 ASSAY OF MAGNESIUM: CPT

## 2023-11-22 PROCEDURE — 85025 COMPLETE CBC W/AUTO DIFF WBC: CPT

## 2023-11-22 PROCEDURE — 63600175 PHARM REV CODE 636 W HCPCS

## 2023-11-22 PROCEDURE — 80053 COMPREHEN METABOLIC PANEL: CPT

## 2023-11-22 PROCEDURE — 84484 ASSAY OF TROPONIN QUANT: CPT

## 2023-11-22 PROCEDURE — 36415 COLL VENOUS BLD VENIPUNCTURE: CPT

## 2023-11-22 RX ORDER — SODIUM CHLORIDE, SODIUM LACTATE, POTASSIUM CHLORIDE, CALCIUM CHLORIDE 600; 310; 30; 20 MG/100ML; MG/100ML; MG/100ML; MG/100ML
INJECTION, SOLUTION INTRAVENOUS CONTINUOUS
Status: ACTIVE | OUTPATIENT
Start: 2023-11-22 | End: 2023-11-23

## 2023-11-22 RX ORDER — METOPROLOL SUCCINATE 100 MG/1
100 TABLET, EXTENDED RELEASE ORAL DAILY
Status: DISCONTINUED | OUTPATIENT
Start: 2023-11-22 | End: 2023-11-23

## 2023-11-22 RX ADMIN — SODIUM CHLORIDE, POTASSIUM CHLORIDE, SODIUM LACTATE AND CALCIUM CHLORIDE 1000 ML: 600; 310; 30; 20 INJECTION, SOLUTION INTRAVENOUS at 08:11

## 2023-11-22 RX ADMIN — CEFTRIAXONE SODIUM 1 G: 1 INJECTION, POWDER, FOR SOLUTION INTRAMUSCULAR; INTRAVENOUS at 12:11

## 2023-11-22 RX ADMIN — POTASSIUM BICARBONATE 25 MEQ: 978 TABLET, EFFERVESCENT ORAL at 08:11

## 2023-11-22 RX ADMIN — APIXABAN 5 MG: 5 TABLET, FILM COATED ORAL at 08:11

## 2023-11-22 RX ADMIN — METOPROLOL SUCCINATE 100 MG: 100 TABLET, EXTENDED RELEASE ORAL at 08:11

## 2023-11-22 RX ADMIN — SODIUM CHLORIDE, POTASSIUM CHLORIDE, SODIUM LACTATE AND CALCIUM CHLORIDE: 600; 310; 30; 20 INJECTION, SOLUTION INTRAVENOUS at 12:11

## 2023-11-22 NOTE — NURSING
Informed on call doctor Jd about the patient's urine output which is dark red. He said to continue monitor the patient and nothing to do for now.

## 2023-11-22 NOTE — ASSESSMENT & PLAN NOTE
Patient w/ long standing history of Afib on Eliquis 5mg BID. [id number] Score is 4    - continue Eliquis  - Toprol increased to 100mg for HR control  - Afib may be uncontrolled in setting of hypovolemia 2/2 acute illness. Will continue IVFs and monitor   - cardiac telemetry  - K>4 and Mg>2

## 2023-11-22 NOTE — HOSPITAL COURSE
Patient initially admitted w/ concern for new acute onset heart failure. However, TTE on 11/21 showed a preserved EF w/ a CVP 3. Upon re-evaluation, concern for hypovolemic state in setting of acute UTI that could be contributing to uncontrolled Afib. Now appears to be euvolemic. ABG reviewed; no abnormalities. Oxygen titrated and discontinued. Passed 6MWT. HR improved; Toprol increased to 200mg QD (hold parameters given for HR <70). IV abx transitioned to PO cefpodoxime; end date 11/27 for a total of 7 days to treat UTI. Unfortunately, path from recent bladder tumor biopsy came back w/ noninvasive high-grade papillary urothelial carcinoma. Dr. Moffett called patient and family to inform them of the pathology. Plan for further outpatient workup included CT A/P scheduled for tomorrow. Will discharge patient home w/ PCP, urology and cardiology follow up. Referral placed to outpatient oncology. Discharged in stable condition. All questions answered. Return precautions given.

## 2023-11-22 NOTE — NURSING
Nurses Note -- 4 Eyes      11/21/2023   3:30 PM      Skin assessed during: Admit      [x] No Altered Skin Integrity Present    []Prevention Measures Documented      [] Yes- Altered Skin Integrity Present or Discovered   [] LDA Added if Not in Epic (Describe Wound)   [] New Altered Skin Integrity was Present on Admit and Documented in LDA   [] Wound Image Taken    Wound Care Consulted? No    Attending Nurse:  Meme Stokes LPN    Second RN/Staff Member:   Rsoa Thomas LPN

## 2023-11-22 NOTE — PLAN OF CARE
Problem: Adult Inpatient Plan of Care  Goal: Plan of Care Review  Outcome: Ongoing, Progressing  Flowsheets (Taken 11/22/2023 0725)  Plan of Care Reviewed With:   patient   spouse   daughter  Goal: Patient-Specific Goal (Individualized)  Outcome: Ongoing, Progressing  Flowsheets (Taken 11/22/2023 0725)  Anxieties, Fears or Concerns: none  Individualized Care Needs: to get better and free from shortness of breath  Goal: Absence of Hospital-Acquired Illness or Injury  Outcome: Ongoing, Progressing  Intervention: Identify and Manage Fall Risk  Flowsheets (Taken 11/22/2023 0725)  Safety Promotion/Fall Prevention:   assistive device/personal item within reach   bed alarm set   lighting adjusted   medications reviewed   instructed to call staff for mobility  Intervention: Prevent Skin Injury  Flowsheets (Taken 11/22/2023 0725)  Body Position: position changed independently  Skin Protection: adhesive use limited  Intervention: Prevent and Manage VTE (Venous Thromboembolism) Risk  Flowsheets (Taken 11/22/2023 0725)  Activity Management:   Rolling - L1   Arm raise - L1  VTE Prevention/Management: ROM (active) performed  Range of Motion: active ROM (range of motion) encouraged  Goal: Optimal Comfort and Wellbeing  Outcome: Ongoing, Progressing  Intervention: Monitor Pain and Promote Comfort  Flowsheets (Taken 11/22/2023 0725)  Pain Management Interventions:   quiet environment facilitated   relaxation techniques promoted     Problem: Fall Injury Risk  Goal: Absence of Fall and Fall-Related Injury  Outcome: Ongoing, Progressing  Intervention: Identify and Manage Contributors  Flowsheets (Taken 11/22/2023 0725)  Self-Care Promotion: independence encouraged  Medication Review/Management: medications reviewed  Intervention: Promote Injury-Free Environment  Flowsheets (Taken 11/22/2023 0725)  Safety Promotion/Fall Prevention:   assistive device/personal item within reach   bed alarm set   lighting adjusted   medications  reviewed   instructed to call staff for mobility     Problem: Adult Inpatient Plan of Care  Goal: Readiness for Transition of Care  Outcome: Ongoing, Not Progressing     Problem: Breathing Pattern Ineffective  Goal: Effective Breathing Pattern  Outcome: Ongoing, Not Progressing  Intervention: Promote Improved Breathing Pattern  Flowsheets (Taken 11/22/2023 5982)  Airway/Ventilation Management:   airway patency maintained   calming measures promoted  Breathing Techniques/Airway Clearance: deep/controlled cough encouraged  Supportive Measures: active listening utilized  Head of Bed (HOB) Positioning: HOB at 30-45 degrees

## 2023-11-22 NOTE — ASSESSMENT & PLAN NOTE
- Patient w/ dysuria since placement of jaquez catheter (removed 11/21) and UA suggestive of UTI  - Will continue empiric Rocephin and de-escalate pending culture results   - Ucx w/ prelim GNR

## 2023-11-22 NOTE — ASSESSMENT & PLAN NOTE
88 year old male w/ a history of Afib on Eliquis, HTN, HLD, CKD3, T2DM and bladder tumor and renal cysts s/p TURBT and BL RPG on 11/16 w/ jaquez catheter removal on 11/21 who presents for tachycardia associated with increased work of breathing. Normotensive, hypoxic w/ SpO2 88% on RA and in Afib in the ED. Trop 0.262 and . CTA w/ cardiomegaly and pulmonary congestion and b/l pleural effusions. Patient received 1 x IV lasix 40 and Rocephin in the ED. Admitted to  for further evaluation.     Initially, high suspicion for new onset heart failure given hypoxia, elevated BNP and CT findings. However, TTE from 11/21 w/ preserved EF and CVP of 3. IVFs started given concern for hypovolemia in setting of acute illness     - will obtain 6mwt to determine patient's oxygen needs  - repeat CXR  - titrate supplemental O2 to maintain SpO2 > 90%

## 2023-11-22 NOTE — ASSESSMENT & PLAN NOTE
CTA chest from admission w/ incidental findings of enlarged and heterogeneous thyroid lobes suggestive of underlying nodules  - Will order TSH and evaluate need for inpatient thyroid US. If thyroid studies abnormal, will consider further evaluation as Afib RVR/new heart failure could be secondary to hyperthyroid disease.   - TSH wnl

## 2023-11-22 NOTE — NURSING
Home Oxygen Evaluation    Date Performed: 11/22/2023    1) Patient's Home O2 Sat on room air, while at rest: 84%        If O2 sats on room air at rest are 88% or below, patient qualifies. No additional testing needed. Document N/A in steps 2 and 3. If 89% or above, complete steps 2.      2) Patient's O2 Sat on room air while exercising: NA        If O2 sats on room air while exercising remain 89% or above patient does not qualify, no further testing needed Document N/A in step 3. If O2 sats on room air while exercising are 88% or below, continue to step 3.      3) Patient's O2 Sat while exercising on O2: NA at NA LPM         (Must show improvement from #2 for patients to qualify)    If O2 sats improve on oxygen, patient qualifies for portable oxygen. If not, the patient does not qualify.     Patient required 5 L NC in order to recover O2 SATs to 91%

## 2023-11-22 NOTE — PLAN OF CARE
[facility] - Med Surg  Initial Discharge Assessment       Primary Care Provider: Munir Estrada MD    Admission Diagnosis: Acute pulmonary edema [J81.0]  A-fib [I48.91]  Tachycardia [R00.0]  Hypoxia [R09.02]  Irregular heart beat [I49.9]  Acute cystitis with hematuria [N30.01]    Admission Date: 11/21/2023  Expected Discharge Date: 11/24/2023    Transition of Care Barriers: None    Payor: MEDICARE / Plan: MEDICARE PART A & B / Product Type: Government /     Extended Emergency Contact Information  Primary Emergency Contact: Nereyda Magaña  Mobile Phone: [phone]  Relation: Spouse  Secondary Emergency Contact: Kelley Walker  Mobile Phone: [phone]  Relation: Daughter    Discharge Plan A: Home, Home with family  Discharge Plan B: Home      [facility] - [place], [place] - [address] [place] [address]  Phone: [phone] Fax: [phone]    [id number] [place] - [address]  [address]  [place] [address]  Phone: [phone] Fax: [phone]    [facility] - [place] - [address]  [address]  [place] [address]  Phone: [phone] Fax: [phone]      Initial Assessment (most recent)       Adult Discharge Assessment - 11/22/23 1515          Discharge Assessment    Assessment Type Discharge Planning Assessment     Confirmed/corrected address, phone number and insurance Yes     Confirmed Demographics Correct on Facesheet     Source of Information patient;family     Communicated HAILEY with patient/caregiver Yes     Reason For Admission Medicare A & B     People in Home spouse     Do you expect to return to your current living situation? Yes     Do you have help at home or someone to help you manage your care at home? Yes     Who are your caregiver(s) and their phone number(s)? Nereyda MagañaYwst-igeoht-[phone]     Prior to hospitilization cognitive status:  Alert/Oriented     Current cognitive status: Alert/Oriented     Equipment Currently Used at Home none     Readmission within 30 days? No     Patient currently being followed by outpatient case management? No     Do you currently have service(s) that help you manage your care at home? No     Do you take prescription medications? Yes     Do you have prescription coverage? Yes     Coverage medicare A & B     Do you have any problems affording any of your prescribed medications? No     Is the patient taking medications as prescribed? yes     Who is going to help you get home at discharge? Pt's wife will provide transportation home.     How do you get to doctors appointments? family or friend will provide;car, drives self     Are you on dialysis? No     Do you take coumadin? No   Pt does take coumadin.    DME Needed Upon Discharge  oxygen;other (see comments)   May need O2 for discharge.    Discharge Plan discussed with: Spouse/sig other;Patient     Transition of Care Barriers None     Discharge Plan A Home;Home with family     Discharge Plan B Home        Physical Activity    On average, how many days per week do you engage in moderate to strenuous exercise (like a brisk walk)? 7 days     On average, how many minutes do you engage in exercise at this level? 60 min        Financial Resource Strain    How hard is it for you to pay for the very basics like food, housing, medical care, and heating? Not hard at all        Housing Stability    In the last 12 months, was there a time when you were not able to pay the mortgage or rent on time? No     In the last 12 months, was there a time when you did not have a steady place to sleep or slept in a shelter (including now)? No        Transportation Needs    In the past 12 months, has lack of transportation kept you from medical appointments or from getting medications? No     In the past 12 months, has lack of transportation kept you from meetings, work, or from getting things  needed for daily living? No        Food Insecurity    Within the past 12 months, you worried that your food would run out before you got the money to buy more. Never true     Within the past 12 months, the food you bought just didn't last and you didn't have money to get more. Never true        Stress    Do you feel stress - tense, restless, nervous, or anxious, or unable to sleep at night because your mind is troubled all the time - these days? Not at all        Social Connections    In a typical week, how many times do you talk on the phone with family, friends, or neighbors? More than three times a week     How often do you get together with friends or relatives? More than three times a week     How often do you attend Yazidism or Orthodox services? Never     Do you belong to any clubs or organizations such as Yazidism groups, unions, fraternal or athletic groups, or school groups? No     How often do you attend meetings of the clubs or organizations you belong to? Never     Are you , , , , never , or living with a partner?         Alcohol Use    Q1: How often do you have a drink containing alcohol? Never     Q2: How many drinks containing alcohol do you have on a typical day when you are drinking? Patient does not drink     Q3: How often do you have six or more drinks on one occasion? Never        OTHER    Name(s) of People in Home Nereyda MagañaJcuf-Yxdtb-[phone]                    completed initial discharge assessment with pt.  No hospital readmissions on file.  Pt will have transportation home.      Pt lives with wife in [facility] w/ no steps for entry.  Pt reported he has been independent with mobility and ADLs.  Pt does not use any DME.  Pt has good family support.      Pt does not receive dialysis.  Pt does not receive coumadin but does take Eliquis.  No substance history reported.     Disp:  1.Home w/ O2  2.  Home w/ family.    Discharge Plan A and Plan B have been  determined by review of patient's clinical status, future medical and therapeutic needs, and coverage/benefits for post-acute care in coordination with multidisciplinary team members.    Danya Mace LMSW  Part-Time-  [facility]  Ext. [phone]

## 2023-11-22 NOTE — SUBJECTIVE & OBJECTIVE
Interval History: HDS overnight w/ HR in 110s. Patient denies any acute symptoms overnight and this morning. Denies SOB or cough. Urine is dark. Labs reviewed; stable     Review of Systems   Constitutional:  Negative for activity change, appetite change, chills and fever.   Respiratory:  Positive for shortness of breath. Negative for cough.    Gastrointestinal:  Negative for abdominal pain, nausea and vomiting.   Genitourinary:  Positive for dysuria. Negative for difficulty urinating.   Skin:  Negative for color change and pallor.   Neurological:  Negative for dizziness, light-headedness and headaches.   Psychiatric/Behavioral:  Negative for agitation and behavioral problems.      Objective:     Vital Signs (Most Recent):  Temp: 97.9 °F (36.6 °C) (11/22/23 1125)  Pulse: 104 (11/22/23 1130)  Resp: 16 (11/22/23 1125)  BP: (!) 145/80 (11/22/23 1125)  SpO2: 98 % (11/22/23 1125) Vital Signs (24h Range):  Temp:  [97.3 °F (36.3 °C)-99.1 °F (37.3 °C)] 97.9 °F (36.6 °C)  Pulse:  [] 104  Resp:  [16-19] 16  SpO2:  [91 %-98 %] 98 %  BP: (130-155)/(69-80) 145/80     Weight: 64.9 kg (143 lb)  Body mass index is 23.8 kg/m².    Intake/Output Summary (Last 24 hours) at 11/22/2023 1231  Last data filed at 11/22/2023 0949  Gross per 24 hour   Intake 520 ml   Output 1850 ml   Net -1330 ml         Physical Exam  Constitutional:       Appearance: Normal appearance.   HENT:      Head: Normocephalic and atraumatic.      Mouth/Throat:      Mouth: Mucous membranes are moist.      Pharynx: Oropharynx is clear.   Eyes:      Extraocular Movements: Extraocular movements intact.      Conjunctiva/sclera: Conjunctivae normal.   Cardiovascular:      Rate and Rhythm: Tachycardia present. Rhythm irregular.      Heart sounds: Normal heart sounds. No murmur heard.  Pulmonary:      Comments: Increased respiratory effort  Bibasilar crackles   Abdominal:      General: Abdomen is flat.      Palpations: Abdomen is soft.      Tenderness: There is no  abdominal tenderness.   Musculoskeletal:      Right lower leg: Edema present.      Left lower leg: Edema present.      Comments: Bilateral 2+ pitting edema up to chins    Skin:     Capillary Refill: Capillary refill takes less than 2 seconds.   Neurological:      General: No focal deficit present.      Mental Status: He is alert and oriented to person, place, and time.             Significant Labs: All pertinent labs within the past 24 hours have been reviewed.    Significant Imaging: I have reviewed all pertinent imaging results/findings within the past 24 hours.

## 2023-11-22 NOTE — PROGRESS NOTES
[facility] Medicine  Progress Note    Patient Name: Cliff Magaña  MRN: [id number]  Patient Class: IP- Inpatient   Admission Date: 11/21/2023  Length of Stay: 1 days  Attending Physician: Turner Kumar MD  Primary Care Provider: Munir Estrada MD        Subjective:     Principal Problem:Acute hypoxic respiratory failure        HPI:  Mr. Magaña is an 88 year old male w/ a history of Afib on Eliquis, HTN, HLD, CKD3, T2DM and bladder tumor and renal cysts s/p TURBT and BL RPG on 11/16 w/ jaquez catheter removal on 11/21 who presents for tachycardia. He was see for a post op visit this morning by Dr. Moffett and had his jaquez catheter removed after passing a voiding trial. He was however found to be tachycardic w/ concern for Afib RVR and was instructed to go to the ED for further evaluation. History was gathered from the patient and his family. Patient's wife reports that patient has increased work of breathing w/ associated bilateral LE edema x 1 week. Additionally, patient endorses dysuria. Denies HA, vision changes, lightheadedness, dizziness, cough, abdominal fullness, abdominal pain, decreased PO intake, decreased urinary output or stool changes. In the ED, patient normotensive w/ HR 110s. He was found to be hypoxic on RA w/ SpO2 ~88% which subsequently improved w/ 2L NC. Labs notable for Na 134, CO2 17, Cr 1.1 (baseline). Trop 0.262 and . UA concerning for UTI. CTA did not show a pulmonary embolism but did show cardiomegaly w/ pulmonary congestion and b/l pleural effusions. Patient received 1 x IV lasix 40 and Rocephin in the ED. Admitted to  for further evaluation.     Overview/Hospital Course:  Patient initially admitted w/ concern for new acute onset heart failure. However, TTE on 11/21 showed a preserved EF w/ a CVP 3. Upon re-evaluation, concern for hypovolemic state in setting of acute UTI that could be contributing to uncontrolled Afib. Will continue IVFs and continue to  monitor.     Interval History: HDS overnight w/ HR in 110s. Patient denies any acute symptoms overnight and this morning. Denies SOB or cough. Urine is dark. Labs reviewed; stable     Review of Systems   Constitutional:  Negative for activity change, appetite change, chills and fever.   Respiratory:  Positive for shortness of breath. Negative for cough.    Gastrointestinal:  Negative for abdominal pain, nausea and vomiting.   Genitourinary:  Positive for dysuria. Negative for difficulty urinating.   Skin:  Negative for color change and pallor.   Neurological:  Negative for dizziness, light-headedness and headaches.   Psychiatric/Behavioral:  Negative for agitation and behavioral problems.      Objective:     Vital Signs (Most Recent):  Temp: 97.9 °F (36.6 °C) (11/22/23 1125)  Pulse: 104 (11/22/23 1130)  Resp: 16 (11/22/23 1125)  BP: (!) 145/80 (11/22/23 1125)  SpO2: 98 % (11/22/23 1125) Vital Signs (24h Range):  Temp:  [97.3 °F (36.3 °C)-99.1 °F (37.3 °C)] 97.9 °F (36.6 °C)  Pulse:  [] 104  Resp:  [16-19] 16  SpO2:  [91 %-98 %] 98 %  BP: (130-155)/(69-80) 145/80     Weight: 64.9 kg (143 lb)  Body mass index is 23.8 kg/m².    Intake/Output Summary (Last 24 hours) at 11/22/2023 1231  Last data filed at 11/22/2023 0949  Gross per 24 hour   Intake 520 ml   Output 1850 ml   Net -1330 ml         Physical Exam  Constitutional:       Appearance: Normal appearance.   HENT:      Head: Normocephalic and atraumatic.      Mouth/Throat:      Mouth: Mucous membranes are moist.      Pharynx: Oropharynx is clear.   Eyes:      Extraocular Movements: Extraocular movements intact.      Conjunctiva/sclera: Conjunctivae normal.   Cardiovascular:      Rate and Rhythm: Tachycardia present. Rhythm irregular.      Heart sounds: Normal heart sounds. No murmur heard.  Pulmonary:      Comments: Increased respiratory effort  Bibasilar crackles   Abdominal:      General: Abdomen is flat.      Palpations: Abdomen is soft.      Tenderness:  There is no abdominal tenderness.   Musculoskeletal:      Right lower leg: Edema present.      Left lower leg: Edema present.      Comments: Bilateral 2+ pitting edema up to chins    Skin:     Capillary Refill: Capillary refill takes less than 2 seconds.   Neurological:      General: No focal deficit present.      Mental Status: He is alert and oriented to person, place, and time.             Significant Labs: All pertinent labs within the past 24 hours have been reviewed.    Significant Imaging: I have reviewed all pertinent imaging results/findings within the past 24 hours.    Assessment/Plan:      * Acute hypoxic respiratory failure  88 year old male w/ a history of Afib on Eliquis, HTN, HLD, CKD3, T2DM and bladder tumor and renal cysts s/p TURBT and BL RPG on 11/16 w/ jaquez catheter removal on 11/21 who presents for tachycardia associated with increased work of breathing. Normotensive, hypoxic w/ SpO2 88% on RA and in Afib in the ED. Trop 0.262 and . CTA w/ cardiomegaly and pulmonary congestion and b/l pleural effusions. Patient received 1 x IV lasix 40 and Rocephin in the ED. Admitted to  for further evaluation.     Initially, high suspicion for new onset heart failure given hypoxia, elevated BNP and CT findings. However, TTE from 11/21 w/ preserved EF and CVP of 3. IVFs started given concern for hypovolemia in setting of acute illness     - will obtain 6mwt to determine patient's oxygen needs  - repeat CXR  - titrate supplemental O2 to maintain SpO2 > 90%    Persistent atrial fibrillation  Patient w/ long standing history of Afib on Eliquis 5mg BID. [id number] Score is 4    - continue Eliquis  - Toprol increased to 100mg for HR control  - Afib may be uncontrolled in setting of hypovolemia 2/2 acute illness. Will continue IVFs and monitor   - cardiac telemetry  - K>4 and Mg>2    Chronic anticoagulation  - see Afib      UTI (urinary tract infection)  - Patient w/ dysuria since placement of jaquez  catheter (removed 11/21) and UA suggestive of UTI  - Will continue empiric Rocephin and de-escalate pending culture results   - Ucx w/ prelim GNR      Thyroid nodules  CTA chest from admission w/ incidental findings of enlarged and heterogeneous thyroid lobes suggestive of underlying nodules  - Will order TSH and evaluate need for inpatient thyroid US. If thyroid studies abnormal, will consider further evaluation as Afib RVR/new heart failure could be secondary to hyperthyroid disease.   - TSH wnl      Elevated troponin  Initial troponin 0.262 w/o acute ischemic changes on EKG  - Likely demand ischemia in setting of Afib w/ intermittent RVR and hypoxia  - Troponin 0.262 -->0.970 -->1.262-->1.356  - TTE w/o WMA  - Continue to monitor    Bladder tumor  - Underwent TURBT and BL RPG on 11/16/2023 with urology. Pathology still pending       DM type 2, controlled, with complication  Last A1C 6.9 on 11/10/2023  - Patient on home Jardiance 10mg QD and metformin 500 QD  - Will hold home antiglycemics whilst admitted. Start LDSSI TIDWM PRN if needed to maintain inpatient BG goal of 140-180      Hyperlipidemia associated with type 2 diabetes mellitus        Hypertension associated with diabetes  - continue home antihypertensives as clinically indicated     CKD stage 3 due to type 2 diabetes mellitus  Baseline Cr ~1.4. Cr 1.1 on admission     - Daily BMP to monitor renal function and electrolytes  - Avoid nephrotoxins (NSAIDs, ACEi/ARB, IV radiocontrast, gadolinium, etc.)  - Renally dose meds        VTE Risk Mitigation (From admission, onward)           Ordered     apixaban tablet 5 mg  2 times daily         11/21/23 1150     IP VTE HIGH RISK PATIENT  Once         11/21/23 1146     Place sequential compression device  Until discontinued         11/21/23 1146                    Discharge Planning   HAILEY: 11/24/2023     Code Status: Full Code   Is the patient medically ready for discharge?: No    Reason for patient still in  hospital (select all that apply): Patient trending condition             Barbra King MD  Department of Hospital Medicine   [facility]

## 2023-11-22 NOTE — ASSESSMENT & PLAN NOTE
Initial troponin 0.262 w/o acute ischemic changes on EKG  - Likely demand ischemia in setting of Afib w/ intermittent RVR and hypoxia  - Troponin 0.262 -->0.970 -->1.262-->1.356  - TTE w/o WMA  - Continue to monitor

## 2023-11-23 LAB
ADENOVIRUS: NOT DETECTED
ALBUMIN SERPL BCP-MCNC: 2.5 G/DL (ref 3.5–5.2)
ALP SERPL-CCNC: 51 U/L (ref 55–135)
ALT SERPL W/O P-5'-P-CCNC: 20 U/L (ref 10–44)
ANION GAP SERPL CALC-SCNC: 16 MMOL/L (ref 8–16)
AST SERPL-CCNC: 25 U/L (ref 10–40)
BACTERIA UR CULT: ABNORMAL
BASOPHILS # BLD AUTO: 0.05 K/UL (ref 0–0.2)
BASOPHILS NFR BLD: 0.5 % (ref 0–1.9)
BILIRUB SERPL-MCNC: 0.7 MG/DL (ref 0.1–1)
BNP SERPL-MCNC: 342 PG/ML (ref 0–99)
BORDETELLA PARAPERTUSSIS (IS1001): NOT DETECTED
BORDETELLA PERTUSSIS (PTXP): NOT DETECTED
BUN SERPL-MCNC: 25 MG/DL (ref 8–23)
CALCIUM SERPL-MCNC: 9.6 MG/DL (ref 8.7–10.5)
CHLAMYDIA PNEUMONIAE: NOT DETECTED
CHLORIDE SERPL-SCNC: 102 MMOL/L (ref 95–110)
CO2 SERPL-SCNC: 21 MMOL/L (ref 23–29)
CORONAVIRUS 229E, COMMON COLD VIRUS: NOT DETECTED
CORONAVIRUS HKU1, COMMON COLD VIRUS: NOT DETECTED
CORONAVIRUS NL63, COMMON COLD VIRUS: NOT DETECTED
CORONAVIRUS OC43, COMMON COLD VIRUS: NOT DETECTED
CREAT SERPL-MCNC: 0.8 MG/DL (ref 0.5–1.4)
DIFFERENTIAL METHOD: ABNORMAL
EOSINOPHIL # BLD AUTO: 0.2 K/UL (ref 0–0.5)
EOSINOPHIL NFR BLD: 2.3 % (ref 0–8)
ERYTHROCYTE [DISTWIDTH] IN BLOOD BY AUTOMATED COUNT: 13.1 % (ref 11.5–14.5)
EST. GFR  (NO RACE VARIABLE): >60 ML/MIN/1.73 M^2
FLUBV RNA NPH QL NAA+NON-PROBE: NOT DETECTED
GLUCOSE SERPL-MCNC: 124 MG/DL (ref 70–110)
HCT VFR BLD AUTO: 44 % (ref 40–54)
HGB BLD-MCNC: 14.8 G/DL (ref 14–18)
HPIV1 RNA NPH QL NAA+NON-PROBE: NOT DETECTED
HPIV2 RNA NPH QL NAA+NON-PROBE: NOT DETECTED
HPIV3 RNA NPH QL NAA+NON-PROBE: NOT DETECTED
HPIV4 RNA NPH QL NAA+NON-PROBE: NOT DETECTED
HUMAN METAPNEUMOVIRUS: NOT DETECTED
IMM GRANULOCYTES # BLD AUTO: 0.15 K/UL (ref 0–0.04)
IMM GRANULOCYTES NFR BLD AUTO: 1.5 % (ref 0–0.5)
INFLUENZA A (SUBTYPES H1,H1-2009,H3): NOT DETECTED
LYMPHOCYTES # BLD AUTO: 1.2 K/UL (ref 1–4.8)
LYMPHOCYTES NFR BLD: 11.6 % (ref 18–48)
MAGNESIUM SERPL-MCNC: 1.8 MG/DL (ref 1.6–2.6)
MCH RBC QN AUTO: 32 PG (ref 27–31)
MCHC RBC AUTO-ENTMCNC: 33.6 G/DL (ref 32–36)
MCV RBC AUTO: 95 FL (ref 82–98)
MONOCYTES # BLD AUTO: 1 K/UL (ref 0.3–1)
MONOCYTES NFR BLD: 10 % (ref 4–15)
MYCOPLASMA PNEUMONIAE: NOT DETECTED
NEUTROPHILS # BLD AUTO: 7.6 K/UL (ref 1.8–7.7)
NEUTROPHILS NFR BLD: 74.1 % (ref 38–73)
NRBC BLD-RTO: 0 /100 WBC
PLATELET # BLD AUTO: 308 K/UL (ref 150–450)
PMV BLD AUTO: 12.5 FL (ref 9.2–12.9)
POTASSIUM SERPL-SCNC: 3.9 MMOL/L (ref 3.5–5.1)
PROT SERPL-MCNC: 6.6 G/DL (ref 6–8.4)
RBC # BLD AUTO: 4.62 M/UL (ref 4.6–6.2)
RESPIRATORY INFECTION PANEL SOURCE: NORMAL
RSV RNA NPH QL NAA+NON-PROBE: NOT DETECTED
RV+EV RNA NPH QL NAA+NON-PROBE: NOT DETECTED
SARS-COV-2 RNA RESP QL NAA+PROBE: NOT DETECTED
SODIUM SERPL-SCNC: 139 MMOL/L (ref 136–145)
WBC # BLD AUTO: 10.3 K/UL (ref 3.9–12.7)

## 2023-11-23 PROCEDURE — 85025 COMPLETE CBC W/AUTO DIFF WBC: CPT

## 2023-11-23 PROCEDURE — 80053 COMPREHEN METABOLIC PANEL: CPT

## 2023-11-23 PROCEDURE — 87798 DETECT AGENT NOS DNA AMP: CPT

## 2023-11-23 PROCEDURE — 25000003 PHARM REV CODE 250: Performed by: NURSE PRACTITIONER

## 2023-11-23 PROCEDURE — 83880 ASSAY OF NATRIURETIC PEPTIDE: CPT

## 2023-11-23 PROCEDURE — 21400001 HC TELEMETRY ROOM

## 2023-11-23 PROCEDURE — 36415 COLL VENOUS BLD VENIPUNCTURE: CPT

## 2023-11-23 PROCEDURE — 63600175 PHARM REV CODE 636 W HCPCS: Performed by: NURSE PRACTITIONER

## 2023-11-23 PROCEDURE — 63600175 PHARM REV CODE 636 W HCPCS

## 2023-11-23 PROCEDURE — 25000003 PHARM REV CODE 250

## 2023-11-23 PROCEDURE — 83735 ASSAY OF MAGNESIUM: CPT

## 2023-11-23 RX ORDER — METOPROLOL SUCCINATE 50 MG/1
50 TABLET, EXTENDED RELEASE ORAL ONCE
Status: COMPLETED | OUTPATIENT
Start: 2023-11-23 | End: 2023-11-23

## 2023-11-23 RX ORDER — SODIUM CHLORIDE, SODIUM LACTATE, POTASSIUM CHLORIDE, CALCIUM CHLORIDE 600; 310; 30; 20 MG/100ML; MG/100ML; MG/100ML; MG/100ML
INJECTION, SOLUTION INTRAVENOUS CONTINUOUS
Status: DISCONTINUED | OUTPATIENT
Start: 2023-11-23 | End: 2023-11-23

## 2023-11-23 RX ORDER — CEFPODOXIME PROXETIL 200 MG/1
200 TABLET, FILM COATED ORAL EVERY 12 HOURS
Status: DISCONTINUED | OUTPATIENT
Start: 2023-11-23 | End: 2023-11-23

## 2023-11-23 RX ORDER — NIFEDIPINE 30 MG/1
30 TABLET, EXTENDED RELEASE ORAL DAILY
Status: DISCONTINUED | OUTPATIENT
Start: 2023-11-23 | End: 2023-11-24

## 2023-11-23 RX ORDER — MAGNESIUM SULFATE 1 G/100ML
1 INJECTION INTRAVENOUS ONCE
Status: COMPLETED | OUTPATIENT
Start: 2023-11-23 | End: 2023-11-23

## 2023-11-23 RX ORDER — CEFPODOXIME PROXETIL 200 MG/1
200 TABLET, FILM COATED ORAL EVERY 12 HOURS
Status: DISCONTINUED | OUTPATIENT
Start: 2023-11-24 | End: 2023-11-24 | Stop reason: HOSPADM

## 2023-11-23 RX ORDER — ACETAMINOPHEN 325 MG/1
650 TABLET ORAL EVERY 6 HOURS PRN
Status: DISCONTINUED | OUTPATIENT
Start: 2023-11-23 | End: 2023-11-24 | Stop reason: HOSPADM

## 2023-11-23 RX ADMIN — METOPROLOL SUCCINATE 100 MG: 100 TABLET, EXTENDED RELEASE ORAL at 08:11

## 2023-11-23 RX ADMIN — CEFTRIAXONE SODIUM 1 G: 1 INJECTION, POWDER, FOR SOLUTION INTRAMUSCULAR; INTRAVENOUS at 11:11

## 2023-11-23 RX ADMIN — SODIUM CHLORIDE, POTASSIUM CHLORIDE, SODIUM LACTATE AND CALCIUM CHLORIDE: 600; 310; 30; 20 INJECTION, SOLUTION INTRAVENOUS at 08:11

## 2023-11-23 RX ADMIN — METOPROLOL SUCCINATE 50 MG: 50 TABLET, EXTENDED RELEASE ORAL at 01:11

## 2023-11-23 RX ADMIN — MAGNESIUM SULFATE HEPTAHYDRATE 1 G: 500 INJECTION, SOLUTION INTRAMUSCULAR; INTRAVENOUS at 09:11

## 2023-11-23 RX ADMIN — APIXABAN 5 MG: 5 TABLET, FILM COATED ORAL at 08:11

## 2023-11-23 RX ADMIN — NIFEDIPINE 30 MG: 30 TABLET, FILM COATED, EXTENDED RELEASE ORAL at 09:11

## 2023-11-23 RX ADMIN — APIXABAN 5 MG: 5 TABLET, FILM COATED ORAL at 09:11

## 2023-11-23 NOTE — ASSESSMENT & PLAN NOTE
Patient w/ long standing history of Afib on Eliquis 5mg BID. [id number] Score is 4    - continue Eliquis  - Toprol increased to 100mg for HR control  - Afib may be uncontrolled in setting of hypovolemia 2/2 acute illness. Will continue IVFs and monitor   - cardiac telemetry  - K>4 and Mg>2

## 2023-11-23 NOTE — ASSESSMENT & PLAN NOTE
- Patient w/ dysuria since placement of jaquez catheter (removed 11/21) and UA suggestive of UTI  - Will continue empiric Rocephin and de-escalate pending culture results   - Ucx w/ pansensitive E.coli; transitioned to cefpodoxime

## 2023-11-23 NOTE — PLAN OF CARE
Problem: Adult Inpatient Plan of Care  Goal: Plan of Care Review  Outcome: Ongoing, Progressing  Flowsheets (Taken 11/23/2023 0530)  Plan of Care Reviewed With: patient  Goal: Patient-Specific Goal (Individualized)  Outcome: Ongoing, Progressing  Flowsheets (Taken 11/23/2023 0530)  Anxieties, Fears or Concerns: none  Individualized Care Needs: to get better  Goal: Absence of Hospital-Acquired Illness or Injury  Outcome: Ongoing, Progressing  Intervention: Identify and Manage Fall Risk  Flowsheets (Taken 11/23/2023 0530)  Safety Promotion/Fall Prevention:   assistive device/personal item within reach   bed alarm set   lighting adjusted   medications reviewed  Intervention: Prevent Skin Injury  Flowsheets (Taken 11/23/2023 0530)  Body Position: position changed independently  Skin Protection: adhesive use limited  Intervention: Prevent and Manage VTE (Venous Thromboembolism) Risk  Flowsheets (Taken 11/23/2023 0530)  Activity Management:   Arm raise - L1   Rolling - L1   Sitting at edge of bed - L2  VTE Prevention/Management: ROM (active) performed  Range of Motion: active ROM (range of motion) encouraged  Intervention: Prevent Infection  Flowsheets (Taken 11/23/2023 0530)  Infection Prevention: environmental surveillance performed  Goal: Optimal Comfort and Wellbeing  Outcome: Ongoing, Progressing  Intervention: Monitor Pain and Promote Comfort  Flowsheets (Taken 11/23/2023 0530)  Pain Management Interventions:   relaxation techniques promoted   quiet environment facilitated  Goal: Readiness for Transition of Care  Outcome: Ongoing, Progressing     Problem: Fall Injury Risk  Goal: Absence of Fall and Fall-Related Injury  Outcome: Ongoing, Progressing  Intervention: Identify and Manage Contributors  Flowsheets (Taken 11/23/2023 0530)  Self-Care Promotion: independence encouraged  Medication Review/Management: medications reviewed  Intervention: Promote Injury-Free Environment  Flowsheets (Taken 11/23/2023  8101)  Safety Promotion/Fall Prevention:   assistive device/personal item within reach   bed alarm set   lighting adjusted   medications reviewed     Problem: Breathing Pattern Ineffective  Goal: Effective Breathing Pattern  Outcome: Ongoing, Not Progressing  Intervention: Promote Improved Breathing Pattern  Flowsheets (Taken 11/23/2023 0630)  Supportive Measures:   active listening utilized   verbalization of feelings encouraged  Head of Bed (HOB) Positioning: HOB at 30-45 degrees

## 2023-11-23 NOTE — ASSESSMENT & PLAN NOTE
88 year old male w/ a history of Afib on Eliquis, HTN, HLD, CKD3, T2DM and bladder tumor and renal cysts s/p TURBT and BL RPG on 11/16 w/ jaquez catheter removal on 11/21 who presents for tachycardia associated with increased work of breathing. Normotensive, hypoxic w/ SpO2 88% on RA and in Afib in the ED. Trop 0.262 and . CTA w/ cardiomegaly and pulmonary congestion and b/l pleural effusions. Patient received 1 x IV lasix 40 and Rocephin in the ED. Admitted to  for further evaluation.     Initially, high suspicion for new onset heart failure given hypoxia, elevated BNP and CT findings. However, TTE from 11/21 w/ preserved EF and CVP of 3. IVFs started given concern for hypovolemia in setting of acute illness     - will obtain 6mwt to determine patient's oxygen needs  - vbG  - Stop IVFs as patient appears euvolemic  - Repeat CXR stable  - Encourage ambulation to avoid worsening atelectasis  - Consider pulmonary consult   - titrate supplemental O2 to maintain SpO2 > 90%

## 2023-11-23 NOTE — PROGRESS NOTES
[facility] Medicine  Progress Note    Patient Name: Cliff Magaña  MRN: [id number]  Patient Class: IP- Inpatient   Admission Date: 11/21/2023  Length of Stay: 2 days  Attending Physician: Emily Nolen MD  Primary Care Provider: Munir Estrada MD        Subjective:     Principal Problem:Acute hypoxic respiratory failure        HPI:  Mr. Magaña is an 88 year old male w/ a history of Afib on Eliquis, HTN, HLD, CKD3, T2DM and bladder tumor and renal cysts s/p TURBT and BL RPG on 11/16 w/ jaquez catheter removal on 11/21 who presents for tachycardia. He was see for a post op visit this morning by Dr. Moffett and had his jaquez catheter removed after passing a voiding trial. He was however found to be tachycardic w/ concern for Afib RVR and was instructed to go to the ED for further evaluation. History was gathered from the patient and his family. Patient's wife reports that patient has increased work of breathing w/ associated bilateral LE edema x 1 week. Additionally, patient endorses dysuria. Denies HA, vision changes, lightheadedness, dizziness, cough, abdominal fullness, abdominal pain, decreased PO intake, decreased urinary output or stool changes. In the ED, patient normotensive w/ HR 110s. He was found to be hypoxic on RA w/ SpO2 ~88% which subsequently improved w/ 2L NC. Labs notable for Na 134, CO2 17, Cr 1.1 (baseline). Trop 0.262 and . UA concerning for UTI. CTA did not show a pulmonary embolism but did show cardiomegaly w/ pulmonary congestion and b/l pleural effusions. Patient received 1 x IV lasix 40 and Rocephin in the ED. Admitted to  for further evaluation.     Overview/Hospital Course:  Patient initially admitted w/ concern for new acute onset heart failure. However, TTE on 11/21 showed a preserved EF w/ a CVP 3. Upon re-evaluation, concern for hypovolemic state in setting of acute UTI that could be contributing to uncontrolled Afib. Now appears to be euvolemic, however,  still requiring O2. Will evaluate w/ vBG and obtain respiratory infection panel. If negative, will consider pulmonary consult.     Interval History: No acute events overnight. Remains HDS on 5L. Reports no symptoms but appears SOB on examination. Labs stable. Euvolemic      Review of Systems   Constitutional:  Negative for activity change, appetite change, chills and fever.   Respiratory:  Positive for shortness of breath. Negative for cough.    Gastrointestinal:  Negative for abdominal pain, nausea and vomiting.   Genitourinary:  Positive for dysuria. Negative for difficulty urinating.   Skin:  Negative for color change and pallor.   Neurological:  Negative for dizziness, light-headedness and headaches.   Psychiatric/Behavioral:  Negative for agitation and behavioral problems.      Objective:     Vital Signs (Most Recent):  Temp: 97.8 °F (36.6 °C) (11/23/23 1106)  Pulse: 97 (11/23/23 1106)  Resp: 18 (11/23/23 1106)  BP: 125/75 (11/23/23 1106)  SpO2: (!) 92 % (11/23/23 1106) Vital Signs (24h Range):  Temp:  [97.8 °F (36.6 °C)-98.6 °F (37 °C)] 97.8 °F (36.6 °C)  Pulse:  [] 97  Resp:  [16-18] 18  SpO2:  [90 %-92 %] 92 %  BP: (125-173)/(70-88) 125/75     Weight: 64.9 kg (143 lb)  Body mass index is 23.8 kg/m².    Intake/Output Summary (Last 24 hours) at 11/23/2023 1129  Last data filed at 11/23/2023 0108  Gross per 24 hour   Intake 120 ml   Output 600 ml   Net -480 ml         Physical Exam  Constitutional:       Appearance: Normal appearance.   HENT:      Head: Normocephalic and atraumatic.      Mouth/Throat:      Mouth: Mucous membranes are moist.      Pharynx: Oropharynx is clear.   Eyes:      Extraocular Movements: Extraocular movements intact.      Conjunctiva/sclera: Conjunctivae normal.   Cardiovascular:      Rate and Rhythm: Tachycardia present. Rhythm irregular.      Heart sounds: Normal heart sounds. No murmur heard.  Pulmonary:      Comments: Increased respiratory effort  Bibasilar crackles   Abdominal:       General: Abdomen is flat.      Palpations: Abdomen is soft.      Tenderness: There is no abdominal tenderness.   Musculoskeletal:      Right lower leg: Edema present.      Left lower leg: Edema present.      Comments: Bilateral 2+ pitting edema up to chins    Skin:     Capillary Refill: Capillary refill takes less than 2 seconds.   Neurological:      General: No focal deficit present.      Mental Status: He is alert and oriented to person, place, and time.             Significant Labs: All pertinent labs within the past 24 hours have been reviewed.    Significant Imaging: I have reviewed all pertinent imaging results/findings within the past 24 hours.    Assessment/Plan:      * Acute hypoxic respiratory failure  88 year old male w/ a history of Afib on Eliquis, HTN, HLD, CKD3, T2DM and bladder tumor and renal cysts s/p TURBT and BL RPG on 11/16 w/ jaquez catheter removal on 11/21 who presents for tachycardia associated with increased work of breathing. Normotensive, hypoxic w/ SpO2 88% on RA and in Afib in the ED. Trop 0.262 and . CTA w/ cardiomegaly and pulmonary congestion and b/l pleural effusions. Patient received 1 x IV lasix 40 and Rocephin in the ED. Admitted to  for further evaluation.     Initially, high suspicion for new onset heart failure given hypoxia, elevated BNP and CT findings. However, TTE from 11/21 w/ preserved EF and CVP of 3. IVFs started given concern for hypovolemia in setting of acute illness     - will obtain 6mwt to determine patient's oxygen needs  - vbG  - Stop IVFs as patient appears euvolemic  - Repeat CXR stable  - Encourage ambulation to avoid worsening atelectasis  - Consider pulmonary consult   - titrate supplemental O2 to maintain SpO2 > 90%    Persistent atrial fibrillation  Patient w/ long standing history of Afib on Eliquis 5mg BID. [id number] Score is 4    - continue Eliquis  - Toprol increased to 100mg for HR control  - Afib may be uncontrolled in setting of  hypovolemia 2/2 acute illness. Will continue IVFs and monitor   - cardiac telemetry  - K>4 and Mg>2    Chronic anticoagulation  - see Afib      UTI (urinary tract infection)  - Patient w/ dysuria since placement of jaquez catheter (removed 11/21) and UA suggestive of UTI  - Will continue empiric Rocephin and de-escalate pending culture results   - Ucx w/ pansensitive E.coli; transitioned to cefpodoxime     Thyroid nodules  CTA chest from admission w/ incidental findings of enlarged and heterogeneous thyroid lobes suggestive of underlying nodules  - Will order TSH and evaluate need for inpatient thyroid US. If thyroid studies abnormal, will consider further evaluation as Afib RVR/new heart failure could be secondary to hyperthyroid disease.   - TSH wnl      Elevated troponin  Initial troponin 0.262 w/o acute ischemic changes on EKG  - Likely demand ischemia in setting of Afib w/ intermittent RVR and hypoxia  - Troponin 0.262 -->0.970 -->1.262-->1.356  - TTE w/o WMA  - Continue to monitor    Bladder tumor  - Underwent TURBT and BL RPG on 11/16/2023 with urology. Pathology still pending       DM type 2, controlled, with complication  Last A1C 6.9 on 11/10/2023  - Patient on home Jardiance 10mg QD and metformin 500 QD  - Will hold home antiglycemics whilst admitted. Start LDSSI TIDWM PRN if needed to maintain inpatient BG goal of 140-180      Hyperlipidemia associated with type 2 diabetes mellitus        Hypertension associated with diabetes  - continue home antihypertensives as clinically indicated     CKD stage 3 due to type 2 diabetes mellitus  Baseline Cr ~1.4. Cr 1.1 on admission     - Daily BMP to monitor renal function and electrolytes  - Avoid nephrotoxins (NSAIDs, ACEi/ARB, IV radiocontrast, gadolinium, etc.)  - Renally dose meds        VTE Risk Mitigation (From admission, onward)           Ordered     apixaban tablet 5 mg  2 times daily         11/21/23 1150     IP VTE HIGH RISK PATIENT  Once         11/21/23  1146     Place sequential compression device  Until discontinued         11/21/23 1146                    Discharge Planning   HAILEY: 11/24/2023     Code Status: Full Code   Is the patient medically ready for discharge?: No    Reason for patient still in hospital (select all that apply): Patient trending condition  Discharge Plan A: Home, Home with family                  Barbra King MD  Department of Hospital Medicine   [facility]

## 2023-11-23 NOTE — SUBJECTIVE & OBJECTIVE
Interval History: No acute events overnight. Remains HDS on 5L. Reports no symptoms but appears SOB on examination. Labs stable. Euvolemic      Review of Systems   Constitutional:  Negative for activity change, appetite change, chills and fever.   Respiratory:  Positive for shortness of breath. Negative for cough.    Gastrointestinal:  Negative for abdominal pain, nausea and vomiting.   Genitourinary:  Positive for dysuria. Negative for difficulty urinating.   Skin:  Negative for color change and pallor.   Neurological:  Negative for dizziness, light-headedness and headaches.   Psychiatric/Behavioral:  Negative for agitation and behavioral problems.      Objective:     Vital Signs (Most Recent):  Temp: 97.8 °F (36.6 °C) (11/23/23 1106)  Pulse: 97 (11/23/23 1106)  Resp: 18 (11/23/23 1106)  BP: 125/75 (11/23/23 1106)  SpO2: (!) 92 % (11/23/23 1106) Vital Signs (24h Range):  Temp:  [97.8 °F (36.6 °C)-98.6 °F (37 °C)] 97.8 °F (36.6 °C)  Pulse:  [] 97  Resp:  [16-18] 18  SpO2:  [90 %-92 %] 92 %  BP: (125-173)/(70-88) 125/75     Weight: 64.9 kg (143 lb)  Body mass index is 23.8 kg/m².    Intake/Output Summary (Last 24 hours) at 11/23/2023 1129  Last data filed at 11/23/2023 0108  Gross per 24 hour   Intake 120 ml   Output 600 ml   Net -480 ml         Physical Exam  Constitutional:       Appearance: Normal appearance.   HENT:      Head: Normocephalic and atraumatic.      Mouth/Throat:      Mouth: Mucous membranes are moist.      Pharynx: Oropharynx is clear.   Eyes:      Extraocular Movements: Extraocular movements intact.      Conjunctiva/sclera: Conjunctivae normal.   Cardiovascular:      Rate and Rhythm: Tachycardia present. Rhythm irregular.      Heart sounds: Normal heart sounds. No murmur heard.  Pulmonary:      Comments: Increased respiratory effort  Bibasilar crackles   Abdominal:      General: Abdomen is flat.      Palpations: Abdomen is soft.      Tenderness: There is no abdominal tenderness.    Musculoskeletal:      Right lower leg: Edema present.      Left lower leg: Edema present.      Comments: Bilateral 2+ pitting edema up to chins    Skin:     Capillary Refill: Capillary refill takes less than 2 seconds.   Neurological:      General: No focal deficit present.      Mental Status: He is alert and oriented to person, place, and time.             Significant Labs: All pertinent labs within the past 24 hours have been reviewed.    Significant Imaging: I have reviewed all pertinent imaging results/findings within the past 24 hours.

## 2023-11-24 VITALS
WEIGHT: 143 LBS | HEART RATE: 86 BPM | RESPIRATION RATE: 16 BRPM | BODY MASS INDEX: 23.82 KG/M2 | DIASTOLIC BLOOD PRESSURE: 86 MMHG | OXYGEN SATURATION: 91 % | SYSTOLIC BLOOD PRESSURE: 181 MMHG | TEMPERATURE: 98 F | HEIGHT: 65 IN

## 2023-11-24 LAB
ALBUMIN SERPL BCP-MCNC: 2.4 G/DL (ref 3.5–5.2)
ALLENS TEST: ABNORMAL
ALP SERPL-CCNC: 55 U/L (ref 55–135)
ALT SERPL W/O P-5'-P-CCNC: 27 U/L (ref 10–44)
ANION GAP SERPL CALC-SCNC: 13 MMOL/L (ref 8–16)
AST SERPL-CCNC: 30 U/L (ref 10–40)
BASOPHILS # BLD AUTO: 0.06 K/UL (ref 0–0.2)
BASOPHILS NFR BLD: 0.7 % (ref 0–1.9)
BILIRUB SERPL-MCNC: 0.7 MG/DL (ref 0.1–1)
BUN SERPL-MCNC: 23 MG/DL (ref 8–23)
CALCIUM SERPL-MCNC: 9.2 MG/DL (ref 8.7–10.5)
CHLORIDE SERPL-SCNC: 103 MMOL/L (ref 95–110)
CO2 SERPL-SCNC: 22 MMOL/L (ref 23–29)
CREAT SERPL-MCNC: 0.8 MG/DL (ref 0.5–1.4)
DELSYS: ABNORMAL
DIFFERENTIAL METHOD: ABNORMAL
EOSINOPHIL # BLD AUTO: 0.4 K/UL (ref 0–0.5)
EOSINOPHIL NFR BLD: 4.3 % (ref 0–8)
ERYTHROCYTE [DISTWIDTH] IN BLOOD BY AUTOMATED COUNT: 12.8 % (ref 11.5–14.5)
EST. GFR  (NO RACE VARIABLE): >60 ML/MIN/1.73 M^2
FIO2: 36
FLOW: 4
GLUCOSE SERPL-MCNC: 136 MG/DL (ref 70–110)
HCO3 UR-SCNC: 24.5 MMOL/L (ref 24–28)
HCT VFR BLD AUTO: 44.8 % (ref 40–54)
HCT VFR BLD CALC: 42 %PCV (ref 36–54)
HGB BLD-MCNC: 15 G/DL (ref 14–18)
IMM GRANULOCYTES # BLD AUTO: 0.07 K/UL (ref 0–0.04)
IMM GRANULOCYTES NFR BLD AUTO: 0.8 % (ref 0–0.5)
LYMPHOCYTES # BLD AUTO: 1.4 K/UL (ref 1–4.8)
LYMPHOCYTES NFR BLD: 16.5 % (ref 18–48)
MAGNESIUM SERPL-MCNC: 2 MG/DL (ref 1.6–2.6)
MCH RBC QN AUTO: 31.6 PG (ref 27–31)
MCHC RBC AUTO-ENTMCNC: 33.5 G/DL (ref 32–36)
MCV RBC AUTO: 95 FL (ref 82–98)
MODE: ABNORMAL
MONOCYTES # BLD AUTO: 1 K/UL (ref 0.3–1)
MONOCYTES NFR BLD: 11.7 % (ref 4–15)
NEUTROPHILS # BLD AUTO: 5.6 K/UL (ref 1.8–7.7)
NEUTROPHILS NFR BLD: 66 % (ref 38–73)
NRBC BLD-RTO: 0 /100 WBC
PCO2 BLDA: 38.6 MMHG (ref 35–45)
PH SMN: 7.41 [PH] (ref 7.35–7.45)
PLATELET # BLD AUTO: 332 K/UL (ref 150–450)
PMV BLD AUTO: 11.9 FL (ref 9.2–12.9)
PO2 BLDA: 83 MMHG (ref 80–100)
POC BE: 0 MMOL/L
POC IONIZED CALCIUM: 1.25 MMOL/L (ref 1.06–1.42)
POC SATURATED O2: 96 % (ref 95–100)
POC TCO2: 26 MMOL/L (ref 23–27)
POTASSIUM BLD-SCNC: 3.9 MMOL/L (ref 3.5–5.1)
POTASSIUM SERPL-SCNC: 3.8 MMOL/L (ref 3.5–5.1)
PROT SERPL-MCNC: 6.5 G/DL (ref 6–8.4)
RBC # BLD AUTO: 4.74 M/UL (ref 4.6–6.2)
SAMPLE: ABNORMAL
SITE: ABNORMAL
SODIUM BLD-SCNC: 135 MMOL/L (ref 136–145)
SODIUM SERPL-SCNC: 138 MMOL/L (ref 136–145)
WBC # BLD AUTO: 8.44 K/UL (ref 3.9–12.7)

## 2023-11-24 PROCEDURE — 85014 HEMATOCRIT: CPT

## 2023-11-24 PROCEDURE — 85025 COMPLETE CBC W/AUTO DIFF WBC: CPT

## 2023-11-24 PROCEDURE — 83735 ASSAY OF MAGNESIUM: CPT

## 2023-11-24 PROCEDURE — 84132 ASSAY OF SERUM POTASSIUM: CPT

## 2023-11-24 PROCEDURE — 84295 ASSAY OF SERUM SODIUM: CPT

## 2023-11-24 PROCEDURE — 94761 N-INVAS EAR/PLS OXIMETRY MLT: CPT | Mod: XB

## 2023-11-24 PROCEDURE — 82330 ASSAY OF CALCIUM: CPT

## 2023-11-24 PROCEDURE — 99900035 HC TECH TIME PER 15 MIN (STAT)

## 2023-11-24 PROCEDURE — 36600 WITHDRAWAL OF ARTERIAL BLOOD: CPT

## 2023-11-24 PROCEDURE — 27000221 HC OXYGEN, UP TO 24 HOURS

## 2023-11-24 PROCEDURE — 36415 COLL VENOUS BLD VENIPUNCTURE: CPT

## 2023-11-24 PROCEDURE — 25000003 PHARM REV CODE 250

## 2023-11-24 PROCEDURE — 82803 BLOOD GASES ANY COMBINATION: CPT

## 2023-11-24 PROCEDURE — 80053 COMPREHEN METABOLIC PANEL: CPT

## 2023-11-24 RX ORDER — POTASSIUM CHLORIDE 20 MEQ/1
40 TABLET, EXTENDED RELEASE ORAL
Status: COMPLETED | OUTPATIENT
Start: 2023-11-24 | End: 2023-11-24

## 2023-11-24 RX ORDER — NIFEDIPINE 60 MG/1
60 TABLET, EXTENDED RELEASE ORAL DAILY
Status: DISCONTINUED | OUTPATIENT
Start: 2023-11-24 | End: 2023-11-24

## 2023-11-24 RX ORDER — CEFPODOXIME PROXETIL 200 MG/1
200 TABLET, FILM COATED ORAL EVERY 12 HOURS
Qty: 6 TABLET | Refills: 0 | Status: SHIPPED | OUTPATIENT
Start: 2023-11-24 | End: 2023-11-27

## 2023-11-24 RX ORDER — METOPROLOL SUCCINATE 50 MG/1
150 TABLET, EXTENDED RELEASE ORAL DAILY
Qty: 90 TABLET | Refills: 11 | Status: SHIPPED | OUTPATIENT
Start: 2023-11-25 | End: 2023-11-24 | Stop reason: SDUPTHER

## 2023-11-24 RX ORDER — CEFPODOXIME PROXETIL 200 MG/1
200 TABLET, FILM COATED ORAL EVERY 12 HOURS
Status: CANCELLED | OUTPATIENT
Start: 2023-11-24 | End: 2023-11-27

## 2023-11-24 RX ORDER — PHENAZOPYRIDINE HYDROCHLORIDE 100 MG/1
100 TABLET, FILM COATED ORAL 3 TIMES DAILY PRN
Qty: 21 TABLET | Refills: 0 | Status: SHIPPED | OUTPATIENT
Start: 2023-11-24 | End: 2023-12-01

## 2023-11-24 RX ORDER — METOPROLOL SUCCINATE 200 MG/1
200 TABLET, EXTENDED RELEASE ORAL DAILY
Qty: 30 TABLET | Refills: 11 | Status: SHIPPED | OUTPATIENT
Start: 2023-11-25 | End: 2023-12-11

## 2023-11-24 RX ADMIN — APIXABAN 5 MG: 5 TABLET, FILM COATED ORAL at 08:11

## 2023-11-24 RX ADMIN — NIFEDIPINE 90 MG: 60 TABLET, FILM COATED, EXTENDED RELEASE ORAL at 08:11

## 2023-11-24 RX ADMIN — CEFPODOXIME PROXETIL 200 MG: 200 TABLET, FILM COATED ORAL at 08:11

## 2023-11-24 RX ADMIN — POTASSIUM CHLORIDE 40 MEQ: 1500 TABLET, EXTENDED RELEASE ORAL at 10:11

## 2023-11-24 RX ADMIN — POTASSIUM CHLORIDE 40 MEQ: 1500 TABLET, EXTENDED RELEASE ORAL at 08:11

## 2023-11-24 RX ADMIN — METOPROLOL SUCCINATE 150 MG: 100 TABLET, EXTENDED RELEASE ORAL at 08:11

## 2023-11-24 NOTE — NURSING
"Dr. Will cleaning for med team V notified of elevated bp 176/86. Per md "no need to treat" at this time. Pt received mag and calcium per nurse at the beginning of shift for elevated bp.  "

## 2023-11-24 NOTE — PLAN OF CARE
Problem: Adult Inpatient Plan of Care  Goal: Plan of Care Review  Outcome: Ongoing, Progressing  Goal: Patient-Specific Goal (Individualized)  Outcome: Ongoing, Progressing  Goal: Absence of Hospital-Acquired Illness or Injury  Outcome: Ongoing, Progressing  Goal: Optimal Comfort and Wellbeing  Outcome: Ongoing, Progressing  Goal: Readiness for Transition of Care  Outcome: Ongoing, Progressing     Problem: Fall Injury Risk  Goal: Absence of Fall and Fall-Related Injury  Outcome: Ongoing, Progressing     Problem: Breathing Pattern Ineffective  Goal: Effective Breathing Pattern  Outcome: Ongoing, Progressing

## 2023-11-24 NOTE — NURSING
AVS/DC instructions given to patient with all questions/concerns addressed. PIV removed with direct pressure applied. Awaiting bedside rx delivery. No concerns as of present.

## 2023-11-24 NOTE — DISCHARGE SUMMARY
[facility] Medicine  Discharge Summary      Patient Name: Cliff Magaña  MRN: [id number]  ANA: [id number]  Patient Class: IP- Inpatient  Admission Date: 11/21/2023  Hospital Length of Stay: 3 days  Discharge Date and Time:  11/24/2023 1:30 PM  Attending Physician: Emily Nolen MD   Discharging Provider: Barbra King MD  Primary Care Provider: Munir Estrada MD  [facility] Medicine Team: [facility] MED  Barbra King MD  Primary Care Team: [facility]    HPI:   Mr. Magaña is an 88 year old male w/ a history of Afib on Eliquis, HTN, HLD, CKD3, T2DM and bladder tumor and renal cysts s/p TURBT and BL RPG on 11/16 w/ jaquez catheter removal on 11/21 who presents for tachycardia. He was see for a post op visit this morning by Dr. Moffett and had his jaquez catheter removed after passing a voiding trial. He was however found to be tachycardic w/ concern for Afib RVR and was instructed to go to the ED for further evaluation. History was gathered from the patient and his family. Patient's wife reports that patient has increased work of breathing w/ associated bilateral LE edema x 1 week. Additionally, patient endorses dysuria. Denies HA, vision changes, lightheadedness, dizziness, cough, abdominal fullness, abdominal pain, decreased PO intake, decreased urinary output or stool changes. In the ED, patient normotensive w/ HR 110s. He was found to be hypoxic on RA w/ SpO2 ~88% which subsequently improved w/ 2L NC. Labs notable for Na 134, CO2 17, Cr 1.1 (baseline). Trop 0.262 and . UA concerning for UTI. CTA did not show a pulmonary embolism but did show cardiomegaly w/ pulmonary congestion and b/l pleural effusions. Patient received 1 x IV lasix 40 and Rocephin in the ED. Admitted to  for further evaluation.     * No surgery found *      Hospital Course:   Patient initially admitted w/ concern for new acute onset heart failure. However, TTE on 11/21 showed a preserved EF w/ a CVP 3. Upon  re-evaluation, concern for hypovolemic state in setting of acute UTI that could be contributing to uncontrolled Afib. Now appears to be euvolemic. ABG reviewed; no abnormalities. Oxygen titrated and discontinued. Passed 6MWT. HR improved; Toprol increased to 200mg QD (hold parameters given for HR <70). IV abx transitioned to PO cefpodoxime; end date 11/27 for a total of 7 days to treat UTI. Unfortunately, path from recent bladder tumor biopsy came back w/ noninvasive high-grade papillary urothelial carcinoma. Dr. Moffett called patient and family to inform them of the pathology. Plan for further outpatient workup included CT A/P scheduled for tomorrow. Will discharge patient home w/ PCP, urology and cardiology follow up. Referral placed to outpatient oncology. Discharged in stable condition. All questions answered. Return precautions given.        Goals of Care Treatment Preferences:  Code Status: Full Code    Living Will: Yes              Consults:     No new Assessment & Plan notes have been filed under this hospital service since the last note was generated.  Service: Hospital Medicine    Final Active Diagnoses:    Diagnosis Date Noted POA    PRINCIPAL PROBLEM:  Acute hypoxic respiratory failure [J96.01] 11/21/2023 Unknown    Persistent atrial fibrillation [I48.19] 06/11/2014 Yes     Chronic    Chronic anticoagulation [Z79.01] 11/23/2012 Not Applicable    UTI (urinary tract infection) [N39.0] 11/21/2023 Unknown    Elevated troponin [R79.89] 11/21/2023 Unknown    Thyroid nodules [E04.2] 11/21/2023 Unknown    Bladder tumor [D49.4] 11/16/2023 Yes    DM type 2, controlled, with complication [E11.8] 12/14/2022 Yes    Hypertension associated with diabetes [E11.59, I15.2] 05/22/2013 Yes    CKD stage 3 due to type 2 diabetes mellitus [E11.22, N18.30] 11/23/2012 Yes      Problems Resolved During this Admission:       Discharged Condition: stable    Disposition: Home or Self Care    Follow Up:   Follow-up Information        Marcelino Moffett MD Follow up in 1 week(s).    Specialty: Urology  Why: Follow up with your PCP  Contact information:  [address]  [facility] [address]  [phone]               Munir Estrada MD Follow up in 1 week(s).    Specialty: Family Medicine  Why: See your PCP within 7-10 days from discharge  Contact information:  [address] [facility] [address]  [phone]               Nereyda Birmingham MD Follow up in 1 week(s).    Specialty: Cardiology  Why: Make a follow up appointment with your cardiology 1-2 weeks after discharge  Contact information:  [address] [facility]  [address]TH FLO[address]  [facility] [address]  [phone]               [facility] HEMATOLOGY/ONCOLOGY Follow up in 1 week(s).    Specialty: Hematology and Oncology  Why: Call the oncology deparment if you don't hear from them in 2-3 weeks.  Contact information:  [address]  [facility] [address]  [phone]                         Patient Instructions:      Ambulatory referral/consult to Oncology   Standing Status: Future   Referral Priority: Routine Referral Type: Consultation   Referral Reason: Specialty Services Required   Requested Specialty: Oncology   Number of Visits Requested: 1       Significant Diagnostic Studies: Labs: All labs within the past 24 hours have been reviewed    Pending Diagnostic Studies:       None           Medications:  Reconciled Home Medications:      Medication List        START taking these medications      cefpodoxime 200 MG tablet  Commonly known as: VANTIN  Take 1 tablet (200 mg total) by mouth every 12 (twelve) hours. for 3 days            CHANGE how you take these medications      metoprolol succinate 200 MG 24 hr tablet  Commonly known as: TOPROL-XL  Take 1 tablet (200 mg total) by mouth once daily. Take 1 tablet (200 mg total) by mouth once daily. Hold if heart rate < 70 bpm.  Start taking on: November 25, 2023  What changed:   medication strength  how much to take  when  to take this  additional instructions  Another medication with the same name was removed. Continue taking this medication, and follow the directions you see here.            CONTINUE taking these medications      acetaminophen 650 MG Tbsr  Commonly known as: TYLENOL  Take 650 mg by mouth every 8 (eight) hours.     apixaban 5 mg Tab  Commonly known as: ELIQUIS  Take 1 tablet (5 mg total) by mouth 2 (two) times daily.     cholecalciferol (vitamin D3) 50 mcg (2,000 unit) Cap capsule  Commonly known as: VITAMIN D3  Take by mouth once daily.     empagliflozin 10 mg tablet  Commonly known as: Jardiance  Take 1 tablet (10 mg total) by mouth once daily.     fenofibrate micronized 200 MG Cap  Commonly known as: LOFIBRA  TAKE 1 CAPSULE DAILY WITH BREAKFAST     loratadine 10 mg tablet  Commonly known as: CLARITIN  Take 10 mg by mouth once daily.     TRAMAINE BIOTIN ORAL  Take 5,000 mcg by mouth once daily.     MEGARED JOINT CARE ORAL     metFORMIN 500 MG ER 24hr tablet  Commonly known as: GLUCOPHAGE-XR  Take 1 tablet (500 mg total) by mouth once daily.     NIFEdipine 90 MG (OSM) 24 hr tablet  Commonly known as: PROCARDIA-XL  Take 1 tablet (90 mg total) by mouth once daily.     ondansetron 4 MG Tbdl  Commonly known as: ZOFRAN-ODT  Dissolve 1 tablet (4 mg total) by mouth every 6 (six) hours as needed (nausea or vomitting).     oxybutynin 5 MG Tab  Commonly known as: DITROPAN  Take 1 tablet (5 mg total) by mouth 3 (three) times daily as needed (bladder spasm).     phenazopyridine 100 MG tablet  Commonly known as: PYRIDIUM  Take 1 tablet (100 mg total) by mouth 3 (three) times daily as needed for Pain.            STOP taking these medications      ketorolac 10 mg tablet  Commonly known as: TORADOL              Indwelling Lines/Drains at time of discharge:   Lines/Drains/Airways       None                   Time spent on the discharge of patient: 35 minutes         Barbra King MD  Department of Hospital Medicine  [facility]  Surg

## 2023-11-24 NOTE — NURSING
Patient titrated off supplemental O2 with SATs at 92% on RA. Nurse to check SATs periodically to ensure patient is maintaining above goal of 90%. No concerns as of present.

## 2023-11-24 NOTE — NURSING
Home Oxygen Evaluation    Date Performed: 2023    1) Patient's Home O2 Sat on room air, while at rest: 94%        If O2 sats on room air at rest are 88% or below, patient qualifies. No additional testing needed. Document N/A in steps 2 and 3. If 89% or above, complete steps 2.      2) Patient's O2 Sat on room air while exercisin%        If O2 sats on room air while exercising remain 89% or above patient does not qualify, no further testing needed Document N/A in step 3. If O2 sats on room air while exercising are 88% or below, continue to step 3.      3) Patient's O2 Sat while exercising on O2: NA at NA LPM         (Must show improvement from #2 for patients to qualify)    If O2 sats improve on oxygen, patient qualifies for portable oxygen. If not, the patient does not qualify.

## 2023-11-24 NOTE — PLAN OF CARE
[facility] - Med Surg  Discharge Final Note    Primary Care Provider: Munir Estrada MD    Expected Discharge Date: 11/24/2023    Pt discharged home with no needs.  Family provided transportation home.      Future Appointments   Date Time Provider Department Center   11/25/2023  1:00 PM [facility] OIC-CT1 500 LB LIMIT [facility]   12/6/2023  9:30 AM Marcelino Moffett MD [facility] UROLOGC [facility]   2/7/2024  8:00 AM Thi Castillo, NP [facility] NEPHRO [facility]   2/9/2024  2:00 PM Bhupendra Brar, DPM [facility] PODIAT [facility]   3/5/2024  2:00 PM Arash Gandara MD [facility] ENT [facility]         Final Discharge Note (most recent)       Final Note - 11/24/23 1550          Final Note    Assessment Type Final Discharge Note     Anticipated Discharge Disposition Home or Self Care        Post-Acute Status    Post-Acute Authorization Other     Coverage Medicare A & B     Other Status No Post-Acute Service Needs     Discharge Delays None known at this time                     Important Message from Medicare  Important Message from Medicare regarding Discharge Appeal Rights: Given to patient/caregiver, Explained to patient/caregiver, Signed/date by patient/caregiver     Date IMM was signed: 11/24/23  Time IMM was signed: 1412    Contact Info       Marcelino Moffett MD   Specialty: Urology    [address]  [facility] [address]   Phone: [phone]       Next Steps: Follow up in 1 week(s)    Instructions: Follow up with your PCP    Munir Estrada MD   Specialty: Family Medicine   Relationship: PCP - General    [address] [facility] [address]   Phone: [phone]       Next Steps: Follow up in 1 week(s)    Instructions: See your PCP within 7-10 days from discharge    Nereyda Birmingham MD   Specialty: Cardiology   Relationship: Consulting Physician  Hypertension Digital Medicine Responsible Provider    [address] [facility]  [address]TH FLOOR  [facility] [address]   Phone: [phone]        Next Steps: Follow up in 1 week(s)    Instructions: Make a follow up appointment with your cardiology 1-2 weeks after discharge    [facility] HEMATOLOGY/ONCOLOGY   Specialty: Hematology and Oncology    [address]  [facility] [address]   Phone: [phone]       Next Steps: Follow up in 1 week(s)    Instructions: Call the oncology deparment if you don't hear from them in 2-3 weeks.          Danya Mace LMSW  Part-Time-  [facility]  Ext. [phone]

## 2023-11-25 ENCOUNTER — HOSPITAL ENCOUNTER (OUTPATIENT)
Dept: RADIOLOGY | Facility: HOSPITAL | Age: 88
Discharge: HOME | End: 2023-11-25
Attending: UROLOGY
Payer: MEDICARE

## 2023-11-25 DIAGNOSIS — C67.3 MALIGNANT NEOPLASM OF ANTERIOR WALL OF URINARY BLADDER: ICD-10-CM

## 2023-11-25 PROCEDURE — 74177 CT ABD & PELVIS W/CONTRAST: CPT | Mod: 26,,, | Performed by: INTERNAL MEDICINE

## 2023-11-25 PROCEDURE — 74177 CT ABDOMEN PELVIS WITH IV CONTRAST: ICD-10-PCS | Mod: 26,,, | Performed by: INTERNAL MEDICINE

## 2023-11-25 PROCEDURE — 74177 CT ABD & PELVIS W/CONTRAST: CPT | Mod: TC

## 2023-11-25 PROCEDURE — 25500020 PHARM REV CODE 255: Performed by: UROLOGY

## 2023-11-25 RX ADMIN — IOHEXOL 100 ML: 350 INJECTION, SOLUTION INTRAVENOUS at 12:11

## 2023-11-27 ENCOUNTER — PATIENT OUTREACH (OUTPATIENT)
Dept: ADMINISTRATIVE | Facility: CLINIC | Age: 88
End: 2023-11-27
Payer: MEDICARE

## 2023-11-27 NOTE — PROGRESS NOTES
C3 nurse spoke with Cliff Magaña and his spouse Nereyda, for a TCC post hospital discharge follow up call. Pt denies any new symptoms or complaints. The patient did not have a scheduled HOSFU appointment with his PCP, Munir Estrada MD within 5-7 days post hospital discharge date of 11/24/23. C3 nurse was unable to schedule HOSFU appointment in [facility] but did schedule a HOSFU NPHV with Kelly Casillas NP for 12/4/23 and the pt verbalized understanding. No messages routed at this time.

## 2023-11-28 NOTE — PHYSICIAN QUERY
PT Name: Cliff Magaña  MR #: [id number]    DOCUMENTATION CLARIFICATION     CDS: Lavern Anders RN         Contact information:[email or web address] or (cell) [phone]    This form is a permanent document in the medical record.     Query Date: November 28, 2023    By submitting this query, we are merely seeking further clarification of documentation. Please utilize your independent clinical judgment when addressing the question(s) below.    Supporting Clinical Findings Location in Medical Record   UTI (urinary tract infection)    - Patient w/ dysuria since placement of jaquez catheter (removed 11/21) and UA suggestive of UTI  - Will continue empiric Rocephin and de-escalate pending culture results   - Ucx w/ pansensitive E.coli; transitioned to cefpodoxime   HM PN 11/23     Urine Culture 11/21- ESCHERICHIA COLI   Micro       Provider, please clarify if there is any clinical correlation between Jaquez Catheter and UTI.           Are the conditions:      [  ] Due to or associated with each other     [ x ] Unrelated to each other     [  ] Other explanation (Please Specify): ______________     [  ] Clinically Undetermined

## 2023-12-04 ENCOUNTER — PATIENT MESSAGE (OUTPATIENT)
Dept: INTERNAL MEDICINE | Facility: CLINIC | Age: 88
End: 2023-12-04
Payer: MEDICARE

## 2023-12-04 ENCOUNTER — OFFICE VISIT (OUTPATIENT)
Dept: HOME HEALTH SERVICES | Facility: CLINIC | Age: 88
End: 2023-12-04
Payer: MEDICARE

## 2023-12-04 VITALS
DIASTOLIC BLOOD PRESSURE: 89 MMHG | TEMPERATURE: 98 F | RESPIRATION RATE: 18 BRPM | OXYGEN SATURATION: 98 % | HEART RATE: 83 BPM | SYSTOLIC BLOOD PRESSURE: 140 MMHG

## 2023-12-04 DIAGNOSIS — N28.1 RENAL CYST: Primary | ICD-10-CM

## 2023-12-04 PROCEDURE — 99495 TCM SERVICES (MODERATE COMPLEXITY): ICD-10-PCS | Mod: S$GLB,,, | Performed by: NURSE PRACTITIONER

## 2023-12-04 PROCEDURE — 99495 TRANSJ CARE MGMT MOD F2F 14D: CPT | Mod: S$GLB,,, | Performed by: NURSE PRACTITIONER

## 2023-12-04 NOTE — TELEPHONE ENCOUNTER
I spoke to pt's wife, he was dx with bladder cancer. Appt with his oncologist is scheduled on 12-6-23.  Wife feels an appt with PCP is not needed at this time.    
Statement Selected

## 2023-12-04 NOTE — PROGRESS NOTES
[facility]  Transitional Care Management (TCM) Home Visit    Encounter Provider: Kelly RAND Chairs   PCP: Munir Estrada MD  Consult Requested By: No ref. provider found  Admit Date: 11/21/23   IP Discharge Date: 11/24/23  Hospital Length of Stay:RRHLOS@ days  Admission Date: 11/21/2023  Hospital Length of Stay: 3 days  Discharge Date and Time:  11/24/2023  Hospital Diagnosis: No admission diagnoses are documented for this encounter.     HISTORY OF PRESENT ILLNESS      Patient ID: Cliff Magaña is a 88 y.o. male was recently admitted to the hospital, this is their TCM encounter.    Hospital Course Synopsis:    HPI:   Mr. Magaña is an 88 year old male w/ a history of Afib on Eliquis, HTN, HLD, CKD3, T2DM and bladder tumor and renal cysts s/p TURBT and BL RPG on 11/16 w/ jaquez catheter removal on 11/21 who presents for tachycardia. He was see for a post op visit this morning by Dr. Moffett and had his jaquez catheter removed after passing a voiding trial. He was however found to be tachycardic w/ concern for Afib RVR and was instructed to go to the ED for further evaluation. History was gathered from the patient and his family. Patient's wife reports that patient has increased work of breathing w/ associated bilateral LE edema x 1 week. Additionally, patient endorses dysuria. Denies HA, vision changes, lightheadedness, dizziness, cough, abdominal fullness, abdominal pain, decreased PO intake, decreased urinary output or stool changes. In the ED, patient normotensive w/ HR 110s. He was found to be hypoxic on RA w/ SpO2 ~88% which subsequently improved w/ 2L NC. Labs notable for Na 134, CO2 17, Cr 1.1 (baseline). Trop 0.262 and . UA concerning for UTI. CTA did not show a pulmonary embolism but did show cardiomegaly w/ pulmonary congestion and b/l pleural effusions. Patient received 1 x IV lasix 40 and Rocephin in the ED. Admitted to  for further evaluation.        Hospital Course:   Patient initially admitted  w/ concern for new acute onset heart failure. However, TTE on 11/21 showed a preserved EF w/ a CVP 3. Upon re-evaluation, concern for hypovolemic state in setting of acute UTI that could be contributing to uncontrolled Afib. Now appears to be euvolemic. ABG reviewed; no abnormalities. Oxygen titrated and discontinued. Passed 6MWT. HR improved; Toprol increased to 200mg QD (hold parameters given for HR <70). IV abx transitioned to PO cefpodoxime; end date 11/27 for a total of 7 days to treat UTI. Unfortunately, path from recent bladder tumor biopsy came back w/ noninvasive high-grade papillary urothelial carcinoma. Dr. Moffett called patient and family to inform them of the pathology. Plan for further outpatient workup included CT A/P scheduled for tomorrow. Will discharge patient home w/ PCP, urology and cardiology follow up. Referral placed to outpatient oncology. Discharged in stable condition. All questions answered. Return precautions given.     DECISION MAKING TODAY    Mr. Magaña is home, his wife is present. He is AAOx3, ambulatory with no assistive device. He denies pain. Reports a good appetite and normal bowel movements.       VSS. Denies fever, chest pain, shortness of breath, nausea, vomiting, diarrhea. Risks of environmental exposure to coronavirus discussed including: social distancing, hand hygiene, and limiting departures from the home for necessities only.  Reports understanding and willingness to comply.  All hospital discharge orders reviewed and being followed, all medications reconciled and reviewed, patient and family verbalized understanding. No other needs identified at this time.     ACP on file.    Attestation: Screening criteria to assess the level of the patient's risk for infection with COVID-19 as recommended by the CDC at the time of the above documented home visit concluded appropriateness to proceed. Universal precautions were maintained at all times, including provider use of 60%  alcohol gel hand  immediately prior to entry and upon departing the patient's home.    Assessment & Plan:  1. Renal cyst     Hem/Onc appt pending  Abx therapy complete  Continue to follow Cards      Medication List on Discharge:     Medication List            Accurate as of December 4, 2023  6:11 PM. If you have any questions, ask your nurse or doctor.                CONTINUE taking these medications      acetaminophen 650 MG Tbsr  Commonly known as: TYLENOL  Take 650 mg by mouth every 8 (eight) hours.     apixaban 5 mg Tab  Commonly known as: ELIQUIS  Take 1 tablet (5 mg total) by mouth 2 (two) times daily.     cholecalciferol (vitamin D3) 50 mcg (2,000 unit) Cap capsule  Commonly known as: VITAMIN D3  Take by mouth once daily.     empagliflozin 10 mg tablet  Commonly known as: Jardiance  Take 1 tablet (10 mg total) by mouth once daily.     fenofibrate micronized 200 MG Cap  Commonly known as: LOFIBRA  TAKE 1 CAPSULE DAILY WITH BREAKFAST     loratadine 10 mg tablet  Commonly known as: CLARITIN  Take 10 mg by mouth once daily.     TRAMAINE BIOTIN ORAL  Take 5,000 mcg by mouth once daily.     MEGARED JOINT CARE ORAL     metFORMIN 500 MG ER 24hr tablet  Commonly known as: GLUCOPHAGE-XR  Take 1 tablet (500 mg total) by mouth once daily.     metoprolol succinate 200 MG 24 hr tablet  Commonly known as: TOPROL-XL  Take 1 tablet (200 mg total) by mouth once daily. Hold if heart rate < 70 bpm.     NIFEdipine 90 MG (OSM) 24 hr tablet  Commonly known as: PROCARDIA-XL  Take 1 tablet (90 mg total) by mouth once daily.     ondansetron 4 MG Tbdl  Commonly known as: ZOFRAN-ODT  Dissolve 1 tablet (4 mg total) by mouth every 6 (six) hours as needed (nausea or vomitting).     oxybutynin 5 MG Tab  Commonly known as: DITROPAN  Take 1 tablet (5 mg total) by mouth 3 (three) times daily as needed (bladder spasm).              Medication Reconciliation:  Were medications changed on discharge? Yes  Were medications in the home? Yes  Is  the patient taking the medications as directed? Yes  Does the patient understand the medications and changes? Yes  Does updated med list accurately reflects meds patient is currently taking? Yes    ENVIRONMENT OF CARE      Family and/or Caregiver present at visit?  Yes  Name of Caregiver: Wife  History provided by: patient and caregiver    Advance Care Planning   Advanced Care Planning Status:  Patient does not have an ACP conversation on file  Living Will: Yes  Power of : No  La[place]: No    Does Caregiver have HCPoA: Yes  Changes today: No       Impression upon entering the home:  Physical Dwelling: single family home   Appearance of home environment: cleaniness: clean  Functional Status: independent  Mobility: ambulatory  Nutritional access: adequate intake and access  Home Health: No, and does not need it at this time   DME/Supplies: none     Diagnostic tests reviewed/disposition: No diagnosic tests pending after this hospitalization.  Disease/illness education:  Fall precautions  Establishment or re-establishment of referral orders for community resources: No other necessary community resources.   Discussion with other health care providers: No discussion with other health care providers necessary.   Does patient have a PCP at [facility]? Yes   Repatriation plan with PCP? follow-up with PCP within 30d   Does patient have an ostomy (ileostomy, colostomy, suprapubic catheter, nephrostomy tube, tracheostomy, PEG tube, pleurex catheter, cholecystostomy, etc)? No  Were BPAs reviewed? Yes    Social History     Socioeconomic History    Marital status:      Spouse name: Nereyda    Number of children: 4   Tobacco Use    Smoking status: Former     Current packs/day: 0.00     Average packs/day: 2.0 packs/day for 20.0 years (40.0 ttl pk-yrs)     Types: Cigarettes     Start date: 1963     Quit date: 1983     Years since quittin.5     Passive exposure: Never    Smokeless tobacco: Never   Substance and  Sexual Activity    Alcohol use: Yes     Alcohol/week: 1.0 standard drink of alcohol     Types: 1 Standard drinks or equivalent per week     Comment: 3 drinks per week    Drug use: No    Sexual activity: Not Currently     Partners: Female     Social Determinants of Health     Financial Resource Strain: Low Risk  (11/22/2023)    Overall Financial Resource Strain (CARDIA)     Difficulty of Paying Living Expenses: Not hard at all   Food Insecurity: No Food Insecurity (11/22/2023)    Hunger Vital Sign     Worried About Running Out of Food in the Last Year: Never true     Ran Out of Food in the Last Year: Never true   Transportation Needs: No Transportation Needs (11/22/2023)    PRAPARE - Transportation     Lack of Transportation (Medical): No     Lack of Transportation (Non-Medical): No   Physical Activity: Sufficiently Active (11/22/2023)    Exercise Vital Sign     Days of Exercise per Week: 7 days     Minutes of Exercise per Session: 60 min   Stress: No Stress Concern Present (11/22/2023)    [employer] - Occupational Stress Questionnaire     Feeling of Stress : Not at all   Social Connections: Moderately Isolated (11/22/2023)    Social Connection and Isolation Panel [NHANES]     Frequency of Communication with Friends and Family: More than three times a week     Frequency of Social Gatherings with Friends and Family: More than three times a week     Attends Orthodoxy Services: Never     Active Member of Clubs or Organizations: No     Attends Club or Organization Meetings: Never     Marital Status:    Housing Stability: Low Risk  (11/22/2023)    Housing Stability Vital Sign     Unable to Pay for Housing in the Last Year: No     Number of Places Lived in the Last Year: 1     Unstable Housing in the Last Year: No         OBJECTIVE:     Vital Signs:  Vitals:    12/04/23 1509   BP: (!) 140/89   Pulse: 83   Resp: 18   Temp: 97.9 °F (36.6 °C)       Review of Systems    Physical  Exam:  Physical Exam    INSTRUCTIONS FOR PATIENT:     Scheduled Follow-up, Appts Reviewed with Modifications if Needed: Yes  Future Appointments   Date Time Provider Department Center   12/6/2023 11:00 AM Wellington Story MD [facility] UROLOG [facility]   2/7/2024  8:00 AM Thi Castillo NP [facility] NEPHRO [facility]   2/9/2024  2:00 PM Bhupendra Brar, ASHLYN [facility] PODIAT [facility]   3/5/2024  2:00 PM Arash Gandara MD [facility] ENT [facility]   3/6/2024 10:00 AM Nereyda Birmingham MD [facility] CARDIO [facility]       Signature: Kelly Casillas, NP    Transition of Care Visit:  I have reviewed and updated the history and problem list.  I have reconciled the medication list.  I have discussed the hospitalization and current medical issues, prognosis and plans with the patient/family.

## 2023-12-05 NOTE — PATIENT INSTRUCTIONS
Instructions:  - [facility] to schedule home follow-up visit with patient as needed.  - Continue all medications, treatments and therapies as ordered.   - Follow all instructions, recommendations as discussed.  - Maintain Safety Precautions at all times.  - Attend all medical appointments as scheduled.  - For worsening symptoms: call Primary Care Physician or Nurse Practitioner.  - For emergencies, call 911 or immediately report to the nearest emergency room.  - Limit Risks of environmental exposure to coronavirus/COVID-19 as discussed including: social distancing, hand hygiene, and limiting departures from the home for necessities only.

## 2023-12-06 ENCOUNTER — OFFICE VISIT (OUTPATIENT)
Dept: UROLOGY | Facility: CLINIC | Age: 88
End: 2023-12-06
Payer: MEDICARE

## 2023-12-06 ENCOUNTER — HOSPITAL ENCOUNTER (OUTPATIENT)
Dept: RADIOLOGY | Facility: HOSPITAL | Age: 88
Discharge: HOME | End: 2023-12-06
Attending: UROLOGY
Payer: MEDICARE

## 2023-12-06 VITALS
DIASTOLIC BLOOD PRESSURE: 90 MMHG | SYSTOLIC BLOOD PRESSURE: 147 MMHG | HEIGHT: 65 IN | BODY MASS INDEX: 21.91 KG/M2 | WEIGHT: 131.5 LBS | HEART RATE: 88 BPM

## 2023-12-06 DIAGNOSIS — D49.4 BLADDER TUMOR: Primary | ICD-10-CM

## 2023-12-06 DIAGNOSIS — D49.4 BLADDER TUMOR: ICD-10-CM

## 2023-12-06 PROCEDURE — 99999 PR PBB SHADOW E&M-EST. PATIENT-LVL IV: CPT | Mod: PBBFAC,,, | Performed by: UROLOGY

## 2023-12-06 PROCEDURE — 71250 CT THORAX DX C-: CPT | Mod: 26,,, | Performed by: RADIOLOGY

## 2023-12-06 PROCEDURE — 71250 CT CHEST WITHOUT CONTRAST: ICD-10-PCS | Mod: 26,,, | Performed by: RADIOLOGY

## 2023-12-06 PROCEDURE — 99214 OFFICE O/P EST MOD 30 MIN: CPT | Mod: PBBFAC,25 | Performed by: UROLOGY

## 2023-12-06 PROCEDURE — 71250 CT THORAX DX C-: CPT | Mod: TC

## 2023-12-06 PROCEDURE — 99215 OFFICE O/P EST HI 40 MIN: CPT | Mod: S$PBB,,, | Performed by: UROLOGY

## 2023-12-11 ENCOUNTER — TELEPHONE (OUTPATIENT)
Dept: HEMATOLOGY/ONCOLOGY | Facility: CLINIC | Age: 88
End: 2023-12-11
Payer: MEDICARE

## 2023-12-11 ENCOUNTER — OFFICE VISIT (OUTPATIENT)
Dept: CARDIOLOGY | Facility: CLINIC | Age: 88
End: 2023-12-11
Payer: MEDICARE

## 2023-12-11 VITALS
SYSTOLIC BLOOD PRESSURE: 135 MMHG | HEIGHT: 64 IN | BODY MASS INDEX: 23.75 KG/M2 | HEART RATE: 74 BPM | WEIGHT: 139.13 LBS | DIASTOLIC BLOOD PRESSURE: 65 MMHG

## 2023-12-11 DIAGNOSIS — E11.22 CKD STAGE 3 DUE TO TYPE 2 DIABETES MELLITUS: ICD-10-CM

## 2023-12-11 DIAGNOSIS — E78.5 HYPERLIPIDEMIA ASSOCIATED WITH TYPE 2 DIABETES MELLITUS: ICD-10-CM

## 2023-12-11 DIAGNOSIS — E11.8 DM TYPE 2, CONTROLLED, WITH COMPLICATION: ICD-10-CM

## 2023-12-11 DIAGNOSIS — I48.19 PERSISTENT ATRIAL FIBRILLATION: Primary | Chronic | ICD-10-CM

## 2023-12-11 DIAGNOSIS — I15.2 HYPERTENSION ASSOCIATED WITH DIABETES: ICD-10-CM

## 2023-12-11 DIAGNOSIS — D49.4 BLADDER TUMOR: ICD-10-CM

## 2023-12-11 DIAGNOSIS — E11.59 HYPERTENSION ASSOCIATED WITH DIABETES: ICD-10-CM

## 2023-12-11 DIAGNOSIS — I70.0 ATHEROSCLEROSIS OF AORTA: ICD-10-CM

## 2023-12-11 DIAGNOSIS — E11.69 HYPERLIPIDEMIA ASSOCIATED WITH TYPE 2 DIABETES MELLITUS: ICD-10-CM

## 2023-12-11 DIAGNOSIS — I50.33 ACUTE ON CHRONIC HEART FAILURE WITH PRESERVED EJECTION FRACTION: ICD-10-CM

## 2023-12-11 DIAGNOSIS — N18.30 CKD STAGE 3 DUE TO TYPE 2 DIABETES MELLITUS: ICD-10-CM

## 2023-12-11 PROCEDURE — 99214 OFFICE O/P EST MOD 30 MIN: CPT | Mod: PBBFAC,PO | Performed by: INTERNAL MEDICINE

## 2023-12-11 PROCEDURE — 99999 PR PBB SHADOW E&M-EST. PATIENT-LVL IV: ICD-10-PCS | Mod: PBBFAC,,, | Performed by: INTERNAL MEDICINE

## 2023-12-11 PROCEDURE — 99999 PR PBB SHADOW E&M-EST. PATIENT-LVL IV: CPT | Mod: PBBFAC,,, | Performed by: INTERNAL MEDICINE

## 2023-12-11 PROCEDURE — 99214 OFFICE O/P EST MOD 30 MIN: CPT | Mod: S$PBB,,, | Performed by: INTERNAL MEDICINE

## 2023-12-11 PROCEDURE — 99214 PR OFFICE/OUTPT VISIT, EST, LEVL IV, 30-39 MIN: ICD-10-PCS | Mod: S$PBB,,, | Performed by: INTERNAL MEDICINE

## 2023-12-11 RX ORDER — METOPROLOL SUCCINATE 200 MG/1
200 TABLET, EXTENDED RELEASE ORAL 2 TIMES DAILY
COMMUNITY
End: 2023-12-11 | Stop reason: SDUPTHER

## 2023-12-11 RX ORDER — METOPROLOL SUCCINATE 200 MG/1
200 TABLET, EXTENDED RELEASE ORAL 2 TIMES DAILY
Qty: 180 TABLET | Refills: 3 | Status: SHIPPED | OUTPATIENT
Start: 2023-12-11

## 2023-12-11 NOTE — TELEPHONE ENCOUNTER
----- Message from Bobby BROOKS Shaw sent at 11/27/2023 11:14 AM CST -----  New Cancer Patient Intake Documentation    Diagnosis: Urothelial cancer    Date patient referral received: 11/24/23    Records collected: in house    Questions asked:  What doctor have you seen for this diagnosis?  Marcelino Schneider MD    What imaging have you had?   Date of imaging:  Where did that occur?    Have you been diagnosed with cancer by a biopsy and/or surgery? Yes  Date of biopsy: 10/19/23   Date of surgery: 11/16/23   Where did that occur?    Other pertinent info:

## 2023-12-11 NOTE — PROGRESS NOTES
Subjective:   Patient ID:  Cliff Magaña is a 88 y.o. male who presents for follow-up of Hospital Follow Up      HPI: Patient was recently hospitalized with decompensated HFpEF and atrial fibrillation with uncontrolled VR due to UTI following TURBT and indwelling cath.  He was treated with antibiotics. Beta blocker was increased to 200mg daily. He is now doing well.  Denies any respiratory of urinary symptoms. His heart rate is controlled and BP is slightly elevated.     He lost weight and his albumin is low. We have reviewed blood work results and discussed with patient and  his wife.    Lab Results   Component Value Date     11/24/2023    K 3.8 11/24/2023     11/24/2023    CO2 22 (L) 11/24/2023    BUN 23 11/24/2023    CREATININE 0.8 11/24/2023     (H) 11/24/2023    HGBA1C 6.9 (H) 11/10/2023    MG 2.0 11/24/2023    AST 30 11/24/2023    ALT 27 11/24/2023    ALBUMIN 2.4 (L) 11/24/2023    PROT 6.5 11/24/2023    BILITOT 0.7 11/24/2023    WBC 8.44 11/24/2023    HGB 15.0 11/24/2023    HCT 42 11/24/2023    HCT 44.8 11/24/2023    MCV 95 11/24/2023     11/24/2023    INR 2.8 12/17/2013    INR 1.8 (H) 06/07/2011    PSA 0.43 06/06/2013    TSH 1.021 11/22/2023    CHOL 194 08/14/2023    HDL 39 (L) 08/14/2023    LDLCALC 108.0 08/14/2023    TRIG 235 (H) 08/14/2023       No results found in the last 24 hours.     Review of Systems   Constitutional: Positive for weight loss.   HENT:  Positive for hearing loss.    Eyes: Negative.    Cardiovascular:  Positive for irregular heartbeat. Negative for chest pain, claudication, dyspnea on exertion, leg swelling, near-syncope, palpitations and syncope.   Respiratory: Negative.  Negative for cough, shortness of breath, snoring and wheezing.    Endocrine: Negative.  Negative for cold intolerance, heat intolerance, polydipsia, polyphagia and polyuria.   Skin: Negative.    Musculoskeletal:  Positive for arthritis and back pain.   Gastrointestinal: Negative.   "  Genitourinary:  Positive for bladder incontinence and frequency.   Neurological:  Positive for excessive daytime sleepiness.   Psychiatric/Behavioral: Negative.         Objective:   Physical Exam  Vitals and nursing note reviewed.   Constitutional:       Appearance: He is well-developed.      Comments: /65   Pulse 74   Ht 5' 4" (1.626 m)   Wt 63.1 kg (139 lb 1.8 oz)   BMI 23.88 kg/m²      HENT:      Head: Normocephalic.   Eyes:      Pupils: Pupils are equal, round, and reactive to light.   Neck:      Thyroid: No thyromegaly.      Vascular: No carotid bruit.   Cardiovascular:      Rate and Rhythm: Normal rate. Rhythm irregularly irregular.      Pulses: Intact distal pulses.           Carotid pulses are 2+ on the right side and 2+ on the left side.       Radial pulses are 2+ on the right side and 2+ on the left side.        Femoral pulses are 2+ on the right side and 2+ on the left side.       Popliteal pulses are 2+ on the right side and 2+ on the left side.        Dorsalis pedis pulses are 2+ on the right side and 2+ on the left side.        Posterior tibial pulses are 2+ on the right side and 2+ on the left side.      Heart sounds: Normal heart sounds. No murmur heard.     No friction rub. No gallop.   Pulmonary:      Effort: Pulmonary effort is normal. No respiratory distress.      Breath sounds: Normal breath sounds. No wheezing or rales.   Chest:      Chest wall: No tenderness.   Abdominal:      General: Bowel sounds are normal.      Palpations: Abdomen is soft.   Musculoskeletal:         General: Normal range of motion.      Cervical back: Normal range of motion and neck supple.   Skin:     General: Skin is warm and dry.   Neurological:      Mental Status: He is alert and oriented to person, place, and time.           Assessment and Plan:     Problem List Items Addressed This Visit          Cardiology Problems    Hypertension associated with diabetes    Hyperlipidemia associated with type 2 diabetes " mellitus    Persistent atrial fibrillation - Primary (Chronic)    Relevant Orders    Echo    Atherosclerosis of aorta       Other    CKD stage 3 due to type 2 diabetes mellitus    DM type 2, controlled, with complication    Bladder tumor     Other Visit Diagnoses       Acute on chronic heart failure with preserved ejection fraction        Relevant Orders    Echo            Patient's Medications   New Prescriptions    No medications on file   Previous Medications    ACETAMINOPHEN (TYLENOL) 650 MG TBSR    Take 650 mg by mouth every 8 (eight) hours.    APIXABAN (ELIQUIS) 5 MG TAB    Take 1 tablet (5 mg total) by mouth 2 (two) times daily.    CHOLECALCIFEROL, VITAMIN D3, (VITAMIN D3) 50 MCG (2,000 UNIT) CAP CAPSULE    Take by mouth once daily.    EMPAGLIFLOZIN (JARDIANCE) 10 MG TABLET    Take 1 tablet (10 mg total) by mouth once daily.    FENOFIBRATE MICRONIZED (LOFIBRA) 200 MG CAP    TAKE 1 CAPSULE DAILY WITH BREAKFAST    KRILL OIL/HYALURONIC/ASTAXANTH (MEGARED JOINT CARE ORAL)        LORATADINE (CLARITIN) 10 MG TABLET    Take 10 mg by mouth once daily.    TRAMAINE BIOTIN ORAL    Take 5,000 mcg by mouth once daily.    METFORMIN (GLUCOPHAGE-XR) 500 MG ER 24HR TABLET    Take 1 tablet (500 mg total) by mouth once daily.    NIFEDIPINE (PROCARDIA-XL) 90 MG (OSM) 24 HR TABLET    Take 1 tablet (90 mg total) by mouth once daily.   Modified Medications    Modified Medication Previous Medication    METOPROLOL SUCCINATE (TOPROL-XL) 200 MG 24 HR TABLET metoprolol succinate (TOPROL-XL) 200 MG 24 hr tablet       Take 1 tablet (200 mg total) by mouth 2 (two) times daily.    Take 200 mg by mouth 2 (two) times daily.   Discontinued Medications    ONDANSETRON (ZOFRAN-ODT) 4 MG TBDL    Dissolve 1 tablet (4 mg total) by mouth every 6 (six) hours as needed (nausea or vomitting).    OXYBUTYNIN (DITROPAN) 5 MG TAB    Take 1 tablet (5 mg total) by mouth 3 (three) times daily as needed (bladder spasm).     Continue on the present  regimen.    Follow up in about 6 months (around 6/11/2024).

## 2023-12-11 NOTE — NURSING
Oncology nurse navigator contacted patient and wife for scheduling medical oncology referral to Dr. Bland placed by Dr. Wellington Story. Date, time, and location discussed. All questions/concerns addressed. Verbalized understanding. Contact information provided for further assistance.

## 2023-12-12 ENCOUNTER — HOSPITAL ENCOUNTER (OUTPATIENT)
Dept: CARDIOLOGY | Facility: HOSPITAL | Age: 88
Discharge: HOME | End: 2023-12-12
Attending: INTERNAL MEDICINE
Payer: MEDICARE

## 2023-12-12 VITALS
DIASTOLIC BLOOD PRESSURE: 60 MMHG | BODY MASS INDEX: 23.73 KG/M2 | WEIGHT: 139 LBS | HEIGHT: 64 IN | SYSTOLIC BLOOD PRESSURE: 130 MMHG | HEART RATE: 70 BPM

## 2023-12-12 DIAGNOSIS — I50.33 ACUTE ON CHRONIC HEART FAILURE WITH PRESERVED EJECTION FRACTION: ICD-10-CM

## 2023-12-12 DIAGNOSIS — I48.19 PERSISTENT ATRIAL FIBRILLATION: Chronic | ICD-10-CM

## 2023-12-12 LAB
ASCENDING AORTA: 3.42 CM
AV INDEX (PROSTH): 0.58
AV MEAN GRADIENT: 5 MMHG
AV PEAK GRADIENT: 9 MMHG
AV VALVE AREA BY VELOCITY RATIO: 1.98 CM²
AV VALVE AREA: 1.84 CM²
AV VELOCITY RATIO: 0.63
BSA FOR ECHO PROCEDURE: 1.69 M2
CV ECHO LV RWT: 0.46 CM
DOP CALC AO PEAK VEL: 1.49 M/S
DOP CALC AO VTI: 28.97 CM
DOP CALC LVOT AREA: 3.1 CM2
DOP CALC LVOT DIAMETER: 2 CM
DOP CALC LVOT PEAK VEL: 0.94 M/S
DOP CALC LVOT STROKE VOLUME: 53.16 CM3
DOP CALC RVOT PEAK VEL: 0.7 M/S
DOP CALC RVOT VTI: 13.47 CM
DOP CALCLVOT PEAK VEL VTI: 16.93 CM
ECHO LV POSTERIOR WALL: 0.98 CM (ref 0.6–1.1)
FRACTIONAL SHORTENING: 16 % (ref 28–44)
INTERVENTRICULAR SEPTUM: 1 CM (ref 0.6–1.1)
LA MAJOR: 5.61 CM
LA MINOR: 6.05 CM
LA WIDTH: 4.2 CM
LEFT ATRIUM SIZE: 4.16 CM
LEFT ATRIUM VOLUME INDEX MOD: 36.2 ML/M2
LEFT ATRIUM VOLUME INDEX: 51.5 ML/M2
LEFT ATRIUM VOLUME MOD: 60.78 CM3
LEFT ATRIUM VOLUME: 86.46 CM3
LEFT INTERNAL DIMENSION IN SYSTOLE: 3.57 CM (ref 2.1–4)
LEFT VENTRICLE DIASTOLIC VOLUME INDEX: 48.41 ML/M2
LEFT VENTRICLE DIASTOLIC VOLUME: 81.33 ML
LEFT VENTRICLE MASS INDEX: 82 G/M2
LEFT VENTRICLE SYSTOLIC VOLUME INDEX: 31.8 ML/M2
LEFT VENTRICLE SYSTOLIC VOLUME: 53.45 ML
LEFT VENTRICULAR INTERNAL DIMENSION IN DIASTOLE: 4.26 CM (ref 3.5–6)
LEFT VENTRICULAR MASS: 138.43 G
MV A" WAVE DURATION": 9.71 MSEC
PISA TR MAX VEL: 2.61 M/S
PULM VEIN S/D RATIO: 0.46
PV MEAN GRADIENT: 1 MMHG
PV PEAK D VEL: 0.37 M/S
PV PEAK GRADIENT: 2
PV PEAK S VEL: 0.17 M/S
PV PEAK VELOCITY: 0.78 M/S
RA MAJOR: 5.28 CM
RA PRESSURE ESTIMATED: 3 MMHG
RA WIDTH: 3.23 CM
RIGHT VENTRICULAR END-DIASTOLIC DIMENSION: 2.65 CM
RV TB RVSP: 6 MMHG
SINUS: 3.45 CM
STJ: 2.87 CM
TR MAX PG: 27 MMHG
TRICUSPID ANNULAR PLANE SYSTOLIC EXCURSION: 2.05 CM
TV REST PULMONARY ARTERY PRESSURE: 30 MMHG
Z-SCORE OF LEFT VENTRICULAR DIMENSION IN END DIASTOLE: -0.96
Z-SCORE OF LEFT VENTRICULAR DIMENSION IN END SYSTOLE: 1.63

## 2023-12-12 PROCEDURE — 93306 ECHO (CUPID ONLY): ICD-10-PCS | Mod: 26,,, | Performed by: INTERNAL MEDICINE

## 2023-12-12 PROCEDURE — 93306 TTE W/DOPPLER COMPLETE: CPT | Mod: 26,,, | Performed by: INTERNAL MEDICINE

## 2023-12-12 PROCEDURE — 93306 TTE W/DOPPLER COMPLETE: CPT | Mod: PO

## 2023-12-13 ENCOUNTER — PATIENT MESSAGE (OUTPATIENT)
Dept: CARDIOLOGY | Facility: CLINIC | Age: 88
End: 2023-12-13
Payer: MEDICARE

## 2023-12-13 DIAGNOSIS — E11.59 HYPERTENSION ASSOCIATED WITH DIABETES: Primary | ICD-10-CM

## 2023-12-13 DIAGNOSIS — I15.2 HYPERTENSION ASSOCIATED WITH DIABETES: Primary | ICD-10-CM

## 2023-12-13 DIAGNOSIS — I50.33 ACUTE ON CHRONIC HEART FAILURE WITH PRESERVED EJECTION FRACTION: ICD-10-CM

## 2023-12-13 RX ORDER — SACUBITRIL AND VALSARTAN 24; 26 MG/1; MG/1
1 TABLET, FILM COATED ORAL 2 TIMES DAILY
COMMUNITY
End: 2023-12-13 | Stop reason: SDUPTHER

## 2023-12-13 RX ORDER — SACUBITRIL AND VALSARTAN 24; 26 MG/1; MG/1
1 TABLET, FILM COATED ORAL 2 TIMES DAILY
Qty: 180 TABLET | Refills: 2 | Status: SHIPPED | OUTPATIENT
Start: 2023-12-13 | End: 2024-02-28

## 2023-12-13 NOTE — PROGRESS NOTES
Please inform the patient that his heart function remains low normal to mildly depressed. Therefore we have to change his medication.  Please discontinue Nifedipine.  Let's start Entresto 24/26mg daily and repeat BMP in 1 week.

## 2023-12-14 ENCOUNTER — PATIENT MESSAGE (OUTPATIENT)
Dept: INTERNAL MEDICINE | Facility: CLINIC | Age: 88
End: 2023-12-14
Payer: MEDICARE

## 2023-12-14 RX ORDER — FENOFIBRATE 160 MG/1
200 TABLET ORAL DAILY
COMMUNITY
End: 2024-01-03

## 2023-12-14 NOTE — TELEPHONE ENCOUNTER
Dr. He covering for Dr. Estrada,   It looks like this medication was intended to continue at time of hospital discharge (per discharge summary).  I would encourage him to keep taking it.  Can we add back to the MAR    Thank you for your time.

## 2023-12-16 ENCOUNTER — PATIENT MESSAGE (OUTPATIENT)
Dept: CARDIOLOGY | Facility: CLINIC | Age: 88
End: 2023-12-16
Payer: MEDICARE

## 2023-12-18 ENCOUNTER — OFFICE VISIT (OUTPATIENT)
Dept: HEMATOLOGY/ONCOLOGY | Facility: CLINIC | Age: 88
End: 2023-12-18
Payer: MEDICARE

## 2023-12-18 VITALS
TEMPERATURE: 97 F | DIASTOLIC BLOOD PRESSURE: 66 MMHG | HEIGHT: 64 IN | RESPIRATION RATE: 17 BRPM | BODY MASS INDEX: 23.93 KG/M2 | SYSTOLIC BLOOD PRESSURE: 163 MMHG | OXYGEN SATURATION: 97 % | WEIGHT: 140.19 LBS | HEART RATE: 84 BPM

## 2023-12-18 DIAGNOSIS — E11.59 HYPERTENSION ASSOCIATED WITH DIABETES: ICD-10-CM

## 2023-12-18 DIAGNOSIS — C68.9 UROTHELIAL CANCER: ICD-10-CM

## 2023-12-18 DIAGNOSIS — E78.5 HYPERLIPIDEMIA ASSOCIATED WITH TYPE 2 DIABETES MELLITUS: ICD-10-CM

## 2023-12-18 DIAGNOSIS — E11.69 HYPERLIPIDEMIA ASSOCIATED WITH TYPE 2 DIABETES MELLITUS: ICD-10-CM

## 2023-12-18 DIAGNOSIS — N18.30 CKD STAGE 3 DUE TO TYPE 2 DIABETES MELLITUS: ICD-10-CM

## 2023-12-18 DIAGNOSIS — I15.2 HYPERTENSION ASSOCIATED WITH DIABETES: ICD-10-CM

## 2023-12-18 DIAGNOSIS — E11.22 CKD STAGE 3 DUE TO TYPE 2 DIABETES MELLITUS: ICD-10-CM

## 2023-12-18 DIAGNOSIS — D49.4 BLADDER TUMOR: Primary | ICD-10-CM

## 2023-12-18 PROCEDURE — 99213 OFFICE O/P EST LOW 20 MIN: CPT | Mod: PBBFAC | Performed by: INTERNAL MEDICINE

## 2023-12-18 PROCEDURE — 99999 PR PBB SHADOW E&M-EST. PATIENT-LVL III: CPT | Mod: PBBFAC,,, | Performed by: INTERNAL MEDICINE

## 2023-12-18 PROCEDURE — 99205 PR OFFICE/OUTPT VISIT, NEW, LEVL V, 60-74 MIN: ICD-10-PCS | Mod: S$PBB,,, | Performed by: INTERNAL MEDICINE

## 2023-12-18 PROCEDURE — 99999 PR PBB SHADOW E&M-EST. PATIENT-LVL III: ICD-10-PCS | Mod: PBBFAC,,, | Performed by: INTERNAL MEDICINE

## 2023-12-18 PROCEDURE — 99205 OFFICE O/P NEW HI 60 MIN: CPT | Mod: S$PBB,,, | Performed by: INTERNAL MEDICINE

## 2023-12-18 NOTE — PROGRESS NOTES
Subjective     Patient ID: Cliff Magaña is a 88 y.o. male.    Chief Complaint: Bladder Cancer    HPI    Diagnosis: Muscle invasive bladder cancer  Presents for a medical oncology opinion    Oncology History:  - bladder cancer incidentally found on imaging as he had been under surveillance for renal cysts  (10/17/2023 ultrasound showed stable left renal cysts and new bladder masses)    -  11/16/2023 TURBT of bladder tumor  Findings:   1. Right postero-lateral papillary bladder wall tumor (3-4cm) with diffusely erythematous and nodular surrounding tissue, right < 1cm papillary bladder wall tumor immediately lateral to the larger postero-lateral papillary tumor, and a right nodular 4cm bladder tumor with high grade and possibly invasive appearance. Tumors resected, hemostasis achieved.  Several other patches of erythema were diffusely identified on the left lateral bladder wall.  2. Bimanual exam post-TURBT showed freely mobile bladder without abnormalities  3. Bilateral retrograde pyelogram showed delicate calices with no evidence of hydronephrosis, no filling defects, and ureter normal in course and caliber, bilaterally  Pathology:  1. Bladder, right posterolateral wall, transurethral resection of bladder tumor:  - Noninvasive high-grade papillary urothelial carcinoma  - Muscularis propria present  - Multiple H&E levels evaluated  2. Bladder, right anterior, transurethral resection of bladder tumor:  - Invasive high-grade papillary urothelial carcinoma  - Tumor invades muscularis propria  - Intact expression of DNA mismatch repair proteins in tumor by immunohistochemistry (see comment)  COMMENT:  Immunohistochemistry (IHC) Testing for Mismatch Repair (MMR) Proteins:  MLH1 - Intact nuclear expression  MSH2 - Intact nuclear expression  MSH6 - Intact nuclear expression  PMS2 - Intact nuclear expression  Background nonneoplastic tissue/internal control with intact nuclear expression  IHC Interpretation  No loss of  nuclear expression of MMR proteins: low probability of microsatellite instability  There are exceptions to the above IHC interpretations. These results should not be considered in isolation, and clinical correlation with genetic counseling is recommended to assess the need for germline testing.    - 11/25/2023 CT Abd/Pelvis:  FINDINGS:  Lung base: Bilateral small volume pleural effusion increased in size compared to recent prior, associated with mild compression atelectasis.  Visualized portion of heart: Coronary artery calcification.  Liver: Normal in size.  Left lobe small calcified granuloma.  No suspicious focal lesion.  Gallbladder: Cholecystectomy.  Bile duct: No intra-or extrahepatic biliary ductal dilatation.  Spleen: Unremarkable.  Pancreas: Parenchymal atrophy.  Multiple parenchymal calcifications suggesting chronic pancreatitis.  No suspicious focal lesion.  No pancreatic ductal dilatation.  No adjacent inflammatory changes or fluid collections.  Adrenal glands: Unremarkable.  Genitourinary: Bilateral renal cysts.  Subcentimeter hypodensities in both kidneys, which are too small to characterize.  The ureters appear normal in course and caliber.  No evidence of nephrolithiasis or hydroureteronephrosis.  Urinary bladder: Postsurgical change in the anterior bladder wall from TURBT.  There are small foci of air in the bladder, likely iatrogenic.  Reproductive organs: Unremarkable.  GI tract: Small hiatal hernia.  The stomach is unremarkable.  The visualized loops of small and large bowel show no evidence of obstruction or inflammation. Diverticulosis.  Appendix unremarkable.  Peritoneum: No free air or fluid.  Retroperitoneum: No significant adenopathy.  Vasculature: Normal caliber of abdominal aorta with moderate calcified and noncalcified atherosclerosis.  Abdominal wall: Tiny fat containing umbilical hernia.  Bones: Stable T12-L1 intervertebral disc calcification.  No suspicious sclerotic lesions.  No  acute fracture.  Impression:  Postoperative changes from of TURBT.  No lymphadenopathy.  No distant metastases.  Other findings as described.     - 12/6/2023 CT Chest:  FINDINGS:  Comparison is 11/21/2023.  No mediastinal, hilar or axillary adenopathy is seen.  There is a right lower pole thyroid nodule.  There are coronary calcifications.  There is a left upper pole renal cyst.  There is a small hiatal hernia.  Trachea and bronchi are patent.  There is no evidence of interstitial lung disease, bronchiectasis, airway trapping, or emphysema.  No suspicious pulmonary nodules, masses, or infiltrates are seen.  There is a calcified left lung granuloma.  Bones demonstrate nothing focal.  Impression:  No significant abnormality seen.  Right lobe thyroid nodule.  Coronary artery calcifications.  Left upper pole renal cyst.    PMH:  Active Ambulatory Problems        Diagnosis   Date Noted       CKD stage 3 due to type 2 diabetes mellitus     11/23/2012       Chronic anticoagulation 11/23/2012       Hypertension associated with diabetes     05/22/2013       Hyperlipidemia associated with type 2 diabetes mellitus     05/22/2013       Persistent atrial fibrillation      06/11/2014       Atherosclerosis of aorta      11/18/2020       Sensorineural hearing loss (SNHL) of both ears  11/19/2020       Risk for falls    12/15/2021       Renal cyst  12/14/2022       DM type 2, controlled, with complication  12/14/2022       Bladder tumor     11/16/2023       UTI (urinary tract infection) 11/21/2023       Acute hypoxic respiratory failure   11/21/2023       Elevated troponin 11/21/2023       Thyroid nodules   11/21/2023     Resolved Ambulatory Problems        Diagnosis   Date Noted       Atrial fibrillation     08/02/2012       Long term (current) use of anticoagulants 08/02/2012       Family history of diabetes mellitus 11/23/2012       Ex-cigarette smoker     11/23/2012       Metabolic syndrome      11/23/2012       Screen for colon  cancer 05/07/2014       Ex-smoker   11/09/2015       Hyperglycemia     12/16/2015       Abscess of chest wall   06/25/2018       Sebaceous cyst    07/25/2018       Decreased back mobility 08/20/2019       Decreased strength of trunk and back      08/20/2019       Decreased range of motion of both hips    08/20/2019       Pain aggravated by walking    08/20/2019       Hamstring tightness of both lower extremities   08/20/2019       Impaired mobility and endurance     08/20/2019       Low back pain     11/19/2020       Chronic pain      04/28/2021       Type 2 diabetes mellitus, without long-term current use of insulin      12/13/2021       CKD stage 3 secondary to diabetes   12/13/2021       Encounter for preventive health examination     12/19/2022     Past Medical History:  Diagnosis   Date       *Atrial fibrillation           Chronic kidney disease         Deep vein thrombosis           Hyperlipidemia           Hypertension       Cholecystectomy  Tonsillectomy  Skin cancer removal    FH:  Paternal Aunt- recalls cancer affecting scalp    SH:    Retired [employer], followed by off shore work,   4 children (1 passed as an infant)  Quit tobacco in the 1980s  + EtOH      Review of Systems   Constitutional:  Negative for activity change, appetite change, chills, fatigue and fever.   HENT:  Positive for hearing loss and rhinorrhea. Negative for nasal congestion, postnasal drip, sinus pressure/congestion, sore throat and tinnitus.    Eyes:  Negative for visual disturbance.   Respiratory:  Negative for cough, chest tightness, shortness of breath and wheezing.    Cardiovascular:  Negative for chest pain, palpitations and leg swelling.   Gastrointestinal:  Negative for abdominal distention, abdominal pain, blood in stool, change in bowel habit, constipation, diarrhea, nausea and vomiting.   Genitourinary:  Negative for decreased urine volume, difficulty urinating, dysuria, frequency, hematuria and urgency.    Musculoskeletal:  Negative for arthralgias, back pain, myalgias, neck pain and neck stiffness.   Integumentary:  Negative for rash.   Neurological:  Negative for dizziness, weakness, light-headedness, numbness and headaches.   Psychiatric/Behavioral:  Negative for dysphoric mood. The patient is not nervous/anxious.           Objective     Physical Exam  Vitals and nursing note reviewed.   Constitutional:       General: He is not in acute distress.     Appearance: Normal appearance. He is normal weight. He is not ill-appearing.      Comments: Very pleasant  Presents with wife and daughter   HENT:      Head: Normocephalic and atraumatic.      Comments: Bilateral hearing aids  Eyes:      General: No scleral icterus.     Extraocular Movements: Extraocular movements intact.      Conjunctiva/sclera: Conjunctivae normal.      Pupils: Pupils are equal, round, and reactive to light.   Cardiovascular:      Rate and Rhythm: Normal rate and regular rhythm.      Heart sounds: Normal heart sounds. No murmur heard.     No friction rub. No gallop.   Pulmonary:      Effort: Pulmonary effort is normal. No respiratory distress.      Breath sounds: Normal breath sounds. No rhonchi or rales.   Abdominal:      General: Abdomen is flat. Bowel sounds are normal. There is no distension.      Palpations: There is no mass.      Tenderness: There is no abdominal tenderness. There is no guarding or rebound.   Musculoskeletal:         General: No swelling or deformity. Normal range of motion.      Right lower leg: No edema.      Left lower leg: No edema.   Skin:     General: Skin is warm and dry.      Coloration: Skin is not jaundiced or pale.      Findings: No bruising, lesion or rash.   Neurological:      General: No focal deficit present.      Mental Status: He is alert and oriented to person, place, and time.      Sensory: No sensory deficit.      Motor: No weakness.   Psychiatric:         Mood and Affect: Mood normal.         Behavior:  Behavior normal.         Thought Content: Thought content normal.         Judgment: Judgment normal.     Labs- reviewed  Imaging- reviewed     Assessment and Plan     1. Bladder tumor    2. CKD stage 3 due to type 2 diabetes mellitus    3. Hyperlipidemia associated with type 2 diabetes mellitus    4. Hypertension associated with diabetes      We had a lengthy discussion regarding muscle invasive bladder cancer  We discussed option of NA chemotherapy followed by surgery (he does not desire surgery)  We also reviewed the option of definitive chemo/XRT and this is his preference- reviewed weekly Cisplatin and daily XRT M-F  He meets with Rad Onc on 1/3 so we will plan to initiate therapy post    Route Chart for Scheduling    Med Onc Chart Routing  Urgent    Follow up with physician    Follow up with [facility] . See Benita for chemo consent 1/3 and start chemo following week- cbc, cmp same day   Infusion scheduling note    Injection scheduling note    Labs    Imaging    Pharmacy appointment    Other referrals

## 2023-12-19 NOTE — PLAN OF CARE
START ON PATHWAY REGIMEN - Bladder    [id number]        Cisplatin           Additional Orders: Cisplatin is given weekly concurrently with radiation   therapy. In study, weekly treatment parameters were: CrCl greater than or equal   to 60 mL/min, WBC greater than or equal to 3.5 x 10?/L, and PLT greater than or   equal to 100 x 10?/L.   Ref: Betzy et al. Radiotherapy and Oncology 81 (2006)   9-17    **Always confirm dose/schedule in your pharmacy ordering system**    Patient Characteristics:  Pre-Cystectomy or Nonsurgical Candidate, M0 (Clinical Staging), cT2-4a, cN0-1,   M0, Bladder-Sparing Approach  Therapeutic Status: Pre-Cystectomy or Nonsurgical Candidate, M0 (Clinical   Staging)  AJCC M Category: cM0  AJCC 8 Stage Grouping: II  AJCC T Category: cT2  AJCC N Category: cN0  Intent of Therapy:  Curative Intent, Discussed with Patient

## 2023-12-21 ENCOUNTER — PATIENT MESSAGE (OUTPATIENT)
Dept: ADMINISTRATIVE | Facility: OTHER | Age: 88
End: 2023-12-21
Payer: MEDICARE

## 2023-12-22 ENCOUNTER — LAB VISIT (OUTPATIENT)
Dept: LAB | Facility: HOSPITAL | Age: 88
End: 2023-12-22
Payer: MEDICARE

## 2023-12-22 DIAGNOSIS — E11.8 DM TYPE 2, CONTROLLED, WITH COMPLICATION: ICD-10-CM

## 2023-12-22 LAB
ALBUMIN SERPL BCP-MCNC: 3.5 G/DL (ref 3.5–5.2)
ALP SERPL-CCNC: 63 U/L (ref 55–135)
ALT SERPL W/O P-5'-P-CCNC: 17 U/L (ref 10–44)
ANION GAP SERPL CALC-SCNC: 10 MMOL/L (ref 8–16)
AST SERPL-CCNC: 17 U/L (ref 10–40)
BILIRUB SERPL-MCNC: 0.5 MG/DL (ref 0.1–1)
BUN SERPL-MCNC: 19 MG/DL (ref 8–23)
CALCIUM SERPL-MCNC: 9.7 MG/DL (ref 8.7–10.5)
CHLORIDE SERPL-SCNC: 105 MMOL/L (ref 95–110)
CO2 SERPL-SCNC: 24 MMOL/L (ref 23–29)
CREAT SERPL-MCNC: 1.2 MG/DL (ref 0.5–1.4)
EST. GFR  (NO RACE VARIABLE): 58.2 ML/MIN/1.73 M^2
ESTIMATED AVG GLUCOSE: 151 MG/DL (ref 68–131)
GLUCOSE SERPL-MCNC: 129 MG/DL (ref 70–110)
HBA1C MFR BLD: 6.9 % (ref 4–5.6)
POTASSIUM SERPL-SCNC: 4.7 MMOL/L (ref 3.5–5.1)
PROT SERPL-MCNC: 7.1 G/DL (ref 6–8.4)
SODIUM SERPL-SCNC: 139 MMOL/L (ref 136–145)

## 2023-12-22 PROCEDURE — 80053 COMPREHEN METABOLIC PANEL: CPT | Performed by: FAMILY MEDICINE

## 2023-12-22 PROCEDURE — 36415 COLL VENOUS BLD VENIPUNCTURE: CPT | Mod: PO | Performed by: FAMILY MEDICINE

## 2023-12-22 PROCEDURE — 83036 HEMOGLOBIN GLYCOSYLATED A1C: CPT | Performed by: FAMILY MEDICINE

## 2023-12-26 NOTE — PROGRESS NOTES
Subjective     Patient ID: Cliff Magaña is a 88 y.o. male.    Chief Complaint: Bladder tumor    HPI    Diagnosis: Muscle invasive bladder cancer  Presents for a medical oncology opinion    Per Dr. Bland's previous note: Oncology History:  - bladder cancer incidentally found on imaging as he had been under surveillance for renal cysts  (10/17/2023 ultrasound showed stable left renal cysts and new bladder masses)    -  11/16/2023 TURBT of bladder tumor  Findings:   1. Right postero-lateral papillary bladder wall tumor (3-4cm) with diffusely erythematous and nodular surrounding tissue, right < 1cm papillary bladder wall tumor immediately lateral to the larger postero-lateral papillary tumor, and a right nodular 4cm bladder tumor with high grade and possibly invasive appearance. Tumors resected, hemostasis achieved.  Several other patches of erythema were diffusely identified on the left lateral bladder wall.  2. Bimanual exam post-TURBT showed freely mobile bladder without abnormalities  3. Bilateral retrograde pyelogram showed delicate calices with no evidence of hydronephrosis, no filling defects, and ureter normal in course and caliber, bilaterally  Pathology:  1. Bladder, right posterolateral wall, transurethral resection of bladder tumor:  - Noninvasive high-grade papillary urothelial carcinoma  - Muscularis propria present  - Multiple H&E levels evaluated  2. Bladder, right anterior, transurethral resection of bladder tumor:  - Invasive high-grade papillary urothelial carcinoma  - Tumor invades muscularis propria  - Intact expression of DNA mismatch repair proteins in tumor by immunohistochemistry (see comment)  COMMENT:  Immunohistochemistry (IHC) Testing for Mismatch Repair (MMR) Proteins:  MLH1 - Intact nuclear expression  MSH2 - Intact nuclear expression  MSH6 - Intact nuclear expression  PMS2 - Intact nuclear expression  Background nonneoplastic tissue/internal control with intact nuclear expression  IHC  Interpretation  No loss of nuclear expression of MMR proteins: low probability of microsatellite instability  There are exceptions to the above IHC interpretations. These results should not be considered in isolation, and clinical correlation with genetic counseling is recommended to assess the need for germline testing.    - 11/25/2023 CT Abd/Pelvis:  FINDINGS:  Lung base: Bilateral small volume pleural effusion increased in size compared to recent prior, associated with mild compression atelectasis.  Visualized portion of heart: Coronary artery calcification.  Liver: Normal in size.  Left lobe small calcified granuloma.  No suspicious focal lesion.  Gallbladder: Cholecystectomy.  Bile duct: No intra-or extrahepatic biliary ductal dilatation.  Spleen: Unremarkable.  Pancreas: Parenchymal atrophy.  Multiple parenchymal calcifications suggesting chronic pancreatitis.  No suspicious focal lesion.  No pancreatic ductal dilatation.  No adjacent inflammatory changes or fluid collections.  Adrenal glands: Unremarkable.  Genitourinary: Bilateral renal cysts.  Subcentimeter hypodensities in both kidneys, which are too small to characterize.  The ureters appear normal in course and caliber.  No evidence of nephrolithiasis or hydroureteronephrosis.  Urinary bladder: Postsurgical change in the anterior bladder wall from TURBT.  There are small foci of air in the bladder, likely iatrogenic.  Reproductive organs: Unremarkable.  GI tract: Small hiatal hernia.  The stomach is unremarkable.  The visualized loops of small and large bowel show no evidence of obstruction or inflammation. Diverticulosis.  Appendix unremarkable.  Peritoneum: No free air or fluid.  Retroperitoneum: No significant adenopathy.  Vasculature: Normal caliber of abdominal aorta with moderate calcified and noncalcified atherosclerosis.  Abdominal wall: Tiny fat containing umbilical hernia.  Bones: Stable T12-L1 intervertebral disc calcification.  No suspicious  sclerotic lesions.  No acute fracture.  Impression:  Postoperative changes from of TURBT.  No lymphadenopathy.  No distant metastases.  Other findings as described.     - 12/6/2023 CT Chest:  FINDINGS:  Comparison is 11/21/2023.  No mediastinal, hilar or axillary adenopathy is seen.  There is a right lower pole thyroid nodule.  There are coronary calcifications.  There is a left upper pole renal cyst.  There is a small hiatal hernia.  Trachea and bronchi are patent.  There is no evidence of interstitial lung disease, bronchiectasis, airway trapping, or emphysema.  No suspicious pulmonary nodules, masses, or infiltrates are seen.  There is a calcified left lung granuloma.  Bones demonstrate nothing focal.  Impression:  No significant abnormality seen.  Right lobe thyroid nodule.  Coronary artery calcifications.  Left upper pole renal cyst.    PMH:  Active Ambulatory Problems        Diagnosis   Date Noted       CKD stage 3 due to type 2 diabetes mellitus     11/23/2012       Chronic anticoagulation 11/23/2012       Hypertension associated with diabetes     05/22/2013       Hyperlipidemia associated with type 2 diabetes mellitus     05/22/2013       Persistent atrial fibrillation      06/11/2014       Atherosclerosis of aorta      11/18/2020       Sensorineural hearing loss (SNHL) of both ears  11/19/2020       Risk for falls    12/15/2021       Renal cyst  12/14/2022       DM type 2, controlled, with complication  12/14/2022       Bladder tumor     11/16/2023       UTI (urinary tract infection) 11/21/2023       Acute hypoxic respiratory failure   11/21/2023       Elevated troponin 11/21/2023       Thyroid nodules   11/21/2023     Resolved Ambulatory Problems        Diagnosis   Date Noted       Atrial fibrillation     08/02/2012       Long term (current) use of anticoagulants 08/02/2012       Family history of diabetes mellitus 11/23/2012       Ex-cigarette smoker     11/23/2012       Metabolic syndrome       11/23/2012       Screen for colon cancer 05/07/2014       Ex-smoker   11/09/2015       Hyperglycemia     12/16/2015       Abscess of chest wall   06/25/2018       Sebaceous cyst    07/25/2018       Decreased back mobility 08/20/2019       Decreased strength of trunk and back      08/20/2019       Decreased range of motion of both hips    08/20/2019       Pain aggravated by walking    08/20/2019       Hamstring tightness of both lower extremities   08/20/2019       Impaired mobility and endurance     08/20/2019       Low back pain     11/19/2020       Chronic pain      04/28/2021       Type 2 diabetes mellitus, without long-term current use of insulin      12/13/2021       CKD stage 3 secondary to diabetes   12/13/2021       Encounter for preventive health examination     12/19/2022     Past Medical History:  Diagnosis   Date       *Atrial fibrillation           Chronic kidney disease         Deep vein thrombosis           Hyperlipidemia           Hypertension       Cholecystectomy  Tonsillectomy  Skin cancer removal    FH:  Paternal Aunt- recalls cancer affecting scalp    SH:    Retired [employer], followed by off shore work,   4 children (1 passed as an infant)  Quit tobacco in the 1980s  + EtOH      Review of Systems   Constitutional:  Negative for activity change, appetite change, chills, fatigue and fever.   HENT:  Positive for hearing loss and rhinorrhea. Negative for nasal congestion, postnasal drip, sinus pressure/congestion, sore throat and tinnitus.    Eyes:  Negative for visual disturbance.   Respiratory:  Negative for cough, chest tightness, shortness of breath and wheezing.    Cardiovascular:  Negative for chest pain, palpitations and leg swelling.   Gastrointestinal:  Negative for abdominal distention, abdominal pain, blood in stool, change in bowel habit, constipation, diarrhea, nausea and vomiting.   Genitourinary:  Negative for decreased urine volume, difficulty urinating, dysuria,  frequency, hematuria and urgency.   Musculoskeletal:  Negative for arthralgias, back pain, myalgias, neck pain and neck stiffness.   Integumentary:  Negative for rash.   Neurological:  Negative for dizziness, weakness, light-headedness, numbness and headaches.   Psychiatric/Behavioral:  Negative for dysphoric mood. The patient is not nervous/anxious.           Objective     Physical Exam  Constitutional:       Appearance: Normal appearance. He is normal weight.   HENT:      Head: Normocephalic and atraumatic.      Nose: Nose normal.   Pulmonary:      Effort: Pulmonary effort is normal.   Neurological:      General: No focal deficit present.      Mental Status: He is alert and oriented to person, place, and time.   Psychiatric:         Mood and Affect: Mood normal.         Behavior: Behavior normal.         Thought Content: Thought content normal.         Judgment: Judgment normal.     Limited due to virtual visit  Labs- reviewed  Imaging- reviewed     Assessment and Plan     1. Bladder tumor    2. CKD stage 3 due to type 2 diabetes mellitus      We had a lengthy discussion regarding muscle invasive bladder cancer  We discussed option of NA chemotherapy followed by surgery (he does not desire surgery)  We also reviewed the option of definitive chemo/XRT and this is his preference- reviewed weekly Cisplatin and daily XRT M-F  He meets with Rad Onc on 1/3 so we will plan to initiate therapy post    Cliff Magaña was consented for chemotherapy/immunotherapy to treat bladder cancer. Cliff Magaña was consented to receive cisplatin.   The consent was discussed and reviewed with patient and wife.     2. Patient was education on what to expect when receiving chemotherapy including: checking in, receiving an ID band, 1 guest allowed in the infusion suite during infusion, can alternate people as well, pole going with you once you are hooked up, warm blankets are available, you may bring lunch and snacks, minimal snacks are  available, take medications as regularly unless told otherwise, warm blankets, will be available. Education about what to expect during their chemo cycle and how often their regimen is given.     3. An extensive discussion was had which included a thorough discussion of the risk and benefits of treatment and alternatives.  Risks, including but not limited to, possible hair loss, bone marrow damage (anemia, thrombocytopenia, immune suppression, neutropenia), damage to body organs (brain, heart, liver, kidney, lungs, nervous system, skin, and others), allergic reactions, sterility, nausea/vomiting, constipation/diarrhea, sores in the mouth, secondary cancers, local damage at possible injection sites, and rarely death were all discussed. Specific side effects pertaining to their chemotherapy/immunotherapy medications were discussed as well.The patient agrees with the plan, and all questions and their support system's questions have been answered to their satisfaction. Contraindications and potential side effects discussed as listed in [facility]edx.     4. Patient was given binder which includes: contact information for the [facility], an immunotherapy side effect guide (if applicable to patient), resources including, but not limited to: women's wellness, acupuncture, physical therapy, , urgent care within oncology and financial assistance.     5. Patient was educated on when to call (and given the numbers to call and knows to message via [facility] if possible) or notify the provider including, but not limited to:   Persistent Nausea and/or Vomiting  Dehydration  Persistent Diarrhea  Fever of 100.4 > 1 hour in duration or any isolated fever > 101   Rash (while on active chemotherapy or immunotherapy)   Severe pain or new onset pain not controlled by current medication regimen  Or any other symptom you feel is related to your current hematology or oncology treatment    6. Patient was provided with  additional resources that [facility] offers including, but not limited to: financial counseling, , psychologist, palliative care, support groups, transportation, dietician, rehab services, women's wellness and urgent care visits within the oncology department.     7. Patient was offered and signed up for chemo care companion. Educated on daily vital signs and daily questionnaire.     8. Patient has an established PCP, patient currently without a PCP, but stable, will refer to internal medicine, or patient without a PCP and referred to oncology PCP clinic.          Patient will Proceed with cycle 1 day 1 of 1/9/24.     Return to clinic in 2 weeks with [facility] appointment and labs.     Patient is in agreement with the proposed treatment plan. All questions were answered to the patient's satisfaction. Patient knows to call clinic for any new or worsening symptoms and if anything is needed before the next clinic visit.          Erin Mcclelland, FNP-C  Hematology & Medical Oncology   [facility] [place] [address]  ph. [phone]  Fax. [phone]    Collaborating physician, Dr. Bland.    Approximately 32 minutes were spent face-to-face with the patient.  Approximately 60 minutes in total were spent on this encounter, which includes face-to-face time and non-face-to-face time preparing to see the patient (e.g., review of tests), obtaining and/or reviewing separately obtained history, documenting clinical information in the electronic or other health record, independently interpreting results (not separately reported) and communicating results to the patient/family/caregiver, or care coordination (not separately reported).     The patient location is: his home  The chief complaint leading to consultation is:  bladder cancer    Visit type: audiovisual    Face to Face time with patient: 32  60 minutes of total time spent on the encounter, which includes face to face time and non-face to face time  preparing to see the patient (eg, review of tests), Obtaining and/or reviewing separately obtained history, Documenting clinical information in the electronic or other health record, Independently interpreting results (not separately reported) and communicating results to the patient/family/caregiver, or Care coordination (not separately reported).         Each patient to whom he or she provides medical services by telemedicine is:  (1) informed of the relationship between the physician and patient and the respective role of any other health care provider with respect to management of the patient; and (2) notified that he or she may decline to receive medical services by telemedicine and may withdraw from such care at any time.    Notes: see above       Route Chart for Scheduling    Med Onc Chart Routing      Follow up with physician . See Dr. Bland 1/23 (if possible) with labs prior to start chemo   Follow up with [facility] 3 weeks. see shahram 1/17 to be cleared for chemo needs labs prior   Infusion scheduling note    Injection scheduling note    Labs CBC and CMP   Scheduling:  Preferred lab:  Lab interval:  weekly prior to chemo   Imaging    Pharmacy appointment    Other referrals                  Treatment Plan Information   OP GYN CISPLATIN WEEKLY   Elizabeth Bland MD   Upcoming Treatment Dates - OP GYN CISPLATIN WEEKLY    1/8/2024       Chemotherapy       CISplatin (Platinol) 25 mg/m2 = 42 mg in sodium chloride 0.9% 292 mL chemo infusion       Pre-Hydration       sodium chloride 0.9% bolus 500 mL 500 mL       Post-Hydration       sodium chloride 0.9% bolus 500 mL 500 mL       Antiemetics       aprepitant (CINVANTI) injection 130 mg       palonosetron 0.25mg/dexAMETHasone 12mg in NS IVPB 0.25 mg 50 mL  1/15/2024       Chemotherapy       CISplatin (Platinol) 25 mg/m2 = 42 mg in sodium chloride 0.9% 292 mL chemo infusion       Pre-Hydration       sodium chloride 0.9% bolus 500 mL 500 mL       Post-Hydration       sodium  chloride 0.9% bolus 500 mL 500 mL       Antiemetics       aprepitant (CINVANTI) injection 130 mg       palonosetron 0.25mg/dexAMETHasone 12mg in NS IVPB 0.25 mg 50 mL  1/22/2024       Chemotherapy       CISplatin (Platinol) 25 mg/m2 = 42 mg in sodium chloride 0.9% 292 mL chemo infusion       Pre-Hydration       sodium chloride 0.9% bolus 500 mL 500 mL       Post-Hydration       sodium chloride 0.9% bolus 500 mL 500 mL       Antiemetics       aprepitant (CINVANTI) injection 130 mg       palonosetron 0.25mg/dexAMETHasone 12mg in NS IVPB 0.25 mg 50 mL  1/29/2024       Chemotherapy       CISplatin (Platinol) 25 mg/m2 = 42 mg in sodium chloride 0.9% 292 mL chemo infusion       Pre-Hydration       sodium chloride 0.9% bolus 500 mL 500 mL       Post-Hydration       sodium chloride 0.9% bolus 500 mL 500 mL       Antiemetics       aprepitant (CINVANTI) injection 130 mg       palonosetron 0.25mg/dexAMETHasone 12mg in NS IVPB 0.25 mg 50 mL

## 2023-12-27 ENCOUNTER — PATIENT MESSAGE (OUTPATIENT)
Dept: ADMINISTRATIVE | Facility: OTHER | Age: 88
End: 2023-12-27
Payer: MEDICARE

## 2023-12-27 ENCOUNTER — OFFICE VISIT (OUTPATIENT)
Dept: HEMATOLOGY/ONCOLOGY | Facility: CLINIC | Age: 88
End: 2023-12-27
Payer: MEDICARE

## 2023-12-27 DIAGNOSIS — N18.30 CKD STAGE 3 DUE TO TYPE 2 DIABETES MELLITUS: ICD-10-CM

## 2023-12-27 DIAGNOSIS — D49.4 BLADDER TUMOR: Primary | ICD-10-CM

## 2023-12-27 DIAGNOSIS — E11.22 CKD STAGE 3 DUE TO TYPE 2 DIABETES MELLITUS: ICD-10-CM

## 2023-12-27 PROCEDURE — 99215 PR OFFICE/OUTPT VISIT, EST, LEVL V, 40-54 MIN: ICD-10-PCS | Mod: 95,,, | Performed by: NURSE PRACTITIONER

## 2023-12-27 PROCEDURE — 99215 OFFICE O/P EST HI 40 MIN: CPT | Mod: 95,,, | Performed by: NURSE PRACTITIONER

## 2023-12-27 RX ORDER — ONDANSETRON HYDROCHLORIDE 8 MG/1
8 TABLET, FILM COATED ORAL EVERY 12 HOURS PRN
Qty: 30 TABLET | Refills: 2 | Status: SHIPPED | OUTPATIENT
Start: 2023-12-27 | End: 2024-12-26

## 2023-12-28 ENCOUNTER — PATIENT MESSAGE (OUTPATIENT)
Dept: ADMINISTRATIVE | Facility: OTHER | Age: 88
End: 2023-12-28
Payer: MEDICARE

## 2023-12-29 ENCOUNTER — PATIENT MESSAGE (OUTPATIENT)
Dept: ADMINISTRATIVE | Facility: OTHER | Age: 88
End: 2023-12-29
Payer: MEDICARE

## 2024-01-02 DIAGNOSIS — D49.4 BLADDER TUMOR: Primary | ICD-10-CM

## 2024-01-02 DIAGNOSIS — C68.9 UROTHELIAL CANCER: ICD-10-CM

## 2024-01-02 NOTE — PROGRESS NOTES
Radiation Oncology Consult Note        Date of Service: 01/03/2024     Chief Complaint: bladder cancer     Reason for visit: consideration for radiation to the pelvis     Referring Physician: Dr Story (urology)     Implantable devices: denies     Therapy to Date:  No radiation     Diagnosis/Assessment:   Cliff Magaña is a 88 y.o. 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     We discussed risks benefits and alternatives.  Radiation therapy may be done between 20 to 32 fractions M-F using IMRT, with concurrent IV chemotherapy. I would try to minimize treatment duration.  CT simulation will be done with a full and empty bladder.  Side effects include but are not limited to fatigue, cystitis, and bowel irritation.  I answered questions to the patient's and family's apparent satisfaction.     The patient signed RT informed consent after I answered questions to their apparent satisfaction.    They were also given our contact information should further questions or concerns arise.     - CT simulation scan 1/5/2024 full; and empty bladder  - RT consent signed  - tentative start date 1/9/2024     HPI:   Cliff Magaña is a 88 y.o. man with recent diagnosis of bladder cancer after presenting with abnormal US for renal cyst surveillance    Oncologic history:    10/17/2023 US kidney (surveillance for renal cysts)    Bilateral minimally complex renal cysts    Two indeterminate lesions that project into the bladder lumen.     11/16/2023 TURBT (Dr Moffett)   Right postero-lateral papillary bladder wall tumor (3-4cm) with diffusely erythematous and  "nodular surrounding tissue; pathology Noninvasive high-grade papillary urothelial carcinoma   Right <1cm papillary bladder wall tumor immediately lateral to the larger postero-lateral papillary tumor  Right anterior nodular 4cm bladder tumor with high grade and possibly invasive appearance. Pathology : Invasive high-grade papillary urothelial carcinoma , invacde muscularis propria MMR intact   Several other patches of erythema were diffusely identified on the left lateral bladder wall.   2. Bimanual exam post-TURBT showed freely mobile bladder without abnormalities    11/21/2023 CTA Chest   No mediastinal, hilar or axillary lymphadenopathy    11/25/2023 CT A/P   Postsurgical change in the anterior bladder wall from TURBT    No lymphadenopathy.  No distant metastases.    12/6/2023 CT chest  No significant abnormality   Right lobe thyroid nodule  Left upper pole renal cyst    12/28/2024 romain Bland   Patient prefers bladder preservation     Subjective:   In clinic the patient is accompanied by his wife and his daughter.    The patient reports feeling "good" overall.   Still with urgency and frequency since his TURBT, which are slowly improving. He denies any hematuria or dysuria. He denies constipation or diarrhea    The patient denies other major complaints such as pain.     The patient denies any history of radiation therapy, implantable cardiac devices, or connective tissue disease.     Social history  , lives in [place], and retired [employer], followed by off shore work,   4 children (1 passed as an infant)  Quit tobacco in the 1980s but still drinks alcohol     Family History   Problem Relation Age of Onset    Diabetes Maternal Aunt     Heart attack Neg Hx     Heart disease Neg Hx     Heart failure Neg Hx     Hyperlipidemia Neg Hx     Hypertension Neg Hx     Stroke Neg Hx         Past Medical History:   Diagnosis Date    *Atrial fibrillation     Chronic kidney disease     Deep vein thrombosis  "    Hyperlipidemia     Hypertension     Metabolic syndrome     Type 2 diabetes mellitus, without long-term current use of insulin 12/13/2021     Past Surgical History:   Procedure Laterality Date    CATARACT EXTRACTION      CHOLECYSTECTOMY      CYSTOSCOPY N/A 11/16/2023    Procedure: CYSTOSCOPY;  Surgeon: Marcelino Moffett MD;  Location: [address];  Service: Urology;  Laterality: N/A;  90 minutes    EYE SURGERY      INJECTION OF FACET JOINT Bilateral 4/28/2021    Procedure: FACET JOINT INJECTION BILATERAL L4/L5 DIRECT REFERRAL;  Surgeon: Philipp Iyer MD;  Location: [facility]IN [facility]GT;  Service: Pain Management;  Laterality: Bilateral;  NEEDS CONSENT, ELIQUIS CLEARANCE IN CHART    RETROGRADE PYELOGRAPHY Bilateral 11/16/2023    Procedure: PYELOGRAM, RETROGRADE;  Surgeon: Marcelino Moffett MD;  Location: [address];  Service: Urology;  Laterality: Bilateral;  90 minutes    SKIN CANCER EXCISION      TONSILLECTOMY      TURBT (TRANSURETHRAL RESECTION OF BLADDER TUMOR) N/A 11/16/2023    Procedure: TURBT (TRANSURETHRAL RESECTION OF BLADDER TUMOR);  Surgeon: Marcelino Moffett MD;  Location: [address];  Service: Urology;  Laterality: N/A;  90 minutes     Review of patient's allergies indicates:   Allergen Reactions    Niacin      Other reaction(s): Rash  Other reaction(s): Itching       Current Outpatient Medications on File Prior to Visit   Medication Sig Dispense Refill    acetaminophen (TYLENOL) 650 MG TbSR Take 650 mg by mouth every 8 (eight) hours.      apixaban (ELIQUIS) 5 mg Tab Take 1 tablet (5 mg total) by mouth 2 (two) times daily. 180 tablet 3    cholecalciferol, vitamin D3, (VITAMIN D3) 50 mcg (2,000 unit) Cap capsule Take by mouth once daily.      empagliflozin (JARDIANCE) 10 mg tablet Take 1 tablet (10 mg total) by mouth once daily. 90 tablet 3    fenofibrate 160 MG Tab Take 200 mg by mouth once daily.      krill oil/hyaluronic/astaxanth (MEGARED JOINT CARE ORAL)       loratadine (CLARITIN)  10 mg tablet Take 10 mg by mouth once daily.      TRAMAINE BIOTIN ORAL Take 5,000 mcg by mouth once daily.      metFORMIN (GLUCOPHAGE-XR) 500 MG ER 24hr tablet Take 1 tablet (500 mg total) by mouth once daily. 90 tablet 1    metoprolol succinate (TOPROL-XL) 200 MG 24 hr tablet Take 1 tablet (200 mg total) by mouth 2 (two) times daily. 180 tablet 3    NIFEdipine (PROCARDIA-XL) 90 MG (OSM) 24 hr tablet Take 1 tablet (90 mg total) by mouth once daily. 90 tablet 6    ondansetron (ZOFRAN) 8 MG tablet Take 1 tablet (8 mg total) by mouth every 12 (twelve) hours as needed for Nausea. 30 tablet 2    sacubitriL-valsartan (ENTRESTO) 24-26 mg per tablet Take 1 tablet by mouth 2 (two) times daily. 180 tablet 2     No current facility-administered medications on file prior to visit.         Review of patient's allergies indicates:   Allergen Reactions    Niacin      Other reaction(s): Rash  Other reaction(s): Itching             Review of Systems   Negative unless as above         Objective:   Physical Exam  Vitals reviewed.   Constitutional:       Appearance: Normal appearance.   HENT:      Head: Normocephalic and atraumatic; bilateral hearing aids   Pulmonary:      Effort: Pulmonary effort is normal.   Abdominal:      General: There is no distension.   Musculoskeletal:         General: Normal range of motion.   Neurological:      General: No focal deficit present.      Mental Status: alert and oriented  Psychiatric:         Mood and Affect: Mood normal.         Behavior: Behavior normal.             Imaging: I have personally reviewed the patient's available images and reports and summarized pertinent findings above in HPI.      Pathology: I have personally reviewed the patient's available pathology and summarized pertinent findings above in HPI.         I spent approximately 60 minutes reviewing the available records and evaluating the patient, out of which over 50% of the time was spent face to face with the patient in counseling  and coordinating this patient's care.     Thank you for the opportunity to care for this patient. Please do not hesitate to contact me with any questions.     Roberto Schofield MD/PhD

## 2024-01-03 ENCOUNTER — OFFICE VISIT (OUTPATIENT)
Dept: RADIATION ONCOLOGY | Facility: CLINIC | Age: 89
End: 2024-01-03
Payer: MEDICARE

## 2024-01-03 ENCOUNTER — PATIENT MESSAGE (OUTPATIENT)
Dept: ADMINISTRATIVE | Facility: OTHER | Age: 89
End: 2024-01-03
Payer: MEDICARE

## 2024-01-03 ENCOUNTER — PATIENT MESSAGE (OUTPATIENT)
Dept: INTERNAL MEDICINE | Facility: CLINIC | Age: 89
End: 2024-01-03
Payer: MEDICARE

## 2024-01-03 VITALS
DIASTOLIC BLOOD PRESSURE: 68 MMHG | TEMPERATURE: 97 F | SYSTOLIC BLOOD PRESSURE: 142 MMHG | WEIGHT: 144.5 LBS | BODY MASS INDEX: 24.67 KG/M2 | HEIGHT: 64 IN | HEART RATE: 69 BPM | OXYGEN SATURATION: 96 %

## 2024-01-03 DIAGNOSIS — D49.4 BLADDER TUMOR: ICD-10-CM

## 2024-01-03 PROBLEM — C67.9 BLADDER CANCER: Status: ACTIVE | Noted: 2023-11-16

## 2024-01-03 PROCEDURE — 99214 OFFICE O/P EST MOD 30 MIN: CPT | Mod: PBBFAC | Performed by: STUDENT IN AN ORGANIZED HEALTH CARE EDUCATION/TRAINING PROGRAM

## 2024-01-03 PROCEDURE — 99999 PR PBB SHADOW E&M-EST. PATIENT-LVL IV: CPT | Mod: PBBFAC,,, | Performed by: STUDENT IN AN ORGANIZED HEALTH CARE EDUCATION/TRAINING PROGRAM

## 2024-01-03 PROCEDURE — 99205 OFFICE O/P NEW HI 60 MIN: CPT | Mod: S$PBB,,, | Performed by: STUDENT IN AN ORGANIZED HEALTH CARE EDUCATION/TRAINING PROGRAM

## 2024-01-03 RX ORDER — FENOFIBRATE 200 MG/1
200 CAPSULE ORAL
COMMUNITY

## 2024-01-04 ENCOUNTER — PATIENT MESSAGE (OUTPATIENT)
Dept: HEMATOLOGY/ONCOLOGY | Facility: CLINIC | Age: 89
End: 2024-01-04
Payer: MEDICARE

## 2024-01-04 ENCOUNTER — PATIENT MESSAGE (OUTPATIENT)
Dept: ADMINISTRATIVE | Facility: OTHER | Age: 89
End: 2024-01-04
Payer: MEDICARE

## 2024-01-05 ENCOUNTER — PATIENT MESSAGE (OUTPATIENT)
Dept: ADMINISTRATIVE | Facility: OTHER | Age: 89
End: 2024-01-05
Payer: MEDICARE

## 2024-01-05 ENCOUNTER — HOSPITAL ENCOUNTER (OUTPATIENT)
Dept: RADIATION THERAPY | Facility: HOSPITAL | Age: 89
Discharge: HOME | End: 2024-01-05
Attending: STUDENT IN AN ORGANIZED HEALTH CARE EDUCATION/TRAINING PROGRAM
Payer: MEDICARE

## 2024-01-05 DIAGNOSIS — D49.4 BLADDER TUMOR: Primary | ICD-10-CM

## 2024-01-05 DIAGNOSIS — D49.4 BLADDER TUMOR: ICD-10-CM

## 2024-01-05 PROCEDURE — 77263 THER RADIOLOGY TX PLNG CPLX: CPT | Mod: ,,, | Performed by: STUDENT IN AN ORGANIZED HEALTH CARE EDUCATION/TRAINING PROGRAM

## 2024-01-05 PROCEDURE — 77334 RADIATION TREATMENT AID(S): CPT | Mod: 26,,, | Performed by: STUDENT IN AN ORGANIZED HEALTH CARE EDUCATION/TRAINING PROGRAM

## 2024-01-05 PROCEDURE — 77334 RADIATION TREATMENT AID(S): CPT | Mod: TC | Performed by: STUDENT IN AN ORGANIZED HEALTH CARE EDUCATION/TRAINING PROGRAM

## 2024-01-05 PROCEDURE — 77014 HC CT GUIDANCE RADIATION THERAPY FLDS PLACEMENT: CPT | Mod: TC | Performed by: STUDENT IN AN ORGANIZED HEALTH CARE EDUCATION/TRAINING PROGRAM

## 2024-01-05 PROCEDURE — 77014 PR  CT GUIDANCE PLACEMENT RAD THERAPY FIELDS: CPT | Mod: 26,,, | Performed by: STUDENT IN AN ORGANIZED HEALTH CARE EDUCATION/TRAINING PROGRAM

## 2024-01-05 RX ORDER — OLANZAPINE 5 MG/1
TABLET ORAL
Qty: 30 TABLET | Refills: 1 | Status: SHIPPED | OUTPATIENT
Start: 2024-01-05 | End: 2024-01-29

## 2024-01-05 RX ORDER — DEXAMETHASONE 4 MG/1
TABLET ORAL
Qty: 40 TABLET | Refills: 1 | Status: SHIPPED | OUTPATIENT
Start: 2024-01-05

## 2024-01-05 RX ORDER — DEXAMETHASONE 4 MG/1
TABLET ORAL
Qty: 40 TABLET | Refills: 1 | Status: SHIPPED | OUTPATIENT
Start: 2024-01-05 | End: 2024-01-05

## 2024-01-05 NOTE — TELEPHONE ENCOUNTER
I called and spoke with them to clarify..     1 tablet twice a day for days 2-4 after chemo correct?

## 2024-01-06 ENCOUNTER — PATIENT MESSAGE (OUTPATIENT)
Dept: ADMINISTRATIVE | Facility: OTHER | Age: 89
End: 2024-01-06
Payer: MEDICARE

## 2024-01-07 ENCOUNTER — PATIENT MESSAGE (OUTPATIENT)
Dept: ADMINISTRATIVE | Facility: OTHER | Age: 89
End: 2024-01-07
Payer: MEDICARE

## 2024-01-08 ENCOUNTER — PATIENT MESSAGE (OUTPATIENT)
Dept: HEMATOLOGY/ONCOLOGY | Facility: CLINIC | Age: 89
End: 2024-01-08
Payer: MEDICARE

## 2024-01-08 ENCOUNTER — PATIENT MESSAGE (OUTPATIENT)
Dept: ADMINISTRATIVE | Facility: OTHER | Age: 89
End: 2024-01-08
Payer: MEDICARE

## 2024-01-08 PROCEDURE — 77301 RADIOTHERAPY DOSE PLAN IMRT: CPT | Mod: 26,,, | Performed by: STUDENT IN AN ORGANIZED HEALTH CARE EDUCATION/TRAINING PROGRAM

## 2024-01-08 PROCEDURE — 77301 RADIOTHERAPY DOSE PLAN IMRT: CPT | Mod: TC | Performed by: STUDENT IN AN ORGANIZED HEALTH CARE EDUCATION/TRAINING PROGRAM

## 2024-01-09 ENCOUNTER — INFUSION (OUTPATIENT)
Dept: INFUSION THERAPY | Facility: HOSPITAL | Age: 89
End: 2024-01-09
Payer: MEDICARE

## 2024-01-09 ENCOUNTER — PATIENT MESSAGE (OUTPATIENT)
Dept: ADMINISTRATIVE | Facility: OTHER | Age: 89
End: 2024-01-09
Payer: MEDICARE

## 2024-01-09 ENCOUNTER — PATIENT MESSAGE (OUTPATIENT)
Dept: HEMATOLOGY/ONCOLOGY | Facility: CLINIC | Age: 89
End: 2024-01-09
Payer: MEDICARE

## 2024-01-09 VITALS
TEMPERATURE: 98 F | HEART RATE: 67 BPM | OXYGEN SATURATION: 95 % | RESPIRATION RATE: 18 BRPM | DIASTOLIC BLOOD PRESSURE: 76 MMHG | BODY MASS INDEX: 24.52 KG/M2 | WEIGHT: 142.88 LBS | SYSTOLIC BLOOD PRESSURE: 162 MMHG

## 2024-01-09 DIAGNOSIS — D49.4 BLADDER TUMOR: Primary | ICD-10-CM

## 2024-01-09 DIAGNOSIS — C67.9 MALIGNANT NEOPLASM OF URINARY BLADDER, UNSPECIFIED SITE: Primary | ICD-10-CM

## 2024-01-09 DIAGNOSIS — C68.9 UROTHELIAL CANCER: ICD-10-CM

## 2024-01-09 PROCEDURE — 63600175 PHARM REV CODE 636 W HCPCS: Mod: JZ,JG | Performed by: INTERNAL MEDICINE

## 2024-01-09 PROCEDURE — 96375 TX/PRO/DX INJ NEW DRUG ADDON: CPT

## 2024-01-09 PROCEDURE — 77338 DESIGN MLC DEVICE FOR IMRT: CPT | Mod: TC | Performed by: STUDENT IN AN ORGANIZED HEALTH CARE EDUCATION/TRAINING PROGRAM

## 2024-01-09 PROCEDURE — 77427 RADIATION TX MANAGEMENT X5: CPT | Mod: ,,, | Performed by: STUDENT IN AN ORGANIZED HEALTH CARE EDUCATION/TRAINING PROGRAM

## 2024-01-09 PROCEDURE — 96413 CHEMO IV INFUSION 1 HR: CPT

## 2024-01-09 PROCEDURE — 77386 HC IMRT, COMPLEX: CPT | Performed by: STUDENT IN AN ORGANIZED HEALTH CARE EDUCATION/TRAINING PROGRAM

## 2024-01-09 PROCEDURE — 96367 TX/PROPH/DG ADDL SEQ IV INF: CPT

## 2024-01-09 PROCEDURE — 77300 RADIATION THERAPY DOSE PLAN: CPT | Mod: 26,,, | Performed by: STUDENT IN AN ORGANIZED HEALTH CARE EDUCATION/TRAINING PROGRAM

## 2024-01-09 PROCEDURE — 77338 DESIGN MLC DEVICE FOR IMRT: CPT | Mod: 26,,, | Performed by: STUDENT IN AN ORGANIZED HEALTH CARE EDUCATION/TRAINING PROGRAM

## 2024-01-09 PROCEDURE — 96361 HYDRATE IV INFUSION ADD-ON: CPT

## 2024-01-09 PROCEDURE — 77300 RADIATION THERAPY DOSE PLAN: CPT | Mod: TC | Performed by: STUDENT IN AN ORGANIZED HEALTH CARE EDUCATION/TRAINING PROGRAM

## 2024-01-09 PROCEDURE — 77014 PR  CT GUIDANCE PLACEMENT RAD THERAPY FIELDS: CPT | Mod: 26,,, | Performed by: STUDENT IN AN ORGANIZED HEALTH CARE EDUCATION/TRAINING PROGRAM

## 2024-01-09 PROCEDURE — 25000003 PHARM REV CODE 250: Performed by: INTERNAL MEDICINE

## 2024-01-09 RX ORDER — HEPARIN 100 UNIT/ML
500 SYRINGE INTRAVENOUS
Status: CANCELLED | OUTPATIENT
Start: 2024-01-09

## 2024-01-09 RX ORDER — HEPARIN 100 UNIT/ML
500 SYRINGE INTRAVENOUS
Status: DISCONTINUED | OUTPATIENT
Start: 2024-01-09 | End: 2024-01-09 | Stop reason: HOSPADM

## 2024-01-09 RX ORDER — SODIUM CHLORIDE 0.9 % (FLUSH) 0.9 %
10 SYRINGE (ML) INJECTION
Status: CANCELLED | OUTPATIENT
Start: 2024-01-09

## 2024-01-09 RX ORDER — SODIUM CHLORIDE 0.9 % (FLUSH) 0.9 %
10 SYRINGE (ML) INJECTION
Status: DISCONTINUED | OUTPATIENT
Start: 2024-01-09 | End: 2024-01-09 | Stop reason: HOSPADM

## 2024-01-09 RX ADMIN — APREPITANT 130 MG: 130 INJECTION, EMULSION INTRAVENOUS at 01:01

## 2024-01-09 RX ADMIN — CISPLATIN 42 MG: 1 INJECTION, SOLUTION INTRAVENOUS at 02:01

## 2024-01-09 RX ADMIN — DEXAMETHASONE SODIUM PHOSPHATE 0.25 MG: 4 INJECTION, SOLUTION INTRA-ARTICULAR; INTRALESIONAL; INTRAMUSCULAR; INTRAVENOUS; SOFT TISSUE at 01:01

## 2024-01-09 RX ADMIN — POTASSIUM CHLORIDE 500 ML/HR: 2 INJECTION, SOLUTION, CONCENTRATE INTRAVENOUS at 02:01

## 2024-01-09 RX ADMIN — SODIUM CHLORIDE 500 ML: 0.9 INJECTION, SOLUTION INTRAVENOUS at 03:01

## 2024-01-09 NOTE — PLAN OF CARE
Problem: Anemia (Chemotherapy Effects)  Goal: Anemia Symptom Improvement  Outcome: Ongoing, Progressing     Problem: Urinary Bleeding Risk or Actual (Chemotherapy Effects)  Goal: Absence of Hematuria  Outcome: Ongoing, Progressing     Problem: Nausea and Vomiting (Chemotherapy Effects)  Goal: Fluid and Electrolyte Balance  Outcome: Ongoing, Progressing     Problem: Neurotoxicity (Chemotherapy Effects)  Goal: Neurotoxicity Symptom Control  Outcome: Ongoing, Progressing     Problem: Neutropenia (Chemotherapy Effects)  Goal: Absence of Infection  Outcome: Ongoing, Progressing     Problem: Oral Mucositis (Chemotherapy Effects)  Goal: Improved Oral Mucous Membrane Integrity  Outcome: Ongoing, Progressing     Problem: Thrombocytopenia Bleeding Risk (Chemotherapy Effects)  Goal: Absence of Bleeding  Outcome: Ongoing, Progressing     Problem: Infection  Goal: Absence of Infection Signs and Symptoms  Outcome: Ongoing, Progressing    Ambulatory to clinic with wife.  No c/o adverse effects or s/s of infection.  PIV inserted, flushed without difficulty, blood return noted and infused with no problems.  Premeds, IVF's and cisplatin tolerated with no problems.  Ambulatory home with wife.  NAD noted.

## 2024-01-10 ENCOUNTER — PATIENT MESSAGE (OUTPATIENT)
Dept: ADMINISTRATIVE | Facility: OTHER | Age: 89
End: 2024-01-10
Payer: MEDICARE

## 2024-01-10 PROCEDURE — 77470 SPECIAL RADIATION TREATMENT: CPT | Mod: 59,TC | Performed by: STUDENT IN AN ORGANIZED HEALTH CARE EDUCATION/TRAINING PROGRAM

## 2024-01-10 PROCEDURE — 77470 SPECIAL RADIATION TREATMENT: CPT | Mod: 26,59,, | Performed by: STUDENT IN AN ORGANIZED HEALTH CARE EDUCATION/TRAINING PROGRAM

## 2024-01-10 PROCEDURE — 77014 PR  CT GUIDANCE PLACEMENT RAD THERAPY FIELDS: CPT | Mod: 26,,, | Performed by: STUDENT IN AN ORGANIZED HEALTH CARE EDUCATION/TRAINING PROGRAM

## 2024-01-10 PROCEDURE — 77386 HC IMRT, COMPLEX: CPT | Performed by: STUDENT IN AN ORGANIZED HEALTH CARE EDUCATION/TRAINING PROGRAM

## 2024-01-11 ENCOUNTER — PATIENT MESSAGE (OUTPATIENT)
Dept: ADMINISTRATIVE | Facility: OTHER | Age: 89
End: 2024-01-11
Payer: MEDICARE

## 2024-01-11 ENCOUNTER — PATIENT MESSAGE (OUTPATIENT)
Dept: HEMATOLOGY/ONCOLOGY | Facility: CLINIC | Age: 89
End: 2024-01-11
Payer: MEDICARE

## 2024-01-11 DIAGNOSIS — Z00.00 ENCOUNTER FOR MEDICARE ANNUAL WELLNESS EXAM: ICD-10-CM

## 2024-01-11 PROCEDURE — 77014 PR  CT GUIDANCE PLACEMENT RAD THERAPY FIELDS: CPT | Mod: 26,,, | Performed by: STUDENT IN AN ORGANIZED HEALTH CARE EDUCATION/TRAINING PROGRAM

## 2024-01-11 PROCEDURE — 77386 HC IMRT, COMPLEX: CPT | Performed by: STUDENT IN AN ORGANIZED HEALTH CARE EDUCATION/TRAINING PROGRAM

## 2024-01-12 ENCOUNTER — PATIENT MESSAGE (OUTPATIENT)
Dept: ADMINISTRATIVE | Facility: OTHER | Age: 89
End: 2024-01-12
Payer: MEDICARE

## 2024-01-12 ENCOUNTER — DOCUMENTATION ONLY (OUTPATIENT)
Dept: RADIATION ONCOLOGY | Facility: CLINIC | Age: 89
End: 2024-01-12
Payer: MEDICARE

## 2024-01-12 PROCEDURE — 77014 PR  CT GUIDANCE PLACEMENT RAD THERAPY FIELDS: CPT | Mod: 26,,, | Performed by: STUDENT IN AN ORGANIZED HEALTH CARE EDUCATION/TRAINING PROGRAM

## 2024-01-12 PROCEDURE — 77386 HC IMRT, COMPLEX: CPT | Performed by: STUDENT IN AN ORGANIZED HEALTH CARE EDUCATION/TRAINING PROGRAM

## 2024-01-12 NOTE — PROGRESS NOTES
[facility]  Urologic Oncology      Date of Service: 12/6/23    Chief Complaint/Reason for Consultation: MIBC    Requesting Provider: Marcelino Moffett      History of Present Illness:   Patient is a 88 y.o. male presenting for evaluation of muscle invasive bladder cancer.  He underwent a Transurethral resection of bladder tumor on 11/16/2023.  All tumors were resected, there were patches of erythema identified on the left lateral bladder wall.  His pathology revealed at the right anterior bladder, muscle invasive bladder cancer with high-grade papillary urothelial carcinoma with intact expression of DNA mismatch repair proteins.    He then had a CT scan on 11/25/2023 which showed postop changes from TURBT but no lymphadenopathy or distant metastasis or hydronephrosis.  I have reviewed this imaging personally and in detail.      Of note, he does have CKD 3 due to type 2 diabetes, hyperlipidemia and hypertension.    Current Problem List:  Patient Active Problem List    Diagnosis Date Noted    UTI (urinary tract infection) 11/21/2023    Acute hypoxic respiratory failure 11/21/2023    Elevated troponin 11/21/2023    Thyroid nodules 11/21/2023    Bladder cancer 11/16/2023    Renal cyst 12/14/2022    DM type 2, controlled, with complication 12/14/2022    Risk for falls 12/15/2021    Sensorineural hearing loss (SNHL) of both ears 11/19/2020    Atherosclerosis of aorta 11/18/2020    Persistent atrial fibrillation 06/11/2014    Hypertension associated with diabetes 05/22/2013    Hyperlipidemia associated with type 2 diabetes mellitus 05/22/2013    CKD stage 3 due to type 2 diabetes mellitus 11/23/2012    Chronic anticoagulation 11/23/2012        Allergies:  Review of patient's allergies indicates:   Allergen Reactions    Niacin      Other reaction(s): Rash  Other reaction(s): Itching        Medications per EMR:  (Not in a hospital admission)      Past Medical History:  Past Medical History:   Diagnosis Date     *Atrial fibrillation     Chronic kidney disease     Deep vein thrombosis     Hyperlipidemia     Hypertension     Metabolic syndrome     Type 2 diabetes mellitus, without long-term current use of insulin 2021        Past Surgical History:  Past Surgical History:   Procedure Laterality Date    CATARACT EXTRACTION      CHOLECYSTECTOMY      CYSTOSCOPY N/A 2023    Procedure: CYSTOSCOPY;  Surgeon: Marcelino Moffett MD;  Location: [address];  Service: Urology;  Laterality: N/A;  90 minutes    EYE SURGERY      INJECTION OF FACET JOINT Bilateral 2021    Procedure: FACET JOINT INJECTION BILATERAL L4/L5 DIRECT REFERRAL;  Surgeon: Philipp Iyer MD;  Location: [facility] [place] [facility];  Service: Pain Management;  Laterality: Bilateral;  NEEDS CONSENT, ELIQUIS CLEARANCE IN CHART    RETROGRADE PYELOGRAPHY Bilateral 2023    Procedure: PYELOGRAM, RETROGRADE;  Surgeon: Marcelino Moffett MD;  Location: [address];  Service: Urology;  Laterality: Bilateral;  90 minutes    SKIN CANCER EXCISION      TONSILLECTOMY      TURBT (TRANSURETHRAL RESECTION OF BLADDER TUMOR) N/A 2023    Procedure: TURBT (TRANSURETHRAL RESECTION OF BLADDER TUMOR);  Surgeon: Marcelino Moffett MD;  Location: [address];  Service: Urology;  Laterality: N/A;  90 minutes        Family History:  Family History   Problem Relation Age of Onset    Diabetes Maternal Aunt     Heart attack Neg Hx     Heart disease Neg Hx     Heart failure Neg Hx     Hyperlipidemia Neg Hx     Hypertension Neg Hx     Stroke Neg Hx         Social History:  Social History     Tobacco Use    Smoking status: Former     Current packs/day: 0.00     Average packs/day: 2.0 packs/day for 20.0 years (40.0 ttl pk-yrs)     Types: Cigarettes     Start date: 1963     Quit date: 1983     Years since quittin.6     Passive exposure: Never    Smokeless tobacco: Never   Substance Use Topics    Alcohol use: Yes     Alcohol/week: 1.0 standard drink of  "alcohol     Types: 1 Standard drinks or equivalent per week     Comment: 3 drinks per week          OBJECTIVE:     Vitals:    12/06/23 1051   BP: (!) 147/90   Pulse: 88   Weight: 59.6 kg (131 lb 8.1 oz)   Height: 5' 5" (1.651 m)          General Appearance: Alert, cooperative, no distress  Head: Normocephalic  Eyes: Clear conjunctiva  Neck: No obvious LND or JVD  Lungs: Normal chest excursion, no accessory muscle use  Chest: Regular rate rhythm by palpation, no pedal edema  Abdomen: Soft, non-tender, non-distended, no rebound, guarding, rigidity  Extremities: Atraumatic   Lymph Nodes: No appreciable lymph adenopathy  Neurologic: No gross gait, motor or sensory deficits        LAB:    CBC:  Lab Results   Component Value Date    WBC 6.07 01/09/2024    HGB 15.1 01/09/2024    HCT 45.9 01/09/2024    MCV 94 01/09/2024     01/09/2024         BMP:  Lab Results   Component Value Date     01/09/2024    K 4.4 01/09/2024     01/09/2024    CO2 23 01/09/2024    BUN 20 01/09/2024    CREATININE 1.0 01/09/2024    CALCIUM 9.4 01/09/2024    ANIONGAP 7 (L) 01/09/2024    EGFRNORACEVR >60.0 01/09/2024     ASSESSMENT/PLAN:     Assessment: 88M with muscle invasive bladder cancer    Plan:  I had a thorough discussion with the patient regarding the standard of care treatment for muscle invasive bladder cancer.  Specifically, we discussed radical cystectomy with urinary diversion as well as chemoradiation.   We discussed that at his age, although he has excellent functional status, put him through a radical cystectomy could certainly have a decrease in his quality of life as well as pose an immediate threat for postoperative complications and recovery.  With this in mind, we discussed chemoradiation with curative intent and discuss the 5-7 year comparable survival between this bladder preserving therapy and radical cystectomy.  I have reviewed his operative note in detail, all of the tissue and tumor was resected at the " time of the TURBT.  We discussed the role for repeat resection, and given the location at the dome, I believe he will be at a very high-risk for perforation and morbidity.  With this in mind, I would like to bypass repeat resection and refer him to medical oncology and radiation oncology.  I have placed an order for him to be seen with Dr. Bland and Dr. Schofield, respectively.     We will perform a flexible cystoscopy in office and obtain a CT chest to complete staging.  The flexible cystoscopy will reveal there is any residual disease.    I spent a total of 45 minutes on the day of the visit.This includes face to face time and non-face to face time preparing to see the patient (eg, review of tests), obtaining and/or reviewing separately obtained history, documenting clinical information in the electronic or other health record, independently interpreting results and communicating results to the patient/family/caregiver, or care coordinator.

## 2024-01-12 NOTE — PLAN OF CARE
Day 4 of outpatient radiation to the bladder. Doing well. No dysuria or hematuria. Still with urinary urgency and frequency. Nocturia x 1. Tolerating therapy well.

## 2024-01-16 ENCOUNTER — LAB VISIT (OUTPATIENT)
Dept: LAB | Facility: HOSPITAL | Age: 89
End: 2024-01-16
Attending: INTERNAL MEDICINE
Payer: MEDICARE

## 2024-01-16 DIAGNOSIS — D49.4 BLADDER TUMOR: ICD-10-CM

## 2024-01-16 DIAGNOSIS — C68.9 UROTHELIAL CANCER: ICD-10-CM

## 2024-01-16 LAB
ALBUMIN SERPL BCP-MCNC: 3.5 G/DL (ref 3.5–5.2)
ALP SERPL-CCNC: 63 U/L (ref 55–135)
ALT SERPL W/O P-5'-P-CCNC: 24 U/L (ref 10–44)
ANION GAP SERPL CALC-SCNC: 8 MMOL/L (ref 8–16)
AST SERPL-CCNC: 21 U/L (ref 10–40)
BASOPHILS # BLD AUTO: 0.02 K/UL (ref 0–0.2)
BASOPHILS NFR BLD: 0.2 % (ref 0–1.9)
BILIRUB SERPL-MCNC: 0.6 MG/DL (ref 0.1–1)
BUN SERPL-MCNC: 28 MG/DL (ref 8–23)
CALCIUM SERPL-MCNC: 9.8 MG/DL (ref 8.7–10.5)
CHLORIDE SERPL-SCNC: 106 MMOL/L (ref 95–110)
CO2 SERPL-SCNC: 25 MMOL/L (ref 23–29)
CREAT SERPL-MCNC: 1.2 MG/DL (ref 0.5–1.4)
DIFFERENTIAL METHOD BLD: ABNORMAL
EOSINOPHIL # BLD AUTO: 0.1 K/UL (ref 0–0.5)
EOSINOPHIL NFR BLD: 1.6 % (ref 0–8)
ERYTHROCYTE [DISTWIDTH] IN BLOOD BY AUTOMATED COUNT: 14.1 % (ref 11.5–14.5)
EST. GFR  (NO RACE VARIABLE): 58.2 ML/MIN/1.73 M^2
GLUCOSE SERPL-MCNC: 189 MG/DL (ref 70–110)
HCT VFR BLD AUTO: 49.4 % (ref 40–54)
HGB BLD-MCNC: 16.2 G/DL (ref 14–18)
IMM GRANULOCYTES # BLD AUTO: 0.08 K/UL (ref 0–0.04)
IMM GRANULOCYTES NFR BLD AUTO: 0.9 % (ref 0–0.5)
LYMPHOCYTES # BLD AUTO: 2.4 K/UL (ref 1–4.8)
LYMPHOCYTES NFR BLD: 26.9 % (ref 18–48)
MCH RBC QN AUTO: 31.1 PG (ref 27–31)
MCHC RBC AUTO-ENTMCNC: 32.8 G/DL (ref 32–36)
MCV RBC AUTO: 95 FL (ref 82–98)
MONOCYTES # BLD AUTO: 0.7 K/UL (ref 0.3–1)
MONOCYTES NFR BLD: 8.1 % (ref 4–15)
NEUTROPHILS # BLD AUTO: 5.6 K/UL (ref 1.8–7.7)
NEUTROPHILS NFR BLD: 62.3 % (ref 38–73)
NRBC BLD-RTO: 0 /100 WBC
PLATELET # BLD AUTO: 202 K/UL (ref 150–450)
PMV BLD AUTO: 13 FL (ref 9.2–12.9)
POTASSIUM SERPL-SCNC: 5.2 MMOL/L (ref 3.5–5.1)
PROT SERPL-MCNC: 6.8 G/DL (ref 6–8.4)
RBC # BLD AUTO: 5.21 M/UL (ref 4.6–6.2)
SODIUM SERPL-SCNC: 139 MMOL/L (ref 136–145)
WBC # BLD AUTO: 8.96 K/UL (ref 3.9–12.7)

## 2024-01-16 PROCEDURE — 36415 COLL VENOUS BLD VENIPUNCTURE: CPT | Mod: PO | Performed by: INTERNAL MEDICINE

## 2024-01-16 PROCEDURE — 80053 COMPREHEN METABOLIC PANEL: CPT | Performed by: INTERNAL MEDICINE

## 2024-01-16 PROCEDURE — 85025 COMPLETE CBC W/AUTO DIFF WBC: CPT | Performed by: INTERNAL MEDICINE

## 2024-01-16 NOTE — PROGRESS NOTES
Subjective     Patient ID: Cliff Magaña is a 88 y.o. male.    Chief Complaint: No chief complaint on file.    HPI    Diagnosis: Muscle invasive bladder cancer    Presents to clinic prior to week 2 of weekly chemoRT  Tolerated treatment well overall with no significant side effects  No fevers, chills, or infectious concerns  No CP, SOB, palpitations   Energy remains stable  Continues to ride stationary bike for ~1 hour most days   Appetite is good  Eats at least 2 meals per day as well as 1 Boost daily   States staying well hydrated   No nausea or vomiting  Bowels regular - no constipation or diarrhea   No blood in urine or stool   Notes increased urinary frequency, likely d/t increased fluid intake   No hearing changes   No other concerns in clinic       Per Dr. Bland's previous note: Oncology History:  - bladder cancer incidentally found on imaging as he had been under surveillance for renal cysts  (10/17/2023 ultrasound showed stable left renal cysts and new bladder masses)    -  11/16/2023 TURBT of bladder tumor  Findings:   1. Right postero-lateral papillary bladder wall tumor (3-4cm) with diffusely erythematous and nodular surrounding tissue, right < 1cm papillary bladder wall tumor immediately lateral to the larger postero-lateral papillary tumor, and a right nodular 4cm bladder tumor with high grade and possibly invasive appearance. Tumors resected, hemostasis achieved.  Several other patches of erythema were diffusely identified on the left lateral bladder wall.  2. Bimanual exam post-TURBT showed freely mobile bladder without abnormalities  3. Bilateral retrograde pyelogram showed delicate calices with no evidence of hydronephrosis, no filling defects, and ureter normal in course and caliber, bilaterally  Pathology:  1. Bladder, right posterolateral wall, transurethral resection of bladder tumor:  - Noninvasive high-grade papillary urothelial carcinoma  - Muscularis propria present  - Multiple H&E levels  evaluated  2. Bladder, right anterior, transurethral resection of bladder tumor:  - Invasive high-grade papillary urothelial carcinoma  - Tumor invades muscularis propria  - Intact expression of DNA mismatch repair proteins in tumor by immunohistochemistry (see comment)  COMMENT:  Immunohistochemistry (IHC) Testing for Mismatch Repair (MMR) Proteins:  MLH1 - Intact nuclear expression  MSH2 - Intact nuclear expression  MSH6 - Intact nuclear expression  PMS2 - Intact nuclear expression  Background nonneoplastic tissue/internal control with intact nuclear expression  IHC Interpretation  No loss of nuclear expression of MMR proteins: low probability of microsatellite instability  There are exceptions to the above IHC interpretations. These results should not be considered in isolation, and clinical correlation with genetic counseling is recommended to assess the need for germline testing.    - 11/25/2023 CT Abd/Pelvis:  FINDINGS:  Lung base: Bilateral small volume pleural effusion increased in size compared to recent prior, associated with mild compression atelectasis.  Visualized portion of heart: Coronary artery calcification.  Liver: Normal in size.  Left lobe small calcified granuloma.  No suspicious focal lesion.  Gallbladder: Cholecystectomy.  Bile duct: No intra-or extrahepatic biliary ductal dilatation.  Spleen: Unremarkable.  Pancreas: Parenchymal atrophy.  Multiple parenchymal calcifications suggesting chronic pancreatitis.  No suspicious focal lesion.  No pancreatic ductal dilatation.  No adjacent inflammatory changes or fluid collections.  Adrenal glands: Unremarkable.  Genitourinary: Bilateral renal cysts.  Subcentimeter hypodensities in both kidneys, which are too small to characterize.  The ureters appear normal in course and caliber.  No evidence of nephrolithiasis or hydroureteronephrosis.  Urinary bladder: Postsurgical change in the anterior bladder wall from TURBT.  There are small foci of air in the  bladder, likely iatrogenic.  Reproductive organs: Unremarkable.  GI tract: Small hiatal hernia.  The stomach is unremarkable.  The visualized loops of small and large bowel show no evidence of obstruction or inflammation. Diverticulosis.  Appendix unremarkable.  Peritoneum: No free air or fluid.  Retroperitoneum: No significant adenopathy.  Vasculature: Normal caliber of abdominal aorta with moderate calcified and noncalcified atherosclerosis.  Abdominal wall: Tiny fat containing umbilical hernia.  Bones: Stable T12-L1 intervertebral disc calcification.  No suspicious sclerotic lesions.  No acute fracture.  Impression:  Postoperative changes from of TURBT.  No lymphadenopathy.  No distant metastases.  Other findings as described.     - 12/6/2023 CT Chest:  FINDINGS:  Comparison is 11/21/2023.  No mediastinal, hilar or axillary adenopathy is seen.  There is a right lower pole thyroid nodule.  There are coronary calcifications.  There is a left upper pole renal cyst.  There is a small hiatal hernia.  Trachea and bronchi are patent.  There is no evidence of interstitial lung disease, bronchiectasis, airway trapping, or emphysema.  No suspicious pulmonary nodules, masses, or infiltrates are seen.  There is a calcified left lung granuloma.  Bones demonstrate nothing focal.  Impression:  No significant abnormality seen.  Right lobe thyroid nodule.  Coronary artery calcifications.  Left upper pole renal cyst.    PMH:  Active Ambulatory Problems        Diagnosis   Date Noted       CKD stage 3 due to type 2 diabetes mellitus     11/23/2012       Chronic anticoagulation 11/23/2012       Hypertension associated with diabetes     05/22/2013       Hyperlipidemia associated with type 2 diabetes mellitus     05/22/2013       Persistent atrial fibrillation      06/11/2014       Atherosclerosis of aorta      11/18/2020       Sensorineural hearing loss (SNHL) of both ears  11/19/2020       Risk for falls    12/15/2021       Renal  cyst  12/14/2022       DM type 2, controlled, with complication  12/14/2022       Bladder tumor     11/16/2023       UTI (urinary tract infection) 11/21/2023       Acute hypoxic respiratory failure   11/21/2023       Elevated troponin 11/21/2023       Thyroid nodules   11/21/2023     Resolved Ambulatory Problems        Diagnosis   Date Noted       Atrial fibrillation     08/02/2012       Long term (current) use of anticoagulants 08/02/2012       Family history of diabetes mellitus 11/23/2012       Ex-cigarette smoker     11/23/2012       Metabolic syndrome      11/23/2012       Screen for colon cancer 05/07/2014       Ex-smoker   11/09/2015       Hyperglycemia     12/16/2015       Abscess of chest wall   06/25/2018       Sebaceous cyst    07/25/2018       Decreased back mobility 08/20/2019       Decreased strength of trunk and back      08/20/2019       Decreased range of motion of both hips    08/20/2019       Pain aggravated by walking    08/20/2019       Hamstring tightness of both lower extremities   08/20/2019       Impaired mobility and endurance     08/20/2019       Low back pain     11/19/2020       Chronic pain      04/28/2021       Type 2 diabetes mellitus, without long-term current use of insulin      12/13/2021       CKD stage 3 secondary to diabetes   12/13/2021       Encounter for preventive health examination     12/19/2022     Past Medical History:  Diagnosis   Date       *Atrial fibrillation           Chronic kidney disease         Deep vein thrombosis           Hyperlipidemia           Hypertension       Cholecystectomy  Tonsillectomy  Skin cancer removal    FH:  Paternal Aunt- recalls cancer affecting scalp    SH:    Retired [employer], followed by off shore work,   4 children (1 passed as an infant)  Quit tobacco in the 1980s  + EtOH      Review of Systems   Constitutional:  Positive for unexpected weight change. Negative for activity change, appetite change, chills, fatigue and  fever.   HENT:  Positive for hearing loss. Negative for nasal congestion, postnasal drip, rhinorrhea, sinus pressure/congestion, sore throat and tinnitus.    Eyes:  Negative for visual disturbance.   Respiratory:  Negative for cough, chest tightness, shortness of breath and wheezing.    Cardiovascular:  Negative for chest pain, palpitations and leg swelling.   Gastrointestinal:  Negative for abdominal distention, abdominal pain, blood in stool, change in bowel habit, constipation, diarrhea, nausea and vomiting.   Genitourinary:  Positive for frequency. Negative for decreased urine volume, difficulty urinating, dysuria, hematuria and urgency.   Musculoskeletal:  Negative for arthralgias, back pain, myalgias, neck pain and neck stiffness.   Integumentary:  Negative for rash.   Neurological:  Negative for dizziness, weakness, light-headedness, numbness and headaches.   Psychiatric/Behavioral:  Negative for agitation, behavioral problems, confusion and dysphoric mood. The patient is not nervous/anxious.         Objective     Physical Exam  Vitals reviewed.   Constitutional:       General: He is not in acute distress.     Appearance: Normal appearance. He is normal weight. He is not ill-appearing, toxic-appearing or diaphoretic.   HENT:      Head: Normocephalic and atraumatic.      Right Ear: External ear normal.      Left Ear: External ear normal.   Eyes:      General: No scleral icterus.     Conjunctiva/sclera: Conjunctivae normal.   Cardiovascular:      Rate and Rhythm: Normal rate.   Pulmonary:      Effort: Pulmonary effort is normal. No respiratory distress.   Abdominal:      General: There is no distension.   Skin:     General: Skin is warm and dry.      Coloration: Skin is not jaundiced or pale.      Findings: No bruising, erythema or rash.   Neurological:      General: No focal deficit present.      Mental Status: He is alert and oriented to person, place, and time. Mental status is at baseline.      Motor: No  weakness.      Gait: Gait normal.   Psychiatric:         Mood and Affect: Mood normal.         Behavior: Behavior normal.         Thought Content: Thought content normal.         Judgment: Judgment normal.       Labs- reviewed  Imaging- reviewed     Assessment and Plan     1. Urothelial cancer    2. Bladder tumor    3. 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 Hypertension associated with diabetes    6. Hyperlipidemia associated with type 2 diabetes mellitus    7. CKD stage 3 due to type 2 diabetes mellitus    Other orders  -     sodium chloride 0.9% bolus 500 mL 500 mL  -     aprepitant (CINVANTI) injection 130 mg  -     palonosetron (ALOXI) 0.25 mg with Dexamethasone (DECADRON) 12 mg in NS 50 mL IVPB  -     CISplatin (Platinol) 25 mg/m2 = 42 mg in sodium chloride 0.9% 292 mL chemo infusion  -     sodium chloride 0.9% bolus 500 mL 500 mL  -     sodium chloride 0.9% flush 10 mL  -     heparin, porcine (PF) 100 unit/mL injection flush 500 Units  -     alteplase injection 2 mg      Bladder cancer-  We had a lengthy discussion regarding muscle invasive bladder cancer at his initial visit   Discussed option of NA chemotherapy followed by surgery which he does not want  Reviewed the option of definitive chemo/XRT and this is his preference- reviewed weekly Cisplatin and daily XRT M-F    Started chemoRT on 1/9/24  Tolerating treatment well overall  Labs reviewed, adequate to proceed with infusion today   Will change pre-hydration to NS d/t elevated K+ level today  RTC in 1 week for next cycle    HTN, HLD, CKD-  Monitor kidney function on treatment.  Encouraged continued hydration d/t mildly abnormal labs today.  BP slightly elevated in clinic. Asymptomatic.   Enrolled in chemo care companion. Readings reviewed.   Continue medical management.  Monitor.     Patient is in agreement with the proposed treatment plan. All questions were answered to the patient's satisfaction.  Pt knows to call clinic if anything is needed before the next clinic visit.    Patient discussed with collaborating physician, Dr. Bland.    At least 40 minutes were spent today on this encounter including face to face time with the patient, data gathering/interpretation and documentation.       Benita Mccann, MSN, APRN, [facility]  Hematology and Medical Oncology  Clinical Nurse Specialist to Dr. Coombs, Dr. Bland & Dr. Meade Chart for Scheduling    Med Onc Chart Routing      Follow up with physician 1 week. as scheduled   Follow up with [facility] 2 weeks. as scheduled   Infusion scheduling note    Injection scheduling note    Labs    Imaging    Pharmacy appointment    Other referrals              Treatment Plan Information   OP CISPLATIN WEEKLY + RT   Elizabeth Bland MD   Upcoming Treatment Dates - OP CISPLATIN WEEKLY + RT    1/16/2024       Chemotherapy       CISplatin (Platinol) 25 mg/m2 = 42 mg in sodium chloride 0.9% 292 mL chemo infusion       Pre-Hydration       sodium chloride 0.9% 500 mL with magnesium sulfate 1 g, potassium chloride 10 mEq infusion       Post-Hydration       sodium chloride 0.9% bolus 500 mL 500 mL       Antiemetics       aprepitant (CINVANTI) injection 130 mg       palonosetron (ALOXI) 0.25 mg with Dexamethasone (DECADRON) 12 mg in NS 50 mL IVPB  1/23/2024       Chemotherapy       CISplatin (Platinol) 25 mg/m2 = 42 mg in sodium chloride 0.9% 292 mL chemo infusion       Pre-Hydration       sodium chloride 0.9% 500 mL with magnesium sulfate 1 g, potassium chloride 10 mEq infusion       Post-Hydration       sodium chloride 0.9% bolus 500 mL 500 mL       Antiemetics       aprepitant (CINVANTI) injection 130 mg       palonosetron (ALOXI) 0.25 mg with Dexamethasone (DECADRON) 12 mg in NS 50 mL IVPB  1/30/2024       Chemotherapy       CISplatin (Platinol) 25 mg/m2 = 42 mg in sodium chloride 0.9% 292 mL chemo infusion       Pre-Hydration       sodium chloride 0.9% 500 mL with magnesium sulfate  1 g, potassium chloride 10 mEq infusion       Post-Hydration       sodium chloride 0.9% bolus 500 mL 500 mL       Antiemetics       aprepitant (CINVANTI) injection 130 mg       palonosetron (ALOXI) 0.25 mg with Dexamethasone (DECADRON) 12 mg in NS 50 mL IVPB  2/6/2024       Chemotherapy       CISplatin (Platinol) 25 mg/m2 = 42 mg in sodium chloride 0.9% 292 mL chemo infusion       Pre-Hydration       sodium chloride 0.9% 500 mL with magnesium sulfate 1 g, potassium chloride 10 mEq infusion       Post-Hydration       sodium chloride 0.9% bolus 500 mL 500 mL       Antiemetics       aprepitant (CINVANTI) injection 130 mg       palonosetron (ALOXI) 0.25 mg with Dexamethasone (DECADRON) 12 mg in NS 50 mL IVPB

## 2024-01-17 ENCOUNTER — OFFICE VISIT (OUTPATIENT)
Dept: HEMATOLOGY/ONCOLOGY | Facility: CLINIC | Age: 89
End: 2024-01-17
Payer: MEDICARE

## 2024-01-17 ENCOUNTER — PATIENT MESSAGE (OUTPATIENT)
Dept: ADMINISTRATIVE | Facility: OTHER | Age: 89
End: 2024-01-17
Payer: MEDICARE

## 2024-01-17 ENCOUNTER — INFUSION (OUTPATIENT)
Dept: INFUSION THERAPY | Facility: HOSPITAL | Age: 89
End: 2024-01-17
Payer: MEDICARE

## 2024-01-17 VITALS
HEART RATE: 72 BPM | HEIGHT: 64 IN | TEMPERATURE: 98 F | SYSTOLIC BLOOD PRESSURE: 150 MMHG | OXYGEN SATURATION: 98 % | WEIGHT: 136.25 LBS | BODY MASS INDEX: 23.26 KG/M2 | DIASTOLIC BLOOD PRESSURE: 69 MMHG | RESPIRATION RATE: 18 BRPM

## 2024-01-17 VITALS
HEART RATE: 72 BPM | TEMPERATURE: 98 F | DIASTOLIC BLOOD PRESSURE: 69 MMHG | WEIGHT: 136.25 LBS | BODY MASS INDEX: 23.26 KG/M2 | HEIGHT: 64 IN | SYSTOLIC BLOOD PRESSURE: 150 MMHG | OXYGEN SATURATION: 98 %

## 2024-01-17 DIAGNOSIS — C68.9 UROTHELIAL CANCER: Primary | ICD-10-CM

## 2024-01-17 DIAGNOSIS — T45.1X5A IMMUNODEFICIENCY SECONDARY TO CHEMOTHERAPY: ICD-10-CM

## 2024-01-17 DIAGNOSIS — D49.9 IMMUNODEFICIENCY SECONDARY TO NEOPLASM: ICD-10-CM

## 2024-01-17 DIAGNOSIS — E78.5 HYPERLIPIDEMIA ASSOCIATED WITH TYPE 2 DIABETES MELLITUS: ICD-10-CM

## 2024-01-17 DIAGNOSIS — Z79.899 IMMUNODEFICIENCY SECONDARY TO CHEMOTHERAPY: ICD-10-CM

## 2024-01-17 DIAGNOSIS — E11.69 HYPERLIPIDEMIA ASSOCIATED WITH TYPE 2 DIABETES MELLITUS: ICD-10-CM

## 2024-01-17 DIAGNOSIS — C67.9 MALIGNANT NEOPLASM OF URINARY BLADDER, UNSPECIFIED SITE: Primary | ICD-10-CM

## 2024-01-17 DIAGNOSIS — D84.821 IMMUNODEFICIENCY SECONDARY TO CHEMOTHERAPY: ICD-10-CM

## 2024-01-17 DIAGNOSIS — D49.4 BLADDER TUMOR: ICD-10-CM

## 2024-01-17 DIAGNOSIS — E11.22 CKD STAGE 3 DUE TO TYPE 2 DIABETES MELLITUS: ICD-10-CM

## 2024-01-17 DIAGNOSIS — I15.2 HYPERTENSION ASSOCIATED WITH DIABETES: ICD-10-CM

## 2024-01-17 DIAGNOSIS — N18.30 CKD STAGE 3 DUE TO TYPE 2 DIABETES MELLITUS: ICD-10-CM

## 2024-01-17 DIAGNOSIS — D84.81 IMMUNODEFICIENCY SECONDARY TO NEOPLASM: ICD-10-CM

## 2024-01-17 DIAGNOSIS — E11.59 HYPERTENSION ASSOCIATED WITH DIABETES: ICD-10-CM

## 2024-01-17 PROCEDURE — 96367 TX/PROPH/DG ADDL SEQ IV INF: CPT

## 2024-01-17 PROCEDURE — 77014 PR  CT GUIDANCE PLACEMENT RAD THERAPY FIELDS: CPT | Mod: 26,,, | Performed by: STUDENT IN AN ORGANIZED HEALTH CARE EDUCATION/TRAINING PROGRAM

## 2024-01-17 PROCEDURE — 99999 PR PBB SHADOW E&M-EST. PATIENT-LVL IV: CPT | Mod: PBBFAC,,, | Performed by: REGISTERED NURSE

## 2024-01-17 PROCEDURE — 99215 OFFICE O/P EST HI 40 MIN: CPT | Mod: S$PBB,,, | Performed by: REGISTERED NURSE

## 2024-01-17 PROCEDURE — 99214 OFFICE O/P EST MOD 30 MIN: CPT | Mod: PBBFAC,25 | Performed by: REGISTERED NURSE

## 2024-01-17 PROCEDURE — 77336 RADIATION PHYSICS CONSULT: CPT | Performed by: STUDENT IN AN ORGANIZED HEALTH CARE EDUCATION/TRAINING PROGRAM

## 2024-01-17 PROCEDURE — 96413 CHEMO IV INFUSION 1 HR: CPT

## 2024-01-17 PROCEDURE — 63600175 PHARM REV CODE 636 W HCPCS: Performed by: REGISTERED NURSE

## 2024-01-17 PROCEDURE — 77386 HC IMRT, COMPLEX: CPT | Performed by: STUDENT IN AN ORGANIZED HEALTH CARE EDUCATION/TRAINING PROGRAM

## 2024-01-17 PROCEDURE — 25000003 PHARM REV CODE 250: Performed by: REGISTERED NURSE

## 2024-01-17 PROCEDURE — 96361 HYDRATE IV INFUSION ADD-ON: CPT

## 2024-01-17 PROCEDURE — 96376 TX/PRO/DX INJ SAME DRUG ADON: CPT

## 2024-01-17 RX ORDER — SODIUM CHLORIDE 0.9 % (FLUSH) 0.9 %
10 SYRINGE (ML) INJECTION
Status: CANCELLED | OUTPATIENT
Start: 2024-01-17

## 2024-01-17 RX ORDER — HEPARIN 100 UNIT/ML
500 SYRINGE INTRAVENOUS
Status: CANCELLED | OUTPATIENT
Start: 2024-01-17

## 2024-01-17 RX ORDER — SODIUM CHLORIDE 0.9 % (FLUSH) 0.9 %
10 SYRINGE (ML) INJECTION
Status: DISCONTINUED | OUTPATIENT
Start: 2024-01-17 | End: 2024-01-17 | Stop reason: HOSPADM

## 2024-01-17 RX ORDER — HEPARIN 100 UNIT/ML
500 SYRINGE INTRAVENOUS
Status: DISCONTINUED | OUTPATIENT
Start: 2024-01-17 | End: 2024-01-17 | Stop reason: HOSPADM

## 2024-01-17 RX ADMIN — SODIUM CHLORIDE 500 ML: 9 INJECTION, SOLUTION INTRAVENOUS at 12:01

## 2024-01-17 RX ADMIN — APREPITANT 130 MG: 130 INJECTION, EMULSION INTRAVENOUS at 10:01

## 2024-01-17 RX ADMIN — SODIUM CHLORIDE 500 ML: 9 INJECTION, SOLUTION INTRAVENOUS at 09:01

## 2024-01-17 RX ADMIN — CISPLATIN 42 MG: 1 INJECTION, SOLUTION INTRAVENOUS at 11:01

## 2024-01-17 RX ADMIN — DEXAMETHASONE SODIUM PHOSPHATE 0.25 MG: 4 INJECTION, SOLUTION INTRA-ARTICULAR; INTRALESIONAL; INTRAMUSCULAR; INTRAVENOUS; SOFT TISSUE at 11:01

## 2024-01-17 NOTE — PLAN OF CARE
"  Problem: Fatigue  Goal: Improved Activity Tolerance  Outcome: Ongoing, Progressing  Intervention: Promote Improved Energy  Flowsheets (Taken 1/17/2024 0979)  Fatigue Management:   activity schedule adjusted   activity assistance provided   fatigue-related activity identified   frequent rest breaks encouraged   paced activity encouraged  Sleep/Rest Enhancement:   awakenings minimized   consistent schedule promoted   family presence promoted   natural light exposure provided   noise level reduced   reading promoted   regular sleep/rest pattern promoted   relaxation techniques promoted  Activity Management:   Ambulated -L4   Up in chair - L3  BP (!) 150/69 (Patient Position: Sitting)   Pulse 72   Temp 98 °F (36.7 °C)   Resp 18   Ht 5' 4" (1.626 m)   Wt 61.8 kg (136 lb 3.9 oz)   SpO2 98%   BMI 23.39 kg/m²   Pleasant, alert and oriented patient to Chemo Infusion clinic per self with wife for C2D1 IVF's and Cisplatin - VSS and PIV stared x1 attempt, flashback observed, site secured, IVF's infusing, and patient tolerated procedure well - patient tolerated treatment with no AVE's, PIV discontinued with pressure dressing applied, and patient discharged to home with no concerns - RTC on 1/23/24     "

## 2024-01-18 ENCOUNTER — PATIENT MESSAGE (OUTPATIENT)
Dept: ADMINISTRATIVE | Facility: OTHER | Age: 89
End: 2024-01-18
Payer: MEDICARE

## 2024-01-18 PROCEDURE — 77014 PR  CT GUIDANCE PLACEMENT RAD THERAPY FIELDS: CPT | Mod: 26,,, | Performed by: STUDENT IN AN ORGANIZED HEALTH CARE EDUCATION/TRAINING PROGRAM

## 2024-01-18 PROCEDURE — 77427 RADIATION TX MANAGEMENT X5: CPT | Mod: ,,, | Performed by: STUDENT IN AN ORGANIZED HEALTH CARE EDUCATION/TRAINING PROGRAM

## 2024-01-18 PROCEDURE — 77386 HC IMRT, COMPLEX: CPT | Performed by: STUDENT IN AN ORGANIZED HEALTH CARE EDUCATION/TRAINING PROGRAM

## 2024-01-19 ENCOUNTER — PATIENT MESSAGE (OUTPATIENT)
Dept: RADIATION ONCOLOGY | Facility: CLINIC | Age: 89
End: 2024-01-19
Payer: MEDICARE

## 2024-01-19 ENCOUNTER — PATIENT MESSAGE (OUTPATIENT)
Dept: ADMINISTRATIVE | Facility: OTHER | Age: 89
End: 2024-01-19
Payer: MEDICARE

## 2024-01-19 ENCOUNTER — DOCUMENTATION ONLY (OUTPATIENT)
Dept: RADIATION ONCOLOGY | Facility: CLINIC | Age: 89
End: 2024-01-19
Payer: MEDICARE

## 2024-01-19 PROCEDURE — 77386 HC IMRT, COMPLEX: CPT | Performed by: STUDENT IN AN ORGANIZED HEALTH CARE EDUCATION/TRAINING PROGRAM

## 2024-01-19 PROCEDURE — 77014 PR  CT GUIDANCE PLACEMENT RAD THERAPY FIELDS: CPT | Mod: 26,,, | Performed by: STUDENT IN AN ORGANIZED HEALTH CARE EDUCATION/TRAINING PROGRAM

## 2024-01-19 NOTE — PLAN OF CARE
Day 7 of outpatient radiation to the bladder. No dysuria or hematuria. Increased urinary frequency. Nocturia x 3. No diarrhea.

## 2024-01-20 ENCOUNTER — PATIENT MESSAGE (OUTPATIENT)
Dept: ADMINISTRATIVE | Facility: OTHER | Age: 89
End: 2024-01-20
Payer: MEDICARE

## 2024-01-21 ENCOUNTER — PATIENT MESSAGE (OUTPATIENT)
Dept: ADMINISTRATIVE | Facility: OTHER | Age: 89
End: 2024-01-21
Payer: MEDICARE

## 2024-01-22 ENCOUNTER — PATIENT MESSAGE (OUTPATIENT)
Dept: ADMINISTRATIVE | Facility: OTHER | Age: 89
End: 2024-01-22
Payer: MEDICARE

## 2024-01-22 ENCOUNTER — LAB VISIT (OUTPATIENT)
Dept: LAB | Facility: HOSPITAL | Age: 89
End: 2024-01-22
Attending: INTERNAL MEDICINE
Payer: MEDICARE

## 2024-01-22 DIAGNOSIS — C68.9 UROTHELIAL CANCER: ICD-10-CM

## 2024-01-22 DIAGNOSIS — D49.4 BLADDER TUMOR: ICD-10-CM

## 2024-01-22 LAB
ALBUMIN SERPL BCP-MCNC: 3.6 G/DL (ref 3.5–5.2)
ALP SERPL-CCNC: 54 U/L (ref 55–135)
ALT SERPL W/O P-5'-P-CCNC: 26 U/L (ref 10–44)
ANION GAP SERPL CALC-SCNC: 7 MMOL/L (ref 8–16)
AST SERPL-CCNC: 19 U/L (ref 10–40)
BASOPHILS # BLD AUTO: 0.01 K/UL (ref 0–0.2)
BASOPHILS NFR BLD: 0.1 % (ref 0–1.9)
BILIRUB SERPL-MCNC: 1.6 MG/DL (ref 0.1–1)
BUN SERPL-MCNC: 40 MG/DL (ref 8–23)
CALCIUM SERPL-MCNC: 9.9 MG/DL (ref 8.7–10.5)
CHLORIDE SERPL-SCNC: 108 MMOL/L (ref 95–110)
CO2 SERPL-SCNC: 25 MMOL/L (ref 23–29)
CREAT SERPL-MCNC: 1.2 MG/DL (ref 0.5–1.4)
DIFFERENTIAL METHOD BLD: ABNORMAL
EOSINOPHIL # BLD AUTO: 0 K/UL (ref 0–0.5)
EOSINOPHIL NFR BLD: 0 % (ref 0–8)
ERYTHROCYTE [DISTWIDTH] IN BLOOD BY AUTOMATED COUNT: 14.3 % (ref 11.5–14.5)
EST. GFR  (NO RACE VARIABLE): 58.2 ML/MIN/1.73 M^2
GLUCOSE SERPL-MCNC: 154 MG/DL (ref 70–110)
HCT VFR BLD AUTO: 51 % (ref 40–54)
HGB BLD-MCNC: 16.5 G/DL (ref 14–18)
IMM GRANULOCYTES # BLD AUTO: 0.06 K/UL (ref 0–0.04)
IMM GRANULOCYTES NFR BLD AUTO: 0.5 % (ref 0–0.5)
LYMPHOCYTES # BLD AUTO: 1.8 K/UL (ref 1–4.8)
LYMPHOCYTES NFR BLD: 15.2 % (ref 18–48)
MCH RBC QN AUTO: 31.1 PG (ref 27–31)
MCHC RBC AUTO-ENTMCNC: 32.4 G/DL (ref 32–36)
MCV RBC AUTO: 96 FL (ref 82–98)
MONOCYTES # BLD AUTO: 1.3 K/UL (ref 0.3–1)
MONOCYTES NFR BLD: 10.9 % (ref 4–15)
NEUTROPHILS # BLD AUTO: 8.8 K/UL (ref 1.8–7.7)
NEUTROPHILS NFR BLD: 73.3 % (ref 38–73)
NRBC BLD-RTO: 0 /100 WBC
PLATELET # BLD AUTO: 217 K/UL (ref 150–450)
PMV BLD AUTO: 11.9 FL (ref 9.2–12.9)
POTASSIUM SERPL-SCNC: 4.7 MMOL/L (ref 3.5–5.1)
PROT SERPL-MCNC: 6.6 G/DL (ref 6–8.4)
RBC # BLD AUTO: 5.31 M/UL (ref 4.6–6.2)
SODIUM SERPL-SCNC: 140 MMOL/L (ref 136–145)
WBC # BLD AUTO: 12.02 K/UL (ref 3.9–12.7)

## 2024-01-22 PROCEDURE — 36415 COLL VENOUS BLD VENIPUNCTURE: CPT | Mod: PO | Performed by: INTERNAL MEDICINE

## 2024-01-22 PROCEDURE — 80053 COMPREHEN METABOLIC PANEL: CPT | Performed by: INTERNAL MEDICINE

## 2024-01-22 PROCEDURE — 85025 COMPLETE CBC W/AUTO DIFF WBC: CPT | Performed by: INTERNAL MEDICINE

## 2024-01-22 PROCEDURE — 77014 PR  CT GUIDANCE PLACEMENT RAD THERAPY FIELDS: CPT | Mod: 26,,, | Performed by: STUDENT IN AN ORGANIZED HEALTH CARE EDUCATION/TRAINING PROGRAM

## 2024-01-22 PROCEDURE — 77386 HC IMRT, COMPLEX: CPT | Performed by: STUDENT IN AN ORGANIZED HEALTH CARE EDUCATION/TRAINING PROGRAM

## 2024-01-23 ENCOUNTER — PATIENT MESSAGE (OUTPATIENT)
Dept: ADMINISTRATIVE | Facility: OTHER | Age: 89
End: 2024-01-23
Payer: MEDICARE

## 2024-01-23 ENCOUNTER — OFFICE VISIT (OUTPATIENT)
Dept: HEMATOLOGY/ONCOLOGY | Facility: CLINIC | Age: 89
End: 2024-01-23
Payer: MEDICARE

## 2024-01-23 ENCOUNTER — INFUSION (OUTPATIENT)
Dept: INFUSION THERAPY | Facility: HOSPITAL | Age: 89
End: 2024-01-23
Payer: MEDICARE

## 2024-01-23 VITALS — SYSTOLIC BLOOD PRESSURE: 132 MMHG | DIASTOLIC BLOOD PRESSURE: 60 MMHG | HEART RATE: 80 BPM

## 2024-01-23 VITALS
WEIGHT: 134.13 LBS | DIASTOLIC BLOOD PRESSURE: 71 MMHG | OXYGEN SATURATION: 99 % | SYSTOLIC BLOOD PRESSURE: 150 MMHG | RESPIRATION RATE: 17 BRPM | TEMPERATURE: 97 F | HEART RATE: 80 BPM | BODY MASS INDEX: 22.9 KG/M2 | HEIGHT: 64 IN

## 2024-01-23 DIAGNOSIS — C67.9 MALIGNANT NEOPLASM OF URINARY BLADDER, UNSPECIFIED SITE: Primary | ICD-10-CM

## 2024-01-23 DIAGNOSIS — E78.5 HYPERLIPIDEMIA ASSOCIATED WITH TYPE 2 DIABETES MELLITUS: ICD-10-CM

## 2024-01-23 DIAGNOSIS — I15.2 HYPERTENSION ASSOCIATED WITH DIABETES: ICD-10-CM

## 2024-01-23 DIAGNOSIS — I48.19 PERSISTENT ATRIAL FIBRILLATION: Chronic | ICD-10-CM

## 2024-01-23 DIAGNOSIS — C68.9 UROTHELIAL CANCER: Primary | ICD-10-CM

## 2024-01-23 DIAGNOSIS — C67.9 MALIGNANT NEOPLASM OF URINARY BLADDER, UNSPECIFIED SITE: ICD-10-CM

## 2024-01-23 DIAGNOSIS — N18.30 CKD STAGE 3 DUE TO TYPE 2 DIABETES MELLITUS: ICD-10-CM

## 2024-01-23 DIAGNOSIS — I70.0 ATHEROSCLEROSIS OF AORTA: ICD-10-CM

## 2024-01-23 DIAGNOSIS — E11.69 HYPERLIPIDEMIA ASSOCIATED WITH TYPE 2 DIABETES MELLITUS: ICD-10-CM

## 2024-01-23 DIAGNOSIS — E11.59 HYPERTENSION ASSOCIATED WITH DIABETES: ICD-10-CM

## 2024-01-23 DIAGNOSIS — E11.22 CKD STAGE 3 DUE TO TYPE 2 DIABETES MELLITUS: ICD-10-CM

## 2024-01-23 PROCEDURE — 99999 PR PBB SHADOW E&M-EST. PATIENT-LVL IV: CPT | Mod: PBBFAC,,, | Performed by: INTERNAL MEDICINE

## 2024-01-23 PROCEDURE — 99214 OFFICE O/P EST MOD 30 MIN: CPT | Mod: PBBFAC,25 | Performed by: INTERNAL MEDICINE

## 2024-01-23 PROCEDURE — 99214 OFFICE O/P EST MOD 30 MIN: CPT | Mod: S$PBB,,, | Performed by: INTERNAL MEDICINE

## 2024-01-23 PROCEDURE — 96413 CHEMO IV INFUSION 1 HR: CPT

## 2024-01-23 PROCEDURE — 96367 TX/PROPH/DG ADDL SEQ IV INF: CPT

## 2024-01-23 PROCEDURE — 96375 TX/PRO/DX INJ NEW DRUG ADDON: CPT

## 2024-01-23 PROCEDURE — 25000003 PHARM REV CODE 250: Performed by: INTERNAL MEDICINE

## 2024-01-23 PROCEDURE — 63600175 PHARM REV CODE 636 W HCPCS: Performed by: INTERNAL MEDICINE

## 2024-01-23 PROCEDURE — 77386 HC IMRT, COMPLEX: CPT | Performed by: STUDENT IN AN ORGANIZED HEALTH CARE EDUCATION/TRAINING PROGRAM

## 2024-01-23 PROCEDURE — 77014 PR  CT GUIDANCE PLACEMENT RAD THERAPY FIELDS: CPT | Mod: 26,,, | Performed by: STUDENT IN AN ORGANIZED HEALTH CARE EDUCATION/TRAINING PROGRAM

## 2024-01-23 RX ORDER — SODIUM CHLORIDE 0.9 % (FLUSH) 0.9 %
10 SYRINGE (ML) INJECTION
Status: CANCELLED | OUTPATIENT
Start: 2024-01-23

## 2024-01-23 RX ORDER — SODIUM CHLORIDE 0.9 % (FLUSH) 0.9 %
10 SYRINGE (ML) INJECTION
Status: DISCONTINUED | OUTPATIENT
Start: 2024-01-23 | End: 2024-01-23 | Stop reason: HOSPADM

## 2024-01-23 RX ORDER — HEPARIN 100 UNIT/ML
500 SYRINGE INTRAVENOUS
Status: DISCONTINUED | OUTPATIENT
Start: 2024-01-23 | End: 2024-01-23 | Stop reason: HOSPADM

## 2024-01-23 RX ORDER — HEPARIN 100 UNIT/ML
500 SYRINGE INTRAVENOUS
Status: CANCELLED | OUTPATIENT
Start: 2024-01-23

## 2024-01-23 RX ADMIN — APREPITANT 130 MG: 130 INJECTION, EMULSION INTRAVENOUS at 12:01

## 2024-01-23 RX ADMIN — POTASSIUM CHLORIDE 500 ML/HR: 2 INJECTION, SOLUTION, CONCENTRATE INTRAVENOUS at 12:01

## 2024-01-23 RX ADMIN — SODIUM CHLORIDE 500 ML: 9 INJECTION, SOLUTION INTRAVENOUS at 02:01

## 2024-01-23 RX ADMIN — PALONOSETRON HYDROCHLORIDE 0.25 MG: 0.25 INJECTION INTRAVENOUS at 12:01

## 2024-01-23 RX ADMIN — CISPLATIN 42 MG: 1 INJECTION, SOLUTION INTRAVENOUS at 01:01

## 2024-01-23 NOTE — PLAN OF CARE
15:00-Pt tolerated IVFs/cistplatin infusions well today, no complaints or complications,. VSS through duration of treatment. Pt aware to call provider with any questions or concerns and is aware of upcoming appts. Pt ambulatory from clinic with steady gait, no distress noted.

## 2024-01-23 NOTE — ASSESSMENT & PLAN NOTE
Blood pressure well controlled on current medication regimen  Follows with PCP  Monitor on therapy    DM- well controlled on Metformin  Monitor

## 2024-01-23 NOTE — ASSESSMENT & PLAN NOTE
Bladder cancer-  Prior lengthy discussion regarding muscle invasive bladder cancer at his initial visit   Discussed option of NA chemotherapy followed by surgery which he does not want  Reviewed the option of definitive chemo/XRT and this is his preference- reviewed weekly Cisplatin and daily XRT M-F     Started chemoRT on 1/9/24  Tolerating treatment well overall  Labs reviewed, adequate to proceed with infusion today   RTC in 1 week for next cycle  Knows to call for any issues

## 2024-01-23 NOTE — PLAN OF CARE
12:00-Labs, hx, and medications reviewed, pt meets parameters for treatment today. Assessment completed and plan of care reviewed. Pt verbalized understanding. Pt voices no new complaints or concerns, will continue to monitor for safety.

## 2024-01-23 NOTE — PROGRESS NOTES
Subjective     Patient ID: Cliff Magaña is a 88 y.o. male.    Chief Complaint: Urothelial cancer    HPI    Diagnosis: Muscle invasive bladder cancer     Presents to clinic prior to week 3 of weekly chemoRT    Reports the followin x (last Fri) isolated blood in stool- pinkish- non since  Sleeping well- no fatigue  Wife notes voice is hoarser  No hearing changes or ringing  No pain issues  Exercising--continues to ride stationary bike for ~1 hour several days per week  No nausea or vomiting  Bowels regular - no constipation or diarrhea   No blood in urine  No mouth sires  No f/c/infections     Oncology History:  - bladder cancer incidentally found on imaging as he had been under surveillance for renal cysts  (10/17/2023 ultrasound showed stable left renal cysts and new bladder masses)     -  2023 TURBT of bladder tumor  Findings:   1. Right postero-lateral papillary bladder wall tumor (3-4cm) with diffusely erythematous and nodular surrounding tissue, right < 1cm papillary bladder wall tumor immediately lateral to the larger postero-lateral papillary tumor, and a right nodular 4cm bladder tumor with high grade and possibly invasive appearance. Tumors resected, hemostasis achieved.  Several other patches of erythema were diffusely identified on the left lateral bladder wall.  2. Bimanual exam post-TURBT showed freely mobile bladder without abnormalities  3. Bilateral retrograde pyelogram showed delicate calices with no evidence of hydronephrosis, no filling defects, and ureter normal in course and caliber, bilaterally  Pathology:  1. Bladder, right posterolateral wall, transurethral resection of bladder tumor:  - Noninvasive high-grade papillary urothelial carcinoma  - Muscularis propria present  - Multiple H&E levels evaluated  2. Bladder, right anterior, transurethral resection of bladder tumor:  - Invasive high-grade papillary urothelial carcinoma  - Tumor invades muscularis propria  - Intact expression  of DNA mismatch repair proteins in tumor by immunohistochemistry (see comment)  COMMENT:  Immunohistochemistry (IHC) Testing for Mismatch Repair (MMR) Proteins:  MLH1 - Intact nuclear expression  MSH2 - Intact nuclear expression  MSH6 - Intact nuclear expression  PMS2 - Intact nuclear expression  Background nonneoplastic tissue/internal control with intact nuclear expression  IHC Interpretation  No loss of nuclear expression of MMR proteins: low probability of microsatellite instability  There are exceptions to the above IHC interpretations. These results should not be considered in isolation, and clinical correlation with genetic counseling is recommended to assess the need for germline testing.     - 11/25/2023 CT Abd/Pelvis:  FINDINGS:  Lung base: Bilateral small volume pleural effusion increased in size compared to recent prior, associated with mild compression atelectasis.  Visualized portion of heart: Coronary artery calcification.  Liver: Normal in size.  Left lobe small calcified granuloma.  No suspicious focal lesion.  Gallbladder: Cholecystectomy.  Bile duct: No intra-or extrahepatic biliary ductal dilatation.  Spleen: Unremarkable.  Pancreas: Parenchymal atrophy.  Multiple parenchymal calcifications suggesting chronic pancreatitis.  No suspicious focal lesion.  No pancreatic ductal dilatation.  No adjacent inflammatory changes or fluid collections.  Adrenal glands: Unremarkable.  Genitourinary: Bilateral renal cysts.  Subcentimeter hypodensities in both kidneys, which are too small to characterize.  The ureters appear normal in course and caliber.  No evidence of nephrolithiasis or hydroureteronephrosis.  Urinary bladder: Postsurgical change in the anterior bladder wall from TURBT.  There are small foci of air in the bladder, likely iatrogenic.  Reproductive organs: Unremarkable.  GI tract: Small hiatal hernia.  The stomach is unremarkable.  The visualized loops of small and large bowel show no evidence  of obstruction or inflammation. Diverticulosis.  Appendix unremarkable.  Peritoneum: No free air or fluid.  Retroperitoneum: No significant adenopathy.  Vasculature: Normal caliber of abdominal aorta with moderate calcified and noncalcified atherosclerosis.  Abdominal wall: Tiny fat containing umbilical hernia.  Bones: Stable T12-L1 intervertebral disc calcification.  No suspicious sclerotic lesions.  No acute fracture.  Impression:  Postoperative changes from of TURBT.  No lymphadenopathy.  No distant metastases.  Other findings as described.     - 12/6/2023 CT Chest:  FINDINGS:  Comparison is 11/21/2023.  No mediastinal, hilar or axillary adenopathy is seen.  There is a right lower pole thyroid nodule.  There are coronary calcifications.  There is a left upper pole renal cyst.  There is a small hiatal hernia.  Trachea and bronchi are patent.  There is no evidence of interstitial lung disease, bronchiectasis, airway trapping, or emphysema.  No suspicious pulmonary nodules, masses, or infiltrates are seen.  There is a calcified left lung granuloma.  Bones demonstrate nothing focal.  Impression:  No significant abnormality seen.  Right lobe thyroid nodule.  Coronary artery calcifications.  Left upper pole renal cyst.     PMH:  Active Ambulatory Problems        Diagnosis   Date Noted       CKD stage 3 due to type 2 diabetes mellitus     11/23/2012       Chronic anticoagulation 11/23/2012       Hypertension associated with diabetes     05/22/2013       Hyperlipidemia associated with type 2 diabetes mellitus     05/22/2013       Persistent atrial fibrillation      06/11/2014       Atherosclerosis of aorta      11/18/2020       Sensorineural hearing loss (SNHL) of both ears  11/19/2020       Risk for falls    12/15/2021       Renal cyst  12/14/2022       DM type 2, controlled, with complication  12/14/2022       Bladder tumor     11/16/2023       UTI (urinary tract infection) 11/21/2023       Acute hypoxic  respiratory failure   11/21/2023       Elevated troponin 11/21/2023       Thyroid nodules   11/21/2023     Resolved Ambulatory Problems        Diagnosis   Date Noted       Atrial fibrillation     08/02/2012       Long term (current) use of anticoagulants 08/02/2012       Family history of diabetes mellitus 11/23/2012       Ex-cigarette smoker     11/23/2012       Metabolic syndrome      11/23/2012       Screen for colon cancer 05/07/2014       Ex-smoker   11/09/2015       Hyperglycemia     12/16/2015       Abscess of chest wall   06/25/2018       Sebaceous cyst    07/25/2018       Decreased back mobility 08/20/2019       Decreased strength of trunk and back      08/20/2019       Decreased range of motion of both hips    08/20/2019       Pain aggravated by walking    08/20/2019       Hamstring tightness of both lower extremities   08/20/2019       Impaired mobility and endurance     08/20/2019       Low back pain     11/19/2020       Chronic pain      04/28/2021       Type 2 diabetes mellitus, without long-term current use of insulin      12/13/2021       CKD stage 3 secondary to diabetes   12/13/2021       Encounter for preventive health examination     12/19/2022     Past Medical History:  Diagnosis   Date       *Atrial fibrillation           Chronic kidney disease         Deep vein thrombosis           Hyperlipidemia           Hypertension        Cholecystectomy  Tonsillectomy  Skin cancer removal     FH:  Paternal Aunt- recalls cancer affecting scalp     SH:    Retired [employer], followed by off shore work,   4 children (1 passed as an infant)  Quit tobacco in the 1980s  + EtOH    Review of Systems   Constitutional:  Negative for activity change, appetite change, chills, fatigue, fever and unexpected weight change.   HENT:  Positive for hearing loss. Negative for nasal congestion, postnasal drip, rhinorrhea, sinus pressure/congestion, sore throat and tinnitus.    Eyes:   Negative for visual disturbance.   Respiratory:  Negative for cough, chest tightness, shortness of breath and wheezing.    Cardiovascular:  Negative for chest pain, palpitations and leg swelling.   Gastrointestinal:  Negative for abdominal distention, abdominal pain, blood in stool, change in bowel habit, constipation, diarrhea, nausea and vomiting.   Genitourinary:  Positive for frequency. Negative for decreased urine volume, difficulty urinating, dysuria, hematuria and urgency.   Musculoskeletal:  Negative for arthralgias, back pain, myalgias, neck pain and neck stiffness.   Integumentary:  Negative for rash.   Neurological:  Negative for dizziness, weakness, light-headedness, numbness and headaches.   Psychiatric/Behavioral:  Negative for agitation, behavioral problems, confusion and dysphoric mood. The patient is not nervous/anxious.           Objective     Physical Exam  Vitals and nursing note reviewed.   Constitutional:       General: He is not in acute distress.     Appearance: Normal appearance. He is normal weight. He is not ill-appearing.   HENT:      Head: Normocephalic and atraumatic.      Comments: Hearing aids     Mouth/Throat:      Mouth: Mucous membranes are moist.      Pharynx: Oropharynx is clear. No oropharyngeal exudate or posterior oropharyngeal erythema.   Eyes:      General: No scleral icterus.     Extraocular Movements: Extraocular movements intact.      Conjunctiva/sclera: Conjunctivae normal.      Pupils: Pupils are equal, round, and reactive to light.   Cardiovascular:      Rate and Rhythm: Normal rate and regular rhythm.      Heart sounds: No murmur heard.  Pulmonary:      Effort: Pulmonary effort is normal. No respiratory distress.      Breath sounds: Normal breath sounds. No wheezing, rhonchi or rales.   Abdominal:      General: Abdomen is flat. Bowel sounds are normal. There is no distension.      Palpations: Abdomen is soft. There is no mass.      Tenderness: There is no abdominal  tenderness. There is no guarding or rebound.      Comments: No organomegaly   Musculoskeletal:         General: No swelling, tenderness or deformity. Normal range of motion.      Right lower leg: No edema.      Left lower leg: No edema.   Skin:     General: Skin is warm and dry.      Coloration: Skin is not jaundiced or pale.      Findings: No bruising, erythema or rash.   Neurological:      General: No focal deficit present.      Mental Status: He is alert and oriented to person, place, and time. Mental status is at baseline.      Sensory: No sensory deficit.      Motor: No weakness.      Gait: Gait normal.   Psychiatric:         Mood and Affect: Mood normal.         Behavior: Behavior normal.         Thought Content: Thought content normal.         Judgment: Judgment normal.     Labs- reviewed     Assessment and Plan     1. Urothelial cancer  -     Comprehensive Metabolic Panel; Future    2. Malignant neoplasm of urinary bladder, unspecified site    3. Hyperlipidemia associated with type 2 diabetes mellitus    4. Persistent atrial fibrillation    5. Hypertension associated with diabetes    6. Atherosclerosis of aorta  Overview:  Lumbar spine results      Other orders  -     Cancel: sodium chloride 0.9% 500 mL with magnesium sulfate 1 g, potassium chloride 10 mEq infusion  -     Cancel: aprepitant (CINVANTI) injection 130 mg  -     Cancel: palonosetron (ALOXI) 0.25 mg with Dexamethasone (DECADRON) 12 mg in NS 50 mL IVPB  -     Cancel: CISplatin (Platinol) 25 mg/m2 = 42 mg in sodium chloride 0.9% 292 mL chemo infusion  -     Cancel: sodium chloride 0.9% bolus 500 mL 500 mL  -     Cancel: sodium chloride 0.9% flush 10 mL  -     Cancel: heparin, porcine (PF) 100 unit/mL injection flush 500 Units  -     Cancel: alteplase injection 2 mg      Route Chart for Scheduling    Med Onc Chart Routing      Follow up with physician . 2/6/2024 cbc, cmp and EP and chemo   Follow up with [facility]    Infusion scheduling note    Injection  scheduling note    Labs    Imaging    Pharmacy appointment    Other referrals            Treatment Plan Information   OP CISPLATIN WEEKLY + RT   Elizabeth Bland MD   Upcoming Treatment Dates - OP CISPLATIN WEEKLY + RT    1/30/2024       Chemotherapy       CISplatin (Platinol) 25 mg/m2 = 42 mg in sodium chloride 0.9% 292 mL chemo infusion       Pre-Hydration       sodium chloride 0.9% 500 mL with magnesium sulfate 1 g, potassium chloride 10 mEq infusion       Post-Hydration       sodium chloride 0.9% bolus 500 mL 500 mL       Antiemetics       aprepitant (CINVANTI) injection 130 mg       palonosetron (ALOXI) 0.25 mg with Dexamethasone (DECADRON) 12 mg in NS 50 mL IVPB  2/6/2024       Chemotherapy       CISplatin (Platinol) 25 mg/m2 = 42 mg in sodium chloride 0.9% 292 mL chemo infusion       Pre-Hydration       sodium chloride 0.9% 500 mL with magnesium sulfate 1 g, potassium chloride 10 mEq infusion       Post-Hydration       sodium chloride 0.9% bolus 500 mL 500 mL       Antiemetics       aprepitant (CINVANTI) injection 130 mg       palonosetron (ALOXI) 0.25 mg with Dexamethasone (DECADRON) 12 mg in NS 50 mL IVPB  2/13/2024       Chemotherapy       CISplatin (Platinol) 25 mg/m2 = 42 mg in sodium chloride 0.9% 292 mL chemo infusion       Pre-Hydration       sodium chloride 0.9% 500 mL with magnesium sulfate 1 g, potassium chloride 10 mEq infusion       Post-Hydration       sodium chloride 0.9% bolus 500 mL 500 mL       Antiemetics       aprepitant (CINVANTI) injection 130 mg       palonosetron (ALOXI) 0.25 mg with Dexamethasone (DECADRON) 12 mg in NS 50 mL IVPB

## 2024-01-24 ENCOUNTER — PATIENT MESSAGE (OUTPATIENT)
Dept: ADMINISTRATIVE | Facility: OTHER | Age: 89
End: 2024-01-24
Payer: MEDICARE

## 2024-01-24 PROCEDURE — 77336 RADIATION PHYSICS CONSULT: CPT | Performed by: STUDENT IN AN ORGANIZED HEALTH CARE EDUCATION/TRAINING PROGRAM

## 2024-01-24 PROCEDURE — 77386 HC IMRT, COMPLEX: CPT | Performed by: STUDENT IN AN ORGANIZED HEALTH CARE EDUCATION/TRAINING PROGRAM

## 2024-01-24 PROCEDURE — 77014 PR  CT GUIDANCE PLACEMENT RAD THERAPY FIELDS: CPT | Mod: 26,,, | Performed by: STUDENT IN AN ORGANIZED HEALTH CARE EDUCATION/TRAINING PROGRAM

## 2024-01-25 ENCOUNTER — PATIENT MESSAGE (OUTPATIENT)
Dept: ADMINISTRATIVE | Facility: OTHER | Age: 89
End: 2024-01-25
Payer: MEDICARE

## 2024-01-25 PROCEDURE — 77386 HC IMRT, COMPLEX: CPT | Performed by: STUDENT IN AN ORGANIZED HEALTH CARE EDUCATION/TRAINING PROGRAM

## 2024-01-25 PROCEDURE — 77427 RADIATION TX MANAGEMENT X5: CPT | Mod: ,,, | Performed by: STUDENT IN AN ORGANIZED HEALTH CARE EDUCATION/TRAINING PROGRAM

## 2024-01-25 PROCEDURE — 77014 PR  CT GUIDANCE PLACEMENT RAD THERAPY FIELDS: CPT | Mod: 26,,, | Performed by: STUDENT IN AN ORGANIZED HEALTH CARE EDUCATION/TRAINING PROGRAM

## 2024-01-26 ENCOUNTER — DOCUMENTATION ONLY (OUTPATIENT)
Dept: RADIATION ONCOLOGY | Facility: CLINIC | Age: 89
End: 2024-01-26
Payer: MEDICARE

## 2024-01-26 ENCOUNTER — PATIENT MESSAGE (OUTPATIENT)
Dept: ADMINISTRATIVE | Facility: OTHER | Age: 89
End: 2024-01-26
Payer: MEDICARE

## 2024-01-26 PROCEDURE — 77386 HC IMRT, COMPLEX: CPT | Performed by: STUDENT IN AN ORGANIZED HEALTH CARE EDUCATION/TRAINING PROGRAM

## 2024-01-26 PROCEDURE — 77014 PR  CT GUIDANCE PLACEMENT RAD THERAPY FIELDS: CPT | Mod: 26,,, | Performed by: STUDENT IN AN ORGANIZED HEALTH CARE EDUCATION/TRAINING PROGRAM

## 2024-01-27 ENCOUNTER — PATIENT MESSAGE (OUTPATIENT)
Dept: ADMINISTRATIVE | Facility: OTHER | Age: 89
End: 2024-01-27
Payer: MEDICARE

## 2024-01-28 ENCOUNTER — PATIENT MESSAGE (OUTPATIENT)
Dept: ADMINISTRATIVE | Facility: OTHER | Age: 89
End: 2024-01-28
Payer: MEDICARE

## 2024-01-28 DIAGNOSIS — D49.4 BLADDER TUMOR: ICD-10-CM

## 2024-01-29 ENCOUNTER — PATIENT MESSAGE (OUTPATIENT)
Dept: ADMINISTRATIVE | Facility: OTHER | Age: 89
End: 2024-01-29
Payer: MEDICARE

## 2024-01-29 ENCOUNTER — PROCEDURE VISIT (OUTPATIENT)
Dept: UROLOGY | Facility: CLINIC | Age: 89
End: 2024-01-29
Payer: MEDICARE

## 2024-01-29 VITALS
WEIGHT: 129.31 LBS | RESPIRATION RATE: 18 BRPM | DIASTOLIC BLOOD PRESSURE: 65 MMHG | TEMPERATURE: 98 F | SYSTOLIC BLOOD PRESSURE: 131 MMHG | HEIGHT: 64 IN | BODY MASS INDEX: 22.07 KG/M2 | HEART RATE: 85 BPM

## 2024-01-29 DIAGNOSIS — D49.4 BLADDER TUMOR: ICD-10-CM

## 2024-01-29 PROCEDURE — 77386 HC IMRT, COMPLEX: CPT | Performed by: STUDENT IN AN ORGANIZED HEALTH CARE EDUCATION/TRAINING PROGRAM

## 2024-01-29 PROCEDURE — 77014 PR  CT GUIDANCE PLACEMENT RAD THERAPY FIELDS: CPT | Mod: 26,,, | Performed by: STUDENT IN AN ORGANIZED HEALTH CARE EDUCATION/TRAINING PROGRAM

## 2024-01-29 PROCEDURE — 87077 CULTURE AEROBIC IDENTIFY: CPT | Performed by: UROLOGY

## 2024-01-29 PROCEDURE — 87186 SC STD MICRODIL/AGAR DIL: CPT | Performed by: UROLOGY

## 2024-01-29 PROCEDURE — 87088 URINE BACTERIA CULTURE: CPT | Performed by: UROLOGY

## 2024-01-29 PROCEDURE — 87086 URINE CULTURE/COLONY COUNT: CPT | Performed by: UROLOGY

## 2024-01-29 RX ORDER — SULFAMETHOXAZOLE AND TRIMETHOPRIM 800; 160 MG/1; MG/1
1 TABLET ORAL 2 TIMES DAILY
Qty: 14 TABLET | Refills: 0 | Status: ON HOLD | OUTPATIENT
Start: 2024-01-29 | End: 2024-02-04 | Stop reason: HOSPADM

## 2024-01-29 RX ORDER — SULFAMETHOXAZOLE AND TRIMETHOPRIM 800; 160 MG/1; MG/1
1 TABLET ORAL
Status: COMPLETED | OUTPATIENT
Start: 2024-01-29 | End: 2024-01-29

## 2024-01-29 RX ORDER — LIDOCAINE HYDROCHLORIDE 20 MG/ML
JELLY TOPICAL ONCE
Status: COMPLETED | OUTPATIENT
Start: 2024-01-29 | End: 2024-01-29

## 2024-01-29 RX ORDER — OLANZAPINE 5 MG/1
TABLET ORAL
Qty: 90 TABLET | Refills: 1 | Status: SHIPPED | OUTPATIENT
Start: 2024-01-29

## 2024-01-29 RX ADMIN — SULFAMETHOXAZOLE AND TRIMETHOPRIM 1 TABLET: 800; 160 TABLET ORAL at 01:01

## 2024-01-29 RX ADMIN — LIDOCAINE HYDROCHLORIDE: 20 JELLY TOPICAL at 01:01

## 2024-01-29 NOTE — PATIENT INSTRUCTIONS
What to Expect After a Cystoscopy  For the next 24-48 hours, you may feel a mild burning when you urinate. This burning is normal and expected. Drink plenty of water to dilute the urine to help relieve the burning sensation. You may also see a small amount of blood in your urine after the procedure.    Unless you are already taking antibiotics, you may be given an antibiotic after the test to prevent infection.    Signs and Symptoms to Report  Call the [facility] at [phone] if you develop any of the following:  Fever of 101 degrees or higher  Chills or persistent bleeding  Inability to urinate

## 2024-01-29 NOTE — PROGRESS NOTES
Procedure cancelled due to possible bladder infection. To be rescheduled after radiation therapy

## 2024-01-29 NOTE — PROCEDURES
I saw and examined the patient in clinic today.  He was said to have a office cystoscopy to visualize if there was any residual tumor at the dome of his bladder.  He was already started radiation and chemotherapy, and has had frequent urination.  We dipped his urine in clinic today, and he was nitrite positive with leukocyte esterase positivity.  We will send off his urinalysis for culture.  We discussed the risk of him getting a worsening urinary tract infection or sepsis should we instrument his bladder with the cystoscope.  We will treat him empirically with antibiotics and follow up his urine cultures.    I reached out to Dr. Murray to confirm when his last radiation therapy session will be, we will aim to perform a cystoscopy and cross-sectional imaging 3 months after this.

## 2024-01-30 ENCOUNTER — PATIENT MESSAGE (OUTPATIENT)
Dept: ADMINISTRATIVE | Facility: OTHER | Age: 89
End: 2024-01-30
Payer: MEDICARE

## 2024-01-30 DIAGNOSIS — C67.0 MALIGNANT NEOPLASM OF TRIGONE OF URINARY BLADDER: Primary | ICD-10-CM

## 2024-01-31 ENCOUNTER — PATIENT MESSAGE (OUTPATIENT)
Dept: UROLOGY | Facility: CLINIC | Age: 89
End: 2024-01-31
Payer: MEDICARE

## 2024-01-31 ENCOUNTER — OFFICE VISIT (OUTPATIENT)
Dept: HEMATOLOGY/ONCOLOGY | Facility: CLINIC | Age: 89
End: 2024-01-31
Payer: MEDICARE

## 2024-01-31 ENCOUNTER — PATIENT MESSAGE (OUTPATIENT)
Dept: ADMINISTRATIVE | Facility: OTHER | Age: 89
End: 2024-01-31
Payer: MEDICARE

## 2024-01-31 ENCOUNTER — INFUSION (OUTPATIENT)
Dept: INFUSION THERAPY | Facility: HOSPITAL | Age: 89
End: 2024-01-31
Payer: MEDICARE

## 2024-01-31 VITALS
WEIGHT: 127.19 LBS | TEMPERATURE: 98 F | HEART RATE: 72 BPM | SYSTOLIC BLOOD PRESSURE: 115 MMHG | BODY MASS INDEX: 21.71 KG/M2 | DIASTOLIC BLOOD PRESSURE: 59 MMHG | OXYGEN SATURATION: 100 % | HEIGHT: 64 IN

## 2024-01-31 DIAGNOSIS — E11.69 HYPERLIPIDEMIA ASSOCIATED WITH TYPE 2 DIABETES MELLITUS: ICD-10-CM

## 2024-01-31 DIAGNOSIS — C68.9 UROTHELIAL CANCER: Primary | ICD-10-CM

## 2024-01-31 DIAGNOSIS — T45.1X5A IMMUNODEFICIENCY SECONDARY TO CHEMOTHERAPY: ICD-10-CM

## 2024-01-31 DIAGNOSIS — D49.9 IMMUNODEFICIENCY SECONDARY TO NEOPLASM: ICD-10-CM

## 2024-01-31 DIAGNOSIS — C67.9 MALIGNANT NEOPLASM OF URINARY BLADDER, UNSPECIFIED SITE: Primary | ICD-10-CM

## 2024-01-31 DIAGNOSIS — C67.9 MALIGNANT NEOPLASM OF URINARY BLADDER, UNSPECIFIED SITE: ICD-10-CM

## 2024-01-31 DIAGNOSIS — E78.5 HYPERLIPIDEMIA ASSOCIATED WITH TYPE 2 DIABETES MELLITUS: ICD-10-CM

## 2024-01-31 DIAGNOSIS — N18.30 CKD STAGE 3 DUE TO TYPE 2 DIABETES MELLITUS: ICD-10-CM

## 2024-01-31 DIAGNOSIS — E11.59 HYPERTENSION ASSOCIATED WITH DIABETES: ICD-10-CM

## 2024-01-31 DIAGNOSIS — D84.821 IMMUNODEFICIENCY SECONDARY TO CHEMOTHERAPY: ICD-10-CM

## 2024-01-31 DIAGNOSIS — I15.2 HYPERTENSION ASSOCIATED WITH DIABETES: ICD-10-CM

## 2024-01-31 DIAGNOSIS — E11.22 CKD STAGE 3 DUE TO TYPE 2 DIABETES MELLITUS: ICD-10-CM

## 2024-01-31 DIAGNOSIS — D84.81 IMMUNODEFICIENCY SECONDARY TO NEOPLASM: ICD-10-CM

## 2024-01-31 DIAGNOSIS — Z79.899 IMMUNODEFICIENCY SECONDARY TO CHEMOTHERAPY: ICD-10-CM

## 2024-01-31 LAB — BACTERIA UR CULT: ABNORMAL

## 2024-01-31 PROCEDURE — 99215 OFFICE O/P EST HI 40 MIN: CPT | Mod: S$PBB,,, | Performed by: REGISTERED NURSE

## 2024-01-31 PROCEDURE — 99999 PR PBB SHADOW E&M-EST. PATIENT-LVL IV: CPT | Mod: PBBFAC,,, | Performed by: REGISTERED NURSE

## 2024-01-31 PROCEDURE — 96367 TX/PROPH/DG ADDL SEQ IV INF: CPT

## 2024-01-31 PROCEDURE — 96413 CHEMO IV INFUSION 1 HR: CPT

## 2024-01-31 PROCEDURE — 77014 PR  CT GUIDANCE PLACEMENT RAD THERAPY FIELDS: CPT | Mod: 26,,, | Performed by: STUDENT IN AN ORGANIZED HEALTH CARE EDUCATION/TRAINING PROGRAM

## 2024-01-31 PROCEDURE — 63600175 PHARM REV CODE 636 W HCPCS: Performed by: REGISTERED NURSE

## 2024-01-31 PROCEDURE — 99214 OFFICE O/P EST MOD 30 MIN: CPT | Mod: PBBFAC,25 | Performed by: REGISTERED NURSE

## 2024-01-31 PROCEDURE — 25000003 PHARM REV CODE 250: Performed by: REGISTERED NURSE

## 2024-01-31 PROCEDURE — 96361 HYDRATE IV INFUSION ADD-ON: CPT

## 2024-01-31 PROCEDURE — 77386 HC IMRT, COMPLEX: CPT | Performed by: STUDENT IN AN ORGANIZED HEALTH CARE EDUCATION/TRAINING PROGRAM

## 2024-01-31 PROCEDURE — 96375 TX/PRO/DX INJ NEW DRUG ADDON: CPT

## 2024-01-31 RX ORDER — SODIUM CHLORIDE 0.9 % (FLUSH) 0.9 %
10 SYRINGE (ML) INJECTION
Status: CANCELLED | OUTPATIENT
Start: 2024-01-31

## 2024-01-31 RX ORDER — SODIUM CHLORIDE 0.9 % (FLUSH) 0.9 %
10 SYRINGE (ML) INJECTION
Status: DISCONTINUED | OUTPATIENT
Start: 2024-01-31 | End: 2024-01-31 | Stop reason: HOSPADM

## 2024-01-31 RX ORDER — HEPARIN 100 UNIT/ML
500 SYRINGE INTRAVENOUS
Status: DISCONTINUED | OUTPATIENT
Start: 2024-01-31 | End: 2024-01-31 | Stop reason: HOSPADM

## 2024-01-31 RX ORDER — HEPARIN 100 UNIT/ML
500 SYRINGE INTRAVENOUS
Status: CANCELLED | OUTPATIENT
Start: 2024-01-31

## 2024-01-31 RX ADMIN — SODIUM CHLORIDE 500 ML: 9 INJECTION, SOLUTION INTRAVENOUS at 11:01

## 2024-01-31 RX ADMIN — CISPLATIN 42 MG: 1 INJECTION, SOLUTION INTRAVENOUS at 11:01

## 2024-01-31 RX ADMIN — DEXAMETHASONE SODIUM PHOSPHATE 0.25 MG: 4 INJECTION, SOLUTION INTRA-ARTICULAR; INTRALESIONAL; INTRAMUSCULAR; INTRAVENOUS; SOFT TISSUE at 10:01

## 2024-01-31 RX ADMIN — POTASSIUM CHLORIDE 500 ML/HR: 2 INJECTION, SOLUTION, CONCENTRATE INTRAVENOUS at 10:01

## 2024-01-31 RX ADMIN — APREPITANT 130 MG: 130 INJECTION, EMULSION INTRAVENOUS at 10:01

## 2024-01-31 NOTE — PROGRESS NOTES
Subjective     Patient ID: Cliff Magaña is a 88 y.o. male.    Chief Complaint: No chief complaint on file.    HPI    Diagnosis: Muscle invasive bladder cancer     Presents to clinic prior to week 4 of weekly chemoRT  Doing well overall  Energy is stable and sleeping well  Continues to ride stationary bike several days per week, slightly less frequent than prior  Appetite is good  Voice remains hoarse, no discomfort or difficulty speaking  Denies hearing changes or ringing in the ears  They have noted his skin is dry lately - using creams/lotions with some relief  Has area in gluteal cleft in particular they are monitoring - states not in pathway of radiation   States he went for cystoscopy and found to have a bladder infection  Procedure to be rescheduled after radiation and now on abx x 7 days  No fevers, chills or infectious complaints  No SOB, CP, palpitations  No nausea, diarrhea, or constipation  No blood in urine or stool  No mouth sores  No burning or pain with urination, worsening frequency or urgency   No other concerns     Oncology History:  - bladder cancer incidentally found on imaging as he had been under surveillance for renal cysts  (10/17/2023 ultrasound showed stable left renal cysts and new bladder masses)     -  11/16/2023 TURBT of bladder tumor  Findings:   1. Right postero-lateral papillary bladder wall tumor (3-4cm) with diffusely erythematous and nodular surrounding tissue, right < 1cm papillary bladder wall tumor immediately lateral to the larger postero-lateral papillary tumor, and a right nodular 4cm bladder tumor with high grade and possibly invasive appearance. Tumors resected, hemostasis achieved.  Several other patches of erythema were diffusely identified on the left lateral bladder wall.  2. Bimanual exam post-TURBT showed freely mobile bladder without abnormalities  3. Bilateral retrograde pyelogram showed delicate calices with no evidence of hydronephrosis, no filling defects, and  ureter normal in course and caliber, bilaterally  Pathology:  1. Bladder, right posterolateral wall, transurethral resection of bladder tumor:  - Noninvasive high-grade papillary urothelial carcinoma  - Muscularis propria present  - Multiple H&E levels evaluated  2. Bladder, right anterior, transurethral resection of bladder tumor:  - Invasive high-grade papillary urothelial carcinoma  - Tumor invades muscularis propria  - Intact expression of DNA mismatch repair proteins in tumor by immunohistochemistry (see comment)  COMMENT:  Immunohistochemistry (IHC) Testing for Mismatch Repair (MMR) Proteins:  MLH1 - Intact nuclear expression  MSH2 - Intact nuclear expression  MSH6 - Intact nuclear expression  PMS2 - Intact nuclear expression  Background nonneoplastic tissue/internal control with intact nuclear expression  IHC Interpretation  No loss of nuclear expression of MMR proteins: low probability of microsatellite instability  There are exceptions to the above IHC interpretations. These results should not be considered in isolation, and clinical correlation with genetic counseling is recommended to assess the need for germline testing.     - 11/25/2023 CT Abd/Pelvis:  FINDINGS:  Lung base: Bilateral small volume pleural effusion increased in size compared to recent prior, associated with mild compression atelectasis.  Visualized portion of heart: Coronary artery calcification.  Liver: Normal in size.  Left lobe small calcified granuloma.  No suspicious focal lesion.  Gallbladder: Cholecystectomy.  Bile duct: No intra-or extrahepatic biliary ductal dilatation.  Spleen: Unremarkable.  Pancreas: Parenchymal atrophy.  Multiple parenchymal calcifications suggesting chronic pancreatitis.  No suspicious focal lesion.  No pancreatic ductal dilatation.  No adjacent inflammatory changes or fluid collections.  Adrenal glands: Unremarkable.  Genitourinary: Bilateral renal cysts.  Subcentimeter hypodensities in both kidneys, which  are too small to characterize.  The ureters appear normal in course and caliber.  No evidence of nephrolithiasis or hydroureteronephrosis.  Urinary bladder: Postsurgical change in the anterior bladder wall from TURBT.  There are small foci of air in the bladder, likely iatrogenic.  Reproductive organs: Unremarkable.  GI tract: Small hiatal hernia.  The stomach is unremarkable.  The visualized loops of small and large bowel show no evidence of obstruction or inflammation. Diverticulosis.  Appendix unremarkable.  Peritoneum: No free air or fluid.  Retroperitoneum: No significant adenopathy.  Vasculature: Normal caliber of abdominal aorta with moderate calcified and noncalcified atherosclerosis.  Abdominal wall: Tiny fat containing umbilical hernia.  Bones: Stable T12-L1 intervertebral disc calcification.  No suspicious sclerotic lesions.  No acute fracture.  Impression:  Postoperative changes from of TURBT.  No lymphadenopathy.  No distant metastases.  Other findings as described.     - 12/6/2023 CT Chest:  FINDINGS:  Comparison is 11/21/2023.  No mediastinal, hilar or axillary adenopathy is seen.  There is a right lower pole thyroid nodule.  There are coronary calcifications.  There is a left upper pole renal cyst.  There is a small hiatal hernia.  Trachea and bronchi are patent.  There is no evidence of interstitial lung disease, bronchiectasis, airway trapping, or emphysema.  No suspicious pulmonary nodules, masses, or infiltrates are seen.  There is a calcified left lung granuloma.  Bones demonstrate nothing focal.  Impression:  No significant abnormality seen.  Right lobe thyroid nodule.  Coronary artery calcifications.  Left upper pole renal cyst.     PMH:  Active Ambulatory Problems        Diagnosis   Date Noted       CKD stage 3 due to type 2 diabetes mellitus     11/23/2012       Chronic anticoagulation 11/23/2012       Hypertension associated with diabetes     05/22/2013       Hyperlipidemia associated with  type 2 diabetes mellitus     05/22/2013       Persistent atrial fibrillation      06/11/2014       Atherosclerosis of aorta      11/18/2020       Sensorineural hearing loss (SNHL) of both ears  11/19/2020       Risk for falls    12/15/2021       Renal cyst  12/14/2022       DM type 2, controlled, with complication  12/14/2022       Bladder tumor     11/16/2023       UTI (urinary tract infection) 11/21/2023       Acute hypoxic respiratory failure   11/21/2023       Elevated troponin 11/21/2023       Thyroid nodules   11/21/2023     Resolved Ambulatory Problems        Diagnosis   Date Noted       Atrial fibrillation     08/02/2012       Long term (current) use of anticoagulants 08/02/2012       Family history of diabetes mellitus 11/23/2012       Ex-cigarette smoker     11/23/2012       Metabolic syndrome      11/23/2012       Screen for colon cancer 05/07/2014       Ex-smoker   11/09/2015       Hyperglycemia     12/16/2015       Abscess of chest wall   06/25/2018       Sebaceous cyst    07/25/2018       Decreased back mobility 08/20/2019       Decreased strength of trunk and back      08/20/2019       Decreased range of motion of both hips    08/20/2019       Pain aggravated by walking    08/20/2019       Hamstring tightness of both lower extremities   08/20/2019       Impaired mobility and endurance     08/20/2019       Low back pain     11/19/2020       Chronic pain      04/28/2021       Type 2 diabetes mellitus, without long-term current use of insulin      12/13/2021       CKD stage 3 secondary to diabetes   12/13/2021       Encounter for preventive health examination     12/19/2022     Past Medical History:  Diagnosis   Date       *Atrial fibrillation           Chronic kidney disease         Deep vein thrombosis           Hyperlipidemia           Hypertension        Cholecystectomy  Tonsillectomy  Skin cancer removal     FH:  Paternal Aunt- recalls cancer affecting scalp     SH:    Retired [employer], followed  by off shore work,   4 children (1 passed as an infant)  Quit tobacco in the 1980s  + EtOH    Review of Systems   Constitutional:  Negative for activity change, appetite change, chills, fatigue, fever and unexpected weight change.   HENT:  Positive for hearing loss. Negative for nasal congestion, postnasal drip, rhinorrhea, sinus pressure/congestion, sore throat and tinnitus.    Eyes:  Negative for visual disturbance.   Respiratory:  Negative for cough, chest tightness, shortness of breath and wheezing.    Cardiovascular:  Negative for chest pain, palpitations and leg swelling.   Gastrointestinal:  Negative for abdominal distention, abdominal pain, blood in stool, change in bowel habit, constipation, diarrhea, nausea and vomiting.   Genitourinary:  Positive for frequency. Negative for decreased urine volume, difficulty urinating, dysuria, hematuria and urgency.   Musculoskeletal:  Negative for arthralgias, back pain, myalgias, neck pain and neck stiffness.   Integumentary:  Negative for rash.        Dry skin   Neurological:  Negative for dizziness, weakness, light-headedness, numbness and headaches.   Psychiatric/Behavioral:  Negative for agitation, behavioral problems, confusion and dysphoric mood. The patient is not nervous/anxious.         Objective     Physical Exam  Vitals and nursing note reviewed.   Constitutional:       General: He is not in acute distress.     Appearance: Normal appearance. He is normal weight. He is not ill-appearing, toxic-appearing or diaphoretic.      Comments: Presents with his wife  Very pleasant   HENT:      Head: Normocephalic and atraumatic.      Comments: Hearing aids     Mouth/Throat:      Mouth: Mucous membranes are moist.      Pharynx: Oropharynx is clear. No oropharyngeal exudate or posterior oropharyngeal erythema.   Eyes:      General: No scleral icterus.     Extraocular Movements: Extraocular movements intact.      Conjunctiva/sclera: Conjunctivae normal.       Pupils: Pupils are equal, round, and reactive to light.   Cardiovascular:      Rate and Rhythm: Normal rate and regular rhythm.      Heart sounds: No murmur heard.  Pulmonary:      Effort: Pulmonary effort is normal. No respiratory distress.      Breath sounds: Normal breath sounds. No wheezing, rhonchi or rales.   Abdominal:      General: Abdomen is flat. Bowel sounds are normal. There is no distension.      Palpations: Abdomen is soft. There is no mass.      Tenderness: There is no abdominal tenderness. There is no guarding or rebound.      Comments: No organomegaly   Musculoskeletal:         General: No swelling, tenderness or deformity. Normal range of motion.      Right lower leg: No edema.      Left lower leg: No edema.   Skin:     General: Skin is warm and dry.      Coloration: Skin is not jaundiced or pale.      Findings: No bruising, erythema or rash.   Neurological:      General: No focal deficit present.      Mental Status: He is alert and oriented to person, place, and time. Mental status is at baseline.      Sensory: No sensory deficit.      Motor: No weakness.      Gait: Gait normal.   Psychiatric:         Mood and Affect: Mood normal.         Behavior: Behavior normal.         Thought Content: Thought content normal.         Judgment: Judgment normal.       Labs- reviewed     Assessment and Plan     1. Urothelial cancer    2. Malignant neoplasm of urinary bladder, unspecified site    3. 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 Hypertension associated with diabetes    6. CKD stage 3 due to type 2 diabetes mellitus    7. Hyperlipidemia associated with type 2 diabetes mellitus    Other orders  -     sodium chloride 0.9% 500 mL with magnesium sulfate 1 g, potassium chloride 10 mEq infusion  -     aprepitant (CINVANTI) injection 130 mg  -     palonosetron (ALOXI) 0.25 mg with Dexamethasone (DECADRON) 12 mg in NS 50 mL IVPB  -     CISplatin (Platinol) 25  mg/m2 = 42 mg in sodium chloride 0.9% 292 mL chemo infusion  -     sodium chloride 0.9% bolus 500 mL 500 mL  -     sodium chloride 0.9% flush 10 mL  -     heparin, porcine (PF) 100 unit/mL injection flush 500 Units  -     alteplase injection 2 mg      Bladder cancer-  Muscle invasive bladder cancer  Does not want to undergo surgery   Opted for definitive chemo/XRT with weekly cisplatin and daily XRT M-F     Started chemoRT on 1/9/24  Tolerating treatment well overall  Labs reviewed, adequate to proceed with cycle 4 infusion today   RTC in 1 week for cycle 5 with labs      HTN, HLD, CKD-  Monitor kidney function on treatment. Currently stable.  Encouraged continued hydration.  BP controlled in clinic.   Enrolled in chemo care companion. Readings reviewed and controlled at home as well.   Continue medical management.  Monitor on treatment.     Patient is in agreement with the proposed treatment plan. All questions were answered to the patient's satisfaction. Pt knows to call clinic if anything is needed before the next clinic visit.    Patient discussed with collaborating physician, Dr. Bland.    At least 40 minutes were spent today on this encounter including face to face time with the patient, data gathering/interpretation and documentation.       Benita Mccann, MSN, APRN, ACCNS-AG  Hematology and Medical Oncology  Clinical Nurse Specialist to Dr. Coombs, Dr. Bland & Dr. Matos    Route Chart for Scheduling    Med Onc Chart Routing      Follow up with physician . Keep as scheduled next week for cycle 5   Follow up with [facility]    Infusion scheduling note    Injection scheduling note    Labs    Imaging    Pharmacy appointment    Other referrals              Treatment Plan Information   OP CISPLATIN WEEKLY + RT   Elizabeth Bland MD   Upcoming Treatment Dates - OP CISPLATIN WEEKLY + RT    1/30/2024       Chemotherapy       CISplatin (Platinol) 25 mg/m2 = 42 mg in sodium chloride 0.9% 292 mL chemo infusion        Pre-Hydration       sodium chloride 0.9% 500 mL with magnesium sulfate 1 g, potassium chloride 10 mEq infusion       Post-Hydration       sodium chloride 0.9% bolus 500 mL 500 mL       Antiemetics       aprepitant (CINVANTI) injection 130 mg       palonosetron (ALOXI) 0.25 mg with Dexamethasone (DECADRON) 12 mg in NS 50 mL IVPB  2/6/2024       Chemotherapy       CISplatin (Platinol) 25 mg/m2 = 42 mg in sodium chloride 0.9% 292 mL chemo infusion       Pre-Hydration       sodium chloride 0.9% 500 mL with magnesium sulfate 1 g, potassium chloride 10 mEq infusion       Post-Hydration       sodium chloride 0.9% bolus 500 mL 500 mL       Antiemetics       aprepitant (CINVANTI) injection 130 mg       palonosetron (ALOXI) 0.25 mg with Dexamethasone (DECADRON) 12 mg in NS 50 mL IVPB  2/13/2024       Chemotherapy       CISplatin (Platinol) 25 mg/m2 = 42 mg in sodium chloride 0.9% 292 mL chemo infusion       Pre-Hydration       sodium chloride 0.9% 500 mL with magnesium sulfate 1 g, potassium chloride 10 mEq infusion       Post-Hydration       sodium chloride 0.9% bolus 500 mL 500 mL       Antiemetics       aprepitant (CINVANTI) injection 130 mg       palonosetron (ALOXI) 0.25 mg with Dexamethasone (DECADRON) 12 mg in NS 50 mL IVPB

## 2024-01-31 NOTE — PLAN OF CARE
Problem: Anemia (Chemotherapy Effects)  Goal: Anemia Symptom Improvement  Outcome: Ongoing, Progressing     Problem: Urinary Bleeding Risk or Actual (Chemotherapy Effects)  Goal: Absence of Hematuria  Outcome: Ongoing, Progressing     Problem: Nausea and Vomiting (Chemotherapy Effects)  Goal: Fluid and Electrolyte Balance  Outcome: Ongoing, Progressing     Problem: Neurotoxicity (Chemotherapy Effects)  Goal: Neurotoxicity Symptom Control  Outcome: Ongoing, Progressing     Problem: Neutropenia (Chemotherapy Effects)  Goal: Absence of Infection  Outcome: Ongoing, Progressing     Problem: Oral Mucositis (Chemotherapy Effects)  Goal: Improved Oral Mucous Membrane Integrity  Outcome: Ongoing, Progressing     Problem: Thrombocytopenia Bleeding Risk (Chemotherapy Effects)  Goal: Absence of Bleeding  Outcome: Ongoing, Progressing    Ambulatory to clinic with wife.  No c/o adverse effects or s/s of infection.  PIV inserted, flushed without difficulty, blood return noted and infused with no problems.  Premeds, Pre/ Post hydration and Cisplatin tolerated with no problems.  Ambulatory home with wife.  NAD noted.

## 2024-02-01 ENCOUNTER — HOSPITAL ENCOUNTER (OUTPATIENT)
Dept: RADIATION THERAPY | Facility: HOSPITAL | Age: 89
Discharge: HOME | End: 2024-02-01
Attending: STUDENT IN AN ORGANIZED HEALTH CARE EDUCATION/TRAINING PROGRAM
Payer: MEDICARE

## 2024-02-01 ENCOUNTER — PATIENT MESSAGE (OUTPATIENT)
Dept: HEMATOLOGY/ONCOLOGY | Facility: CLINIC | Age: 89
End: 2024-02-01
Payer: MEDICARE

## 2024-02-01 ENCOUNTER — PATIENT MESSAGE (OUTPATIENT)
Dept: ADMINISTRATIVE | Facility: OTHER | Age: 89
End: 2024-02-01
Payer: MEDICARE

## 2024-02-01 PROCEDURE — 77014 PR  CT GUIDANCE PLACEMENT RAD THERAPY FIELDS: CPT | Mod: 26,,, | Performed by: STUDENT IN AN ORGANIZED HEALTH CARE EDUCATION/TRAINING PROGRAM

## 2024-02-01 PROCEDURE — 77336 RADIATION PHYSICS CONSULT: CPT | Performed by: STUDENT IN AN ORGANIZED HEALTH CARE EDUCATION/TRAINING PROGRAM

## 2024-02-02 ENCOUNTER — DOCUMENTATION ONLY (OUTPATIENT)
Dept: RADIATION ONCOLOGY | Facility: CLINIC | Age: 89
End: 2024-02-02
Payer: MEDICARE

## 2024-02-02 ENCOUNTER — PATIENT MESSAGE (OUTPATIENT)
Dept: ADMINISTRATIVE | Facility: OTHER | Age: 89
End: 2024-02-02
Payer: MEDICARE

## 2024-02-02 ENCOUNTER — HOSPITAL ENCOUNTER (INPATIENT)
Facility: HOSPITAL | Age: 89
LOS: 2 days | Discharge: HOME | DRG: 690 | End: 2024-02-04
Attending: EMERGENCY MEDICINE | Admitting: STUDENT IN AN ORGANIZED HEALTH CARE EDUCATION/TRAINING PROGRAM
Payer: MEDICARE

## 2024-02-02 DIAGNOSIS — R53.83 FATIGUE: ICD-10-CM

## 2024-02-02 DIAGNOSIS — N39.0 URINARY TRACT INFECTION WITH HEMATURIA, SITE UNSPECIFIED: Primary | ICD-10-CM

## 2024-02-02 DIAGNOSIS — R31.9 URINARY TRACT INFECTION WITH HEMATURIA, SITE UNSPECIFIED: Primary | ICD-10-CM

## 2024-02-02 DIAGNOSIS — R07.9 CHEST PAIN: ICD-10-CM

## 2024-02-02 PROBLEM — N17.9 AKI (ACUTE KIDNEY INJURY): Status: ACTIVE | Noted: 2024-02-02

## 2024-02-02 PROBLEM — E87.5 HYPERKALEMIA: Status: ACTIVE | Noted: 2024-02-02

## 2024-02-02 LAB
ALBUMIN SERPL BCP-MCNC: 3.4 G/DL (ref 3.5–5.2)
ALLENS TEST: NORMAL
ALP SERPL-CCNC: 61 U/L (ref 55–135)
ALT SERPL W/O P-5'-P-CCNC: 38 U/L (ref 10–44)
ANION GAP SERPL CALC-SCNC: 7 MMOL/L (ref 8–16)
ANION GAP SERPL CALC-SCNC: 9 MMOL/L (ref 8–16)
AST SERPL-CCNC: 17 U/L (ref 10–40)
BACTERIA #/AREA URNS AUTO: ABNORMAL /HPF
BASOPHILS # BLD AUTO: 0.01 K/UL (ref 0–0.2)
BASOPHILS NFR BLD: 0.1 % (ref 0–1.9)
BILIRUB SERPL-MCNC: 0.6 MG/DL (ref 0.1–1)
BILIRUB UR QL STRIP: NEGATIVE
BUN SERPL-MCNC: 46 MG/DL (ref 6–30)
BUN SERPL-MCNC: 46 MG/DL (ref 8–23)
BUN SERPL-MCNC: 47 MG/DL (ref 8–23)
CALCIUM SERPL-MCNC: 8.6 MG/DL (ref 8.7–10.5)
CALCIUM SERPL-MCNC: 9.7 MG/DL (ref 8.7–10.5)
CHLORIDE SERPL-SCNC: 109 MMOL/L (ref 95–110)
CHLORIDE SERPL-SCNC: 110 MMOL/L (ref 95–110)
CHLORIDE SERPL-SCNC: 111 MMOL/L (ref 95–110)
CLARITY UR REFRACT.AUTO: CLEAR
CO2 SERPL-SCNC: 18 MMOL/L (ref 23–29)
CO2 SERPL-SCNC: 19 MMOL/L (ref 23–29)
COLOR UR AUTO: YELLOW
CREAT SERPL-MCNC: 1.4 MG/DL (ref 0.5–1.4)
CREAT SERPL-MCNC: 1.4 MG/DL (ref 0.5–1.4)
CREAT SERPL-MCNC: 1.5 MG/DL (ref 0.5–1.4)
CREAT UR-MCNC: 36 MG/DL (ref 23–375)
DIFFERENTIAL METHOD BLD: ABNORMAL
EOSINOPHIL # BLD AUTO: 0 K/UL (ref 0–0.5)
EOSINOPHIL NFR BLD: 0.1 % (ref 0–8)
ERYTHROCYTE [DISTWIDTH] IN BLOOD BY AUTOMATED COUNT: 15.5 % (ref 11.5–14.5)
EST. GFR  (NO RACE VARIABLE): 44.5 ML/MIN/1.73 M^2
EST. GFR  (NO RACE VARIABLE): 48.3 ML/MIN/1.73 M^2
GLUCOSE SERPL-MCNC: 223 MG/DL (ref 70–110)
GLUCOSE SERPL-MCNC: 223 MG/DL (ref 70–110)
GLUCOSE SERPL-MCNC: 302 MG/DL (ref 70–110)
GLUCOSE UR QL STRIP: ABNORMAL
HCT VFR BLD AUTO: 48.5 % (ref 40–54)
HCT VFR BLD CALC: 50 %PCV (ref 36–54)
HGB BLD-MCNC: 16.1 G/DL (ref 14–18)
HGB UR QL STRIP: ABNORMAL
IMM GRANULOCYTES # BLD AUTO: 0.12 K/UL (ref 0–0.04)
IMM GRANULOCYTES NFR BLD AUTO: 1 % (ref 0–0.5)
INFLUENZA A, MOLECULAR: NEGATIVE
INFLUENZA B, MOLECULAR: NEGATIVE
KETONES UR QL STRIP: NEGATIVE
LDH SERPL L TO P-CCNC: 0.94 MMOL/L (ref 0.5–2.2)
LEUKOCYTE ESTERASE UR QL STRIP: ABNORMAL
LYMPHOCYTES # BLD AUTO: 1 K/UL (ref 1–4.8)
LYMPHOCYTES NFR BLD: 7.9 % (ref 18–48)
MAGNESIUM SERPL-MCNC: 2.2 MG/DL (ref 1.6–2.6)
MCH RBC QN AUTO: 31.8 PG (ref 27–31)
MCHC RBC AUTO-ENTMCNC: 33.2 G/DL (ref 32–36)
MCV RBC AUTO: 96 FL (ref 82–98)
MICROSCOPIC COMMENT: ABNORMAL
MONOCYTES # BLD AUTO: 0.5 K/UL (ref 0.3–1)
MONOCYTES NFR BLD: 3.8 % (ref 4–15)
NEUTROPHILS # BLD AUTO: 10.9 K/UL (ref 1.8–7.7)
NEUTROPHILS NFR BLD: 87.1 % (ref 38–73)
NITRITE UR QL STRIP: NEGATIVE
NRBC BLD-RTO: 0 /100 WBC
PH UR STRIP: 6 [PH] (ref 5–8)
PHOSPHATE SERPL-MCNC: 3.7 MG/DL (ref 2.7–4.5)
PLATELET # BLD AUTO: 126 K/UL (ref 150–450)
PMV BLD AUTO: 12.1 FL (ref 9.2–12.9)
POC IONIZED CALCIUM: 1.3 MMOL/L (ref 1.06–1.42)
POC TCO2 (MEASURED): 22 MMOL/L (ref 23–27)
POTASSIUM BLD-SCNC: 5.7 MMOL/L (ref 3.5–5.1)
POTASSIUM SERPL-SCNC: 5.3 MMOL/L (ref 3.5–5.1)
POTASSIUM SERPL-SCNC: 6.1 MMOL/L (ref 3.5–5.1)
PROT SERPL-MCNC: 6.3 G/DL (ref 6–8.4)
PROT UR QL STRIP: NEGATIVE
RBC # BLD AUTO: 5.07 M/UL (ref 4.6–6.2)
RBC #/AREA URNS AUTO: >100 /HPF (ref 0–4)
SAMPLE: ABNORMAL
SAMPLE: NORMAL
SARS-COV-2 RDRP RESP QL NAA+PROBE: NEGATIVE
SITE: NORMAL
SODIUM BLD-SCNC: 137 MMOL/L (ref 136–145)
SODIUM SERPL-SCNC: 136 MMOL/L (ref 136–145)
SODIUM SERPL-SCNC: 138 MMOL/L (ref 136–145)
SODIUM UR-SCNC: 76 MMOL/L (ref 20–250)
SP GR UR STRIP: 1.02 (ref 1–1.03)
SPECIMEN SOURCE: NORMAL
SQUAMOUS #/AREA URNS AUTO: 0 /HPF
TROPONIN I SERPL DL<=0.01 NG/ML-MCNC: 0.2 NG/ML (ref 0–0.03)
TROPONIN I SERPL DL<=0.01 NG/ML-MCNC: 0.2 NG/ML (ref 0–0.03)
URN SPEC COLLECT METH UR: ABNORMAL
UUN UR-MCNC: 704 MG/DL (ref 140–1050)
WBC # BLD AUTO: 12.45 K/UL (ref 3.9–12.7)
WBC #/AREA URNS AUTO: >100 /HPF (ref 0–5)
YEAST UR QL AUTO: ABNORMAL

## 2024-02-02 PROCEDURE — 84100 ASSAY OF PHOSPHORUS: CPT | Performed by: EMERGENCY MEDICINE

## 2024-02-02 PROCEDURE — 93010 ELECTROCARDIOGRAM REPORT: CPT | Mod: ,,, | Performed by: INTERNAL MEDICINE

## 2024-02-02 PROCEDURE — 80048 BASIC METABOLIC PNL TOTAL CA: CPT | Mod: XB

## 2024-02-02 PROCEDURE — 81001 URINALYSIS AUTO W/SCOPE: CPT | Performed by: EMERGENCY MEDICINE

## 2024-02-02 PROCEDURE — 84540 ASSAY OF URINE/UREA-N: CPT | Performed by: STUDENT IN AN ORGANIZED HEALTH CARE EDUCATION/TRAINING PROGRAM

## 2024-02-02 PROCEDURE — 77386 HC IMRT, COMPLEX: CPT | Performed by: STUDENT IN AN ORGANIZED HEALTH CARE EDUCATION/TRAINING PROGRAM

## 2024-02-02 PROCEDURE — 83605 ASSAY OF LACTIC ACID: CPT

## 2024-02-02 PROCEDURE — 96365 THER/PROPH/DIAG IV INF INIT: CPT

## 2024-02-02 PROCEDURE — 84484 ASSAY OF TROPONIN QUANT: CPT | Mod: 91 | Performed by: STUDENT IN AN ORGANIZED HEALTH CARE EDUCATION/TRAINING PROGRAM

## 2024-02-02 PROCEDURE — 93005 ELECTROCARDIOGRAM TRACING: CPT

## 2024-02-02 PROCEDURE — 84300 ASSAY OF URINE SODIUM: CPT | Performed by: STUDENT IN AN ORGANIZED HEALTH CARE EDUCATION/TRAINING PROGRAM

## 2024-02-02 PROCEDURE — 87502 INFLUENZA DNA AMP PROBE: CPT | Performed by: EMERGENCY MEDICINE

## 2024-02-02 PROCEDURE — 63600175 PHARM REV CODE 636 W HCPCS: Performed by: EMERGENCY MEDICINE

## 2024-02-02 PROCEDURE — 80053 COMPREHEN METABOLIC PANEL: CPT | Performed by: EMERGENCY MEDICINE

## 2024-02-02 PROCEDURE — 99285 EMERGENCY DEPT VISIT HI MDM: CPT | Mod: 25

## 2024-02-02 PROCEDURE — 87086 URINE CULTURE/COLONY COUNT: CPT | Performed by: EMERGENCY MEDICINE

## 2024-02-02 PROCEDURE — 84484 ASSAY OF TROPONIN QUANT: CPT | Performed by: EMERGENCY MEDICINE

## 2024-02-02 PROCEDURE — 20600001 HC STEP DOWN PRIVATE ROOM

## 2024-02-02 PROCEDURE — 85025 COMPLETE CBC W/AUTO DIFF WBC: CPT | Performed by: EMERGENCY MEDICINE

## 2024-02-02 PROCEDURE — 80048 BASIC METABOLIC PNL TOTAL CA: CPT | Mod: XB | Performed by: STUDENT IN AN ORGANIZED HEALTH CARE EDUCATION/TRAINING PROGRAM

## 2024-02-02 PROCEDURE — 82570 ASSAY OF URINE CREATININE: CPT | Performed by: STUDENT IN AN ORGANIZED HEALTH CARE EDUCATION/TRAINING PROGRAM

## 2024-02-02 PROCEDURE — 63600175 PHARM REV CODE 636 W HCPCS: Performed by: STUDENT IN AN ORGANIZED HEALTH CARE EDUCATION/TRAINING PROGRAM

## 2024-02-02 PROCEDURE — 83735 ASSAY OF MAGNESIUM: CPT | Performed by: EMERGENCY MEDICINE

## 2024-02-02 PROCEDURE — 99900035 HC TECH TIME PER 15 MIN (STAT)

## 2024-02-02 PROCEDURE — 77014 PR  CT GUIDANCE PLACEMENT RAD THERAPY FIELDS: CPT | Mod: 26,,, | Performed by: STUDENT IN AN ORGANIZED HEALTH CARE EDUCATION/TRAINING PROGRAM

## 2024-02-02 PROCEDURE — 96361 HYDRATE IV INFUSION ADD-ON: CPT

## 2024-02-02 PROCEDURE — 25000003 PHARM REV CODE 250: Performed by: STUDENT IN AN ORGANIZED HEALTH CARE EDUCATION/TRAINING PROGRAM

## 2024-02-02 PROCEDURE — 77427 RADIATION TX MANAGEMENT X5: CPT | Mod: ,,, | Performed by: STUDENT IN AN ORGANIZED HEALTH CARE EDUCATION/TRAINING PROGRAM

## 2024-02-02 PROCEDURE — U0002 COVID-19 LAB TEST NON-CDC: HCPCS | Performed by: EMERGENCY MEDICINE

## 2024-02-02 PROCEDURE — 25000003 PHARM REV CODE 250: Performed by: EMERGENCY MEDICINE

## 2024-02-02 RX ORDER — GLUCAGON 1 MG
1 KIT INJECTION
Status: DISCONTINUED | OUTPATIENT
Start: 2024-02-02 | End: 2024-02-04 | Stop reason: HOSPADM

## 2024-02-02 RX ORDER — NALOXONE HCL 0.4 MG/ML
0.02 VIAL (ML) INJECTION
Status: DISCONTINUED | OUTPATIENT
Start: 2024-02-02 | End: 2024-02-04 | Stop reason: HOSPADM

## 2024-02-02 RX ORDER — OLANZAPINE 2.5 MG/1
5 TABLET ORAL NIGHTLY
Status: COMPLETED | OUTPATIENT
Start: 2024-02-02 | End: 2024-02-03

## 2024-02-02 RX ORDER — DEXAMETHASONE 4 MG/1
4 TABLET ORAL EVERY 12 HOURS
Status: COMPLETED | OUTPATIENT
Start: 2024-02-02 | End: 2024-02-04

## 2024-02-02 RX ORDER — SODIUM CHLORIDE 0.9 % (FLUSH) 0.9 %
10 SYRINGE (ML) INJECTION EVERY 12 HOURS PRN
Status: DISCONTINUED | OUTPATIENT
Start: 2024-02-02 | End: 2024-02-04 | Stop reason: HOSPADM

## 2024-02-02 RX ORDER — INSULIN ASPART 100 [IU]/ML
0-5 INJECTION, SOLUTION INTRAVENOUS; SUBCUTANEOUS
Status: DISCONTINUED | OUTPATIENT
Start: 2024-02-02 | End: 2024-02-04 | Stop reason: HOSPADM

## 2024-02-02 RX ORDER — METOPROLOL SUCCINATE 100 MG/1
200 TABLET, EXTENDED RELEASE ORAL 2 TIMES DAILY
Status: DISCONTINUED | OUTPATIENT
Start: 2024-02-02 | End: 2024-02-04 | Stop reason: HOSPADM

## 2024-02-02 RX ORDER — ACETAMINOPHEN 325 MG/1
650 TABLET ORAL EVERY 4 HOURS PRN
Status: DISCONTINUED | OUTPATIENT
Start: 2024-02-02 | End: 2024-02-04 | Stop reason: HOSPADM

## 2024-02-02 RX ORDER — IBUPROFEN 200 MG
16 TABLET ORAL
Status: DISCONTINUED | OUTPATIENT
Start: 2024-02-02 | End: 2024-02-04 | Stop reason: HOSPADM

## 2024-02-02 RX ORDER — ONDANSETRON HYDROCHLORIDE 2 MG/ML
4 INJECTION, SOLUTION INTRAVENOUS EVERY 8 HOURS PRN
Status: DISCONTINUED | OUTPATIENT
Start: 2024-02-02 | End: 2024-02-04 | Stop reason: HOSPADM

## 2024-02-02 RX ORDER — IBUPROFEN 200 MG
24 TABLET ORAL
Status: DISCONTINUED | OUTPATIENT
Start: 2024-02-02 | End: 2024-02-04 | Stop reason: HOSPADM

## 2024-02-02 RX ADMIN — DEXAMETHASONE 4 MG: 4 TABLET ORAL at 08:02

## 2024-02-02 RX ADMIN — METOPROLOL SUCCINATE 200 MG: 100 TABLET, EXTENDED RELEASE ORAL at 08:02

## 2024-02-02 RX ADMIN — OLANZAPINE 5 MG: 2.5 TABLET, FILM COATED ORAL at 08:02

## 2024-02-02 RX ADMIN — SODIUM CHLORIDE 1000 ML: 9 INJECTION, SOLUTION INTRAVENOUS at 01:02

## 2024-02-02 RX ADMIN — APIXABAN 2.5 MG: 2.5 TABLET, FILM COATED ORAL at 08:02

## 2024-02-02 RX ADMIN — CEFTRIAXONE 1 G: 1 INJECTION, POWDER, FOR SOLUTION INTRAMUSCULAR; INTRAVENOUS at 01:02

## 2024-02-02 NOTE — ED NOTES
I-STAT Chem-8+ Results:   Value Reference Range   Sodium 137 136-145 mmol/L   Potassium  5.7 3.5-5.1 mmol/L   Chloride 109  mmol/L   Ionized Calcium 1.30 1.06-1.42 mmol/L   CO2 (measured) 22 23-29 mmol/L   Glucose 223  mg/dL   BUN 46 6-30 mg/dL   Creatinine 1.4 0.5-1.4 mg/dL   Hematocrit 50 36-54%      MD Billingsley notified of abnormal istat values.

## 2024-02-02 NOTE — PLAN OF CARE
Day 16 of outpatient radiation to the bladder. Client present with some weakness, increased fatigue, and low O2 Sats (87%.) B/P  sittin/69, HR 74; standin/73, HR 74. No dysuria or hematuria. Glucose 223. Dr. Schofield has been notified. Nocturia x 4-5.

## 2024-02-02 NOTE — PROGRESS NOTES
Radiation Oncology Brief Note Note    Date of Service: 02/02/2024    Chief Complaint: bladder cancer    Referring Physician: Dr Story (urology)    Implantable devices: denies    Radiation Summary to Date:   First and Last Treatment Dates:  1/9/2024 - 2/2/2024    Treatment Summary  Course: C1 Pelvis 2024    Treatment Site Ref. ID Energy Dose/Fx (Gy) #Fx Dose Correction (Gy) Total Dose (Gy) Start Date End Date Elapsed Days   IM Bladder PTV_High 6X 2.75 16 / 20 0 44 1/9/2024 2/2/2024 24     Diagnosis/Assessment:   Cliff Magaña is a 88 y.o. man with Stage II (cT2, cN0, cM0) multifocal invasive high-grade papillary urothelial carcinoma of the bladder s/p TURBT (Dr Moffett,11/16/2023)    PMH CKD3, T2DM, afib (on Eliquis)    He was seen by [facility] on treatment visit 2/2/2024 , reported he is on ABX for asymptomatic  UTI per Dr Wellington Story.   Received chemo wed 01/31/2024 and since has been experiencing severe fatigue, generalized weakness unable to walk, feeling unstable.    His BP was within normal range by O2 sat was 94%.    Discussed with Dr Bland who recommended ED eval      Thank you for the opportunity to care for this patient. Please do not hesitate to contact me with any questions.  Roberto Schofield MD/PhD  Radiation Oncology

## 2024-02-02 NOTE — ASSESSMENT & PLAN NOTE
"Patient's FSGs are controlled on current medication regimen.  Last A1c reviewed-   Lab Results   Component Value Date    HGBA1C 6.9 (H) 12/22/2023     Most recent fingerstick glucose reviewed- No results for input(s): "POCTGLUCOSE" in the last 24 hours.  Current correctional scale  Low  Maintain anti-hyperglycemic dose as follows-   Antihyperglycemics (From admission, onward)      Start     Stop Route Frequency Ordered    02/02/24 1637  insulin aspart U-100 pen 0-5 Units         -- SubQ Before meals & nightly PRN 02/02/24 1538          Hold Oral hypoglycemics while patient is in the hospital.  "

## 2024-02-02 NOTE — Clinical Note
Diagnosis: Fatigue [480974]   Future Attending Provider: OTF MAIER [[id number]]   Reason for IP Medical Treatment  (Clinical interventions that can only be accomplished in the IP setting? ) :: iv abx   I certify that Inpatient services for greater than or equal to 2 midnights are medically necessary:: Yes   Plans for Post-Acute care--if anticipated (pick the single best option):: A. No post acute care anticipated at this time   Special Needs:: No Special Needs [1]

## 2024-02-02 NOTE — ED TRIAGE NOTES
Pt arrives to ED stating today he came in for radiation treatment for kidney cx. Pt states his doctor's told him to go to the ER due to weakness. Pt states he also had chemo Wednesday. Pt states he did not notice the weakness. Pt denies CP or SOB. Pt states he is also taking abx for uti. Pt denies dysuria.

## 2024-02-02 NOTE — ASSESSMENT & PLAN NOTE
88 y.o. 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   - on weekly cisplatin with daily XRT M-F, started 1/9/24, last chemo was 1/31/24  - urine culture from 1/29 with sens E coli and he was started on bactrim  - f/u urine culture this admission  - will give rocephin. Hold bactrim for JER and hyperkalemia

## 2024-02-02 NOTE — ASSESSMENT & PLAN NOTE
Hyperkalemia  Patient with acute kidney injury/acute renal failure likely due to pre-renal azotemia due to dehydration JER is currently worsening. - Labs reviewed- Renal function/electrolytes with Estimated Creatinine Clearance: 27.8 mL/min (A) (based on SCr of 1.5 mg/dL (H)). according to latest data. Monitor urine output and serial BMP and adjust therapy as needed. Avoid nephrotoxins and renally dose meds for GFR listed above.    Hold bactrim  S/p IVF. Trend  Check urine studies

## 2024-02-02 NOTE — H&P
[facility] - Emergency Dept  [facility] Medicine  History & Physical    Patient Name: Cliff Magaña  MRN: [id number]  Patient Class: IP- Inpatient  Admission Date: 2/2/2024  Attending Physician: Ulysses Johnson*   Primary Care Provider: Munir Estrada MD         Patient information was obtained from patient, spouse/SO, past medical records, and ER records.     Subjective:     Principal Problem:UTI (urinary tract infection)    Chief Complaint:   Chief Complaint   Patient presents with    Fatigue     Hx of bladder cancer last chemo wednesday        HPI: Cliff Magaña is a 88 y.o. 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 History obtained with assistance of patient wife at bedside. She states that he has developed significant overall weakness over the last few days, and he had a near fall at this last radiation appointment when his legs gave out and he had to be helped down. He did not lose consciousness or hit his head. He was supposed to have a cystoscopy 4 days ago but this was cancelled when he was found to have a UTI. He was started on bactrim which he was taken for the past 4 days. He denies fever/chills, shortness of breath, chest pain, abdominal pain, nausea / vomiting. He reports increased urination which he attributes to trying to stay hydrated because of the chemo he is on. He denies dysuria. He is currently on weekly cisplatin with daily XRT M-F, started 1/9/24, last chemo was 1/31/24. He was instructed to present to ED because of the severe fatigue and difficulty walking.     Past Medical History:   Diagnosis Date    *Atrial fibrillation     Chronic kidney disease     Deep vein thrombosis     Hyperlipidemia     Hypertension     Metabolic syndrome     Type 2 diabetes mellitus, without long-term current use of  insulin 12/13/2021       Past Surgical History:   Procedure Laterality Date    CATARACT EXTRACTION      CHOLECYSTECTOMY      CYSTOSCOPY N/A 11/16/2023    Procedure: CYSTOSCOPY;  Surgeon: Marcelino Moffett MD;  Location: [facility] OR [address] [place] [address];  Service: Urology;  Laterality: N/A;  90 minutes    EYE SURGERY      INJECTION OF FACET JOINT Bilateral 4/28/2021    Procedure: FACET JOINT INJECTION BILATERAL L4/L5 DIRECT REFERRAL;  Surgeon: Philipp Iyer MD;  Location: [facility] [place] PAIN MGT;  Service: Pain Management;  Laterality: Bilateral;  NEEDS CONSENT, ELIQUIS CLEARANCE IN CHART    RETROGRADE PYELOGRAPHY Bilateral 11/16/2023    Procedure: PYELOGRAM, RETROGRADE;  Surgeon: Marcelino Moffett MD;  Location: [facility] OR [address] [place] [address];  Service: Urology;  Laterality: Bilateral;  90 minutes    SKIN CANCER EXCISION      TONSILLECTOMY      TURBT (TRANSURETHRAL RESECTION OF BLADDER TUMOR) N/A 11/16/2023    Procedure: TURBT (TRANSURETHRAL RESECTION OF BLADDER TUMOR);  Surgeon: Marcelino Moffett MD;  Location: [address];  Service: Urology;  Laterality: N/A;  90 minutes       Review of patient's allergies indicates:   Allergen Reactions    Niacin      Other reaction(s): Rash  Other reaction(s): Itching       No current facility-administered medications on file prior to encounter.     Current Outpatient Medications on File Prior to Encounter   Medication Sig    acetaminophen (TYLENOL) 650 MG TbSR Take 650 mg by mouth every 8 (eight) hours.    apixaban (ELIQUIS) 5 mg Tab Take 1 tablet (5 mg total) by mouth 2 (two) times daily.    cholecalciferol, vitamin D3, (VITAMIN D3) 50 mcg (2,000 unit) Cap capsule Take by mouth once daily.    dexAMETHasone (DECADRON) 4 MG Tab TAKE EVERY 12 HOURS DAILY FOR DAYS 2 THOUGH 4 FOLLOWING CHEMOTHERAPY    empagliflozin (JARDIANCE) 10 mg tablet Take 1 tablet (10 mg total) by mouth once daily.    fenofibrate micronized (LOFIBRA) 200 MG Cap Take 200 mg by mouth daily with breakfast.    krill oil/hyaluronic/astaxanth  (MEGARED JOINT CARE ORAL)     loratadine (CLARITIN) 10 mg tablet Take 10 mg by mouth once daily.    TRAMAINE BIOTIN ORAL Take 5,000 mcg by mouth once daily.    metFORMIN (GLUCOPHAGE-XR) 500 MG ER 24hr tablet Take 1 tablet (500 mg total) by mouth once daily.    metoprolol succinate (TOPROL-XL) 200 MG 24 hr tablet Take 1 tablet (200 mg total) by mouth 2 (two) times daily.    OLANZapine (ZYPREXA) 5 MG tablet TAKE NIGHTLY FOR 4 DAYS FOLLOWING CHEMOTHERAPY    ondansetron (ZOFRAN) 8 MG tablet Take 1 tablet (8 mg total) by mouth every 12 (twelve) hours as needed for Nausea.    sacubitriL-valsartan (ENTRESTO) 24-26 mg per tablet Take 1 tablet by mouth 2 (two) times daily.    sulfamethoxazole-trimethoprim 800-160mg (BACTRIM DS) 800-160 mg Tab Take 1 tablet by mouth 2 (two) times daily. for 7 days     Family History       Problem Relation (Age of Onset)    Diabetes Maternal Aunt          Tobacco Use    Smoking status: Former     Current packs/day: 0.00     Average packs/day: 2.0 packs/day for 20.0 years (40.0 ttl pk-yrs)     Types: Cigarettes     Start date: 1963     Quit date: 1983     Years since quittin.7     Passive exposure: Never    Smokeless tobacco: Never   Substance and Sexual Activity    Alcohol use: Yes     Alcohol/week: 1.0 standard drink of alcohol     Types: 1 Standard drinks or equivalent per week     Comment: 3 drinks per week    Drug use: No    Sexual activity: Not Currently     Partners: Female     Review of Systems   Constitutional:  Positive for fatigue. Negative for chills and fever.   HENT:  Negative for congestion and sore throat.    Respiratory:  Negative for cough and shortness of breath.    Cardiovascular:  Negative for chest pain and leg swelling.   Gastrointestinal:  Negative for abdominal pain, nausea and vomiting.   Genitourinary:  Positive for frequency. Negative for decreased urine volume, dysuria and urgency.   Musculoskeletal:  Negative for arthralgias and myalgias.   Skin:   Negative for color change and pallor.   Neurological:  Positive for weakness. Negative for dizziness, syncope, light-headedness and headaches.   Psychiatric/Behavioral:  Negative for agitation and confusion.      Objective:     Vital Signs (Most Recent):  Temp: 98.2 °F (36.8 °C) (02/02/24 1315)  Pulse: 73 (02/02/24 1400)  Resp: 16 (02/02/24 1400)  BP: (!) 141/64 (02/02/24 1400)  SpO2: 100 % (02/02/24 1400) Vital Signs (24h Range):  Temp:  [97.4 °F (36.3 °C)-98.2 °F (36.8 °C)] 98.2 °F (36.8 °C)  Pulse:  [73-78] 73  Resp:  [16-20] 16  SpO2:  [96 %-100 %] 100 %  BP: (117-141)/(55-70) 141/64     Weight: 57.7 kg (127 lb 3.3 oz)  Body mass index is 21.83 kg/m².     Physical Exam  Constitutional:       General: He is not in acute distress.     Appearance: Normal appearance. He is not toxic-appearing or diaphoretic.   HENT:      Head: Normocephalic and atraumatic.   Cardiovascular:      Rate and Rhythm: Normal rate and regular rhythm.      Heart sounds: No murmur heard.     No friction rub. No gallop.   Pulmonary:      Effort: Pulmonary effort is normal. No respiratory distress.      Breath sounds: No wheezing or rales.   Abdominal:      General: Abdomen is flat. There is no distension.      Palpations: Abdomen is soft.      Tenderness: There is no abdominal tenderness. There is no guarding or rebound.   Musculoskeletal:      Cervical back: Normal range of motion and neck supple.      Right lower leg: No edema.      Left lower leg: No edema.   Skin:     General: Skin is warm and dry.   Neurological:      General: No focal deficit present.      Mental Status: He is alert and oriented to person, place, and time.                Significant Labs: All pertinent labs within the past 24 hours have been reviewed.    Significant Imaging: I have reviewed all pertinent imaging results/findings within the past 24 hours.  Assessment/Plan:     * UTI (urinary tract infection)  88 y.o. male with HTN, T2DM, Afib (on Eliquis), CKD, and  "Bladder Cancer (Stage II (cT2, cN0, cM0) multifocal invasive high-grade papillary urothelial carcinoma of the bladder s/p TURBT), who presents with weakness.   - on weekly cisplatin with daily XRT M-F, started 1/9/24, last chemo was 1/31/24  - urine culture from 1/29 with sens E coli and he was started on bactrim  - f/u urine culture this admission  - will give rocephin. Hold bactrim for JER and hyperkalemia    JER (acute kidney injury)  Hyperkalemia  Patient with acute kidney injury/acute renal failure likely due to pre-renal azotemia due to dehydration JER is currently worsening. - Labs reviewed- Renal function/electrolytes with Estimated Creatinine Clearance: 27.8 mL/min (A) (based on SCr of 1.5 mg/dL (H)). according to latest data. Monitor urine output and serial BMP and adjust therapy as needed. Avoid nephrotoxins and renally dose meds for GFR listed above.    Hold bactrim  S/p IVF. Trend  Check urine studies    Elevated troponin  No chest pain  Suspect type II demand in setting of JER and infection  Trend troponin      DM type 2, controlled, with complication  Patient's FSGs are controlled on current medication regimen.  Last A1c reviewed-   Lab Results   Component Value Date    HGBA1C 6.9 (H) 12/22/2023     Most recent fingerstick glucose reviewed- No results for input(s): "POCTGLUCOSE" in the last 24 hours.  Current correctional scale  Low  Maintain anti-hyperglycemic dose as follows-   Antihyperglycemics (From admission, onward)      Start     Stop Route Frequency Ordered    02/02/24 1637  insulin aspart U-100 pen 0-5 Units         -- SubQ Before meals & nightly PRN 02/02/24 1538          Hold Oral hypoglycemics while patient is in the hospital.    Risk for falls  PT/OT consulted      Persistent atrial fibrillation  Patient with Permanent atrial fibrillation which is controlled currently with Beta Blocker. Patient is currently in atrial fibrillation.[id number] Score: 3. Anticoagulation indicated. " Anticoagulation done with eliquis .    Hyperlipidemia associated with type 2 diabetes mellitus  Hold fenofibrate for JER      Hypertension associated with diabetes  Continue metoprolol  Hold entresto for JER        VTE Risk Mitigation (From admission, onward)           Ordered     apixaban tablet 5 mg  2 times daily         02/02/24 1538     IP VTE HIGH RISK PATIENT  Once         02/02/24 1538     Place sequential compression device  Until discontinued         02/02/24 1538                                    Ulysses Johnson MD  Department of Hospital Medicine  [facility] - Emergency Dept

## 2024-02-02 NOTE — SUBJECTIVE & OBJECTIVE
Past Medical History:   Diagnosis Date    *Atrial fibrillation     Chronic kidney disease     Deep vein thrombosis     Hyperlipidemia     Hypertension     Metabolic syndrome     Type 2 diabetes mellitus, without long-term current use of insulin 12/13/2021       Past Surgical History:   Procedure Laterality Date    CATARACT EXTRACTION      CHOLECYSTECTOMY      CYSTOSCOPY N/A 11/16/2023    Procedure: CYSTOSCOPY;  Surgeon: Marcelino Moffett MD;  Location: [address];  Service: Urology;  Laterality: N/A;  90 minutes    EYE SURGERY      INJECTION OF FACET JOINT Bilateral 4/28/2021    Procedure: FACET JOINT INJECTION BILATERAL L4/L5 DIRECT REFERRAL;  Surgeon: Philipp Iyer MD;  Location: [facility] PAIN MGT;  Service: Pain Management;  Laterality: Bilateral;  NEEDS CONSENT, ELIQUIS CLEARANCE IN CHART    RETROGRADE PYELOGRAPHY Bilateral 11/16/2023    Procedure: PYELOGRAM, RETROGRADE;  Surgeon: Marcelino Moffett MD;  Location: [address];  Service: Urology;  Laterality: Bilateral;  90 minutes    SKIN CANCER EXCISION      TONSILLECTOMY      TURBT (TRANSURETHRAL RESECTION OF BLADDER TUMOR) N/A 11/16/2023    Procedure: TURBT (TRANSURETHRAL RESECTION OF BLADDER TUMOR);  Surgeon: Marcelino Moffett MD;  Location: [address];  Service: Urology;  Laterality: N/A;  90 minutes       Review of patient's allergies indicates:   Allergen Reactions    Niacin      Other reaction(s): Rash  Other reaction(s): Itching       No current facility-administered medications on file prior to encounter.     Current Outpatient Medications on File Prior to Encounter   Medication Sig    acetaminophen (TYLENOL) 650 MG TbSR Take 650 mg by mouth every 8 (eight) hours.    apixaban (ELIQUIS) 5 mg Tab Take 1 tablet (5 mg total) by mouth 2 (two) times daily.    cholecalciferol, vitamin D3, (VITAMIN D3) 50 mcg (2,000 unit) Cap capsule Take by mouth once daily.    dexAMETHasone (DECADRON) 4 MG Tab TAKE EVERY 12 HOURS DAILY FOR DAYS 2 THOUGH 4  FOLLOWING CHEMOTHERAPY    empagliflozin (JARDIANCE) 10 mg tablet Take 1 tablet (10 mg total) by mouth once daily.    fenofibrate micronized (LOFIBRA) 200 MG Cap Take 200 mg by mouth daily with breakfast.    krill oil/hyaluronic/astaxanth (MEGARED JOINT CARE ORAL)     loratadine (CLARITIN) 10 mg tablet Take 10 mg by mouth once daily.    TRAMAINE BIOTIN ORAL Take 5,000 mcg by mouth once daily.    metFORMIN (GLUCOPHAGE-XR) 500 MG ER 24hr tablet Take 1 tablet (500 mg total) by mouth once daily.    metoprolol succinate (TOPROL-XL) 200 MG 24 hr tablet Take 1 tablet (200 mg total) by mouth 2 (two) times daily.    OLANZapine (ZYPREXA) 5 MG tablet TAKE NIGHTLY FOR 4 DAYS FOLLOWING CHEMOTHERAPY    ondansetron (ZOFRAN) 8 MG tablet Take 1 tablet (8 mg total) by mouth every 12 (twelve) hours as needed for Nausea.    sacubitriL-valsartan (ENTRESTO) 24-26 mg per tablet Take 1 tablet by mouth 2 (two) times daily.    sulfamethoxazole-trimethoprim 800-160mg (BACTRIM DS) 800-160 mg Tab Take 1 tablet by mouth 2 (two) times daily. for 7 days     Family History       Problem Relation (Age of Onset)    Diabetes Maternal Aunt          Tobacco Use    Smoking status: Former     Current packs/day: 0.00     Average packs/day: 2.0 packs/day for 20.0 years (40.0 ttl pk-yrs)     Types: Cigarettes     Start date: 1963     Quit date: 1983     Years since quittin.7     Passive exposure: Never    Smokeless tobacco: Never   Substance and Sexual Activity    Alcohol use: Yes     Alcohol/week: 1.0 standard drink of alcohol     Types: 1 Standard drinks or equivalent per week     Comment: 3 drinks per week    Drug use: No    Sexual activity: Not Currently     Partners: Female     Review of Systems   Constitutional:  Positive for fatigue. Negative for chills and fever.   HENT:  Negative for congestion and sore throat.    Respiratory:  Negative for cough and shortness of breath.    Cardiovascular:  Negative for chest pain and leg swelling.    Gastrointestinal:  Negative for abdominal pain, nausea and vomiting.   Genitourinary:  Positive for frequency. Negative for decreased urine volume, dysuria and urgency.   Musculoskeletal:  Negative for arthralgias and myalgias.   Skin:  Negative for color change and pallor.   Neurological:  Positive for weakness. Negative for dizziness, syncope, light-headedness and headaches.   Psychiatric/Behavioral:  Negative for agitation and confusion.      Objective:     Vital Signs (Most Recent):  Temp: 98.2 °F (36.8 °C) (02/02/24 1315)  Pulse: 73 (02/02/24 1400)  Resp: 16 (02/02/24 1400)  BP: (!) 141/64 (02/02/24 1400)  SpO2: 100 % (02/02/24 1400) Vital Signs (24h Range):  Temp:  [97.4 °F (36.3 °C)-98.2 °F (36.8 °C)] 98.2 °F (36.8 °C)  Pulse:  [73-78] 73  Resp:  [16-20] 16  SpO2:  [96 %-100 %] 100 %  BP: (117-141)/(55-70) 141/64     Weight: 57.7 kg (127 lb 3.3 oz)  Body mass index is 21.83 kg/m².     Physical Exam  Constitutional:       General: He is not in acute distress.     Appearance: Normal appearance. He is not toxic-appearing or diaphoretic.   HENT:      Head: Normocephalic and atraumatic.   Cardiovascular:      Rate and Rhythm: Normal rate and regular rhythm.      Heart sounds: No murmur heard.     No friction rub. No gallop.   Pulmonary:      Effort: Pulmonary effort is normal. No respiratory distress.      Breath sounds: No wheezing or rales.   Abdominal:      General: Abdomen is flat. There is no distension.      Palpations: Abdomen is soft.      Tenderness: There is no abdominal tenderness. There is no guarding or rebound.   Musculoskeletal:      Cervical back: Normal range of motion and neck supple.      Right lower leg: No edema.      Left lower leg: No edema.   Skin:     General: Skin is warm and dry.   Neurological:      General: No focal deficit present.      Mental Status: He is alert and oriented to person, place, and time.                Significant Labs: All pertinent labs within the past 24 hours  have been reviewed.    Significant Imaging: I have reviewed all pertinent imaging results/findings within the past 24 hours.

## 2024-02-02 NOTE — HPI
Cliff Magaña is a 88 y.o. 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 History obtained with assistance of patient wife at bedside. She states that he has developed significant overall weakness over the last few days, and he had a near fall at this last radiation appointment when his legs gave out and he had to be helped down. He did not lose consciousness or hit his head. He was supposed to have a cystoscopy 4 days ago but this was cancelled when he was found to have a UTI. He was started on bactrim which he was taken for the past 4 days. He denies fever/chills, shortness of breath, chest pain, abdominal pain, nausea / vomiting. He reports increased urination which he attributes to trying to stay hydrated because of the chemo he is on. He denies dysuria. He is currently on weekly cisplatin with daily XRT M-F, started 1/9/24, last chemo was 1/31/24. He was instructed to present to ED because of the severe fatigue and difficulty walking.

## 2024-02-02 NOTE — ED PROVIDER NOTES
Chief Complaint   Fatigue (Hx of bladder cancer last chemo wednesday)      History Of Present Illness   Cliff Magaña is a 88 y.o. male presenting with being fatigue over the past couple of days.  The patient had radiation this morning and subsequently saw his oncologist, who referred him to the ED after the patient became too weak to walk unaided.  His last chemotherapy was 2 days ago.  He had a cystoscopy scheduled 4 days ago, but was canceled due to a UTI found on labs on Monday.  He is currently on antibiotics for the UTI.  Denies fever or chills.      Review of patient's allergies indicates:   Allergen Reactions    Niacin      Other reaction(s): Rash  Other reaction(s): Itching       No current facility-administered medications on file prior to encounter.     Current Outpatient Medications on File Prior to Encounter   Medication Sig Dispense Refill    acetaminophen (TYLENOL) 650 MG TbSR Take 650 mg by mouth every 8 (eight) hours.      apixaban (ELIQUIS) 5 mg Tab Take 1 tablet (5 mg total) by mouth 2 (two) times daily. 180 tablet 3    cholecalciferol, vitamin D3, (VITAMIN D3) 50 mcg (2,000 unit) Cap capsule Take by mouth once daily.      dexAMETHasone (DECADRON) 4 MG Tab TAKE EVERY 12 HOURS DAILY FOR DAYS 2 THOUGH 4 FOLLOWING CHEMOTHERAPY 40 tablet 1    empagliflozin (JARDIANCE) 10 mg tablet Take 1 tablet (10 mg total) by mouth once daily. 90 tablet 3    fenofibrate micronized (LOFIBRA) 200 MG Cap Take 200 mg by mouth daily with breakfast.      krill oil/hyaluronic/astaxanth (MEGARED JOINT CARE ORAL)       loratadine (CLARITIN) 10 mg tablet Take 10 mg by mouth once daily.      TRAMAINE BIOTIN ORAL Take 5,000 mcg by mouth once daily.      metFORMIN (GLUCOPHAGE-XR) 500 MG ER 24hr tablet Take 1 tablet (500 mg total) by mouth once daily. 90 tablet 1    metoprolol succinate (TOPROL-XL) 200 MG 24 hr tablet Take 1 tablet (200 mg total) by mouth 2 (two) times daily. 180 tablet 3    OLANZapine (ZYPREXA) 5 MG tablet TAKE  NIGHTLY FOR 4 DAYS FOLLOWING CHEMOTHERAPY 90 tablet 1    ondansetron (ZOFRAN) 8 MG tablet Take 1 tablet (8 mg total) by mouth every 12 (twelve) hours as needed for Nausea. 30 tablet 2    sacubitriL-valsartan (ENTRESTO) 24-26 mg per tablet Take 1 tablet by mouth 2 (two) times daily. 180 tablet 2    sulfamethoxazole-trimethoprim 800-160mg (BACTRIM DS) 800-160 mg Tab Take 1 tablet by mouth 2 (two) times daily. for 7 days 14 tablet 0       Past History   As per HPI and below:  Past Medical History:   Diagnosis Date    *Atrial fibrillation     Chronic kidney disease     Deep vein thrombosis     Hyperlipidemia     Hypertension     Metabolic syndrome     Type 2 diabetes mellitus, without long-term current use of insulin 12/13/2021     Past Surgical History:   Procedure Laterality Date    CATARACT EXTRACTION      CHOLECYSTECTOMY      CYSTOSCOPY N/A 11/16/2023    Procedure: CYSTOSCOPY;  Surgeon: Marcelino Moffett MD;  Location: [facility] OR [address] [place] [address];  Service: Urology;  Laterality: N/A;  90 minutes    EYE SURGERY      INJECTION OF FACET JOINT Bilateral 4/28/2021    Procedure: FACET JOINT INJECTION BILATERAL L4/L5 DIRECT REFERRAL;  Surgeon: Philipp Iyer MD;  Location: [facility];  Service: Pain Management;  Laterality: Bilateral;  NEEDS CONSENT, ELIQUIS CLEARANCE IN CHART    RETROGRADE PYELOGRAPHY Bilateral 11/16/2023    Procedure: PYELOGRAM, RETROGRADE;  Surgeon: Marcelino Moffett MD;  Location: [address];  Service: Urology;  Laterality: Bilateral;  90 minutes    SKIN CANCER EXCISION      TONSILLECTOMY      TURBT (TRANSURETHRAL RESECTION OF BLADDER TUMOR) N/A 11/16/2023    Procedure: TURBT (TRANSURETHRAL RESECTION OF BLADDER TUMOR);  Surgeon: Marcelino Moffett MD;  Location: [address];  Service: Urology;  Laterality: N/A;  90 minutes       Social History     Tobacco Use    Smoking status: Former     Current packs/day: 0.00     Average packs/day: 2.0 packs/day for 20.0 years (40.0 ttl pk-yrs)     Types:  Cigarettes     Start date: 1963     Quit date: 1983     Years since quittin.7     Passive exposure: Never    Smokeless tobacco: Never   Substance Use Topics    Alcohol use: Yes     Alcohol/week: 1.0 standard drink of alcohol     Types: 1 Standard drinks or equivalent per week     Comment: 3 drinks per week    Drug use: No       Family History   Problem Relation Age of Onset    Diabetes Maternal Aunt     Heart attack Neg Hx     Heart disease Neg Hx     Heart failure Neg Hx     Hyperlipidemia Neg Hx     Hypertension Neg Hx     Stroke Neg Hx        Physical Exam     Vitals:    24 1115 24 1230 24 1315 24 1400   BP: 138/63 125/70  (!) 141/64   Pulse: 76 78  73   Resp: 16 16  16   Temp:   98.2 °F (36.8 °C)    TempSrc:   Rectal    SpO2: 97% 96%  100%   Weight:       Height:         Appearance: No acute distress.  Skin: No rashes seen.  Good turgor.  No abrasions.  No ecchymoses.  Eyes: No conjunctival injection.  ENT: Oropharynx clear.    Chest: Clear to auscultation bilaterally.  Good air movement.  No wheezes.  No rhonchi.  Cardiovascular: Regular rate and rhythm.  No murmurs. No gallops. No rubs.  Abdomen: Soft.  Not distended.  Nontender.  No guarding.  No rebound.  Musculoskeletal: Good range of motion all joints.  No deformities.  Neck supple.  No meningismus.  Neurologic: Motor intact.  Sensation intact.  Cerebellar intact.  Cranial nerves intact.  Mental Status:  Alert and oriented x 3.  Appropriate, conversant.      Initial MDM   Generalized weakness, recent chemo, currently on antibiotics for UTI.  No fever or chills.  Stable vitals, relatively benign exam.  Will do extensive workup and reassess.  Differential is vast.      Medications Given     Medications   sodium chloride 0.9% bolus 1,000 mL 1,000 mL (1,000 mLs Intravenous New Bag 24 1304)   cefTRIAXone (Rocephin) 1 g in dextrose 5 % in water (D5W) 100 mL IVPB (MB+) (0 g Intravenous Stopped 24 1426)       Results  and Course     Labs Reviewed   CBC W/ AUTO DIFFERENTIAL - Abnormal; Notable for the following components:       Result Value    MCH 31.8 (*)     RDW 15.5 (*)     Platelets 126 (*)     Immature Granulocytes 1.0 (*)     Gran # (ANC) 10.9 (*)     Immature Grans (Abs) 0.12 (*)     Gran % 87.1 (*)     Lymph % 7.9 (*)     Mono % 3.8 (*)     All other components within normal limits   COMPREHENSIVE METABOLIC PANEL - Abnormal; Notable for the following components:    Potassium 6.1 (*)     Chloride 111 (*)     CO2 18 (*)     Glucose 223 (*)     BUN 46 (*)     Creatinine 1.5 (*)     Albumin 3.4 (*)     eGFR 44.5 (*)     All other components within normal limits   URINALYSIS, REFLEX TO URINE CULTURE - Abnormal; Notable for the following components:    Glucose, UA 4+ (*)     Occult Blood UA 3+ (*)     Leukocytes, UA Trace (*)     All other components within normal limits    Narrative:     Specimen Source->Urine   TROPONIN I - Abnormal; Notable for the following components:    Troponin I 0.205 (*)     All other components within normal limits   URINALYSIS MICROSCOPIC - Abnormal; Notable for the following components:    RBC, UA >100 (*)     WBC, UA >100 (*)     All other components within normal limits    Narrative:     Specimen Source->Urine   ISTAT PROCEDURE - Abnormal; Notable for the following components:    POC Glucose 223 (*)     POC BUN 46 (*)     POC Potassium 5.7 (*)     POC TCO2 (MEASURED) 22 (*)     All other components within normal limits   INFLUENZA A & B BY MOLECULAR   CULTURE, URINE   MAGNESIUM   PHOSPHORUS   SARS-COV-2 RNA AMPLIFICATION, QUAL   ISTAT LACTATE   ISTAT CHEM8       Imaging Results              X-Ray Chest AP Portable (Final result)  Result time 02/02/24 12:31:59      Final result by Deniz Serrano MD (02/02/24 12:31:59)                   Impression:      See above      Electronically signed by: Deniz Serrano MD  Date:    02/02/2024  Time:    12:31               Narrative:    EXAMINATION:  XR CHEST AP  PORTABLE    CLINICAL HISTORY:  Other fatigue    TECHNIQUE:  Single frontal view of the chest was performed.    COMPARISON:  Non 11/22/2023 e    FINDINGS:  Heart size normal.  No significant airspace consolidation or pleural effusion identified                                      ED Course as of 02/02/24 1441   Fri Feb 02, 2024   1137 EKG 12-lead  Afib @ 66 bpm, LBBB, no acute ischemia per my independent interpretation.     [DC]   1246 Influenza A, Molecular: Negative [DC]   1246 Influenza B, Molecular: Negative [DC]   1246 SARS-CoV-2 RNA, Amplification, Qual: Negative [DC]   1246 POC Lactate: 0.94 [DC]   1246 Creatinine(!): 1.5  Baseline 1.2 [DC]   1246 BUN(!): 46 [DC]   1247 Troponin I(!): 0.205  Lowest of the last 6 tests [DC]   1248 Phosphorus Level: 3.7 [DC]   1248 Magnesium : 2.2 [DC]   1248 X-Ray Chest AP Portable  CXR shows no acute disease per my independent interpretation.         [DC]   1335 WBC, UA(!): >100 [DC]   1335 RBC, UA(!): >100 [DC]      ED Course User Index  [DC] Craig Billingsley MD               MDM, Impression and Plan   88 y.o. male with fatigue, UTI that is failing outpatient treatment.  Urothelial cancer with chemo 2 days ago.  IV antibiotics given.  Discussed with oncology and hospital medicine.  Hospital medicine to admit,         Final diagnoses:  [R53.83] Fatigue  [N39.0, R31.9] Urinary tract infection with hematuria, site unspecified (Primary)        ED Disposition Condition    Admit Stable                  Craig Billingsley MD  02/02/24 1441

## 2024-02-02 NOTE — ASSESSMENT & PLAN NOTE
Patient with Permanent atrial fibrillation which is controlled currently with Beta Blocker. Patient is currently in atrial fibrillation.[id number] Score: 3. Anticoagulation indicated. Anticoagulation done with eliquis .

## 2024-02-03 LAB
ALBUMIN SERPL BCP-MCNC: 2.9 G/DL (ref 3.5–5.2)
ALP SERPL-CCNC: 49 U/L (ref 55–135)
ALT SERPL W/O P-5'-P-CCNC: 30 U/L (ref 10–44)
ANION GAP SERPL CALC-SCNC: 7 MMOL/L (ref 8–16)
AST SERPL-CCNC: 11 U/L (ref 10–40)
BACTERIA UR CULT: NORMAL
BASOPHILS # BLD AUTO: 0.03 K/UL (ref 0–0.2)
BASOPHILS NFR BLD: 0.3 % (ref 0–1.9)
BILIRUB SERPL-MCNC: 0.6 MG/DL (ref 0.1–1)
BUN SERPL-MCNC: 47 MG/DL (ref 8–23)
CALCIUM SERPL-MCNC: 9 MG/DL (ref 8.7–10.5)
CHLORIDE SERPL-SCNC: 112 MMOL/L (ref 95–110)
CO2 SERPL-SCNC: 16 MMOL/L (ref 23–29)
CREAT SERPL-MCNC: 1.3 MG/DL (ref 0.5–1.4)
DIFFERENTIAL METHOD BLD: ABNORMAL
EOSINOPHIL # BLD AUTO: 0 K/UL (ref 0–0.5)
EOSINOPHIL NFR BLD: 0 % (ref 0–8)
ERYTHROCYTE [DISTWIDTH] IN BLOOD BY AUTOMATED COUNT: 15.5 % (ref 11.5–14.5)
EST. GFR  (NO RACE VARIABLE): 52.8 ML/MIN/1.73 M^2
GLUCOSE SERPL-MCNC: 246 MG/DL (ref 70–110)
HCT VFR BLD AUTO: 44.7 % (ref 40–54)
HGB BLD-MCNC: 15.1 G/DL (ref 14–18)
IMM GRANULOCYTES # BLD AUTO: 0.06 K/UL (ref 0–0.04)
IMM GRANULOCYTES NFR BLD AUTO: 0.6 % (ref 0–0.5)
LYMPHOCYTES # BLD AUTO: 0.8 K/UL (ref 1–4.8)
LYMPHOCYTES NFR BLD: 8.1 % (ref 18–48)
MCH RBC QN AUTO: 31.7 PG (ref 27–31)
MCHC RBC AUTO-ENTMCNC: 33.8 G/DL (ref 32–36)
MCV RBC AUTO: 94 FL (ref 82–98)
MONOCYTES # BLD AUTO: 0.5 K/UL (ref 0.3–1)
MONOCYTES NFR BLD: 5.4 % (ref 4–15)
NEUTROPHILS # BLD AUTO: 8.5 K/UL (ref 1.8–7.7)
NEUTROPHILS NFR BLD: 85.6 % (ref 38–73)
NRBC BLD-RTO: 0 /100 WBC
PLATELET # BLD AUTO: 107 K/UL (ref 150–450)
PMV BLD AUTO: 12.1 FL (ref 9.2–12.9)
POCT GLUCOSE: 284 MG/DL (ref 70–110)
POTASSIUM SERPL-SCNC: 5.2 MMOL/L (ref 3.5–5.1)
PROT SERPL-MCNC: 5.4 G/DL (ref 6–8.4)
RBC # BLD AUTO: 4.77 M/UL (ref 4.6–6.2)
SODIUM SERPL-SCNC: 135 MMOL/L (ref 136–145)
WBC # BLD AUTO: 9.94 K/UL (ref 3.9–12.7)

## 2024-02-03 PROCEDURE — 97165 OT EVAL LOW COMPLEX 30 MIN: CPT

## 2024-02-03 PROCEDURE — 97116 GAIT TRAINING THERAPY: CPT

## 2024-02-03 PROCEDURE — 36415 COLL VENOUS BLD VENIPUNCTURE: CPT | Performed by: STUDENT IN AN ORGANIZED HEALTH CARE EDUCATION/TRAINING PROGRAM

## 2024-02-03 PROCEDURE — 97161 PT EVAL LOW COMPLEX 20 MIN: CPT

## 2024-02-03 PROCEDURE — 63600175 PHARM REV CODE 636 W HCPCS: Performed by: STUDENT IN AN ORGANIZED HEALTH CARE EDUCATION/TRAINING PROGRAM

## 2024-02-03 PROCEDURE — 25000003 PHARM REV CODE 250: Performed by: INTERNAL MEDICINE

## 2024-02-03 PROCEDURE — 85025 COMPLETE CBC W/AUTO DIFF WBC: CPT | Performed by: STUDENT IN AN ORGANIZED HEALTH CARE EDUCATION/TRAINING PROGRAM

## 2024-02-03 PROCEDURE — 97530 THERAPEUTIC ACTIVITIES: CPT

## 2024-02-03 PROCEDURE — 25000003 PHARM REV CODE 250: Performed by: STUDENT IN AN ORGANIZED HEALTH CARE EDUCATION/TRAINING PROGRAM

## 2024-02-03 PROCEDURE — 80053 COMPREHEN METABOLIC PANEL: CPT | Performed by: STUDENT IN AN ORGANIZED HEALTH CARE EDUCATION/TRAINING PROGRAM

## 2024-02-03 PROCEDURE — 20600001 HC STEP DOWN PRIVATE ROOM

## 2024-02-03 RX ADMIN — APIXABAN 2.5 MG: 2.5 TABLET, FILM COATED ORAL at 09:02

## 2024-02-03 RX ADMIN — METOPROLOL SUCCINATE 200 MG: 100 TABLET, EXTENDED RELEASE ORAL at 09:02

## 2024-02-03 RX ADMIN — DEXAMETHASONE 4 MG: 4 TABLET ORAL at 09:02

## 2024-02-03 RX ADMIN — APIXABAN 2.5 MG: 2.5 TABLET, FILM COATED ORAL at 10:02

## 2024-02-03 RX ADMIN — OLANZAPINE 5 MG: 2.5 TABLET, FILM COATED ORAL at 10:02

## 2024-02-03 RX ADMIN — SODIUM POLYSTYRENE SULFONATE 15 G: 15 SUSPENSION ORAL; RECTAL at 11:02

## 2024-02-03 RX ADMIN — DEXAMETHASONE 4 MG: 4 TABLET ORAL at 10:02

## 2024-02-03 RX ADMIN — CEFTRIAXONE SODIUM 1 G: 1 INJECTION, POWDER, FOR SOLUTION INTRAMUSCULAR; INTRAVENOUS at 02:02

## 2024-02-03 RX ADMIN — METOPROLOL SUCCINATE 200 MG: 100 TABLET, EXTENDED RELEASE ORAL at 10:02

## 2024-02-03 NOTE — ASSESSMENT & PLAN NOTE
Hyperkalemia  Patient with acute kidney injury/acute renal failure likely due to pre-renal azotemia due to dehydration JER is currently worsening. - Labs reviewed- Renal function/electrolytes with Estimated Creatinine Clearance: 32.1 mL/min (based on SCr of 1.3 mg/dL). according to latest data. Monitor urine output and serial BMP and adjust therapy as needed. Avoid nephrotoxins and renally dose meds for GFR listed above.    Hold bactrim  S/p IVF. Trend  Check urine studies    2/3/24  Improved

## 2024-02-03 NOTE — PLAN OF CARE
Problem: Adult Inpatient Plan of Care  Goal: Plan of Care Review  Outcome: Ongoing, Progressing  Goal: Patient-Specific Goal (Individualized)  Outcome: Ongoing, Progressing  Goal: Absence of Hospital-Acquired Illness or Injury  Outcome: Ongoing, Progressing  Goal: Optimal Comfort and Wellbeing  Outcome: Ongoing, Progressing  Goal: Readiness for Transition of Care  Outcome: Ongoing, Progressing     Problem: Impaired Wound Healing  Goal: Optimal Wound Healing  Outcome: Ongoing, Progressing     Problem: Fluid and Electrolyte Imbalance (Acute Kidney Injury/Impairment)  Goal: Fluid and Electrolyte Balance  Outcome: Ongoing, Progressing     Problem: Oral Intake Inadequate (Acute Kidney Injury/Impairment)  Goal: Optimal Nutrition Intake  Outcome: Ongoing, Progressing     Problem: Renal Function Impairment (Acute Kidney Injury/Impairment)  Goal: Effective Renal Function  Outcome: Ongoing, Progressing     Problem: Diabetes Comorbidity  Goal: Blood Glucose Level Within Targeted Range  Outcome: Ongoing, Progressing

## 2024-02-03 NOTE — ASSESSMENT & PLAN NOTE
88 y.o. 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   - on weekly cisplatin with daily XRT M-F, started 1/9/24, last chemo was 1/31/24  - urine culture from 1/29 with sens E coli and he was started on bactrim  - f/u urine culture this admission  - will give rocephin. Hold bactrim for JER and hyperkalemia    2/3/24  Clinically improving.  Urine cultures remain no growth.  Continue IV ceftriaxone, we will do at least 3 days of IV antibiotics.  Aim for possible discharge tomorrow.

## 2024-02-03 NOTE — PLAN OF CARE
PT Eval complete, appropriate goals created    Problem: Physical Therapy  Goal: Physical Therapy Goal  Description: Goals to be completed by: 3/3/24    Pt will be able to stand up from EOB w/ independence using no AD  Pt will ambulate 350 feet w/ independence using LRAD  Pt will be independent w/ HEP therex on BLE w/ good form and ROM   Outcome: Ongoing, Progressing

## 2024-02-03 NOTE — SUBJECTIVE & OBJECTIVE
Interval History:  Patient feels better today.  Denies any complaints at this time.  Urine cultures remain no growth.  Creatinine within normal limits.  Potassium of 5.2 this morning, Kayexalate ordered.    Review of Systems   All other systems reviewed and are negative.    Objective:     Vital Signs (Most Recent):  Temp: 96.7 °F (35.9 °C) (02/03/24 0747)  Pulse: 69 (02/03/24 0747)  Resp: 20 (02/03/24 0747)  BP: 121/68 (02/03/24 0747)  SpO2: 97 % (02/03/24 0747) Vital Signs (24h Range):  Temp:  [96.7 °F (35.9 °C)-98.2 °F (36.8 °C)] 96.7 °F (35.9 °C)  Pulse:  [69-86] 69  Resp:  [16-20] 20  SpO2:  [96 %-100 %] 97 %  BP: (107-141)/(55-71) 121/68     Weight: 57.7 kg (127 lb 3.3 oz)  Body mass index is 21.83 kg/m².    Intake/Output Summary (Last 24 hours) at 2/3/2024 0824  Last data filed at 2/3/2024 0012  Gross per 24 hour   Intake 1178.33 ml   Output 100 ml   Net 1078.33 ml         Physical Exam  Constitutional:       General: He is not in acute distress.  HENT:      Head: Normocephalic.      Right Ear: External ear normal.      Left Ear: External ear normal.      Nose: Nose normal.   Eyes:      General: No scleral icterus.  Cardiovascular:      Rate and Rhythm: Normal rate.      Heart sounds: Normal heart sounds.   Pulmonary:      Breath sounds: Normal breath sounds.   Abdominal:      Palpations: Abdomen is soft.      Tenderness: There is no abdominal tenderness.   Genitourinary:     Penis: Normal.    Musculoskeletal:      Right lower leg: No edema.      Left lower leg: No edema.   Skin:     General: Skin is warm.   Neurological:      General: No focal deficit present.      Mental Status: He is alert and oriented to person, place, and time.   Psychiatric:         Mood and Affect: Mood normal.             Significant Labs: All pertinent labs within the past 24 hours have been reviewed.

## 2024-02-03 NOTE — ASSESSMENT & PLAN NOTE
Potassium of 5.2 this morning.  We will do 1 time dose of oral Kayexalate.  A.m. BMP.  Bactrim was d/c

## 2024-02-03 NOTE — PT/OT/SLP EVAL
Occupational Therapy   Co-Evaluation  Co-treatment performed due to patient's multiple deficits requiring two skilled therapists to appropriately and safely assess patient's strength and endurance while facilitating functional tasks in addition to accommodating for patient's activity tolerance.      Name: Cliff Magaña  MRN: [id number]  Admitting Diagnosis: UTI (urinary tract infection)  Recent Surgery: * No surgery found *      Recommendations:     Discharge Recommendations: Low Intensity Therapy  Discharge Equipment Recommendations:  walker, rolling  Barriers to discharge:  None    Assessment:     Cliff Magaña is a 88 y.o. male with a medical diagnosis of UTI (urinary tract infection).  He presents with the following performance deficits affecting function: weakness, impaired endurance, impaired self care skills, impaired functional mobility, gait instability, impaired balance, decreased lower extremity function.  Pt agreeable to therapy and tolerated well. Pt is limited in ADLs, functional mobility, and functional transfers and is currently not performing tasks at OF. Pt would continue to benefit from skilled OT services to maximize functional independence with ADLs and functional mobility, reduce caregiver burden, and facilitate safe discharge in the least restrictive environment.  Patient continues to demonstrate the need for low intensity therapy on a scheduled basis exhibited by decreased independence with self-care and functional mobility       Patient demonstrates a mobility limitation that significantly impairs their ability to participate in one or more mobility related activities of daily living. Patient's mobility limitation cannot be sufficiently resolved with the use of a cane, but can be sufficiently resolved with the use of a rolling walker.The use of a rolling walker will considerably improve their ability to participate in MRADLs. Patient will use the walker on a regular basis at home.       Rehab Prognosis: Good; patient would benefit from acute skilled OT services to address these deficits and reach maximum level of function.       Plan:     Patient to be seen 3 x/week to address the above listed problems via self-care/home management, therapeutic activities, therapeutic exercises  Plan of Care Expires: 03/04/24  Plan of Care Reviewed with: patient, spouse    Subjective     Chief Complaint: weakness  Patient/Family Comments/goals: to return to [facility]    Occupational Profile:  Living Environment: Pt lives with his spouse in a [facility] with no steps. W-I-S with bath bench and grab bar  Previous level of function: Independent with ADLs and functional mobility  Roles and Routines: pt enjoys doing puzzles and spending time with family  Equipment Used at Home: bath bench, grab bar  Assistance upon Discharge: wife    Pain/Comfort:  Pain Rating 1: 0/10  Pain Rating Post-Intervention 1: 0/10    Patients cultural, spiritual, Scientology conflicts given the current situation: no    Objective:     Communicated with: RN prior to session.  Patient found HOB elevated with  (no lines attached) upon OT entry to room.    General Precautions: Standard, fall  Orthopedic Precautions: N/A  Braces: N/A  Respiratory Status: Room air    Occupational Performance:    Bed Mobility:    Patient completed Supine to Sit with supervision    Functional Mobility/Transfers:  Patient completed Sit <> Stand Transfer with contact guard assistance  with  no assistive device   Functional Mobility: Pt engaged in functional mobility to simulate household/community distances 4 ft with Min (A) and no AD then 200 ft with SBA and RW in order to maximize functional endurance and standing balance required for engagement in occupations of choice     Activities of Daily Living:  Upper Body Dressing: stand by assistance to don hospital go EOB    Cognitive/Visual Perceptual:  Cognitive/Psychosocial Skills:     -       Oriented to: Person, Place, Time, and  Situation   -       Follows Commands/attention:Follows one-step commands  -       Safety awareness/insight to disability: impaired   -       Mood/Affect/Coping skills/emotional control: Cooperative and Pleasant    Physical Exam:  Upper Extremity Range of Motion:     -       Right Upper Extremity: WFL  -       Left Upper Extremity: WFL  Upper Extremity Strength:    -       Right Upper Extremity: WFL  -       Left Upper Extremity: WFL    AMPAC 6 Click ADL:  AMPAC Total Score: 19    Treatment & Education:  -Education on energy conservation and task modification to maximize safety and (I) during ADLs and mobility  -Education on importance of OOB activity to improve overall activity tolerance and promote recovery  -Pt educated to call for assistance and to transfer with hospital staff only  -Provided education regarding role of OT, POC, & discharge recommendations with pt and spouse verbalizing understanding.  Pt had no further questions & when asked whether there were any concerns pt reported none.     Patient left up in chair with all lines intact, call button in reach, RN notified, and wife present    GOALS:   Multidisciplinary Problems       Occupational Therapy Goals          Problem: Occupational Therapy    Goal Priority Disciplines Outcome Interventions   Occupational Therapy Goal     OT, PT/OT Ongoing, Progressing    Description: Goals to be met by: 3/3/24     Patient will increase functional independence with ADLs by performing:    UE Dressing with Modified [place].  LE Dressing with Modified [place].  Grooming while standing at sink with Modified [place].  Toileting from toilet/bedside commode with Modified [place] for hygiene and clothing management.   Toilet transfer to toilet/bedside commode with Modified [place].                         History:     Past Medical History:   Diagnosis Date    *Atrial fibrillation     Chronic kidney disease     Deep vein thrombosis     Hyperlipidemia      Hypertension     Metabolic syndrome     Type 2 diabetes mellitus, without long-term current use of insulin 12/13/2021         Past Surgical History:   Procedure Laterality Date    CATARACT EXTRACTION      CHOLECYSTECTOMY      CYSTOSCOPY N/A 11/16/2023    Procedure: CYSTOSCOPY;  Surgeon: Marcelino Moffett MD;  Location: [address];  Service: Urology;  Laterality: N/A;  90 minutes    EYE SURGERY      INJECTION OF FACET JOINT Bilateral 4/28/2021    Procedure: FACET JOINT INJECTION BILATERAL L4/L5 DIRECT REFERRAL;  Surgeon: Philipp Iyer MD;  Location: [facility];  Service: Pain Management;  Laterality: Bilateral;  NEEDS CONSENT, ELIQUIS CLEARANCE IN CHART    RETROGRADE PYELOGRAPHY Bilateral 11/16/2023    Procedure: PYELOGRAM, RETROGRADE;  Surgeon: Marcelino Moffett MD;  Location: [address];  Service: Urology;  Laterality: Bilateral;  90 minutes    SKIN CANCER EXCISION      TONSILLECTOMY      TURBT (TRANSURETHRAL RESECTION OF BLADDER TUMOR) N/A 11/16/2023    Procedure: TURBT (TRANSURETHRAL RESECTION OF BLADDER TUMOR);  Surgeon: Marcelino Moffett MD;  Location: [address];  Service: Urology;  Laterality: N/A;  90 minutes       Time Tracking:     OT Date of Treatment: 02/03/24  OT Start Time: 1024  OT Stop Time: 1040  OT Total Time (min): 16 min    Billable Minutes:Evaluation 8  Therapeutic Activity 8    2/3/2024

## 2024-02-03 NOTE — PT/OT/SLP EVAL
Physical Therapy Co-Evaluation and Treatment    Patient Name: Cliff Magaña   MRN: [id number]  Recent Surgery: * No surgery found *      Recommendations:     Discharge Recommendations: Low Intensity Therapy  Discharge Equipment Recommendations: walker, rolling   Barriers to discharge: None    Justification for Walker HME   The mobility limitation cannot be sufficiently resolved by the use of a cane.   Patient's functional mobility deficit can be sufficiently resolved with the use of a walker.  Patient's mobility limitation significantly impairs their ability to participate in one of more activities of daily living.  The use of a walker will significantly improve the patients ability to participate in MRADLS and the patient will use it on regular basis in the home.     Assessment:     Cliff Magaña is a 88 y.o. male admitted with a medical diagnosis of UTI (urinary tract infection). He presents with the following impairments/functional limitations: weakness, impaired endurance, impaired self care skills, impaired functional mobility, gait instability, impaired balance, decreased lower extremity function, decreased safety awareness, impaired cardiopulmonary response to activity. Pt w/ mild balance deficits and decreased strength leading to increased assistance required for ambulation. His gait improves w/ use of RW. Pt is currently mobilizing well enough to safely return home w/ family upon d/c but will benefit from continued treatment here and upon d/c to address the above listed impairments in order to progress back to PLOF.     Rehab Prognosis: Good; patient would benefit from acute PT services to address these deficits and reach maximum level of function.    Plan:     During this hospitalization, patient to be seen 3 x/week to address the above listed problems via gait training, therapeutic activities, therapeutic exercises, neuromuscular re-education    Plan of Care Expires: 03/03/24    Subjective     Chief  Complaint: weakness  Patient/Family Comments/Goals: pt reports he is feeling better; wants to get back home independently  Pain/Comfort:  Pain Rating 1: 0/10  Pain Rating Post-Intervention 1: 0/10    Social History:  Living Environment: Patient lives with their spouse in a single story house with number of outside stair(s): 0 and walk-in shower.  Prior Level of Function: Prior to admission, patient was independent.  Equipment Used at Home: grab bars, bath bench  DME owned (not currently used): none  Assistance Upon Discharge: family    Objective:     Communicated with RN prior to session. Patient found HOB elevated with telemetry upon PT entry to room. Co-evaluation w/ OT 2/2 suspected pt complexity and requirement of assistance from 2 skilled therapists to maximize treatment potential and maintain pt safety     General Precautions: Standard, fall   Orthopedic Precautions:N/A   Braces: N/A   Respiratory Status: Room air    Exams:  Cognition: Patient is oriented to Person, Place, Time, Situation  RLE ROM: WFL  RLE Strength: WFL  LLE ROM: WFL  LLE Strength: WFL  Sensation:    -       Intact    Functional Mobility:  Bed Mobility:   Supine to Sit: supervision  Transfers:   Sit to Stand: contact guard assistance with no AD  Bed to Chair: stand by assistance with rolling walker with cues for hand placement using Step Transfer  Gait:   Patient ambulated 4' with no AD and minimum assistance.   Patient ambulated 200' with rolling walker and stand by assistance.   Patient demonstrates occasional unsteady gait, decreased step length, decreased weight shift, decreased foot clearance, ambulates outside JEN of RW, flexed posture, decreased memo, and shuffle gait. Pt's ambulation improved w/ use of RW. All lines remained intact throughout ambulation trial.  Balance:   Sitting: independence  Standing: stand by assistance    Therapeutic Activities & Exercises:   Patient educated on role of acute care PT and PT POC, safety while  in hospital including calling nurse for mobility, and call light usage  Patient educated about importance of OOB mobility and remaining up in chair most of the day  Gait training w/ verbal and visual cueing for proper use of AD, step length, step height, postural control, and proximity to AD     AM-PAC 6 CLICK MOBILITY  Total Score:20     Patient left up in chair with all lines intact, call button in reach, RN notified, and spouse present.    GOALS:   Multidisciplinary Problems       Physical Therapy Goals          Problem: Physical Therapy    Goal Priority Disciplines Outcome Goal Variances Interventions   Physical Therapy Goal     PT, PT/OT Ongoing, Progressing     Description: Goals to be completed by: 3/3/24    Pt will be able to stand up from EOB w/ independence using no AD  Pt will ambulate 350 feet w/ independence using LRAD  Pt will be independent w/ HEP therex on BLE w/ good form and ROM                        History:     Past Medical History:   Diagnosis Date    *Atrial fibrillation     Chronic kidney disease     Deep vein thrombosis     Hyperlipidemia     Hypertension     Metabolic syndrome     Type 2 diabetes mellitus, without long-term current use of insulin 12/13/2021       Past Surgical History:   Procedure Laterality Date    CATARACT EXTRACTION      CHOLECYSTECTOMY      CYSTOSCOPY N/A 11/16/2023    Procedure: CYSTOSCOPY;  Surgeon: Marcelino Moffett MD;  Location: [address];  Service: Urology;  Laterality: N/A;  90 minutes    EYE SURGERY      INJECTION OF FACET JOINT Bilateral 4/28/2021    Procedure: FACET JOINT INJECTION BILATERAL L4/L5 DIRECT REFERRAL;  Surgeon: Philipp Iyer MD;  Location: [facility];  Service: Pain Management;  Laterality: Bilateral;  NEEDS CONSENT, ELIQUIS CLEARANCE IN CHART    RETROGRADE PYELOGRAPHY Bilateral 11/16/2023    Procedure: PYELOGRAM, RETROGRADE;  Surgeon: Marcelino Moffett MD;  Location: [facility] OR [address] [place] [address];  Service: Urology;  Laterality: Bilateral;  90  minutes    SKIN CANCER EXCISION      TONSILLECTOMY      TURBT (TRANSURETHRAL RESECTION OF BLADDER TUMOR) N/A 11/16/2023    Procedure: TURBT (TRANSURETHRAL RESECTION OF BLADDER TUMOR);  Surgeon: Marcelino Moffett MD;  Location: [facility] OR [address] [place] [address];  Service: Urology;  Laterality: N/A;  90 minutes       Time Tracking:     PT Received On: 02/03/24  PT Start Time: 1023  PT Stop Time: 1039  PT Total Time (min): 16 min     Billable Minutes: Evaluation 8 and Gait Training 8    2/3/2024

## 2024-02-03 NOTE — PROGRESS NOTES
[facility] Cardiology [facility] Medicine  Progress Note    Patient Name: Cliff Magaña  MRN: [id number]  Patient Class: IP- Inpatient   Admission Date: 2/2/2024  Length of Stay: 1 days  Attending Physician: Craig Solis MD  Primary Care Provider: Munir Estrada MD        Subjective:     Principal Problem:UTI (urinary tract infection)        HPI:  Cliff Magaña is a 88 y.o. 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 History obtained with assistance of patient wife at bedside. She states that he has developed significant overall weakness over the last few days, and he had a near fall at this last radiation appointment when his legs gave out and he had to be helped down. He did not lose consciousness or hit his head. He was supposed to have a cystoscopy 4 days ago but this was cancelled when he was found to have a UTI. He was started on bactrim which he was taken for the past 4 days. He denies fever/chills, shortness of breath, chest pain, abdominal pain, nausea / vomiting. He reports increased urination which he attributes to trying to stay hydrated because of the chemo he is on. He denies dysuria. He is currently on weekly cisplatin with daily XRT M-F, started 1/9/24, last chemo was 1/31/24. He was instructed to present to ED because of the severe fatigue and difficulty walking.     Overview/Hospital Course:  No notes on file    Interval History:  Patient feels better today.  Denies any complaints at this time.  Urine cultures remain no growth.  Creatinine within normal limits.  Potassium of 5.2 this morning, Kayexalate ordered.    Review of Systems   All other systems reviewed and are negative.    Objective:     Vital Signs (Most Recent):  Temp: 96.7 °F (35.9 °C) (02/03/24 0747)  Pulse: 69 (02/03/24 0747)  Resp: 20  (02/03/24 0747)  BP: 121/68 (02/03/24 0747)  SpO2: 97 % (02/03/24 0747) Vital Signs (24h Range):  Temp:  [96.7 °F (35.9 °C)-98.2 °F (36.8 °C)] 96.7 °F (35.9 °C)  Pulse:  [69-86] 69  Resp:  [16-20] 20  SpO2:  [96 %-100 %] 97 %  BP: (107-141)/(55-71) 121/68     Weight: 57.7 kg (127 lb 3.3 oz)  Body mass index is 21.83 kg/m².    Intake/Output Summary (Last 24 hours) at 2/3/2024 0824  Last data filed at 2/3/2024 0012  Gross per 24 hour   Intake 1178.33 ml   Output 100 ml   Net 1078.33 ml         Physical Exam  Constitutional:       General: He is not in acute distress.  HENT:      Head: Normocephalic.      Right Ear: External ear normal.      Left Ear: External ear normal.      Nose: Nose normal.   Eyes:      General: No scleral icterus.  Cardiovascular:      Rate and Rhythm: Normal rate.      Heart sounds: Normal heart sounds.   Pulmonary:      Breath sounds: Normal breath sounds.   Abdominal:      Palpations: Abdomen is soft.      Tenderness: There is no abdominal tenderness.   Genitourinary:     Penis: Normal.    Musculoskeletal:      Right lower leg: No edema.      Left lower leg: No edema.   Skin:     General: Skin is warm.   Neurological:      General: No focal deficit present.      Mental Status: He is alert and oriented to person, place, and time.   Psychiatric:         Mood and Affect: Mood normal.             Significant Labs: All pertinent labs within the past 24 hours have been reviewed.        Assessment/Plan:      * UTI (urinary tract infection)  88 y.o. 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   - on weekly cisplatin with daily XRT M-F, started 1/9/24, last chemo was 1/31/24  - urine culture from 1/29 with sens E coli and he was started on bactrim  - f/u urine culture this admission  - will give rocephin. Hold bactrim for JER and  "hyperkalemia    2/3/24  Clinically improving.  Urine cultures remain no growth.  Continue IV ceftriaxone, we will do at least 3 days of IV antibiotics.  Aim for possible discharge tomorrow.    Hyperkalemia  Potassium of 5.2 this morning.  We will do 1 time dose of oral Kayexalate.  A.m. BMP.  Bactrim was d/c        JER (acute kidney injury)  Hyperkalemia  Patient with acute kidney injury/acute renal failure likely due to pre-renal azotemia due to dehydration JER is currently worsening. - Labs reviewed- Renal function/electrolytes with Estimated Creatinine Clearance: 32.1 mL/min (based on SCr of 1.3 mg/dL). according to latest data. Monitor urine output and serial BMP and adjust therapy as needed. Avoid nephrotoxins and renally dose meds for GFR listed above.    Hold bactrim  S/p IVF. Trend  Check urine studies    2/3/24  Improved    Elevated troponin  No chest pain  Suspect type II demand in setting of JER and infection  Trend troponin      DM type 2, controlled, with complication  Patient's FSGs are controlled on current medication regimen.  Last A1c reviewed-   Lab Results   Component Value Date    HGBA1C 6.9 (H) 12/22/2023     Most recent fingerstick glucose reviewed- No results for input(s): "POCTGLUCOSE" in the last 24 hours.  Current correctional scale  Low  Maintain anti-hyperglycemic dose as follows-   Antihyperglycemics (From admission, onward)      Start     Stop Route Frequency Ordered    02/02/24 1637  insulin aspart U-100 pen 0-5 Units         -- SubQ Before meals & nightly PRN 02/02/24 1538          Hold Oral hypoglycemics while patient is in the hospital.    Risk for falls  PT/OT consulted      Persistent atrial fibrillation  Patient with Permanent atrial fibrillation which is controlled currently with Beta Blocker. Patient is currently in atrial fibrillation.[id number] Score: 3. Anticoagulation indicated. Anticoagulation done with eliquis .    Hyperlipidemia associated with type 2 diabetes " mellitus  Hold fenofibrate for JER      Hypertension associated with diabetes  Continue metoprolol  Hold entresto for JER        VTE Risk Mitigation (From admission, onward)           Ordered     apixaban tablet 2.5 mg  2 times daily         02/02/24 1538     IP VTE HIGH RISK PATIENT  Once         02/02/24 1538     Place sequential compression device  Until discontinued         02/02/24 1538                    Discharge Planning   HAILEY:      Code Status: Full Code   Is the patient medically ready for discharge?:     Reason for patient still in hospital (select all that apply): Patient trending condition                     Craig Solis MD  Department of Hospital Medicine   [facility] - Cardiology Stepdown

## 2024-02-03 NOTE — PLAN OF CARE
OT eval complete. OT POC and goals established.   Problem: Occupational Therapy  Goal: Occupational Therapy Goal  Description: Goals to be met by: 3/3/24     Patient will increase functional independence with ADLs by performing:    UE Dressing with Modified [place].  LE Dressing with Modified [place].  Grooming while standing at sink with Modified [place].  Toileting from toilet/bedside commode with Modified [place] for hygiene and clothing management.   Toilet transfer to toilet/bedside commode with Modified [place].    Outcome: Ongoing, Progressing

## 2024-02-04 ENCOUNTER — PATIENT MESSAGE (OUTPATIENT)
Dept: INTERNAL MEDICINE | Facility: CLINIC | Age: 89
End: 2024-02-04
Payer: MEDICARE

## 2024-02-04 ENCOUNTER — PATIENT MESSAGE (OUTPATIENT)
Dept: RADIATION ONCOLOGY | Facility: CLINIC | Age: 89
End: 2024-02-04
Payer: MEDICARE

## 2024-02-04 VITALS
RESPIRATION RATE: 18 BRPM | SYSTOLIC BLOOD PRESSURE: 117 MMHG | TEMPERATURE: 98 F | DIASTOLIC BLOOD PRESSURE: 76 MMHG | HEART RATE: 86 BPM | WEIGHT: 127.19 LBS | BODY MASS INDEX: 21.71 KG/M2 | HEIGHT: 64 IN | OXYGEN SATURATION: 97 %

## 2024-02-04 VITALS
DIASTOLIC BLOOD PRESSURE: 68 MMHG | RESPIRATION RATE: 18 BRPM | OXYGEN SATURATION: 100 % | TEMPERATURE: 97 F | SYSTOLIC BLOOD PRESSURE: 140 MMHG | HEART RATE: 94 BPM

## 2024-02-04 DIAGNOSIS — Z91.81 RISK FOR FALLS: Primary | ICD-10-CM

## 2024-02-04 DIAGNOSIS — M54.40 LOW BACK PAIN WITH SCIATICA, SCIATICA LATERALITY UNSPECIFIED, UNSPECIFIED BACK PAIN LATERALITY, UNSPECIFIED CHRONICITY: ICD-10-CM

## 2024-02-04 DIAGNOSIS — R52 PAIN AGGRAVATED BY WALKING: ICD-10-CM

## 2024-02-04 PROBLEM — E87.5 HYPERKALEMIA: Status: RESOLVED | Noted: 2024-02-02 | Resolved: 2024-02-04

## 2024-02-04 LAB
ALBUMIN SERPL BCP-MCNC: 2.9 G/DL (ref 3.5–5.2)
ALP SERPL-CCNC: 56 U/L (ref 55–135)
ALT SERPL W/O P-5'-P-CCNC: 29 U/L (ref 10–44)
ANION GAP SERPL CALC-SCNC: 7 MMOL/L (ref 8–16)
AST SERPL-CCNC: 15 U/L (ref 10–40)
BASOPHILS # BLD AUTO: 0.02 K/UL (ref 0–0.2)
BASOPHILS NFR BLD: 0.3 % (ref 0–1.9)
BILIRUB SERPL-MCNC: 0.7 MG/DL (ref 0.1–1)
BUN SERPL-MCNC: 46 MG/DL (ref 8–23)
CALCIUM SERPL-MCNC: 9 MG/DL (ref 8.7–10.5)
CHLORIDE SERPL-SCNC: 110 MMOL/L (ref 95–110)
CO2 SERPL-SCNC: 17 MMOL/L (ref 23–29)
CREAT SERPL-MCNC: 1.1 MG/DL (ref 0.5–1.4)
DIFFERENTIAL METHOD BLD: ABNORMAL
EOSINOPHIL # BLD AUTO: 0 K/UL (ref 0–0.5)
EOSINOPHIL NFR BLD: 0.1 % (ref 0–8)
ERYTHROCYTE [DISTWIDTH] IN BLOOD BY AUTOMATED COUNT: 15.3 % (ref 11.5–14.5)
EST. GFR  (NO RACE VARIABLE): >60 ML/MIN/1.73 M^2
GLUCOSE SERPL-MCNC: 236 MG/DL (ref 70–110)
HCT VFR BLD AUTO: 44.1 % (ref 40–54)
HGB BLD-MCNC: 14.8 G/DL (ref 14–18)
IMM GRANULOCYTES # BLD AUTO: 0.03 K/UL (ref 0–0.04)
IMM GRANULOCYTES NFR BLD AUTO: 0.4 % (ref 0–0.5)
LYMPHOCYTES # BLD AUTO: 0.8 K/UL (ref 1–4.8)
LYMPHOCYTES NFR BLD: 11.4 % (ref 18–48)
MCH RBC QN AUTO: 31.8 PG (ref 27–31)
MCHC RBC AUTO-ENTMCNC: 33.6 G/DL (ref 32–36)
MCV RBC AUTO: 95 FL (ref 82–98)
MONOCYTES # BLD AUTO: 0.4 K/UL (ref 0.3–1)
MONOCYTES NFR BLD: 5.9 % (ref 4–15)
NEUTROPHILS # BLD AUTO: 6 K/UL (ref 1.8–7.7)
NEUTROPHILS NFR BLD: 81.9 % (ref 38–73)
NRBC BLD-RTO: 0 /100 WBC
PLATELET # BLD AUTO: 97 K/UL (ref 150–450)
PMV BLD AUTO: 12.5 FL (ref 9.2–12.9)
POCT GLUCOSE: 209 MG/DL (ref 70–110)
POTASSIUM SERPL-SCNC: 4.6 MMOL/L (ref 3.5–5.1)
PROT SERPL-MCNC: 5.3 G/DL (ref 6–8.4)
RBC # BLD AUTO: 4.65 M/UL (ref 4.6–6.2)
SODIUM SERPL-SCNC: 134 MMOL/L (ref 136–145)
WBC # BLD AUTO: 7.34 K/UL (ref 3.9–12.7)

## 2024-02-04 PROCEDURE — 85025 COMPLETE CBC W/AUTO DIFF WBC: CPT | Performed by: STUDENT IN AN ORGANIZED HEALTH CARE EDUCATION/TRAINING PROGRAM

## 2024-02-04 PROCEDURE — 80053 COMPREHEN METABOLIC PANEL: CPT | Performed by: STUDENT IN AN ORGANIZED HEALTH CARE EDUCATION/TRAINING PROGRAM

## 2024-02-04 PROCEDURE — 25000003 PHARM REV CODE 250: Performed by: STUDENT IN AN ORGANIZED HEALTH CARE EDUCATION/TRAINING PROGRAM

## 2024-02-04 PROCEDURE — 25000003 PHARM REV CODE 250: Performed by: INTERNAL MEDICINE

## 2024-02-04 PROCEDURE — 63600175 PHARM REV CODE 636 W HCPCS: Performed by: INTERNAL MEDICINE

## 2024-02-04 PROCEDURE — 63600175 PHARM REV CODE 636 W HCPCS: Performed by: STUDENT IN AN ORGANIZED HEALTH CARE EDUCATION/TRAINING PROGRAM

## 2024-02-04 PROCEDURE — 36415 COLL VENOUS BLD VENIPUNCTURE: CPT | Performed by: STUDENT IN AN ORGANIZED HEALTH CARE EDUCATION/TRAINING PROGRAM

## 2024-02-04 RX ORDER — CEFDINIR 300 MG/1
300 CAPSULE ORAL 2 TIMES DAILY
Qty: 10 CAPSULE | Refills: 0 | Status: SHIPPED | OUTPATIENT
Start: 2024-02-04 | End: 2024-02-04

## 2024-02-04 RX ORDER — CEFDINIR 300 MG/1
300 CAPSULE ORAL 2 TIMES DAILY
Qty: 10 CAPSULE | Refills: 0 | Status: SHIPPED | OUTPATIENT
Start: 2024-02-04 | End: 2024-02-09

## 2024-02-04 RX ADMIN — APIXABAN 2.5 MG: 2.5 TABLET, FILM COATED ORAL at 08:02

## 2024-02-04 RX ADMIN — METOPROLOL SUCCINATE 200 MG: 100 TABLET, EXTENDED RELEASE ORAL at 08:02

## 2024-02-04 RX ADMIN — INSULIN ASPART 2 UNITS: 100 INJECTION, SOLUTION INTRAVENOUS; SUBCUTANEOUS at 08:02

## 2024-02-04 RX ADMIN — CEFTRIAXONE 1 G: 1 INJECTION, POWDER, FOR SOLUTION INTRAMUSCULAR; INTRAVENOUS at 08:02

## 2024-02-04 RX ADMIN — DEXAMETHASONE 4 MG: 4 TABLET ORAL at 08:02

## 2024-02-04 NOTE — DISCHARGE SUMMARY
[facility] - Cardiology [facility] Medicine  Discharge Summary      Patient Name: Cliff Magaña  MRN: [id number]  Admission Date: 2/2/2024  Hospital Length of Stay: 2 days  Discharge Date and Time:  02/04/2024 7:52 AM  Attending Physician: Craig Solis MD   Discharging Provider: Craig Solis MD  Discharge Provider Team: [facility] MED D  Primary Care Provider: Munir Estrada MD      * No surgery found *      Hospital Course: 88 y.o. 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  He was recently diagnosed with pansensitive E coli and started on oral Bactrim, however had hyperkalemia, JER and urinalysis suggestive of cystitis.  He was admitted, received IV fluids, Bactrim was held, hyperkalemia and JER improved.  Urine cultures were obtained and he was started on IV ceftriaxone.  He received 3 days of IV ceftriaxone and urine cultures were no growth.  His symptoms resolved.  Seen by PT who recommends low intensity therapy.  Home health aide was ordered.  Of note, on night before discharge, he had a benign mechanical fall with no injuries.  He was eager to go home and was discharged.  He has mild thrombocytopenia and has an appointment to see Oncology in 2 days, he was given a lab requests to repeat CBC in 2 days before appointment.  He was discharged on oral cefdinir for 5 more days.       Physical Exam  Constitutional:       General: He is not in acute distress.  HENT:      Head: Normocephalic.      Right Ear: External ear normal.      Left Ear: External ear normal.      Nose: Nose normal.   Eyes:      General: No scleral icterus.  Cardiovascular:      Rate and Rhythm: Normal rate.      Heart sounds: Normal heart sounds.   Pulmonary:      Breath sounds: Normal breath sounds.   Abdominal:       Palpations: Abdomen is soft.      Tenderness: There is no abdominal tenderness.   Genitourinary:     Penis: Normal.    Musculoskeletal:      Right lower leg: No edema.      Left lower leg: No edema.   Skin:     General: Skin is warm.   Neurological:      General: No focal deficit present.      Mental Status: He is alert and oriented to person, place, and time.   Psychiatric:         Mood and Affect: Mood normal.     Consults:     Final Active Diagnoses:    Diagnosis Date Noted POA    PRINCIPAL PROBLEM:  UTI (urinary tract infection) [N39.0] 11/21/2023 Yes    JER (acute kidney injury) [N17.9] 02/02/2024 Unknown    Elevated troponin [R79.89] 11/21/2023 Yes    Bladder cancer [C67.9] 11/16/2023 Yes    DM type 2, controlled, with complication [E11.8] 12/14/2022 Yes    Risk for falls [Z91.81] 12/15/2021 Not Applicable    Persistent atrial fibrillation [I48.19] 06/11/2014 Yes     Chronic    Hypertension associated with diabetes [E11.59, I15.2] 05/22/2013 Yes    Hyperlipidemia associated with type 2 diabetes mellitus [E11.69, E78.5] 05/22/2013 Yes      Problems Resolved During this Admission:    Diagnosis Date Noted Date Resolved POA    Hyperkalemia [E87.5] 02/02/2024 02/04/2024 Unknown      Discharged Condition: stable    Disposition: Home or Self Care    Follow Up:    Patient Instructions:      CBC W/ AUTO DIFFERENTIAL   Standing Status: Future Standing Exp. Date: 04/04/25     Medications:  Reconciled Home Medications:      Medication List        START taking these medications      cefdinir 300 MG capsule  Commonly known as: OMNICEF  Take 1 capsule (300 mg total) by mouth 2 (two) times daily. for 5 days            CONTINUE taking these medications      acetaminophen 650 MG Tbsr  Commonly known as: TYLENOL  Take 650 mg by mouth every 8 (eight) hours.     apixaban 5 mg Tab  Commonly known as: ELIQUIS  Take 1 tablet (5 mg total) by mouth 2 (two) times daily.     cholecalciferol (vitamin D3) 50 mcg (2,000 unit) Cap  capsule  Commonly known as: VITAMIN D3  Take by mouth once daily.     dexAMETHasone 4 MG Tab  Commonly known as: DECADRON  TAKE EVERY 12 HOURS DAILY FOR DAYS 2 THOUGH 4 FOLLOWING CHEMOTHERAPY     empagliflozin 10 mg tablet  Commonly known as: Jardiance  Take 1 tablet (10 mg total) by mouth once daily.     ENTRESTO 24-26 mg per tablet  Generic drug: sacubitriL-valsartan  Take 1 tablet by mouth 2 (two) times daily.     fenofibrate micronized 200 MG Cap  Commonly known as: LOFIBRA  Take 200 mg by mouth daily with breakfast.     loratadine 10 mg tablet  Commonly known as: CLARITIN  Take 10 mg by mouth once daily.     TRAMAINE BIOTIN ORAL  Take 5,000 mcg by mouth once daily.     MEGARED JOINT CARE ORAL     metFORMIN 500 MG ER 24hr tablet  Commonly known as: GLUCOPHAGE-XR  Take 1 tablet (500 mg total) by mouth once daily.     metoprolol succinate 200 MG 24 hr tablet  Commonly known as: TOPROL-XL  Take 1 tablet (200 mg total) by mouth 2 (two) times daily.     OLANZapine 5 MG tablet  Commonly known as: ZyPREXA  TAKE NIGHTLY FOR 4 DAYS FOLLOWING CHEMOTHERAPY     ondansetron 8 MG tablet  Commonly known as: ZOFRAN  Take 1 tablet (8 mg total) by mouth every 12 (twelve) hours as needed for Nausea.            STOP taking these medications      sulfamethoxazole-trimethoprim 800-160mg 800-160 mg Tab  Commonly known as: BACTRIM DS                  Pending Diagnostic Studies:       None          Indwelling Lines/Drains at time of discharge:   Lines/Drains/Airways       None                   Time spent on the discharge of patient: 40 minutes         Craig Solis MD  Department of Hospital Medicine  [facility] - Cardiology Stepdown

## 2024-02-04 NOTE — PROGRESS NOTES
02/04/24 0008   Vital Signs   Temp 97.6 °F (36.4 °C)   Temp Source Oral   Pulse 88   Heart Rate Source Monitor   Resp 19   SpO2 (!) 94 %   Pulse Oximetry Type Intermittent   Device (Oxygen Therapy) room air   /68   MAP (mmHg) 95   BP Location Left arm   BP Method Automatic   Patient Position Lying     Pt is AO4. Pt did not call RN for ambulation assistance- pt had an unwitnessed fall in bathroom. He said he was trying to rush to bathroom with his walker and his timing and urination happened sooner then expected then Incident occurred. He stated he felt himself going to fall and got on one knee and then scooted himself to pull emergency light. Pt assisted back into bed. VSS. pt denies hitting head and states he has no pain.  No visual injuries noted at this time. Charge Nurse rene and MD Kulwinder Howard made aware. Orders for bedside commode. Orders implemented.  Pt reeducated on fall risk and safety precautions. Pt verbalized understanding. Call light near pt reach.

## 2024-02-04 NOTE — PLAN OF CARE
Patient stable. No complaints of fatigue. Patient verbalizes feeling better today. Patient participated in activity with PT/OT. Patient received Rocephin IV. No complaints of pain or shortness of breath. No ectopy noted on the monitor. Patient given one time dose of kayexalate. Patient had a bowel movement 3 times.

## 2024-02-04 NOTE — PLAN OF CARE
Problem: Adult Inpatient Plan of Care  Goal: Plan of Care Review  Outcome: Met  Goal: Patient-Specific Goal (Individualized)  Outcome: Met  Goal: Absence of Hospital-Acquired Illness or Injury  Outcome: Met  Goal: Optimal Comfort and Wellbeing  Outcome: Met  Goal: Readiness for Transition of Care  Outcome: Met     Problem: Impaired Wound Healing  Goal: Optimal Wound Healing  Outcome: Met     Problem: Fluid and Electrolyte Imbalance (Acute Kidney Injury/Impairment)  Goal: Fluid and Electrolyte Balance  Outcome: Met     Problem: Oral Intake Inadequate (Acute Kidney Injury/Impairment)  Goal: Optimal Nutrition Intake  Outcome: Met     Problem: Renal Function Impairment (Acute Kidney Injury/Impairment)  Goal: Effective Renal Function  Outcome: Met     Problem: Diabetes Comorbidity  Goal: Blood Glucose Level Within Targeted Range  Outcome: Met     Problem: Fall Injury Risk  Goal: Absence of Fall and Fall-Related Injury  Outcome: Met     Problem: Skin Injury Risk Increased  Goal: Skin Health and Integrity  Outcome: Met

## 2024-02-04 NOTE — PLAN OF CARE
Problem: Adult Inpatient Plan of Care  Goal: Plan of Care Review  Outcome: Ongoing, Progressing  Goal: Patient-Specific Goal (Individualized)  Outcome: Ongoing, Progressing  Goal: Absence of Hospital-Acquired Illness or Injury  Outcome: Ongoing, Progressing  Goal: Optimal Comfort and Wellbeing  Outcome: Ongoing, Progressing  Goal: Readiness for Transition of Care  Outcome: Ongoing, Progressing     Problem: Impaired Wound Healing  Goal: Optimal Wound Healing  Outcome: Ongoing, Progressing     Problem: Fluid and Electrolyte Imbalance (Acute Kidney Injury/Impairment)  Goal: Fluid and Electrolyte Balance  Outcome: Ongoing, Progressing     Problem: Oral Intake Inadequate (Acute Kidney Injury/Impairment)  Goal: Optimal Nutrition Intake  Outcome: Ongoing, Progressing     Problem: Renal Function Impairment (Acute Kidney Injury/Impairment)  Goal: Effective Renal Function  Outcome: Ongoing, Progressing     Problem: Diabetes Comorbidity  Goal: Blood Glucose Level Within Targeted Range  Outcome: Ongoing, Progressing     Problem: Fall Injury Risk  Goal: Absence of Fall and Fall-Related Injury  Outcome: Ongoing, Progressing     Problem: Skin Injury Risk Increased  Goal: Skin Health and Integrity  Outcome: Ongoing, Progressing

## 2024-02-04 NOTE — PLAN OF CARE
[facility] - Cardiology Stepdown  Discharge Final Note    Primary Care Provider: Munir Estrada MD    Expected Discharge Date: 2/4/2024    Final Discharge Note (most recent)       Final Note - 02/04/24 1138          Final Note    Assessment Type Final Discharge Note (P)      Anticipated Discharge Disposition Home or Self Care (P)      Hospital Resources/Appts/Education Provided Provided patient/caregiver with written discharge plan information;Provided education on problems/symptoms using teachback;Appointments scheduled and added to AVS (P)         Post-Acute Status    Post-Acute Authorization Other (P)      Other Status No Post-Acute Service Needs (P)      Discharge Delays None known at this time (P)                      Important Message from Medicare             SW noting pt's discharge order. After review of pt's medical record, no discharge needs identified.     Dennis Kenney LMSW

## 2024-02-04 NOTE — PLAN OF CARE
[facility] - Cardiology Stepdown      HOME HEALTH ORDERS  FACE TO FACE ENCOUNTER    Patient Name: Cliff Magaña  YOB: 1935    PCP: Munir Estrada MD   PCP Address: [address] [facility] [place] [facility]  PCP Phone Number: [phone]  PCP Fax: [phone]    Encounter Date: 2/2/24    Admit to Home Health    Diagnoses:  Active Hospital Problems    Diagnosis  POA    *UTI (urinary tract infection) [N39.0]  Yes    JER (acute kidney injury) [N17.9]  Unknown    Elevated troponin [R79.89]  Yes    Bladder cancer [C67.9]  Yes    DM type 2, controlled, with complication [E11.8]  Yes    Risk for falls [Z91.81]  Not Applicable    Persistent atrial fibrillation [I48.19]  Yes     Chronic    Hypertension associated with diabetes [E11.59, I15.2]  Yes    Hyperlipidemia associated with type 2 diabetes mellitus [E11.69, E78.5]  Yes      Resolved Hospital Problems    Diagnosis Date Resolved POA    Hyperkalemia [E87.5] 02/04/2024 Unknown       Follow Up Appointments:  Future Appointments   Date Time Provider Department Center   2/6/2024  3:00 PM LAB, HEMONC CANCER BLDG [facility] LAB [facility]   2/6/2024  4:00 PM Elizabeth Bland MD [facility] HEMONC2 [facility]   2/7/2024  8:00 AM Thi Castillo NP [facility] NEPHRO [facility]   2/8/2024  9:00 AM NURSE 5, [facility] CHEMO [facility] CHEMO [facility]   2/9/2024  2:00 PM Bhupendra Brar, DPROYAL [facility] PODIAT [facility]   2/15/2024 10:30 AM CHAIR 10, [facility] BMT INF [facility] BMT INF [facility]   2/29/2024  1:00 PM [facility] OIC-CT1 500 LB LIMIT [facility]   3/4/2024 12:30 PM PROCEDURE, UROLOGY ROOM 3 [facility] UROPRO [facility]   3/5/2024  2:00 PM Arash Gandara MD [facility] ENT [facility]   3/6/2024 10:00 AM Nereyda Birmingham MD [facility] CARDIO [place]   3/7/2024  2:30 PM Munir Estrada MD [facility] IM [place]       Allergies:  Review of patient's allergies indicates:   Allergen Reactions    Niacin      Other reaction(s): Rash  Other reaction(s): Itching       Medications:  Review discharge medications with patient and family and provide education.    Current Facility-Administered Medications   Medication Dose Route Frequency Provider Last Rate Last Admin    acetaminophen tablet 650 mg  650 mg Oral Q4H PRN Ulysses Johnson MD        apixaban tablet 2.5 mg  2.5 mg Oral BID Ulysses Johnson MD   2.5 mg at 02/03/24 2212    cefTRIAXone (Rocephin) 1 g in dextrose 5 % in water (D5W) 100 mL IVPB (MB+)  1 g Intravenous Once Craig Solis MD        dexAMETHasone tablet 4 mg  4 mg Oral Q12H Ulysses Johnson MD   4 mg at 02/03/24 2212    dextrose 10% bolus 125 mL 125 mL  12.5 g Intravenous PRN Ulysses Johnson MD        dextrose 10% bolus 250 mL 250 mL  25 g Intravenous PRN Ulysses Johnson MD        glucagon (human recombinant) injection 1 mg  1 mg Intramuscular PRN Ulysses Johnson MD        glucose chewable tablet 16 g  16 g Oral PRN Ulysses Johnson MD        glucose chewable tablet 24 g  24 g Oral PRN Ulysses Johnson MD        insulin aspart U-100 pen 0-5 Units  0-5 Units Subcutaneous QID (AC + HS) PRN Ulysses Johnson MD        metoprolol succinate (TOPROL-XL) 24 hr tablet 200 mg  200 mg Oral BID Ulysses Johnson MD   200 mg at 02/03/24 2212    naloxone 0.4 mg/mL injection 0.02 mg  0.02 mg Intravenous PRN Ulysses Johnson MD        ondansetron injection 4 mg  4 mg Intravenous Q8H PRN Ulysses Johnson MD        sodium chloride 0.9% flush 10 mL  10 mL Intravenous Q12H PRN Ulysses Johnson MD         Current Discharge Medication List        START taking these medications    Details   cefdinir (OMNICEF) 300 MG capsule Take 1 capsule (300 mg total) by mouth 2 (two) times daily. for 5 days  Qty: 10 capsule, Refills: 0           CONTINUE these medications which have NOT CHANGED    Details   acetaminophen (TYLENOL) 650  MG TbSR Take 650 mg by mouth every 8 (eight) hours.      apixaban (ELIQUIS) 5 mg Tab Take 1 tablet (5 mg total) by mouth 2 (two) times daily.  Qty: 180 tablet, Refills: 3    Associated Diagnoses: Persistent atrial fibrillation      cholecalciferol, vitamin D3, (VITAMIN D3) 50 mcg (2,000 unit) Cap capsule Take by mouth once daily.      dexAMETHasone (DECADRON) 4 MG Tab TAKE EVERY 12 HOURS DAILY FOR DAYS 2 THOUGH 4 FOLLOWING CHEMOTHERAPY  Qty: 40 tablet, Refills: 1    Associated Diagnoses: Bladder tumor      empagliflozin (JARDIANCE) 10 mg tablet Take 1 tablet (10 mg total) by mouth once daily.  Qty: 90 tablet, Refills: 3    Associated Diagnoses: Hypertension associated with diabetes; DM type 2, controlled, with complication; CKD stage 3 due to type 2 diabetes mellitus      fenofibrate micronized (LOFIBRA) 200 MG Cap Take 200 mg by mouth daily with breakfast.      krill oil/hyaluronic/astaxanth (MEGARED JOINT CARE ORAL)       loratadine (CLARITIN) 10 mg tablet Take 10 mg by mouth once daily.      TRAMAINE BIOTIN ORAL Take 5,000 mcg by mouth once daily.      metFORMIN (GLUCOPHAGE-XR) 500 MG ER 24hr tablet Take 1 tablet (500 mg total) by mouth once daily.  Qty: 90 tablet, Refills: 1    Associated Diagnoses: Type 2 diabetes mellitus with stage 3a chronic kidney disease, without long-term current use of insulin      metoprolol succinate (TOPROL-XL) 200 MG 24 hr tablet Take 1 tablet (200 mg total) by mouth 2 (two) times daily.  Qty: 180 tablet, Refills: 3    Comments: .      OLANZapine (ZYPREXA) 5 MG tablet TAKE NIGHTLY FOR 4 DAYS FOLLOWING CHEMOTHERAPY  Qty: 90 tablet, Refills: 1    Associated Diagnoses: Bladder tumor      ondansetron (ZOFRAN) 8 MG tablet Take 1 tablet (8 mg total) by mouth every 12 (twelve) hours as needed for Nausea.  Qty: 30 tablet, Refills: 2    Associated Diagnoses: Bladder tumor      sacubitriL-valsartan (ENTRESTO) 24-26 mg per tablet Take 1 tablet by mouth 2 (two) times daily.  Qty: 180 tablet,  Refills: 2    Associated Diagnoses: Hypertension associated with diabetes; Acute on chronic heart failure with preserved ejection fraction           STOP taking these medications       sulfamethoxazole-trimethoprim 800-160mg (BACTRIM DS) 800-160 mg Tab Comments:   Reason for Stopping:                 I have seen and examined this patient within the last 30 days. My clinical findings that support the need for the home health skilled services and home bound status are the following:no   Weakness/numbness causing balance and gait disturbance due to Debility making it taxing to leave home.     Diet:   cardiac diet    Referrals/ Consults  Physical Therapy to evaluate and treat. Evaluate for home safety and equipment needs; Establish/upgrade home exercise program. Perform / instruct on therapeutic exercises, gait training, transfer training, and Range of Motion.  Occupational Therapy to evaluate and treat. Evaluate home environment for safety and equipment needs. Perform/Instruct on transfers, ADL training, ROM, and therapeutic exercises.    Activities:   activity as tolerated    Nursing:   Agency to admit patient within 24 hours of hospital discharge unless specified on physician order or at patient request    SN to complete comprehensive assessment including routine vital signs. Instruct on disease process and s/s of complications to report to MD. Review/verify medication list sent home with the patient at time of discharge  and instruct patient/caregiver as needed. Frequency may be adjusted depending on start of care date.     Skilled nurse to perform up to 3 visits PRN for symptoms related to diagnosis    Notify MD if SBP > 160 or < 90; DBP > 90 or < 50; HR > 120 or < 50; Temp > 101; O2 < 88%; Other:       Ok to schedule additional visits based on staff availability and patient request on consecutive days within the home health episode.    When multiple disciplines ordered:    Start of Care occurs on Sunday -  Wednesday schedule remaining discipline evaluations as ordered on separate consecutive days following the start of care.    Thursday SOC -schedule subsequent evaluations Friday and Monday the following week.     Friday - Saturday SOC - schedule subsequent discipline evaluations on consecutive days starting Monday of the following week.    For all post-discharge communication and subsequent orders please contact patient's primary care physician. If unable to reach primary care physician or do not receive response within 30 minutes, please contact  for clinical staff order clarification        Home Health Aide:  Physical Therapy Daily and Occupational Therapy Daily        I certify that this patient is confined to his home and needs physical therapy and occupational therapy.

## 2024-02-05 ENCOUNTER — PATIENT MESSAGE (OUTPATIENT)
Dept: ADMINISTRATIVE | Facility: OTHER | Age: 89
End: 2024-02-05
Payer: MEDICARE

## 2024-02-05 ENCOUNTER — PATIENT OUTREACH (OUTPATIENT)
Dept: ADMINISTRATIVE | Facility: CLINIC | Age: 89
End: 2024-02-05
Payer: MEDICARE

## 2024-02-05 PROCEDURE — 77014 PR  CT GUIDANCE PLACEMENT RAD THERAPY FIELDS: CPT | Mod: 26,,, | Performed by: STUDENT IN AN ORGANIZED HEALTH CARE EDUCATION/TRAINING PROGRAM

## 2024-02-05 PROCEDURE — 77386 HC IMRT, COMPLEX: CPT | Performed by: STUDENT IN AN ORGANIZED HEALTH CARE EDUCATION/TRAINING PROGRAM

## 2024-02-06 ENCOUNTER — PATIENT MESSAGE (OUTPATIENT)
Dept: ADMINISTRATIVE | Facility: OTHER | Age: 89
End: 2024-02-06
Payer: MEDICARE

## 2024-02-06 ENCOUNTER — OFFICE VISIT (OUTPATIENT)
Dept: HEMATOLOGY/ONCOLOGY | Facility: CLINIC | Age: 89
End: 2024-02-06
Payer: MEDICARE

## 2024-02-06 VITALS
DIASTOLIC BLOOD PRESSURE: 73 MMHG | OXYGEN SATURATION: 99 % | TEMPERATURE: 97 F | WEIGHT: 125.75 LBS | RESPIRATION RATE: 17 BRPM | SYSTOLIC BLOOD PRESSURE: 136 MMHG | BODY MASS INDEX: 21.47 KG/M2 | HEART RATE: 93 BPM | HEIGHT: 64 IN

## 2024-02-06 DIAGNOSIS — E11.59 HYPERTENSION ASSOCIATED WITH DIABETES: ICD-10-CM

## 2024-02-06 DIAGNOSIS — E11.69 HYPERLIPIDEMIA ASSOCIATED WITH TYPE 2 DIABETES MELLITUS: ICD-10-CM

## 2024-02-06 DIAGNOSIS — N39.0 URINARY TRACT INFECTION WITHOUT HEMATURIA, SITE UNSPECIFIED: ICD-10-CM

## 2024-02-06 DIAGNOSIS — E11.22 CKD STAGE 3 DUE TO TYPE 2 DIABETES MELLITUS: ICD-10-CM

## 2024-02-06 DIAGNOSIS — C67.9 MALIGNANT NEOPLASM OF URINARY BLADDER, UNSPECIFIED SITE: Primary | ICD-10-CM

## 2024-02-06 DIAGNOSIS — N18.30 CKD STAGE 3 DUE TO TYPE 2 DIABETES MELLITUS: ICD-10-CM

## 2024-02-06 DIAGNOSIS — E78.5 HYPERLIPIDEMIA ASSOCIATED WITH TYPE 2 DIABETES MELLITUS: ICD-10-CM

## 2024-02-06 DIAGNOSIS — I15.2 HYPERTENSION ASSOCIATED WITH DIABETES: ICD-10-CM

## 2024-02-06 PROCEDURE — 77386 HC IMRT, COMPLEX: CPT | Performed by: STUDENT IN AN ORGANIZED HEALTH CARE EDUCATION/TRAINING PROGRAM

## 2024-02-06 PROCEDURE — 80053 COMPREHEN METABOLIC PANEL: CPT | Performed by: INTERNAL MEDICINE

## 2024-02-06 PROCEDURE — 36415 COLL VENOUS BLD VENIPUNCTURE: CPT | Performed by: INTERNAL MEDICINE

## 2024-02-06 PROCEDURE — 85025 COMPLETE CBC W/AUTO DIFF WBC: CPT | Performed by: INTERNAL MEDICINE

## 2024-02-06 PROCEDURE — 99214 OFFICE O/P EST MOD 30 MIN: CPT | Mod: PBBFAC | Performed by: INTERNAL MEDICINE

## 2024-02-06 PROCEDURE — 99999 PR PBB SHADOW E&M-EST. PATIENT-LVL IV: CPT | Mod: PBBFAC,,, | Performed by: INTERNAL MEDICINE

## 2024-02-06 PROCEDURE — 99214 OFFICE O/P EST MOD 30 MIN: CPT | Mod: S$PBB,,, | Performed by: INTERNAL MEDICINE

## 2024-02-06 PROCEDURE — 77014 PR  CT GUIDANCE PLACEMENT RAD THERAPY FIELDS: CPT | Mod: 26,,, | Performed by: STUDENT IN AN ORGANIZED HEALTH CARE EDUCATION/TRAINING PROGRAM

## 2024-02-06 NOTE — PROGRESS NOTES
Subjective     Patient ID: Cliff Magaña is a 88 y.o. male.    Chief Complaint: Urothelial cancer    HPI    Diagnosis: Muscle invasive bladder cancer     Presents to clinic prior to week 4 of weekly chemoRT  Wife mentions XRT finishes 2/8    He is fatigued and notes weaker    Reports the following:  Recently discharged - he was admitted from 2/2- 2/4/2024  He was admitted for a complicated UTI- he had been on Bactrim outpatient after U/A in Urology    Hospital Course: 88 y.o. 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  He was recently diagnosed with pansensitive E coli and started on oral Bactrim, however had hyperkalemia, JER and urinalysis suggestive of cystitis.  He was admitted, received IV fluids, Bactrim was held, hyperkalemia and JER improved.  Urine cultures were obtained and he was started on IV ceftriaxone.  He received 3 days of IV ceftriaxone and urine cultures were no growth.  His symptoms resolved.  Seen by PT who recommends low intensity therapy. Home health aide was ordered.  Of note, on night before discharge, he had a benign mechanical fall with no injuries.  He was eager to go home and was discharged.  He has mild thrombocytopenia and has an appointment to see Oncology in 2 days, he was given a lab requests to repeat CBC in 2 days before appointment.  He was discharged on oral cefdinir for 5 more days.    Discharged on Omnicef    Oncology History:  - bladder cancer incidentally found on imaging as he had been under surveillance for renal cysts  (10/17/2023 ultrasound showed stable left renal cysts and new bladder masses)     -  11/16/2023 TURBT of bladder tumor  Findings:   1. Right postero-lateral papillary bladder wall tumor (3-4cm) with diffusely erythematous and nodular  surrounding tissue, right < 1cm papillary bladder wall tumor immediately lateral to the larger postero-lateral papillary tumor, and a right nodular 4cm bladder tumor with high grade and possibly invasive appearance. Tumors resected, hemostasis achieved.  Several other patches of erythema were diffusely identified on the left lateral bladder wall.  2. Bimanual exam post-TURBT showed freely mobile bladder without abnormalities  3. Bilateral retrograde pyelogram showed delicate calices with no evidence of hydronephrosis, no filling defects, and ureter normal in course and caliber, bilaterally  Pathology:  1. Bladder, right posterolateral wall, transurethral resection of bladder tumor:  - Noninvasive high-grade papillary urothelial carcinoma  - Muscularis propria present  - Multiple H&E levels evaluated  2. Bladder, right anterior, transurethral resection of bladder tumor:  - Invasive high-grade papillary urothelial carcinoma  - Tumor invades muscularis propria  - Intact expression of DNA mismatch repair proteins in tumor by immunohistochemistry (see comment)  COMMENT:  Immunohistochemistry (IHC) Testing for Mismatch Repair (MMR) Proteins:  MLH1 - Intact nuclear expression  MSH2 - Intact nuclear expression  MSH6 - Intact nuclear expression  PMS2 - Intact nuclear expression  Background nonneoplastic tissue/internal control with intact nuclear expression  IHC Interpretation  No loss of nuclear expression of MMR proteins: low probability of microsatellite instability  There are exceptions to the above IHC interpretations. These results should not be considered in isolation, and clinical correlation with genetic counseling is recommended to assess the need for germline testing.     - 11/25/2023 CT Abd/Pelvis:  FINDINGS:  Lung base: Bilateral small volume pleural effusion increased in size compared to recent prior, associated with mild compression atelectasis.  Visualized portion of heart: Coronary artery  calcification.  Liver: Normal in size.  Left lobe small calcified granuloma.  No suspicious focal lesion.  Gallbladder: Cholecystectomy.  Bile duct: No intra-or extrahepatic biliary ductal dilatation.  Spleen: Unremarkable.  Pancreas: Parenchymal atrophy.  Multiple parenchymal calcifications suggesting chronic pancreatitis.  No suspicious focal lesion.  No pancreatic ductal dilatation.  No adjacent inflammatory changes or fluid collections.  Adrenal glands: Unremarkable.  Genitourinary: Bilateral renal cysts.  Subcentimeter hypodensities in both kidneys, which are too small to characterize.  The ureters appear normal in course and caliber.  No evidence of nephrolithiasis or hydroureteronephrosis.  Urinary bladder: Postsurgical change in the anterior bladder wall from TURBT.  There are small foci of air in the bladder, likely iatrogenic.  Reproductive organs: Unremarkable.  GI tract: Small hiatal hernia.  The stomach is unremarkable.  The visualized loops of small and large bowel show no evidence of obstruction or inflammation. Diverticulosis.  Appendix unremarkable.  Peritoneum: No free air or fluid.  Retroperitoneum: No significant adenopathy.  Vasculature: Normal caliber of abdominal aorta with moderate calcified and noncalcified atherosclerosis.  Abdominal wall: Tiny fat containing umbilical hernia.  Bones: Stable T12-L1 intervertebral disc calcification.  No suspicious sclerotic lesions.  No acute fracture.  Impression:  Postoperative changes from of TURBT.  No lymphadenopathy.  No distant metastases.  Other findings as described.     - 12/6/2023 CT Chest:  FINDINGS:  Comparison is 11/21/2023.  No mediastinal, hilar or axillary adenopathy is seen.  There is a right lower pole thyroid nodule.  There are coronary calcifications.  There is a left upper pole renal cyst.  There is a small hiatal hernia.  Trachea and bronchi are patent.  There is no evidence of interstitial lung disease, bronchiectasis, airway  trapping, or emphysema.  No suspicious pulmonary nodules, masses, or infiltrates are seen.  There is a calcified left lung granuloma.  Bones demonstrate nothing focal.  Impression:  No significant abnormality seen.  Right lobe thyroid nodule.  Coronary artery calcifications.  Left upper pole renal cyst.     PMH:  Active Ambulatory Problems        Diagnosis   Date Noted       CKD stage 3 due to type 2 diabetes mellitus     11/23/2012       Chronic anticoagulation 11/23/2012       Hypertension associated with diabetes     05/22/2013       Hyperlipidemia associated with type 2 diabetes mellitus     05/22/2013       Persistent atrial fibrillation      06/11/2014       Atherosclerosis of aorta      11/18/2020       Sensorineural hearing loss (SNHL) of both ears  11/19/2020       Risk for falls    12/15/2021       Renal cyst  12/14/2022       DM type 2, controlled, with complication  12/14/2022       Bladder tumor     11/16/2023       UTI (urinary tract infection) 11/21/2023       Acute hypoxic respiratory failure   11/21/2023       Elevated troponin 11/21/2023       Thyroid nodules   11/21/2023     Resolved Ambulatory Problems        Diagnosis   Date Noted       Atrial fibrillation     08/02/2012       Long term (current) use of anticoagulants 08/02/2012       Family history of diabetes mellitus 11/23/2012       Ex-cigarette smoker     11/23/2012       Metabolic syndrome      11/23/2012       Screen for colon cancer 05/07/2014       Ex-smoker   11/09/2015       Hyperglycemia     12/16/2015       Abscess of chest wall   06/25/2018       Sebaceous cyst    07/25/2018       Decreased back mobility 08/20/2019       Decreased strength of trunk and back      08/20/2019       Decreased range of motion of both hips    08/20/2019       Pain aggravated by walking    08/20/2019       Hamstring tightness of both lower extremities   08/20/2019       Impaired mobility and endurance     08/20/2019       Low back pain     11/19/2020        Chronic pain      04/28/2021       Type 2 diabetes mellitus, without long-term current use of insulin      12/13/2021       CKD stage 3 secondary to diabetes   12/13/2021       Encounter for preventive health examination     12/19/2022     Past Medical History:  Diagnosis   Date       *Atrial fibrillation           Chronic kidney disease         Deep vein thrombosis           Hyperlipidemia           Hypertension        Cholecystectomy  Tonsillectomy  Skin cancer removal     FH:  Paternal Aunt- recalls cancer affecting scalp     SH:    Retired [employer], followed by off shore work,   4 children (1 passed as an infant)  Quit tobacco in the 1980s  + EtOH    Review of Systems   Constitutional:  Positive for activity change, appetite change, fatigue and unexpected weight change. Negative for chills and fever.   HENT:  Positive for hearing loss. Negative for nasal congestion, postnasal drip, rhinorrhea, sinus pressure/congestion, sore throat and tinnitus.    Eyes:  Negative for visual disturbance.   Respiratory:  Negative for cough, chest tightness, shortness of breath and wheezing.    Cardiovascular:  Negative for chest pain, palpitations and leg swelling.   Gastrointestinal:  Negative for abdominal distention, abdominal pain, blood in stool, change in bowel habit, constipation, diarrhea, nausea and vomiting.   Genitourinary:  Positive for frequency. Negative for decreased urine volume, difficulty urinating, dysuria, hematuria and urgency.   Musculoskeletal:  Negative for arthralgias, back pain, myalgias, neck pain and neck stiffness.   Integumentary:  Negative for rash.        Dry skin   Neurological:  Positive for weakness. Negative for dizziness, light-headedness, numbness and headaches.   Psychiatric/Behavioral:  Negative for agitation, behavioral problems, confusion and dysphoric mood. The patient is not nervous/anxious.           Objective     Physical Exam  Vitals and nursing note reviewed.    Constitutional:       General: He is not in acute distress.     Appearance: Normal appearance. He is ill-appearing.      Comments: Appears more fatigued  ECOG= 1  Using borrowed walker   HENT:      Head: Normocephalic and atraumatic.      Comments: Hearing aids     Mouth/Throat:      Mouth: Mucous membranes are moist.      Pharynx: Oropharynx is clear. No oropharyngeal exudate or posterior oropharyngeal erythema.   Eyes:      General: No scleral icterus.     Extraocular Movements: Extraocular movements intact.      Conjunctiva/sclera: Conjunctivae normal.      Pupils: Pupils are equal, round, and reactive to light.   Cardiovascular:      Rate and Rhythm: Normal rate and regular rhythm.      Heart sounds: No murmur heard.     No friction rub.   Pulmonary:      Effort: Pulmonary effort is normal. No respiratory distress.      Breath sounds: Normal breath sounds. No wheezing, rhonchi or rales.   Abdominal:      General: Abdomen is flat. Bowel sounds are normal. There is no distension.      Palpations: Abdomen is soft. There is no mass.      Tenderness: There is no abdominal tenderness. There is no guarding or rebound.      Comments: No organomegaly   Musculoskeletal:         General: No swelling, tenderness or deformity. Normal range of motion.      Right lower leg: No edema.      Left lower leg: No edema.   Skin:     General: Skin is warm and dry.      Coloration: Skin is not jaundiced or pale.      Findings: No bruising, erythema or rash.   Neurological:      General: No focal deficit present.      Mental Status: He is alert and oriented to person, place, and time. Mental status is at baseline.      Sensory: No sensory deficit.      Motor: Weakness (generalized) present.      Gait: Gait normal.   Psychiatric:         Mood and Affect: Mood normal.         Behavior: Behavior normal.         Thought Content: Thought content normal.         Judgment: Judgment normal.       Labs- reviewed     Assessment and Plan     1.  Malignant neoplasm of urinary bladder, unspecified site    2. CKD stage 3 due to type 2 diabetes mellitus    3. Urinary tract infection without hematuria, site unspecified    4. Hypertension associated with diabetes    5. Hyperlipidemia associated with type 2 diabetes mellitus      Chemotherapy side effects and recurrent UTIs  Declining performance status   We discussed ceasing with final week - XRT completes this week    Home PT- set up by hospital- will check on    Reassess in 4 weeks for performance status improvement  We will not rescan for 6-8 weeks     HTN, DM, Hyperlipidemia-- all managed well with PCP  Continue current medications  No adjustments are needed    On UTI treatment- symptoms improving  Recheck with next labs    Route Chart for Scheduling    Med Onc Chart Routing  Urgent    Follow up with physician . Cancel chemo   Follow up with [facility] . 3-4 weeks cbc, cmp, U/A   Infusion scheduling note    Injection scheduling note    Labs    Imaging    Pharmacy appointment    Other referrals                  Treatment Plan Information   OP CISPLATIN WEEKLY + RT   Elizabeth Bland MD   Upcoming Treatment Dates - OP CISPLATIN WEEKLY + RT    2/6/2024       Chemotherapy       CISplatin (Platinol) 25 mg/m2 = 42 mg in sodium chloride 0.9% 292 mL chemo infusion       Pre-Hydration       sodium chloride 0.9% 500 mL with magnesium sulfate 1 g, potassium chloride 10 mEq infusion       Post-Hydration       sodium chloride 0.9% bolus 500 mL 500 mL       Antiemetics       aprepitant (CINVANTI) injection 130 mg       palonosetron (ALOXI) 0.25 mg with Dexamethasone (DECADRON) 12 mg in NS 50 mL IVPB  2/13/2024       Chemotherapy       CISplatin (Platinol) 25 mg/m2 = 42 mg in sodium chloride 0.9% 292 mL chemo infusion       Pre-Hydration       sodium chloride 0.9% 500 mL with magnesium sulfate 1 g, potassium chloride 10 mEq infusion       Post-Hydration       sodium chloride 0.9% bolus 500 mL 500 mL       Antiemetics        aprepitant (CINVANTI) injection 130 mg       palonosetron (ALOXI) 0.25 mg with Dexamethasone (DECADRON) 12 mg in NS 50 mL IVPB

## 2024-02-06 NOTE — PROGRESS NOTES
Subjective:       Patient ID: Cliff Magaña is a 88 y.o. white male who presents for f/u evaluation of   CKD    HPI     Patient is new to me. New to clinic.  Prior pertinent chart reviewed since this is patient's first appointment with me.    Patient presents for new evaluation of CKD and renal cysts.  Baseline creatinine of 1.3-1.5 mg/dL.    Had MRI for back pain to determine placment of injections; incidentlally found cysts on kidneys at that time.    Significant hx includes HTN since before [place], afib, HLD, metabolic syndrome, aortic atheroslcerosis, T2DM      The patient denies taking NSAIDs, herbal supplements, or new antibiotics, recreational drugs, recent episode of dehydration, diarrhea, nausea or vomiting, acute illness, hospitalization or exposure to IV radiocontrast.     Significant family hx includes: No known kidney disease.    Last renal US: April 2021, reviewed.    Update 10/5/21:  Last seen by me in May 2021.  Baseline sCr 1.3-1.5 mg/dL.  Home BPs: fluctuate from low 100s-140s/60-70s    Denies recent hospitalizations or NSAID use.    Update 4/12/22:  Returns for f/u of CKD.  Baseline sCr 1.3-1.5 mg/dL.  Home BPs: 110s-130s/60s-70s on digital monitoring  Denies recent hospitalizations or NSAID use.    Update 5/17/22:  Returns for f/u of CKD.  Now has an JER.  Baseline sCr 1.3-1.5 mg/dL. sCr 2.0 recently.  Says he has h/o nephrolithiasis; never had any surgically removed.  Eliquis decreased by cardiology.  Home BPs: 110s-130s/60s-70s on digital monitoring; occassional SBP in 140s or less than 110  Denies recent NSAID use, new antibiotics, illnesses.    Update 4/11/23:  Returns for f/u of CKD.  Baseline sCr 1.3-1.5 mg/dL.  Home BPs: 120s-140s/70s  Valsartan previously d/c due to JER  No recent hospitalizations.    Update 2/7/24:  Returns for f/u of CKD.  Baseline sCr 1.3-1.5 mg/dL.   Home BPs: 110s-150s/70-90s    On chemo and radiation for bladder cancer.  Had UTI treated with Bactrim but had  "hyperkalemia, JER. Held Bactrim. Given IVF. Started on ceftriaxone.      Review of Systems   Respiratory:  Negative for shortness of breath.    Cardiovascular:  Negative for leg swelling.   Gastrointestinal:  Negative for diarrhea, nausea and vomiting.   Genitourinary:  Positive for frequency (with high PO fluid intake). Negative for difficulty urinating, dysuria and hematuria.       Objective:       Blood pressure 122/80, pulse 77, height 5' 4" (1.626 m), weight 56 kg (123 lb 7.3 oz), SpO2 (!) 92 %.  Physical Exam  Vitals reviewed.   Constitutional:       General: He is not in acute distress.     Appearance: Normal appearance. He is well-developed.   Eyes:      Conjunctiva/sclera: Conjunctivae normal.   Cardiovascular:      Rate and Rhythm: Normal rate.   Pulmonary:      Effort: Pulmonary effort is normal. No respiratory distress.      Breath sounds: Normal breath sounds.   Abdominal:      Tenderness: There is no right CVA tenderness or left CVA tenderness.   Musculoskeletal:      Cervical back: Neck supple.      Right lower leg: No edema.      Left lower leg: No edema.   Skin:     General: Skin is warm and dry.      Findings: No lesion or rash.   Neurological:      Mental Status: He is alert and oriented to person, place, and time.   Psychiatric:         Mood and Affect: Mood normal.         Behavior: Behavior normal.         Thought Content: Thought content normal.         Judgment: Judgment normal.           Lab Results   Component Value Date    CREATININE 1.3 02/06/2024     Prot/Creat Ratio, Urine   Date Value Ref Range Status   10/17/2023 0.51 (H) 0.00 - 0.20 Final   04/11/2023 0.55 (H) 0.00 - 0.20 Final   04/04/2023 0.32 (H) 0.00 - 0.20 Final     Lab Results   Component Value Date     02/06/2024    K 5.3 (H) 02/06/2024    CO2 18 (L) 02/06/2024     02/06/2024     Lab Results   Component Value Date    PTH 63.9 10/17/2023    CALCIUM 10.0 02/06/2024    PHOS 3.7 02/02/2024     Lab Results   Component " Value Date    HGB 17.7 02/06/2024    WBC 8.39 02/06/2024    HCT 52.5 02/06/2024      Lab Results   Component Value Date    HGBA1C 6.9 (H) 12/22/2023     (L) 02/06/2024    BUN 55 (H) 02/06/2024     Lab Results   Component Value Date    LDLCALC 108.0 08/14/2023         Assessment:       1. Stage 3 chronic kidney disease, unspecified whether stage 3a or 3b CKD    2. Acute on chronic heart failure with preserved ejection fraction    3. Stage 3a chronic kidney disease    4. Complex renal cyst    5. Hypertension, unspecified type    6. Hematuria, unspecified type    7. Urine WBC increased    8. Hyperkalemia    9. Hyponatremia    10. Type 2 diabetes mellitus with stage 3 chronic kidney disease, unspecified whether long term insulin use, unspecified whether stage 3a or 3b CKD    11. JER (acute kidney injury)    12. Low serum bicarbonate            Plan:   CKD stage 3A c eGFR 45-49 mL/min - clinically 2/2 age-related nephron loss coupled with persistent afib.  Less likely that renal cysts are contributing. At baseline (resolved from recent JER)   Educated patient to control BP, BG, remain well-hydrated, and avoid NSAIDs to prevent progression of CKD.    UPCR Proteinuric at last check in October. Needs repeat.  On Jardiance and entresto.     Acid-base Bicarb low while hospitalized. Unclear why. Does not correlate with level of CKD. Monitor   Renal osteodystrophy Ca okay. Phos, PTH okay. Vit D low in August 2022;   Anemia WNL   DM Well-controlled.    Lipid Management On fenofibrate since at least 2012.   ESRD planning  Reassurance given.     HTN - Labile on digital monitoring record but okay today. On metoprolol succinate 200 mg, entresto 24-26 mg  - Valsartan previously d/c in setting of JER    HF - on entresto per cardiology.    Hyperkalemia - Has been drinking tomato juice and eating bananas and tomatoes. Low K diet information provided. Low serum bicarb also contributing.    Renal cysts - being followed by  urology    Hematuria/ leukocyturia - in setting of resolving UTI + bladder cancer    Hyponatremia - in setting of hospitalization. Resolved.    All questions patient had were answered.  Asked if further questions. None. F/u in clinic in 6 with labs and urine prior to next visit or sooner if needed.  ER for emergency concerns.    Summary of Plan:  1. UA, UPCR  2. avoid NSAID/ bactrim/ IV contrast/ gadolinium/ aminoglycoside where possible  3. BMP in 2-3 weeks; ABG if no improvement in bicarb  3. RTC in 6 mos     Visit today included increased complexity associated with the care of the episodic problem of low serum bicarb addressed and managing the longitudinal care of the patient due to the serious and/or complex managed problem(s) CKD, HF.    43 minutes of total time spent on the encounter, which includes face to face time and non-face to face time preparing to see the patient (eg, review of tests), Obtaining and/or reviewing separately obtained history, documenting clinical information in the electronic or other health record, independently interpreting results (not separately reported) and communicating results to the patient/family/caregiver, or Care coordination (not separately reported).

## 2024-02-07 ENCOUNTER — PATIENT MESSAGE (OUTPATIENT)
Dept: ADMINISTRATIVE | Facility: OTHER | Age: 89
End: 2024-02-07
Payer: MEDICARE

## 2024-02-07 ENCOUNTER — OFFICE VISIT (OUTPATIENT)
Dept: NEPHROLOGY | Facility: CLINIC | Age: 89
End: 2024-02-07
Payer: MEDICARE

## 2024-02-07 ENCOUNTER — PATIENT MESSAGE (OUTPATIENT)
Dept: NEPHROLOGY | Facility: CLINIC | Age: 89
End: 2024-02-07

## 2024-02-07 VITALS
HEIGHT: 64 IN | OXYGEN SATURATION: 92 % | DIASTOLIC BLOOD PRESSURE: 80 MMHG | WEIGHT: 123.44 LBS | BODY MASS INDEX: 21.07 KG/M2 | HEART RATE: 77 BPM | SYSTOLIC BLOOD PRESSURE: 122 MMHG

## 2024-02-07 DIAGNOSIS — R82.998 URINE WBC INCREASED: ICD-10-CM

## 2024-02-07 DIAGNOSIS — I50.33 ACUTE ON CHRONIC HEART FAILURE WITH PRESERVED EJECTION FRACTION: ICD-10-CM

## 2024-02-07 DIAGNOSIS — I10 HYPERTENSION, UNSPECIFIED TYPE: ICD-10-CM

## 2024-02-07 DIAGNOSIS — R79.89 LOW SERUM BICARBONATE: ICD-10-CM

## 2024-02-07 DIAGNOSIS — N17.9 AKI (ACUTE KIDNEY INJURY): ICD-10-CM

## 2024-02-07 DIAGNOSIS — N18.30 TYPE 2 DIABETES MELLITUS WITH STAGE 3 CHRONIC KIDNEY DISEASE, UNSPECIFIED WHETHER LONG TERM INSULIN USE, UNSPECIFIED WHETHER STAGE 3A OR 3B CKD: ICD-10-CM

## 2024-02-07 DIAGNOSIS — E87.1 HYPONATREMIA: ICD-10-CM

## 2024-02-07 DIAGNOSIS — R31.9 HEMATURIA, UNSPECIFIED TYPE: ICD-10-CM

## 2024-02-07 DIAGNOSIS — E87.5 HYPERKALEMIA: ICD-10-CM

## 2024-02-07 DIAGNOSIS — N18.31 STAGE 3A CHRONIC KIDNEY DISEASE: ICD-10-CM

## 2024-02-07 DIAGNOSIS — E11.22 TYPE 2 DIABETES MELLITUS WITH STAGE 3 CHRONIC KIDNEY DISEASE, UNSPECIFIED WHETHER LONG TERM INSULIN USE, UNSPECIFIED WHETHER STAGE 3A OR 3B CKD: ICD-10-CM

## 2024-02-07 DIAGNOSIS — N18.30 STAGE 3 CHRONIC KIDNEY DISEASE, UNSPECIFIED WHETHER STAGE 3A OR 3B CKD: Primary | ICD-10-CM

## 2024-02-07 DIAGNOSIS — N28.1 COMPLEX RENAL CYST: ICD-10-CM

## 2024-02-07 PROCEDURE — 99213 OFFICE O/P EST LOW 20 MIN: CPT | Mod: PBBFAC | Performed by: NURSE PRACTITIONER

## 2024-02-07 PROCEDURE — 77386 HC IMRT, COMPLEX: CPT | Performed by: STUDENT IN AN ORGANIZED HEALTH CARE EDUCATION/TRAINING PROGRAM

## 2024-02-07 PROCEDURE — 99215 OFFICE O/P EST HI 40 MIN: CPT | Mod: S$PBB,,, | Performed by: NURSE PRACTITIONER

## 2024-02-07 PROCEDURE — G2211 COMPLEX E/M VISIT ADD ON: HCPCS | Mod: S$PBB,,, | Performed by: NURSE PRACTITIONER

## 2024-02-07 PROCEDURE — 77014 PR  CT GUIDANCE PLACEMENT RAD THERAPY FIELDS: CPT | Mod: 26,,, | Performed by: STUDENT IN AN ORGANIZED HEALTH CARE EDUCATION/TRAINING PROGRAM

## 2024-02-07 PROCEDURE — 99999 PR PBB SHADOW E&M-EST. PATIENT-LVL III: CPT | Mod: PBBFAC,,, | Performed by: NURSE PRACTITIONER

## 2024-02-07 PROCEDURE — 82043 UR ALBUMIN QUANTITATIVE: CPT | Performed by: NURSE PRACTITIONER

## 2024-02-07 PROCEDURE — 84156 ASSAY OF PROTEIN URINE: CPT | Performed by: NURSE PRACTITIONER

## 2024-02-08 ENCOUNTER — PATIENT MESSAGE (OUTPATIENT)
Dept: ADMINISTRATIVE | Facility: OTHER | Age: 89
End: 2024-02-08
Payer: MEDICARE

## 2024-02-08 ENCOUNTER — TELEPHONE (OUTPATIENT)
Dept: HEMATOLOGY/ONCOLOGY | Facility: CLINIC | Age: 89
End: 2024-02-08
Payer: MEDICARE

## 2024-02-08 PROCEDURE — 77386 HC IMRT, COMPLEX: CPT | Performed by: STUDENT IN AN ORGANIZED HEALTH CARE EDUCATION/TRAINING PROGRAM

## 2024-02-08 PROCEDURE — 77336 RADIATION PHYSICS CONSULT: CPT | Performed by: STUDENT IN AN ORGANIZED HEALTH CARE EDUCATION/TRAINING PROGRAM

## 2024-02-08 PROCEDURE — 77014 PR  CT GUIDANCE PLACEMENT RAD THERAPY FIELDS: CPT | Mod: 26,,, | Performed by: STUDENT IN AN ORGANIZED HEALTH CARE EDUCATION/TRAINING PROGRAM

## 2024-02-09 ENCOUNTER — OFFICE VISIT (OUTPATIENT)
Dept: PODIATRY | Facility: CLINIC | Age: 89
End: 2024-02-09
Payer: MEDICARE

## 2024-02-09 ENCOUNTER — PATIENT MESSAGE (OUTPATIENT)
Dept: ADMINISTRATIVE | Facility: OTHER | Age: 89
End: 2024-02-09
Payer: MEDICARE

## 2024-02-09 VITALS
HEART RATE: 94 BPM | BODY MASS INDEX: 21 KG/M2 | WEIGHT: 123 LBS | HEIGHT: 64 IN | DIASTOLIC BLOOD PRESSURE: 65 MMHG | SYSTOLIC BLOOD PRESSURE: 102 MMHG

## 2024-02-09 DIAGNOSIS — E11.51 TYPE 2 DIABETES MELLITUS WITH PERIPHERAL VASCULAR DISEASE: Primary | ICD-10-CM

## 2024-02-09 DIAGNOSIS — E11.49 TYPE 2 DIABETES MELLITUS WITH NEUROLOGICAL MANIFESTATIONS: ICD-10-CM

## 2024-02-09 DIAGNOSIS — B35.1 ONYCHOMYCOSIS: ICD-10-CM

## 2024-02-09 PROCEDURE — 99213 OFFICE O/P EST LOW 20 MIN: CPT | Mod: PBBFAC,PN,25 | Performed by: PODIATRIST

## 2024-02-09 PROCEDURE — 11721 DEBRIDE NAIL 6 OR MORE: CPT | Mod: PBBFAC,PN | Performed by: PODIATRIST

## 2024-02-09 PROCEDURE — 99999 PR PBB SHADOW E&M-EST. PATIENT-LVL III: CPT | Mod: PBBFAC,,, | Performed by: PODIATRIST

## 2024-02-09 PROCEDURE — 99213 OFFICE O/P EST LOW 20 MIN: CPT | Mod: 25,S$PBB,, | Performed by: PODIATRIST

## 2024-02-09 NOTE — PROGRESS NOTES
Subjective:      Patient ID: Cliff Magaña is a 88 y.o. male.    Chief Complaint: Diabetic Foot Exam and Diabetes Mellitus      Cliff is a 88 y.o. male who presents to the clinic upon referral from Dr. Steff choi. provider found  for evaluation and treatment of diabetic feet. Cliff has a past medical history of *Atrial fibrillation, Chronic kidney disease, Deep vein thrombosis, Hyperlipidemia, Hypertension, Metabolic syndrome, and Type 2 diabetes mellitus, without long-term current use of insulin (12/13/2021). Patient relates no major problem with feet. Only complaints today consist of thickened toenails that he has difficulty trimming.  He will sometimes get a pedicure.  He also relates he gets intermittent cramping to both legs at night.  History of chronic low back pain with right lower extremity symptoms.    04/29/2022: Returns for routine nail care. No new concerns.     07/29/2022: Returns for routine nail care. No new concerns.     11/17/2022:  Returns routine foot care.  No new concerns.    02/16/2023:  Returns for routine foot care.  Due for annual foot exam.  Complains of intermittent cramping to the left foot that is brief in duration.  Denies any claudication symptoms. Followed by Cardiology.     05/18/23: Returns for routine foot care.  No new concerns.  Accompanied by his wife.    08/17/2023: Returns for routine foot care.  No new concerns.    11/09/2023:  Returns for routine foot care.  Accompanied by his wife.  No new concerns.    02/09/2024:  Returns for annual foot exam and routine foot care.  Accompanied by his wife.  Patient has had significant weight loss secondary to loss of appetite post chemotherapy and radiation therapy for bladder cancer.  Recently admitted for UTI and discharged.    PCP: Munir Estrada MD    Date Last Seen by PCP:  08/18/2023    Current shoe gear: Casual shoes    Hemoglobin A1C   Date Value Ref Range Status   12/22/2023 6.9 (H) 4.0 - 5.6 % Final     Comment:     ADA  "Screening Guidelines:  5.7-6.4%  Consistent with prediabetes  >or=6.5%  Consistent with diabetes    High levels of fetal hemoglobin interfere with the HbA1C  assay. Heterozygous hemoglobin variants (HbS, HgC, etc)do  not significantly interfere with this assay.   However, presence of multiple variants may affect accuracy.     11/10/2023 6.9 (H) 4.0 - 5.6 % Final     Comment:     ADA Screening Guidelines:  5.7-6.4%  Consistent with prediabetes  >or=6.5%  Consistent with diabetes    High levels of fetal hemoglobin interfere with the HbA1C  assay. Heterozygous hemoglobin variants (HbS, HgC, etc)do  not significantly interfere with this assay.   However, presence of multiple variants may affect accuracy.     08/14/2023 8.6 (H) 4.0 - 5.6 % Final     Comment:     ADA Screening Guidelines:  5.7-6.4%  Consistent with prediabetes  >or=6.5%  Consistent with diabetes    High levels of fetal hemoglobin interfere with the HbA1C  assay. Heterozygous hemoglobin variants (HbS, HgC, etc)do  not significantly interfere with this assay.   However, presence of multiple variants may affect accuracy.       Vitals:    02/09/24 1352   BP: 102/65   Pulse: 94   Weight: 55.8 kg (123 lb)   Height: 5' 4" (1.626 m)   PainSc: 0-No pain      Past Medical History:   Diagnosis Date    *Atrial fibrillation     Chronic kidney disease     Deep vein thrombosis     Hyperlipidemia     Hypertension     Metabolic syndrome     Type 2 diabetes mellitus, without long-term current use of insulin 12/13/2021       Past Surgical History:   Procedure Laterality Date    CATARACT EXTRACTION      CHOLECYSTECTOMY      CYSTOSCOPY N/A 11/16/2023    Procedure: CYSTOSCOPY;  Surgeon: Marcelino Moffett MD;  Location: [facility] OR [address] [place] [address];  Service: Urology;  Laterality: N/A;  90 minutes    EYE SURGERY      INJECTION OF FACET JOINT Bilateral 4/28/2021    Procedure: FACET JOINT INJECTION BILATERAL L4/L5 DIRECT REFERRAL;  Surgeon: Philipp Iyer MD;  [facility];  " Service: Pain Management;  Laterality: Bilateral;  NEEDS CONSENT, ELIQUIS CLEARANCE IN CHART    RETROGRADE PYELOGRAPHY Bilateral 2023    Procedure: PYELOGRAM, RETROGRADE;  Surgeon: Marcelino Moffett MD;  Location: [facility] OR [address] [place] [address];  Service: Urology;  Laterality: Bilateral;  90 minutes    SKIN CANCER EXCISION      TONSILLECTOMY      TURBT (TRANSURETHRAL RESECTION OF BLADDER TUMOR) N/A 2023    Procedure: TURBT (TRANSURETHRAL RESECTION OF BLADDER TUMOR);  Surgeon: Marcelino Moffett MD;  Location: [facility] OR [address] [place] [address];  Service: Urology;  Laterality: N/A;  90 minutes       Family History   Problem Relation Age of Onset    Diabetes Maternal Aunt     Heart attack Neg Hx     Heart disease Neg Hx     Heart failure Neg Hx     Hyperlipidemia Neg Hx     Hypertension Neg Hx     Stroke Neg Hx        Social History     Socioeconomic History    Marital status:      Spouse name: Nereyda    Number of children: 4   Tobacco Use    Smoking status: Former     Current packs/day: 0.00     Average packs/day: 2.0 packs/day for 20.0 years (40.0 ttl pk-yrs)     Types: Cigarettes     Start date: 1963     Quit date: 1983     Years since quittin.7     Passive exposure: Never    Smokeless tobacco: Never   Substance and Sexual Activity    Alcohol use: Yes     Alcohol/week: 1.0 standard drink of alcohol     Types: 1 Standard drinks or equivalent per week     Comment: 3 drinks per week    Drug use: No    Sexual activity: Not Currently     Partners: Female     Social Determinants of Health     Financial Resource Strain: Low Risk  (2023)    Overall Financial Resource Strain (CARDIA)     Difficulty of Paying Living Expenses: Not hard at all   Food Insecurity: No Food Insecurity (2023)    Hunger Vital Sign     Worried About Running Out of Food in the Last Year: Never true     Ran Out of Food in the Last Year: Never true   Transportation Needs: No Transportation Needs (2023)    PRAPARE - Transportation      Lack of Transportation (Medical): No     Lack of Transportation (Non-Medical): No   Physical Activity: Sufficiently Active (11/22/2023)    Exercise Vital Sign     Days of Exercise per Week: 7 days     Minutes of Exercise per Session: 60 min   Stress: No Stress Concern Present (11/22/2023)    [employer] [place] [employer] - Occupational Stress Questionnaire     Feeling of Stress : Not at all   Social Connections: Moderately Isolated (11/22/2023)    Social Connection and Isolation Panel [NHANES]     Frequency of Communication with Friends and Family: More than three times a week     Frequency of Social Gatherings with Friends and Family: More than three times a week     Attends Scientologist Services: Never     Active Member of Clubs or Organizations: No     Attends Club or Organization Meetings: Never     Marital Status:    Housing Stability: Low Risk  (11/22/2023)    Housing Stability Vital Sign     Unable to Pay for Housing in the Last Year: No     Number of Places Lived in the Last Year: 1     Unstable Housing in the Last Year: No       Current Outpatient Medications   Medication Sig Dispense Refill    acetaminophen (TYLENOL) 650 MG TbSR Take 650 mg by mouth every 8 (eight) hours.      apixaban (ELIQUIS) 5 mg Tab Take 1 tablet (5 mg total) by mouth 2 (two) times daily. 180 tablet 3    cefdinir (OMNICEF) 300 MG capsule Take 1 capsule (300 mg total) by mouth 2 (two) times daily. for 5 days 10 capsule 0    cholecalciferol, vitamin D3, (VITAMIN D3) 50 mcg (2,000 unit) Cap capsule Take by mouth once daily.      dexAMETHasone (DECADRON) 4 MG Tab TAKE EVERY 12 HOURS DAILY FOR DAYS 2 THOUGH 4 FOLLOWING CHEMOTHERAPY 40 tablet 1    empagliflozin (JARDIANCE) 10 mg tablet Take 1 tablet (10 mg total) by mouth once daily. 90 tablet 3    fenofibrate micronized (LOFIBRA) 200 MG Cap Take 200 mg by mouth daily with breakfast.      krill oil/hyaluronic/astaxanth (MEGARED JOINT CARE ORAL)       loratadine (CLARITIN) 10  mg tablet Take 10 mg by mouth once daily.      TRAMAINE BIOTIN ORAL Take 5,000 mcg by mouth once daily.      metFORMIN (GLUCOPHAGE-XR) 500 MG ER 24hr tablet Take 1 tablet (500 mg total) by mouth once daily. 90 tablet 1    metoprolol succinate (TOPROL-XL) 200 MG 24 hr tablet Take 1 tablet (200 mg total) by mouth 2 (two) times daily. 180 tablet 3    OLANZapine (ZYPREXA) 5 MG tablet TAKE NIGHTLY FOR 4 DAYS FOLLOWING CHEMOTHERAPY 90 tablet 1    ondansetron (ZOFRAN) 8 MG tablet Take 1 tablet (8 mg total) by mouth every 12 (twelve) hours as needed for Nausea. 30 tablet 2    sacubitriL-valsartan (ENTRESTO) 24-26 mg per tablet Take 1 tablet by mouth 2 (two) times daily. 180 tablet 2     No current facility-administered medications for this visit.       Review of patient's allergies indicates:   Allergen Reactions    Niacin      Other reaction(s): Rash  Other reaction(s): Itching           Review of Systems   Constitutional: Negative for chills, fever and malaise/fatigue.   HENT:  Negative for congestion and hearing loss.    Cardiovascular:  Negative for chest pain, claudication and leg swelling.   Respiratory:  Negative for cough and shortness of breath.    Skin:  Positive for nail changes.   Musculoskeletal:  Positive for back pain and muscle cramps. Negative for joint pain and muscle weakness.   Gastrointestinal:  Negative for nausea and vomiting.   Neurological:  Positive for paresthesias. Negative for numbness and weakness.   Psychiatric/Behavioral:  Negative for altered mental status.            Objective:      Physical Exam  Constitutional:       General: He is not in acute distress.     Appearance: Normal appearance. He is not ill-appearing.   Cardiovascular:      Pulses:           Dorsalis pedis pulses are 0 on the right side and 0 on the left side.        Posterior tibial pulses are 0 on the right side and 0 on the left side.      Comments: Absent PT/AT with Doppler bilateral lower extremity.    Mild nonpitting edema  to lower extremity bilateral.  No hair growth bilateral lower extremity.  Mild rubor on dependency bilateral lower extremity.  Musculoskeletal:      Comments: No pain with ROM or MMT bilateral lower extremity.    No significant digital deformity bilateral.  Rectus appearing foot type bilateral.   Feet:      Right foot:      Protective Sensation: 10 sites tested.  9 sites sensed.      Skin integrity: Dry skin present.      Toenail Condition: Right toenails are abnormally thick and long.      Left foot:      Protective Sensation: 10 sites tested.  10 sites sensed.      Skin integrity: Dry skin present.      Toenail Condition: Left toenails are abnormally thick and long.   Skin:     General: Skin is warm.      Capillary Refill: Capillary refill takes 2 to 3 seconds.      Findings: No ecchymosis or erythema.      Nails: There is no clubbing.      Comments: Second toenail bilateral is growing a superior direction, thickened and discolored yellow with some mild loosening.  Nails 1, 3, 4 and 5 bilateral are mildly elongated 2-3 mm, thickened with patchy yellow discoloration mild underlying debris.    No open lesions or macerations to lower extremity bilateral.    Skin is atrophied to lower extremity bilateral.   Neurological:      Mental Status: He is alert and oriented to person, place, and time.      Sensory: Sensory deficit present.      Motor: Motor function is intact.      Comments: Absent vibratory sensation right foot and decreased left foot.               Assessment:       Encounter Diagnoses   Name Primary?    Type 2 diabetes mellitus with peripheral vascular disease Yes    Type 2 diabetes mellitus with neurological manifestations     Onychomycosis          Plan:       Cliff was seen today for diabetic foot exam and diabetes mellitus.    Diagnoses and all orders for this visit:    Type 2 diabetes mellitus with peripheral vascular disease  -     Routine Foot Care    Type 2 diabetes mellitus with neurological  manifestations  -     Routine Foot Care    Onychomycosis  -     Routine Foot Care      I counseled the patient on his conditions, their implications and medical management.    Shoe inspection. Diabetic Foot Education. Patient reminded of the importance of good nutrition and blood sugar control to help prevent podiatric complications of diabetes. Patient instructed on proper foot hygeine. We discussed wearing proper shoe gear, daily foot inspections, never walking without protective shoe gear, never putting sharp instruments to feet.    Routine foot care per attached note. Patient relates relief following the procedure. He will continue to monitor the areas daily, inspect his feet, wear protective shoe gear when ambulatory, moisturizer to maintain skin integrity.    Extensive chart check completed.  Annual foot exam completed.  Patient found to be intermediate to high risk for diabetic foot complications secondary to peripheral arterial disease which is monitored per Cardiology.    Discussed nutritional support with protein shakes twice daily in addition to his regular meals to help with weight gain.    Assisted by Mony Kim DPM PGY 2    RTC within 4 months or p.r.n. as discussed.      A portion of this note was generated by voice recognition software and may contain spelling and grammar errors.

## 2024-02-09 NOTE — PROCEDURES
"Routine Foot Care    Date/Time: 2/9/2024 2:00 PM    Performed by: Bhupendra Brar DPM  Authorized by: Bhupendra Brar DPM    Time out: Immediately prior to procedure a "time out" was called to verify the correct patient, procedure, equipment, support staff and site/side marked as required.    Consent Done?:  Yes (Verbal)  Hyperkeratotic Skin Lesions?: No      Nail Care Type:  Debride  Location(s): All  (Left 1st Toe, Left 3rd Toe, Left 2nd Toe, Left 4th Toe, Left 5th Toe, Right 1st Toe, Right 2nd Toe, Right 3rd Toe, Right 4th Toe and Right 5th Toe)  Patient tolerance:  Patient tolerated the procedure well with no immediate complications     Used sterile nail nipper. Assisted by Mony Kim DPM PGY 2    "

## 2024-02-10 ENCOUNTER — PATIENT MESSAGE (OUTPATIENT)
Dept: ADMINISTRATIVE | Facility: OTHER | Age: 89
End: 2024-02-10
Payer: MEDICARE

## 2024-02-11 ENCOUNTER — PATIENT MESSAGE (OUTPATIENT)
Dept: ADMINISTRATIVE | Facility: OTHER | Age: 89
End: 2024-02-11
Payer: MEDICARE

## 2024-02-12 ENCOUNTER — PATIENT MESSAGE (OUTPATIENT)
Dept: ADMINISTRATIVE | Facility: OTHER | Age: 89
End: 2024-02-12
Payer: MEDICARE

## 2024-02-13 ENCOUNTER — PATIENT MESSAGE (OUTPATIENT)
Dept: ADMINISTRATIVE | Facility: OTHER | Age: 89
End: 2024-02-13
Payer: MEDICARE

## 2024-02-14 ENCOUNTER — PATIENT MESSAGE (OUTPATIENT)
Dept: INTERNAL MEDICINE | Facility: CLINIC | Age: 89
End: 2024-02-14
Payer: MEDICARE

## 2024-02-14 ENCOUNTER — PATIENT MESSAGE (OUTPATIENT)
Dept: ADMINISTRATIVE | Facility: OTHER | Age: 89
End: 2024-02-14
Payer: MEDICARE

## 2024-02-14 ENCOUNTER — DOCUMENTATION ONLY (OUTPATIENT)
Dept: RADIATION ONCOLOGY | Facility: CLINIC | Age: 89
End: 2024-02-14
Payer: MEDICARE

## 2024-02-14 DIAGNOSIS — Z91.81 RISK FOR FALLS: Primary | ICD-10-CM

## 2024-02-14 DIAGNOSIS — M54.40 LOW BACK PAIN WITH SCIATICA, SCIATICA LATERALITY UNSPECIFIED, UNSPECIFIED BACK PAIN LATERALITY, UNSPECIFIED CHRONICITY: ICD-10-CM

## 2024-02-14 NOTE — TELEPHONE ENCOUNTER
LOV 8-18-23    Pt's wife states pt is not able to stand with a regular walker. Pt had eval by  nurse and physical therapist.  She is requesting a new order to  a rollator.    Please advise  Thanks

## 2024-02-15 ENCOUNTER — PATIENT MESSAGE (OUTPATIENT)
Dept: ADMINISTRATIVE | Facility: OTHER | Age: 89
End: 2024-02-15
Payer: MEDICARE

## 2024-02-16 ENCOUNTER — TELEPHONE (OUTPATIENT)
Dept: HEMATOLOGY/ONCOLOGY | Facility: CLINIC | Age: 89
End: 2024-02-16
Payer: MEDICARE

## 2024-02-16 ENCOUNTER — PATIENT MESSAGE (OUTPATIENT)
Dept: ADMINISTRATIVE | Facility: OTHER | Age: 89
End: 2024-02-16
Payer: MEDICARE

## 2024-02-16 ENCOUNTER — DOCUMENTATION ONLY (OUTPATIENT)
Dept: AUDIOLOGY | Facility: CLINIC | Age: 89
End: 2024-02-16
Payer: MEDICARE

## 2024-02-16 DIAGNOSIS — R53.1 GENERALIZED WEAKNESS: ICD-10-CM

## 2024-02-16 DIAGNOSIS — C67.9 MALIGNANT NEOPLASM OF URINARY BLADDER, UNSPECIFIED SITE: Primary | ICD-10-CM

## 2024-02-16 NOTE — TELEPHONE ENCOUNTER
Called pt per Benita to get more information on the need for a Rolator..   No answer. Left voicemail.

## 2024-02-16 NOTE — TELEPHONE ENCOUNTER
Received message about weakness and inability to ambulate without assistance. He is working with PT/OT but requires additional assistance and frequent rest breaks. Rollator order placed.     The mobility limitation cannot be sufficiently resolved by the use of a cane. Patient's functional mobility deficit can be sufficiently resolved with the use of a rollator walker. Patient's mobility limitation significantly impairs their ability to participate in one of more activities of daily living. The use of a rollator will significantly improve the patient's ability to participate in MRADLS and the patient will use it on regular basis in the home.

## 2024-02-16 NOTE — PROGRESS NOTES
[facility] window to have [facility] hearing aids checked.  Mr. Magaña states that his right hearing aid has static.   was replaced and both hearing aids were cleaned and tested.  I reminded Mrs. Magaña that his right hearing aid is going out of warranty in May.  She will be dropping them both off either the end of April or some time in May to send them in for end of warranty check.    Jeison Bautista M90-RT   Champagne   Purchase Date: 11/20/2019   Lt SN: [id number]   Rt SN: [id number]   : M2  Dome: Small Vented  Lt Warranty Exp: 11/14/2024   Rt Warrant Exp: 05/22/2024

## 2024-02-17 ENCOUNTER — PATIENT MESSAGE (OUTPATIENT)
Dept: ADMINISTRATIVE | Facility: OTHER | Age: 89
End: 2024-02-17
Payer: MEDICARE

## 2024-02-17 ENCOUNTER — PATIENT MESSAGE (OUTPATIENT)
Dept: HEMATOLOGY/ONCOLOGY | Facility: CLINIC | Age: 89
End: 2024-02-17
Payer: MEDICARE

## 2024-02-18 ENCOUNTER — PATIENT MESSAGE (OUTPATIENT)
Dept: ADMINISTRATIVE | Facility: OTHER | Age: 89
End: 2024-02-18
Payer: MEDICARE

## 2024-02-19 ENCOUNTER — PATIENT MESSAGE (OUTPATIENT)
Dept: ADMINISTRATIVE | Facility: OTHER | Age: 89
End: 2024-02-19
Payer: MEDICARE

## 2024-02-20 ENCOUNTER — PATIENT MESSAGE (OUTPATIENT)
Dept: ADMINISTRATIVE | Facility: OTHER | Age: 89
End: 2024-02-20
Payer: MEDICARE

## 2024-02-21 ENCOUNTER — PATIENT MESSAGE (OUTPATIENT)
Dept: ADMINISTRATIVE | Facility: OTHER | Age: 89
End: 2024-02-21
Payer: MEDICARE

## 2024-02-22 ENCOUNTER — PATIENT MESSAGE (OUTPATIENT)
Dept: ADMINISTRATIVE | Facility: OTHER | Age: 89
End: 2024-02-22
Payer: MEDICARE

## 2024-02-23 ENCOUNTER — PATIENT MESSAGE (OUTPATIENT)
Dept: ADMINISTRATIVE | Facility: OTHER | Age: 89
End: 2024-02-23
Payer: MEDICARE

## 2024-02-24 ENCOUNTER — PATIENT MESSAGE (OUTPATIENT)
Dept: ADMINISTRATIVE | Facility: OTHER | Age: 89
End: 2024-02-24
Payer: MEDICARE

## 2024-02-25 ENCOUNTER — PATIENT MESSAGE (OUTPATIENT)
Dept: ADMINISTRATIVE | Facility: OTHER | Age: 89
End: 2024-02-25
Payer: MEDICARE

## 2024-02-26 ENCOUNTER — PATIENT MESSAGE (OUTPATIENT)
Dept: ADMINISTRATIVE | Facility: OTHER | Age: 89
End: 2024-02-26
Payer: MEDICARE

## 2024-02-26 PROBLEM — J96.01 ACUTE HYPOXIC RESPIRATORY FAILURE: Status: RESOLVED | Noted: 2023-11-21 | Resolved: 2024-02-26

## 2024-02-27 ENCOUNTER — PATIENT MESSAGE (OUTPATIENT)
Dept: ADMINISTRATIVE | Facility: OTHER | Age: 89
End: 2024-02-27
Payer: MEDICARE

## 2024-02-27 ENCOUNTER — OFFICE VISIT (OUTPATIENT)
Dept: HEMATOLOGY/ONCOLOGY | Facility: CLINIC | Age: 89
End: 2024-02-27
Payer: MEDICARE

## 2024-02-27 ENCOUNTER — LAB VISIT (OUTPATIENT)
Dept: LAB | Facility: HOSPITAL | Age: 89
End: 2024-02-27
Attending: INTERNAL MEDICINE
Payer: MEDICARE

## 2024-02-27 ENCOUNTER — DOCUMENTATION ONLY (OUTPATIENT)
Dept: AUDIOLOGY | Facility: CLINIC | Age: 89
End: 2024-02-27
Payer: MEDICARE

## 2024-02-27 VITALS
DIASTOLIC BLOOD PRESSURE: 61 MMHG | SYSTOLIC BLOOD PRESSURE: 119 MMHG | HEIGHT: 64 IN | HEART RATE: 88 BPM | WEIGHT: 127.75 LBS | OXYGEN SATURATION: 99 % | TEMPERATURE: 98 F | BODY MASS INDEX: 21.81 KG/M2

## 2024-02-27 DIAGNOSIS — D84.81 IMMUNODEFICIENCY SECONDARY TO NEOPLASM: ICD-10-CM

## 2024-02-27 DIAGNOSIS — N18.30 CKD STAGE 3 DUE TO TYPE 2 DIABETES MELLITUS: ICD-10-CM

## 2024-02-27 DIAGNOSIS — R53.1 GENERALIZED WEAKNESS: ICD-10-CM

## 2024-02-27 DIAGNOSIS — N39.0 URINARY TRACT INFECTION WITHOUT HEMATURIA, SITE UNSPECIFIED: ICD-10-CM

## 2024-02-27 DIAGNOSIS — E11.59 HYPERTENSION ASSOCIATED WITH DIABETES: ICD-10-CM

## 2024-02-27 DIAGNOSIS — Z79.899 IMMUNODEFICIENCY SECONDARY TO CHEMOTHERAPY: ICD-10-CM

## 2024-02-27 DIAGNOSIS — E11.69 HYPERLIPIDEMIA ASSOCIATED WITH TYPE 2 DIABETES MELLITUS: ICD-10-CM

## 2024-02-27 DIAGNOSIS — D49.9 IMMUNODEFICIENCY SECONDARY TO NEOPLASM: ICD-10-CM

## 2024-02-27 DIAGNOSIS — C67.9 MALIGNANT NEOPLASM OF URINARY BLADDER, UNSPECIFIED SITE: ICD-10-CM

## 2024-02-27 DIAGNOSIS — I15.2 HYPERTENSION ASSOCIATED WITH DIABETES: ICD-10-CM

## 2024-02-27 DIAGNOSIS — E78.5 HYPERLIPIDEMIA ASSOCIATED WITH TYPE 2 DIABETES MELLITUS: ICD-10-CM

## 2024-02-27 DIAGNOSIS — T45.1X5A IMMUNODEFICIENCY SECONDARY TO CHEMOTHERAPY: ICD-10-CM

## 2024-02-27 DIAGNOSIS — E11.22 CKD STAGE 3 DUE TO TYPE 2 DIABETES MELLITUS: ICD-10-CM

## 2024-02-27 DIAGNOSIS — D84.821 IMMUNODEFICIENCY SECONDARY TO CHEMOTHERAPY: ICD-10-CM

## 2024-02-27 DIAGNOSIS — C67.9 MALIGNANT NEOPLASM OF URINARY BLADDER, UNSPECIFIED SITE: Primary | ICD-10-CM

## 2024-02-27 LAB
ALBUMIN SERPL BCP-MCNC: 2.9 G/DL (ref 3.5–5.2)
ALP SERPL-CCNC: 57 U/L (ref 55–135)
ALT SERPL W/O P-5'-P-CCNC: 28 U/L (ref 10–44)
ANION GAP SERPL CALC-SCNC: 10 MMOL/L (ref 8–16)
AST SERPL-CCNC: 23 U/L (ref 10–40)
BASOPHILS # BLD AUTO: 0.05 K/UL (ref 0–0.2)
BASOPHILS NFR BLD: 0.7 % (ref 0–1.9)
BILIRUB SERPL-MCNC: 0.6 MG/DL (ref 0.1–1)
BUN SERPL-MCNC: 25 MG/DL (ref 8–23)
CALCIUM SERPL-MCNC: 9.3 MG/DL (ref 8.7–10.5)
CHLORIDE SERPL-SCNC: 107 MMOL/L (ref 95–110)
CO2 SERPL-SCNC: 22 MMOL/L (ref 23–29)
CREAT SERPL-MCNC: 0.8 MG/DL (ref 0.5–1.4)
DIFFERENTIAL METHOD BLD: ABNORMAL
EOSINOPHIL # BLD AUTO: 0 K/UL (ref 0–0.5)
EOSINOPHIL NFR BLD: 0.3 % (ref 0–8)
ERYTHROCYTE [DISTWIDTH] IN BLOOD BY AUTOMATED COUNT: 16.3 % (ref 11.5–14.5)
EST. GFR  (NO RACE VARIABLE): >60 ML/MIN/1.73 M^2
GLUCOSE SERPL-MCNC: 138 MG/DL (ref 70–110)
HCT VFR BLD AUTO: 44.8 % (ref 40–54)
HGB BLD-MCNC: 15.2 G/DL (ref 14–18)
IMM GRANULOCYTES # BLD AUTO: 0.1 K/UL (ref 0–0.04)
IMM GRANULOCYTES NFR BLD AUTO: 1.3 % (ref 0–0.5)
LYMPHOCYTES # BLD AUTO: 2.7 K/UL (ref 1–4.8)
LYMPHOCYTES NFR BLD: 35.2 % (ref 18–48)
MCH RBC QN AUTO: 31.5 PG (ref 27–31)
MCHC RBC AUTO-ENTMCNC: 33.9 G/DL (ref 32–36)
MCV RBC AUTO: 93 FL (ref 82–98)
MONOCYTES # BLD AUTO: 1 K/UL (ref 0.3–1)
MONOCYTES NFR BLD: 12.4 % (ref 4–15)
NEUTROPHILS # BLD AUTO: 3.8 K/UL (ref 1.8–7.7)
NEUTROPHILS NFR BLD: 50.1 % (ref 38–73)
NRBC BLD-RTO: 0 /100 WBC
PLATELET # BLD AUTO: 255 K/UL (ref 150–450)
PMV BLD AUTO: 11.3 FL (ref 9.2–12.9)
POTASSIUM SERPL-SCNC: 4.3 MMOL/L (ref 3.5–5.1)
PROT SERPL-MCNC: 5.6 G/DL (ref 6–8.4)
RBC # BLD AUTO: 4.83 M/UL (ref 4.6–6.2)
SODIUM SERPL-SCNC: 139 MMOL/L (ref 136–145)
WBC # BLD AUTO: 7.64 K/UL (ref 3.9–12.7)

## 2024-02-27 PROCEDURE — 99999 PR PBB SHADOW E&M-EST. PATIENT-LVL IV: CPT | Mod: PBBFAC,,, | Performed by: REGISTERED NURSE

## 2024-02-27 PROCEDURE — 99214 OFFICE O/P EST MOD 30 MIN: CPT | Mod: PBBFAC | Performed by: REGISTERED NURSE

## 2024-02-27 PROCEDURE — 80053 COMPREHEN METABOLIC PANEL: CPT | Performed by: INTERNAL MEDICINE

## 2024-02-27 PROCEDURE — 99214 OFFICE O/P EST MOD 30 MIN: CPT | Mod: S$PBB,,, | Performed by: REGISTERED NURSE

## 2024-02-27 PROCEDURE — 85025 COMPLETE CBC W/AUTO DIFF WBC: CPT | Performed by: INTERNAL MEDICINE

## 2024-02-27 PROCEDURE — 36415 COLL VENOUS BLD VENIPUNCTURE: CPT | Performed by: INTERNAL MEDICINE

## 2024-02-27 RX ORDER — MUPIROCIN 20 MG/G
OINTMENT TOPICAL 2 TIMES DAILY
COMMUNITY
Start: 2024-02-22

## 2024-02-27 RX ORDER — DOXYCYCLINE 100 MG/1
CAPSULE ORAL
COMMUNITY
Start: 2024-02-22 | End: 2024-03-07

## 2024-02-27 NOTE — PROGRESS NOTES
Patient is sending in both hearing aids and  to have a end of warranty check.  Hearing aids also have crackling.  Aysha hearing aids were given to him.  Aysha SN: [id number]  I will reach out to Mrs. Magaña for  once we receive them back in the clinic.     Jeison Bautista M90-RT   Champagne   Purchase Date: 11/20/2019  Lt SN: [id number]   Rt SN: [id number]   : DEBBIE Bello: Small Vented  Lt Warranty Exp: 11/14/2024   Rt Warrant Exp: 05/22/2024

## 2024-02-27 NOTE — PROGRESS NOTES
"Subjective     Patient ID: Cliff Magaña is a 88 y.o. male.    Chief Complaint: No chief complaint on file.    HPI    Diagnosis: Muscle invasive bladder cancer     Last radiation session on 2/8/24  Has rash to hands - initially looked "like he had been in a cat fight"   Saw Dr. Eason (dermatology) on 2/22/24  Started on topical and oral antibiotics (BID x 10 days - started Friday)  Areas starting to improve but taking a while   Remains fatigued, sleeps on and off during the day   States feeling a little better overall over last few weeks   Wakes up every 1-1.5 hours to urinate at night, unchanged from prior   Appetite starting to improve, able to eat 1/2 sandwich at times   Still with taste changes - salt is more prominent taste despite limiting salt in foods   Supplementing with protein shakes/smoothies   Rare diarrhea  Working with  PT - encouraged to increase movement daily  States they are coming at 8:15 tomorrow morning   Using rollator with improvement in ambulation  No fevers or chills  No other concerns     Oncology History:  - bladder cancer incidentally found on imaging as he had been under surveillance for renal cysts  (10/17/2023 ultrasound showed stable left renal cysts and new bladder masses)     -  11/16/2023 TURBT of bladder tumor  Findings:   1. Right postero-lateral papillary bladder wall tumor (3-4cm) with diffusely erythematous and nodular surrounding tissue, right < 1cm papillary bladder wall tumor immediately lateral to the larger postero-lateral papillary tumor, and a right nodular 4cm bladder tumor with high grade and possibly invasive appearance. Tumors resected, hemostasis achieved.  Several other patches of erythema were diffusely identified on the left lateral bladder wall.  2. Bimanual exam post-TURBT showed freely mobile bladder without abnormalities  3. Bilateral retrograde pyelogram showed delicate calices with no evidence of hydronephrosis, no filling defects, and ureter " normal in course and caliber, bilaterally  Pathology:  1. Bladder, right posterolateral wall, transurethral resection of bladder tumor:  - Noninvasive high-grade papillary urothelial carcinoma  - Muscularis propria present  - Multiple H&E levels evaluated  2. Bladder, right anterior, transurethral resection of bladder tumor:  - Invasive high-grade papillary urothelial carcinoma  - Tumor invades muscularis propria  - Intact expression of DNA mismatch repair proteins in tumor by immunohistochemistry (see comment)  COMMENT:  Immunohistochemistry (IHC) Testing for Mismatch Repair (MMR) Proteins:  MLH1 - Intact nuclear expression  MSH2 - Intact nuclear expression  MSH6 - Intact nuclear expression  PMS2 - Intact nuclear expression  Background nonneoplastic tissue/internal control with intact nuclear expression  IHC Interpretation  No loss of nuclear expression of MMR proteins: low probability of microsatellite instability  There are exceptions to the above IHC interpretations. These results should not be considered in isolation, and clinical correlation with genetic counseling is recommended to assess the need for germline testing.     - 11/25/2023 CT Abd/Pelvis:  FINDINGS:  Lung base: Bilateral small volume pleural effusion increased in size compared to recent prior, associated with mild compression atelectasis.  Visualized portion of heart: Coronary artery calcification.  Liver: Normal in size.  Left lobe small calcified granuloma.  No suspicious focal lesion.  Gallbladder: Cholecystectomy.  Bile duct: No intra-or extrahepatic biliary ductal dilatation.  Spleen: Unremarkable.  Pancreas: Parenchymal atrophy.  Multiple parenchymal calcifications suggesting chronic pancreatitis.  No suspicious focal lesion.  No pancreatic ductal dilatation.  No adjacent inflammatory changes or fluid collections.  Adrenal glands: Unremarkable.  Genitourinary: Bilateral renal cysts.  Subcentimeter hypodensities in both kidneys, which are too  small to characterize.  The ureters appear normal in course and caliber.  No evidence of nephrolithiasis or hydroureteronephrosis.  Urinary bladder: Postsurgical change in the anterior bladder wall from TURBT.  There are small foci of air in the bladder, likely iatrogenic.  Reproductive organs: Unremarkable.  GI tract: Small hiatal hernia.  The stomach is unremarkable.  The visualized loops of small and large bowel show no evidence of obstruction or inflammation. Diverticulosis.  Appendix unremarkable.  Peritoneum: No free air or fluid.  Retroperitoneum: No significant adenopathy.  Vasculature: Normal caliber of abdominal aorta with moderate calcified and noncalcified atherosclerosis.  Abdominal wall: Tiny fat containing umbilical hernia.  Bones: Stable T12-L1 intervertebral disc calcification.  No suspicious sclerotic lesions.  No acute fracture.  Impression:  Postoperative changes from of TURBT.  No lymphadenopathy.  No distant metastases.  Other findings as described.     - 12/6/2023 CT Chest:  FINDINGS:  Comparison is 11/21/2023.  No mediastinal, hilar or axillary adenopathy is seen.  There is a right lower pole thyroid nodule.  There are coronary calcifications.  There is a left upper pole renal cyst.  There is a small hiatal hernia.  Trachea and bronchi are patent.  There is no evidence of interstitial lung disease, bronchiectasis, airway trapping, or emphysema.  No suspicious pulmonary nodules, masses, or infiltrates are seen.  There is a calcified left lung granuloma.  Bones demonstrate nothing focal.  Impression:  No significant abnormality seen.  Right lobe thyroid nodule.  Coronary artery calcifications.  Left upper pole renal cyst.     PMH:  Active Ambulatory Problems        Diagnosis   Date Noted       CKD stage 3 due to type 2 diabetes mellitus     11/23/2012       Chronic anticoagulation 11/23/2012       Hypertension associated with diabetes     05/22/2013       Hyperlipidemia associated with type 2  diabetes mellitus     05/22/2013       Persistent atrial fibrillation      06/11/2014       Atherosclerosis of aorta      11/18/2020       Sensorineural hearing loss (SNHL) of both ears  11/19/2020       Risk for falls    12/15/2021       Renal cyst  12/14/2022       DM type 2, controlled, with complication  12/14/2022       Bladder tumor     11/16/2023       UTI (urinary tract infection) 11/21/2023       Acute hypoxic respiratory failure   11/21/2023       Elevated troponin 11/21/2023       Thyroid nodules   11/21/2023     Resolved Ambulatory Problems        Diagnosis   Date Noted       Atrial fibrillation     08/02/2012       Long term (current) use of anticoagulants 08/02/2012       Family history of diabetes mellitus 11/23/2012       Ex-cigarette smoker     11/23/2012       Metabolic syndrome      11/23/2012       Screen for colon cancer 05/07/2014       Ex-smoker   11/09/2015       Hyperglycemia     12/16/2015       Abscess of chest wall   06/25/2018       Sebaceous cyst    07/25/2018       Decreased back mobility 08/20/2019       Decreased strength of trunk and back      08/20/2019       Decreased range of motion of both hips    08/20/2019       Pain aggravated by walking    08/20/2019       Hamstring tightness of both lower extremities   08/20/2019       Impaired mobility and endurance     08/20/2019       Low back pain     11/19/2020       Chronic pain      04/28/2021       Type 2 diabetes mellitus, without long-term current use of insulin      12/13/2021       CKD stage 3 secondary to diabetes   12/13/2021       Encounter for preventive health examination     12/19/2022     Past Medical History:  Diagnosis   Date       *Atrial fibrillation           Chronic kidney disease         Deep vein thrombosis           Hyperlipidemia           Hypertension        Cholecystectomy  Tonsillectomy  Skin cancer removal     FH:  Paternal Aunt- recalls cancer affecting scalp     SH:    Retired [employer], followed by off  shore work,   4 children (1 passed as an infant)  Quit tobacco in the 1980s  + EtOH    Review of Systems   Constitutional:  Positive for activity change, appetite change (starting to improve) and fatigue. Negative for chills and fever.   HENT:  Positive for hearing loss. Negative for nasal congestion, postnasal drip, rhinorrhea, sinus pressure/congestion, sore throat and tinnitus.    Eyes:  Negative for visual disturbance.   Respiratory:  Negative for cough, chest tightness, shortness of breath and wheezing.    Cardiovascular:  Negative for chest pain, palpitations and leg swelling.   Gastrointestinal:  Negative for abdominal distention, abdominal pain, blood in stool, change in bowel habit, constipation, diarrhea, nausea and vomiting.   Genitourinary:  Positive for frequency (stable). Negative for decreased urine volume, difficulty urinating, dysuria, hematuria and urgency.   Musculoskeletal:  Negative for arthralgias, back pain, myalgias, neck pain and neck stiffness.   Integumentary:  Negative for rash.        Dry skin   Neurological:  Positive for weakness (generalized, improving). Negative for dizziness, light-headedness, numbness and headaches.   Psychiatric/Behavioral:  Negative for agitation, behavioral problems, confusion and dysphoric mood. The patient is not nervous/anxious.           Objective     Physical Exam  Vitals and nursing note reviewed.   Constitutional:       General: He is not in acute distress.     Appearance: Normal appearance. He is ill-appearing (chronically).      Comments: Presents with his wife  ECOG= 1  Using rollator walker   HENT:      Head: Normocephalic and atraumatic.      Comments: Hearing aids     Mouth/Throat:      Mouth: Mucous membranes are moist.      Pharynx: Oropharynx is clear. No oropharyngeal exudate or posterior oropharyngeal erythema.   Eyes:      General: No scleral icterus.     Extraocular Movements: Extraocular movements intact.      Conjunctiva/sclera:  Conjunctivae normal.      Pupils: Pupils are equal, round, and reactive to light.   Cardiovascular:      Rate and Rhythm: Normal rate and regular rhythm.      Heart sounds: No murmur heard.     No friction rub.   Pulmonary:      Effort: Pulmonary effort is normal. No respiratory distress.      Breath sounds: Normal breath sounds. No wheezing, rhonchi or rales.   Abdominal:      General: Abdomen is flat. Bowel sounds are normal. There is no distension.      Palpations: Abdomen is soft. There is no mass.      Tenderness: There is no abdominal tenderness. There is no guarding or rebound.      Comments: No organomegaly   Musculoskeletal:         General: No swelling, tenderness or deformity. Normal range of motion.      Right lower leg: No edema.      Left lower leg: No edema.   Skin:     General: Skin is warm and dry.      Coloration: Skin is not jaundiced or pale.      Findings: No bruising, erythema or rash.   Neurological:      General: No focal deficit present.      Mental Status: He is alert and oriented to person, place, and time. Mental status is at baseline.      Sensory: No sensory deficit.      Motor: Weakness (generalized) present.      Gait: Gait normal.   Psychiatric:         Mood and Affect: Mood normal.         Behavior: Behavior normal.         Thought Content: Thought content normal.         Judgment: Judgment normal.     Labs- reviewed     Assessment and Plan     1. 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 Immunodeficiency secondary to neoplasm    4. Generalized weakness    5. Hypertension associated with diabetes    6. Hyperlipidemia associated with type 2 diabetes mellitus    7. CKD stage 3 due to type 2 diabetes mellitus    8. Urinary tract infection without hematuria, site unspecified      Had increased chemotherapy side effects and recurrent UTIs with associated declining performance status   Completed XRT and held off on last  cycle of chemo     Doing better today - will continue to monitor for further Improvement   We will not rescan for 2-4 more weeks     Home PT- set up by hospital- doing well with this   States encouraged by team to increase walking to 7 minutes daily     HTN, DM, Hyperlipidemia--   All managed well with PCP  BP controlled in clinic  Continue current medications  No adjustments are needed  Monitor     Previously UTI symptoms s/p treatment  Symptoms improving, no new concerns  UA pending today.     Patient is in agreement with the proposed treatment plan. All questions were answered to the patient's satisfaction. Pt knows to call clinic if anything is needed before the next clinic visit.    Patient discussed with collaborating physician, Dr. Bland.    At least 30 minutes were spent today on this encounter including face to face time with the patient, data gathering/interpretation and documentation.       Benita Mccann, MSN, APRN, [facility]  Hematology and Medical Oncology  Clinical Nurse Specialist to Dr. Coombs, Dr. Bland & Dr. Matos    Route Chart for Scheduling    Med Onc Chart Routing      Follow up with physician . Rtc in 3-4 weeks with labs and scans (done 1-2 days prior) to see Dr. Bland in clinic to review test results and treatment discussion   Follow up with [facility]    Infusion scheduling note    Injection scheduling note    Labs CBC and CMP   Scheduling:  Preferred lab:  Lab interval:     Imaging    Pharmacy appointment    Other referrals              Treatment Plan Information   OP CISPLATIN WEEKLY + RT   Elizabeth Bland MD   Upcoming Treatment Dates - OP CISPLATIN WEEKLY + RT    2/6/2024       Chemotherapy       CISplatin (Platinol) 25 mg/m2 = 42 mg in sodium chloride 0.9% 292 mL chemo infusion       Pre-Hydration       sodium chloride 0.9% 500 mL with magnesium sulfate 1 g, potassium chloride 10 mEq infusion       Post-Hydration       sodium chloride 0.9% bolus 500 mL 500 mL       Antiemetics        aprepitant (CINVANTI) injection 130 mg       palonosetron (ALOXI) 0.25 mg with Dexamethasone (DECADRON) 12 mg in NS 50 mL IVPB  2/13/2024       Chemotherapy       CISplatin (Platinol) 25 mg/m2 = 42 mg in sodium chloride 0.9% 292 mL chemo infusion       Pre-Hydration       sodium chloride 0.9% 500 mL with magnesium sulfate 1 g, potassium chloride 10 mEq infusion       Post-Hydration       sodium chloride 0.9% bolus 500 mL 500 mL       Antiemetics       aprepitant (CINVANTI) injection 130 mg       palonosetron (ALOXI) 0.25 mg with Dexamethasone (DECADRON) 12 mg in NS 50 mL IVPB

## 2024-02-27 NOTE — PROGRESS NOTES
Loaner hearing aids were programmed for patient to use while hearing aids are out for repair and given to his wife. Patient's wife was also given loaner charge.

## 2024-02-28 ENCOUNTER — PATIENT MESSAGE (OUTPATIENT)
Dept: ADMINISTRATIVE | Facility: OTHER | Age: 89
End: 2024-02-28
Payer: MEDICARE

## 2024-02-28 ENCOUNTER — PATIENT MESSAGE (OUTPATIENT)
Dept: INTERNAL MEDICINE | Facility: CLINIC | Age: 89
End: 2024-02-28
Payer: MEDICARE

## 2024-02-28 RX ORDER — FENOFIBRATE 200 MG/1
CAPSULE ORAL
Qty: 30 CAPSULE | Refills: 0 | Status: SHIPPED | OUTPATIENT
Start: 2024-02-28

## 2024-02-28 RX ORDER — FENOFIBRATE 200 MG/1
CAPSULE ORAL
Qty: 90 CAPSULE | Refills: 3 | Status: SHIPPED | OUTPATIENT
Start: 2024-02-28 | End: 2024-02-28

## 2024-02-28 RX ORDER — FENOFIBRATE 200 MG/1
CAPSULE ORAL
Qty: 90 CAPSULE | Refills: 3 | Status: SHIPPED | OUTPATIENT
Start: 2024-02-28 | End: 2024-03-05 | Stop reason: SDUPTHER

## 2024-02-29 ENCOUNTER — PATIENT MESSAGE (OUTPATIENT)
Dept: ADMINISTRATIVE | Facility: OTHER | Age: 89
End: 2024-02-29
Payer: MEDICARE

## 2024-02-29 ENCOUNTER — HOSPITAL ENCOUNTER (OUTPATIENT)
Dept: RADIOLOGY | Facility: HOSPITAL | Age: 89
Discharge: HOME | End: 2024-02-29
Attending: UROLOGY
Payer: MEDICARE

## 2024-02-29 DIAGNOSIS — C67.0 MALIGNANT NEOPLASM OF TRIGONE OF URINARY BLADDER: ICD-10-CM

## 2024-02-29 PROCEDURE — 74178 CT ABD&PLV WO CNTR FLWD CNTR: CPT | Mod: TC

## 2024-02-29 PROCEDURE — 74178 CT ABD&PLV WO CNTR FLWD CNTR: CPT | Mod: 26,,, | Performed by: STUDENT IN AN ORGANIZED HEALTH CARE EDUCATION/TRAINING PROGRAM

## 2024-02-29 PROCEDURE — 25500020 PHARM REV CODE 255: Performed by: UROLOGY

## 2024-02-29 RX ADMIN — IOHEXOL 100 ML: 350 INJECTION, SOLUTION INTRAVENOUS at 01:02

## 2024-03-01 ENCOUNTER — PATIENT MESSAGE (OUTPATIENT)
Dept: ADMINISTRATIVE | Facility: OTHER | Age: 89
End: 2024-03-01
Payer: MEDICARE

## 2024-03-01 ENCOUNTER — TELEPHONE (OUTPATIENT)
Dept: UROLOGY | Facility: CLINIC | Age: 89
End: 2024-03-01

## 2024-03-02 ENCOUNTER — PATIENT MESSAGE (OUTPATIENT)
Dept: ADMINISTRATIVE | Facility: OTHER | Age: 89
End: 2024-03-02
Payer: MEDICARE

## 2024-03-03 ENCOUNTER — PATIENT MESSAGE (OUTPATIENT)
Dept: ADMINISTRATIVE | Facility: OTHER | Age: 89
End: 2024-03-03
Payer: MEDICARE

## 2024-03-04 ENCOUNTER — PROCEDURE VISIT (OUTPATIENT)
Dept: UROLOGY | Facility: CLINIC | Age: 89
End: 2024-03-04
Payer: MEDICARE

## 2024-03-04 ENCOUNTER — TELEPHONE (OUTPATIENT)
Dept: PREADMISSION TESTING | Facility: HOSPITAL | Age: 89
End: 2024-03-04
Payer: MEDICARE

## 2024-03-04 ENCOUNTER — PATIENT MESSAGE (OUTPATIENT)
Dept: ADMINISTRATIVE | Facility: OTHER | Age: 89
End: 2024-03-04
Payer: MEDICARE

## 2024-03-04 VITALS
SYSTOLIC BLOOD PRESSURE: 127 MMHG | TEMPERATURE: 98 F | DIASTOLIC BLOOD PRESSURE: 76 MMHG | HEART RATE: 83 BPM | HEIGHT: 64 IN | RESPIRATION RATE: 18 BRPM | WEIGHT: 123.25 LBS | BODY MASS INDEX: 21.04 KG/M2

## 2024-03-04 DIAGNOSIS — C67.0 MALIGNANT NEOPLASM OF TRIGONE OF URINARY BLADDER: ICD-10-CM

## 2024-03-04 LAB
BACTERIA #/AREA URNS AUTO: ABNORMAL /HPF
BILIRUB UR QL STRIP: NEGATIVE
CLARITY UR REFRACT.AUTO: ABNORMAL
COLOR UR AUTO: YELLOW
GLUCOSE UR QL STRIP: ABNORMAL
HGB UR QL STRIP: ABNORMAL
HYALINE CASTS UR QL AUTO: 0 /LPF
KETONES UR QL STRIP: NEGATIVE
LEUKOCYTE ESTERASE UR QL STRIP: ABNORMAL
MICROSCOPIC COMMENT: ABNORMAL
NITRITE UR QL STRIP: NEGATIVE
PH UR STRIP: 6 [PH] (ref 5–8)
PROT UR QL STRIP: ABNORMAL
RBC #/AREA URNS AUTO: >100 /HPF (ref 0–4)
SP GR UR STRIP: 1.02 (ref 1–1.03)
SQUAMOUS #/AREA URNS AUTO: 1 /HPF
URN SPEC COLLECT METH UR: ABNORMAL
WBC #/AREA URNS AUTO: >100 /HPF (ref 0–5)
YEAST UR QL AUTO: ABNORMAL

## 2024-03-04 PROCEDURE — 81001 URINALYSIS AUTO W/SCOPE: CPT | Performed by: UROLOGY

## 2024-03-04 PROCEDURE — 87086 URINE CULTURE/COLONY COUNT: CPT | Performed by: UROLOGY

## 2024-03-04 PROCEDURE — 52000 CYSTOURETHROSCOPY: CPT | Mod: PBBFAC | Performed by: UROLOGY

## 2024-03-04 RX ORDER — LIDOCAINE HYDROCHLORIDE 20 MG/ML
JELLY TOPICAL ONCE
Status: COMPLETED | OUTPATIENT
Start: 2024-03-04 | End: 2024-03-04

## 2024-03-04 RX ADMIN — LIDOCAINE HYDROCHLORIDE 10 ML: 20 JELLY TOPICAL at 12:03

## 2024-03-04 NOTE — TELEPHONE ENCOUNTER
----- Message from Nereyda Birmingham MD sent at 3/4/2024  2:13 PM CST -----  Regarding: RE: preop eliquis inst  Patient can hold Eliquis for 72 hours and fenofibrate for 5 days prior to the surgery and restart asap  ----- Message -----  From: Naren Quintero RN  Sent: 3/4/2024   1:30 PM CST  To: Nereyda Birmingham MD  Subject: preop eliquis inst                               Dr. Birmingham,     Mr. Magaña is scheduled for another TURBT on 03/20/24. Requesting preop instructions for his eliquis please. Much appreciated.     Thank you,  Naren Quintero RN  [facility] [place]

## 2024-03-04 NOTE — PROCEDURES
Cystoscopy    Date/Time: 3/4/2024 12:30 PM    Performed by: Wellington Story MD  Authorized by: Wellington Story MD      Office Cystoscopy Procedure Note    Date of Procedure: 03/04/2024    Indication:   Muscle invasive bladder cancer status post XRT       Informed consent:  The risks, benefits, complications, treatment options, and expected outcomes were discussed with the patient. The patient concurred with the proposed plan and provided informed consent.     Anesthesia: Lidocaine jelly 2%     Antibiotic prophylaxis: None     Procedure:  The patient was placed in the lithotomy position, was prepped and draped in the usual manner using sterile technique, and 2% lidocaine jelly instilled into the urethra.  A procedural timeout was performed identifying the patient, all in attendance were in agreement, including the patient. A 17 F flexible cystoscope was then inserted into the urethra and the urethra and bladder carefully examined.  The following findings were noted:     Findings:   1. Normal anterior urethra without evidence of strictures or tumors   2. Prostatic urethra open without signs of obstruction  3. Bladder with nodular growth at the dome and posterior bladder wall, consistent with recurrent carcinoma.  Surrounding erythema.  Ureteral orifices in orthotopic position bilaterally        Specimens: None   Complications: None; patient tolerated the procedure well          Disposition: Home after brief observation   Condition: Stable     Assessment:  80-year-old male status post chemoradiation for muscle invasive bladder cancer    Plan:  We will plan for repeat TURBT to rule out persistent residual/viable tumor.  We will discuss case with Dr. Schofield and Edwina, and see patient back in clinic to discuss repeat TURBT.    Attending Attestation:      I personally performed the procedure.     Wellington Story  12:33 PM  03/04/2024

## 2024-03-04 NOTE — PATIENT INSTRUCTIONS
What to Expect After a Cystoscopy  For the next 24-48 hours, you may feel a mild burning when you urinate. This burning is normal and expected. Drink plenty of water to dilute the urine to help relieve the burning sensation. You may also see a small amount of blood in your urine after the procedure.    Unless you are already taking antibiotics, you may be given an antibiotic after the test to prevent infection.    Signs and Symptoms to Report  Call the [facility] at [phone] if you develop any of the following:  Fever of 101 degrees or higher  Chills or persistent bleeding  Inability to urinate

## 2024-03-05 ENCOUNTER — OFFICE VISIT (OUTPATIENT)
Dept: OTOLARYNGOLOGY | Facility: CLINIC | Age: 89
End: 2024-03-05
Payer: MEDICARE

## 2024-03-05 ENCOUNTER — PATIENT MESSAGE (OUTPATIENT)
Dept: ADMINISTRATIVE | Facility: OTHER | Age: 89
End: 2024-03-05
Payer: MEDICARE

## 2024-03-05 VITALS — HEART RATE: 81 BPM | DIASTOLIC BLOOD PRESSURE: 73 MMHG | SYSTOLIC BLOOD PRESSURE: 112 MMHG

## 2024-03-05 DIAGNOSIS — H61.23 IMPACTED CERUMEN OF BOTH EARS: ICD-10-CM

## 2024-03-05 DIAGNOSIS — H93.8X3 EAR FULLNESS, BILATERAL: Primary | ICD-10-CM

## 2024-03-05 LAB — BACTERIA UR CULT: NO GROWTH

## 2024-03-05 PROCEDURE — 99213 OFFICE O/P EST LOW 20 MIN: CPT | Mod: PBBFAC | Performed by: OTOLARYNGOLOGY

## 2024-03-05 PROCEDURE — 99999 PR PBB SHADOW E&M-EST. PATIENT-LVL III: CPT | Mod: PBBFAC,,, | Performed by: OTOLARYNGOLOGY

## 2024-03-05 PROCEDURE — 69210 REMOVE IMPACTED EAR WAX UNI: CPT | Mod: S$PBB,,, | Performed by: OTOLARYNGOLOGY

## 2024-03-05 PROCEDURE — 69210 REMOVE IMPACTED EAR WAX UNI: CPT | Mod: PBBFAC | Performed by: OTOLARYNGOLOGY

## 2024-03-05 PROCEDURE — 99213 OFFICE O/P EST LOW 20 MIN: CPT | Mod: 25,S$PBB,, | Performed by: OTOLARYNGOLOGY

## 2024-03-05 RX ORDER — FENOFIBRATE 200 MG/1
CAPSULE ORAL
Qty: 90 CAPSULE | Refills: 3 | Status: SHIPPED | OUTPATIENT
Start: 2024-03-05

## 2024-03-05 NOTE — PROGRESS NOTES
Subjective:       Patient ID: Cliff Magaña is a 88 y.o. male.    Chief Complaint: Ear Fullness and Cerumen Impaction    Ear Fullness   There is pain in both ears. This is a recurrent problem. The current episode started more than 1 month ago (Last seen for similar complaint 11/2023.). The problem occurs constantly. The problem has been waxing and waning. There has been no fever. The patient is experiencing no pain. Associated symptoms include hearing loss. Pertinent negatives include no coughing, diarrhea, ear discharge, headaches, neck pain, rash, rhinorrhea or vomiting. He has tried nothing for the symptoms. His past medical history is significant for hearing loss.     Review of Systems   Constitutional:  Positive for chills. Negative for activity change, appetite change and fever.   HENT:  Positive for hearing loss and postnasal drip. Negative for congestion, ear discharge, ear pain, nosebleeds, rhinorrhea and sneezing.    Eyes: Negative.  Negative for redness and visual disturbance.   Respiratory: Negative.  Negative for apnea, cough, shortness of breath and wheezing.    Cardiovascular:  Negative for chest pain and palpitations.   Gastrointestinal:  Negative for diarrhea and vomiting.   Endocrine: Positive for cold intolerance.   Genitourinary:  Positive for difficulty urinating. Negative for frequency.   Musculoskeletal:  Positive for back pain. Negative for arthralgias, gait problem and neck pain.   Skin: Negative.  Negative for color change and rash.   Allergic/Immunologic: Negative.    Neurological: Negative.  Negative for dizziness, speech difficulty, weakness and headaches.   Hematological: Negative.  Negative for adenopathy. Does not bruise/bleed easily.   Psychiatric/Behavioral:  Positive for sleep disturbance. Negative for agitation and behavioral problems.        Objective:        Constitutional:   Vital signs are normal. He appears well-developed and well-nourished. He is active. Normal speech.       Head:  Normocephalic and atraumatic. Salivary glands normal.  Facial strength is normal.      Ears:    PROCEDURE NOTE:  Ceruminosis is noted in both EACs and no normal anatomic landmarks are visible bilaterally.  Wax was removed by manual debridement and suctioning utilizing the assistance of binocular microscopy revealing EACs and TMs WNL. The patient tolerated this procedure without difficulty. The subjective decrease noted in hearing pre-cleaning was resolved post-cleaning.          Nose:  Nose normal including turbinates, nasal mucosa, sinuses and nasal septum.     Mouth/Throat  Oropharynx clear and moist without lesions or asymmetry and normal uvula midline.     Neck:  Neck normal without thyromegaly masses, asymmetry, normal tracheal structure, crepitus, and tenderness, thyroid normal, trachea normal, phonation normal and full range of motion with neck supple.     Psychiatric:   He has a normal mood and affect. His speech is normal and behavior is normal.     Neurological:   He has neurological normal, alert and oriented.     Skin:   No abrasions, lacerations, lesions, or rashes.       Assessment:       1. Ear fullness, bilateral    2. Impacted cerumen of both ears        Plan:       1. Hearing conservation strongly recommended.  2. Trial of amplification bilaterally also recommended (patient already wearing).  3. Re-check of hearing in 18-24 months or sooner if subjective change noted.  4. F/U with PCP as per schedule.

## 2024-03-05 NOTE — ANESTHESIA PAT ROS NOTE
03/05/2024  Cliff Magaña is a 88 y.o., male.      Pre-op Assessment    I have reviewed the NPO Status.   I have reviewed the Medications.   Steroids Taken In Past Year: Decadron    Review of Systems  Anesthesia Hx:  No problems with previous Anesthesia   History of prior surgery of interest to airway management or planning:          Denies Family Hx of Anesthesia complications.    Denies Personal Hx of Anesthesia complications.                    Social:  Non-Smoker, No Alcohol Use, Former Smoker       Hematology/Oncology:  Hematology Normal                     Current/Recent Cancer.  Other (see Oncology comments)       radiation, chemotherapy and surgery   Oncology Comments: Bladder cancer     EENT/Dental:  EENT/Dental Normal           Cardiovascular:     Denies Pacemaker. Hypertension, well controlled    Denies CAD.    Denies CABG/stent. Dysrhythmias atrial fibrillation  Denies Angina. CHF    hyperlipidemia   ECG has been reviewed.  Functional Capacity unable to determine, gets around with walker at all times  limited by disability       Deep Venous Thrombosis (DVT), Hx of DVT                  Pulmonary:  Pulmonary Normal   Denies COPD.  Denies Asthma.   Denies Shortness of breath.   Denies Sleep Apnea.                Renal/:  Chronic Renal Disease, CKD                Hepatic/GI:  Hepatic/GI Normal     Denies GERD. Denies Liver Disease.            Musculoskeletal:  Musculoskeletal Normal                Neurological:  Neurology Normal Denies TIA.  Denies CVA.    Denies Seizures.                                Endocrine:  Diabetes, type 2 Denies Hypothyroidism.        Metabolic Disorders, Metabolic Syndrome  Psych:  Psychiatric Normal                         Anesthesia Assessment: Preoperative EQUATION    Planned Procedure: Procedure(s) (LRB):  TURBT (TRANSURETHRAL RESECTION OF BLADDER TUMOR)  (N/A)  CYSTOGRAM (N/A)  Requested Anesthesia Type:General  Surgeon: Wellington Story MD  Service: Urology  Known or anticipated Date of Surgery:3/20/2024    Surgeon notes: reviewed    Electronic QUestionnaire Assessment completed via nurse interview with patient.        Triage considerations:     The patient has no apparent active cardiac condition (No unstable coronary Syndrome such as severe unstable angina or recent [<1 month] myocardial infarction, decompensated CHF, severe valvular   disease or significant arrhythmia)    Previous anesthesia records:GETA and No problems  11/16/23 TURBT (TRANSURETHRAL RESECTION OF BLADDER TUMOR) (Bladder), CYSTOSCOPY (Bladder), PYELOGRAM, RETROGRADE (Bilateral: Kidney), gen anes, ASA 3  Airway:  Mallampati: II   Mouth Opening: Normal  TM Distance: Normal  Tongue: Normal  Neck ROM: Normal ROM  Intubation     Date/Time: 11/16/2023 10:49 AM     Performed by: Earle Spicer CRNA  Authorized by: Adolfo Jade Jr., MD    Intubation:     Induction:  Intravenous    Intubated:  Postinduction    Mask Ventilation:  Easy mask    Attempts:  1    Attempted By:  CRNA    Method of Intubation:  Direct    Blade:  [place] 3    Laryngeal View Grade: Grade IIA - cords partially seen      Difficult Airway Encountered?: No      Complications:  None    Airway Device:  Oral endotracheal tube    Airway Device Size:  7.5    Style/Cuff Inflation:  Cuffed (inflated to minimal occlusive pressure)    Tube secured:  22    Secured at:  The lips    Placement Verified By:  Capnometry    Complicating Factors:  None    Findings Post-Intubation:  BS equal bilateral    Last PCP note: 6-12 months ago , within [facility]   Subspecialty notes: Cardiology: General, Hematology/Oncology, Urology    Other important co-morbidities: A FIB, CHF, DM2, HLD, HTN, and metabolic syndrome, h/o DVT, bladder cancer       Tests already available:  Available tests,  within 1 month , within 3 months , 3-6 months ago , within [facility] .    03/04/24 UA  02/29/24 BMP  02/27/24 CBC, CMP  02/02/24 EKG  12/12/23 TTE (EF 45-50%)  (Last A1C 6.9 on 11/10/23)            Instructions given. (See in Nurse's note)    Optimization:  Anesthesia Preop Clinic Assessment Indicated:     --phone screen done    Medical Opinion Indicated--pcp       Sub-specialist consult indicated:  [facility] inst from cards      Plan:    Testing:  None Needed     Consultation:Patient's PCP for a statement of optimization      Patient  has previously scheduled Medical Appointment:    Navigation:  Consults scheduled.             Results will be tracked by Preop Clinic.    03/05/24 Requested pcp Dr. Estrada add preop clearance to already established appt on 03/07/24 03/05/24 Per cards Dr. Birmingham hold fenofibrate for 5 days and eliquis for 3 days.

## 2024-03-06 ENCOUNTER — PATIENT MESSAGE (OUTPATIENT)
Dept: ADMINISTRATIVE | Facility: OTHER | Age: 89
End: 2024-03-06
Payer: MEDICARE

## 2024-03-07 ENCOUNTER — TELEPHONE (OUTPATIENT)
Dept: AUDIOLOGY | Facility: CLINIC | Age: 89
End: 2024-03-07
Payer: MEDICARE

## 2024-03-07 ENCOUNTER — DOCUMENTATION ONLY (OUTPATIENT)
Dept: AUDIOLOGY | Facility: CLINIC | Age: 89
End: 2024-03-07
Payer: MEDICARE

## 2024-03-07 ENCOUNTER — OFFICE VISIT (OUTPATIENT)
Dept: INTERNAL MEDICINE | Facility: CLINIC | Age: 89
End: 2024-03-07
Payer: MEDICARE

## 2024-03-07 ENCOUNTER — PATIENT MESSAGE (OUTPATIENT)
Dept: ADMINISTRATIVE | Facility: OTHER | Age: 89
End: 2024-03-07
Payer: MEDICARE

## 2024-03-07 VITALS
TEMPERATURE: 97 F | HEART RATE: 81 BPM | HEIGHT: 64 IN | WEIGHT: 122.38 LBS | SYSTOLIC BLOOD PRESSURE: 106 MMHG | BODY MASS INDEX: 20.89 KG/M2 | DIASTOLIC BLOOD PRESSURE: 66 MMHG | RESPIRATION RATE: 18 BRPM | OXYGEN SATURATION: 95 %

## 2024-03-07 DIAGNOSIS — E11.22 TYPE 2 DIABETES MELLITUS WITH STAGE 3A CHRONIC KIDNEY DISEASE, WITHOUT LONG-TERM CURRENT USE OF INSULIN: ICD-10-CM

## 2024-03-07 DIAGNOSIS — E11.8 DM TYPE 2, CONTROLLED, WITH COMPLICATION: ICD-10-CM

## 2024-03-07 DIAGNOSIS — I70.0 ATHEROSCLEROSIS OF AORTA: ICD-10-CM

## 2024-03-07 DIAGNOSIS — Z01.818 PRE-OP EXAM: Primary | ICD-10-CM

## 2024-03-07 DIAGNOSIS — E11.69 HYPERLIPIDEMIA ASSOCIATED WITH TYPE 2 DIABETES MELLITUS: ICD-10-CM

## 2024-03-07 DIAGNOSIS — E78.5 HYPERLIPIDEMIA ASSOCIATED WITH TYPE 2 DIABETES MELLITUS: ICD-10-CM

## 2024-03-07 DIAGNOSIS — E11.59 HYPERTENSION ASSOCIATED WITH DIABETES: ICD-10-CM

## 2024-03-07 DIAGNOSIS — C67.9 MALIGNANT NEOPLASM OF URINARY BLADDER, UNSPECIFIED SITE: ICD-10-CM

## 2024-03-07 DIAGNOSIS — I48.19 PERSISTENT ATRIAL FIBRILLATION: Chronic | ICD-10-CM

## 2024-03-07 DIAGNOSIS — Z79.01 CHRONIC ANTICOAGULATION: ICD-10-CM

## 2024-03-07 DIAGNOSIS — M54.50 CHRONIC BILATERAL LOW BACK PAIN WITHOUT SCIATICA: ICD-10-CM

## 2024-03-07 DIAGNOSIS — N18.31 TYPE 2 DIABETES MELLITUS WITH STAGE 3A CHRONIC KIDNEY DISEASE, WITHOUT LONG-TERM CURRENT USE OF INSULIN: ICD-10-CM

## 2024-03-07 DIAGNOSIS — G89.29 CHRONIC BILATERAL LOW BACK PAIN WITHOUT SCIATICA: ICD-10-CM

## 2024-03-07 DIAGNOSIS — I15.2 HYPERTENSION ASSOCIATED WITH DIABETES: ICD-10-CM

## 2024-03-07 PROCEDURE — 99999 PR PBB SHADOW E&M-EST. PATIENT-LVL V: CPT | Mod: PBBFAC,,, | Performed by: FAMILY MEDICINE

## 2024-03-07 PROCEDURE — 99214 OFFICE O/P EST MOD 30 MIN: CPT | Mod: S$PBB,,, | Performed by: FAMILY MEDICINE

## 2024-03-07 PROCEDURE — 99215 OFFICE O/P EST HI 40 MIN: CPT | Mod: PBBFAC,PO | Performed by: FAMILY MEDICINE

## 2024-03-07 RX ORDER — METFORMIN HYDROCHLORIDE 500 MG/1
500 TABLET, EXTENDED RELEASE ORAL
Qty: 90 TABLET | Refills: 2 | Status: SHIPPED | OUTPATIENT
Start: 2024-03-07

## 2024-03-07 RX ORDER — CYCLOBENZAPRINE HCL 5 MG
5 TABLET ORAL 3 TIMES DAILY PRN
Qty: 30 TABLET | Refills: 0 | Status: SHIPPED | OUTPATIENT
Start: 2024-03-07

## 2024-03-07 NOTE — TELEPHONE ENCOUNTER
Refill Routing Note   Medication(s) are not appropriate for processing by [facility] for the following reason(s):        ED/Hospital Visit since last OV with provider  Drug-disease interaction    [facility] action(s):  Defer      Medication Therapy Plan: CKD stage 3 due to type 2 diabetes mellitus; Type 2 diabetes mellitus with stage 3a chronic kidney disease, without long-term current use of insulin    Pharmacist review requested: Yes     Appointments  past 12m or future 3m with PCP    Date Provider   Last Visit   8/18/2023 Munir Estrada MD   Next Visit   3/7/2024 Munir Estrada MD   ED visits in past 90 days: 0        Note composed:12:12 PM 03/07/2024

## 2024-03-07 NOTE — TELEPHONE ENCOUNTER
Refill Routing Note   Medication(s) are not appropriate for processing by [facility] for the following reason(s):        ED/Hospital Visit since last OV with provider  Drug-disease interaction    [facility] action(s):  Defer        Medication Therapy Plan: CKD stage 3 due to type 2 diabetes mellitus; Type 2 diabetes mellitus with stage 3a chronic kidney disease, without long-term current use of insulin    Pharmacist review requested: Yes     Appointments  past 12m or future 3m with PCP    Date Provider   Last Visit   8/18/2023 Munir Estrada MD   Next Visit   3/7/2024 Munir Estrada MD   ED visits in past 90 days: 0        Note composed:12:36 PM 03/07/2024

## 2024-03-07 NOTE — PROGRESS NOTES
Subjective     Patient ID: Cliff Magaña is a 88 y.o. male.    Chief Complaint: Pre-op Exam and Back Pain  88-year-old white male with bladder cancer, atrial fibrillation, hypertension, hyperlipidemia, aortic atherosclerosis, and type 2 diabetes presents to clinic today for a preoperative exam in order to have TURBT performed on March 20, 2024 by Dr. Story.  Secondarily, the patient reports chronic low back pain that has occurred off and on for many years.  He had previously been followed by pain management and has undergone physical therapy secondary to chronic back pain secondary to degenerative disc disease.  At this time he has been noticing more frequent back pain.  Back Pain  Pertinent negatives include no abdominal pain, chest pain, dysuria, fever, headaches or weakness.     Review of Systems   Constitutional:  Positive for activity change and unexpected weight change. Negative for appetite change, chills, fatigue and fever.   HENT:  Positive for hearing loss and rhinorrhea. Negative for nasal congestion, ear pain, postnasal drip, sinus pressure/congestion, sore throat, tinnitus and trouble swallowing.    Eyes:  Negative for discharge, redness, itching and visual disturbance.   Respiratory:  Negative for cough, chest tightness, shortness of breath and wheezing.    Cardiovascular:  Positive for palpitations. Negative for chest pain.   Gastrointestinal:  Positive for diarrhea. Negative for abdominal pain, blood in stool, constipation, nausea and vomiting.   Endocrine: Positive for polydipsia and polyuria.   Genitourinary:  Positive for urgency. Negative for decreased urine volume, difficulty urinating, dysuria, frequency and hematuria.   Musculoskeletal:  Positive for back pain. Negative for arthralgias, joint swelling, myalgias, neck pain and neck stiffness.   Integumentary:  Negative for rash.   Neurological:  Negative for dizziness, weakness, light-headedness and headaches.   Psychiatric/Behavioral:  Negative.  Negative for confusion and dysphoric mood.           Objective     Physical Exam  Vitals and nursing note reviewed.   Constitutional:       General: He is not in acute distress.     Appearance: Normal appearance. He is well-developed. He is not diaphoretic.   HENT:      Head: Normocephalic and atraumatic.      Right Ear: External ear normal.      Left Ear: External ear normal.      Nose: Nose normal.      Mouth/Throat:      Pharynx: No oropharyngeal exudate.   Eyes:      General: No scleral icterus.        Right eye: No discharge.         Left eye: No discharge.      Conjunctiva/sclera: Conjunctivae normal.      Pupils: Pupils are equal, round, and reactive to light.   Neck:      Thyroid: No thyromegaly.      Vascular: No JVD.      Trachea: No tracheal deviation.   Cardiovascular:      Rate and Rhythm: Normal rate and regular rhythm.      Heart sounds: Normal heart sounds. No murmur heard.     No friction rub. No gallop.   Pulmonary:      Effort: Pulmonary effort is normal. No respiratory distress.      Breath sounds: Normal breath sounds. No stridor. No wheezing, rhonchi or rales.   Chest:      Chest wall: No tenderness.   Abdominal:      General: Bowel sounds are normal. There is no distension.      Palpations: Abdomen is soft. There is no mass.      Tenderness: There is no abdominal tenderness. There is no guarding or rebound.   Musculoskeletal:         General: Normal range of motion.      Cervical back: Normal range of motion and neck supple.      Lumbar back: Spasms and tenderness present.      Comments: Walks with assistance of a Rollator   Lymphadenopathy:      Cervical: No cervical adenopathy.   Skin:     General: Skin is warm and dry.      Coloration: Skin is not pale.      Findings: No erythema or rash.   Neurological:      Mental Status: He is alert and oriented to person, place, and time.   Psychiatric:         Mood and Affect: Mood normal.         Behavior: Behavior normal.         Thought  Content: Thought content normal.         Judgment: Judgment normal.            Assessment and Plan     1. Pre-op exam    2. Malignant neoplasm of urinary bladder, unspecified site    3. Persistent atrial fibrillation    4. Chronic anticoagulation    5. Hypertension associated with diabetes    6. Hyperlipidemia associated with type 2 diabetes mellitus    7. Atherosclerosis of aorta  Overview:  Lumbar spine results      8. DM type 2, controlled, with complication    9. Chronic bilateral low back pain without sciatica  -     cyclobenzaprine (FLEXERIL) 5 MG tablet; Take 1 tablet (5 mg total) by mouth 3 (three) times daily as needed for Muscle spasms.  Dispense: 30 tablet; Refill: 0        1. Atrial fibrillation and hypertension currently remains stable on Entresto 24/26 mg b.i.d., metoprolol 200 mg b.i.d., and Eliquis 5 mg daily.  Continue follow-up with cardiology as scheduled.  Cardiology has already provided recommendations to hold Eliquis 3 days prior to surgery.  2. Continue fenofibrate 200 mg daily.  Hyperlipidemia and atherosclerosis versus remained stable.    3. Continue metformin 500 mg daily and Jardiance 10 mg daily.  Diabetes is well controlled.  Okay to hold Jardiance 3 days prior to surgery.    4. Patient reports chronic low back pain secondary to degenerative disc disease.  I recommend continued stretching exercises as discussed and have prescribed Flexeril 5 mg t.i.d. p.r.n. muscle strain.  5. I have reviewed the patient's preoperative labs and EKG and the patient is okay to proceed with surgery as scheduled.  Per cardiology it is okay for the patient to hold Eliquis 3 days prior to surgery.    6. Return to clinic as needed or in 5 months for general exam.           Follow up in 5 months (on 8/7/2024), or if symptoms worsen or fail to improve, for Annual exam.

## 2024-03-07 NOTE — TELEPHONE ENCOUNTER
No care due was identified.  [facility] Embedded Care Due Messages. Reference number: [id number].   3/07/2024 1:28:17 AM CST

## 2024-03-07 NOTE — PROGRESS NOTES
Received in warranty repair from [employer]    Action Taken:  Replaced electronics, housing and .  Replaced .      [employer] M90-RT   [employer]   Purchase Date: 11/20/2019  Lt SN: [id number]   Rt SN: [id number]   : M2  Dome: Small Vented  Lt Warranty Exp: 11/14/2024   Rt Warrant Exp: 05/22/2024      SN: [id number]

## 2024-03-08 ENCOUNTER — PATIENT MESSAGE (OUTPATIENT)
Dept: ADMINISTRATIVE | Facility: OTHER | Age: 89
End: 2024-03-08
Payer: MEDICARE

## 2024-03-09 ENCOUNTER — PATIENT MESSAGE (OUTPATIENT)
Dept: ADMINISTRATIVE | Facility: OTHER | Age: 89
End: 2024-03-09
Payer: MEDICARE

## 2024-03-11 ENCOUNTER — PATIENT MESSAGE (OUTPATIENT)
Dept: ADMINISTRATIVE | Facility: OTHER | Age: 89
End: 2024-03-11
Payer: MEDICARE

## 2024-03-11 ENCOUNTER — OFFICE VISIT (OUTPATIENT)
Dept: UROLOGY | Facility: CLINIC | Age: 89
End: 2024-03-11
Payer: MEDICARE

## 2024-03-11 VITALS
HEIGHT: 64 IN | SYSTOLIC BLOOD PRESSURE: 116 MMHG | WEIGHT: 121 LBS | HEART RATE: 84 BPM | BODY MASS INDEX: 20.66 KG/M2 | DIASTOLIC BLOOD PRESSURE: 73 MMHG

## 2024-03-11 DIAGNOSIS — C67.3 MALIGNANT NEOPLASM OF ANTERIOR WALL OF URINARY BLADDER: Primary | ICD-10-CM

## 2024-03-11 PROCEDURE — 99213 OFFICE O/P EST LOW 20 MIN: CPT | Mod: PBBFAC | Performed by: UROLOGY

## 2024-03-11 PROCEDURE — 99999 PR PBB SHADOW E&M-EST. PATIENT-LVL III: CPT | Mod: PBBFAC,,, | Performed by: UROLOGY

## 2024-03-11 PROCEDURE — 99215 OFFICE O/P EST HI 40 MIN: CPT | Mod: S$PBB,,, | Performed by: UROLOGY

## 2024-03-11 NOTE — PROGRESS NOTES
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  He received 5 of 6 weeks of cisplatin therapy  Surveillance cystoscopy and CT urogram on 03/04/2024 showing residual tumor at the bladder dome    History of Present Illness:   Patient is a 88 y.o. male who returns to clinic for evaluation of muscle invasive bladder cancer. See above urologic oncology problem list for more details.  He was now status post chemoradiation with single agent cisplatin and external beam radiotherapy to the bladder dome.    Please recall that the patient required admission to the hospital on 02/04/2024 for weakness and hyperkalemia as well as cystitis.  He also required hospitalization after his initial TURBT.  His wife is quite upset and recalls this to be somewhat traumatic experience for her and the patient, and she was concerned regarding ongoing requirements for hospitalization, procedures, care with the Jernigan catheter etc.    We performed a cystoscopy and CT urogram last week which shows evidence of residual tumor at the bladder dome.  He was overall doing well, but has some urinary urgency and frequency.    Imaging: I have reviewed the imaging study CT urogram on 02/29/2024 personally, and agree with the findings.      Allergies:  Review of patient's allergies indicates:   Allergen Reactions    Niacin      Other reaction(s): Rash  Other reaction(s): Itching        Medications per EMR:  (Not in a hospital admission)      Past Medical History:  Past Medical History:   Diagnosis Date    *Atrial fibrillation     Chronic kidney disease     Deep vein thrombosis     Hyperlipidemia     Hypertension     Metabolic syndrome     Type 2 diabetes mellitus, without long-term current use of insulin  "2021        Past Surgical History:  Past Surgical History:   Procedure Laterality Date    CATARACT EXTRACTION      CHOLECYSTECTOMY      CYSTOSCOPY N/A 2023    Procedure: CYSTOSCOPY;  Surgeon: Marcelino Moffett MD;  Location: [address];  Service: Urology;  Laterality: N/A;  90 minutes    EYE SURGERY      INJECTION OF FACET JOINT Bilateral 2021    Procedure: FACET JOINT INJECTION BILATERAL L4/L5 DIRECT REFERRAL;  Surgeon: Philipp Iyer MD;  Location: [facility];  Service: Pain Management;  Laterality: Bilateral;  NEEDS CONSENT, ELIQUIS CLEARANCE IN CHART    RETROGRADE PYELOGRAPHY Bilateral 2023    Procedure: PYELOGRAM, RETROGRADE;  Surgeon: Marcelino Moffett MD;  Location: [address];  Service: Urology;  Laterality: Bilateral;  90 minutes    SKIN CANCER EXCISION      TONSILLECTOMY      TURBT (TRANSURETHRAL RESECTION OF BLADDER TUMOR) N/A 2023    Procedure: TURBT (TRANSURETHRAL RESECTION OF BLADDER TUMOR);  Surgeon: Marcelino Moffett MD;  Location: [address];  Service: Urology;  Laterality: N/A;  90 minutes        Family History:  Family History   Problem Relation Age of Onset    Diabetes Maternal Aunt     Heart attack Neg Hx     Heart disease Neg Hx     Heart failure Neg Hx     Hyperlipidemia Neg Hx     Hypertension Neg Hx     Stroke Neg Hx         Social History:  Social History     Tobacco Use    Smoking status: Former     Current packs/day: 0.00     Average packs/day: 2.0 packs/day for 20.0 years (40.0 ttl pk-yrs)     Types: Cigarettes     Start date: 1963     Quit date: 1983     Years since quittin.8     Passive exposure: Never    Smokeless tobacco: Never   Substance Use Topics    Alcohol use: Yes     Alcohol/week: 1.0 standard drink of alcohol     Types: 1 Standard drinks or equivalent per week     Comment: 3 drinks per week          OBJECTIVE:     Vitals:    24 1114   BP: 116/73   Pulse: 84   Weight: 54.9 kg (121 lb)   Height: 5' 4" " (1.626 m)          General Appearance: Alert, cooperative, no distress, frail in appearance  Head: Normocephalic  Eyes: Clear conjunctiva  Neck: No obvious LND or JVD  Lungs: Normal chest excursion, no accessory muscle use  Chest: Regular rate rhythm by palpation, no pedal edema  Abdomen: Soft, non-tender, non-distended, no rebound, guarding, rigidity  Extremities: Atraumatic   Lymph Nodes: No appreciable lymph adenopathy  Neurologic: No gross gait, motor or sensory deficits        LABS:    CBC:  Lab Results   Component Value Date    WBC 7.64 02/27/2024    HGB 15.2 02/27/2024    HCT 44.8 02/27/2024    MCV 93 02/27/2024     02/27/2024         BMP:  Lab Results   Component Value Date     (L) 02/29/2024    K 4.6 02/29/2024     02/29/2024    CO2 24 02/29/2024    BUN 21 02/29/2024    CREATININE 0.8 02/29/2024    CALCIUM 9.8 02/29/2024    ANIONGAP 8 02/29/2024    EGFRNORACEVR >60.0 02/29/2024       ASSESSMENT/PLAN:     Assessment:  80-year-old male with a history of muscle invasive bladder cancer with enhancement and tumor recurrence at the dome of the urinary bladder status post chemoradiation    Plan:  I had a thorough discussion with the patient and his wife.  Specifically, we discussed the merits of repeat TURBT versus observation.  I also discussed this with Dr. Schofield, his radiation oncologist.    We discussed the standard of care would include repeat TURBT to determine if there was any viable tumor.  We could also perform bladder biopsies to determine if any CIS is present.  As noted above, his wife was very upset that after his last TURBT he required readmission, had significant weakness, and they did not deal with the catheter very well.  I explained to her that any TURBT would carry the risk of him having a catheter, and a risk of perforation and prolonged catheterization as a standard of care.  She understands this and understands this is a difficult situation and given his age he does not  have many options, and is not a radical cystectomy candidate.    We did discuss the possibility of observation given that he has no metastatic disease, repeating a cystoscopy and CT urogram in 3 months, in treating him expectantly.  He and his wife do not wish to pursue this and made a informed decision to proceed with Transurethral resection of bladder tumor.    We then discussed risks, benefits, alternatives associated with this surgery, including complications, detailed below:  -General anesthesia:  Associated risks include pneumonia, cerebrovascular accident, cardiac events including myocardial infarction, pulmonary embolism, deep vein thrombosis  -TURBT: We discussed complications such as urinary tract infection, dysuria, sepsis, hematuria, clot retention requiring take back to the operating room, and bladder perforation requiring prolonged catheterization versus operative exploration in the case of a intraperitoneal perforation.  We also discussed the possibility of a 2nd operation for either high-grade disease requiring a standard of care resection versus no detrusor muscle present for which a re-resection may be needed depending on the pathology.    We will adjust his surgery date and obtain preoperative urines.  All questions and concerns were addressed.  I advised him to continue anticoagulation until we get back to them with a surgery date, and we will call them tomorrow with this information.    I spent a total of 45 minutes on the day of the visit.This includes face to face time and non-face to face time preparing to see the patient (eg, review of tests), obtaining and/or reviewing separately obtained history, documenting clinical information in the electronic or other health record, independently interpreting results and communicating results to the patient/family/caregiver, or care coordinator

## 2024-03-12 ENCOUNTER — PATIENT MESSAGE (OUTPATIENT)
Dept: ADMINISTRATIVE | Facility: OTHER | Age: 89
End: 2024-03-12
Payer: MEDICARE

## 2024-03-12 ENCOUNTER — DOCUMENTATION ONLY (OUTPATIENT)
Dept: AUDIOLOGY | Facility: CLINIC | Age: 89
End: 2024-03-12
Payer: MEDICARE

## 2024-03-12 ENCOUNTER — PATIENT MESSAGE (OUTPATIENT)
Dept: UROLOGY | Facility: CLINIC | Age: 89
End: 2024-03-12
Payer: MEDICARE

## 2024-03-12 ENCOUNTER — OFFICE VISIT (OUTPATIENT)
Dept: HEMATOLOGY/ONCOLOGY | Facility: CLINIC | Age: 89
End: 2024-03-12
Payer: MEDICARE

## 2024-03-12 VITALS
RESPIRATION RATE: 17 BRPM | TEMPERATURE: 97 F | HEIGHT: 64 IN | DIASTOLIC BLOOD PRESSURE: 67 MMHG | HEART RATE: 86 BPM | SYSTOLIC BLOOD PRESSURE: 155 MMHG | OXYGEN SATURATION: 100 % | BODY MASS INDEX: 21.02 KG/M2 | WEIGHT: 123.13 LBS

## 2024-03-12 DIAGNOSIS — E11.59 HYPERTENSION ASSOCIATED WITH DIABETES: ICD-10-CM

## 2024-03-12 DIAGNOSIS — I15.2 HYPERTENSION ASSOCIATED WITH DIABETES: ICD-10-CM

## 2024-03-12 DIAGNOSIS — C67.9 MALIGNANT NEOPLASM OF URINARY BLADDER, UNSPECIFIED SITE: Primary | ICD-10-CM

## 2024-03-12 DIAGNOSIS — C67.3 MALIGNANT NEOPLASM OF ANTERIOR WALL OF URINARY BLADDER: Primary | ICD-10-CM

## 2024-03-12 DIAGNOSIS — N18.31 STAGE 3A CHRONIC KIDNEY DISEASE: ICD-10-CM

## 2024-03-12 PROCEDURE — 99214 OFFICE O/P EST MOD 30 MIN: CPT | Mod: PBBFAC | Performed by: INTERNAL MEDICINE

## 2024-03-12 PROCEDURE — 99214 OFFICE O/P EST MOD 30 MIN: CPT | Mod: S$PBB,,, | Performed by: INTERNAL MEDICINE

## 2024-03-12 PROCEDURE — 99999 PR PBB SHADOW E&M-EST. PATIENT-LVL IV: CPT | Mod: PBBFAC,,, | Performed by: INTERNAL MEDICINE

## 2024-03-12 NOTE — PROGRESS NOTES
Subjective     Patient ID: Cliff Magaña is a 88 y.o. male.    Chief Complaint: Malignant neoplasm of urinary bladder, unspecified site    HPI    Diagnosis: Muscle invasive bladder cancer post chemo/XRT     Returns for follow up after scans and for follow up  Scans without signs of metastatic disease  Recent cystoscopy with Dr. Dubose on 3/4 revealing residual tumor bladder dome  He has an upcoming TURBT for 4/12/2024  Reports taste changes post chemotherapy and admits to decreased appetite as a result  Notes more frequent urination  Weight stable  Denies pain    Most recent imaging:  - 2/29/2024 CT Urogram Abd/Pelvis:  FINDINGS:  Stable mild cardiomegaly.  Coronary artery calcific atherosclerosis.  Visualized inferior lungs are clear.  Stable small left hepatic lobe calcification.  No suspicious hepatic lesion.  Gallbladder is surgically absent.  No biliary ductal dilatation.  Spleen and adrenal glands are unremarkable.  Stable appearance of the pancreas with atrophy, scattered calcifications, and mild duct dilatation measuring 4 mm.  Findings can be seen with sequela of chronic pancreatitis.  Bilateral renal cortical thinning.  Multiple bilateral renal cysts and subcentimeter renal hypodensities too small to characterize.  No solid enhancing renal mass.  No renal stone.  No hydronephrosis or ureteral dilatation.  Opacification of the bilateral renal collecting systems and ureters without suspicious filling defect.  Patient is status post TURBT.  There is increased heterogeneous wall thickening and enhancement along the anterior bladder (series 4, image 144) concerning for residual/recurrent tumor.  Mild perivesical stranding and vascular prominence, similar to prior exam.  Small hiatal hernia.  Colonic diverticulosis.  No evidence of bowel obstruction or inflammation.  No free intraperitoneal fluid or air.  No pathologically enlarged abdominopelvic lymph nodes.  Abdominal aorta is normal caliber.  Moderate/severe  calcific atherosclerosis.  Degenerative changes of the osseous structures.  No acute fracture or aggressive osseous lesion  Impression:  1. Increased heterogeneous wall thickening and enhancement along the anterior bladder concerning for residual/recurrent tumor.  2. No evidence of metastatic disease.  3. Additional findings as above.     Oncology History:  - bladder cancer incidentally found on imaging as he had been under surveillance for renal cysts  (10/17/2023 ultrasound showed stable left renal cysts and new bladder masses)     -  11/16/2023 TURBT of bladder tumor  Findings:   1. Right postero-lateral papillary bladder wall tumor (3-4cm) with diffusely erythematous and nodular surrounding tissue, right < 1cm papillary bladder wall tumor immediately lateral to the larger postero-lateral papillary tumor, and a right nodular 4cm bladder tumor with high grade and possibly invasive appearance. Tumors resected, hemostasis achieved.  Several other patches of erythema were diffusely identified on the left lateral bladder wall.  2. Bimanual exam post-TURBT showed freely mobile bladder without abnormalities  3. Bilateral retrograde pyelogram showed delicate calices with no evidence of hydronephrosis, no filling defects, and ureter normal in course and caliber, bilaterally  Pathology:  1. Bladder, right posterolateral wall, transurethral resection of bladder tumor:  - Noninvasive high-grade papillary urothelial carcinoma  - Muscularis propria present  - Multiple H&E levels evaluated  2. Bladder, right anterior, transurethral resection of bladder tumor:  - Invasive high-grade papillary urothelial carcinoma  - Tumor invades muscularis propria  - Intact expression of DNA mismatch repair proteins in tumor by immunohistochemistry (see comment)  COMMENT:  Immunohistochemistry (IHC) Testing for Mismatch Repair (MMR) Proteins:  MLH1 - Intact nuclear expression  MSH2 - Intact nuclear expression  MSH6 - Intact nuclear  expression  PMS2 - Intact nuclear expression  Background nonneoplastic tissue/internal control with intact nuclear expression  IHC Interpretation  No loss of nuclear expression of MMR proteins: low probability of microsatellite instability  There are exceptions to the above IHC interpretations. These results should not be considered in isolation, and clinical correlation with genetic counseling is recommended to assess the need for germline testing.     - 11/25/2023 CT Abd/Pelvis:  FINDINGS:  Lung base: Bilateral small volume pleural effusion increased in size compared to recent prior, associated with mild compression atelectasis.  Visualized portion of heart: Coronary artery calcification.  Liver: Normal in size.  Left lobe small calcified granuloma.  No suspicious focal lesion.  Gallbladder: Cholecystectomy.  Bile duct: No intra-or extrahepatic biliary ductal dilatation.  Spleen: Unremarkable.  Pancreas: Parenchymal atrophy.  Multiple parenchymal calcifications suggesting chronic pancreatitis.  No suspicious focal lesion.  No pancreatic ductal dilatation.  No adjacent inflammatory changes or fluid collections.  Adrenal glands: Unremarkable.  Genitourinary: Bilateral renal cysts.  Subcentimeter hypodensities in both kidneys, which are too small to characterize.  The ureters appear normal in course and caliber.  No evidence of nephrolithiasis or hydroureteronephrosis.  Urinary bladder: Postsurgical change in the anterior bladder wall from TURBT.  There are small foci of air in the bladder, likely iatrogenic.  Reproductive organs: Unremarkable.  GI tract: Small hiatal hernia.  The stomach is unremarkable.  The visualized loops of small and large bowel show no evidence of obstruction or inflammation. Diverticulosis.  Appendix unremarkable.  Peritoneum: No free air or fluid.  Retroperitoneum: No significant adenopathy.  Vasculature: Normal caliber of abdominal aorta with moderate calcified and noncalcified  atherosclerosis.  Abdominal wall: Tiny fat containing umbilical hernia.  Bones: Stable T12-L1 intervertebral disc calcification.  No suspicious sclerotic lesions.  No acute fracture.  Impression:  Postoperative changes from of TURBT.  No lymphadenopathy.  No distant metastases.  Other findings as described.     - 12/6/2023 CT Chest:  FINDINGS:  Comparison is 11/21/2023.  No mediastinal, hilar or axillary adenopathy is seen.  There is a right lower pole thyroid nodule.  There are coronary calcifications.  There is a left upper pole renal cyst.  There is a small hiatal hernia.  Trachea and bronchi are patent.  There is no evidence of interstitial lung disease, bronchiectasis, airway trapping, or emphysema.  No suspicious pulmonary nodules, masses, or infiltrates are seen.  There is a calcified left lung granuloma.  Bones demonstrate nothing focal.  Impression:  No significant abnormality seen.  Right lobe thyroid nodule.  Coronary artery calcifications.  Left upper pole renal cyst.     Last radiation session on 2/8/24    PMH:  Active Ambulatory Problems        Diagnosis   Date Noted       CKD stage 3 due to type 2 diabetes mellitus     11/23/2012       Chronic anticoagulation 11/23/2012       Hypertension associated with diabetes     05/22/2013       Hyperlipidemia associated with type 2 diabetes mellitus     05/22/2013       Persistent atrial fibrillation      06/11/2014       Atherosclerosis of aorta      11/18/2020       Sensorineural hearing loss (SNHL) of both ears  11/19/2020       Risk for falls    12/15/2021       Renal cyst  12/14/2022       DM type 2, controlled, with complication  12/14/2022       Bladder tumor     11/16/2023       UTI (urinary tract infection) 11/21/2023       Acute hypoxic respiratory failure   11/21/2023       Elevated troponin 11/21/2023       Thyroid nodules   11/21/2023     Resolved Ambulatory Problems        Diagnosis   Date Noted       Atrial fibrillation      08/02/2012       Long term (current) use of anticoagulants 08/02/2012       Family history of diabetes mellitus 11/23/2012       Ex-cigarette smoker     11/23/2012       Metabolic syndrome      11/23/2012       Screen for colon cancer 05/07/2014       Ex-smoker   11/09/2015       Hyperglycemia     12/16/2015       Abscess of chest wall   06/25/2018       Sebaceous cyst    07/25/2018       Decreased back mobility 08/20/2019       Decreased strength of trunk and back      08/20/2019       Decreased range of motion of both hips    08/20/2019       Pain aggravated by walking    08/20/2019       Hamstring tightness of both lower extremities   08/20/2019       Impaired mobility and endurance     08/20/2019       Low back pain     11/19/2020       Chronic pain      04/28/2021       Type 2 diabetes mellitus, without long-term current use of insulin      12/13/2021       CKD stage 3 secondary to diabetes   12/13/2021       Encounter for preventive health examination     12/19/2022     Past Medical History:  Diagnosis   Date       *Atrial fibrillation           Chronic kidney disease         Deep vein thrombosis           Hyperlipidemia           Hypertension        Cholecystectomy  Tonsillectomy  Skin cancer removal     FH:  Paternal Aunt- recalls cancer affecting scalp     SH:    Retired [employer], followed by off shore work,   4 children (1 passed as an infant)  Quit tobacco in the 1980s  + EtOH    Review of Systems   Constitutional:  Positive for activity change, appetite change and fatigue. Negative for chills, fever and unexpected weight change.   HENT:  Positive for hearing loss. Negative for nasal congestion, postnasal drip, rhinorrhea, sinus pressure/congestion, sore throat and tinnitus.    Eyes:  Negative for visual disturbance.   Respiratory:  Negative for cough, chest tightness, shortness of breath and wheezing.    Cardiovascular:  Negative for chest pain, palpitations and  leg swelling.   Gastrointestinal:  Negative for abdominal distention, abdominal pain, blood in stool, change in bowel habit, constipation, diarrhea, nausea and vomiting.   Genitourinary:  Positive for frequency and urgency. Negative for decreased urine volume, difficulty urinating, dysuria and hematuria.   Musculoskeletal:  Negative for arthralgias, back pain, myalgias, neck pain and neck stiffness.   Integumentary:  Positive for rash (improved).        Dry skin   Neurological:  Positive for weakness. Negative for dizziness, light-headedness, numbness and headaches.   Psychiatric/Behavioral:  Negative for agitation, behavioral problems, confusion and dysphoric mood. The patient is not nervous/anxious.           Objective     Physical Exam  Vitals and nursing note reviewed.   Constitutional:       General: He is not in acute distress.     Appearance: Normal appearance. He is ill-appearing.      Comments: Appears more fatigued  ECOG= 1  Using borrowed walker   HENT:      Head: Normocephalic and atraumatic.      Comments: Hearing aids     Mouth/Throat:      Mouth: Mucous membranes are moist.      Pharynx: Oropharynx is clear. No oropharyngeal exudate or posterior oropharyngeal erythema.   Eyes:      General: No scleral icterus.     Extraocular Movements: Extraocular movements intact.      Conjunctiva/sclera: Conjunctivae normal.      Pupils: Pupils are equal, round, and reactive to light.   Cardiovascular:      Rate and Rhythm: Normal rate and regular rhythm.      Heart sounds: No murmur heard.     No friction rub.   Pulmonary:      Effort: Pulmonary effort is normal. No respiratory distress.      Breath sounds: Normal breath sounds. No wheezing, rhonchi or rales.   Abdominal:      General: Abdomen is flat. Bowel sounds are normal. There is no distension.      Palpations: Abdomen is soft. There is no mass.      Tenderness: There is no abdominal tenderness. There is no guarding or rebound.      Comments: No organomegaly    Musculoskeletal:         General: No swelling, tenderness or deformity. Normal range of motion.      Right lower leg: No edema.      Left lower leg: No edema.   Skin:     General: Skin is warm and dry.      Coloration: Skin is not jaundiced or pale.      Findings: No bruising, erythema or rash.   Neurological:      General: No focal deficit present.      Mental Status: He is alert and oriented to person, place, and time. Mental status is at baseline.      Sensory: No sensory deficit.      Motor: Weakness (generalized) present.      Gait: Gait normal.   Psychiatric:         Mood and Affect: Mood normal.         Behavior: Behavior normal.         Thought Content: Thought content normal.         Judgment: Judgment normal.        Assessment and Plan     1. Malignant neoplasm of urinary bladder, unspecified site    2. Hypertension associated with diabetes    3. Stage 3a chronic kidney disease      See post TURBT    HTN/DM- well controlled    CKI- parameters stable     Dietician referral    Route Chart for Scheduling    Med Onc Chart Routing      Follow up with physician 2 months.   Follow up with [facility]    Infusion scheduling note    Injection scheduling note    Labs    Imaging    Pharmacy appointment    Other referrals            Treatment Plan Information   OP CISPLATIN WEEKLY + RT   Elizabeth Bland MD   Upcoming Treatment Dates - OP CISPLATIN WEEKLY + RT    2/6/2024       Chemotherapy       CISplatin (Platinol) 25 mg/m2 = 42 mg in sodium chloride 0.9% 292 mL chemo infusion       Pre-Hydration       sodium chloride 0.9% 500 mL with magnesium sulfate 1 g, potassium chloride 10 mEq infusion       Post-Hydration       sodium chloride 0.9% bolus 500 mL 500 mL       Antiemetics       aprepitant (CINVANTI) injection 130 mg       palonosetron (ALOXI) 0.25 mg with Dexamethasone (DECADRON) 12 mg in NS 50 mL IVPB  2/13/2024       Chemotherapy       CISplatin (Platinol) 25 mg/m2 = 42 mg in sodium chloride 0.9% 292 mL chemo  infusion       Pre-Hydration       sodium chloride 0.9% 500 mL with magnesium sulfate 1 g, potassium chloride 10 mEq infusion       Post-Hydration       sodium chloride 0.9% bolus 500 mL 500 mL       Antiemetics       aprepitant (CINVANTI) injection 130 mg       palonosetron (ALOXI) 0.25 mg with Dexamethasone (DECADRON) 12 mg in NS 50 mL IVPB

## 2024-03-13 ENCOUNTER — PATIENT MESSAGE (OUTPATIENT)
Dept: ADMINISTRATIVE | Facility: OTHER | Age: 89
End: 2024-03-13
Payer: MEDICARE

## 2024-03-14 ENCOUNTER — DOCUMENT SCAN (OUTPATIENT)
Dept: HOME HEALTH SERVICES | Facility: HOSPITAL | Age: 89
End: 2024-03-14
Payer: MEDICARE

## 2024-03-14 ENCOUNTER — PATIENT MESSAGE (OUTPATIENT)
Dept: ADMINISTRATIVE | Facility: OTHER | Age: 89
End: 2024-03-14
Payer: MEDICARE

## 2024-03-15 ENCOUNTER — PATIENT MESSAGE (OUTPATIENT)
Dept: ADMINISTRATIVE | Facility: OTHER | Age: 89
End: 2024-03-15
Payer: MEDICARE

## 2024-03-16 ENCOUNTER — PATIENT MESSAGE (OUTPATIENT)
Dept: HEMATOLOGY/ONCOLOGY | Facility: CLINIC | Age: 89
End: 2024-03-16
Payer: MEDICARE

## 2024-03-16 ENCOUNTER — PATIENT MESSAGE (OUTPATIENT)
Dept: ADMINISTRATIVE | Facility: OTHER | Age: 89
End: 2024-03-16
Payer: MEDICARE

## 2024-03-17 ENCOUNTER — PATIENT MESSAGE (OUTPATIENT)
Dept: ADMINISTRATIVE | Facility: OTHER | Age: 89
End: 2024-03-17
Payer: MEDICARE

## 2024-03-18 ENCOUNTER — PATIENT MESSAGE (OUTPATIENT)
Dept: ADMINISTRATIVE | Facility: OTHER | Age: 89
End: 2024-03-18
Payer: MEDICARE

## 2024-03-18 DIAGNOSIS — R63.0 DECREASED APPETITE: Primary | ICD-10-CM

## 2024-03-18 RX ORDER — MEGESTROL ACETATE 40 MG/ML
200 SUSPENSION ORAL DAILY
Qty: 150 ML | Refills: 11 | Status: SHIPPED | OUTPATIENT
Start: 2024-03-18 | End: 2025-03-18

## 2024-03-19 ENCOUNTER — PATIENT MESSAGE (OUTPATIENT)
Dept: ADMINISTRATIVE | Facility: OTHER | Age: 89
End: 2024-03-19
Payer: MEDICARE

## 2024-03-20 ENCOUNTER — PATIENT MESSAGE (OUTPATIENT)
Dept: ADMINISTRATIVE | Facility: OTHER | Age: 89
End: 2024-03-20
Payer: MEDICARE

## 2024-03-21 ENCOUNTER — PATIENT MESSAGE (OUTPATIENT)
Dept: ADMINISTRATIVE | Facility: OTHER | Age: 89
End: 2024-03-21
Payer: MEDICARE

## 2024-03-22 ENCOUNTER — PATIENT MESSAGE (OUTPATIENT)
Dept: ADMINISTRATIVE | Facility: OTHER | Age: 89
End: 2024-03-22
Payer: MEDICARE

## 2024-03-23 ENCOUNTER — PATIENT MESSAGE (OUTPATIENT)
Dept: ADMINISTRATIVE | Facility: OTHER | Age: 89
End: 2024-03-23
Payer: MEDICARE

## 2024-03-24 ENCOUNTER — PATIENT MESSAGE (OUTPATIENT)
Dept: ADMINISTRATIVE | Facility: OTHER | Age: 89
End: 2024-03-24
Payer: MEDICARE

## 2024-03-25 ENCOUNTER — PATIENT MESSAGE (OUTPATIENT)
Dept: ADMINISTRATIVE | Facility: OTHER | Age: 89
End: 2024-03-25
Payer: MEDICARE

## 2024-03-25 ENCOUNTER — PATIENT MESSAGE (OUTPATIENT)
Dept: RADIATION ONCOLOGY | Facility: CLINIC | Age: 89
End: 2024-03-25
Payer: MEDICARE

## 2024-03-26 ENCOUNTER — PATIENT MESSAGE (OUTPATIENT)
Dept: ADMINISTRATIVE | Facility: OTHER | Age: 89
End: 2024-03-26
Payer: MEDICARE

## 2024-03-27 ENCOUNTER — PATIENT MESSAGE (OUTPATIENT)
Dept: ADMINISTRATIVE | Facility: OTHER | Age: 89
End: 2024-03-27
Payer: MEDICARE

## 2024-03-28 ENCOUNTER — PATIENT MESSAGE (OUTPATIENT)
Dept: ADMINISTRATIVE | Facility: OTHER | Age: 89
End: 2024-03-28
Payer: MEDICARE

## 2024-03-29 ENCOUNTER — PATIENT MESSAGE (OUTPATIENT)
Dept: ADMINISTRATIVE | Facility: OTHER | Age: 89
End: 2024-03-29
Payer: MEDICARE

## 2024-04-02 ENCOUNTER — LAB VISIT (OUTPATIENT)
Dept: LAB | Facility: HOSPITAL | Age: 89
End: 2024-04-02
Attending: UROLOGY
Payer: MEDICARE

## 2024-04-02 DIAGNOSIS — C67.3 MALIGNANT NEOPLASM OF ANTERIOR WALL OF URINARY BLADDER: ICD-10-CM

## 2024-04-02 LAB
BACTERIA #/AREA URNS AUTO: ABNORMAL /HPF
BILIRUB UR QL STRIP: NEGATIVE
CLARITY UR REFRACT.AUTO: CLEAR
COLOR UR AUTO: YELLOW
GLUCOSE UR QL STRIP: ABNORMAL
HGB UR QL STRIP: ABNORMAL
KETONES UR QL STRIP: NEGATIVE
LEUKOCYTE ESTERASE UR QL STRIP: ABNORMAL
MICROSCOPIC COMMENT: ABNORMAL
NITRITE UR QL STRIP: NEGATIVE
PH UR STRIP: 6 [PH] (ref 5–8)
PROT UR QL STRIP: ABNORMAL
RBC #/AREA URNS AUTO: >100 /HPF (ref 0–4)
SP GR UR STRIP: >1.03 (ref 1–1.03)
SQUAMOUS #/AREA URNS AUTO: 1 /HPF
URN SPEC COLLECT METH UR: ABNORMAL
WBC #/AREA URNS AUTO: 11 /HPF (ref 0–5)
YEAST UR QL AUTO: ABNORMAL

## 2024-04-02 PROCEDURE — 81001 URINALYSIS AUTO W/SCOPE: CPT | Performed by: UROLOGY

## 2024-04-02 PROCEDURE — 87086 URINE CULTURE/COLONY COUNT: CPT | Performed by: UROLOGY

## 2024-04-02 PROCEDURE — 87186 SC STD MICRODIL/AGAR DIL: CPT | Performed by: UROLOGY

## 2024-04-02 PROCEDURE — 87088 URINE BACTERIA CULTURE: CPT | Performed by: UROLOGY

## 2024-04-02 PROCEDURE — 87077 CULTURE AEROBIC IDENTIFY: CPT | Performed by: UROLOGY

## 2024-04-03 ENCOUNTER — PATIENT MESSAGE (OUTPATIENT)
Dept: ADMINISTRATIVE | Facility: OTHER | Age: 89
End: 2024-04-03
Payer: MEDICARE

## 2024-04-04 ENCOUNTER — PATIENT MESSAGE (OUTPATIENT)
Dept: ADMINISTRATIVE | Facility: OTHER | Age: 89
End: 2024-04-04
Payer: MEDICARE

## 2024-04-04 LAB — BACTERIA UR CULT: ABNORMAL

## 2024-04-05 ENCOUNTER — PATIENT MESSAGE (OUTPATIENT)
Dept: ADMINISTRATIVE | Facility: OTHER | Age: 89
End: 2024-04-05
Payer: MEDICARE

## 2024-04-05 ENCOUNTER — TELEPHONE (OUTPATIENT)
Dept: UROLOGY | Facility: CLINIC | Age: 89
End: 2024-04-05
Payer: MEDICARE

## 2024-04-05 ENCOUNTER — PATIENT MESSAGE (OUTPATIENT)
Dept: UROLOGY | Facility: CLINIC | Age: 89
End: 2024-04-05
Payer: MEDICARE

## 2024-04-05 RX ORDER — CIPROFLOXACIN 500 MG/1
500 TABLET ORAL 2 TIMES DAILY
Qty: 14 TABLET | Refills: 0 | Status: SHIPPED | OUTPATIENT
Start: 2024-04-05 | End: 2024-04-12

## 2024-04-06 ENCOUNTER — PATIENT MESSAGE (OUTPATIENT)
Dept: ADMINISTRATIVE | Facility: OTHER | Age: 89
End: 2024-04-06
Payer: MEDICARE

## 2024-04-07 ENCOUNTER — PATIENT MESSAGE (OUTPATIENT)
Dept: ADMINISTRATIVE | Facility: OTHER | Age: 89
End: 2024-04-07
Payer: MEDICARE

## 2024-04-08 ENCOUNTER — PATIENT MESSAGE (OUTPATIENT)
Dept: ADMINISTRATIVE | Facility: OTHER | Age: 89
End: 2024-04-08
Payer: MEDICARE

## 2024-04-09 ENCOUNTER — PATIENT MESSAGE (OUTPATIENT)
Dept: ADMINISTRATIVE | Facility: OTHER | Age: 89
End: 2024-04-09
Payer: MEDICARE

## 2024-04-10 ENCOUNTER — PATIENT MESSAGE (OUTPATIENT)
Dept: ADMINISTRATIVE | Facility: OTHER | Age: 89
End: 2024-04-10
Payer: MEDICARE

## 2024-04-11 ENCOUNTER — PATIENT MESSAGE (OUTPATIENT)
Dept: ADMINISTRATIVE | Facility: OTHER | Age: 89
End: 2024-04-11
Payer: MEDICARE

## 2024-04-12 ENCOUNTER — ANESTHESIA EVENT (OUTPATIENT)
Dept: SURGERY | Facility: HOSPITAL | Age: 89
End: 2024-04-12
Payer: MEDICARE

## 2024-04-12 ENCOUNTER — PATIENT MESSAGE (OUTPATIENT)
Dept: SURGERY | Facility: HOSPITAL | Age: 89
End: 2024-04-12
Payer: MEDICARE

## 2024-04-12 ENCOUNTER — HOSPITAL ENCOUNTER (OUTPATIENT)
Facility: HOSPITAL | Age: 89
Discharge: HOME | End: 2024-04-12
Attending: UROLOGY | Admitting: UROLOGY
Payer: MEDICARE

## 2024-04-12 ENCOUNTER — ANESTHESIA (OUTPATIENT)
Dept: SURGERY | Facility: HOSPITAL | Age: 89
End: 2024-04-12
Payer: MEDICARE

## 2024-04-12 VITALS
SYSTOLIC BLOOD PRESSURE: 167 MMHG | RESPIRATION RATE: 15 BRPM | HEART RATE: 81 BPM | DIASTOLIC BLOOD PRESSURE: 77 MMHG | BODY MASS INDEX: 21.04 KG/M2 | TEMPERATURE: 98 F | OXYGEN SATURATION: 97 % | WEIGHT: 123.25 LBS | HEIGHT: 64 IN

## 2024-04-12 DIAGNOSIS — N32.89 BLADDER MASS: Primary | ICD-10-CM

## 2024-04-12 LAB
POCT GLUCOSE: 149 MG/DL (ref 70–110)
POCT GLUCOSE: 157 MG/DL (ref 70–110)

## 2024-04-12 PROCEDURE — 27201423 OPTIME MED/SURG SUP & DEVICES STERILE SUPPLY: Performed by: UROLOGY

## 2024-04-12 PROCEDURE — 52240 CYSTOSCOPY AND TREATMENT: CPT | Mod: ,,, | Performed by: UROLOGY

## 2024-04-12 PROCEDURE — 71000015 HC POSTOP RECOV 1ST HR: Performed by: UROLOGY

## 2024-04-12 PROCEDURE — 71000044 HC DOSC ROUTINE RECOVERY FIRST HOUR: Performed by: UROLOGY

## 2024-04-12 PROCEDURE — 88307 TISSUE EXAM BY PATHOLOGIST: CPT | Mod: 26,,, | Performed by: PATHOLOGY

## 2024-04-12 PROCEDURE — 25000003 PHARM REV CODE 250: Performed by: NURSE ANESTHETIST, CERTIFIED REGISTERED

## 2024-04-12 PROCEDURE — 25000003 PHARM REV CODE 250

## 2024-04-12 PROCEDURE — 36000707: Performed by: UROLOGY

## 2024-04-12 PROCEDURE — D9220A PRA ANESTHESIA: Mod: CRNA,,, | Performed by: NURSE ANESTHETIST, CERTIFIED REGISTERED

## 2024-04-12 PROCEDURE — 25000003 PHARM REV CODE 250: Performed by: ANESTHESIOLOGY

## 2024-04-12 PROCEDURE — 82962 GLUCOSE BLOOD TEST: CPT | Performed by: UROLOGY

## 2024-04-12 PROCEDURE — 63600175 PHARM REV CODE 636 W HCPCS: Performed by: NURSE ANESTHETIST, CERTIFIED REGISTERED

## 2024-04-12 PROCEDURE — 71000016 HC POSTOP RECOV ADDL HR: Performed by: UROLOGY

## 2024-04-12 PROCEDURE — 63600175 PHARM REV CODE 636 W HCPCS: Mod: JZ,JG | Performed by: ANESTHESIOLOGY

## 2024-04-12 PROCEDURE — 37000009 HC ANESTHESIA EA ADD 15 MINS: Performed by: UROLOGY

## 2024-04-12 PROCEDURE — 52204 CYSTOSCOPY W/BIOPSY(S): CPT | Mod: 59,51,, | Performed by: UROLOGY

## 2024-04-12 PROCEDURE — 36000706: Performed by: UROLOGY

## 2024-04-12 PROCEDURE — 88307 TISSUE EXAM BY PATHOLOGIST: CPT | Mod: 59 | Performed by: PATHOLOGY

## 2024-04-12 PROCEDURE — D9220A PRA ANESTHESIA: Mod: ANES,,, | Performed by: ANESTHESIOLOGY

## 2024-04-12 PROCEDURE — 63600175 PHARM REV CODE 636 W HCPCS

## 2024-04-12 PROCEDURE — 37000008 HC ANESTHESIA 1ST 15 MINUTES: Performed by: UROLOGY

## 2024-04-12 RX ORDER — OXYCODONE HYDROCHLORIDE 5 MG/1
5 TABLET ORAL EVERY 6 HOURS PRN
Qty: 5 TABLET | Refills: 0 | Status: SHIPPED | OUTPATIENT
Start: 2024-04-12

## 2024-04-12 RX ORDER — CIPROFLOXACIN 500 MG/1
500 TABLET ORAL 2 TIMES DAILY
Qty: 6 TABLET | Refills: 0 | Status: SHIPPED | OUTPATIENT
Start: 2024-04-12 | End: 2024-04-15

## 2024-04-12 RX ORDER — HYDROMORPHONE HYDROCHLORIDE 1 MG/ML
0.2 INJECTION, SOLUTION INTRAMUSCULAR; INTRAVENOUS; SUBCUTANEOUS EVERY 5 MIN PRN
Status: DISCONTINUED | OUTPATIENT
Start: 2024-04-12 | End: 2024-04-12 | Stop reason: HOSPADM

## 2024-04-12 RX ORDER — OXYCODONE HYDROCHLORIDE 5 MG/1
5 TABLET ORAL
Status: DISCONTINUED | OUTPATIENT
Start: 2024-04-12 | End: 2024-04-12 | Stop reason: HOSPADM

## 2024-04-12 RX ORDER — TAMSULOSIN HYDROCHLORIDE 0.4 MG/1
0.4 CAPSULE ORAL NIGHTLY
Qty: 7 CAPSULE | Refills: 0 | Status: SHIPPED | OUTPATIENT
Start: 2024-04-12 | End: 2024-04-19

## 2024-04-12 RX ORDER — DEXAMETHASONE SODIUM PHOSPHATE 4 MG/ML
INJECTION, SOLUTION INTRA-ARTICULAR; INTRALESIONAL; INTRAMUSCULAR; INTRAVENOUS; SOFT TISSUE
Status: DISCONTINUED | OUTPATIENT
Start: 2024-04-12 | End: 2024-04-12

## 2024-04-12 RX ORDER — ONDANSETRON HYDROCHLORIDE 2 MG/ML
INJECTION, SOLUTION INTRAVENOUS
Status: DISCONTINUED | OUTPATIENT
Start: 2024-04-12 | End: 2024-04-12

## 2024-04-12 RX ORDER — LIDOCAINE HYDROCHLORIDE 20 MG/ML
INJECTION, SOLUTION EPIDURAL; INFILTRATION; INTRACAUDAL; PERINEURAL
Status: DISCONTINUED | OUTPATIENT
Start: 2024-04-12 | End: 2024-04-12

## 2024-04-12 RX ORDER — HALOPERIDOL 5 MG/ML
0.5 INJECTION INTRAMUSCULAR EVERY 10 MIN PRN
Status: DISCONTINUED | OUTPATIENT
Start: 2024-04-12 | End: 2024-04-12 | Stop reason: HOSPADM

## 2024-04-12 RX ORDER — FENTANYL CITRATE 50 UG/ML
INJECTION, SOLUTION INTRAMUSCULAR; INTRAVENOUS
Status: DISCONTINUED | OUTPATIENT
Start: 2024-04-12 | End: 2024-04-12

## 2024-04-12 RX ORDER — OXYBUTYNIN CHLORIDE 5 MG/1
5 TABLET ORAL 3 TIMES DAILY PRN
Qty: 6 TABLET | Refills: 0 | Status: SHIPPED | OUTPATIENT
Start: 2024-04-12 | End: 2024-04-14

## 2024-04-12 RX ORDER — LABETALOL HCL 20 MG/4 ML
5 SYRINGE (ML) INTRAVENOUS
Status: DISCONTINUED | OUTPATIENT
Start: 2024-04-12 | End: 2024-04-12 | Stop reason: HOSPADM

## 2024-04-12 RX ORDER — ROCURONIUM BROMIDE 10 MG/ML
INJECTION, SOLUTION INTRAVENOUS
Status: DISCONTINUED | OUTPATIENT
Start: 2024-04-12 | End: 2024-04-12

## 2024-04-12 RX ORDER — PROPOFOL 10 MG/ML
VIAL (ML) INTRAVENOUS
Status: DISCONTINUED | OUTPATIENT
Start: 2024-04-12 | End: 2024-04-12

## 2024-04-12 RX ORDER — LIDOCAINE HYDROCHLORIDE 40 MG/ML
SOLUTION TOPICAL
Status: DISCONTINUED | OUTPATIENT
Start: 2024-04-12 | End: 2024-04-12

## 2024-04-12 RX ORDER — PHENAZOPYRIDINE HYDROCHLORIDE 200 MG/1
200 TABLET, FILM COATED ORAL 3 TIMES DAILY PRN
Qty: 9 TABLET | Refills: 0 | Status: SHIPPED | OUTPATIENT
Start: 2024-04-12 | End: 2024-04-15

## 2024-04-12 RX ORDER — ACETAMINOPHEN 10 MG/ML
INJECTION, SOLUTION INTRAVENOUS
Status: DISCONTINUED | OUTPATIENT
Start: 2024-04-12 | End: 2024-04-12

## 2024-04-12 RX ADMIN — OXYCODONE 5 MG: 5 TABLET ORAL at 09:04

## 2024-04-12 RX ADMIN — FENTANYL CITRATE 50 MCG: 50 INJECTION, SOLUTION INTRAMUSCULAR; INTRAVENOUS at 07:04

## 2024-04-12 RX ADMIN — DEXAMETHASONE SODIUM PHOSPHATE 4 MG: 4 INJECTION, SOLUTION INTRAMUSCULAR; INTRAVENOUS at 07:04

## 2024-04-12 RX ADMIN — LABETALOL HYDROCHLORIDE 5 MG: 5 INJECTION, SOLUTION INTRAVENOUS at 09:04

## 2024-04-12 RX ADMIN — ACETAMINOPHEN 1000 MG: 10 INJECTION, SOLUTION INTRAVENOUS at 07:04

## 2024-04-12 RX ADMIN — PROPOFOL 100 MG: 10 INJECTION, EMULSION INTRAVENOUS at 07:04

## 2024-04-12 RX ADMIN — ONDANSETRON 4 MG: 2 INJECTION INTRAMUSCULAR; INTRAVENOUS at 07:04

## 2024-04-12 RX ADMIN — ROCURONIUM BROMIDE 50 MG: 10 INJECTION, SOLUTION INTRAVENOUS at 07:04

## 2024-04-12 RX ADMIN — LIDOCAINE HYDROCHLORIDE 4 ML: 40 SOLUTION ORAL at 07:04

## 2024-04-12 RX ADMIN — SUGAMMADEX 200 MG: 100 INJECTION, SOLUTION INTRAVENOUS at 07:04

## 2024-04-12 RX ADMIN — CEFAZOLIN 2 G: 2 INJECTION, POWDER, FOR SOLUTION INTRAMUSCULAR; INTRAVENOUS at 07:04

## 2024-04-12 RX ADMIN — SODIUM CHLORIDE: 9 INJECTION, SOLUTION INTRAVENOUS at 06:04

## 2024-04-12 RX ADMIN — LIDOCAINE HYDROCHLORIDE 100 MG: 20 INJECTION, SOLUTION EPIDURAL; INFILTRATION; INTRACAUDAL; PERINEURAL at 07:04

## 2024-04-12 NOTE — TRANSFER OF CARE
"Anesthesia Transfer of Care Note    Patient: Cliff Magaña    Procedure(s) Performed: Procedure(s) (LRB):  TURBT (TRANSURETHRAL RESECTION OF BLADDER TUMOR) (N/A)  DILATION, URETHRA (N/A)  BIOPSY, BLADDER (N/A)  FULGURATION, BLADDER (N/A)    Patient location: PACU    Anesthesia Type: general    Transport from OR: Transported from OR on room air with adequate spontaneous ventilation    Post pain: adequate analgesia    Post assessment: tolerated procedure well and no apparent anesthetic complications    Post vital signs: stable    Level of consciousness: awake and alert    Nausea/Vomiting: no nausea/vomiting    Complications: none    Transfer of care protocol was followed    Last vitals: Visit Vitals  BP (!) 171/81 (BP Location: Left arm, Patient Position: Lying)   Pulse 79   Temp 36.2 °C (97.1 °F) (Temporal)   Resp 16   Ht 5' 4" (1.626 m)   Wt 55.9 kg (123 lb 3.8 oz)   SpO2 97%   BMI 21.15 kg/m²     "

## 2024-04-12 NOTE — ANESTHESIA POSTPROCEDURE EVALUATION
Anesthesia Post Evaluation    Patient: Cliff R Pond    Procedure(s) Performed: Procedure(s) (LRB):  TURBT (TRANSURETHRAL RESECTION OF BLADDER TUMOR) (N/A)  DILATION, URETHRA (N/A)  BIOPSY, BLADDER (N/A)  FULGURATION, BLADDER (N/A)    Final Anesthesia Type: general      Patient location during evaluation: PACU  Patient participation: Yes- Able to Participate  Level of consciousness: awake and alert  Post-procedure vital signs: reviewed and stable  Pain management: adequate  Airway patency: patent    PONV status: None or treated.  Anesthetic complications: no      Cardiovascular status: hemodynamically stable  Respiratory status: spontaneous ventilation  Hydration status: euvolemic  Follow-up not needed.          Vitals Value Taken Time   /77 04/12/24 1008   Temp 36.6 °C (97.9 °F) 04/12/24 1000   Pulse 81 04/12/24 1014   Resp 24 04/12/24 1008   SpO2 97 % 04/12/24 1014   Vitals shown include unvalidated device data.      No case tracking events are documented in the log.      Pain/Shweta Score: Pain Rating Prior to Med Admin: 5 (4/12/2024  9:09 AM)  Shweta Score: 10 (4/12/2024 10:00 AM)

## 2024-04-12 NOTE — ANESTHESIA PROCEDURE NOTES
Intubation    Date/Time: 4/12/2024 7:12 AM    Performed by: Amos Traylor CRNA  Authorized by: Oliver Merritt MD    Intubation:     Induction:  Intravenous    Intubated:  Postinduction    Mask Ventilation:  Easy mask    Attempts:  1    Attempted By:  CRNA    Method of Intubation:  Video laryngoscopy    Blade:  Julio 3    Laryngeal View Grade: Grade IIA - cords partially seen      Difficult Airway Encountered?: No      Complications:  None    Airway Device:  Oral endotracheal tube    Airway Device Size:  7.0    Style/Cuff Inflation:  Cuffed    Tube secured:  22    Secured at:  The lips    Placement Verified By:  Capnometry    Complicating Factors:  Poor neck/head extension    Findings Post-Intubation:  BS equal bilateral and atraumatic/condition of teeth unchanged  Notes:      Pt reported sore throat with last intubation. Smaller tube and LTA utilized.

## 2024-04-12 NOTE — H&P
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  He received 5 of 6 weeks of cisplatin therapy  Surveillance cystoscopy and CT urogram on 03/04/2024 showing residual tumor at the bladder dome    History of Present Illness:   Patient is a 88 y.o. male who returns to clinic for evaluation of muscle invasive bladder cancer. See above urologic oncology problem list for more details.  He was now status post chemoradiation with single agent cisplatin and external beam radiotherapy to the bladder dome.    Please recall that the patient required admission to the hospital on 02/04/2024 for weakness and hyperkalemia as well as cystitis.  He also required hospitalization after his initial TURBT.  His wife is quite upset and recalls this to be somewhat traumatic experience for her and the patient, and she was concerned regarding ongoing requirements for hospitalization, procedures, care with the Jernigan catheter etc.    We performed a cystoscopy and CT urogram last week which shows evidence of residual tumor at the bladder dome.  He was overall doing well, but has some urinary urgency and frequency.    Imaging: I have reviewed the imaging study CT urogram on 02/29/2024 personally, and agree with the findings.      Allergies:  Review of patient's allergies indicates:   Allergen Reactions    Niacin      Other reaction(s): Rash  Other reaction(s): Itching        Medications per EMR:  Medications Prior to Admission   Medication Sig Dispense Refill Last Dose    acetaminophen (TYLENOL) 650 MG TbSR Take 650 mg by mouth every 8 (eight) hours.   3/4/2024    apixaban (ELIQUIS) 5 mg Tab Take 1 tablet (5 mg total) by mouth 2 (two) times daily. 180 tablet 3 4/9/2024    cholecalciferol,  vitamin D3, (VITAMIN D3) 50 mcg (2,000 unit) Cap capsule Take by mouth once daily.   Past Week    ciprofloxacin HCl (CIPRO) 500 MG tablet Take 1 tablet (500 mg total) by mouth 2 (two) times daily. for 7 days 14 tablet 0 4/11/2024    cyclobenzaprine (FLEXERIL) 5 MG tablet Take 1 tablet (5 mg total) by mouth 3 (three) times daily as needed for Muscle spasms. 30 tablet 0 Past Week    dexAMETHasone (DECADRON) 4 MG Tab TAKE EVERY 12 HOURS DAILY FOR DAYS 2 THOUGH 4 FOLLOWING CHEMOTHERAPY 40 tablet 1 Past Week    empagliflozin (JARDIANCE) 10 mg tablet Take 1 tablet (10 mg total) by mouth once daily. 90 tablet 3 4/9/2024    fenofibrate micronized (LOFIBRA) 200 MG Cap TAKE 1 CAPSULE DAILY WITH BREAKFAST 90 capsule 3 4/11/2024    krill oil/hyaluronic/astaxanth (MEGARED JOINT CARE ORAL)    Past Week    loratadine (CLARITIN) 10 mg tablet Take 10 mg by mouth once daily.   3/5/2024    TRAMAINE BIOTIN ORAL Take 5,000 mcg by mouth once daily.   3/5/2024    metFORMIN (GLUCOPHAGE-XR) 500 MG ER 24hr tablet TAKE 1 TABLET DAILY 90 tablet 2 4/11/2024    metoprolol succinate (TOPROL-XL) 200 MG 24 hr tablet Take 1 tablet (200 mg total) by mouth 2 (two) times daily. 180 tablet 3 4/11/2024    mupirocin (BACTROBAN) 2 % ointment Apply topically 2 (two) times daily.   4/11/2024    OLANZapine (ZYPREXA) 5 MG tablet TAKE NIGHTLY FOR 4 DAYS FOLLOWING CHEMOTHERAPY 90 tablet 1 4/11/2024    sacubitriL-valsartan (ENTRESTO) 24-26 mg per tablet Take 1 tablet by mouth 2 (two) times daily. 180 tablet 3 4/9/2024    fenofibrate micronized (LOFIBRA) 200 MG Cap TAKE 1 CAPSULE DAILY WITH BREAKFAST 30 capsule 0     megestroL (MEGACE) 400 mg/10 mL (40 mg/mL) Susp Take 5 mLs (200 mg total) by mouth once daily. 150 mL 11     ondansetron (ZOFRAN) 8 MG tablet Take 1 tablet (8 mg total) by mouth every 12 (twelve) hours as needed for Nausea. 30 tablet 2        Past Medical History:  Past Medical History:   Diagnosis Date    *Atrial fibrillation     Chronic kidney disease      Deep vein thrombosis     Hyperlipidemia     Hypertension     Metabolic syndrome     Type 2 diabetes mellitus, without long-term current use of insulin 2021        Past Surgical History:  Past Surgical History:   Procedure Laterality Date    CATARACT EXTRACTION      CHOLECYSTECTOMY      CYSTOSCOPY N/A 2023    Procedure: CYSTOSCOPY;  Surgeon: Marcelino Moffett MD;  Location: [address];  Service: Urology;  Laterality: N/A;  90 minutes    EYE SURGERY      INJECTION OF FACET JOINT Bilateral 2021    Procedure: FACET JOINT INJECTION BILATERAL L4/L5 DIRECT REFERRAL;  Surgeon: Philipp Iyer MD;  Location: [facility];  Service: Pain Management;  Laterality: Bilateral;  NEEDS CONSENT, ELIQUIS CLEARANCE IN CHART    RETROGRADE PYELOGRAPHY Bilateral 2023    Procedure: PYELOGRAM, RETROGRADE;  Surgeon: Marcelino Moffett MD;  Location: [address];  Service: Urology;  Laterality: Bilateral;  90 minutes    SKIN CANCER EXCISION      TONSILLECTOMY      TURBT (TRANSURETHRAL RESECTION OF BLADDER TUMOR) N/A 2023    Procedure: TURBT (TRANSURETHRAL RESECTION OF BLADDER TUMOR);  Surgeon: Marcelino Moffett MD;  Location: [address];  Service: Urology;  Laterality: N/A;  90 minutes        Family History:  Family History   Problem Relation Age of Onset    Diabetes Maternal Aunt     Heart attack Neg Hx     Heart disease Neg Hx     Heart failure Neg Hx     Hyperlipidemia Neg Hx     Hypertension Neg Hx     Stroke Neg Hx         Social History:  Social History     Tobacco Use    Smoking status: Former     Current packs/day: 0.00     Average packs/day: 2.0 packs/day for 20.0 years (40.0 ttl pk-yrs)     Types: Cigarettes     Start date: 1963     Quit date: 1983     Years since quittin.9     Passive exposure: Never    Smokeless tobacco: Never   Substance Use Topics    Alcohol use: Yes     Alcohol/week: 1.0 standard drink of alcohol     Types: 1 Standard drinks or equivalent per  "week     Comment: 3 drinks per week          OBJECTIVE:     Vitals:    04/12/24 0543   BP: (!) 171/81   BP Location: Left arm   Patient Position: Lying   Pulse: 79   Resp: 16   Temp: 97.1 °F (36.2 °C)   TempSrc: Temporal   SpO2: 97%   Weight: 55.9 kg (123 lb 3.8 oz)   Height: 5' 4" (1.626 m)          General Appearance: Alert, cooperative, no distress, frail in appearance  Head: Normocephalic  Eyes: Clear conjunctiva  Neck: No obvious LND or JVD  Lungs: Normal chest excursion, no accessory muscle use  Chest: Regular rate rhythm by palpation, no pedal edema  Abdomen: Soft, non-tender, non-distended, no rebound, guarding, rigidity  Extremities: Atraumatic   Lymph Nodes: No appreciable lymph adenopathy  Neurologic: No gross gait, motor or sensory deficits        LABS:    CBC:  Lab Results   Component Value Date    WBC 7.64 02/27/2024    HGB 15.2 02/27/2024    HCT 44.8 02/27/2024    MCV 93 02/27/2024     02/27/2024         BMP:  Lab Results   Component Value Date     (L) 02/29/2024    K 4.6 02/29/2024     02/29/2024    CO2 24 02/29/2024    BUN 21 02/29/2024    CREATININE 0.8 02/29/2024    CALCIUM 9.8 02/29/2024    ANIONGAP 8 02/29/2024    EGFRNORACEVR >60.0 02/29/2024       ASSESSMENT/PLAN:     Assessment:  80-year-old male with a history of muscle invasive bladder cancer with enhancement and tumor recurrence at the dome of the urinary bladder status post chemoradiation    Plan:  I had a thorough discussion with the patient and his wife.  Specifically, we discussed the merits of repeat TURBT versus observation.  I also discussed this with Dr. Schofield, his radiation oncologist.    We discussed the standard of care would include repeat TURBT to determine if there was any viable tumor.  We could also perform bladder biopsies to determine if any CIS is present.  As noted above, his wife was very upset that after his last TURBT he required readmission, had significant weakness, and they did not deal with " the catheter very well.  I explained to her that any TURBT would carry the risk of him having a catheter, and a risk of perforation and prolonged catheterization as a standard of care.  She understands this and understands this is a difficult situation and given his age he does not have many options, and is not a radical cystectomy candidate.    We did discuss the possibility of observation given that he has no metastatic disease, repeating a cystoscopy and CT urogram in 3 months, in treating him expectantly.  He and his wife do not wish to pursue this and made a informed decision to proceed with Transurethral resection of bladder tumor.    We then discussed risks, benefits, alternatives associated with this surgery, including complications, detailed below:  -General anesthesia:  Associated risks include pneumonia, cerebrovascular accident, cardiac events including myocardial infarction, pulmonary embolism, deep vein thrombosis  -TURBT: We discussed complications such as urinary tract infection, dysuria, sepsis, hematuria, clot retention requiring take back to the operating room, and bladder perforation requiring prolonged catheterization versus operative exploration in the case of a intraperitoneal perforation.  We also discussed the possibility of a 2nd operation for either high-grade disease requiring a standard of care resection versus no detrusor muscle present for which a re-resection may be needed depending on the pathology.    Patient has been taking Cipro for positive urine culture, last dose 4/11 evening.    The patient has stopped all blood thinners.    Patient consented and would like to proceed with the procedure.    Maxwell Cordon MD  [facility] - PGY3

## 2024-04-12 NOTE — PLAN OF CARE
Pt alert and orientated. Family and spouse at bs. VSS. No distress noted. Pt denies N/V/D. Pt states they are ready for discharge.

## 2024-04-12 NOTE — DISCHARGE INSTRUCTIONS
Post Cystoscopy and Transurethral resection of Bladder Tumor Instructions  Do not strain to have a bowel movement  No strenuous exercise x 7 days  No driving while you are on narcotic pain medications or if your jaquez  catheter is in place    You can expect:  To see blood in your urine.    Go to the ER if:  Your catheter stops draining  You are having severe abdominal pain  Inability to void if you do not have a catheter    Call the doctor if:  Temperature is greater than 101F  Persistent vomiting and inability to keep food down    You may call [phone] for the  and ask for urology on call after hours and on weekends. Otherwise please call Dr. Story's office.

## 2024-04-12 NOTE — ANESTHESIA PREPROCEDURE EVALUATION
04/12/2024  Cliff Magaña is a 88 y.o., male.      Pre-op Assessment    I have reviewed the Patient Summary Reports.    I have reviewed the NPO Status.   I have reviewed the Medications.   Steroids Taken In Past Year: Decadron    Review of Systems  Anesthesia Hx:  No problems with previous Anesthesia   History of prior surgery of interest to airway management or planning:          Denies Family Hx of Anesthesia complications.    Denies Personal Hx of Anesthesia complications.                    Social:  Non-Smoker       Hematology/Oncology:  Hematology Normal   Oncology Normal                     Other (see Oncology comments)       radiation, chemotherapy and surgery   Oncology Comments: Bladder cancer     EENT/Dental:  EENT/Dental Normal           Cardiovascular:     Denies Pacemaker. Hypertension, well controlled    Denies CAD.    Denies CABG/stent. Dysrhythmias atrial fibrillation  Denies Angina. CHF (EF 45-50%)       ECG has been reviewed.  Functional Capacity unable to determine, gets around with walker at all times  limited by disability       Deep Venous Thrombosis (DVT), Hx of DVT                  Pulmonary:  Pulmonary Normal   Denies COPD.  Denies Asthma.   Denies Shortness of breath.   Denies Sleep Apnea.                Renal/:  Chronic Renal Disease                Hepatic/GI:  Hepatic/GI Normal     Denies GERD. Denies Liver Disease.            Musculoskeletal:  Musculoskeletal Normal                Neurological:  Neurology Normal Denies TIA.  Denies CVA.    Denies Seizures.                                Endocrine:  Diabetes, type 2 Denies Hypothyroidism.        Metabolic Disorders, Metabolic Syndrome  Dermatological:  Skin Normal    Psych:  Psychiatric Normal                  Physical Exam  General: Alert and Oriented    Airway:  Mallampati: II / II  Mouth Opening: Normal  TM Distance:  Normal  Tongue: Normal  Neck ROM: Normal ROM    Dental:  Intact    Chest/Lungs:  Clear to auscultation, Normal Respiratory Rate    Heart:  Rate: Normal  Rhythm: Regular Rhythm  Sounds: Normal      Anesthesia Assessment: Preoperative EQUATION    Planned Procedure: Procedure(s) (LRB):  TURBT (TRANSURETHRAL RESECTION OF BLADDER TUMOR) (N/A)  CYSTOGRAM (N/A)  Requested Anesthesia Type:General  Surgeon: Wellington Story MD  Service: Urology  Known or anticipated Date of Surgery:3/20/2024    Surgeon notes: reviewed    Electronic QUestionnaire Assessment completed via nurse interview with patient.        Triage considerations:     The patient has no apparent active cardiac condition (No unstable coronary Syndrome such as severe unstable angina or recent [<1 month] myocardial infarction, decompensated CHF, severe valvular   disease or significant arrhythmia)    Previous anesthesia records:GETA and No problems  11/16/23 TURBT (TRANSURETHRAL RESECTION OF BLADDER TUMOR) (Bladder), CYSTOSCOPY (Bladder), PYELOGRAM, RETROGRADE (Bilateral: Kidney), gen anes, ASA 3  Airway:  Mallampati: II   Mouth Opening: Normal  TM Distance: Normal  Tongue: Normal  Neck ROM: Normal ROM  Intubation     Date/Time: 11/16/2023 10:49 AM     Performed by: Earle Spicer CRNA  Authorized by: Adolfo Jade Jr., MD    Intubation:     Induction:  Intravenous    Intubated:  Postinduction    Mask Ventilation:  Easy mask    Attempts:  1    Attempted By:  CRNA    Method of Intubation:  Direct    Blade:  [place] 3    Laryngeal View Grade: Grade IIA - cords partially seen      Difficult Airway Encountered?: No      Complications:  None    Airway Device:  Oral endotracheal tube    Airway Device Size:  7.5    Style/Cuff Inflation:  Cuffed (inflated to minimal occlusive pressure)    Tube secured:  22    Secured at:  The lips    Placement Verified By:  Capnometry    Complicating Factors:  None    Findings Post-Intubation:  BS equal bilateral    Last PCP note:  6-12 months ago , within [facility]   Subspecialty notes: Cardiology: General, Hematology/Oncology, Urology    Other important co-morbidities: A FIB, CHF, DM2, HLD, HTN, and metabolic syndrome, h/o DVT, bladder cancer       Tests already available:  Available tests,  within 1 month , within 3 months , 3-6 months ago , within [facility] .   03/04/24 UA  02/29/24 BMP  02/27/24 CBC, CMP  02/02/24 EKG  12/12/23 TTE (EF 45-50%)  (Last A1C 6.9 on 11/10/23)            Instructions given. (See in Nurse's note)    Optimization:  Anesthesia Preop Clinic Assessment Indicated:     --phone screen done    Medical Opinion Indicated--pcp       Sub-specialist consult indicated:  O[facility] inst from cards      Plan:    Testing:  None Needed     Consultation:Patient's PCP for a statement of optimization      Patient  has previously scheduled Medical Appointment:    Navigation:  Consults scheduled.             Results will be tracked by Preop Clinic.    03/05/24 Requested pcp Dr. Estrada add preop clearance to already established appt on 03/07/24 03/05/24 Per cards Dr. Birmingham hold fenofibrate for 5 days and eliquis for 3 days.                 Anesthesia Plan  Type of Anesthesia, risks & benefits discussed:    Anesthesia Type: Gen ETT  Intra-op Monitoring Plan: Standard ASA Monitors  Post Op Pain Control Plan: multimodal analgesia  Airway Plan: Direct  Informed Consent: Informed consent signed with the Patient and all parties understand the risks and agree with anesthesia plan.  All questions answered.   ASA Score: 3    Ready For Surgery From Anesthesia Perspective.   .

## 2024-04-13 ENCOUNTER — PATIENT MESSAGE (OUTPATIENT)
Dept: ADMINISTRATIVE | Facility: OTHER | Age: 89
End: 2024-04-13
Payer: MEDICARE

## 2024-04-14 ENCOUNTER — PATIENT MESSAGE (OUTPATIENT)
Dept: ADMINISTRATIVE | Facility: OTHER | Age: 89
End: 2024-04-14
Payer: MEDICARE

## 2024-04-15 ENCOUNTER — PATIENT MESSAGE (OUTPATIENT)
Dept: ADMINISTRATIVE | Facility: OTHER | Age: 89
End: 2024-04-15
Payer: MEDICARE

## 2024-04-15 ENCOUNTER — PATIENT MESSAGE (OUTPATIENT)
Dept: HEMATOLOGY/ONCOLOGY | Facility: CLINIC | Age: 89
End: 2024-04-15
Payer: MEDICARE

## 2024-04-16 ENCOUNTER — PATIENT MESSAGE (OUTPATIENT)
Dept: ADMINISTRATIVE | Facility: OTHER | Age: 89
End: 2024-04-16
Payer: MEDICARE

## 2024-04-17 ENCOUNTER — PATIENT MESSAGE (OUTPATIENT)
Dept: ADMINISTRATIVE | Facility: OTHER | Age: 89
End: 2024-04-17
Payer: MEDICARE

## 2024-04-17 LAB
FINAL PATHOLOGIC DIAGNOSIS: NORMAL
GROSS: NORMAL
Lab: NORMAL

## 2024-04-18 ENCOUNTER — PATIENT MESSAGE (OUTPATIENT)
Dept: ADMINISTRATIVE | Facility: OTHER | Age: 89
End: 2024-04-18
Payer: MEDICARE

## 2024-04-18 DIAGNOSIS — N32.89 BLADDER MASS: Primary | ICD-10-CM

## 2024-04-19 ENCOUNTER — PATIENT MESSAGE (OUTPATIENT)
Dept: ADMINISTRATIVE | Facility: OTHER | Age: 89
End: 2024-04-19
Payer: MEDICARE

## 2024-04-20 ENCOUNTER — PATIENT MESSAGE (OUTPATIENT)
Dept: ADMINISTRATIVE | Facility: OTHER | Age: 89
End: 2024-04-20
Payer: MEDICARE

## 2024-04-21 ENCOUNTER — PATIENT MESSAGE (OUTPATIENT)
Dept: ADMINISTRATIVE | Facility: OTHER | Age: 89
End: 2024-04-21
Payer: MEDICARE

## 2024-04-22 ENCOUNTER — PATIENT MESSAGE (OUTPATIENT)
Dept: ADMINISTRATIVE | Facility: OTHER | Age: 89
End: 2024-04-22
Payer: MEDICARE

## 2024-04-23 ENCOUNTER — PATIENT MESSAGE (OUTPATIENT)
Dept: ADMINISTRATIVE | Facility: OTHER | Age: 89
End: 2024-04-23
Payer: MEDICARE

## 2024-04-24 ENCOUNTER — PATIENT MESSAGE (OUTPATIENT)
Dept: ADMINISTRATIVE | Facility: OTHER | Age: 89
End: 2024-04-24
Payer: MEDICARE

## 2024-04-25 ENCOUNTER — PATIENT MESSAGE (OUTPATIENT)
Dept: OTHER | Facility: OTHER | Age: 89
End: 2024-04-25
Payer: MEDICARE

## 2024-04-25 ENCOUNTER — PATIENT MESSAGE (OUTPATIENT)
Dept: ADMINISTRATIVE | Facility: OTHER | Age: 89
End: 2024-04-25
Payer: MEDICARE

## 2024-04-26 ENCOUNTER — PATIENT MESSAGE (OUTPATIENT)
Dept: ADMINISTRATIVE | Facility: OTHER | Age: 89
End: 2024-04-26
Payer: MEDICARE

## 2024-04-26 ENCOUNTER — OFFICE VISIT (OUTPATIENT)
Dept: RADIATION ONCOLOGY | Facility: CLINIC | Age: 89
End: 2024-04-26
Payer: MEDICARE

## 2024-04-26 VITALS
OXYGEN SATURATION: 95 % | TEMPERATURE: 98 F | HEART RATE: 83 BPM | HEIGHT: 64 IN | DIASTOLIC BLOOD PRESSURE: 76 MMHG | SYSTOLIC BLOOD PRESSURE: 121 MMHG | WEIGHT: 127.88 LBS | BODY MASS INDEX: 21.83 KG/M2

## 2024-04-26 DIAGNOSIS — C67.9 MALIGNANT NEOPLASM OF URINARY BLADDER, UNSPECIFIED SITE: ICD-10-CM

## 2024-04-26 DIAGNOSIS — R63.4 ABNORMAL INTENTIONAL WEIGHT LOSS: Primary | ICD-10-CM

## 2024-04-26 DIAGNOSIS — N18.32 CHRONIC KIDNEY DISEASE, STAGE 3B: ICD-10-CM

## 2024-04-26 PROCEDURE — 99214 OFFICE O/P EST MOD 30 MIN: CPT | Mod: S$PBB,,, | Performed by: STUDENT IN AN ORGANIZED HEALTH CARE EDUCATION/TRAINING PROGRAM

## 2024-04-26 PROCEDURE — 99215 OFFICE O/P EST HI 40 MIN: CPT | Mod: PBBFAC | Performed by: STUDENT IN AN ORGANIZED HEALTH CARE EDUCATION/TRAINING PROGRAM

## 2024-04-26 PROCEDURE — 99999 PR PBB SHADOW E&M-EST. PATIENT-LVL V: CPT | Mod: PBBFAC,,, | Performed by: STUDENT IN AN ORGANIZED HEALTH CARE EDUCATION/TRAINING PROGRAM

## 2024-04-26 NOTE — PROGRESS NOTES
Radiation Oncology Follow-up Note        Date of Service: 04/26/2024     Chief Complaint: bladder cancer, s/p definitive bladder preservation TMT     Reason for visit: post TMT toxicity check     Referring Physician: Dr Story (urology)     Implantable devices: denies     Therapy to Date:  Course: C1 Pelvis 2024  Treatment Site Ref. ID Energy Dose/Fx (Gy) #Fx Dose Correction (Gy) Total Dose (Gy) Start Date End Date Elapsed Days   IM Bladder PTV_High 6X 2.75 20 / 20 0 55 1/9/2024 2/8/2024 30        Diagnosis/Assessment:   Cliff Magaña is a 88 y.o. 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 Found to abnormal bladder mucosa on cysto 1 month after completion c/f recurrence but TURBT 4/12/2024 was negative for dysplasia or malignancy.     ECOG 2,  with urgency and frequency from latest TURBT and weight loss     Plan     - refer to dietician  - continue to follow with Dr Bland and Dr Story  - return to [facility]       Interval history:     2/8/2024 tolerated radiation therapy well with no expected toxicities, without significant treatment delays or breaks.      Recent cystoscopy with Dr. Dubose on 3/4 revealing residual tumor bladder dome     3/4/2024 cysto (Dr Story)   Bladder with nodular growth at the dome and posterior bladder wall, consistent with recurrent carcinoma.  Surrounding erythema.  Ureteral orifices in orthotopic position bilaterally      3/12/2024 Dr Bland     4/12/2024 TURBT Dr Story  Procedure(s) Performed:   1. TURBT of large (>5.0 cm) sized bladder tumor Anterior wall bladder tumor TURBT   2. Biopsy and fulguration of bladder tumors  3. Urethral Dilation   Pathology : Negative for dysplasia or malignancy       Subjective:   In clinic the patient is accompanied by his  wife .  He reports weight loss, with normal appetite. Also reports increased urinary frequency and urgency since TURBT, but denies hematuria.    He denies constipation or diarrhea or rectal bleeding     He denies denies other major complaints such as pain.       Social history  , lives in [place], and retired [employer], followed by off shore work,   4 children (1 passed as an infant)  Quit tobacco in the 1980s but still drinks alcohol     Current Outpatient Medications on File Prior to Visit   Medication Sig Dispense Refill    acetaminophen (TYLENOL) 650 MG TbSR Take 650 mg by mouth every 8 (eight) hours.      apixaban (ELIQUIS) 5 mg Tab Take 1 tablet (5 mg total) by mouth 2 (two) times daily. 180 tablet 3    cholecalciferol, vitamin D3, (VITAMIN D3) 50 mcg (2,000 unit) Cap capsule Take by mouth once daily.      cyclobenzaprine (FLEXERIL) 5 MG tablet Take 1 tablet (5 mg total) by mouth 3 (three) times daily as needed for Muscle spasms. 30 tablet 0    empagliflozin (JARDIANCE) 10 mg tablet Take 1 tablet (10 mg total) by mouth once daily. 90 tablet 3    fenofibrate micronized (LOFIBRA) 200 MG Cap TAKE 1 CAPSULE DAILY WITH BREAKFAST 30 capsule 0    fenofibrate micronized (LOFIBRA) 200 MG Cap TAKE 1 CAPSULE DAILY WITH BREAKFAST 90 capsule 3    krill oil/hyaluronic/astaxanth (MEGARED JOINT CARE ORAL)       loratadine (CLARITIN) 10 mg tablet Take 10 mg by mouth once daily.      TRAMAINE BIOTIN ORAL Take 5,000 mcg by mouth once daily.      megestroL (MEGACE) 400 mg/10 mL (40 mg/mL) Susp Take 5 mLs (200 mg total) by mouth once daily. 150 mL 11    metFORMIN (GLUCOPHAGE-XR) 500 MG ER 24hr tablet TAKE 1 TABLET DAILY 90 tablet 2    metoprolol succinate (TOPROL-XL) 200 MG 24 hr tablet Take 1 tablet (200 mg total) by mouth 2 (two) times daily. 180 tablet 3    mupirocin (BACTROBAN) 2 % ointment Apply topically 2 (two) times daily.      ondansetron (ZOFRAN) 8 MG tablet Take 1 tablet (8 mg total) by mouth every 12  (twelve) hours as needed for Nausea. 30 tablet 2    sacubitriL-valsartan (ENTRESTO) 24-26 mg per tablet Take 1 tablet by mouth 2 (two) times daily. 180 tablet 3    dexAMETHasone (DECADRON) 4 MG Tab TAKE EVERY 12 HOURS DAILY FOR DAYS 2 THOUGH 4 FOLLOWING CHEMOTHERAPY (Patient not taking: Reported on 4/26/2024) 40 tablet 1    OLANZapine (ZYPREXA) 5 MG tablet TAKE NIGHTLY FOR 4 DAYS FOLLOWING CHEMOTHERAPY (Patient not taking: Reported on 4/26/2024) 90 tablet 1    oxybutynin (DITROPAN) 5 MG Tab Take 1 tablet (5 mg total) by mouth 3 (three) times daily as needed. 6 tablet 0    oxyCODONE (ROXICODONE) 5 MG immediate release tablet Take 1 tablet (5 mg total) by mouth every 6 (six) hours as needed for Pain. (Patient not taking: Reported on 4/26/2024) 5 tablet 0    tamsulosin (FLOMAX) 0.4 mg Cap Take 1 capsule (0.4 mg total) by mouth every evening. for 7 days 7 capsule 0     No current facility-administered medications on file prior to visit.           Review of patient's allergies indicates:   Allergen Reactions    Niacin      Other reaction(s): Rash  Other reaction(s): Itching            Review of Systems   Negative unless as above       HPI:   Cliff Magaña is a 88 y.o. man with recent diagnosis of bladder cancer after presenting with abnormal US for renal cyst surveillance     Oncologic history:     10/17/2023 US kidney (surveillance for renal cysts)               Bilateral minimally complex renal cysts               Two indeterminate lesions that project into the bladder lumen.      11/16/2023 TURBT (Dr Moffett)              Right postero-lateral papillary bladder wall tumor (3-4cm) with diffusely erythematous and nodular surrounding tissue; pathology Noninvasive high-grade papillary urothelial carcinoma   Right <1cm papillary bladder wall tumor immediately lateral to the larger postero-lateral papillary tumor  Right anterior nodular 4cm bladder tumor with high grade and possibly invasive appearance. Pathology : Invasive  high-grade papillary urothelial carcinoma , invacde muscularis propria MMR intact   Several other patches of erythema were diffusely identified on the left lateral bladder wall.   2. Bimanual exam post-TURBT showed freely mobile bladder without abnormalities     11/21/2023 CTA Chest              No mediastinal, hilar or axillary lymphadenopathy     11/25/2023 CT A/P              Postsurgical change in the anterior bladder wall from TURBT               No lymphadenopathy.  No distant metastases.     12/6/2023 CT chest  No significant abnormality   Right lobe thyroid nodule  Left upper pole renal cyst     12/28/2024 romain Bland              Patient prefers bladder preservation           Objective:   Physical Exam  Vitals reviewed.   Constitutional:       Appearance: Normal appearance.   HENT:      Head: Normocephalic and atraumatic; bilateral hearing aids   Pulmonary:      Effort: Pulmonary effort is normal.   Abdominal:      General: There is no distension.   Musculoskeletal:         General: Normal range of motion.   Neurological:      General: No focal deficit present.      Mental Status: alert and oriented  Psychiatric:         Mood and Affect: Mood normal.         Behavior: Behavior normal.             Imaging: I have personally reviewed the patient's available images and reports and summarized pertinent findings above in HPI.      Pathology: I have personally reviewed the patient's available pathology and summarized pertinent findings above in HPI.         I spent approximately 30 minutes reviewing the available records and evaluating the patient, out of which over 50% of the time was spent face to face with the patient in counseling and coordinating this patient's care.     Thank you for the opportunity to care for this patient. Please do not hesitate to contact me with any questions.     Roberto Schofield MD/PhD

## 2024-04-27 ENCOUNTER — PATIENT MESSAGE (OUTPATIENT)
Dept: ADMINISTRATIVE | Facility: OTHER | Age: 89
End: 2024-04-27
Payer: MEDICARE

## 2024-04-28 ENCOUNTER — PATIENT MESSAGE (OUTPATIENT)
Dept: ADMINISTRATIVE | Facility: OTHER | Age: 89
End: 2024-04-28
Payer: MEDICARE

## 2024-04-29 ENCOUNTER — PATIENT MESSAGE (OUTPATIENT)
Dept: ADMINISTRATIVE | Facility: OTHER | Age: 89
End: 2024-04-29
Payer: MEDICARE

## 2024-04-30 ENCOUNTER — TELEPHONE (OUTPATIENT)
Dept: HEMATOLOGY/ONCOLOGY | Facility: CLINIC | Age: 89
End: 2024-04-30
Payer: MEDICARE

## 2024-04-30 ENCOUNTER — EXTERNAL HOME HEALTH (OUTPATIENT)
Dept: HOME HEALTH SERVICES | Facility: HOSPITAL | Age: 89
End: 2024-04-30
Payer: MEDICARE

## 2024-04-30 ENCOUNTER — PATIENT MESSAGE (OUTPATIENT)
Dept: ADMINISTRATIVE | Facility: OTHER | Age: 89
End: 2024-04-30
Payer: MEDICARE

## 2024-04-30 NOTE — TELEPHONE ENCOUNTER
Spoke w/ pt's wife in regards to scheduling nutrition referral. Pt's wife approved to schedule virtually w/ Ebony Thompson RD @ 1PM on 5/13. Pt's wife stated they are familiar with virtual appt.       [place], [place] ext [phone]

## 2024-05-01 ENCOUNTER — PATIENT MESSAGE (OUTPATIENT)
Dept: ADMINISTRATIVE | Facility: OTHER | Age: 89
End: 2024-05-01
Payer: MEDICARE

## 2024-05-01 DIAGNOSIS — I48.19 PERSISTENT ATRIAL FIBRILLATION: ICD-10-CM

## 2024-05-02 ENCOUNTER — PATIENT MESSAGE (OUTPATIENT)
Dept: ADMINISTRATIVE | Facility: OTHER | Age: 89
End: 2024-05-02
Payer: MEDICARE

## 2024-05-02 RX ORDER — APIXABAN 5 MG/1
5 TABLET, FILM COATED ORAL 2 TIMES DAILY
Qty: 180 TABLET | Refills: 3 | Status: SHIPPED | OUTPATIENT
Start: 2024-05-02

## 2024-05-03 ENCOUNTER — PATIENT MESSAGE (OUTPATIENT)
Dept: UROLOGY | Facility: CLINIC | Age: 89
End: 2024-05-03
Payer: MEDICARE

## 2024-05-03 ENCOUNTER — PATIENT MESSAGE (OUTPATIENT)
Dept: ADMINISTRATIVE | Facility: OTHER | Age: 89
End: 2024-05-03
Payer: MEDICARE

## 2024-05-05 ENCOUNTER — PATIENT MESSAGE (OUTPATIENT)
Dept: ADMINISTRATIVE | Facility: OTHER | Age: 89
End: 2024-05-05
Payer: MEDICARE

## 2024-05-06 ENCOUNTER — PATIENT MESSAGE (OUTPATIENT)
Dept: ADMINISTRATIVE | Facility: OTHER | Age: 89
End: 2024-05-06
Payer: MEDICARE

## 2024-05-06 PROBLEM — N39.0 UTI (URINARY TRACT INFECTION): Status: RESOLVED | Noted: 2023-11-21 | Resolved: 2024-05-06

## 2024-05-07 ENCOUNTER — PATIENT MESSAGE (OUTPATIENT)
Dept: ADMINISTRATIVE | Facility: OTHER | Age: 89
End: 2024-05-07
Payer: MEDICARE

## 2024-05-07 NOTE — PROGRESS NOTES
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  He received 5 of 6 weeks of cisplatin therapy  Surveillance cystoscopy and CT urogram on 03/04/2024 showing residual tumor at the bladder dome  Underwent TURBT on 4/12/24 and the path was negative for malignancy    History of Present Illness:   Patient is a 88 y.o. male who returns to clinic for evaluation of muscle invasive bladder cancer. See above urologic oncology problem list for more details.  He was now status post chemoradiation with single agent cisplatin and external beam radiotherapy to the bladder dome.    Please recall that the patient required admission to the hospital on 02/04/2024 for weakness and hyperkalemia as well as cystitis.  He also required hospitalization after his initial TURBT.  His wife is quite upset and recalls this to be somewhat traumatic experience for her and the patient, and she was concerned regarding ongoing requirements for hospitalization, procedures, care with the Jernigan catheter etc.    We performed a surveillance cystoscopy and CT urogram  which shows evidence of residual tumor at the bladder dome. He underwent TURBT on 4/12/24 that was negative for malignancy.  He was overall doing well, but has some urinary urgency and frequency.  He also has some concern for penile redness and that his penis is hidden, and foreskin is not coming all the way back.  He was circumcised and confirmed this by history    Allergies:  Review of patient's allergies indicates:   Allergen Reactions    Niacin      Other reaction(s): Rash  Other reaction(s): Itching        Medications per EMR:  (Not in a hospital admission)      Past Medical History:  Past Medical History:   Diagnosis Date    *Atrial  fibrillation     Chronic kidney disease     Deep vein thrombosis     Hyperlipidemia     Hypertension     Metabolic syndrome     Type 2 diabetes mellitus, without long-term current use of insulin 12/13/2021        Past Surgical History:  Past Surgical History:   Procedure Laterality Date    BIOPSY OF BLADDER N/A 4/12/2024    Procedure: BIOPSY, BLADDER;  Surgeon: Wellington Story MD;  Location: [address];  Service: Urology;  Laterality: N/A;    BLADDER FULGURATION N/A 4/12/2024    Procedure: FULGURATION, BLADDER;  Surgeon: Wellington Story MD;  Location: [address];  Service: Urology;  Laterality: N/A;    CATARACT EXTRACTION      CHOLECYSTECTOMY      CYSTOSCOPY N/A 11/16/2023    Procedure: CYSTOSCOPY;  Surgeon: Marcelino Moffett MD;  Location: [address];  Service: Urology;  Laterality: N/A;  90 minutes    DILATION OF URETHRA N/A 4/12/2024    Procedure: DILATION, URETHRA;  Surgeon: Wellington Story MD;  Location: [address];  Service: Urology;  Laterality: N/A;    EYE SURGERY      INJECTION OF FACET JOINT Bilateral 4/28/2021    Procedure: FACET JOINT INJECTION BILATERAL L4/L5 DIRECT REFERRAL;  Surgeon: Philipp Iyer MD;  Location: [facility];  Service: Pain Management;  Laterality: Bilateral;  NEEDS CONSENT, ELIQUIS CLEARANCE IN CHART    RETROGRADE PYELOGRAPHY Bilateral 11/16/2023    Procedure: PYELOGRAM, RETROGRADE;  Surgeon: Marcelino Moffett MD;  Location: [address];  Service: Urology;  Laterality: Bilateral;  90 minutes    SKIN CANCER EXCISION      TONSILLECTOMY      TURBT (TRANSURETHRAL RESECTION OF BLADDER TUMOR) N/A 11/16/2023    Procedure: TURBT (TRANSURETHRAL RESECTION OF BLADDER TUMOR);  Surgeon: Marcelino Moffett MD;  Location: [address];  Service: Urology;  Laterality: N/A;  90 minutes    TURBT (TRANSURETHRAL RESECTION OF BLADDER TUMOR) N/A 4/12/2024    Procedure: TURBT (TRANSURETHRAL RESECTION OF BLADDER TUMOR);  Surgeon: Wellington Story MD;  Location: [address]  [facility];  Service: Urology;  Laterality: N/A;        Family History:  Family History   Problem Relation Name Age of Onset    Diabetes Maternal Aunt      Heart attack Neg Hx      Heart disease Neg Hx      Heart failure Neg Hx      Hyperlipidemia Neg Hx      Hypertension Neg Hx      Stroke Neg Hx          Social History:  Social History     Tobacco Use    Smoking status: Former     Current packs/day: 0.00     Average packs/day: 2.0 packs/day for 20.0 years (40.0 ttl pk-yrs)     Types: Cigarettes     Start date: 1963     Quit date: 1983     Years since quittin.9     Passive exposure: Never    Smokeless tobacco: Never   Substance Use Topics    Alcohol use: Yes     Alcohol/week: 1.0 standard drink of alcohol     Types: 1 Standard drinks or equivalent per week     Comment: 3 drinks per week          OBJECTIVE:     There were no vitals filed for this visit.         General Appearance: Alert, cooperative, no distress, frail in appearance  Head: Normocephalic  Eyes: Clear conjunctiva  Neck: No obvious LND or JVD  Lungs: Normal chest excursion, no accessory muscle use  Chest: Regular rate rhythm by palpation, no pedal edema  Abdomen: Soft, non-tender, non-distended, no rebound, guarding, rigidity  -genital exam: Circumcised phallus, bilaterally descended testes, erythema around the glans penis and shaft penis consistent with balanitis  Extremities: Atraumatic   Lymph Nodes: No appreciable lymph adenopathy  Neurologic: No gross gait, motor or sensory deficits        LABS:    CBC:  Lab Results   Component Value Date    WBC 7.64 2024    HGB 15.2 2024    HCT 44.8 2024    MCV 93 2024     2024         BMP:  Lab Results   Component Value Date     (L) 2024    K 4.6 2024     2024    CO2 24 2024    BUN 21 2024    CREATININE 0.8 2024    CALCIUM 9.8 2024    ANIONGAP 8 2024    EGFRNORACEVR >60.0 2024       ASSESSMENT/PLAN:      Assessment:  80-year-old male with a history of muscle invasive bladder cancer with enhancement and tumor recurrence at the dome of the urinary bladder status post chemoradiation, TURBT was negative for recurrent malignancy, physical exam consistent with balanitis    Plan:  -muscle invasive bladder cancer:  follow up in July 2024 with CT urogram, cystoscopy, cytology  -on physical exam today, he has evidence for balanitis, we will try topical betamethasone twice daily and I have sent this to his pharmacy on file  -he has urinary urgency and frequency, we are checking urinalysis and culture and I will call him with the results should they be positive for infection

## 2024-05-08 ENCOUNTER — OFFICE VISIT (OUTPATIENT)
Dept: UROLOGY | Facility: CLINIC | Age: 89
End: 2024-05-08
Payer: MEDICARE

## 2024-05-08 ENCOUNTER — PATIENT MESSAGE (OUTPATIENT)
Dept: ADMINISTRATIVE | Facility: OTHER | Age: 89
End: 2024-05-08
Payer: MEDICARE

## 2024-05-08 VITALS
WEIGHT: 126.13 LBS | DIASTOLIC BLOOD PRESSURE: 73 MMHG | BODY MASS INDEX: 21.53 KG/M2 | HEIGHT: 64 IN | SYSTOLIC BLOOD PRESSURE: 124 MMHG | HEART RATE: 78 BPM

## 2024-05-08 DIAGNOSIS — R39.15 URGENCY OF URINATION: Primary | ICD-10-CM

## 2024-05-08 LAB
BACTERIA #/AREA URNS AUTO: ABNORMAL /HPF
BILIRUB UR QL STRIP: NEGATIVE
CLARITY UR REFRACT.AUTO: CLEAR
COLOR UR AUTO: YELLOW
GLUCOSE UR QL STRIP: ABNORMAL
HGB UR QL STRIP: ABNORMAL
HYALINE CASTS UR QL AUTO: 1 /LPF
KETONES UR QL STRIP: NEGATIVE
LEUKOCYTE ESTERASE UR QL STRIP: ABNORMAL
MICROSCOPIC COMMENT: ABNORMAL
NITRITE UR QL STRIP: NEGATIVE
PH UR STRIP: 6 [PH] (ref 5–8)
PROT UR QL STRIP: ABNORMAL
RBC #/AREA URNS AUTO: 88 /HPF (ref 0–4)
SP GR UR STRIP: 1.02 (ref 1–1.03)
SQUAMOUS #/AREA URNS AUTO: 0 /HPF
URN SPEC COLLECT METH UR: ABNORMAL
WBC #/AREA URNS AUTO: 22 /HPF (ref 0–5)
YEAST UR QL AUTO: ABNORMAL

## 2024-05-08 PROCEDURE — 87086 URINE CULTURE/COLONY COUNT: CPT | Performed by: UROLOGY

## 2024-05-08 PROCEDURE — 99999 PR PBB SHADOW E&M-EST. PATIENT-LVL IV: CPT | Mod: PBBFAC,,, | Performed by: UROLOGY

## 2024-05-08 PROCEDURE — 81001 URINALYSIS AUTO W/SCOPE: CPT | Performed by: UROLOGY

## 2024-05-08 PROCEDURE — 99214 OFFICE O/P EST MOD 30 MIN: CPT | Mod: S$PBB,,, | Performed by: UROLOGY

## 2024-05-08 PROCEDURE — 99214 OFFICE O/P EST MOD 30 MIN: CPT | Mod: PBBFAC | Performed by: UROLOGY

## 2024-05-08 RX ORDER — MEGESTROL ACETATE 40 MG/ML
400 SUSPENSION ORAL DAILY
COMMUNITY
Start: 2024-04-08 | End: 2024-05-20 | Stop reason: SDUPTHER

## 2024-05-08 RX ORDER — BETAMETHASONE VALERATE 1 MG/G
CREAM TOPICAL 2 TIMES DAILY
Qty: 1 EACH | Refills: 0 | Status: SHIPPED | OUTPATIENT
Start: 2024-05-08 | End: 2024-05-22

## 2024-05-09 ENCOUNTER — PATIENT MESSAGE (OUTPATIENT)
Dept: ADMINISTRATIVE | Facility: OTHER | Age: 89
End: 2024-05-09
Payer: MEDICARE

## 2024-05-09 LAB — BACTERIA UR CULT: NO GROWTH

## 2024-05-09 NOTE — PROGRESS NOTES
The patient location is: [place]  The chief complaint leading to consultation is: weight loss (unintentional)     Visit type: audiovisual    Face to Face time with patient: 27 minutes   38 minutes of total time spent on the encounter, which includes face to face time and non-face to face time preparing to see the patient (eg, review of tests), Obtaining and/or reviewing separately obtained history, Documenting clinical information in the electronic or other health record, Independently interpreting results (not separately reported) and communicating results to the patient/family/caregiver, or Care coordination (not separately reported).     Each patient to whom he or she provides medical services by telemedicine is:  (1) informed of the relationship between the physician and patient and the respective role of any other health care provider with respect to management of the patient; and (2) notified that he or she may decline to receive medical services by telemedicine and may withdraw from such care at any time.    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 He is s/p definitive bladder preservation 55Gy in 20fx with IV chemo M-F using IMRT completed 2/8/2024. Referred to nutrition due to appetite loss and weight loss. PMH includes DM2, CKD3, HTN, HLD.   He reports symptoms started  "right after surgery. Reports foods do not have a normal taste and are gritty, dry. Reports constant dry mouth. Drinks water all day long and urinates frequently. Uses Biotene spray. Weight loss is down to 121lb from usual of 140lb.   Doing homemade smoothies with protein powder, some Boost Very High Calorie.   Blood sugars have been high per his report, but he is also not checking routinely.     Weight:54.9 kg (121 lb)  Height:5' 4" (1.626 m)  BMI:Body mass index is 20.77 kg/m².   IBW: Ideal body weight: 59.2 kg (130 lb 8.2 oz)    Usual BW: 140lb   Weight Change:20lb over the past year     Allergies: Niacin    Current Medications:    Current Outpatient Medications:     acetaminophen (TYLENOL) 650 MG TbSR, Take 650 mg by mouth every 8 (eight) hours., Disp: , Rfl:     betamethasone valerate 0.1% (VALISONE) 0.1 % Crea, Apply topically 2 (two) times daily. for 14 days, Disp: 1 each, Rfl: 0    cholecalciferol, vitamin D3, (VITAMIN D3) 50 mcg (2,000 unit) Cap capsule, Take by mouth once daily., Disp: , Rfl:     cyclobenzaprine (FLEXERIL) 5 MG tablet, Take 1 tablet (5 mg total) by mouth 3 (three) times daily as needed for Muscle spasms., Disp: 30 tablet, Rfl: 0    dexAMETHasone (DECADRON) 4 MG Tab, TAKE EVERY 12 HOURS DAILY FOR DAYS 2 THOUGH 4 FOLLOWING CHEMOTHERAPY, Disp: 40 tablet, Rfl: 1    ELIQUIS 5 mg Tab, TAKE 1 TABLET TWICE A DAY, Disp: 180 tablet, Rfl: 3    empagliflozin (JARDIANCE) 10 mg tablet, Take 1 tablet (10 mg total) by mouth once daily., Disp: 90 tablet, Rfl: 3    fenofibrate micronized (LOFIBRA) 200 MG Cap, TAKE 1 CAPSULE DAILY WITH BREAKFAST, Disp: 30 capsule, Rfl: 0    fenofibrate micronized (LOFIBRA) 200 MG Cap, TAKE 1 CAPSULE DAILY WITH BREAKFAST, Disp: 90 capsule, Rfl: 3    krill oil/hyaluronic/astaxanth (MEGARED JOINT CARE ORAL), , Disp: , Rfl:     loratadine (CLARITIN) 10 mg tablet, Take 10 mg by mouth once daily., Disp: , Rfl:     TRAMAINE BIOTIN ORAL, Take 5,000 mcg by mouth once daily., Disp: , Rfl: "     megestroL (MEGACE) 400 mg/10 mL (10 mL) Susp, Take 400 mg by mouth., Disp: , Rfl:     megestroL (MEGACE) 400 mg/10 mL (40 mg/mL) Susp, Take 5 mLs (200 mg total) by mouth once daily., Disp: 150 mL, Rfl: 11    metFORMIN (GLUCOPHAGE-XR) 500 MG ER 24hr tablet, TAKE 1 TABLET DAILY, Disp: 90 tablet, Rfl: 2    metoprolol succinate (TOPROL-XL) 200 MG 24 hr tablet, Take 1 tablet (200 mg total) by mouth 2 (two) times daily., Disp: 180 tablet, Rfl: 3    mupirocin (BACTROBAN) 2 % ointment, Apply topically 2 (two) times daily., Disp: , Rfl:     OLANZapine (ZYPREXA) 5 MG tablet, TAKE NIGHTLY FOR 4 DAYS FOLLOWING CHEMOTHERAPY, Disp: 90 tablet, Rfl: 1    ondansetron (ZOFRAN) 8 MG tablet, Take 1 tablet (8 mg total) by mouth every 12 (twelve) hours as needed for Nausea., Disp: 30 tablet, Rfl: 2    oxybutynin (DITROPAN) 5 MG Tab, Take 1 tablet (5 mg total) by mouth 3 (three) times daily as needed., Disp: 6 tablet, Rfl: 0    oxyCODONE (ROXICODONE) 5 MG immediate release tablet, Take 1 tablet (5 mg total) by mouth every 6 (six) hours as needed for Pain., Disp: 5 tablet, Rfl: 0    sacubitriL-valsartan (ENTRESTO) 24-26 mg per tablet, Take 1 tablet by mouth 2 (two) times daily., Disp: 180 tablet, Rfl: 3    tamsulosin (FLOMAX) 0.4 mg Cap, Take 1 capsule (0.4 mg total) by mouth every evening. for 7 days, Disp: 7 capsule, Rfl: 0    Vitamins/Supplements: Vitamin D, megared (omega 3 fish oil)     Labs: Reviewed from the past 6 months   Most recent Albumin 2.9, glucose 149    Hemoglobin A1C   Date Value Ref Range Status   12/22/2023 6.9 (H) 4.0 - 5.6 % Final     Comment:     ADA Screening Guidelines:  5.7-6.4%  Consistent with prediabetes  >or=6.5%  Consistent with diabetes    High levels of fetal hemoglobin interfere with the HbA1C  assay. Heterozygous hemoglobin variants (HbS, HgC, etc)do  not significantly interfere with this assay.   However, presence of multiple variants may affect accuracy.     11/10/2023 6.9 (H) 4.0 - 5.6 % Final      Comment:     ADA Screening Guidelines:  5.7-6.4%  Consistent with prediabetes  >or=6.5%  Consistent with diabetes    High levels of fetal hemoglobin interfere with the HbA1C  assay. Heterozygous hemoglobin variants (HbS, HgC, etc)do  not significantly interfere with this assay.   However, presence of multiple variants may affect accuracy.     08/14/2023 8.6 (H) 4.0 - 5.6 % Final     Comment:     ADA Screening Guidelines:  5.7-6.4%  Consistent with prediabetes  >or=6.5%  Consistent with diabetes    High levels of fetal hemoglobin interfere with the HbA1C  assay. Heterozygous hemoglobin variants (HbS, HgC, etc)do  not significantly interfere with this assay.   However, presence of multiple variants may affect accuracy.           Nutrition Diagnosis    Problem: inadequate protein/energy intake  Etiology (related to): appetite loss , early satiety , and dysgeusia   Signs/Symptoms (as evidenced by): weight loss    Nutrition Intervention    Nutrition Prescription   9551-8954 Kcals (30-35kcal/kg)  72-82 g protein (1.3-1.5g/kg)   2169-2619 mL fluid (30-35mL/kg)    Recommendations:  Moist, nourishing foods   -yogurt with fruit, cottage cheese, beans, high calorie soups, smoothies, pot pies, casserole dishes, oatmeal, nutritional supplements  -tuna, eggs, etc without bread (too hard to breakdown with inadequate saliva)   -add soups, gravies for additional calories and moisture   -use healthy fats to help with calories without spiking blood sugar   -cold fruits   Xylimelts for dry mouth  Continue Biotene as needed   Pair carbohydrates with proteins and/or fats to help with blood sugar   Use plain yogurt or peanut butter in smoothies   Continue to drink a lot of water   Consider Centrum Silver if continuing to have difficulty eating   Try 5 small meals per day instead of trying larger meals   Don't force breads, crackers, etc due to a lack of saliva     Materials Provided/Reviewed   Meal and recipe ideas     Nutrition  Monitoring and Evaluation    Monitor: energy intake, diet tolerance , and weight    Goals: weight gain, improved food tolerance     Follow up Patient provided with dietitian contact number and advised to call with questions or make future appointment if further intervention is needed.  They also know how to reach out via portal message     Communication to referring provider/care team: note available in chart     Counseling time: 30 Minutes    Ebony Thompson, MPH, RD, , LDN, FAND  Board Certified Specialist in Oncology Nutrition   [phone]

## 2024-05-10 ENCOUNTER — OFFICE VISIT (OUTPATIENT)
Dept: PODIATRY | Facility: CLINIC | Age: 89
End: 2024-05-10
Payer: MEDICARE

## 2024-05-10 ENCOUNTER — PATIENT MESSAGE (OUTPATIENT)
Dept: ADMINISTRATIVE | Facility: OTHER | Age: 89
End: 2024-05-10
Payer: MEDICARE

## 2024-05-10 VITALS
BODY MASS INDEX: 21.53 KG/M2 | DIASTOLIC BLOOD PRESSURE: 64 MMHG | WEIGHT: 126.13 LBS | SYSTOLIC BLOOD PRESSURE: 120 MMHG | HEART RATE: 64 BPM | HEIGHT: 64 IN

## 2024-05-10 DIAGNOSIS — E11.49 TYPE 2 DIABETES MELLITUS WITH NEUROLOGICAL MANIFESTATIONS: ICD-10-CM

## 2024-05-10 DIAGNOSIS — B35.1 ONYCHOMYCOSIS: ICD-10-CM

## 2024-05-10 DIAGNOSIS — E11.51 TYPE 2 DIABETES MELLITUS WITH PERIPHERAL VASCULAR DISEASE: Primary | ICD-10-CM

## 2024-05-10 PROCEDURE — 99999 PR PBB SHADOW E&M-EST. PATIENT-LVL IV: CPT | Mod: PBBFAC,,, | Performed by: PODIATRIST

## 2024-05-10 PROCEDURE — 99214 OFFICE O/P EST MOD 30 MIN: CPT | Mod: PBBFAC,PN | Performed by: PODIATRIST

## 2024-05-10 PROCEDURE — 99499 UNLISTED E&M SERVICE: CPT | Mod: S$PBB,,, | Performed by: PODIATRIST

## 2024-05-10 PROCEDURE — 11721 DEBRIDE NAIL 6 OR MORE: CPT | Mod: Q8,PBBFAC,PN | Performed by: PODIATRIST

## 2024-05-10 NOTE — PROGRESS NOTES
Subjective:      Patient ID: Cliff Magaña is a 88 y.o. male.    Chief Complaint: Diabetes Mellitus and Nail Care      Cliff is a 88 y.o. male who presents to the clinic upon referral from Dr. Steff choi. provider found  for evaluation and treatment of diabetic feet. Cliff has a past medical history of *Atrial fibrillation, Chronic kidney disease, Deep vein thrombosis, Hyperlipidemia, Hypertension, Metabolic syndrome, and Type 2 diabetes mellitus, without long-term current use of insulin (12/13/2021). Patient relates no major problem with feet. Only complaints today consist of thickened toenails that he has difficulty trimming.  He will sometimes get a pedicure.  He also relates he gets intermittent cramping to both legs at night.  History of chronic low back pain with right lower extremity symptoms.    04/29/2022: Returns for routine nail care. No new concerns.     07/29/2022: Returns for routine nail care. No new concerns.     11/17/2022:  Returns routine foot care.  No new concerns.    02/16/2023:  Returns for routine foot care.  Due for annual foot exam.  Complains of intermittent cramping to the left foot that is brief in duration.  Denies any claudication symptoms. Followed by Cardiology.     05/18/23: Returns for routine foot care.  No new concerns.  Accompanied by his wife.    08/17/2023: Returns for routine foot care.  No new concerns.    11/09/2023:  Returns for routine foot care.  Accompanied by his wife.  No new concerns.    02/09/2024:  Returns for annual foot exam and routine foot care.  Accompanied by his wife.  Patient has had significant weight loss secondary to loss of appetite post chemotherapy and radiation therapy for bladder cancer.  Recently admitted for UTI and discharged.    05/10/2024: Returns for routine foot care.  No new concerns.    PCP: Munir Estrada MD    Date Last Seen by PCP:  03/07/2024    Current shoe gear: Casual shoes    Hemoglobin A1C   Date Value Ref Range Status  "  12/22/2023 6.9 (H) 4.0 - 5.6 % Final     Comment:     ADA Screening Guidelines:  5.7-6.4%  Consistent with prediabetes  >or=6.5%  Consistent with diabetes    High levels of fetal hemoglobin interfere with the HbA1C  assay. Heterozygous hemoglobin variants (HbS, HgC, etc)do  not significantly interfere with this assay.   However, presence of multiple variants may affect accuracy.     11/10/2023 6.9 (H) 4.0 - 5.6 % Final     Comment:     ADA Screening Guidelines:  5.7-6.4%  Consistent with prediabetes  >or=6.5%  Consistent with diabetes    High levels of fetal hemoglobin interfere with the HbA1C  assay. Heterozygous hemoglobin variants (HbS, HgC, etc)do  not significantly interfere with this assay.   However, presence of multiple variants may affect accuracy.     08/14/2023 8.6 (H) 4.0 - 5.6 % Final     Comment:     ADA Screening Guidelines:  5.7-6.4%  Consistent with prediabetes  >or=6.5%  Consistent with diabetes    High levels of fetal hemoglobin interfere with the HbA1C  assay. Heterozygous hemoglobin variants (HbS, HgC, etc)do  not significantly interfere with this assay.   However, presence of multiple variants may affect accuracy.       Vitals:    05/10/24 1356   BP: 120/64   Pulse: 64   Weight: 57.2 kg (126 lb 1.7 oz)   Height: 5' 4" (1.626 m)   PainSc: 0-No pain      Past Medical History:   Diagnosis Date    *Atrial fibrillation     Chronic kidney disease     Deep vein thrombosis     Hyperlipidemia     Hypertension     Metabolic syndrome     Type 2 diabetes mellitus, without long-term current use of insulin 12/13/2021       Past Surgical History:   Procedure Laterality Date    BIOPSY OF BLADDER N/A 4/12/2024    Procedure: BIOPSY, BLADDER;  Surgeon: Wellington Story MD;  Location: [facility] OR [address] [place] [address];  Service: Urology;  Laterality: N/A;    BLADDER FULGURATION N/A 4/12/2024    Procedure: FULGURATION, BLADDER;  Surgeon: Wellington Story MD;  Location: [facility] OR [address] [place] [address];  Service: Urology;  Laterality: N/A;    CATARACT " EXTRACTION      CHOLECYSTECTOMY      CYSTOSCOPY N/A 11/16/2023    Procedure: CYSTOSCOPY;  Surgeon: Marcelino Moffett MD;  Location: [facility] OR [address] [place] [address];  Service: Urology;  Laterality: N/A;  90 minutes    DILATION OF URETHRA N/A 4/12/2024    Procedure: DILATION, URETHRA;  Surgeon: Wellingtno Story MD;  Location: [facility] OR [address] [place] [address];  Service: Urology;  Laterality: N/A;    EYE SURGERY      INJECTION OF FACET JOINT Bilateral 4/28/2021    Procedure: FACET JOINT INJECTION BILATERAL L4/L5 DIRECT REFERRAL;  Surgeon: Philipp Iyer MD;  Location: [facility]IN [facility]GT;  Service: Pain Management;  Laterality: Bilateral;  NEEDS CONSENT, ELIQUIS CLEARANCE IN CHART    RETROGRADE PYELOGRAPHY Bilateral 11/16/2023    Procedure: PYELOGRAM, RETROGRADE;  Surgeon: Marcelino Moffett MD;  Location: [facility] OR [address] [place] [address];  Service: Urology;  Laterality: Bilateral;  90 minutes    SKIN CANCER EXCISION      TONSILLECTOMY      TURBT (TRANSURETHRAL RESECTION OF BLADDER TUMOR) N/A 11/16/2023    Procedure: TURBT (TRANSURETHRAL RESECTION OF BLADDER TUMOR);  Surgeon: Marcelino Moffett MD;  Location: [facility] OR [address] [place] [address];  Service: Urology;  Laterality: N/A;  90 minutes    TURBT (TRANSURETHRAL RESECTION OF BLADDER TUMOR) N/A 4/12/2024    Procedure: TURBT (TRANSURETHRAL RESECTION OF BLADDER TUMOR);  Surgeon: Wellington Story MD;  Location: [address];  Service: Urology;  Laterality: N/A;       Family History   Problem Relation Name Age of Onset    Diabetes Maternal Aunt      Heart attack Neg Hx      Heart disease Neg Hx      Heart failure Neg Hx      Hyperlipidemia Neg Hx      Hypertension Neg Hx      Stroke Neg Hx         Social History     Socioeconomic History    Marital status:      Spouse name: Nereyda    Number of children: 4   Tobacco Use    Smoking status: Former     Current packs/day: 0.00     Average packs/day: 2.0 packs/day for 20.0 years (40.0 ttl pk-yrs)     Types: Cigarettes     Start date: 5/22/1963     Quit date: 5/22/1983     Years since  quittin.9     Passive exposure: Never    Smokeless tobacco: Never   Substance and Sexual Activity    Alcohol use: Yes     Alcohol/week: 1.0 standard drink of alcohol     Types: 1 Standard drinks or equivalent per week     Comment: 3 drinks per week    Drug use: No    Sexual activity: Not Currently     Partners: Female     Social Determinants of Health     Financial Resource Strain: Low Risk  (2023)    Overall Financial Resource Strain (CARDIA)     Difficulty of Paying Living Expenses: Not hard at all   Food Insecurity: No Food Insecurity (2023)    Hunger Vital Sign     Worried About Running Out of Food in the Last Year: Never true     Ran Out of Food in the Last Year: Never true   Transportation Needs: No Transportation Needs (2023)    PRAPARE - Transportation     Lack of Transportation (Medical): No     Lack of Transportation (Non-Medical): No   Physical Activity: Sufficiently Active (2023)    Exercise Vital Sign     Days of Exercise per Week: 7 days     Minutes of Exercise per Session: 60 min   Stress: No Stress Concern Present (2023)    [employer] [place] [employer] - Occupational Stress Questionnaire     Feeling of Stress : Not at all   Housing Stability: Low Risk  (2023)    Housing Stability Vital Sign     Unable to Pay for Housing in the Last Year: No     Number of Places Lived in the Last Year: 1     Unstable Housing in the Last Year: No       Current Outpatient Medications   Medication Sig Dispense Refill    acetaminophen (TYLENOL) 650 MG TbSR Take 650 mg by mouth every 8 (eight) hours.      betamethasone valerate 0.1% (VALISONE) 0.1 % Crea Apply topically 2 (two) times daily. for 14 days 1 each 0    cholecalciferol, vitamin D3, (VITAMIN D3) 50 mcg (2,000 unit) Cap capsule Take by mouth once daily.      cyclobenzaprine (FLEXERIL) 5 MG tablet Take 1 tablet (5 mg total) by mouth 3 (three) times daily as needed for Muscle spasms. 30 tablet 0    dexAMETHasone  (DECADRON) 4 MG Tab TAKE EVERY 12 HOURS DAILY FOR DAYS 2 THOUGH 4 FOLLOWING CHEMOTHERAPY 40 tablet 1    ELIQUIS 5 mg Tab TAKE 1 TABLET TWICE A  tablet 3    empagliflozin (JARDIANCE) 10 mg tablet Take 1 tablet (10 mg total) by mouth once daily. 90 tablet 3    fenofibrate micronized (LOFIBRA) 200 MG Cap TAKE 1 CAPSULE DAILY WITH BREAKFAST 30 capsule 0    fenofibrate micronized (LOFIBRA) 200 MG Cap TAKE 1 CAPSULE DAILY WITH BREAKFAST 90 capsule 3    krill oil/hyaluronic/astaxanth (MEGARED JOINT CARE ORAL)       loratadine (CLARITIN) 10 mg tablet Take 10 mg by mouth once daily.      TRAMAINE BIOTIN ORAL Take 5,000 mcg by mouth once daily.      megestroL (MEGACE) 400 mg/10 mL (10 mL) Susp Take 400 mg by mouth.      megestroL (MEGACE) 400 mg/10 mL (40 mg/mL) Susp Take 5 mLs (200 mg total) by mouth once daily. 150 mL 11    metFORMIN (GLUCOPHAGE-XR) 500 MG ER 24hr tablet TAKE 1 TABLET DAILY 90 tablet 2    metoprolol succinate (TOPROL-XL) 200 MG 24 hr tablet Take 1 tablet (200 mg total) by mouth 2 (two) times daily. 180 tablet 3    mupirocin (BACTROBAN) 2 % ointment Apply topically 2 (two) times daily.      OLANZapine (ZYPREXA) 5 MG tablet TAKE NIGHTLY FOR 4 DAYS FOLLOWING CHEMOTHERAPY 90 tablet 1    ondansetron (ZOFRAN) 8 MG tablet Take 1 tablet (8 mg total) by mouth every 12 (twelve) hours as needed for Nausea. 30 tablet 2    oxyCODONE (ROXICODONE) 5 MG immediate release tablet Take 1 tablet (5 mg total) by mouth every 6 (six) hours as needed for Pain. 5 tablet 0    sacubitriL-valsartan (ENTRESTO) 24-26 mg per tablet Take 1 tablet by mouth 2 (two) times daily. 180 tablet 3    oxybutynin (DITROPAN) 5 MG Tab Take 1 tablet (5 mg total) by mouth 3 (three) times daily as needed. 6 tablet 0    tamsulosin (FLOMAX) 0.4 mg Cap Take 1 capsule (0.4 mg total) by mouth every evening. for 7 days 7 capsule 0     No current facility-administered medications for this visit.       Review of patient's allergies indicates:   Allergen  Reactions    Niacin      Other reaction(s): Rash  Other reaction(s): Itching           Review of Systems   Constitutional: Negative for chills, fever and malaise/fatigue.   HENT:  Negative for congestion and hearing loss.    Cardiovascular:  Negative for chest pain, claudication and leg swelling.   Respiratory:  Negative for cough and shortness of breath.    Skin:  Positive for nail changes.   Musculoskeletal:  Positive for back pain and muscle cramps. Negative for joint pain and muscle weakness.   Gastrointestinal:  Negative for nausea and vomiting.   Neurological:  Positive for paresthesias. Negative for numbness and weakness.   Psychiatric/Behavioral:  Negative for altered mental status.            Objective:      Physical Exam  Constitutional:       General: He is not in acute distress.     Appearance: Normal appearance. He is not ill-appearing.   Cardiovascular:      Pulses:           Dorsalis pedis pulses are 0 on the right side and 0 on the left side.        Posterior tibial pulses are 0 on the right side and 0 on the left side.      Comments: Absent PT/AT with Doppler bilateral lower extremity.    Mild nonpitting edema to lower extremity bilateral.  No hair growth bilateral lower extremity.  Mild rubor on dependency bilateral lower extremity.  Musculoskeletal:      Comments: No pain with ROM or MMT bilateral lower extremity.    No significant digital deformity bilateral.  Rectus appearing foot type bilateral.   Feet:      Right foot:      Protective Sensation: 10 sites tested.  9 sites sensed.      Skin integrity: Dry skin present.      Toenail Condition: Right toenails are abnormally thick and long.      Left foot:      Protective Sensation: 10 sites tested.  10 sites sensed.      Skin integrity: Dry skin present.      Toenail Condition: Left toenails are abnormally thick and long.   Skin:     General: Skin is warm.      Capillary Refill: Capillary refill takes 2 to 3 seconds.      Findings: No ecchymosis  or erythema.      Nails: There is no clubbing.      Comments: Second toenail bilateral is growing a superior direction, thickened and discolored yellow with some mild loosening.  Nails 1, 3, 4 and 5 bilateral are mildly elongated 2-3 mm, thickened with patchy yellow discoloration mild underlying debris.    No open lesions or macerations to lower extremity bilateral.    Skin is atrophied to lower extremity bilateral.   Neurological:      Mental Status: He is alert and oriented to person, place, and time.      Sensory: Sensory deficit present.      Motor: Motor function is intact.      Comments: Absent vibratory sensation right foot and decreased left foot.               Assessment:       Encounter Diagnoses   Name Primary?    Type 2 diabetes mellitus with peripheral vascular disease Yes    Type 2 diabetes mellitus with neurological manifestations     Onychomycosis          Plan:       Cliff was seen today for diabetes mellitus and nail care.    Diagnoses and all orders for this visit:    Type 2 diabetes mellitus with peripheral vascular disease  -     Routine Foot Care    Type 2 diabetes mellitus with neurological manifestations  -     Routine Foot Care    Onychomycosis  -     Routine Foot Care      I counseled the patient on his conditions, their implications and medical management.    Shoe inspection. Diabetic Foot Education. Patient reminded of the importance of good nutrition and blood sugar control to help prevent podiatric complications of diabetes. Patient instructed on proper foot hygeine. We discussed wearing proper shoe gear, daily foot inspections, never walking without protective shoe gear, never putting sharp instruments to feet.    Routine foot care per attached note. Patient relates relief following the procedure. He will continue to monitor the areas daily, inspect his feet, wear protective shoe gear when ambulatory, moisturizer to maintain skin integrity.    Assisted by Dickson Quinn DPM PGY  1    RTC within 4 months or p.r.n. as discussed.      A portion of this note was generated by voice recognition software and may contain spelling and grammar errors.

## 2024-05-10 NOTE — PROCEDURES
"Routine Foot Care    Date/Time: 5/10/2024 2:00 PM    Performed by: Bhupendra Brar DPM  Authorized by: Bhupendra Brar DPM    Time out: Immediately prior to procedure a "time out" was called to verify the correct patient, procedure, equipment, support staff and site/side marked as required.    Consent Done?:  Yes (Verbal)  Hyperkeratotic Skin Lesions?: No      Nail Care Type:  Debride  Location(s): All  (Left 1st Toe, Left 3rd Toe, Left 2nd Toe, Left 4th Toe, Left 5th Toe, Right 1st Toe, Right 2nd Toe, Right 3rd Toe, Right 4th Toe and Right 5th Toe)  Patient tolerance:  Patient tolerated the procedure well with no immediate complications     Used sterile nail nipper. Assisted by Dickson Quinn DPM PGY 1          "

## 2024-05-12 ENCOUNTER — PATIENT MESSAGE (OUTPATIENT)
Dept: ADMINISTRATIVE | Facility: OTHER | Age: 89
End: 2024-05-12
Payer: MEDICARE

## 2024-05-13 ENCOUNTER — CLINICAL SUPPORT (OUTPATIENT)
Dept: HEMATOLOGY/ONCOLOGY | Facility: CLINIC | Age: 89
End: 2024-05-13
Payer: MEDICARE

## 2024-05-13 ENCOUNTER — PATIENT MESSAGE (OUTPATIENT)
Dept: ADMINISTRATIVE | Facility: OTHER | Age: 89
End: 2024-05-13
Payer: MEDICARE

## 2024-05-13 VITALS — BODY MASS INDEX: 20.66 KG/M2 | HEIGHT: 64 IN | WEIGHT: 121 LBS

## 2024-05-13 DIAGNOSIS — R63.4 ABNORMAL INTENTIONAL WEIGHT LOSS: ICD-10-CM

## 2024-05-13 DIAGNOSIS — Z71.3 NUTRITIONAL COUNSELING: Primary | ICD-10-CM

## 2024-05-13 DIAGNOSIS — N18.32 CHRONIC KIDNEY DISEASE, STAGE 3B: ICD-10-CM

## 2024-05-13 DIAGNOSIS — I15.2 HYPERTENSION ASSOCIATED WITH DIABETES: ICD-10-CM

## 2024-05-13 DIAGNOSIS — C68.9 UROTHELIAL CANCER: ICD-10-CM

## 2024-05-13 DIAGNOSIS — E11.22 CKD STAGE 3 DUE TO TYPE 2 DIABETES MELLITUS: ICD-10-CM

## 2024-05-13 DIAGNOSIS — R63.0 DECREASED APPETITE: ICD-10-CM

## 2024-05-13 DIAGNOSIS — E11.59 HYPERTENSION ASSOCIATED WITH DIABETES: ICD-10-CM

## 2024-05-13 DIAGNOSIS — E11.8 DM TYPE 2, CONTROLLED, WITH COMPLICATION: ICD-10-CM

## 2024-05-13 DIAGNOSIS — N18.30 CKD STAGE 3 DUE TO TYPE 2 DIABETES MELLITUS: ICD-10-CM

## 2024-05-13 PROBLEM — N17.9 AKI (ACUTE KIDNEY INJURY): Status: RESOLVED | Noted: 2024-02-02 | Resolved: 2024-05-13

## 2024-05-13 PROCEDURE — 97802 MEDICAL NUTRITION INDIV IN: CPT | Mod: 95,,, | Performed by: DIETITIAN, REGISTERED

## 2024-05-14 ENCOUNTER — PATIENT MESSAGE (OUTPATIENT)
Dept: ADMINISTRATIVE | Facility: OTHER | Age: 89
End: 2024-05-14
Payer: MEDICARE

## 2024-05-15 ENCOUNTER — PATIENT MESSAGE (OUTPATIENT)
Dept: ADMINISTRATIVE | Facility: OTHER | Age: 89
End: 2024-05-15
Payer: MEDICARE

## 2024-05-15 ENCOUNTER — EXTERNAL HOME HEALTH (OUTPATIENT)
Dept: HOME HEALTH SERVICES | Facility: HOSPITAL | Age: 89
End: 2024-05-15
Payer: MEDICARE

## 2024-05-16 ENCOUNTER — OFFICE VISIT (OUTPATIENT)
Dept: HEMATOLOGY/ONCOLOGY | Facility: CLINIC | Age: 89
End: 2024-05-16
Payer: MEDICARE

## 2024-05-16 ENCOUNTER — PATIENT MESSAGE (OUTPATIENT)
Dept: ADMINISTRATIVE | Facility: OTHER | Age: 89
End: 2024-05-16
Payer: MEDICARE

## 2024-05-16 VITALS
HEART RATE: 78 BPM | WEIGHT: 127 LBS | RESPIRATION RATE: 17 BRPM | DIASTOLIC BLOOD PRESSURE: 61 MMHG | HEIGHT: 64 IN | SYSTOLIC BLOOD PRESSURE: 121 MMHG | TEMPERATURE: 97 F | BODY MASS INDEX: 21.68 KG/M2 | OXYGEN SATURATION: 100 %

## 2024-05-16 DIAGNOSIS — C67.9 MALIGNANT NEOPLASM OF URINARY BLADDER, UNSPECIFIED SITE: Primary | ICD-10-CM

## 2024-05-16 PROCEDURE — 99999 PR PBB SHADOW E&M-EST. PATIENT-LVL V: CPT | Mod: PBBFAC,,, | Performed by: INTERNAL MEDICINE

## 2024-05-16 PROCEDURE — 99214 OFFICE O/P EST MOD 30 MIN: CPT | Mod: S$PBB,,, | Performed by: INTERNAL MEDICINE

## 2024-05-16 PROCEDURE — G2211 COMPLEX E/M VISIT ADD ON: HCPCS | Mod: S$PBB,,, | Performed by: INTERNAL MEDICINE

## 2024-05-16 PROCEDURE — 99215 OFFICE O/P EST HI 40 MIN: CPT | Mod: PBBFAC | Performed by: INTERNAL MEDICINE

## 2024-05-16 NOTE — PROGRESS NOTES
Subjective     Patient ID: Cliff Magaña is a 88 y.o. male.    Chief Complaint: Malignant neoplasm of urinary bladder, unspecified site    HPI    Diagnosis: Muscle invasive bladder cancer post chemo/XRT     Returns for follow up    In the interval:    - 2/2024 scans without signs of metastatic disease    - cystoscopy with Dr. Story on 3/4/2024 revealing concern for residual tumor bladder dome but TURBT for 4/12/2024 was negative on Pathology  Pathology:  1. BLADDER, RIGHT LATERAL WALL, BIOPSY:   Small fragment of urothelial mucosa with reactive changes.    Negative for dysplasia or malignancy.    2. BLADDER, POSTERIOR WALL, BIOPSY:   Heavily denuded urothelial mucosa with reactive changes.    Negative for dysplasia or malignancy.    3. BLADDER, LEFT LATERAL WALL, BIOPSY:   Small fragment of urothelial mucosa with reactive changes.    Negative for dysplasia or malignancy.    4. BLADDER, POSTERIOR PATCH, BIOPSY:   Heavily denuded urothelial mucosa with marked chronic inflammation and reactive changes.   Negative for dysplasia or malignancy.    5. BLADDER, ANTERIOR WALL TUMOR, TRANSURETHRAL RESECTION:   Polypoid cystitis and granulation tissue with squamous metaplasia of urothelial mucosa.    Negative for dysplasia or malignancy.     Reports the following:  + fatigue  Weight stable in low 120s (never regained to baseline of 140 lbs)- lowest weight was 117 lbs and then started Marinol  Patient states food does not really taste good anymore- feels like he is being punished  Also feels like his mouth is dry all the time  Likes fruits smoothies, oatmeal with fruit, ice cream, soups, scrambled eggs  Wife notes other things she cooks that is agonizing to watch as no interest     States just the last 2 weeks he has returned to [employer]ucianism- but this the only outing  Sleeps a lot    No pain    Frequent urination    Most recent imaging:  - 2/29/2024 CT Urogram Abd/Pelvis:  FINDINGS:  Stable mild cardiomegaly.  Coronary artery  calcific atherosclerosis.  Visualized inferior lungs are clear.  Stable small left hepatic lobe calcification.  No suspicious hepatic lesion.  Gallbladder is surgically absent.  No biliary ductal dilatation.  Spleen and adrenal glands are unremarkable.  Stable appearance of the pancreas with atrophy, scattered calcifications, and mild duct dilatation measuring 4 mm.  Findings can be seen with sequela of chronic pancreatitis.  Bilateral renal cortical thinning.  Multiple bilateral renal cysts and subcentimeter renal hypodensities too small to characterize.  No solid enhancing renal mass.  No renal stone.  No hydronephrosis or ureteral dilatation.  Opacification of the bilateral renal collecting systems and ureters without suspicious filling defect.  Patient is status post TURBT.  There is increased heterogeneous wall thickening and enhancement along the anterior bladder (series 4, image 144) concerning for residual/recurrent tumor.  Mild perivesical stranding and vascular prominence, similar to prior exam.  Small hiatal hernia.  Colonic diverticulosis.  No evidence of bowel obstruction or inflammation.  No free intraperitoneal fluid or air.  No pathologically enlarged abdominopelvic lymph nodes.  Abdominal aorta is normal caliber.  Moderate/severe calcific atherosclerosis.  Degenerative changes of the osseous structures.  No acute fracture or aggressive osseous lesion  Impression:  1. Increased heterogeneous wall thickening and enhancement along the anterior bladder concerning for residual/recurrent tumor.  2. No evidence of metastatic disease.  3. Additional findings as above.     Oncology History:  - bladder cancer incidentally found on imaging as he had been under surveillance for renal cysts  (10/17/2023 ultrasound showed stable left renal cysts and new bladder masses)     -  11/16/2023 TURBT of bladder tumor  Findings:   1. Right postero-lateral papillary bladder wall tumor (3-4cm) with diffusely erythematous  and nodular surrounding tissue, right < 1cm papillary bladder wall tumor immediately lateral to the larger postero-lateral papillary tumor, and a right nodular 4cm bladder tumor with high grade and possibly invasive appearance. Tumors resected, hemostasis achieved.  Several other patches of erythema were diffusely identified on the left lateral bladder wall.  2. Bimanual exam post-TURBT showed freely mobile bladder without abnormalities  3. Bilateral retrograde pyelogram showed delicate calices with no evidence of hydronephrosis, no filling defects, and ureter normal in course and caliber, bilaterally  Pathology:  1. Bladder, right posterolateral wall, transurethral resection of bladder tumor:  - Noninvasive high-grade papillary urothelial carcinoma  - Muscularis propria present  - Multiple H&E levels evaluated  2. Bladder, right anterior, transurethral resection of bladder tumor:  - Invasive high-grade papillary urothelial carcinoma  - Tumor invades muscularis propria  - Intact expression of DNA mismatch repair proteins in tumor by immunohistochemistry (see comment)  COMMENT:  Immunohistochemistry (IHC) Testing for Mismatch Repair (MMR) Proteins:  MLH1 - Intact nuclear expression  MSH2 - Intact nuclear expression  MSH6 - Intact nuclear expression  PMS2 - Intact nuclear expression  Background nonneoplastic tissue/internal control with intact nuclear expression  IHC Interpretation  No loss of nuclear expression of MMR proteins: low probability of microsatellite instability  There are exceptions to the above IHC interpretations. These results should not be considered in isolation, and clinical correlation with genetic counseling is recommended to assess the need for germline testing.     - 11/25/2023 CT Abd/Pelvis:  FINDINGS:  Lung base: Bilateral small volume pleural effusion increased in size compared to recent prior, associated with mild compression atelectasis.  Visualized portion of heart: Coronary artery  calcification.  Liver: Normal in size.  Left lobe small calcified granuloma.  No suspicious focal lesion.  Gallbladder: Cholecystectomy.  Bile duct: No intra-or extrahepatic biliary ductal dilatation.  Spleen: Unremarkable.  Pancreas: Parenchymal atrophy.  Multiple parenchymal calcifications suggesting chronic pancreatitis.  No suspicious focal lesion.  No pancreatic ductal dilatation.  No adjacent inflammatory changes or fluid collections.  Adrenal glands: Unremarkable.  Genitourinary: Bilateral renal cysts.  Subcentimeter hypodensities in both kidneys, which are too small to characterize.  The ureters appear normal in course and caliber.  No evidence of nephrolithiasis or hydroureteronephrosis.  Urinary bladder: Postsurgical change in the anterior bladder wall from TURBT.  There are small foci of air in the bladder, likely iatrogenic.  Reproductive organs: Unremarkable.  GI tract: Small hiatal hernia.  The stomach is unremarkable.  The visualized loops of small and large bowel show no evidence of obstruction or inflammation. Diverticulosis.  Appendix unremarkable.  Peritoneum: No free air or fluid.  Retroperitoneum: No significant adenopathy.  Vasculature: Normal caliber of abdominal aorta with moderate calcified and noncalcified atherosclerosis.  Abdominal wall: Tiny fat containing umbilical hernia.  Bones: Stable T12-L1 intervertebral disc calcification.  No suspicious sclerotic lesions.  No acute fracture.  Impression:  Postoperative changes from of TURBT.  No lymphadenopathy.  No distant metastases.  Other findings as described.     - 12/6/2023 CT Chest:  FINDINGS:  Comparison is 11/21/2023.  No mediastinal, hilar or axillary adenopathy is seen.  There is a right lower pole thyroid nodule.  There are coronary calcifications.  There is a left upper pole renal cyst.  There is a small hiatal hernia.  Trachea and bronchi are patent.  There is no evidence of interstitial lung disease, bronchiectasis, airway  trapping, or emphysema.  No suspicious pulmonary nodules, masses, or infiltrates are seen.  There is a calcified left lung granuloma.  Bones demonstrate nothing focal.  Impression:  No significant abnormality seen.  Right lobe thyroid nodule.  Coronary artery calcifications.  Left upper pole renal cyst.     Last radiation session on 2/8/24     PMH:  Active Ambulatory Problems        Diagnosis   Date Noted       CKD stage 3 due to type 2 diabetes mellitus     11/23/2012       Chronic anticoagulation 11/23/2012       Hypertension associated with diabetes     05/22/2013       Hyperlipidemia associated with type 2 diabetes mellitus     05/22/2013       Persistent atrial fibrillation      06/11/2014       Atherosclerosis of aorta      11/18/2020       Sensorineural hearing loss (SNHL) of both ears  11/19/2020       Risk for falls    12/15/2021       Renal cyst  12/14/2022       DM type 2, controlled, with complication  12/14/2022       Bladder tumor     11/16/2023       UTI (urinary tract infection) 11/21/2023       Acute hypoxic respiratory failure   11/21/2023       Elevated troponin 11/21/2023       Thyroid nodules   11/21/2023     Resolved Ambulatory Problems        Diagnosis   Date Noted       Atrial fibrillation     08/02/2012       Long term (current) use of anticoagulants 08/02/2012       Family history of diabetes mellitus 11/23/2012       Ex-cigarette smoker     11/23/2012       Metabolic syndrome      11/23/2012       Screen for colon cancer 05/07/2014       Ex-smoker   11/09/2015       Hyperglycemia     12/16/2015       Abscess of chest wall   06/25/2018       Sebaceous cyst    07/25/2018       Decreased back mobility 08/20/2019       Decreased strength of trunk and back      08/20/2019       Decreased range of motion of both hips    08/20/2019       Pain aggravated by walking    08/20/2019       Hamstring tightness of both lower extremities   08/20/2019       Impaired mobility and endurance     08/20/2019        Low back pain     11/19/2020       Chronic pain      04/28/2021       Type 2 diabetes mellitus, without long-term current use of insulin      12/13/2021       CKD stage 3 secondary to diabetes   12/13/2021       Encounter for preventive health examination     12/19/2022     Past Medical History:  Diagnosis   Date       *Atrial fibrillation           Chronic kidney disease         Deep vein thrombosis           Hyperlipidemia           Hypertension        Cholecystectomy  Tonsillectomy  Skin cancer removal     FH:  Paternal Aunt- recalls cancer affecting scalp     SH:    Retired [employer], followed by off shore work,   4 children (1 passed as an infant)  Quit tobacco in the 1980s  + EtOH    Review of Systems   Constitutional:  Positive for activity change, appetite change and fatigue. Negative for chills, fever and unexpected weight change.   HENT:  Positive for hearing loss. Negative for nasal congestion, postnasal drip, rhinorrhea, sinus pressure/congestion, sore throat and tinnitus.         Dry mouth   Eyes:  Negative for visual disturbance.   Respiratory:  Negative for cough, chest tightness, shortness of breath and wheezing.    Cardiovascular:  Negative for chest pain, palpitations and leg swelling.   Gastrointestinal:  Negative for abdominal distention, abdominal pain, blood in stool, change in bowel habit, constipation, diarrhea, nausea and vomiting.   Genitourinary:  Positive for frequency and urgency. Negative for decreased urine volume, difficulty urinating, dysuria and hematuria.   Musculoskeletal:  Negative for arthralgias, back pain, myalgias, neck pain and neck stiffness.   Integumentary:  Positive for rash (improved).        Dry skin   Neurological:  Positive for weakness. Negative for dizziness, light-headedness, numbness and headaches.   Psychiatric/Behavioral:  Negative for agitation, behavioral problems, confusion and dysphoric mood. The patient is not nervous/anxious.            Objective     Physical Exam  Vitals and nursing note reviewed.   Constitutional:       General: He is not in acute distress.     Appearance: Normal appearance. He is ill-appearing.      Comments: Appears more fatigued, thin  ECOG= 1-2  Using walker   HENT:      Head: Normocephalic and atraumatic.      Comments: Hearing aids     Mouth/Throat:      Mouth: Mucous membranes are moist.      Pharynx: Oropharynx is clear. No oropharyngeal exudate or posterior oropharyngeal erythema.   Eyes:      General: No scleral icterus.     Extraocular Movements: Extraocular movements intact.      Conjunctiva/sclera: Conjunctivae normal.      Pupils: Pupils are equal, round, and reactive to light.   Cardiovascular:      Rate and Rhythm: Normal rate and regular rhythm.      Heart sounds: No murmur heard.     No friction rub.   Pulmonary:      Effort: Pulmonary effort is normal. No respiratory distress.      Breath sounds: Normal breath sounds. No wheezing, rhonchi or rales.   Abdominal:      General: Abdomen is flat. Bowel sounds are normal. There is no distension.      Palpations: Abdomen is soft. There is no mass.      Tenderness: There is no abdominal tenderness. There is no guarding or rebound.      Comments: No organomegaly   Musculoskeletal:         General: No swelling, tenderness or deformity. Normal range of motion.      Right lower leg: No edema.      Left lower leg: No edema.   Skin:     General: Skin is warm and dry.      Coloration: Skin is not jaundiced or pale.      Findings: No bruising, erythema or rash.   Neurological:      General: No focal deficit present.      Mental Status: He is alert and oriented to person, place, and time. Mental status is at baseline.      Sensory: No sensory deficit.      Motor: Weakness (generalized) present.      Gait: Gait normal.   Psychiatric:         Mood and Affect: Mood normal.         Behavior: Behavior normal.         Thought Content: Thought content normal.         Judgment:  Judgment normal.     Labs- reviewed       Assessment and Plan     1. Malignant neoplasm of urinary bladder, unspecified site      Add CT Chest to 7/2024 CT scans  Continue work with dietician    RTC post above      Visit today included increased complexity associated with the care of the episodic problem bladder cancer addressed and managing the longitudinal care of the patient due to the serious and/or complex managed problem(s)     Route Chart for Scheduling    Med Onc Chart Routing      Follow up with physician . Please add CT chest to 7/15 CT already scheduled- see me within 1 week post   Follow up with [facility]    Infusion scheduling note    Injection scheduling note    Labs    Imaging    Pharmacy appointment    Other referrals

## 2024-05-17 ENCOUNTER — PATIENT MESSAGE (OUTPATIENT)
Dept: ADMINISTRATIVE | Facility: OTHER | Age: 89
End: 2024-05-17
Payer: MEDICARE

## 2024-05-18 ENCOUNTER — PATIENT MESSAGE (OUTPATIENT)
Dept: ADMINISTRATIVE | Facility: OTHER | Age: 89
End: 2024-05-18
Payer: MEDICARE

## 2024-05-19 ENCOUNTER — PATIENT MESSAGE (OUTPATIENT)
Dept: ADMINISTRATIVE | Facility: OTHER | Age: 89
End: 2024-05-19
Payer: MEDICARE

## 2024-05-20 ENCOUNTER — PATIENT MESSAGE (OUTPATIENT)
Dept: HEMATOLOGY/ONCOLOGY | Facility: CLINIC | Age: 89
End: 2024-05-20
Payer: MEDICARE

## 2024-05-20 ENCOUNTER — PATIENT MESSAGE (OUTPATIENT)
Dept: ADMINISTRATIVE | Facility: OTHER | Age: 89
End: 2024-05-20
Payer: MEDICARE

## 2024-05-20 DIAGNOSIS — Z71.3 NUTRITIONAL COUNSELING: Primary | ICD-10-CM

## 2024-05-20 RX ORDER — MEGESTROL ACETATE 40 MG/ML
400 SUSPENSION ORAL DAILY
Qty: 500 ML | Refills: 1 | Status: SHIPPED | OUTPATIENT
Start: 2024-05-20

## 2024-05-21 ENCOUNTER — PATIENT MESSAGE (OUTPATIENT)
Dept: ADMINISTRATIVE | Facility: OTHER | Age: 89
End: 2024-05-21
Payer: MEDICARE

## 2024-05-22 ENCOUNTER — PATIENT MESSAGE (OUTPATIENT)
Dept: ADMINISTRATIVE | Facility: OTHER | Age: 89
End: 2024-05-22
Payer: MEDICARE

## 2024-05-23 ENCOUNTER — PATIENT MESSAGE (OUTPATIENT)
Dept: ADMINISTRATIVE | Facility: OTHER | Age: 89
End: 2024-05-23
Payer: MEDICARE

## 2024-05-24 ENCOUNTER — PATIENT MESSAGE (OUTPATIENT)
Dept: ADMINISTRATIVE | Facility: OTHER | Age: 89
End: 2024-05-24
Payer: MEDICARE

## 2024-05-25 ENCOUNTER — PATIENT MESSAGE (OUTPATIENT)
Dept: ADMINISTRATIVE | Facility: OTHER | Age: 89
End: 2024-05-25
Payer: MEDICARE

## 2024-05-26 ENCOUNTER — PATIENT MESSAGE (OUTPATIENT)
Dept: ADMINISTRATIVE | Facility: OTHER | Age: 89
End: 2024-05-26
Payer: MEDICARE

## 2024-05-27 ENCOUNTER — PATIENT MESSAGE (OUTPATIENT)
Dept: ADMINISTRATIVE | Facility: OTHER | Age: 89
End: 2024-05-27
Payer: MEDICARE

## 2024-05-28 ENCOUNTER — PATIENT MESSAGE (OUTPATIENT)
Dept: ADMINISTRATIVE | Facility: OTHER | Age: 89
End: 2024-05-28
Payer: MEDICARE

## 2024-05-29 ENCOUNTER — PATIENT MESSAGE (OUTPATIENT)
Dept: INTERNAL MEDICINE | Facility: CLINIC | Age: 89
End: 2024-05-29
Payer: MEDICARE

## 2024-05-29 ENCOUNTER — PATIENT MESSAGE (OUTPATIENT)
Dept: ADMINISTRATIVE | Facility: OTHER | Age: 89
End: 2024-05-29
Payer: MEDICARE

## 2024-05-29 DIAGNOSIS — E11.22 TYPE 2 DIABETES MELLITUS WITH CHRONIC KIDNEY DISEASE, WITHOUT LONG-TERM CURRENT USE OF INSULIN, UNSPECIFIED CKD STAGE: ICD-10-CM

## 2024-05-29 DIAGNOSIS — N18.31 TYPE 2 DIABETES MELLITUS WITH STAGE 3A CHRONIC KIDNEY DISEASE, WITHOUT LONG-TERM CURRENT USE OF INSULIN: ICD-10-CM

## 2024-05-29 DIAGNOSIS — E11.22 TYPE 2 DIABETES MELLITUS WITH STAGE 3A CHRONIC KIDNEY DISEASE, WITHOUT LONG-TERM CURRENT USE OF INSULIN: ICD-10-CM

## 2024-05-29 DIAGNOSIS — E11.8 DM TYPE 2, CONTROLLED, WITH COMPLICATION: Primary | ICD-10-CM

## 2024-05-30 ENCOUNTER — PATIENT MESSAGE (OUTPATIENT)
Dept: ADMINISTRATIVE | Facility: OTHER | Age: 89
End: 2024-05-30
Payer: MEDICARE

## 2024-06-01 ENCOUNTER — PATIENT MESSAGE (OUTPATIENT)
Dept: ADMINISTRATIVE | Facility: OTHER | Age: 89
End: 2024-06-01
Payer: MEDICARE

## 2024-06-02 ENCOUNTER — PATIENT MESSAGE (OUTPATIENT)
Dept: ADMINISTRATIVE | Facility: OTHER | Age: 89
End: 2024-06-02
Payer: MEDICARE

## 2024-06-03 ENCOUNTER — PATIENT MESSAGE (OUTPATIENT)
Dept: ADMINISTRATIVE | Facility: OTHER | Age: 89
End: 2024-06-03
Payer: MEDICARE

## 2024-06-04 ENCOUNTER — PATIENT MESSAGE (OUTPATIENT)
Dept: ADMINISTRATIVE | Facility: OTHER | Age: 89
End: 2024-06-04
Payer: MEDICARE

## 2024-06-05 ENCOUNTER — PATIENT MESSAGE (OUTPATIENT)
Dept: ADMINISTRATIVE | Facility: OTHER | Age: 89
End: 2024-06-05
Payer: MEDICARE

## 2024-06-06 ENCOUNTER — PATIENT MESSAGE (OUTPATIENT)
Dept: ADMINISTRATIVE | Facility: OTHER | Age: 89
End: 2024-06-06
Payer: MEDICARE

## 2024-06-06 ENCOUNTER — PATIENT MESSAGE (OUTPATIENT)
Dept: HEMATOLOGY/ONCOLOGY | Facility: CLINIC | Age: 89
End: 2024-06-06
Payer: MEDICARE

## 2024-06-07 ENCOUNTER — PATIENT MESSAGE (OUTPATIENT)
Dept: ADMINISTRATIVE | Facility: OTHER | Age: 89
End: 2024-06-07
Payer: MEDICARE

## 2024-06-07 DIAGNOSIS — C67.9 MALIGNANT NEOPLASM OF URINARY BLADDER, UNSPECIFIED SITE: Primary | ICD-10-CM

## 2024-06-08 ENCOUNTER — PATIENT MESSAGE (OUTPATIENT)
Dept: ADMINISTRATIVE | Facility: OTHER | Age: 89
End: 2024-06-08
Payer: MEDICARE

## 2024-06-09 ENCOUNTER — PATIENT MESSAGE (OUTPATIENT)
Dept: ADMINISTRATIVE | Facility: OTHER | Age: 89
End: 2024-06-09
Payer: MEDICARE

## 2024-06-10 ENCOUNTER — PATIENT MESSAGE (OUTPATIENT)
Dept: ADMINISTRATIVE | Facility: OTHER | Age: 89
End: 2024-06-10
Payer: MEDICARE

## 2024-06-11 ENCOUNTER — DOCUMENT SCAN (OUTPATIENT)
Dept: HOME HEALTH SERVICES | Facility: HOSPITAL | Age: 89
End: 2024-06-11
Payer: MEDICARE

## 2024-06-11 ENCOUNTER — PATIENT MESSAGE (OUTPATIENT)
Dept: ADMINISTRATIVE | Facility: OTHER | Age: 89
End: 2024-06-11
Payer: MEDICARE

## 2024-06-12 ENCOUNTER — TELEPHONE (OUTPATIENT)
Dept: PALLIATIVE MEDICINE | Facility: CLINIC | Age: 89
End: 2024-06-12
Payer: MEDICARE

## 2024-06-12 ENCOUNTER — PATIENT MESSAGE (OUTPATIENT)
Dept: ADMINISTRATIVE | Facility: OTHER | Age: 89
End: 2024-06-12
Payer: MEDICARE

## 2024-06-12 ENCOUNTER — OFFICE VISIT (OUTPATIENT)
Dept: PALLIATIVE MEDICINE | Facility: CLINIC | Age: 89
End: 2024-06-12
Payer: MEDICARE

## 2024-06-12 VITALS
DIASTOLIC BLOOD PRESSURE: 61 MMHG | SYSTOLIC BLOOD PRESSURE: 117 MMHG | HEART RATE: 85 BPM | BODY MASS INDEX: 21.65 KG/M2 | WEIGHT: 126.13 LBS

## 2024-06-12 DIAGNOSIS — Z51.5 ENCOUNTER FOR PALLIATIVE CARE: Primary | ICD-10-CM

## 2024-06-12 DIAGNOSIS — Z71.89 ACP (ADVANCE CARE PLANNING): ICD-10-CM

## 2024-06-12 DIAGNOSIS — R53.83 FATIGUE, UNSPECIFIED TYPE: ICD-10-CM

## 2024-06-12 DIAGNOSIS — C67.9 MALIGNANT NEOPLASM OF URINARY BLADDER, UNSPECIFIED SITE: ICD-10-CM

## 2024-06-12 DIAGNOSIS — F43.21 ADJUSTMENT DISORDER WITH DEPRESSED MOOD: ICD-10-CM

## 2024-06-12 DIAGNOSIS — R63.0 ANOREXIA: ICD-10-CM

## 2024-06-12 DIAGNOSIS — G47.00 INSOMNIA, UNSPECIFIED TYPE: ICD-10-CM

## 2024-06-12 PROCEDURE — 99999 PR PBB SHADOW E&M-EST. PATIENT-LVL IV: CPT | Mod: PBBFAC,,, | Performed by: NURSE PRACTITIONER

## 2024-06-12 PROCEDURE — 99214 OFFICE O/P EST MOD 30 MIN: CPT | Mod: PBBFAC | Performed by: NURSE PRACTITIONER

## 2024-06-12 RX ORDER — MIRTAZAPINE 7.5 MG/1
7.5 TABLET, FILM COATED ORAL NIGHTLY
Qty: 30 TABLET | Refills: 0 | Status: SHIPPED | OUTPATIENT
Start: 2024-06-12 | End: 2024-07-12

## 2024-06-12 NOTE — PROGRESS NOTES
Advance Care Planning   [facility] Palliative Medicine [facility]  Palliative Care   Psychosocial Assessment    Patient Name: Cliff Magaña  MRN: [id number]  Palliative Care Provider: Johana Parsons DNP  Primary Care Physician: Munir Estrada MD  Principal Problem: Bladder cancer    Reason for Referral:  Advanced Care Planning and Psychosocial Support       Present during Interview: patient and spouse/SO.      Primary Language:English   Needed: no      Past Medical Situation:   PMH:   Past Medical History:   Diagnosis Date    *Atrial fibrillation     Chronic kidney disease     Deep vein thrombosis     Hyperlipidemia     Hypertension     Metabolic syndrome     Type 2 diabetes mellitus, without long-term current use of insulin 2021     Mental Health/Substance Use History:Patient exhibits signs of depression  Risk of Abuse, neglect or exploitation: None identified   Current or Previous Trauma and/or evidence of PTSD: None identified   Non-traditional Health practices: None identified     Understanding of diagnosis and prognosis: Fair   Experience/Comfort level with health care system: Fair     Patients Mental Status: Alert and oriented to person, place, time and situation with depressed affect.     Socio-Economic Factors/Resources:  Address: [address] [place] [address]  Phone Number: [phone] (home) [phone] (work)    Marital Status:   Household composition: Patient and spouse  Children: Three living daughters (one child )     Patient/Family perceptions about Caregiving Needs; availability and capacity: Spouse provides assistance with meal prep and transportation.  Two of patient's daughters live around the corner from patient's home.     Family Dynamics/Relationships: Healthy     Patient/Family Strengths/Resilience: Patient openly acknowledges his struggles with team despite his aversion to expressing his feelings.   Patient/Family Coping: Limited.  "Patient's physical symptoms could be attributed to his lowed mood.  Patient would benefit from additional support.     Activities of Daily Living: Assistance as needed (transportation, meal prep)  Support Systems-Family & Community (Home Health, HME etc): n/a    Transportation:  yes    Work/Education History: retired    Self-Care Activities/Hobbies: Traveling, dining out, "taking naps"     History: Yes, Retired [employer]    Financial Resources:Medicare      Advanced Care Planning & Legal Concerns:   Advanced Directives/Living Will: yes  [place]/POLST: no   Planning:  no    Power of : no    Emergency Contacts: Spouse/Nereyda Ponaubrey    Spirituality, Culture & Coping Mechanisms:  F- Jenna and Belief: Yazdanism     I - Importance: High     C - Community/Culture Values: Patient and spouse regularly attend weekly Scientology services.      A - Address in Care: Needs met at this time       Goals/Hopes/Expectations: Improve appetite and sleep and gain weight.   Fears/Anxiety/Concerns: Patient endorses fears regarding the results of upcoming surveillance tests.       Advance Care Planning     Date: 2024    [facility]  Team engaged the patient in a voluntary conversation about advance care planning and we specifically addressed what the goals of care would be moving forward, in light of the patient's change in clinical status, specifically bladder cancer.  We did not specifically address the patient's likely prognosis, which is  TBD by Oncology .  We explored the patient's values and preferences for future care.  The patient endorses that what is most important right now is to focus on gaining weight, improving sleep, appetite and mood.     Accordingly, we have decided that the best plan to meet the patient's goals includes continuing with treatment    I did not explain the role for hospice care at this stage of the patient's illness, including its ability to help the patient live with the best quality of life " "possible.  We will not be making a hospice referral.    I spent a total of 15 minutes engaging the patient in this advance care planning discussion.           Plan of Care:       SW accompanied DNP during initial visit with patient and spouse/Nereyda.  Patient presents Oriented x4 with depressed affect.  Patient appears to have a fair understanding of their illness. Spouse reports patient has tested negative for malignancy following treatment and is scheduled for surveillance in the upcoming months. Patient reports following treatment he feels his quality of life has decreased as patient is experiencing frequent urination at night and diminished appetite. Patient also endorses a lack of motivation and feeling "displeased" with his current circumstances.  SW encouraged patient to elaborate on the evolving dynamic between he and his spouse and the difficulties adjusting to changes post diagnosis and treatment.  SW highlighted to patient it appears he may be experiencing depression. SW educated patient regarding the benefits of psychotherapy and informed patient of the availability of [facility] DSW to provide additional support.  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 Patient adds he was able to "face his problems and move on" prior to his diagnosis however this has not been successful at this stage of his life in these circumstances.  Team commended patient for his willingness to consider psychotherapy and his ability to share his feeling on this date.         Patient previously completed a living will indicating he would wish to withdraw life sustaining measures excluding artificial hydration and nutrition in the event his condition is terminal and irreversible.  Document can be found in EPIC.  Patient and spouse deny further psychosocial concerns. SW remains available to provide support; will " continue to follow.    Sugar Iniguez, LCSW-BACS    Palliative Medicine

## 2024-06-12 NOTE — PROGRESS NOTES
Consult Note  Palliative Care      Consult Requested By: Dr. Elizabeth Bland  Reason for Consult: symptom mgmt/ACP      ASSESSMENT/PLAN:     Plan/Recommendations:    06/12/2024:  - initial visit  - refer to [facility] for counseling/emotional support (virtual visit)   - start mirtazapine for appetite, sleep, depression    Bladder cancer  - following w Dr. Bland   - s/p chemoRT, last RT 2/8/2024  - surveillance     Encounter for palliative care  - Patient is decisional  - Patient accompanied today by his wife, Nereyda   - ACP documents are uploaded into EMR   - Philosophy of Palliative Medicine reviewed with patient and family at first visit  - New patient folder given to and reviewed with patient and family at first visit  - Goals of care: Reported goal: to get a good night's sleep.     Fatigue/insomnia   - primary complaint  - sleeping on/off during the day bc his sleep is fractured over night due to freq urination  - he has not tried drinking less water before bed, though admits this was previously recommended  - says per Dr. Story, there are medications that can help with nocturia but patient wishes to avoid side effect of confusion                                                                                                                                                                                                                                                                                                                                                                                                                       - remeron 7.5 mg qhs started at initial visit   - sleep hygiene tip sheet provided in new patient folder   - will continue to monitor     Anorexia  - pt c/o poor appetite, low PO intake and weight-loss (though presently stable)  - pt c/o difficulty swallowing drier foods and taste changes   - has seen dietician, he is using high calorie Boost, two daily, cream soup, smoothies, etc.  "Avoids many foods due to texture and discomfort swallowing    - using megace daily, dose recently increased but is not helping  - started remeron 7.5 mg nightly at initial visit    Adjustment disorder with depression   - reports he is unhappy with his current quality of life, he feels worse than he did prior to treatment and displeased with "the direction things are going in"  - endorses anxiety before scans/tests, afraid of possibility of new treatment   - admits he is not much of a talker, per wife, he spent most of his career around men and is not used to opening up about feelings  - pt's low PO intake is a source of conflict between he and wife, pt is tired of wife asking him to eat, says her insistence is daily and he would like it to stop   - lack of sleep is particularly impacting his qol   - when he's feeling well, he likes to travel, something he and his wife used to enjoy together  - support system includes wife, Nereyda, two daughters (they live very close to patient)  - active in [employer], no spiritual needs at this time  - is open to counseling, referred to [facility] DSW for emotional support at initial visit   - started remeron 7.5 mg at initial visit   - [facility] [place] [facility] present at initial visit, emotional support provided, pls see separate note for details   - will continue to monitor     Chronic back pain, hx of  - sees pain mgmt for this  - has flexeril, does not use    Understanding of illness: TBD     Goals of care: symptom mgmt     Follow up: 8-10 weeks     Patient's encounter and above plan of care discussed with patient's oncology team.     SUBJECTIVE:     History of Present Illness:  Patient is a 88 y.o. year old male presenting with neoplasm of bladder. Bladder cancer incidentally found on imaging as he had been under surveillance for renal cysts. Now s/p chemoRT with 2/2024 scans without metastatic disease however cystoscopy on 3/4/2024 with concern for residual tumor but TURBT for 4/12/2024 " negative on path. Please see oncology notes for full oncologic history and treatment course.      06/12/2024:  [place]  reviewed: no concerns    Patient presents to initial visit with his wife; pall care MSW joined today, see separate note for details. Patient reports dissatisfaction with current quality of life, it is worse now then it was before cancer treatment, which is distressing to him. His affect is consistent with depression, he admits he is not much of a talker but is interested in referral to [facility] DSW for emotional support. From symptom standpoint, lack of sleep is his biggest concern, also unable to eat much primarily due to difficulty swallowing due to throat dryness, started remeron today.      Past Medical History:   Diagnosis Date    *Atrial fibrillation     Chronic kidney disease     Deep vein thrombosis     Hyperlipidemia     Hypertension     Metabolic syndrome     Type 2 diabetes mellitus, without long-term current use of insulin 12/13/2021     Past Surgical History:   Procedure Laterality Date    BIOPSY OF BLADDER N/A 4/12/2024    Procedure: BIOPSY, BLADDER;  Surgeon: Wellington Story MD;  Location: [facility] OR [address] [place] [address];  Service: Urology;  Laterality: N/A;    BLADDER FULGURATION N/A 4/12/2024    Procedure: FULGURATION, BLADDER;  Surgeon: Wellington Story MD;  Location: [facility] OR [address] [place] [address];  Service: Urology;  Laterality: N/A;    CATARACT EXTRACTION      CHOLECYSTECTOMY      CYSTOSCOPY N/A 11/16/2023    Procedure: CYSTOSCOPY;  Surgeon: Marcelino Moffett MD;  Location: [address];  Service: Urology;  Laterality: N/A;  90 minutes    DILATION OF URETHRA N/A 4/12/2024    Procedure: DILATION, URETHRA;  Surgeon: Wellington Story MD;  Location: [facility] OR [address] [place] [address];  Service: Urology;  Laterality: N/A;    EYE SURGERY      INJECTION OF FACET JOINT Bilateral 4/28/2021    Procedure: FACET JOINT INJECTION BILATERAL L4/L5 DIRECT REFERRAL;  Surgeon: Philipp Iyer MD;  Location: [facility] PAIN MGT;  Service: Pain  Management;  Laterality: Bilateral;  NEEDS CONSENT, ELIQUIS CLEARANCE IN CHART    RETROGRADE PYELOGRAPHY Bilateral 11/16/2023    Procedure: PYELOGRAM, RETROGRADE;  Surgeon: Marcelino Moffett MD;  Location: [facility] OR [address] [place] [address];  Service: Urology;  Laterality: Bilateral;  90 minutes    SKIN CANCER EXCISION      TONSILLECTOMY      TURBT (TRANSURETHRAL RESECTION OF BLADDER TUMOR) N/A 11/16/2023    Procedure: TURBT (TRANSURETHRAL RESECTION OF BLADDER TUMOR);  Surgeon: Marcelino Moffett MD;  Location: [facility] OR [address] [place] [address];  Service: Urology;  Laterality: N/A;  90 minutes    TURBT (TRANSURETHRAL RESECTION OF BLADDER TUMOR) N/A 4/12/2024    Procedure: TURBT (TRANSURETHRAL RESECTION OF BLADDER TUMOR);  Surgeon: Wellington Story MD;  Location: [facility] OR [address] [place] [address];  Service: Urology;  Laterality: N/A;     Family History   Problem Relation Name Age of Onset    Diabetes Maternal Aunt      Heart attack Neg Hx      Heart disease Neg Hx      Heart failure Neg Hx      Hyperlipidemia Neg Hx      Hypertension Neg Hx      Stroke Neg Hx       Review of patient's allergies indicates:   Allergen Reactions    Niacin      Other reaction(s): Rash  Other reaction(s): Itching       Medications:    Current Outpatient Medications:     acetaminophen (TYLENOL) 650 MG TbSR, Take 650 mg by mouth every 8 (eight) hours., Disp: , Rfl:     betamethasone valerate 0.1% (VALISONE) 0.1 % Crea, Apply topically 2 (two) times daily. for 14 days, Disp: 1 each, Rfl: 0    cholecalciferol, vitamin D3, (VITAMIN D3) 50 mcg (2,000 unit) Cap capsule, Take by mouth once daily., Disp: , Rfl:     cyclobenzaprine (FLEXERIL) 5 MG tablet, Take 1 tablet (5 mg total) by mouth 3 (three) times daily as needed for Muscle spasms., Disp: 30 tablet, Rfl: 0    dexAMETHasone (DECADRON) 4 MG Tab, TAKE EVERY 12 HOURS DAILY FOR DAYS 2 THOUGH 4 FOLLOWING CHEMOTHERAPY, Disp: 40 tablet, Rfl: 1    ELIQUIS 5 mg Tab, TAKE 1 TABLET TWICE A DAY, Disp: 180 tablet, Rfl: 3    empagliflozin (JARDIANCE) 10 mg  tablet, Take 1 tablet (10 mg total) by mouth once daily., Disp: 90 tablet, Rfl: 3    fenofibrate micronized (LOFIBRA) 200 MG Cap, TAKE 1 CAPSULE DAILY WITH BREAKFAST, Disp: 30 capsule, Rfl: 0    fenofibrate micronized (LOFIBRA) 200 MG Cap, TAKE 1 CAPSULE DAILY WITH BREAKFAST, Disp: 90 capsule, Rfl: 3    krill oil/hyaluronic/astaxanth (MEGARED JOINT CARE ORAL), , Disp: , Rfl:     loratadine (CLARITIN) 10 mg tablet, Take 10 mg by mouth once daily., Disp: , Rfl:     TRAMAINE BIOTIN ORAL, Take 5,000 mcg by mouth once daily., Disp: , Rfl:     megestroL (MEGACE) 400 mg/10 mL (10 mL) Susp, Take 10 mLs (400 mg total) by mouth once daily., Disp: 500 mL, Rfl: 1    megestroL (MEGACE) 400 mg/10 mL (40 mg/mL) Susp, Take 5 mLs (200 mg total) by mouth once daily., Disp: 150 mL, Rfl: 11    metFORMIN (GLUCOPHAGE-XR) 500 MG ER 24hr tablet, TAKE 1 TABLET DAILY, Disp: 90 tablet, Rfl: 2    metoprolol succinate (TOPROL-XL) 200 MG 24 hr tablet, Take 1 tablet (200 mg total) by mouth 2 (two) times daily., Disp: 180 tablet, Rfl: 3    mupirocin (BACTROBAN) 2 % ointment, Apply topically 2 (two) times daily., Disp: , Rfl:     OLANZapine (ZYPREXA) 5 MG tablet, TAKE NIGHTLY FOR 4 DAYS FOLLOWING CHEMOTHERAPY, Disp: 90 tablet, Rfl: 1    ondansetron (ZOFRAN) 8 MG tablet, Take 1 tablet (8 mg total) by mouth every 12 (twelve) hours as needed for Nausea., Disp: 30 tablet, Rfl: 2    oxybutynin (DITROPAN) 5 MG Tab, Take 1 tablet (5 mg total) by mouth 3 (three) times daily as needed., Disp: 6 tablet, Rfl: 0    oxyCODONE (ROXICODONE) 5 MG immediate release tablet, Take 1 tablet (5 mg total) by mouth every 6 (six) hours as needed for Pain., Disp: 5 tablet, Rfl: 0    sacubitriL-valsartan (ENTRESTO) 24-26 mg per tablet, Take 1 tablet by mouth 2 (two) times daily., Disp: 180 tablet, Rfl: 3    tamsulosin (FLOMAX) 0.4 mg Cap, Take 1 capsule (0.4 mg total) by mouth every evening. for 7 days, Disp: 7 capsule, Rfl: 0    OBJECTIVE:       ROS:  Review of Systems    Constitutional:  Positive for activity change, appetite change, fatigue and unexpected weight change.   HENT:  Positive for trouble swallowing. Negative for congestion, dental problem and drooling.    Eyes:  Negative for pain, redness and itching.   Respiratory:  Negative for cough, shortness of breath and wheezing.    Cardiovascular:  Negative for chest pain, palpitations and leg swelling.   Gastrointestinal:  Negative for constipation, diarrhea, nausea and vomiting.   Genitourinary:  Positive for frequency. Negative for difficulty urinating.   Skin:  Negative for pallor, rash and wound.   Neurological:  Negative for dizziness, light-headedness and numbness.   Psychiatric/Behavioral:  Positive for sleep disturbance. Negative for dysphoric mood. The patient is not nervous/anxious.        Review of Symptoms      Symptom Assessment (ESAS 0-10 Scale)  Pain:  0  Dyspnea:  0  Anxiety:  0  Nausea:  0  Depression:  0  Anorexia:  8  Fatigue:  8  Insomnia:  8  Restlessness:  0  Agitation:  0     CAM / Delirium:  Negative  Constipation:  Negative  Diarrhea:  Negative    Anxiety:  Is not nervous/anxious  Constipation:  No constipation    Bowel Management Plan (BMP):  Yes      Pain Assessment:  OME in 24 hours:  0  Location(s): none      Modified Starr Scale:  0    ECOG Performance Status ndGndrndanddndend:nd nd2nd Living Arrangements:  Lives with spouse    Psychosocial/Cultural:   See Palliative Psychosocial Note: Yes  **Primary  to Follow**  Palliative Care  Consult: No     Time-Based Charting:  No      Advance Care Planning   Advance Directives:   Living Will: Yes        Copy on chart: Yes      Decision Making:  Patient answered questions  Goals of Care: The patient endorses that what is most important right now is to focus on remaining as independent as possible, symptom/pain control, and curative/life-prolongation (regardless of treatment burdens)    Accordingly, we have decided that the best plan to meet the  patient's goals includes continuing with treatment        Advance Care Planning     Date: 06/13/2024    [facility]  I engaged the patient in a voluntary conversation about advance care planning and we specifically addressed what the goals of care would be moving forward, in light of the patient's change in clinical status, specifically cancer.  We did not specifically address the patient's likely prognosis, which is  tbd .  We explored the patient's values and preferences for future care.  The patient endorses that what is most important right now is to focus on symptom/pain control    Accordingly, we have decided that the best plan to meet the patient's goals includes continuing with treatment    A total of 16 min was spent on advance care planning, goals of care discussion, emotional support, formulating and communicating prognosis and exploring burden/benefit of various approaches of treatment. This discussion occurred on a fully voluntary basis with the verbal consent of the patient and/or family.            Physical Exam:  Vitals:    Vitals:    06/12/24 0907   BP: 117/61   Pulse: 85      Physical Exam  Vitals reviewed.   Constitutional:       General: He is not in acute distress.     Appearance: Normal appearance. He is not ill-appearing, toxic-appearing or diaphoretic.   HENT:      Head: Normocephalic and atraumatic.      Right Ear: External ear normal. No decreased hearing noted.      Left Ear: External ear normal. No decreased hearing noted.      Nose: Nose normal.   Eyes:      General:         Right eye: No discharge.         Left eye: No discharge.      Extraocular Movements: Extraocular movements intact.      Conjunctiva/sclera: Conjunctivae normal.   Cardiovascular:      Rate and Rhythm: Normal rate.   Pulmonary:      Effort: Pulmonary effort is normal. No respiratory distress.      Breath sounds: No stridor.   Musculoskeletal:         General: No deformity or signs of injury. Normal range of motion.      Right  lower leg: No edema.      Left lower leg: No edema.   Skin:     General: Skin is warm and dry.      Coloration: Skin is not jaundiced.   Neurological:      Mental Status: He is alert and oriented to person, place, and time. Mental status is at baseline.   Psychiatric:         Attention and Perception: Attention normal.         Mood and Affect: Mood is depressed.         Speech: Speech normal.         Thought Content: Thought content normal.         Judgment: Judgment normal.         Labs:  CBC:   WBC   Date Value Ref Range Status   02/27/2024 7.64 3.90 - 12.70 K/uL Final     Hemoglobin   Date Value Ref Range Status   02/27/2024 15.2 14.0 - 18.0 g/dL Final     POC Hematocrit   Date Value Ref Range Status   02/02/2024 50 36 - 54 %PCV Final     Hematocrit   Date Value Ref Range Status   02/27/2024 44.8 40.0 - 54.0 % Final     MCV   Date Value Ref Range Status   02/27/2024 93 82 - 98 fL Final     Platelets   Date Value Ref Range Status   02/27/2024 255 150 - 450 K/uL Final       LFT:   Lab Results   Component Value Date    AST 23 02/27/2024    ALKPHOS 57 02/27/2024    BILITOT 0.6 02/27/2024       Albumin:   Albumin   Date Value Ref Range Status   02/27/2024 2.9 (L) 3.5 - 5.2 g/dL Final     Protein:   Total Protein   Date Value Ref Range Status   02/27/2024 5.6 (L) 6.0 - 8.4 g/dL Final       Radiology:I have reviewed all pertinent imaging results/findings within the past 24 hours.    03/04/2024 Cystoscopy: Findings:   1. Normal anterior urethra without evidence of strictures or tumors   2. Prostatic urethra open without signs of obstruction  3. Bladder with nodular growth at the dome and posterior bladder wall, consistent with recurrent carcinoma.  Surrounding erythema.  Ureteral orifices in orthotopic position bilaterally      - 2/29/2024 CT Urogram Abd/Pelvis:  FINDINGS:  Stable mild cardiomegaly.  Coronary artery calcific atherosclerosis.  Visualized inferior lungs are clear.  Stable small left hepatic lobe  calcification.  No suspicious hepatic lesion.  Gallbladder is surgically absent.  No biliary ductal dilatation.  Spleen and adrenal glands are unremarkable.  Stable appearance of the pancreas with atrophy, scattered calcifications, and mild duct dilatation measuring 4 mm.  Findings can be seen with sequela of chronic pancreatitis.  Bilateral renal cortical thinning.  Multiple bilateral renal cysts and subcentimeter renal hypodensities too small to characterize.  No solid enhancing renal mass.  No renal stone.  No hydronephrosis or ureteral dilatation.  Opacification of the bilateral renal collecting systems and ureters without suspicious filling defect.  Patient is status post TURBT.  There is increased heterogeneous wall thickening and enhancement along the anterior bladder (series 4, image 144) concerning for residual/recurrent tumor.  Mild perivesical stranding and vascular prominence, similar to prior exam.  Small hiatal hernia.  Colonic diverticulosis.  No evidence of bowel obstruction or inflammation.  No free intraperitoneal fluid or air.  No pathologically enlarged abdominopelvic lymph nodes.  Abdominal aorta is normal caliber.  Moderate/severe calcific atherosclerosis.  Degenerative changes of the osseous structures.  No acute fracture or aggressive osseous lesion  Impression:  1. Increased heterogeneous wall thickening and enhancement along the anterior bladder concerning for residual/recurrent tumor.  2. No evidence of metastatic disease.  3. Additional findings as above.     -  11/16/2023 TURBT of bladder tumor  Findings:   1. Right postero-lateral papillary bladder wall tumor (3-4cm) with diffusely erythematous and nodular surrounding tissue, right < 1cm papillary bladder wall tumor immediately lateral to the larger postero-lateral papillary tumor, and a right nodular 4cm bladder tumor with high grade and possibly invasive appearance. Tumors resected, hemostasis achieved.  Several other patches of  erythema were diffusely identified on the left lateral bladder wall.  2. Bimanual exam post-TURBT showed freely mobile bladder without abnormalities  3. Bilateral retrograde pyelogram showed delicate calices with no evidence of hydronephrosis, no filling defects, and ureter normal in course and caliber, bilaterally  Pathology:  1. Bladder, right posterolateral wall, transurethral resection of bladder tumor:  - Noninvasive high-grade papillary urothelial carcinoma  - Muscularis propria present  - Multiple H&E levels evaluated  2. Bladder, right anterior, transurethral resection of bladder tumor:  - Invasive high-grade papillary urothelial carcinoma  - Tumor invades muscularis propria  - Intact expression of DNA mismatch repair proteins in tumor by immunohistochemistry (see comment)  COMMENT:  Immunohistochemistry (IHC) Testing for Mismatch Repair (MMR) Proteins:  MLH1 - Intact nuclear expression  MSH2 - Intact nuclear expression  MSH6 - Intact nuclear expression  PMS2 - Intact nuclear expression  Background nonneoplastic tissue/internal control with intact nuclear expression  IHC Interpretation  No loss of nuclear expression of MMR proteins: low probability of microsatellite instability  There are exceptions to the above IHC interpretations. These results should not be considered in isolation, and clinical correlation with genetic counseling is recommended to assess the need for germline testing.     - 11/25/2023 CT Abd/Pelvis:  FINDINGS:  Lung base: Bilateral small volume pleural effusion increased in size compared to recent prior, associated with mild compression atelectasis.  Visualized portion of heart: Coronary artery calcification.  Liver: Normal in size.  Left lobe small calcified granuloma.  No suspicious focal lesion.  Gallbladder: Cholecystectomy.  Bile duct: No intra-or extrahepatic biliary ductal dilatation.  Spleen: Unremarkable.  Pancreas: Parenchymal atrophy.  Multiple parenchymal calcifications  suggesting chronic pancreatitis.  No suspicious focal lesion.  No pancreatic ductal dilatation.  No adjacent inflammatory changes or fluid collections.  Adrenal glands: Unremarkable.  Genitourinary: Bilateral renal cysts.  Subcentimeter hypodensities in both kidneys, which are too small to characterize.  The ureters appear normal in course and caliber.  No evidence of nephrolithiasis or hydroureteronephrosis.  Urinary bladder: Postsurgical change in the anterior bladder wall from TURBT.  There are small foci of air in the bladder, likely iatrogenic.  Reproductive organs: Unremarkable.  GI tract: Small hiatal hernia.  The stomach is unremarkable.  The visualized loops of small and large bowel show no evidence of obstruction or inflammation. Diverticulosis.  Appendix unremarkable.  Peritoneum: No free air or fluid.  Retroperitoneum: No significant adenopathy.  Vasculature: Normal caliber of abdominal aorta with moderate calcified and noncalcified atherosclerosis.  Abdominal wall: Tiny fat containing umbilical hernia.  Bones: Stable T12-L1 intervertebral disc calcification.  No suspicious sclerotic lesions.  No acute fracture.  Impression:  Postoperative changes from of TURBT.  No lymphadenopathy.  No distant metastases.  Other findings as described.     - 12/6/2023 CT Chest:  FINDINGS:  Comparison is 11/21/2023.  No mediastinal, hilar or axillary adenopathy is seen.  There is a right lower pole thyroid nodule.  There are coronary calcifications.  There is a left upper pole renal cyst.  There is a small hiatal hernia.  Trachea and bronchi are patent.  There is no evidence of interstitial lung disease, bronchiectasis, airway trapping, or emphysema.  No suspicious pulmonary nodules, masses, or infiltrates are seen.  There is a calcified left lung granuloma.  Bones demonstrate nothing focal.  Impression:  No significant abnormality seen.  Right lobe thyroid nodule.  Coronary artery calcifications.  Left upper pole renal  cyst.    I spent a total of 70 minutes on the day of the visit.This includes face to face time in discussion of goals of care, symptom assessment, coordination of care and emotional support.  This also includes non-face to face time preparing to see the patient (eg, review of tests/imaging), obtaining and/or reviewing separately obtained history, documenting clinical information in the electronic or other health record, independently interpreting results and communicating results to the patient/family/caregiver, or care coordinator.     A total of 16 min was spent on advance care planning, goals of care discussion, emotional support, formulating and communicating prognosis and goals of care, exploring burden/benefit of various approaches of treatment. This discussion occurred on a fully voluntary basis with the verbal consent of the patient and/or family    Signature: Johana Parsons, DNP

## 2024-06-13 ENCOUNTER — PATIENT MESSAGE (OUTPATIENT)
Dept: ADMINISTRATIVE | Facility: OTHER | Age: 89
End: 2024-06-13
Payer: MEDICARE

## 2024-06-14 ENCOUNTER — PATIENT MESSAGE (OUTPATIENT)
Dept: ADMINISTRATIVE | Facility: OTHER | Age: 89
End: 2024-06-14
Payer: MEDICARE

## 2024-06-17 ENCOUNTER — PATIENT MESSAGE (OUTPATIENT)
Dept: CARDIOLOGY | Facility: CLINIC | Age: 89
End: 2024-06-17
Payer: MEDICARE

## 2024-06-17 DIAGNOSIS — I50.33 ACUTE ON CHRONIC HEART FAILURE WITH PRESERVED EJECTION FRACTION: ICD-10-CM

## 2024-06-17 DIAGNOSIS — E11.59 HYPERTENSION ASSOCIATED WITH DIABETES: ICD-10-CM

## 2024-06-17 DIAGNOSIS — I15.2 HYPERTENSION ASSOCIATED WITH DIABETES: ICD-10-CM

## 2024-06-18 ENCOUNTER — TELEPHONE (OUTPATIENT)
Dept: PALLIATIVE MEDICINE | Facility: CLINIC | Age: 89
End: 2024-06-18
Payer: MEDICARE

## 2024-06-18 RX ORDER — SACUBITRIL AND VALSARTAN 24; 26 MG/1; MG/1
1 TABLET, FILM COATED ORAL 2 TIMES DAILY
Qty: 60 TABLET | Refills: 0 | Status: SHIPPED | OUTPATIENT
Start: 2024-06-18

## 2024-06-18 NOTE — TELEPHONE ENCOUNTER
Spoke with patient wife at the request of Johana Parsons DNP to see how the patient is doing with the new sleep medication, mirtazapine. Patient wife report that the patient is sleeping well and his appetite is improving. Johana Parsons DNP notified.

## 2024-06-19 ENCOUNTER — PATIENT MESSAGE (OUTPATIENT)
Dept: ADMINISTRATIVE | Facility: OTHER | Age: 89
End: 2024-06-19
Payer: MEDICARE

## 2024-06-20 ENCOUNTER — PATIENT MESSAGE (OUTPATIENT)
Dept: ADMINISTRATIVE | Facility: OTHER | Age: 89
End: 2024-06-20
Payer: MEDICARE

## 2024-06-21 ENCOUNTER — PATIENT MESSAGE (OUTPATIENT)
Dept: ADMINISTRATIVE | Facility: OTHER | Age: 89
End: 2024-06-21
Payer: MEDICARE

## 2024-06-22 ENCOUNTER — PATIENT MESSAGE (OUTPATIENT)
Dept: ADMINISTRATIVE | Facility: OTHER | Age: 89
End: 2024-06-22
Payer: MEDICARE

## 2024-06-23 ENCOUNTER — PATIENT MESSAGE (OUTPATIENT)
Dept: HEMATOLOGY/ONCOLOGY | Facility: CLINIC | Age: 89
End: 2024-06-23
Payer: MEDICARE

## 2024-06-23 ENCOUNTER — PATIENT MESSAGE (OUTPATIENT)
Dept: ADMINISTRATIVE | Facility: OTHER | Age: 89
End: 2024-06-23
Payer: MEDICARE

## 2024-06-24 ENCOUNTER — PATIENT MESSAGE (OUTPATIENT)
Dept: ADMINISTRATIVE | Facility: OTHER | Age: 89
End: 2024-06-24
Payer: MEDICARE

## 2024-06-26 ENCOUNTER — PATIENT MESSAGE (OUTPATIENT)
Dept: ADMINISTRATIVE | Facility: OTHER | Age: 89
End: 2024-06-26
Payer: MEDICARE

## 2024-06-27 ENCOUNTER — PATIENT MESSAGE (OUTPATIENT)
Dept: ADMINISTRATIVE | Facility: OTHER | Age: 89
End: 2024-06-27
Payer: MEDICARE

## 2024-06-28 ENCOUNTER — PATIENT MESSAGE (OUTPATIENT)
Dept: ADMINISTRATIVE | Facility: OTHER | Age: 89
End: 2024-06-28
Payer: MEDICARE

## 2024-06-29 ENCOUNTER — PATIENT MESSAGE (OUTPATIENT)
Dept: ADMINISTRATIVE | Facility: OTHER | Age: 89
End: 2024-06-29
Payer: MEDICARE

## 2024-06-30 ENCOUNTER — PATIENT MESSAGE (OUTPATIENT)
Dept: ADMINISTRATIVE | Facility: OTHER | Age: 89
End: 2024-06-30
Payer: MEDICARE

## 2024-07-01 ENCOUNTER — HOSPITAL ENCOUNTER (OUTPATIENT)
Dept: RADIOLOGY | Facility: HOSPITAL | Age: 89
Discharge: HOME | End: 2024-07-01
Attending: UROLOGY
Payer: MEDICARE

## 2024-07-01 ENCOUNTER — HOSPITAL ENCOUNTER (OUTPATIENT)
Dept: RADIOLOGY | Facility: HOSPITAL | Age: 89
Discharge: HOME | End: 2024-07-01
Attending: INTERNAL MEDICINE
Payer: MEDICARE

## 2024-07-01 ENCOUNTER — PATIENT MESSAGE (OUTPATIENT)
Dept: ADMINISTRATIVE | Facility: OTHER | Age: 89
End: 2024-07-01
Payer: MEDICARE

## 2024-07-01 DIAGNOSIS — N32.89 BLADDER MASS: ICD-10-CM

## 2024-07-01 DIAGNOSIS — C67.9 MALIGNANT NEOPLASM OF URINARY BLADDER, UNSPECIFIED SITE: ICD-10-CM

## 2024-07-01 PROCEDURE — 71260 CT THORAX DX C+: CPT | Mod: 26,,, | Performed by: RADIOLOGY

## 2024-07-01 PROCEDURE — 74178 CT ABD&PLV WO CNTR FLWD CNTR: CPT | Mod: TC

## 2024-07-01 PROCEDURE — 71260 CT THORAX DX C+: CPT | Mod: TC

## 2024-07-01 PROCEDURE — 25500020 PHARM REV CODE 255: Performed by: UROLOGY

## 2024-07-01 PROCEDURE — 74178 CT ABD&PLV WO CNTR FLWD CNTR: CPT | Mod: 26,,, | Performed by: STUDENT IN AN ORGANIZED HEALTH CARE EDUCATION/TRAINING PROGRAM

## 2024-07-01 RX ADMIN — IOHEXOL 125 ML: 350 INJECTION, SOLUTION INTRAVENOUS at 01:07

## 2024-07-02 ENCOUNTER — PATIENT MESSAGE (OUTPATIENT)
Dept: ADMINISTRATIVE | Facility: OTHER | Age: 89
End: 2024-07-02
Payer: MEDICARE

## 2024-07-02 DIAGNOSIS — I15.2 HYPERTENSION ASSOCIATED WITH DIABETES: ICD-10-CM

## 2024-07-02 DIAGNOSIS — E11.22 CKD STAGE 3 DUE TO TYPE 2 DIABETES MELLITUS: ICD-10-CM

## 2024-07-02 DIAGNOSIS — E11.8 DM TYPE 2, CONTROLLED, WITH COMPLICATION: ICD-10-CM

## 2024-07-02 DIAGNOSIS — N18.30 CKD STAGE 3 DUE TO TYPE 2 DIABETES MELLITUS: ICD-10-CM

## 2024-07-02 DIAGNOSIS — E11.59 HYPERTENSION ASSOCIATED WITH DIABETES: ICD-10-CM

## 2024-07-02 RX ORDER — EMPAGLIFLOZIN 10 MG/1
10 TABLET, FILM COATED ORAL
Qty: 90 TABLET | Refills: 1 | Status: SHIPPED | OUTPATIENT
Start: 2024-07-02

## 2024-07-02 NOTE — TELEPHONE ENCOUNTER
Care Due:                  Date            Visit Type   Department     Provider  --------------------------------------------------------------------------------                                MYCHART                              FOLLOWUP/OF  [facility] INTERNAL  Last Visit: 03-      FICE VISIT   MEDICINE       Munir Estrada  Next Visit: None Scheduled  None         None Found                                                            Last  Test          Frequency    Reason                     Performed    Due Date  --------------------------------------------------------------------------------    Lipid Panel.  12 months..  fenofibrate..............  08- 08-    [facility] Embedded Care Due Messages. Reference number: [id number].   7/02/2024 2:30:36 AM CDT

## 2024-07-02 NOTE — TELEPHONE ENCOUNTER
Provider Staff:  Action required for this patient     Please see care gap opportunities below in Care Due Message.    Thanks!  [facility]     Appointments      Date Provider   Last Visit   3/7/2024 Munir Estrada MD   Next Visit   Visit date not found Munir Etsrada MD      Refill Decision Note   Cliff Magaña  is requesting a refill authorization.  Brief Assessment and Rationale for Refill:  Approve     Medication Therapy Plan:         Comments:     Note composed:2:48 AM 07/02/2024

## 2024-07-03 ENCOUNTER — PATIENT MESSAGE (OUTPATIENT)
Dept: ADMINISTRATIVE | Facility: OTHER | Age: 89
End: 2024-07-03
Payer: MEDICARE

## 2024-07-03 ENCOUNTER — EXTERNAL HOME HEALTH (OUTPATIENT)
Dept: HOME HEALTH SERVICES | Facility: HOSPITAL | Age: 89
End: 2024-07-03
Payer: MEDICARE

## 2024-07-04 ENCOUNTER — PATIENT MESSAGE (OUTPATIENT)
Dept: ADMINISTRATIVE | Facility: OTHER | Age: 89
End: 2024-07-04
Payer: MEDICARE

## 2024-07-04 DIAGNOSIS — R63.0 ANOREXIA: ICD-10-CM

## 2024-07-05 ENCOUNTER — PATIENT MESSAGE (OUTPATIENT)
Dept: ADMINISTRATIVE | Facility: OTHER | Age: 89
End: 2024-07-05
Payer: MEDICARE

## 2024-07-05 RX ORDER — MIRTAZAPINE 7.5 MG/1
7.5 TABLET, FILM COATED ORAL NIGHTLY
Qty: 90 TABLET | Refills: 1 | Status: SHIPPED | OUTPATIENT
Start: 2024-07-05

## 2024-07-06 ENCOUNTER — PATIENT MESSAGE (OUTPATIENT)
Dept: ADMINISTRATIVE | Facility: OTHER | Age: 89
End: 2024-07-06
Payer: MEDICARE

## 2024-07-07 ENCOUNTER — PATIENT MESSAGE (OUTPATIENT)
Dept: ADMINISTRATIVE | Facility: OTHER | Age: 89
End: 2024-07-07
Payer: MEDICARE

## 2024-07-08 ENCOUNTER — PATIENT MESSAGE (OUTPATIENT)
Dept: UROLOGY | Facility: CLINIC | Age: 89
End: 2024-07-08
Payer: MEDICARE

## 2024-07-08 ENCOUNTER — PATIENT MESSAGE (OUTPATIENT)
Dept: ADMINISTRATIVE | Facility: OTHER | Age: 89
End: 2024-07-08
Payer: MEDICARE

## 2024-07-08 ENCOUNTER — TELEPHONE (OUTPATIENT)
Dept: UROLOGY | Facility: CLINIC | Age: 89
End: 2024-07-08
Payer: MEDICARE

## 2024-07-09 ENCOUNTER — OFFICE VISIT (OUTPATIENT)
Dept: OTOLARYNGOLOGY | Facility: CLINIC | Age: 89
End: 2024-07-09
Payer: MEDICARE

## 2024-07-09 ENCOUNTER — PATIENT MESSAGE (OUTPATIENT)
Dept: ADMINISTRATIVE | Facility: OTHER | Age: 89
End: 2024-07-09
Payer: MEDICARE

## 2024-07-09 VITALS — HEART RATE: 76 BPM | SYSTOLIC BLOOD PRESSURE: 120 MMHG | DIASTOLIC BLOOD PRESSURE: 80 MMHG

## 2024-07-09 DIAGNOSIS — H61.23 IMPACTED CERUMEN OF BOTH EARS: ICD-10-CM

## 2024-07-09 DIAGNOSIS — H93.8X3 EAR FULLNESS, BILATERAL: Primary | ICD-10-CM

## 2024-07-09 PROCEDURE — 99999 PR PBB SHADOW E&M-EST. PATIENT-LVL IV: CPT | Mod: PBBFAC,,, | Performed by: OTOLARYNGOLOGY

## 2024-07-09 PROCEDURE — 69210 REMOVE IMPACTED EAR WAX UNI: CPT | Mod: 50,PBBFAC | Performed by: OTOLARYNGOLOGY

## 2024-07-09 PROCEDURE — 99214 OFFICE O/P EST MOD 30 MIN: CPT | Mod: PBBFAC | Performed by: OTOLARYNGOLOGY

## 2024-07-09 NOTE — PROGRESS NOTES
Subjective:       Patient ID: Cliff Magaña is a 88 y.o. male.    Chief Complaint: Ear Fullness and Cerumen Impaction    Ear Fullness   There is pain in both ears. This is a recurrent problem. The current episode started more than 1 month ago. The problem occurs constantly. The problem has been waxing and waning. There has been no fever. The patient is experiencing no pain. Associated symptoms include hearing loss. Pertinent negatives include no coughing, diarrhea, ear discharge, headaches, neck pain, rash, rhinorrhea or vomiting. He has tried nothing for the symptoms. His past medical history is significant for hearing loss.     Review of Systems   Constitutional:  Negative for activity change, appetite change and fever.   HENT:  Positive for hearing loss. Negative for congestion, ear discharge, ear pain, nosebleeds, postnasal drip, rhinorrhea and sneezing.    Eyes:  Negative for redness and visual disturbance.   Respiratory:  Negative for apnea, cough, shortness of breath and wheezing.    Cardiovascular:  Negative for chest pain and palpitations.   Gastrointestinal:  Negative for diarrhea and vomiting.   Genitourinary:  Negative for difficulty urinating and frequency.   Musculoskeletal:  Negative for arthralgias, back pain, gait problem and neck pain.   Skin:  Negative for color change and rash.   Neurological:  Negative for dizziness, speech difficulty, weakness and headaches.   Hematological:  Negative for adenopathy. Does not bruise/bleed easily.   Psychiatric/Behavioral:  Negative for agitation and behavioral problems.        Objective:        Constitutional:   Vital signs are normal. He appears well-developed and well-nourished. He is active. Normal speech.      Head:  Normocephalic and atraumatic. Salivary glands normal.  Facial strength is normal.      Ears:    Right Ear: Decreased hearing is noted.   Left Ear: Decreased hearing is noted.   PROCEDURE NOTE:  Ceruminosis is noted in both EACs obscuring all  normal anatomic landmarks.Wax was removed by manual debridement and suctioning utilizing the assistance of binocular microscopy revealing EACs and TMs WNL. The patient tolerated this procedure without difficulty. The subjective decrease noted in hearing pre-cleaning was resolved post-cleaning.          Nose:  Nose normal including turbinates, nasal mucosa, sinuses and nasal septum.     Mouth/Throat  Oropharynx clear and moist without lesions or asymmetry and normal uvula midline.     Neck:  Neck normal without thyromegaly masses, asymmetry, normal tracheal structure, crepitus, and tenderness, thyroid normal, trachea normal, phonation normal and full range of motion with neck supple.     Psychiatric:   He has a normal mood and affect. His speech is normal and behavior is normal.     Neurological:   He has neurological normal, alert and oriented.     Skin:   No abrasions, lacerations, lesions, or rashes.       Assessment:       1. Ear fullness, bilateral    2. Impacted cerumen of both ears        Plan:       1. Hearing conservation strongly recommended.  2. Trial of amplification bilaterally also recommended (patient not interested).  3. Re-check of hearing in 18-24 months or sooner if subjective change noted.  4. F/U with PCP as per schedule.

## 2024-07-10 ENCOUNTER — PATIENT MESSAGE (OUTPATIENT)
Dept: ADMINISTRATIVE | Facility: OTHER | Age: 89
End: 2024-07-10
Payer: MEDICARE

## 2024-07-11 ENCOUNTER — PATIENT MESSAGE (OUTPATIENT)
Dept: HEMATOLOGY/ONCOLOGY | Facility: CLINIC | Age: 89
End: 2024-07-11
Payer: MEDICARE

## 2024-07-11 ENCOUNTER — PATIENT MESSAGE (OUTPATIENT)
Dept: ADMINISTRATIVE | Facility: OTHER | Age: 89
End: 2024-07-11
Payer: MEDICARE

## 2024-07-12 ENCOUNTER — PATIENT MESSAGE (OUTPATIENT)
Dept: ADMINISTRATIVE | Facility: OTHER | Age: 89
End: 2024-07-12
Payer: MEDICARE

## 2024-07-13 ENCOUNTER — PATIENT MESSAGE (OUTPATIENT)
Dept: ADMINISTRATIVE | Facility: OTHER | Age: 89
End: 2024-07-13
Payer: MEDICARE

## 2024-07-15 ENCOUNTER — PATIENT MESSAGE (OUTPATIENT)
Dept: ADMINISTRATIVE | Facility: OTHER | Age: 89
End: 2024-07-15
Payer: MEDICARE

## 2024-07-16 ENCOUNTER — PATIENT MESSAGE (OUTPATIENT)
Dept: OTHER | Facility: OTHER | Age: 89
End: 2024-07-16
Payer: MEDICARE

## 2024-07-16 ENCOUNTER — PATIENT MESSAGE (OUTPATIENT)
Dept: ADMINISTRATIVE | Facility: OTHER | Age: 89
End: 2024-07-16
Payer: MEDICARE

## 2024-07-16 ENCOUNTER — TELEPHONE (OUTPATIENT)
Dept: CARDIOLOGY | Facility: CLINIC | Age: 89
End: 2024-07-16
Payer: MEDICARE

## 2024-07-16 NOTE — TELEPHONE ENCOUNTER
----- Message from Raven Billingsley PharmD sent at 7/16/2024 11:00 AM CDT -----  Regarding: Low blood pressures  Good morning Dr. [place],    Patient is in hypertension digital medicine and his blood pressures have been trending down and he's had several hypotensive readings <90/60. He is currently taking Entresto 24-26 mg twice daily and metoprolol 200 mg twice daily for CHF.     Can you please advise if you recommend any medication adjustments/hold parameters for hypotension?    Thank you,  Raven Billingsley, PharmD  Digital Medicine Clinical Pharmacist  [phone]

## 2024-07-17 ENCOUNTER — PATIENT MESSAGE (OUTPATIENT)
Dept: ADMINISTRATIVE | Facility: OTHER | Age: 89
End: 2024-07-17
Payer: MEDICARE

## 2024-07-18 ENCOUNTER — PATIENT MESSAGE (OUTPATIENT)
Dept: ADMINISTRATIVE | Facility: OTHER | Age: 89
End: 2024-07-18
Payer: MEDICARE

## 2024-07-19 ENCOUNTER — PATIENT MESSAGE (OUTPATIENT)
Dept: ADMINISTRATIVE | Facility: OTHER | Age: 89
End: 2024-07-19
Payer: MEDICARE

## 2024-07-20 ENCOUNTER — PATIENT MESSAGE (OUTPATIENT)
Dept: ADMINISTRATIVE | Facility: OTHER | Age: 89
End: 2024-07-20
Payer: MEDICARE

## 2024-07-21 ENCOUNTER — PATIENT MESSAGE (OUTPATIENT)
Dept: ADMINISTRATIVE | Facility: OTHER | Age: 89
End: 2024-07-21
Payer: MEDICARE

## 2024-07-22 ENCOUNTER — OFFICE VISIT (OUTPATIENT)
Dept: HEMATOLOGY/ONCOLOGY | Facility: CLINIC | Age: 89
End: 2024-07-22
Payer: MEDICARE

## 2024-07-22 ENCOUNTER — PATIENT MESSAGE (OUTPATIENT)
Dept: ADMINISTRATIVE | Facility: OTHER | Age: 89
End: 2024-07-22
Payer: MEDICARE

## 2024-07-22 ENCOUNTER — PROCEDURE VISIT (OUTPATIENT)
Dept: UROLOGY | Facility: CLINIC | Age: 89
End: 2024-07-22
Payer: MEDICARE

## 2024-07-22 VITALS
HEART RATE: 99 BPM | TEMPERATURE: 97 F | WEIGHT: 133.06 LBS | SYSTOLIC BLOOD PRESSURE: 126 MMHG | RESPIRATION RATE: 17 BRPM | DIASTOLIC BLOOD PRESSURE: 82 MMHG | BODY MASS INDEX: 22.84 KG/M2

## 2024-07-22 VITALS
DIASTOLIC BLOOD PRESSURE: 71 MMHG | OXYGEN SATURATION: 99 % | WEIGHT: 133.81 LBS | HEART RATE: 96 BPM | HEIGHT: 64 IN | RESPIRATION RATE: 17 BRPM | BODY MASS INDEX: 22.85 KG/M2 | SYSTOLIC BLOOD PRESSURE: 151 MMHG | TEMPERATURE: 97 F

## 2024-07-22 DIAGNOSIS — C67.9 MALIGNANT NEOPLASM OF URINARY BLADDER, UNSPECIFIED SITE: Primary | ICD-10-CM

## 2024-07-22 DIAGNOSIS — E04.2 MULTIPLE THYROID NODULES: ICD-10-CM

## 2024-07-22 DIAGNOSIS — I15.2 HYPERTENSION ASSOCIATED WITH DIABETES: ICD-10-CM

## 2024-07-22 DIAGNOSIS — N32.89 BLADDER MASS: ICD-10-CM

## 2024-07-22 DIAGNOSIS — I50.33 ACUTE ON CHRONIC HEART FAILURE WITH PRESERVED EJECTION FRACTION: ICD-10-CM

## 2024-07-22 DIAGNOSIS — E11.22 CKD STAGE 3 DUE TO TYPE 2 DIABETES MELLITUS: ICD-10-CM

## 2024-07-22 DIAGNOSIS — N18.30 CKD STAGE 3 DUE TO TYPE 2 DIABETES MELLITUS: ICD-10-CM

## 2024-07-22 DIAGNOSIS — R13.10 DYSPHAGIA, UNSPECIFIED TYPE: ICD-10-CM

## 2024-07-22 DIAGNOSIS — E11.59 HYPERTENSION ASSOCIATED WITH DIABETES: ICD-10-CM

## 2024-07-22 DIAGNOSIS — D49.4 BLADDER TUMOR: ICD-10-CM

## 2024-07-22 DIAGNOSIS — I48.19 PERSISTENT ATRIAL FIBRILLATION: Chronic | ICD-10-CM

## 2024-07-22 PROCEDURE — 88112 CYTOPATH CELL ENHANCE TECH: CPT | Performed by: PATHOLOGY

## 2024-07-22 PROCEDURE — 52000 CYSTOURETHROSCOPY: CPT | Mod: PBBFAC | Performed by: UROLOGY

## 2024-07-22 PROCEDURE — 99999 PR PBB SHADOW E&M-EST. PATIENT-LVL V: CPT | Mod: PBBFAC,,, | Performed by: INTERNAL MEDICINE

## 2024-07-22 PROCEDURE — 87086 URINE CULTURE/COLONY COUNT: CPT | Performed by: UROLOGY

## 2024-07-22 PROCEDURE — 99215 OFFICE O/P EST HI 40 MIN: CPT | Mod: PBBFAC | Performed by: INTERNAL MEDICINE

## 2024-07-22 RX ORDER — OLANZAPINE 5 MG/1
TABLET ORAL
Qty: 90 TABLET | Refills: 1 | Status: SHIPPED | OUTPATIENT
Start: 2024-07-22

## 2024-07-22 RX ORDER — LIDOCAINE HYDROCHLORIDE 20 MG/ML
JELLY TOPICAL
Status: COMPLETED | OUTPATIENT
Start: 2024-07-22 | End: 2024-07-22

## 2024-07-22 RX ADMIN — LIDOCAINE HYDROCHLORIDE: 20 JELLY TOPICAL at 12:07

## 2024-07-22 NOTE — PATIENT INSTRUCTIONS
What to Expect After a Cystoscopy  For the next 24-48 hours, you may feel a mild burning when you urinate. This burning is normal and expected. Drink plenty of water to dilute the urine to help relieve the burning sensation. You may also see a small amount of blood in your urine after the procedure.    Unless you are already taking antibiotics, you may be given an antibiotic after the test to prevent infection.    Signs and Symptoms to Report  Call the [facility] at [phone] if you develop any of the following:  Fever of 101 degrees or higher  Chills or persistent bleeding  Inability to urinate

## 2024-07-22 NOTE — PROGRESS NOTES
Subjective     Patient ID: Cliff Magaña is a 88 y.o. male.    Chief Complaint: Follow-up    HPI    Diagnosis: Muscle invasive bladder cancer post chemo/XRT     Returns for follow up surveillance.  He has been having solid dysphagia in the interval and wife has started to change his food and use Boost to supplement.  No work up to date  Notes coughing with eating  Denies infectious complaints  Weight stable  Continued gait disturbance and fatigue  Memory unchanged  Denies pain    In the interval has had updated imaging as below:  - 7/1/2024 CT Urogram:  FINDINGS:  Thoracic soft tissues: Partially visualized thoracic soft tissues appear unremarkable.  Heart: Severe multi-vessel calcific disease of the coronary arteries.  Calcification of the mitral and aortic valves.  No pericardial effusion.  Left atrial enlargement.  Lungs: There is probable scarring versus atelectasis at the lung bases.  There is a partially visualized area of question tree-in-bud add pulmonary nodules on series 2, image 1.  There is a 1.0 cm nodular opacity at the right lung base (2-23). no pleural effusion. Please see same day chest CT for further characterization of these findings.  Esophagus: Small hiatal hernia.  Stomach and duodenum: Unremarkable.  Liver: Normal in size and contour.  No focal hepatic lesion.  Gallbladder: Surgically absent.  Bile Ducts: No evidence of dilated ducts.  Spleen: Unremarkable.  Pancreas: Pancreas is atrophic.  Pancreatic duct is decompressed from prior proximally, but there is increased prominence of duct at the pancreatic tail.  This may be secondary to a large calcification which appears intraductal (2-55).  Findings may be related to sequela of chronic pancreatitis.  No mass lesions seen.  Adrenals: Unremarkable.  Kidneys/Ureters:  Bilateral renal cortical thinning and irregularity, suggestive of areas of scarring.  Multiple bilateral renal cysts and subcentimeter renal hypodensities too small to  characterize.  No solid enhancing renal mass.  No renal stone.  No hydronephrosis or ureteral dilatation.  Opacification of the bilateral renal collecting systems and ureters without suspicious filling defect.  Bladder: Patient is status TURBT.  irregular soft tissue thickening along the anterior bladder wall, somewhat more conspicuous from prior.  Reproductive organs: Unremarkable.  Bowel/Mesentery: Small bowel is normal in caliber with no evidence of obstruction.  No evidence of inflammation or wall thickening.  Colon demonstrates no focal wall thickening.  Appendix appears within normal limits.  Peritoneum: No intraperitoneal free air or fluid.  Lymph nodes: No lymphadenopathy.  Abdominal wall:  Tiny fat containing umbilical hernia.  Vasculature: No aneurysm. Severe calcific and noncalcific atherosclerosis through the abdominal aorta, iliac vessels, and the aortic branch vessels.  Bones: No acute fracture. No suspicious osseous lesions.  Degenerative changes of the spine.  Osteopenia.  Multiple tiny sclerotic foci through the pelvic bones which are unchanged from prior CT and likely represent small bone islands.  Impression:  Patient is status post TURBT.  There is irregular soft tissue thickening along the anterior bladder wall which is somewhat more conspicuous from prior and could represent disease recurrence.  Recommend clinical correlation.  Pleural based nodular opacity at the right lung base.  This finding is not significantly changed from 11/25/2023.  Further evaluation versus follow-up as clinically warranted.  Please see same day chest CT report for further details regarding the chest.  Sequela of chronic pancreatitis as above.  Other findings are detailed above.  This report was flagged in Epic as abnormal.    - 7/1/2024 CT Chest:  FINDINGS:  Chest wall and lower neck: No axillary or supraclavicular lymphadenopathy.Heterogeneous thyroid with a focal 1.7 cm lesion in the right-sided lobe.  Non emergent  thyroid ultrasound is suggested for further characterization. Sebaceous cyst is noted at the level of the neck measuring approximately 1.6 cm.  Lungs and pleura: Subtle subcentimeter ground-glass foci noted in the right upper lobe, not visualized on prior examination.  12 mm pleural base nodule is identified in the right lower lobe (image 395 sequence 4), unchanged since prior.  Mediastinum and trang: No mediastinal or hilar adenopathy.  Heart and pericardium: Heart size is normal.  Heterogeneous left atrial appendage likely representing mixing artifacts.  No pericardial effusion.  Vessels: Moderate atheromatous disease is seen in the aorta.   The coronary arteries show moderate atheromatous disease.  Upper abdomen: Cholecystectomy.  Cystic lesion is noted in the left upper renal pole measuring 2.3 cm.  Bones: Degenerative disease is noted in the thoracic spine with osteophyte formations, and endplate sclerosis . DJD is identified in the shoulders.  Impression:  1. Pleural based nodular lesion in the right lower lobe appears stable since previous examination.  For a solid nodule >8 mm, Fleischner Society 2017 guidelines recommend considering CT, PET/CT or tissue sampling at 3 months.  2. Interval development of ground-glass foci in the right upper lobe, nonspecific.  Aspiration, infectious process or edema can be considered in the differential.  3. Additional observations.    Interval procedures- repeat cystoscope due today  - 3/4/2024 Cystoscopy:  Findings:   1. Normal anterior urethra without evidence of strictures or tumors   2. Prostatic urethra open without signs of obstruction  3. Bladder with nodular growth at the dome and posterior bladder wall, consistent with recurrent carcinoma.  Surrounding erythema.  Ureteral orifices in orthotopic position bilaterally    Specimens: None   Complications: None; patient tolerated the procedure well          Disposition: Home after brief observation   Condition: Stable    Assessment:  80-year-old male status post chemoradiation for muscle invasive bladder cancer  Plan:  We will plan for repeat TURBT to rule out persistent residual/viable tumor.  We will discuss case with Dr. Schofield and Edwina, and see patient back in clinic to discuss repeat TURBT.       Oncology History:  - bladder cancer incidentally found on imaging as he had been under surveillance for renal cysts  (10/17/2023 ultrasound showed stable left renal cysts and new bladder masses)     -  11/16/2023 TURBT of bladder tumor  Findings:   1. Right postero-lateral papillary bladder wall tumor (3-4cm) with diffusely erythematous and nodular surrounding tissue, right < 1cm papillary bladder wall tumor immediately lateral to the larger postero-lateral papillary tumor, and a right nodular 4cm bladder tumor with high grade and possibly invasive appearance. Tumors resected, hemostasis achieved.  Several other patches of erythema were diffusely identified on the left lateral bladder wall.  2. Bimanual exam post-TURBT showed freely mobile bladder without abnormalities  3. Bilateral retrograde pyelogram showed delicate calices with no evidence of hydronephrosis, no filling defects, and ureter normal in course and caliber, bilaterally  Pathology:  1. Bladder, right posterolateral wall, transurethral resection of bladder tumor:  - Noninvasive high-grade papillary urothelial carcinoma  - Muscularis propria present  - Multiple H&E levels evaluated  2. Bladder, right anterior, transurethral resection of bladder tumor:  - Invasive high-grade papillary urothelial carcinoma  - Tumor invades muscularis propria  - Intact expression of DNA mismatch repair proteins in tumor by immunohistochemistry (see comment)  COMMENT:  Immunohistochemistry (IHC) Testing for Mismatch Repair (MMR) Proteins:  MLH1 - Intact nuclear expression  MSH2 - Intact nuclear expression  MSH6 - Intact nuclear expression  PMS2 - Intact nuclear expression  Background  nonneoplastic tissue/internal control with intact nuclear expression  IHC Interpretation  No loss of nuclear expression of MMR proteins: low probability of microsatellite instability  There are exceptions to the above IHC interpretations. These results should not be considered in isolation, and clinical correlation with genetic counseling is recommended to assess the need for germline testing.     - 11/25/2023 CT Abd/Pelvis:  FINDINGS:  Lung base: Bilateral small volume pleural effusion increased in size compared to recent prior, associated with mild compression atelectasis.  Visualized portion of heart: Coronary artery calcification.  Liver: Normal in size.  Left lobe small calcified granuloma.  No suspicious focal lesion.  Gallbladder: Cholecystectomy.  Bile duct: No intra-or extrahepatic biliary ductal dilatation.  Spleen: Unremarkable.  Pancreas: Parenchymal atrophy.  Multiple parenchymal calcifications suggesting chronic pancreatitis.  No suspicious focal lesion.  No pancreatic ductal dilatation.  No adjacent inflammatory changes or fluid collections.  Adrenal glands: Unremarkable.  Genitourinary: Bilateral renal cysts.  Subcentimeter hypodensities in both kidneys, which are too small to characterize.  The ureters appear normal in course and caliber.  No evidence of nephrolithiasis or hydroureteronephrosis.  Urinary bladder: Postsurgical change in the anterior bladder wall from TURBT.  There are small foci of air in the bladder, likely iatrogenic.  Reproductive organs: Unremarkable.  GI tract: Small hiatal hernia.  The stomach is unremarkable.  The visualized loops of small and large bowel show no evidence of obstruction or inflammation. Diverticulosis.  Appendix unremarkable.  Peritoneum: No free air or fluid.  Retroperitoneum: No significant adenopathy.  Vasculature: Normal caliber of abdominal aorta with moderate calcified and noncalcified atherosclerosis.  Abdominal wall: Tiny fat containing umbilical  hernia.  Bones: Stable T12-L1 intervertebral disc calcification.  No suspicious sclerotic lesions.  No acute fracture.  Impression:  Postoperative changes from of TURBT.  No lymphadenopathy.  No distant metastases.  Other findings as described.     - 12/6/2023 CT Chest:  FINDINGS:  Comparison is 11/21/2023.  No mediastinal, hilar or axillary adenopathy is seen.  There is a right lower pole thyroid nodule.  There are coronary calcifications.  There is a left upper pole renal cyst.  There is a small hiatal hernia.  Trachea and bronchi are patent.  There is no evidence of interstitial lung disease, bronchiectasis, airway trapping, or emphysema.  No suspicious pulmonary nodules, masses, or infiltrates are seen.  There is a calcified left lung granuloma.  Bones demonstrate nothing focal.  Impression:  No significant abnormality seen.  Right lobe thyroid nodule.  Coronary artery calcifications.  Left upper pole renal cyst.     - chemo/XRT 1/9- 3/12/2024  (Weekly Cisplatin)    - 2/2024 scans without signs of metastatic disease     - cystoscopy with Dr. Story on 3/4/2024 revealing concern for residual tumor bladder dome but TURBT for 4/12/2024 was negative on Pathology  Pathology:  1. BLADDER, RIGHT LATERAL WALL, BIOPSY:   Small fragment of urothelial mucosa with reactive changes.    Negative for dysplasia or malignancy.    2. BLADDER, POSTERIOR WALL, BIOPSY:   Heavily denuded urothelial mucosa with reactive changes.    Negative for dysplasia or malignancy.    3. BLADDER, LEFT LATERAL WALL, BIOPSY:   Small fragment of urothelial mucosa with reactive changes.    Negative for dysplasia or malignancy.    4. BLADDER, POSTERIOR PATCH, BIOPSY:   Heavily denuded urothelial mucosa with marked chronic inflammation and reactive changes.   Negative for dysplasia or malignancy.    5. BLADDER, ANTERIOR WALL TUMOR, TRANSURETHRAL RESECTION:   Polypoid cystitis and granulation tissue with squamous metaplasia of urothelial mucosa.     Negative for dysplasia or malignancy.     - 2/29/2024 CT Urogram Abd/Pelvis:  FINDINGS:  Stable mild cardiomegaly.  Coronary artery calcific atherosclerosis.  Visualized inferior lungs are clear.  Stable small left hepatic lobe calcification.  No suspicious hepatic lesion.  Gallbladder is surgically absent.  No biliary ductal dilatation.  Spleen and adrenal glands are unremarkable.  Stable appearance of the pancreas with atrophy, scattered calcifications, and mild duct dilatation measuring 4 mm.  Findings can be seen with sequela of chronic pancreatitis.  Bilateral renal cortical thinning.  Multiple bilateral renal cysts and subcentimeter renal hypodensities too small to characterize.  No solid enhancing renal mass.  No renal stone.  No hydronephrosis or ureteral dilatation.  Opacification of the bilateral renal collecting systems and ureters without suspicious filling defect.  Patient is status post TURBT.  There is increased heterogeneous wall thickening and enhancement along the anterior bladder (series 4, image 144) concerning for residual/recurrent tumor.  Mild perivesical stranding and vascular prominence, similar to prior exam.  Small hiatal hernia.  Colonic diverticulosis.  No evidence of bowel obstruction or inflammation.  No free intraperitoneal fluid or air.  No pathologically enlarged abdominopelvic lymph nodes.  Abdominal aorta is normal caliber.  Moderate/severe calcific atherosclerosis.  Degenerative changes of the osseous structures.  No acute fracture or aggressive osseous lesion  Impression:  1. Increased heterogeneous wall thickening and enhancement along the anterior bladder concerning for residual/recurrent tumor.  2. No evidence of metastatic disease.  3. Additional findings as above.    PMH:  Active Ambulatory Problems        Diagnosis   Date Noted       CKD stage 3 due to type 2 diabetes mellitus     11/23/2012       Chronic anticoagulation 11/23/2012       Hypertension associated with  diabetes     05/22/2013       Hyperlipidemia associated with type 2 diabetes mellitus     05/22/2013       Persistent atrial fibrillation      06/11/2014       Atherosclerosis of aorta      11/18/2020       Sensorineural hearing loss (SNHL) of both ears  11/19/2020       Risk for falls    12/15/2021       Renal cyst  12/14/2022       DM type 2, controlled, with complication  12/14/2022       Bladder tumor     11/16/2023       UTI (urinary tract infection) 11/21/2023       Acute hypoxic respiratory failure   11/21/2023       Elevated troponin 11/21/2023       Thyroid nodules   11/21/2023     Resolved Ambulatory Problems        Diagnosis   Date Noted       Atrial fibrillation     08/02/2012       Long term (current) use of anticoagulants 08/02/2012       Family history of diabetes mellitus 11/23/2012       Ex-cigarette smoker     11/23/2012       Metabolic syndrome      11/23/2012       Screen for colon cancer 05/07/2014       Ex-smoker   11/09/2015       Hyperglycemia     12/16/2015       Abscess of chest wall   06/25/2018       Sebaceous cyst    07/25/2018       Decreased back mobility 08/20/2019       Decreased strength of trunk and back      08/20/2019       Decreased range of motion of both hips    08/20/2019       Pain aggravated by walking    08/20/2019       Hamstring tightness of both lower extremities   08/20/2019       Impaired mobility and endurance     08/20/2019       Low back pain     11/19/2020       Chronic pain      04/28/2021       Type 2 diabetes mellitus, without long-term current use of insulin      12/13/2021       CKD stage 3 secondary to diabetes   12/13/2021       Encounter for preventive health examination     12/19/2022     Past Medical History:  Diagnosis   Date       *Atrial fibrillation           Chronic kidney disease         Deep vein thrombosis           Hyperlipidemia           Hypertension        Cholecystectomy  Tonsillectomy  Skin cancer  removal     FH:  Paternal Aunt- recalls cancer affecting scalp     SH:    Retired [employer], followed by off shore work,   4 children (1 passed as an infant)  Quit tobacco in the 1980s  + EtOH    Review of Systems   Constitutional:  Positive for appetite change and fatigue. Negative for activity change, chills, fever and unexpected weight change.   HENT:  Positive for hearing loss. Negative for nasal congestion, postnasal drip, rhinorrhea, sinus pressure/congestion, sore throat and tinnitus.         Dry mouth   Eyes:  Negative for visual disturbance.   Respiratory:  Negative for cough, chest tightness, shortness of breath and wheezing.    Cardiovascular:  Negative for chest pain, palpitations and leg swelling.   Gastrointestinal:  Negative for abdominal distention, abdominal pain, blood in stool, change in bowel habit, constipation, diarrhea, nausea and vomiting.   Genitourinary:  Positive for frequency (better with changes they have made) and urgency. Negative for decreased urine volume, difficulty urinating, dysuria and hematuria.   Musculoskeletal:  Negative for arthralgias, back pain, myalgias, neck pain and neck stiffness.   Integumentary:  Positive for rash (improved).        Dry skin   Neurological:  Positive for weakness. Negative for dizziness, light-headedness, numbness and headaches.   Psychiatric/Behavioral:  Negative for agitation, behavioral problems, confusion and dysphoric mood. The patient is not nervous/anxious.           Objective     Physical Exam  Vitals and nursing note reviewed.   Constitutional:       General: He is not in acute distress.     Appearance: Normal appearance. He is ill-appearing.      Comments: Appears more fatigued, thin  ECOG= 1-2  Using walker   HENT:      Head: Normocephalic and atraumatic.      Comments: Hearing aids     Mouth/Throat:      Mouth: Mucous membranes are moist.      Pharynx: Oropharynx is clear. No oropharyngeal exudate or posterior oropharyngeal  erythema.   Eyes:      General: No scleral icterus.     Extraocular Movements: Extraocular movements intact.      Conjunctiva/sclera: Conjunctivae normal.      Pupils: Pupils are equal, round, and reactive to light.   Cardiovascular:      Rate and Rhythm: Normal rate and regular rhythm.      Heart sounds: No murmur heard.     No friction rub.   Pulmonary:      Effort: Pulmonary effort is normal. No respiratory distress.      Breath sounds: Normal breath sounds. No wheezing, rhonchi or rales.   Abdominal:      General: Abdomen is flat. Bowel sounds are normal. There is no distension.      Palpations: Abdomen is soft. There is no mass.      Tenderness: There is no abdominal tenderness. There is no guarding or rebound.      Comments: No organomegaly   Musculoskeletal:         General: No swelling, tenderness or deformity. Normal range of motion.      Right lower leg: No edema.      Left lower leg: No edema.   Skin:     General: Skin is warm and dry.      Coloration: Skin is not jaundiced or pale.      Findings: No bruising, erythema or rash.   Neurological:      General: No focal deficit present.      Mental Status: He is alert and oriented to person, place, and time. Mental status is at baseline.      Sensory: No sensory deficit.      Motor: Weakness (generalized) present.      Gait: Gait normal.   Psychiatric:         Mood and Affect: Mood normal.         Behavior: Behavior normal.         Thought Content: Thought content normal.         Judgment: Judgment normal.     Labs- reviewed     Assessment and Plan     1. Malignant neoplasm of urinary bladder, unspecified site    2. CKD stage 3 due to type 2 diabetes mellitus    3. Acute on chronic heart failure with preserved ejection fraction    4. Persistent atrial fibrillation    5. Hypertension associated with diabetes    6. Thyroid nodules    7. Dysphagia, unspecified type        Advanced bladder cancer- completed chemo/XRT  Will follow up on cystoscope results  today    Dysphagia and scans with aspiration evidence  Swallowing study and further work up to be planned    CHF, DM, HTN- managed with PCP and parameters clinically stable    Thyroid nodules- Thyroid ultrasound and check TSH, T4    Route Chart for Scheduling    Med Onc Chart Routing  Urgent    Follow up with physician . Swallowing study asap, thyroid ultrasound and tsh t4 labs; RTC 2-3 weeks   Follow up with [facility]    Infusion scheduling note    Injection scheduling note    Labs    Imaging    Pharmacy appointment    Other referrals

## 2024-07-22 NOTE — PROCEDURES
Cystoscopy    Date/Time: 7/22/2024 1:15 PM    Performed by: Wellington Story MD  Authorized by: Wellington Story MD    Office Cystoscopy Procedure Note For Urothelial Carcinoma    Date of Procedure: 07/22/2024    Indication:  Urothelial Carcinoma    Urothelial Carcinoma History:  Muscle invasive bladder cancer of the dome diagnosed on 11/16/2023, status post chemoradiation with single agent cisplatin and external beam radiation completed on 02/08/2024, 20 of 20 sessions.  He received 5 of 6 weeks of cisplatin therapy  Surveillance cystoscopy and CT urogram on 03/04/2024 showing residual tumor at the bladder dome  Underwent TURBT on 4/12/24 and the path was negative for malignancy     Informed consent:  The risks, benefits, complications, treatment options, and expected outcomes were discussed with the patient. The patient concurred with the proposed plan and provided informed consent.     Anesthesia: Lidocaine jelly 2%     Procedure:  The patient was placed in the lithotomy position, was prepped and draped in the usual manner using sterile technique, and 2% lidocaine jelly instilled into the urethra.  A procedural timeout was performed identifying the patient, all in attendance were in agreement, including the patient. A 17 F flexible cystoscope was then inserted into the urethra and the urethra and bladder carefully examined.  The following findings were noted:     Findings:   1. Normal anterior urethra without evidence of strictures or tumors   2. Prostatic urethra open without signs of obstruction  3. Bladder with healing tissue and fibrinous material at the dome, no evidence for residual malignancy.  Ureteral orifices in orthotopic position bilaterally        Specimens: None   Complications: None; patient tolerated the procedure well          Disposition: Home after brief observation   Condition: Stable     Assessment: 88M with MIBC s/p chemorads and repeat TURBT without current endoscopic evidence for  recurrence    Plan: Follow up in 3 months with repeat cysto/cytology    Attending Attestation:      I personally performed the procedure.     Wellington Story  1:49 PM  07/22/2024

## 2024-07-23 ENCOUNTER — PATIENT MESSAGE (OUTPATIENT)
Dept: ADMINISTRATIVE | Facility: OTHER | Age: 89
End: 2024-07-23
Payer: MEDICARE

## 2024-07-23 ENCOUNTER — PATIENT MESSAGE (OUTPATIENT)
Dept: HEMATOLOGY/ONCOLOGY | Facility: CLINIC | Age: 89
End: 2024-07-23
Payer: MEDICARE

## 2024-07-23 ENCOUNTER — TELEPHONE (OUTPATIENT)
Dept: HEMATOLOGY/ONCOLOGY | Facility: CLINIC | Age: 89
End: 2024-07-23
Payer: MEDICARE

## 2024-07-23 PROBLEM — R13.10 DYSPHAGIA: Status: ACTIVE | Noted: 2024-07-23

## 2024-07-23 LAB
BACTERIA UR CULT: NO GROWTH
FINAL PATHOLOGIC DIAGNOSIS: NORMAL
Lab: NORMAL

## 2024-07-23 NOTE — TELEPHONE ENCOUNTER
Called pt to let them know the medication can be brought back to the pharmacy. However they have to return it to the [facility] on [facility] [place]note [facility] and [facility] as their home pharmacy does not have a medication drop off box.     Pt did not answer. Left voicemail for pt.

## 2024-07-24 ENCOUNTER — TELEPHONE (OUTPATIENT)
Dept: SPEECH THERAPY | Facility: HOSPITAL | Age: 89
End: 2024-07-24
Payer: MEDICARE

## 2024-07-24 ENCOUNTER — PATIENT MESSAGE (OUTPATIENT)
Dept: ADMINISTRATIVE | Facility: OTHER | Age: 89
End: 2024-07-24
Payer: MEDICARE

## 2024-07-24 NOTE — TELEPHONE ENCOUNTER
Sw pt spouse to inform of scheduled mbss. 8/2@2:30. Informed to fast 2hrs before test,[facility] radiology clinic

## 2024-07-25 ENCOUNTER — PATIENT MESSAGE (OUTPATIENT)
Dept: ADMINISTRATIVE | Facility: OTHER | Age: 89
End: 2024-07-25
Payer: MEDICARE

## 2024-07-26 ENCOUNTER — PATIENT MESSAGE (OUTPATIENT)
Dept: ADMINISTRATIVE | Facility: OTHER | Age: 89
End: 2024-07-26
Payer: MEDICARE

## 2024-07-27 ENCOUNTER — PATIENT MESSAGE (OUTPATIENT)
Dept: ADMINISTRATIVE | Facility: OTHER | Age: 89
End: 2024-07-27
Payer: MEDICARE

## 2024-07-28 ENCOUNTER — PATIENT MESSAGE (OUTPATIENT)
Dept: ADMINISTRATIVE | Facility: OTHER | Age: 89
End: 2024-07-28
Payer: MEDICARE

## 2024-07-29 ENCOUNTER — PATIENT MESSAGE (OUTPATIENT)
Dept: ADMINISTRATIVE | Facility: OTHER | Age: 89
End: 2024-07-29
Payer: MEDICARE

## 2024-07-30 ENCOUNTER — PATIENT MESSAGE (OUTPATIENT)
Dept: ADMINISTRATIVE | Facility: OTHER | Age: 89
End: 2024-07-30
Payer: MEDICARE

## 2024-07-31 ENCOUNTER — PATIENT MESSAGE (OUTPATIENT)
Dept: ADMINISTRATIVE | Facility: OTHER | Age: 89
End: 2024-07-31
Payer: MEDICARE

## 2024-08-01 ENCOUNTER — PATIENT MESSAGE (OUTPATIENT)
Dept: ADMINISTRATIVE | Facility: OTHER | Age: 89
End: 2024-08-01
Payer: MEDICARE

## 2024-08-02 ENCOUNTER — PATIENT MESSAGE (OUTPATIENT)
Dept: ADMINISTRATIVE | Facility: OTHER | Age: 89
End: 2024-08-02
Payer: MEDICARE

## 2024-08-02 ENCOUNTER — HOSPITAL ENCOUNTER (OUTPATIENT)
Dept: RADIOLOGY | Facility: HOSPITAL | Age: 89
Discharge: HOME | End: 2024-08-02
Attending: INTERNAL MEDICINE
Payer: MEDICARE

## 2024-08-02 ENCOUNTER — CLINICAL SUPPORT (OUTPATIENT)
Dept: SPEECH THERAPY | Facility: HOSPITAL | Age: 89
End: 2024-08-02
Attending: INTERNAL MEDICINE
Payer: MEDICARE

## 2024-08-02 DIAGNOSIS — C67.9 MALIGNANT NEOPLASM OF URINARY BLADDER, UNSPECIFIED SITE: ICD-10-CM

## 2024-08-02 DIAGNOSIS — R13.12 OROPHARYNGEAL DYSPHAGIA: Primary | ICD-10-CM

## 2024-08-02 PROCEDURE — 92611 MOTION FLUOROSCOPY/SWALLOW: CPT

## 2024-08-02 PROCEDURE — 74230 X-RAY XM SWLNG FUNCJ C+: CPT | Mod: 26,,, | Performed by: RADIOLOGY

## 2024-08-02 PROCEDURE — A9698 NON-RAD CONTRAST MATERIALNOC: HCPCS | Performed by: INTERNAL MEDICINE

## 2024-08-02 PROCEDURE — 74230 X-RAY XM SWLNG FUNCJ C+: CPT | Mod: TC

## 2024-08-02 PROCEDURE — 25500020 PHARM REV CODE 255: Performed by: INTERNAL MEDICINE

## 2024-08-02 RX ADMIN — BARIUM SULFATE 40 ML: 0.81 POWDER, FOR SUSPENSION ORAL at 02:08

## 2024-08-03 ENCOUNTER — PATIENT MESSAGE (OUTPATIENT)
Dept: ADMINISTRATIVE | Facility: OTHER | Age: 89
End: 2024-08-03
Payer: MEDICARE

## 2024-08-05 ENCOUNTER — PATIENT MESSAGE (OUTPATIENT)
Dept: ADMINISTRATIVE | Facility: OTHER | Age: 89
End: 2024-08-05
Payer: MEDICARE

## 2024-08-06 ENCOUNTER — PATIENT MESSAGE (OUTPATIENT)
Dept: INTERNAL MEDICINE | Facility: CLINIC | Age: 89
End: 2024-08-06
Payer: MEDICARE

## 2024-08-06 ENCOUNTER — PATIENT MESSAGE (OUTPATIENT)
Dept: HEMATOLOGY/ONCOLOGY | Facility: CLINIC | Age: 89
End: 2024-08-06
Payer: MEDICARE

## 2024-08-06 ENCOUNTER — PATIENT MESSAGE (OUTPATIENT)
Dept: ADMINISTRATIVE | Facility: OTHER | Age: 89
End: 2024-08-06
Payer: MEDICARE

## 2024-08-07 ENCOUNTER — PATIENT MESSAGE (OUTPATIENT)
Dept: ADMINISTRATIVE | Facility: OTHER | Age: 89
End: 2024-08-07
Payer: MEDICARE

## 2024-08-07 DIAGNOSIS — R13.10 DYSPHAGIA, UNSPECIFIED TYPE: Primary | ICD-10-CM

## 2024-08-08 ENCOUNTER — PATIENT MESSAGE (OUTPATIENT)
Dept: ADMINISTRATIVE | Facility: OTHER | Age: 89
End: 2024-08-08
Payer: MEDICARE

## 2024-08-09 ENCOUNTER — PATIENT MESSAGE (OUTPATIENT)
Dept: ADMINISTRATIVE | Facility: OTHER | Age: 89
End: 2024-08-09
Payer: MEDICARE

## 2024-08-10 ENCOUNTER — PATIENT MESSAGE (OUTPATIENT)
Dept: ADMINISTRATIVE | Facility: OTHER | Age: 89
End: 2024-08-10
Payer: MEDICARE

## 2024-08-11 ENCOUNTER — PATIENT MESSAGE (OUTPATIENT)
Dept: ADMINISTRATIVE | Facility: OTHER | Age: 89
End: 2024-08-11
Payer: MEDICARE

## 2024-08-12 ENCOUNTER — PATIENT MESSAGE (OUTPATIENT)
Dept: ADMINISTRATIVE | Facility: OTHER | Age: 89
End: 2024-08-12
Payer: MEDICARE

## 2024-08-12 ENCOUNTER — OFFICE VISIT (OUTPATIENT)
Dept: PODIATRY | Facility: CLINIC | Age: 89
End: 2024-08-12
Payer: MEDICARE

## 2024-08-12 VITALS
SYSTOLIC BLOOD PRESSURE: 121 MMHG | WEIGHT: 132.94 LBS | HEIGHT: 64 IN | HEART RATE: 74 BPM | DIASTOLIC BLOOD PRESSURE: 78 MMHG | BODY MASS INDEX: 22.7 KG/M2

## 2024-08-12 DIAGNOSIS — E11.49 TYPE 2 DIABETES MELLITUS WITH NEUROLOGICAL MANIFESTATIONS: ICD-10-CM

## 2024-08-12 DIAGNOSIS — L97.529: ICD-10-CM

## 2024-08-12 DIAGNOSIS — B35.1 ONYCHOMYCOSIS: ICD-10-CM

## 2024-08-12 DIAGNOSIS — E11.51 TYPE 2 DIABETES MELLITUS WITH PERIPHERAL VASCULAR DISEASE: Primary | ICD-10-CM

## 2024-08-12 PROCEDURE — 99214 OFFICE O/P EST MOD 30 MIN: CPT | Mod: PBBFAC,PN,25 | Performed by: PODIATRIST

## 2024-08-12 PROCEDURE — 11721 DEBRIDE NAIL 6 OR MORE: CPT | Mod: Q8,PBBFAC,PN | Performed by: PODIATRIST

## 2024-08-12 PROCEDURE — 99213 OFFICE O/P EST LOW 20 MIN: CPT | Mod: 25,S$PBB,, | Performed by: PODIATRIST

## 2024-08-12 PROCEDURE — 99999 PR PBB SHADOW E&M-EST. PATIENT-LVL IV: CPT | Mod: PBBFAC,,, | Performed by: PODIATRIST

## 2024-08-12 NOTE — PROGRESS NOTES
Subjective:      Patient ID: Cliff Magaña is a 88 y.o. male.    Chief Complaint: Diabetic Foot Exam      Cliff is a 88 y.o. male who presents to the clinic upon referral from Dr. Steff choi. provider found  for evaluation and treatment of diabetic feet. Cliff has a past medical history of *Atrial fibrillation, Chronic kidney disease, Deep vein thrombosis, Hyperlipidemia, Hypertension, Metabolic syndrome, and Type 2 diabetes mellitus, without long-term current use of insulin (12/13/2021). Patient relates no major problem with feet. Only complaints today consist of thickened toenails that he has difficulty trimming.  He will sometimes get a pedicure.  He also relates he gets intermittent cramping to both legs at night.  History of chronic low back pain with right lower extremity symptoms.    04/29/2022: Returns for routine nail care. No new concerns.     07/29/2022: Returns for routine nail care. No new concerns.     11/17/2022:  Returns routine foot care.  No new concerns.    02/16/2023:  Returns for routine foot care.  Due for annual foot exam.  Complains of intermittent cramping to the left foot that is brief in duration.  Denies any claudication symptoms. Followed by Cardiology.     05/18/23: Returns for routine foot care.  No new concerns.  Accompanied by his wife.    08/17/2023: Returns for routine foot care.  No new concerns.    11/09/2023:  Returns for routine foot care.  Accompanied by his wife.  No new concerns.    02/09/2024:  Returns for annual foot exam and routine foot care.  Accompanied by his wife.  Patient has had significant weight loss secondary to loss of appetite post chemotherapy and radiation therapy for bladder cancer.  Recently admitted for UTI and discharged.    05/10/2024: Returns for routine foot care.  No new concerns.    08/12/2024: Returns for routine foot care.  Accompanied by his wife.  New wound discovered during the exam today that was not known per his wife.  No pain  "noted.    PCP: Munir Estrada MD    Date Last Seen by PCP:  03/07/2024    Current shoe gear: Casual shoes    Hemoglobin A1C   Date Value Ref Range Status   07/01/2024 8.0 (H) 4.0 - 5.6 % Final     Comment:     ADA Screening Guidelines:  5.7-6.4%  Consistent with prediabetes  >or=6.5%  Consistent with diabetes    High levels of fetal hemoglobin interfere with the HbA1C  assay. Heterozygous hemoglobin variants (HbS, HgC, etc)do  not significantly interfere with this assay.   However, presence of multiple variants may affect accuracy.     12/22/2023 6.9 (H) 4.0 - 5.6 % Final     Comment:     ADA Screening Guidelines:  5.7-6.4%  Consistent with prediabetes  >or=6.5%  Consistent with diabetes    High levels of fetal hemoglobin interfere with the HbA1C  assay. Heterozygous hemoglobin variants (HbS, HgC, etc)do  not significantly interfere with this assay.   However, presence of multiple variants may affect accuracy.     11/10/2023 6.9 (H) 4.0 - 5.6 % Final     Comment:     ADA Screening Guidelines:  5.7-6.4%  Consistent with prediabetes  >or=6.5%  Consistent with diabetes    High levels of fetal hemoglobin interfere with the HbA1C  assay. Heterozygous hemoglobin variants (HbS, HgC, etc)do  not significantly interfere with this assay.   However, presence of multiple variants may affect accuracy.       Vitals:    08/12/24 1044   BP: 121/78   Pulse: 74   Weight: 60.3 kg (132 lb 15 oz)   Height: 5' 4" (1.626 m)   PainSc: 0-No pain      Past Medical History:   Diagnosis Date    *Atrial fibrillation     Chronic kidney disease     Deep vein thrombosis     Hyperlipidemia     Hypertension     Metabolic syndrome     Type 2 diabetes mellitus, without long-term current use of insulin 12/13/2021       Past Surgical History:   Procedure Laterality Date    BIOPSY OF BLADDER N/A 4/12/2024    Procedure: BIOPSY, BLADDER;  Surgeon: Wellington Story MD;  [place] [facility] [place] [address] [place] [address];  Service: Urology;  Laterality: N/A;    BLADDER " FULGURATION N/A 4/12/2024    Procedure: FULGURATION, BLADDER;  Surgeon: Wellington Story MD;  Location: [address];  Service: Urology;  Laterality: N/A;    CATARACT EXTRACTION      CHOLECYSTECTOMY      CYSTOSCOPY N/A 11/16/2023    Procedure: CYSTOSCOPY;  Surgeon: Marcelino Moffett MD;  Location: [address];  Service: Urology;  Laterality: N/A;  90 minutes    DILATION OF URETHRA N/A 4/12/2024    Procedure: DILATION, URETHRA;  Surgeon: Wellington Story MD;  Location: [address];  Service: Urology;  Laterality: N/A;    EYE SURGERY      INJECTION OF FACET JOINT Bilateral 4/28/2021    Procedure: FACET JOINT INJECTION BILATERAL L4/L5 DIRECT REFERRAL;  Surgeon: Philipp Iyer MD;  [facility];  Service: Pain Management;  Laterality: Bilateral;  NEEDS CONSENT, ELIQUIS CLEARANCE IN CHART    RETROGRADE PYELOGRAPHY Bilateral 11/16/2023    Procedure: PYELOGRAM, RETROGRADE;  Surgeon: Marcelino Moffett MD;  Location: [address];  Service: Urology;  Laterality: Bilateral;  90 minutes    SKIN CANCER EXCISION      TONSILLECTOMY      TURBT (TRANSURETHRAL RESECTION OF BLADDER TUMOR) N/A 11/16/2023    Procedure: TURBT (TRANSURETHRAL RESECTION OF BLADDER TUMOR);  Surgeon: Marcelino Moffett MD;  Location: [address];  Service: Urology;  Laterality: N/A;  90 minutes    TURBT (TRANSURETHRAL RESECTION OF BLADDER TUMOR) N/A 4/12/2024    Procedure: TURBT (TRANSURETHRAL RESECTION OF BLADDER TUMOR);  Surgeon: Wellington tSory MD;  Location: [address];  Service: Urology;  Laterality: N/A;       Family History   Problem Relation Name Age of Onset    Diabetes Maternal Aunt      Heart attack Neg Hx      Heart disease Neg Hx      Heart failure Neg Hx      Hyperlipidemia Neg Hx      Hypertension Neg Hx      Stroke Neg Hx         Social History     Socioeconomic History    Marital status:      Spouse name: Nereyda    Number of children: 4   Tobacco Use    Smoking status: Former     Current packs/day:  0.00     Average packs/day: 2.0 packs/day for 20.0 years (40.0 ttl pk-yrs)     Types: Cigarettes     Start date: 1963     Quit date: 1983     Years since quittin.2     Passive exposure: Never    Smokeless tobacco: Never   Substance and Sexual Activity    Alcohol use: Yes     Alcohol/week: 1.0 standard drink of alcohol     Types: 1 Standard drinks or equivalent per week     Comment: 3 drinks per week    Drug use: No    Sexual activity: Not Currently     Partners: Female     Social Determinants of Health     Financial Resource Strain: Low Risk  (2023)    Overall Financial Resource Strain (CARDIA)     Difficulty of Paying Living Expenses: Not hard at all   Food Insecurity: No Food Insecurity (2023)    Hunger Vital Sign     Worried About Running Out of Food in the Last Year: Never true     Ran Out of Food in the Last Year: Never true   Transportation Needs: No Transportation Needs (2023)    PRAPARE - Transportation     Lack of Transportation (Medical): No     Lack of Transportation (Non-Medical): No   Physical Activity: Sufficiently Active (2023)    Exercise Vital Sign     Days of Exercise per Week: 7 days     Minutes of Exercise per Session: 60 min   Stress: No Stress Concern Present (2023)    [employer] - Occupational Stress Questionnaire     Feeling of Stress : Not at all   Housing Stability: Low Risk  (2023)    Housing Stability Vital Sign     Unable to Pay for Housing in the Last Year: No     Number of Places Lived in the Last Year: 1     Unstable Housing in the Last Year: No       Current Outpatient Medications   Medication Sig Dispense Refill    acetaminophen (TYLENOL) 650 MG TbSR Take 650 mg by mouth every 8 (eight) hours.      cholecalciferol, vitamin D3, (VITAMIN D3) 50 mcg (2,000 unit) Cap capsule Take by mouth once daily.      cyclobenzaprine (FLEXERIL) 5 MG tablet Take 1 tablet (5 mg total) by mouth 3 (three) times daily as  needed for Muscle spasms. 30 tablet 0    dexAMETHasone (DECADRON) 4 MG Tab TAKE EVERY 12 HOURS DAILY FOR DAYS 2 THOUGH 4 FOLLOWING CHEMOTHERAPY 40 tablet 1    ELIQUIS 5 mg Tab TAKE 1 TABLET TWICE A  tablet 3    fenofibrate micronized (LOFIBRA) 200 MG Cap TAKE 1 CAPSULE DAILY WITH BREAKFAST 30 capsule 0    fenofibrate micronized (LOFIBRA) 200 MG Cap TAKE 1 CAPSULE DAILY WITH BREAKFAST 90 capsule 3    JARDIANCE 10 mg tablet TAKE 1 TABLET DAILY 90 tablet 1    loratadine (CLARITIN) 10 mg tablet Take 10 mg by mouth once daily.      megestroL (MEGACE) 400 mg/10 mL (10 mL) Susp Take 10 mLs (400 mg total) by mouth once daily. 500 mL 1    metFORMIN (GLUCOPHAGE-XR) 500 MG ER 24hr tablet TAKE 1 TABLET DAILY 90 tablet 2    metoprolol succinate (TOPROL-XL) 100 MG 24 hr tablet Take 1 tablet (100 mg total) by mouth 2 (two) times daily. 180 tablet 1    mirtazapine (REMERON) 7.5 MG Tab TAKE 1 TABLET BY MOUTH EVERY EVENING. 90 tablet 1    mupirocin (BACTROBAN) 2 % ointment Apply topically 2 (two) times daily.      OLANZapine (ZYPREXA) 5 MG tablet TAKE NIGHTLY FOR 4 DAYS FOLLOWING CHEMOTHERAPY 90 tablet 1    ondansetron (ZOFRAN) 8 MG tablet Take 1 tablet (8 mg total) by mouth every 12 (twelve) hours as needed for Nausea. 30 tablet 2    sacubitriL-valsartan (ENTRESTO) 24-26 mg per tablet Take 1 tablet by mouth 2 (two) times daily. 60 tablet 0     No current facility-administered medications for this visit.       Review of patient's allergies indicates:   Allergen Reactions    Niacin      Other reaction(s): Rash  Other reaction(s): Itching           Review of Systems   Constitutional: Negative for chills, fever and malaise/fatigue.   HENT:  Negative for congestion and hearing loss.    Cardiovascular:  Negative for chest pain, claudication and leg swelling.   Respiratory:  Negative for cough and shortness of breath.    Skin:  Positive for nail changes.   Musculoskeletal:  Positive for back pain and muscle cramps. Negative for  joint pain and muscle weakness.   Gastrointestinal:  Negative for nausea and vomiting.   Neurological:  Positive for paresthesias. Negative for numbness and weakness.   Psychiatric/Behavioral:  Negative for altered mental status.            Objective:      Physical Exam  Constitutional:       General: He is not in acute distress.     Appearance: Normal appearance. He is not ill-appearing.   Cardiovascular:      Pulses:           Dorsalis pedis pulses are 0 on the right side and 0 on the left side.        Posterior tibial pulses are 0 on the right side and 0 on the left side.      Comments: Weakly monophasic left distal AT with relatively strong monophasic peroneal and biphasic PT with Doppler left foot.    Mild nonpitting edema to lower extremity bilateral.  No hair growth bilateral lower extremity.  Mild rubor on dependency bilateral lower extremity.  Musculoskeletal:      Comments: No pain with ROM or MMT bilateral lower extremity.    No significant digital deformity bilateral.  Rectus appearing foot type bilateral.   Feet:      Right foot:      Protective Sensation: 10 sites tested.  9 sites sensed.      Skin integrity: Dry skin present.      Toenail Condition: Right toenails are abnormally thick and long.      Left foot:      Protective Sensation: 10 sites tested.  10 sites sensed.      Skin integrity: Dry skin present.      Toenail Condition: Left toenails are abnormally thick and long.   Skin:     General: Skin is warm.      Capillary Refill: Capillary refill takes 2 to 3 seconds.      Findings: No ecchymosis or erythema.      Nails: There is no clubbing.      Comments: Second toenail bilateral is growing a superior direction, thickened and discolored yellow with some mild loosening.  Nails 1, 3, 4 and 5 bilateral are mildly elongated 2-3 mm, thickened with patchy yellow discoloration mild underlying debris.    Ulceration overlying the dorsal distal lateral aspect of the left 5th toe with eschar base and  slightly raised demarcating margin.  No surrounding erythema.  No drainage.  No localized pain on palpation.    Skin is atrophied to lower extremity bilateral.   Neurological:      Mental Status: He is alert and oriented to person, place, and time.      Sensory: Sensory deficit present.      Motor: Motor function is intact.      Comments: Absent vibratory sensation right foot and decreased left foot.               Assessment:       Encounter Diagnoses   Name Primary?    Type 2 diabetes mellitus with peripheral vascular disease Yes    Type 2 diabetes mellitus with neurological manifestations     Onychomycosis     Neuropathic ulcer of toe, left, with unspecified severity          Plan:       Cliff was seen today for diabetic foot exam.    Diagnoses and all orders for this visit:    Type 2 diabetes mellitus with peripheral vascular disease  -     Routine Foot Care    Type 2 diabetes mellitus with neurological manifestations  -     Routine Foot Care    Onychomycosis  -     Routine Foot Care    Neuropathic ulcer of toe, left, with unspecified severity      I counseled the patient on his conditions, their implications and medical management.    Shoe inspection. Diabetic Foot Education. Patient reminded of the importance of good nutrition and blood sugar control to help prevent podiatric complications of diabetes. Patient instructed on proper foot hygeine. We discussed wearing proper shoe gear, daily foot inspections, never walking without protective shoe gear, never putting sharp instruments to feet.    Routine foot care per attached note. Patient relates relief following the procedure. He will continue to monitor the areas daily, inspect his feet, wear protective shoe gear when ambulatory, moisturizer to maintain skin integrity.    New ulceration noted to left 5th toe.  There is extensive peripheral vascular disease but he does have dopplerable signals all 3 pulses as per the physical exam.  The wife and patient has  expressed that they do not want any aggressive treatment.  Betadine was applied followed by Mepilex border.  Home care instructions reviewed in detail with Betadine and Band-Aid.  We discussed monitoring for any signs of progressive breakdown of the surrounding skin or signs infection.    RTC within 1 month to re-evaluate or p.r.n. as discussed    Assisted by Mony Kim DPM PGY 3    A portion of this note was generated by voice recognition software and may contain spelling and grammar errors.

## 2024-08-12 NOTE — PROCEDURES
"Routine Foot Care    Date/Time: 8/12/2024 11:00 AM    Performed by: Bhupendra Brar DPM  Authorized by: Bhupendra Brar DPM    Time out: Immediately prior to procedure a "time out" was called to verify the correct patient, procedure, equipment, support staff and site/side marked as required.    Consent Done?:  Yes (Verbal)  Hyperkeratotic Skin Lesions?: No      Nail Care Type:  Debride  Location(s): All  (Left 1st Toe, Left 3rd Toe, Left 2nd Toe, Left 4th Toe, Left 5th Toe, Right 1st Toe, Right 2nd Toe, Right 3rd Toe, Right 4th Toe and Right 5th Toe)  Patient tolerance:  Patient tolerated the procedure well with no immediate complications     Used sterile nail nipper. Assisted by Mony Kim DPM PGY 3    "

## 2024-08-13 ENCOUNTER — PATIENT MESSAGE (OUTPATIENT)
Dept: ADMINISTRATIVE | Facility: OTHER | Age: 89
End: 2024-08-13
Payer: MEDICARE

## 2024-08-14 ENCOUNTER — LAB VISIT (OUTPATIENT)
Dept: LAB | Facility: HOSPITAL | Age: 89
End: 2024-08-14
Attending: FAMILY MEDICINE
Payer: MEDICARE

## 2024-08-14 ENCOUNTER — PATIENT MESSAGE (OUTPATIENT)
Dept: ADMINISTRATIVE | Facility: OTHER | Age: 89
End: 2024-08-14
Payer: MEDICARE

## 2024-08-14 DIAGNOSIS — E04.2 MULTIPLE THYROID NODULES: ICD-10-CM

## 2024-08-14 DIAGNOSIS — Z91.81 RISK FOR FALLS: ICD-10-CM

## 2024-08-14 DIAGNOSIS — E78.5 HYPERLIPIDEMIA ASSOCIATED WITH TYPE 2 DIABETES MELLITUS: ICD-10-CM

## 2024-08-14 DIAGNOSIS — E11.22 CKD STAGE 3 DUE TO TYPE 2 DIABETES MELLITUS: ICD-10-CM

## 2024-08-14 DIAGNOSIS — Z79.01 CHRONIC ANTICOAGULATION: ICD-10-CM

## 2024-08-14 DIAGNOSIS — N28.1 RENAL CYST: ICD-10-CM

## 2024-08-14 DIAGNOSIS — N18.31 STAGE 3A CHRONIC KIDNEY DISEASE: ICD-10-CM

## 2024-08-14 DIAGNOSIS — E11.69 HYPERLIPIDEMIA ASSOCIATED WITH TYPE 2 DIABETES MELLITUS: ICD-10-CM

## 2024-08-14 DIAGNOSIS — C67.9 MALIGNANT NEOPLASM OF URINARY BLADDER, UNSPECIFIED SITE: ICD-10-CM

## 2024-08-14 DIAGNOSIS — R79.89 ELEVATED TROPONIN: ICD-10-CM

## 2024-08-14 DIAGNOSIS — R13.10 DYSPHAGIA, UNSPECIFIED TYPE: ICD-10-CM

## 2024-08-14 DIAGNOSIS — I70.0 ATHEROSCLEROSIS OF AORTA: ICD-10-CM

## 2024-08-14 DIAGNOSIS — E11.59 HYPERTENSION ASSOCIATED WITH DIABETES: ICD-10-CM

## 2024-08-14 DIAGNOSIS — I48.19 PERSISTENT ATRIAL FIBRILLATION: Chronic | ICD-10-CM

## 2024-08-14 DIAGNOSIS — H90.3 SENSORINEURAL HEARING LOSS (SNHL) OF BOTH EARS: ICD-10-CM

## 2024-08-14 DIAGNOSIS — N18.30 CKD STAGE 3 DUE TO TYPE 2 DIABETES MELLITUS: ICD-10-CM

## 2024-08-14 DIAGNOSIS — Z00.00 PREVENTATIVE HEALTH CARE: ICD-10-CM

## 2024-08-14 DIAGNOSIS — I15.2 HYPERTENSION ASSOCIATED WITH DIABETES: ICD-10-CM

## 2024-08-14 DIAGNOSIS — E11.8 DM TYPE 2, CONTROLLED, WITH COMPLICATION: ICD-10-CM

## 2024-08-14 DIAGNOSIS — I50.33 ACUTE ON CHRONIC HEART FAILURE WITH PRESERVED EJECTION FRACTION: ICD-10-CM

## 2024-08-14 LAB
ALBUMIN/CREAT UR: 612.1 UG/MG (ref 0–30)
CREAT UR-MCNC: 66 MG/DL (ref 23–375)
MICROALBUMIN UR DL<=1MG/L-MCNC: 404 UG/ML

## 2024-08-14 PROCEDURE — 82043 UR ALBUMIN QUANTITATIVE: CPT | Performed by: FAMILY MEDICINE

## 2024-08-14 PROCEDURE — 82570 ASSAY OF URINE CREATININE: CPT | Performed by: FAMILY MEDICINE

## 2024-08-15 ENCOUNTER — PATIENT MESSAGE (OUTPATIENT)
Dept: ADMINISTRATIVE | Facility: OTHER | Age: 89
End: 2024-08-15
Payer: MEDICARE

## 2024-08-16 ENCOUNTER — TELEPHONE (OUTPATIENT)
Dept: PALLIATIVE MEDICINE | Facility: CLINIC | Age: 89
End: 2024-08-16
Payer: MEDICARE

## 2024-08-16 ENCOUNTER — PATIENT MESSAGE (OUTPATIENT)
Dept: ADMINISTRATIVE | Facility: OTHER | Age: 89
End: 2024-08-16
Payer: MEDICARE

## 2024-08-17 ENCOUNTER — PATIENT MESSAGE (OUTPATIENT)
Dept: ADMINISTRATIVE | Facility: OTHER | Age: 89
End: 2024-08-17
Payer: MEDICARE

## 2024-08-18 ENCOUNTER — PATIENT MESSAGE (OUTPATIENT)
Dept: ADMINISTRATIVE | Facility: OTHER | Age: 89
End: 2024-08-18
Payer: MEDICARE

## 2024-08-18 DIAGNOSIS — Z71.3 NUTRITIONAL COUNSELING: ICD-10-CM

## 2024-08-19 ENCOUNTER — PATIENT MESSAGE (OUTPATIENT)
Dept: ADMINISTRATIVE | Facility: OTHER | Age: 89
End: 2024-08-19
Payer: MEDICARE

## 2024-08-19 RX ORDER — MEGESTROL ACETATE 40 MG/ML
400 SUSPENSION ORAL
Qty: 480 ML | Refills: 2 | Status: SHIPPED | OUTPATIENT
Start: 2024-08-19

## 2024-08-20 ENCOUNTER — PATIENT MESSAGE (OUTPATIENT)
Dept: ADMINISTRATIVE | Facility: OTHER | Age: 89
End: 2024-08-20
Payer: MEDICARE

## 2024-08-20 ENCOUNTER — TELEPHONE (OUTPATIENT)
Dept: HEMATOLOGY/ONCOLOGY | Facility: CLINIC | Age: 89
End: 2024-08-20
Payer: MEDICARE

## 2024-08-21 ENCOUNTER — OFFICE VISIT (OUTPATIENT)
Dept: PALLIATIVE MEDICINE | Facility: CLINIC | Age: 89
End: 2024-08-21
Payer: MEDICARE

## 2024-08-21 VITALS
HEART RATE: 84 BPM | OXYGEN SATURATION: 98 % | BODY MASS INDEX: 21.95 KG/M2 | WEIGHT: 127.88 LBS | SYSTOLIC BLOOD PRESSURE: 109 MMHG | DIASTOLIC BLOOD PRESSURE: 52 MMHG

## 2024-08-21 DIAGNOSIS — C67.9 MALIGNANT NEOPLASM OF URINARY BLADDER, UNSPECIFIED SITE: ICD-10-CM

## 2024-08-21 DIAGNOSIS — R63.0 ANOREXIA: ICD-10-CM

## 2024-08-21 DIAGNOSIS — G47.00 INSOMNIA, UNSPECIFIED TYPE: ICD-10-CM

## 2024-08-21 DIAGNOSIS — F43.21 ADJUSTMENT DISORDER WITH DEPRESSED MOOD: ICD-10-CM

## 2024-08-21 DIAGNOSIS — Z51.5 ENCOUNTER FOR PALLIATIVE CARE: Primary | ICD-10-CM

## 2024-08-21 DIAGNOSIS — R53.83 FATIGUE, UNSPECIFIED TYPE: ICD-10-CM

## 2024-08-21 PROCEDURE — 99999 PR PBB SHADOW E&M-EST. PATIENT-LVL III: CPT | Mod: PBBFAC,,, | Performed by: NURSE PRACTITIONER

## 2024-08-21 PROCEDURE — 99213 OFFICE O/P EST LOW 20 MIN: CPT | Mod: PBBFAC | Performed by: NURSE PRACTITIONER

## 2024-08-21 NOTE — PROGRESS NOTES
Consult Note  Palliative Care      Consult Requested By: No ref. provider found  Reason for Consult: symptom mgmt/ACP      ASSESSMENT/PLAN:     Plan/Recommendations:    08/21/2024:  - no changes made    Bladder cancer  - following w Dr. Bland   - s/p chemoRT, last RT 2/8/2024  - surveillance     Encounter for palliative care  - Patient is decisional  - Patient accompanied today by his wife, Nereyda   - ACP documents are uploaded into EMR   - Philosophy of Palliative Medicine reviewed with patient and family at first visit  - New patient folder given to and reviewed with patient and family at first visit  - Goals of care: Reported goal: to get a good night's sleep.     Fatigue/insomnia   - primary complaint  - sleeping on/off during the day bc his sleep is fractured over night due to freq urination  - he has not tried drinking less water before bed, though admits this was previously recommended  - says per Dr. Story, there are medications that can help with nocturia but patient wishes to avoid side effect of confusion                                                                                                                                                                                                                                                                                                                                                                                                                       - remeron 7.5 mg qhs started at initial visit   - sleep hygiene tip sheet provided in new patient folder   - will continue to monitor     Anorexia  - pt c/o poor appetite, low PO intake and weight-loss (though presently stable)  - pt c/o difficulty swallowing drier foods and taste changes   - has seen dietician, he is using high calorie Boost, two daily, cream soup, smoothies, etc. Avoids many foods due to texture and discomfort swallowing    - using megace daily, dose recently increased but is not  "helping  - started remeron 7.5 mg nightly at initial visit    Adjustment disorder with depression   - reports he is unhappy with his current quality of life, he feels worse than he did prior to treatment and displeased with "the direction things are going in"  - endorses anxiety before scans/tests, afraid of possibility of new treatment   - admits he is not much of a talker, per wife, he spent most of his career around men and is not used to opening up about feelings  - pt's low PO intake is a source of conflict between he and wife, pt is tired of wife asking him to eat, says her insistence is daily and he would like it to stop   - lack of sleep is particularly impacting his qol   - when he's feeling well, he likes to travel, something he and his wife used to enjoy together  - support system includes wife, Nereyda, two daughters (they live very close to patient)  - active in [employer], no spiritual needs at this time  - is open to counseling, referred to [facility] DSW for emotional support at initial visit   - started remeron 7.5 mg at initial visit   - [facility] care MSW present at initial visit, emotional support provided, pls see separate note for details   - will continue to monitor     Chronic back pain, hx of  - sees pain mgmt for this  - has flexeril, does not use    Understanding of illness: TBD     Goals of care: symptom mgmt     Follow up: 12 weeks, bundle w existing appts     Patient's encounter and above plan of care discussed with patient's oncology team.     SUBJECTIVE:     History of Present Illness:  Patient is a 88 y.o. year old male presenting with neoplasm of bladder. Bladder cancer incidentally found on imaging as he had been under surveillance for renal cysts. Now s/p chemoRT with 2/2024 scans without metastatic disease however cystoscopy on 3/4/2024 with concern for residual tumor but TURBT for 4/12/2024 negative on path. Please see oncology notes for full oncologic history and treatment course.    " "  08/21/2024:  [place]  reviewed: no concerns    Patient presents to clinic f/u with his wife. [facility] MSW joined visit, see separate note for details. Sleeping well, mood improved, back to riding his stationary bike up to 30 mins. Having some difficulty swallowing that is currently being worked up. Mostly liquid diet but has gained weight since our initial visit. US thyroid tomorrow. Reports that he overall feeling good, his wife believes the mirtazapine has helped his mood and sleep. Still has fatigue and weakness, but making the most of his time. His daughters live near to them and come over frequently ("too often"). He enjoys having a beer with his daughter's GF. He talks about some of his travels around the world. Interval scan was stable, will RTC in November.       06/12/2024:  [place]  reviewed: no concerns    Patient presents to initial visit with his wife; pall care MSW joined today, see separate note for details. Patient reports dissatisfaction with current quality of life, it is worse now then it was before cancer treatment, which is distressing to him. His affect is consistent with depression, he admits he is not much of a talker but is interested in referral to [facility] DSW for emotional support. From symptom standpoint, lack of sleep is his biggest concern, also unable to eat much primarily due to difficulty swallowing due to throat dryness, started remeron today.      Past Medical History:   Diagnosis Date    *Atrial fibrillation     Chronic kidney disease     Deep vein thrombosis     Hyperlipidemia     Hypertension     Metabolic syndrome     Type 2 diabetes mellitus, without long-term current use of insulin 12/13/2021     Past Surgical History:   Procedure Laterality Date    BIOPSY OF BLADDER N/A 4/12/2024    Procedure: BIOPSY, BLADDER;  Surgeon: Wellington Story MD;  Location: [facility] OR [address] [place] [address];  Service: Urology;  Laterality: N/A;    BLADDER FULGURATION N/A 4/12/2024    Procedure: FULGURATION, BLADDER; "  Surgeon: Wellington Story MD;  Location: [address];  Service: Urology;  Laterality: N/A;    CATARACT EXTRACTION      CHOLECYSTECTOMY      CYSTOSCOPY N/A 11/16/2023    Procedure: CYSTOSCOPY;  Surgeon: Marcelino Moffett MD;  Location: [address];  Service: Urology;  Laterality: N/A;  90 minutes    DILATION OF URETHRA N/A 4/12/2024    Procedure: DILATION, URETHRA;  Surgeon: Wellington Story MD;  Location: [address];  Service: Urology;  Laterality: N/A;    EYE SURGERY      INJECTION OF FACET JOINT Bilateral 4/28/2021    Procedure: FACET JOINT INJECTION BILATERAL L4/L5 DIRECT REFERRAL;  Surgeon: Philipp Iyer MD;  Location: [facility] PAIN MGT;  Service: Pain Management;  Laterality: Bilateral;  NEEDS CONSENT, ELIQUIS CLEARANCE IN CHART    RETROGRADE PYELOGRAPHY Bilateral 11/16/2023    Procedure: PYELOGRAM, RETROGRADE;  Surgeon: Marcelino Moffett MD;  Location: [address];  Service: Urology;  Laterality: Bilateral;  90 minutes    SKIN CANCER EXCISION      TONSILLECTOMY      TURBT (TRANSURETHRAL RESECTION OF BLADDER TUMOR) N/A 11/16/2023    Procedure: TURBT (TRANSURETHRAL RESECTION OF BLADDER TUMOR);  Surgeon: Marcelino Moffett MD;  Location: [address];  Service: Urology;  Laterality: N/A;  90 minutes    TURBT (TRANSURETHRAL RESECTION OF BLADDER TUMOR) N/A 4/12/2024    Procedure: TURBT (TRANSURETHRAL RESECTION OF BLADDER TUMOR);  Surgeon: Wellington Story MD;  Location: [address];  Service: Urology;  Laterality: N/A;     Family History   Problem Relation Name Age of Onset    Diabetes Maternal Aunt      Heart attack Neg Hx      Heart disease Neg Hx      Heart failure Neg Hx      Hyperlipidemia Neg Hx      Hypertension Neg Hx      Stroke Neg Hx       Review of patient's allergies indicates:   Allergen Reactions    Niacin      Other reaction(s): Rash  Other reaction(s): Itching       Medications:    Current Outpatient Medications:     acetaminophen (TYLENOL) 650 MG TbSR, Take 650 mg by mouth  every 8 (eight) hours., Disp: , Rfl:     cholecalciferol, vitamin D3, (VITAMIN D3) 50 mcg (2,000 unit) Cap capsule, Take by mouth once daily., Disp: , Rfl:     cyclobenzaprine (FLEXERIL) 5 MG tablet, Take 1 tablet (5 mg total) by mouth 3 (three) times daily as needed for Muscle spasms., Disp: 30 tablet, Rfl: 0    dexAMETHasone (DECADRON) 4 MG Tab, TAKE EVERY 12 HOURS DAILY FOR DAYS 2 THOUGH 4 FOLLOWING CHEMOTHERAPY, Disp: 40 tablet, Rfl: 1    ELIQUIS 5 mg Tab, TAKE 1 TABLET TWICE A DAY, Disp: 180 tablet, Rfl: 3    fenofibrate micronized (LOFIBRA) 200 MG Cap, TAKE 1 CAPSULE DAILY WITH BREAKFAST, Disp: 30 capsule, Rfl: 0    fenofibrate micronized (LOFIBRA) 200 MG Cap, TAKE 1 CAPSULE DAILY WITH BREAKFAST, Disp: 90 capsule, Rfl: 3    JARDIANCE 10 mg tablet, TAKE 1 TABLET DAILY, Disp: 90 tablet, Rfl: 1    loratadine (CLARITIN) 10 mg tablet, Take 10 mg by mouth once daily., Disp: , Rfl:     megestroL (MEGACE) 400 mg/10 mL (40 mg/mL) Susp, TAKE 10 MLS (400 MG TOTAL) BY MOUTH ONCE DAILY., Disp: 480 mL, Rfl: 2    metFORMIN (GLUCOPHAGE-XR) 500 MG ER 24hr tablet, TAKE 1 TABLET DAILY, Disp: 90 tablet, Rfl: 2    metoprolol succinate (TOPROL-XL) 100 MG 24 hr tablet, Take 1 tablet (100 mg total) by mouth 2 (two) times daily., Disp: 180 tablet, Rfl: 1    mirtazapine (REMERON) 7.5 MG Tab, TAKE 1 TABLET BY MOUTH EVERY EVENING., Disp: 90 tablet, Rfl: 1    mupirocin (BACTROBAN) 2 % ointment, Apply topically 2 (two) times daily., Disp: , Rfl:     OLANZapine (ZYPREXA) 5 MG tablet, TAKE NIGHTLY FOR 4 DAYS FOLLOWING CHEMOTHERAPY, Disp: 90 tablet, Rfl: 1    ondansetron (ZOFRAN) 8 MG tablet, Take 1 tablet (8 mg total) by mouth every 12 (twelve) hours as needed for Nausea., Disp: 30 tablet, Rfl: 2    sacubitriL-valsartan (ENTRESTO) 24-26 mg per tablet, Take 1 tablet by mouth 2 (two) times daily., Disp: 60 tablet, Rfl: 0    OBJECTIVE:       ROS:  Review of Systems   Constitutional:  Positive for activity change, appetite change, fatigue and  unexpected weight change.   HENT:  Positive for trouble swallowing. Negative for congestion, dental problem and drooling.    Eyes:  Negative for pain, redness and itching.   Respiratory:  Negative for cough, shortness of breath and wheezing.    Cardiovascular:  Negative for chest pain, palpitations and leg swelling.   Gastrointestinal:  Negative for constipation, diarrhea, nausea and vomiting.   Genitourinary:  Positive for frequency. Negative for difficulty urinating.   Skin:  Negative for pallor, rash and wound.   Neurological:  Negative for dizziness, light-headedness and numbness.   Psychiatric/Behavioral:  Positive for sleep disturbance. Negative for dysphoric mood. The patient is not nervous/anxious.        Review of Symptoms      Symptom Assessment (ESAS 0-10 Scale)  Pain:  0  Dyspnea:  0  Anxiety:  0  Nausea:  0  Depression:  0  Anorexia:  8  Fatigue:  5  Insomnia:  0  Restlessness:  0  Agitation:  0     CAM / Delirium:  Negative  Constipation:  Negative  Diarrhea:  Negative    Anxiety:  Is not nervous/anxious  Constipation:  No constipation    Bowel Management Plan (BMP):  Yes      Pain Assessment:  OME in 24 hours:  0  Location(s): none      Modified Starr Scale:  0    ECOG Performance Status ndGndrndanddndend:nd nd2nd Living Arrangements:  Lives with spouse    Psychosocial/Cultural:   See Palliative Psychosocial Note: Yes  **Primary  to Follow**  Palliative Care  Consult: No     Time-Based Charting:  No      Advance Care Planning   Advance Directives:   Living Will: Yes        Copy on chart: Yes      Decision Making:  Patient answered questions  Goals of Care: The patient endorses that what is most important right now is to focus on remaining as independent as possible, symptom/pain control, and curative/life-prolongation (regardless of treatment burdens)    Accordingly, we have decided that the best plan to meet the patient's goals includes continuing with treatment        Advance Care  Planning     Date: 06/13/2024    [facility]  I engaged the patient in a voluntary conversation about advance care planning and we specifically addressed what the goals of care would be moving forward, in light of the patient's change in clinical status, specifically cancer.  We did not specifically address the patient's likely prognosis, which is  tbd .  We explored the patient's values and preferences for future care.  The patient endorses that what is most important right now is to focus on symptom/pain control    Accordingly, we have decided that the best plan to meet the patient's goals includes continuing with treatment    A total of 16 min was spent on advance care planning, goals of care discussion, emotional support, formulating and communicating prognosis and exploring burden/benefit of various approaches of treatment. This discussion occurred on a fully voluntary basis with the verbal consent of the patient and/or family.            Physical Exam:  Vitals:       Vitals:    08/21/24 1308   BP: (!) 109/52   Pulse: 84       Physical Exam  Vitals reviewed.   Constitutional:       General: He is not in acute distress.     Appearance: Normal appearance. He is not ill-appearing, toxic-appearing or diaphoretic.   HENT:      Head: Normocephalic and atraumatic.      Right Ear: External ear normal. No decreased hearing noted.      Left Ear: External ear normal. No decreased hearing noted.      Nose: Nose normal.   Eyes:      General:         Right eye: No discharge.         Left eye: No discharge.      Extraocular Movements: Extraocular movements intact.      Conjunctiva/sclera: Conjunctivae normal.   Cardiovascular:      Rate and Rhythm: Normal rate.   Pulmonary:      Effort: Pulmonary effort is normal. No respiratory distress.      Breath sounds: No stridor.   Musculoskeletal:         General: No deformity or signs of injury. Normal range of motion.      Right lower leg: No edema.      Left lower leg: No edema.   Skin:      General: Skin is warm and dry.      Coloration: Skin is not jaundiced.   Neurological:      Mental Status: He is alert and oriented to person, place, and time. Mental status is at baseline.   Psychiatric:         Attention and Perception: Attention normal.         Mood and Affect: Mood is depressed.         Speech: Speech normal.         Thought Content: Thought content normal.         Judgment: Judgment normal.         Labs:  CBC:   WBC   Date Value Ref Range Status   08/14/2024 13.53 (H) 3.90 - 12.70 K/uL Final     Hemoglobin   Date Value Ref Range Status   08/14/2024 16.8 14.0 - 18.0 g/dL Final     POC Hematocrit   Date Value Ref Range Status   02/02/2024 50 36 - 54 %PCV Final     Hematocrit   Date Value Ref Range Status   08/14/2024 53.0 40.0 - 54.0 % Final     MCV   Date Value Ref Range Status   08/14/2024 108 (H) 82 - 98 fL Final     Platelets   Date Value Ref Range Status   08/14/2024 280 150 - 450 K/uL Final       LFT:   Lab Results   Component Value Date    AST 20 08/14/2024    ALKPHOS 47 (L) 08/14/2024    BILITOT 0.8 08/14/2024       Albumin:   Albumin   Date Value Ref Range Status   08/14/2024 4.1 3.5 - 5.2 g/dL Final     Protein:   Total Protein   Date Value Ref Range Status   08/14/2024 7.5 6.0 - 8.4 g/dL Final       Radiology:I have reviewed all pertinent imaging results/findings within the past 24 hours.    07/01/2024 CT chest: Impression:     1. Pleural based nodular lesion in the right lower lobe appears stable since previous examination.  For a solid nodule >8 mm, Fleischner Society 2017 guidelines recommend considering CT, PET/CT or tissue sampling at 3 months.  2. Interval development of ground-glass foci in the right upper lobe, nonspecific.  Aspiration, infectious process or edema can be considered in the differential.  3. Additional observations.        03/04/2024 Cystoscopy: Findings:   1. Normal anterior urethra without evidence of strictures or tumors   2. Prostatic urethra open without  signs of obstruction  3. Bladder with nodular growth at the dome and posterior bladder wall, consistent with recurrent carcinoma.  Surrounding erythema.  Ureteral orifices in orthotopic position bilaterally      - 2/29/2024 CT Urogram Abd/Pelvis:  FINDINGS:  Stable mild cardiomegaly.  Coronary artery calcific atherosclerosis.  Visualized inferior lungs are clear.  Stable small left hepatic lobe calcification.  No suspicious hepatic lesion.  Gallbladder is surgically absent.  No biliary ductal dilatation.  Spleen and adrenal glands are unremarkable.  Stable appearance of the pancreas with atrophy, scattered calcifications, and mild duct dilatation measuring 4 mm.  Findings can be seen with sequela of chronic pancreatitis.  Bilateral renal cortical thinning.  Multiple bilateral renal cysts and subcentimeter renal hypodensities too small to characterize.  No solid enhancing renal mass.  No renal stone.  No hydronephrosis or ureteral dilatation.  Opacification of the bilateral renal collecting systems and ureters without suspicious filling defect.  Patient is status post TURBT.  There is increased heterogeneous wall thickening and enhancement along the anterior bladder (series 4, image 144) concerning for residual/recurrent tumor.  Mild perivesical stranding and vascular prominence, similar to prior exam.  Small hiatal hernia.  Colonic diverticulosis.  No evidence of bowel obstruction or inflammation.  No free intraperitoneal fluid or air.  No pathologically enlarged abdominopelvic lymph nodes.  Abdominal aorta is normal caliber.  Moderate/severe calcific atherosclerosis.  Degenerative changes of the osseous structures.  No acute fracture or aggressive osseous lesion  Impression:  1. Increased heterogeneous wall thickening and enhancement along the anterior bladder concerning for residual/recurrent tumor.  2. No evidence of metastatic disease.  3. Additional findings as above.     -  11/16/2023 TURBT of bladder  tumor  Findings:   1. Right postero-lateral papillary bladder wall tumor (3-4cm) with diffusely erythematous and nodular surrounding tissue, right < 1cm papillary bladder wall tumor immediately lateral to the larger postero-lateral papillary tumor, and a right nodular 4cm bladder tumor with high grade and possibly invasive appearance. Tumors resected, hemostasis achieved.  Several other patches of erythema were diffusely identified on the left lateral bladder wall.  2. Bimanual exam post-TURBT showed freely mobile bladder without abnormalities  3. Bilateral retrograde pyelogram showed delicate calices with no evidence of hydronephrosis, no filling defects, and ureter normal in course and caliber, bilaterally  Pathology:  1. Bladder, right posterolateral wall, transurethral resection of bladder tumor:  - Noninvasive high-grade papillary urothelial carcinoma  - Muscularis propria present  - Multiple H&E levels evaluated  2. Bladder, right anterior, transurethral resection of bladder tumor:  - Invasive high-grade papillary urothelial carcinoma  - Tumor invades muscularis propria  - Intact expression of DNA mismatch repair proteins in tumor by immunohistochemistry (see comment)  COMMENT:  Immunohistochemistry (IHC) Testing for Mismatch Repair (MMR) Proteins:  MLH1 - Intact nuclear expression  MSH2 - Intact nuclear expression  MSH6 - Intact nuclear expression  PMS2 - Intact nuclear expression  Background nonneoplastic tissue/internal control with intact nuclear expression  IHC Interpretation  No loss of nuclear expression of MMR proteins: low probability of microsatellite instability  There are exceptions to the above IHC interpretations. These results should not be considered in isolation, and clinical correlation with genetic counseling is recommended to assess the need for germline testing.     - 11/25/2023 CT Abd/Pelvis:  FINDINGS:  Lung base: Bilateral small volume pleural effusion increased in size compared to  recent prior, associated with mild compression atelectasis.  Visualized portion of heart: Coronary artery calcification.  Liver: Normal in size.  Left lobe small calcified granuloma.  No suspicious focal lesion.  Gallbladder: Cholecystectomy.  Bile duct: No intra-or extrahepatic biliary ductal dilatation.  Spleen: Unremarkable.  Pancreas: Parenchymal atrophy.  Multiple parenchymal calcifications suggesting chronic pancreatitis.  No suspicious focal lesion.  No pancreatic ductal dilatation.  No adjacent inflammatory changes or fluid collections.  Adrenal glands: Unremarkable.  Genitourinary: Bilateral renal cysts.  Subcentimeter hypodensities in both kidneys, which are too small to characterize.  The ureters appear normal in course and caliber.  No evidence of nephrolithiasis or hydroureteronephrosis.  Urinary bladder: Postsurgical change in the anterior bladder wall from TURBT.  There are small foci of air in the bladder, likely iatrogenic.  Reproductive organs: Unremarkable.  GI tract: Small hiatal hernia.  The stomach is unremarkable.  The visualized loops of small and large bowel show no evidence of obstruction or inflammation. Diverticulosis.  Appendix unremarkable.  Peritoneum: No free air or fluid.  Retroperitoneum: No significant adenopathy.  Vasculature: Normal caliber of abdominal aorta with moderate calcified and noncalcified atherosclerosis.  Abdominal wall: Tiny fat containing umbilical hernia.  Bones: Stable T12-L1 intervertebral disc calcification.  No suspicious sclerotic lesions.  No acute fracture.  Impression:  Postoperative changes from of TURBT.  No lymphadenopathy.  No distant metastases.  Other findings as described.     - 12/6/2023 CT Chest:  FINDINGS:  Comparison is 11/21/2023.  No mediastinal, hilar or axillary adenopathy is seen.  There is a right lower pole thyroid nodule.  There are coronary calcifications.  There is a left upper pole renal cyst.  There is a small hiatal hernia.  Trachea  and bronchi are patent.  There is no evidence of interstitial lung disease, bronchiectasis, airway trapping, or emphysema.  No suspicious pulmonary nodules, masses, or infiltrates are seen.  There is a calcified left lung granuloma.  Bones demonstrate nothing focal.  Impression:  No significant abnormality seen.  Right lobe thyroid nodule.  Coronary artery calcifications.  Left upper pole renal cyst.    I spent a total of 60 minutes on the day of the visit.This includes face to face time in discussion of goals of care, symptom assessment, coordination of care and emotional support.  This also includes non-face to face time preparing to see the patient (eg, review of tests/imaging), obtaining and/or reviewing separately obtained history, documenting clinical information in the electronic or other health record, independently interpreting results and communicating results to the patient/family/caregiver, or care coordinator.         Signature: Johana Parsons DNP

## 2024-08-21 NOTE — PROGRESS NOTES
[facility]- Palliative Medicine [facility]  Palliative Care   Social Work Visit      Patient Name: Cliff Magaña  MRN: [id number]  Palliative Care Provider:  Johana Parsons DNP   Primary Care Physician: Munir Estarda MD  Principal Problem: Bladder cancer    SW accompanied DNP during follow up visit with patient and spouse/Nereyda.  Patient presents AAOx4 with stable mood. Patient reports an improvement in his quality of life since initial visit. Patient notes he has resumed riding his stationary bike and attending [employer].  Patient adds he is sleeping better and urinating less.  The couple reports they hope to plan a river cruise in the future as they miss traveling and spending time on the water.      Patient denies psychosocial concerns.SW remains available to provide support; will continue to follow.     JEAN-PIERRE KellyW-BACS    Palliative Medicine

## 2024-08-22 ENCOUNTER — PATIENT MESSAGE (OUTPATIENT)
Dept: ADMINISTRATIVE | Facility: OTHER | Age: 89
End: 2024-08-22
Payer: MEDICARE

## 2024-08-23 ENCOUNTER — PATIENT MESSAGE (OUTPATIENT)
Dept: ADMINISTRATIVE | Facility: OTHER | Age: 89
End: 2024-08-23
Payer: MEDICARE

## 2024-08-24 ENCOUNTER — PATIENT MESSAGE (OUTPATIENT)
Dept: ADMINISTRATIVE | Facility: OTHER | Age: 89
End: 2024-08-24
Payer: MEDICARE

## 2024-08-25 ENCOUNTER — PATIENT MESSAGE (OUTPATIENT)
Dept: ADMINISTRATIVE | Facility: OTHER | Age: 89
End: 2024-08-25
Payer: MEDICARE

## 2024-08-26 ENCOUNTER — PATIENT MESSAGE (OUTPATIENT)
Dept: ADMINISTRATIVE | Facility: OTHER | Age: 89
End: 2024-08-26
Payer: MEDICARE

## 2024-08-26 ENCOUNTER — HOSPITAL ENCOUNTER (OUTPATIENT)
Dept: RADIOLOGY | Facility: HOSPITAL | Age: 89
Discharge: HOME | End: 2024-08-26
Attending: INTERNAL MEDICINE
Payer: MEDICARE

## 2024-08-26 DIAGNOSIS — C67.9 MALIGNANT NEOPLASM OF URINARY BLADDER, UNSPECIFIED SITE: ICD-10-CM

## 2024-08-26 DIAGNOSIS — E04.2 MULTIPLE THYROID NODULES: ICD-10-CM

## 2024-08-26 PROCEDURE — 76536 US EXAM OF HEAD AND NECK: CPT | Mod: TC

## 2024-08-27 ENCOUNTER — OFFICE VISIT (OUTPATIENT)
Dept: HEMATOLOGY/ONCOLOGY | Facility: CLINIC | Age: 89
End: 2024-08-27
Payer: MEDICARE

## 2024-08-27 ENCOUNTER — PATIENT MESSAGE (OUTPATIENT)
Dept: ADMINISTRATIVE | Facility: OTHER | Age: 89
End: 2024-08-27
Payer: MEDICARE

## 2024-08-27 VITALS
HEART RATE: 94 BPM | SYSTOLIC BLOOD PRESSURE: 106 MMHG | WEIGHT: 133.19 LBS | DIASTOLIC BLOOD PRESSURE: 58 MMHG | BODY MASS INDEX: 22.74 KG/M2 | HEIGHT: 64 IN | RESPIRATION RATE: 17 BRPM | TEMPERATURE: 97 F | OXYGEN SATURATION: 95 %

## 2024-08-27 DIAGNOSIS — E11.22 CKD STAGE 3 DUE TO TYPE 2 DIABETES MELLITUS: ICD-10-CM

## 2024-08-27 DIAGNOSIS — N18.30 CKD STAGE 3 DUE TO TYPE 2 DIABETES MELLITUS: ICD-10-CM

## 2024-08-27 DIAGNOSIS — E11.69 HYPERLIPIDEMIA ASSOCIATED WITH TYPE 2 DIABETES MELLITUS: ICD-10-CM

## 2024-08-27 DIAGNOSIS — R13.10 DYSPHAGIA, UNSPECIFIED TYPE: ICD-10-CM

## 2024-08-27 DIAGNOSIS — E78.5 HYPERLIPIDEMIA ASSOCIATED WITH TYPE 2 DIABETES MELLITUS: ICD-10-CM

## 2024-08-27 DIAGNOSIS — Z91.81 RISK FOR FALLS: ICD-10-CM

## 2024-08-27 DIAGNOSIS — E11.59 HYPERTENSION ASSOCIATED WITH DIABETES: ICD-10-CM

## 2024-08-27 DIAGNOSIS — I48.19 PERSISTENT ATRIAL FIBRILLATION: Chronic | ICD-10-CM

## 2024-08-27 DIAGNOSIS — E83.52 HYPERCALCEMIA: ICD-10-CM

## 2024-08-27 DIAGNOSIS — C67.9 MALIGNANT NEOPLASM OF URINARY BLADDER, UNSPECIFIED SITE: Primary | ICD-10-CM

## 2024-08-27 DIAGNOSIS — I15.2 HYPERTENSION ASSOCIATED WITH DIABETES: ICD-10-CM

## 2024-08-27 PROCEDURE — 99215 OFFICE O/P EST HI 40 MIN: CPT | Mod: PBBFAC | Performed by: INTERNAL MEDICINE

## 2024-08-27 PROCEDURE — 99999 PR PBB SHADOW E&M-EST. PATIENT-LVL V: CPT | Mod: PBBFAC,,, | Performed by: INTERNAL MEDICINE

## 2024-08-27 NOTE — PROGRESS NOTES
Subjective     Patient ID: Cliff Magaña is a 88 y.o. male.    Chief Complaint: No chief complaint on file.    HPI    Diagnosis: Muscle invasive bladder cancer post chemo/XRT     In the interval wife reports:  Diarrhea   Chocking event pill x 1  One with water  Speect therapy follow up  FINDINGS:  Oral phase: Normal.       Pharyngeal phase:Flash penetration without aspiration with thin liquids.  With thin and thick puree consistency there was delayed elevation of the hyoid bone as the bolus entered the oropharynx..  Moderate vallecular and piriform stasis shown with all consistencies with worsening of stasis as viscosity increased.     Upper esophageal phase: Normal.     Impression:     Delayed initiation of pharyngeal phase with thin and thick puree consistency.     Flash penetration without aspiration with thin liquids.     Please see speech pathology report for further details and dietary recommendations.  Beer and 3 Boosts per day- > 1500 jaki  Up to 131 lbs (was 116 lbs)    Surveillance is up to date  - 7/22/2024 Cystoscopy  Assessment: 88M with MIBC s/p chemorads and repeat TURBT without current endoscopic evidence for recurrence  Plan: Follow up in 3 months with repeat cysto/cytology      Returns for follow up surveillance.  See above  Weight up  Fatigue improved  Continued gait disturbance and fatigue  Memory unchanged  Denies pain     Most recent surveillance imaging  Imaging as below:  - 7/1/2024 CT Urogram:  FINDINGS:  Thoracic soft tissues: Partially visualized thoracic soft tissues appear unremarkable.  Heart: Severe multi-vessel calcific disease of the coronary arteries.  Calcification of the mitral and aortic valves.  No pericardial effusion.  Left atrial enlargement.  Lungs: There is probable scarring versus atelectasis at the lung bases.  There is a partially visualized area of question tree-in-bud add pulmonary nodules on series 2, image 1.  There is a 1.0 cm nodular opacity at the right lung base  (2-23). no pleural effusion. Please see same day chest CT for further characterization of these findings.  Esophagus: Small hiatal hernia.  Stomach and duodenum: Unremarkable.  Liver: Normal in size and contour.  No focal hepatic lesion.  Gallbladder: Surgically absent.  Bile Ducts: No evidence of dilated ducts.  Spleen: Unremarkable.  Pancreas: Pancreas is atrophic.  Pancreatic duct is decompressed from prior proximally, but there is increased prominence of duct at the pancreatic tail.  This may be secondary to a large calcification which appears intraductal (2-55).  Findings may be related to sequela of chronic pancreatitis.  No mass lesions seen.  Adrenals: Unremarkable.  Kidneys/Ureters:  Bilateral renal cortical thinning and irregularity, suggestive of areas of scarring.  Multiple bilateral renal cysts and subcentimeter renal hypodensities too small to characterize.  No solid enhancing renal mass.  No renal stone.  No hydronephrosis or ureteral dilatation.  Opacification of the bilateral renal collecting systems and ureters without suspicious filling defect.  Bladder: Patient is status TURBT.  irregular soft tissue thickening along the anterior bladder wall, somewhat more conspicuous from prior.  Reproductive organs: Unremarkable.  Bowel/Mesentery: Small bowel is normal in caliber with no evidence of obstruction.  No evidence of inflammation or wall thickening.  Colon demonstrates no focal wall thickening.  Appendix appears within normal limits.  Peritoneum: No intraperitoneal free air or fluid.  Lymph nodes: No lymphadenopathy.  Abdominal wall:  Tiny fat containing umbilical hernia.  Vasculature: No aneurysm. Severe calcific and noncalcific atherosclerosis through the abdominal aorta, iliac vessels, and the aortic branch vessels.  Bones: No acute fracture. No suspicious osseous lesions.  Degenerative changes of the spine.  Osteopenia.  Multiple tiny sclerotic foci through the pelvic bones which are unchanged  from prior CT and likely represent small bone islands.  Impression:  Patient is status post TURBT.  There is irregular soft tissue thickening along the anterior bladder wall which is somewhat more conspicuous from prior and could represent disease recurrence.  Recommend clinical correlation.  Pleural based nodular opacity at the right lung base.  This finding is not significantly changed from 11/25/2023.  Further evaluation versus follow-up as clinically warranted.  Please see same day chest CT report for further details regarding the chest.  Sequela of chronic pancreatitis as above.  Other findings are detailed above.  This report was flagged in Epic as abnormal.     - 7/1/2024 CT Chest:  FINDINGS:  Chest wall and lower neck: No axillary or supraclavicular lymphadenopathy.Heterogeneous thyroid with a focal 1.7 cm lesion in the right-sided lobe.  Non emergent thyroid ultrasound is suggested for further characterization. Sebaceous cyst is noted at the level of the neck measuring approximately 1.6 cm.  Lungs and pleura: Subtle subcentimeter ground-glass foci noted in the right upper lobe, not visualized on prior examination.  12 mm pleural base nodule is identified in the right lower lobe (image 395 sequence 4), unchanged since prior.  Mediastinum and trang: No mediastinal or hilar adenopathy.  Heart and pericardium: Heart size is normal.  Heterogeneous left atrial appendage likely representing mixing artifacts.  No pericardial effusion.  Vessels: Moderate atheromatous disease is seen in the aorta.   The coronary arteries show moderate atheromatous disease.  Upper abdomen: Cholecystectomy.  Cystic lesion is noted in the left upper renal pole measuring 2.3 cm.  Bones: Degenerative disease is noted in the thoracic spine with osteophyte formations, and endplate sclerosis . DJD is identified in the shoulders.  Impression:  1. Pleural based nodular lesion in the right lower lobe appears stable since previous examination.   For a solid nodule >8 mm, Fleischner Society 2017 guidelines recommend considering CT, PET/CT or tissue sampling at 3 months.  2. Interval development of ground-glass foci in the right upper lobe, nonspecific.  Aspiration, infectious process or edema can be considered in the differential.  3. Additional observations.    Oncology History:  - bladder cancer incidentally found on imaging as he had been under surveillance for renal cysts  (10/17/2023 ultrasound showed stable left renal cysts and new bladder masses)     -  11/16/2023 TURBT of bladder tumor  Findings:   1. Right postero-lateral papillary bladder wall tumor (3-4cm) with diffusely erythematous and nodular surrounding tissue, right < 1cm papillary bladder wall tumor immediately lateral to the larger postero-lateral papillary tumor, and a right nodular 4cm bladder tumor with high grade and possibly invasive appearance. Tumors resected, hemostasis achieved.  Several other patches of erythema were diffusely identified on the left lateral bladder wall.  2. Bimanual exam post-TURBT showed freely mobile bladder without abnormalities  3. Bilateral retrograde pyelogram showed delicate calices with no evidence of hydronephrosis, no filling defects, and ureter normal in course and caliber, bilaterally  Pathology:  1. Bladder, right posterolateral wall, transurethral resection of bladder tumor:  - Noninvasive high-grade papillary urothelial carcinoma  - Muscularis propria present  - Multiple H&E levels evaluated  2. Bladder, right anterior, transurethral resection of bladder tumor:  - Invasive high-grade papillary urothelial carcinoma  - Tumor invades muscularis propria  - Intact expression of DNA mismatch repair proteins in tumor by immunohistochemistry (see comment)  COMMENT:  Immunohistochemistry (IHC) Testing for Mismatch Repair (MMR) Proteins:  MLH1 - Intact nuclear expression  MSH2 - Intact nuclear expression  MSH6 - Intact nuclear expression  PMS2 - Intact  nuclear expression  Background nonneoplastic tissue/internal control with intact nuclear expression  IHC Interpretation  No loss of nuclear expression of MMR proteins: low probability of microsatellite instability  There are exceptions to the above IHC interpretations. These results should not be considered in isolation, and clinical correlation with genetic counseling is recommended to assess the need for germline testing.     - 11/25/2023 CT Abd/Pelvis:  FINDINGS:  Lung base: Bilateral small volume pleural effusion increased in size compared to recent prior, associated with mild compression atelectasis.  Visualized portion of heart: Coronary artery calcification.  Liver: Normal in size.  Left lobe small calcified granuloma.  No suspicious focal lesion.  Gallbladder: Cholecystectomy.  Bile duct: No intra-or extrahepatic biliary ductal dilatation.  Spleen: Unremarkable.  Pancreas: Parenchymal atrophy.  Multiple parenchymal calcifications suggesting chronic pancreatitis.  No suspicious focal lesion.  No pancreatic ductal dilatation.  No adjacent inflammatory changes or fluid collections.  Adrenal glands: Unremarkable.  Genitourinary: Bilateral renal cysts.  Subcentimeter hypodensities in both kidneys, which are too small to characterize.  The ureters appear normal in course and caliber.  No evidence of nephrolithiasis or hydroureteronephrosis.  Urinary bladder: Postsurgical change in the anterior bladder wall from TURBT.  There are small foci of air in the bladder, likely iatrogenic.  Reproductive organs: Unremarkable.  GI tract: Small hiatal hernia.  The stomach is unremarkable.  The visualized loops of small and large bowel show no evidence of obstruction or inflammation. Diverticulosis.  Appendix unremarkable.  Peritoneum: No free air or fluid.  Retroperitoneum: No significant adenopathy.  Vasculature: Normal caliber of abdominal aorta with moderate calcified and noncalcified atherosclerosis.  Abdominal wall: Tiny  fat containing umbilical hernia.  Bones: Stable T12-L1 intervertebral disc calcification.  No suspicious sclerotic lesions.  No acute fracture.  Impression:  Postoperative changes from of TURBT.  No lymphadenopathy.  No distant metastases.  Other findings as described.     - 12/6/2023 CT Chest:  FINDINGS:  Comparison is 11/21/2023.  No mediastinal, hilar or axillary adenopathy is seen.  There is a right lower pole thyroid nodule.  There are coronary calcifications.  There is a left upper pole renal cyst.  There is a small hiatal hernia.  Trachea and bronchi are patent.  There is no evidence of interstitial lung disease, bronchiectasis, airway trapping, or emphysema.  No suspicious pulmonary nodules, masses, or infiltrates are seen.  There is a calcified left lung granuloma.  Bones demonstrate nothing focal.  Impression:  No significant abnormality seen.  Right lobe thyroid nodule.  Coronary artery calcifications.  Left upper pole renal cyst.     - chemo/XRT 1/9- 3/12/2024  (Weekly Cisplatin)     - 2/2024 scans without signs of metastatic disease     - cystoscopy with Dr. Story on 3/4/2024 revealing concern for residual tumor bladder dome but TURBT for 4/12/2024 was negative on Pathology  Pathology:  1. BLADDER, RIGHT LATERAL WALL, BIOPSY:   Small fragment of urothelial mucosa with reactive changes.    Negative for dysplasia or malignancy.    2. BLADDER, POSTERIOR WALL, BIOPSY:   Heavily denuded urothelial mucosa with reactive changes.    Negative for dysplasia or malignancy.    3. BLADDER, LEFT LATERAL WALL, BIOPSY:   Small fragment of urothelial mucosa with reactive changes.    Negative for dysplasia or malignancy.    4. BLADDER, POSTERIOR PATCH, BIOPSY:   Heavily denuded urothelial mucosa with marked chronic inflammation and reactive changes.   Negative for dysplasia or malignancy.    5. BLADDER, ANTERIOR WALL TUMOR, TRANSURETHRAL RESECTION:   Polypoid cystitis and granulation tissue with squamous metaplasia of  urothelial mucosa.    Negative for dysplasia or malignancy.      - 2/29/2024 CT Urogram Abd/Pelvis:  FINDINGS:  Stable mild cardiomegaly.  Coronary artery calcific atherosclerosis.  Visualized inferior lungs are clear.  Stable small left hepatic lobe calcification.  No suspicious hepatic lesion.  Gallbladder is surgically absent.  No biliary ductal dilatation.  Spleen and adrenal glands are unremarkable.  Stable appearance of the pancreas with atrophy, scattered calcifications, and mild duct dilatation measuring 4 mm.  Findings can be seen with sequela of chronic pancreatitis.  Bilateral renal cortical thinning.  Multiple bilateral renal cysts and subcentimeter renal hypodensities too small to characterize.  No solid enhancing renal mass.  No renal stone.  No hydronephrosis or ureteral dilatation.  Opacification of the bilateral renal collecting systems and ureters without suspicious filling defect.  Patient is status post TURBT.  There is increased heterogeneous wall thickening and enhancement along the anterior bladder (series 4, image 144) concerning for residual/recurrent tumor.  Mild perivesical stranding and vascular prominence, similar to prior exam.  Small hiatal hernia.  Colonic diverticulosis.  No evidence of bowel obstruction or inflammation.  No free intraperitoneal fluid or air.  No pathologically enlarged abdominopelvic lymph nodes.  Abdominal aorta is normal caliber.  Moderate/severe calcific atherosclerosis.  Degenerative changes of the osseous structures.  No acute fracture or aggressive osseous lesion  Impression:  1. Increased heterogeneous wall thickening and enhancement along the anterior bladder concerning for residual/recurrent tumor.  2. No evidence of metastatic disease.  3. Additional findings as above.     PMH:  Active Ambulatory Problems        Diagnosis   Date Noted       CKD stage 3 due to type 2 diabetes mellitus     11/23/2012       Chronic anticoagulation 11/23/2012        Hypertension associated with diabetes     05/22/2013       Hyperlipidemia associated with type 2 diabetes mellitus     05/22/2013       Persistent atrial fibrillation      06/11/2014       Atherosclerosis of aorta      11/18/2020       Sensorineural hearing loss (SNHL) of both ears  11/19/2020       Risk for falls    12/15/2021       Renal cyst  12/14/2022       DM type 2, controlled, with complication  12/14/2022       Bladder tumor     11/16/2023       UTI (urinary tract infection) 11/21/2023       Acute hypoxic respiratory failure   11/21/2023       Elevated troponin 11/21/2023       Thyroid nodules   11/21/2023     Resolved Ambulatory Problems        Diagnosis   Date Noted       Atrial fibrillation     08/02/2012       Long term (current) use of anticoagulants 08/02/2012       Family history of diabetes mellitus 11/23/2012       Ex-cigarette smoker     11/23/2012       Metabolic syndrome      11/23/2012       Screen for colon cancer 05/07/2014       Ex-smoker   11/09/2015       Hyperglycemia     12/16/2015       Abscess of chest wall   06/25/2018       Sebaceous cyst    07/25/2018       Decreased back mobility 08/20/2019       Decreased strength of trunk and back      08/20/2019       Decreased range of motion of both hips    08/20/2019       Pain aggravated by walking    08/20/2019       Hamstring tightness of both lower extremities   08/20/2019       Impaired mobility and endurance     08/20/2019       Low back pain     11/19/2020       Chronic pain      04/28/2021       Type 2 diabetes mellitus, without long-term current use of insulin      12/13/2021       CKD stage 3 secondary to diabetes   12/13/2021       Encounter for preventive health examination     12/19/2022     Past Medical History:  Diagnosis   Date       *Atrial fibrillation           Chronic kidney disease         Deep vein thrombosis           Hyperlipidemia           Hypertension        Cholecystectomy  Tonsillectomy  Skin cancer removal      FH:  Paternal Aunt- recalls cancer affecting scalp     SH:    Retired [employer], followed by off shore work,   4 children (1 passed as an infant)  Quit tobacco in the 1980s  + EtOH    Review of Systems   Constitutional:  Positive for appetite change (needs ST assistance) and fatigue (better). Negative for activity change, chills, fever and unexpected weight change.   HENT:  Positive for hearing loss (hearing aids bilat) and trouble swallowing. Negative for nasal congestion, dental problem, postnasal drip, rhinorrhea, sinus pressure/congestion, sore throat and tinnitus.         Dry mouth   Eyes:  Negative for visual disturbance.   Respiratory:  Negative for cough, chest tightness, shortness of breath and wheezing.    Cardiovascular:  Negative for chest pain, palpitations and leg swelling.   Gastrointestinal:  Negative for abdominal distention, abdominal pain, blood in stool, change in bowel habit, constipation, diarrhea, nausea and vomiting.   Genitourinary:  Negative for decreased urine volume, difficulty urinating, dysuria, frequency (better), hematuria and urgency (better).   Musculoskeletal:  Negative for arthralgias, back pain, myalgias, neck pain and neck stiffness.   Integumentary:  Positive for rash (improved).        Dry skin   Neurological:  Positive for weakness. Negative for dizziness, light-headedness, numbness and headaches.   Psychiatric/Behavioral:  Negative for agitation, behavioral problems, confusion, dysphoric mood and sleep disturbance. The patient is not nervous/anxious.           Objective     Physical Exam  Vitals and nursing note reviewed.   Constitutional:       General: He is not in acute distress.     Appearance: Normal appearance. He is not ill-appearing.      Comments: Looks better!  ECOG= 1  Has wheelchair   HENT:      Head: Normocephalic and atraumatic.      Comments: Hearing aids     Mouth/Throat:      Mouth: Mucous membranes are moist.      Pharynx: Oropharynx is  clear. No oropharyngeal exudate or posterior oropharyngeal erythema.   Eyes:      General: No scleral icterus.     Extraocular Movements: Extraocular movements intact.      Conjunctiva/sclera: Conjunctivae normal.      Pupils: Pupils are equal, round, and reactive to light.   Cardiovascular:      Rate and Rhythm: Normal rate and regular rhythm.      Heart sounds: No murmur heard.     No friction rub.   Pulmonary:      Effort: Pulmonary effort is normal. No respiratory distress.      Breath sounds: Normal breath sounds. No wheezing, rhonchi or rales.   Abdominal:      General: Abdomen is flat. Bowel sounds are normal. There is no distension.      Palpations: Abdomen is soft. There is no mass.      Tenderness: There is no abdominal tenderness. There is no guarding or rebound.      Comments: No organomegaly   Musculoskeletal:         General: No swelling, tenderness or deformity. Normal range of motion.      Right lower leg: No edema.      Left lower leg: No edema.   Skin:     General: Skin is warm and dry.      Coloration: Skin is not jaundiced or pale.      Findings: No bruising, erythema or rash.   Neurological:      General: No focal deficit present.      Mental Status: He is alert and oriented to person, place, and time. Mental status is at baseline.      Sensory: No sensory deficit.      Motor: Weakness (generalized) present.      Gait: Gait normal.   Psychiatric:         Mood and Affect: Mood normal.         Behavior: Behavior normal.         Thought Content: Thought content normal.         Judgment: Judgment normal.     Labs- reviewed       Assessment and Plan     1. Malignant neoplasm of urinary bladder, unspecified site  -     CMP; Future; Expected date: 08/27/2024  -     CBC W/ AUTO DIFFERENTIAL; Future; Expected date: 08/27/2024  -     CMP; Future; Expected date: 08/27/2024  -     CT Chest Abdomen Pelvis Without Contrast (XPD); Future; Expected date: 08/27/2024    2. Hypertension associated with  diabetes    3. Hyperlipidemia associated with type 2 diabetes mellitus    4. Persistent atrial fibrillation    5. CKD stage 3 due to type 2 diabetes mellitus    6. Dysphagia, unspecified type    7. Risk for falls  -     Ambulatory referral/consult to Physical/Occupational Therapy; Future; Expected date: 09/03/2024    8. Hypercalcemia      Advanced bladder cancer- completed chemo/XRT  Cystoscope and scans end of 10/2024     Dysphagia and scans with aspiration evidence  Swallowing study and further work up to be planned     CHF, DM, HTN, a fib- managed with PCP and parameters clinically stable     CKD- reviewed worsening parameters   Likely from dehydration- discuss with ST- thickening liquids  Recheck parameters next week    Hypercalcemia-  Check home vitamins and recheck lab in 1 week    Dysphagia  Needs ST follow up  Wife needs assistance    Risk for falls- PT    Route Chart for Scheduling    Med Onc Chart Routing  Urgent    Follow up with physician . Needs speech therapy follow up asap; late October scans and labs 1 day and me the next day; and then next week cmp   Follow up with [facility]    Infusion scheduling note    Injection scheduling note    Labs    Imaging    Pharmacy appointment    Other referrals

## 2024-08-28 ENCOUNTER — PATIENT MESSAGE (OUTPATIENT)
Dept: HEMATOLOGY/ONCOLOGY | Facility: CLINIC | Age: 89
End: 2024-08-28
Payer: MEDICARE

## 2024-08-28 ENCOUNTER — PATIENT MESSAGE (OUTPATIENT)
Dept: ADMINISTRATIVE | Facility: OTHER | Age: 89
End: 2024-08-28
Payer: MEDICARE

## 2024-08-29 ENCOUNTER — PATIENT MESSAGE (OUTPATIENT)
Dept: ADMINISTRATIVE | Facility: OTHER | Age: 89
End: 2024-08-29
Payer: MEDICARE

## 2024-08-30 ENCOUNTER — PATIENT MESSAGE (OUTPATIENT)
Dept: ADMINISTRATIVE | Facility: OTHER | Age: 89
End: 2024-08-30
Payer: MEDICARE

## 2024-08-31 ENCOUNTER — PATIENT MESSAGE (OUTPATIENT)
Dept: ADMINISTRATIVE | Facility: OTHER | Age: 89
End: 2024-08-31
Payer: MEDICARE

## 2024-09-01 ENCOUNTER — PATIENT MESSAGE (OUTPATIENT)
Dept: ADMINISTRATIVE | Facility: OTHER | Age: 89
End: 2024-09-01
Payer: MEDICARE

## 2024-09-02 ENCOUNTER — PATIENT MESSAGE (OUTPATIENT)
Dept: ADMINISTRATIVE | Facility: OTHER | Age: 89
End: 2024-09-02
Payer: MEDICARE

## 2024-09-03 ENCOUNTER — LAB VISIT (OUTPATIENT)
Dept: LAB | Facility: HOSPITAL | Age: 89
End: 2024-09-03
Attending: INTERNAL MEDICINE
Payer: MEDICARE

## 2024-09-03 ENCOUNTER — PATIENT MESSAGE (OUTPATIENT)
Dept: ADMINISTRATIVE | Facility: OTHER | Age: 89
End: 2024-09-03
Payer: MEDICARE

## 2024-09-03 DIAGNOSIS — C68.9 UROTHELIAL CANCER: ICD-10-CM

## 2024-09-03 DIAGNOSIS — D49.4 BLADDER TUMOR: ICD-10-CM

## 2024-09-03 LAB
ALBUMIN SERPL BCP-MCNC: 3.4 G/DL (ref 3.5–5.2)
ALP SERPL-CCNC: 65 U/L (ref 55–135)
ALT SERPL W/O P-5'-P-CCNC: 21 U/L (ref 10–44)
ANION GAP SERPL CALC-SCNC: 10 MMOL/L (ref 8–16)
AST SERPL-CCNC: 22 U/L (ref 10–40)
BILIRUB SERPL-MCNC: 0.5 MG/DL (ref 0.1–1)
BUN SERPL-MCNC: 51 MG/DL (ref 8–23)
CALCIUM SERPL-MCNC: 10.2 MG/DL (ref 8.7–10.5)
CHLORIDE SERPL-SCNC: 107 MMOL/L (ref 95–110)
CO2 SERPL-SCNC: 19 MMOL/L (ref 23–29)
CREAT SERPL-MCNC: 1.5 MG/DL (ref 0.5–1.4)
EST. GFR  (NO RACE VARIABLE): 44.5 ML/MIN/1.73 M^2
GLUCOSE SERPL-MCNC: 219 MG/DL (ref 70–110)
POTASSIUM SERPL-SCNC: 4.9 MMOL/L (ref 3.5–5.1)
PROT SERPL-MCNC: 6.6 G/DL (ref 6–8.4)
SODIUM SERPL-SCNC: 136 MMOL/L (ref 136–145)

## 2024-09-03 PROCEDURE — 36415 COLL VENOUS BLD VENIPUNCTURE: CPT | Performed by: INTERNAL MEDICINE

## 2024-09-03 PROCEDURE — 80053 COMPREHEN METABOLIC PANEL: CPT | Performed by: INTERNAL MEDICINE

## 2024-09-04 ENCOUNTER — PATIENT MESSAGE (OUTPATIENT)
Dept: ADMINISTRATIVE | Facility: OTHER | Age: 89
End: 2024-09-04
Payer: MEDICARE

## 2024-09-05 ENCOUNTER — PATIENT MESSAGE (OUTPATIENT)
Dept: ADMINISTRATIVE | Facility: OTHER | Age: 89
End: 2024-09-05
Payer: MEDICARE

## 2024-09-06 ENCOUNTER — PATIENT MESSAGE (OUTPATIENT)
Dept: ADMINISTRATIVE | Facility: OTHER | Age: 89
End: 2024-09-06
Payer: MEDICARE

## 2024-09-07 ENCOUNTER — PATIENT MESSAGE (OUTPATIENT)
Dept: ADMINISTRATIVE | Facility: OTHER | Age: 89
End: 2024-09-07
Payer: MEDICARE

## 2024-09-08 ENCOUNTER — PATIENT MESSAGE (OUTPATIENT)
Dept: ADMINISTRATIVE | Facility: OTHER | Age: 89
End: 2024-09-08
Payer: MEDICARE

## 2024-09-09 ENCOUNTER — PATIENT MESSAGE (OUTPATIENT)
Dept: ADMINISTRATIVE | Facility: OTHER | Age: 89
End: 2024-09-09
Payer: MEDICARE

## 2024-09-10 ENCOUNTER — PATIENT MESSAGE (OUTPATIENT)
Dept: ADMINISTRATIVE | Facility: OTHER | Age: 89
End: 2024-09-10
Payer: MEDICARE

## 2024-09-11 ENCOUNTER — PATIENT MESSAGE (OUTPATIENT)
Dept: ADMINISTRATIVE | Facility: OTHER | Age: 89
End: 2024-09-11
Payer: MEDICARE

## 2024-09-12 ENCOUNTER — PATIENT MESSAGE (OUTPATIENT)
Dept: ADMINISTRATIVE | Facility: OTHER | Age: 89
End: 2024-09-12
Payer: MEDICARE

## 2024-09-13 ENCOUNTER — PATIENT MESSAGE (OUTPATIENT)
Dept: ADMINISTRATIVE | Facility: OTHER | Age: 89
End: 2024-09-13
Payer: MEDICARE

## 2024-09-16 ENCOUNTER — PATIENT MESSAGE (OUTPATIENT)
Dept: INTERNAL MEDICINE | Facility: CLINIC | Age: 89
End: 2024-09-16
Payer: MEDICARE

## 2024-09-18 ENCOUNTER — PATIENT MESSAGE (OUTPATIENT)
Dept: ADMINISTRATIVE | Facility: OTHER | Age: 89
End: 2024-09-18
Payer: MEDICARE

## 2024-09-20 ENCOUNTER — CLINICAL SUPPORT (OUTPATIENT)
Dept: REHABILITATION | Facility: HOSPITAL | Age: 89
End: 2024-09-20
Payer: MEDICARE

## 2024-09-20 ENCOUNTER — PATIENT MESSAGE (OUTPATIENT)
Dept: ADMINISTRATIVE | Facility: OTHER | Age: 89
End: 2024-09-20
Payer: MEDICARE

## 2024-09-20 DIAGNOSIS — R53.81 PHYSICAL DECONDITIONING: Primary | ICD-10-CM

## 2024-09-20 DIAGNOSIS — Z91.81 RISK FOR FALLS: ICD-10-CM

## 2024-09-20 PROCEDURE — 97530 THERAPEUTIC ACTIVITIES: CPT | Mod: PN

## 2024-09-20 PROCEDURE — 97162 PT EVAL MOD COMPLEX 30 MIN: CPT | Mod: PN

## 2024-09-20 NOTE — PLAN OF CARE
[facility]  Physical Therapy Neurological Rehabilitation Initial Evaluation     Name: Cliff Magaña  Clinic Number: [id number]    Therapy Diagnosis:   Encounter Diagnoses   Name Primary?    Risk for falls     Physical deconditioning Yes     Physician: Elizabeth Bland MD    Physician Orders: PT Eval and Treat   Medical Diagnosis from Referral: Risk for falls [Z91.81]   Evaluation Date: 9/20/2024  Authorization Period Expiration: 8/27/2025  Plan of Care Expiration: 11/1/2024  Progress Note Due: 10/18/2024  Date of Surgery: n/a  Visit # / Visits authorized: 1/ 1  FOTO: 1/ 3    Precautions: Standard and Fall    Time In: 12:02 PM  Time Out: 12:47 PM  Total Billable Time: 45 minutes     Subjective      Date of onset: Chronic; MD noted gait disturbances and fall risk     History of current condition - Cliff reports: That he has been increased amounts of falls; falling due to tripping at least 3 times within the last 6 months. He has recently been cleared of bladder cancer, though feels decrease mobility and endurance after receiving chemotherapy and would like to get back to ambulating safely to return to traveling and previous activities.    No malignancy in bladder cancer last visit, reassess in October.      Prior Therapy: Home health within last year  Social History:  lives with their spouse  Falls: 3 times last 6 months   DME: shower bench, bars in bathroom, rollator wheel, quad cane   Home Environment: single floor, one step to get in.  Exercise Routine / History: No, has stationary bike at home  Family Present at time of Eval: wife, Nereyda    Occupation: Retired  Prior Level of Function: Modified independent  Current Level of Function: modified independent, needs help with dressing, independent with personal hygiene      Patient's goals: Wants to travel again, walking speed,     Medical History:   Past Medical History:   Diagnosis Date    *Atrial fibrillation     Chronic kidney disease      Deep vein thrombosis     Hyperlipidemia     Hypertension     Metabolic syndrome     Type 2 diabetes mellitus, without long-term current use of insulin 2021       Surgical History:   Cliff Magaña  has a past surgical history that includes Cholecystectomy; Tonsillectomy; Cataract extraction; Eye surgery; Skin cancer excision; Injection of facet joint (Bilateral, 2021); turbt (transurethral resection of bladder tumor) (N/A, 2023); Cystoscopy (N/A, 2023); Retrograde pyelography (Bilateral, 2023); turbt (transurethral resection of bladder tumor) (N/A, 2024); Dilation of urethra (N/A, 2024); Biopsy of bladder (N/A, 2024); and Bladder fulguration (N/A, 2024).    Medications:   Cliff has a current medication list which includes the following prescription(s): acetaminophen, cholecalciferol (vitamin d3), cyclobenzaprine, eliquis, fenofibrate micronized, jardiance, loratadine, megestrol, metformin, metoprolol succinate, mirtazapine, mupirocin, ondansetron, and entresto, and the following Facility-Administered Medications: acetaminophen, albuterol-ipratropium, aluminum-magnesium hydroxide-simethicone, dextrose 10%, dextrose 10%, fenofibrate micronized, glucagon (human recombinant), glucose, glucose, insulin aspart u-100, megestrol, melatonin, metoprolol succinate, mirtazapine, mupirocin, naloxone, ondansetron, polyethylene glycol, prochlorperazine, sacubitril-valsartan, simethicone, sodium chloride 0.9%, tamsulosin, and vitamin d.    Allergies:   Review of patient's allergies indicates:   Allergen Reactions    Niacin      Other reaction(s): Rash  Other reaction(s): Itching        Objective      Command followin% of time     Speech: no deficits     Mental status: alert, oriented to person, place, and time  Behavior:  calm and cooperative  Attention Span and Concentration:  Normal    Posture Alignment: increased kyphosis, increased forward lean with longer durations of  standing      Gait Assessment:   AD used: quad cane, rollator, able to walk without for short distances      MCDANIELS  BALANCE ASSESSMENT TOOL  1. Sitting to Standing   4 - able to stand without using hands and stabilize independently  2. Standing Unsupported   4 - able to stand safely 2 minutes without hold  3. Sitting Unsupported   4 - able to sit safely and securely 2 minutes  4. Standing to Sitting   4 - sits safely with minimal use of hands  5. Pivot Transfer   4 - able to trnasfer safely with minor use of hands  6. Standing with Eyes Closed   4 - albe to stand 10 seconds safely  7. Standing with Feet Together   4 - able to place feet together independently and stand 1 minute safely  8. Reaching Forward with Outstretched Arm   4 - can reach forward confidently 25 cm/10 inches  9. Retrieving Object from Floor   4 - able to  slipper safely and easily  10. Turning to Look Behind   4 - looks behind from both sides and weights shifts well  11. Turning 360 Degrees   3 - able to trun 360 safely one side only in 4 seconds or less  12. Placing Alternate Foot on Step   3 - able to stand independently and completely 8 steps > 20 seconds  13. Standing with One Foot in Front   4 - able to tandem stand independently and hold 30 seconds  14. Standing on One Foot   2 - able to lift leg indepentdently and hold = or < 3 seconds    TOTAL SCORE: 52  Maximum: 56    Score interpretation:   0-20 = wheelchair bound  21-40 = walking with assistance  41-56 = independent    Fall risk cutoff scores:   Elderly and history of falls: <51/56   Elderly and no history of falls: <42/56   CVA: <45/56    MDC:  Parkinson's = 5 points  Acute CVA = 6.9 points  Chronic CVA = 4.7 points  Pulmonary Disease = 5.9 points  Progressive Dementia = 1.9 points    30 second STS: 12  Walking speed: 45'/22.7 sec = 0.6 m/s  TU seconds       Intake Outcome Measure for FOTO ABC Survey    Therapist reviewed FOTO scores for Cliff Magaña on 2024.   BETZAIDA  report - see Media section or FOTO account episode details.    Intake Score: 45%       Treatment     Total Treatment time separate from Evaluation: 10 minutes    Cliff received the treatments listed below:        Therapeutic activities to improve functional performance for 10  minutes, including:    HEP: heel raises, toe raises, high knee marches, lateral steps, sit to stands, single leg stance       Patient Education and Home Exercises     Education provided:   - Plan of Care discussed  - Home Exercise Program issued     Written Home Exercises Provided: Yes.  Exercises were reviewed and Cliff was able to demonstrate them prior to the end of the session.  Cliff demonstrated good  understanding of the education provided.     Assessment     Cliff is a 89 y.o. male referred to outpatient Physical Therapy with a medical diagnosis of Risk for falls [Z91.81]. Patient presents with descent stability but decreased walking speed and mobility as observed by increase kyphosis and forward lean with standing and walking posture. Pt can benefit from skilled physical therapy to address LE endurance for safe ambulation within the community to return to prior level of function.     Patient prognosis is Fair.   Patient will benefit from skilled outpatient Physical Therapy to address the deficits stated above and in the chart below, provide patient /family education, and to maximize patient's level of independence.     Plan of care discussed with patient: Yes  Patient's spiritual, cultural and educational needs considered and patient is agreeable to the plan of care and goals as stated below:     Anticipated Barriers for therapy: Fall risk, age     Medical Necessity is demonstrated by the following  History  Co-morbidities and personal factors that may impact the plan of care [] LOW: no personal factors / co-morbidities  [] MODERATE: 1-2 personal factors / co-morbidities  [x] HIGH: 3+ personal factors /  co-morbidities    Moderate / High Support Documentation:   Co-morbidities affecting plan of care: see above    Personal Factors:   age     Examination  Body Structures and Functions, activity limitations and participation restrictions that may impact the plan of care [] LOW: addressing 1-2 elements  [] MODERATE: 3+ elements  [x] HIGH: 4+ elements (please support below)    Moderate / High Support Documentation: see above     Clinical Presentation [] LOW: stable  [x] MODERATE: Evolving  [] HIGH: Unstable     Decision Making/ Complexity Score: moderate       Goals:  Short Term Goals: 4 weeks   Patient will be compliant with HEP in order to maximize PT benefits  Patient will complete TUG in </= 16 seconds with least restrictive assistive device in order to reduce risk for falls and improve safety with functional mobility  Patient will perform 8 step taps within 20 seconds in order to reduce risk for falls and ambulate stairs safely      Long Term Goals: 6 weeks   Patient will score >/= 50% on FOTO limitation survey in order to improve self-perception of functional mobility deficits  Patient will increase walking speed to >0.8 m/s in order to improve BLE endurance and muscular power for transfers   Patient will perform bilateral single leg stance for >3 seconds in order to reduce risk for falls and improve safety with functional mobility  Patient will report 0 falls from initiation of PT management  Patient will begin some form of home/community fitness in order to sustain progress gained in PT   Plan     Plan of care Certification: 9/20/2024 to 11/1/2024.    Outpatient Physical Therapy 2 times weekly for 6 weeks to include the following interventions: Neuromuscular Re-ed, Patient Education, Therapeutic Activities, and Therapeutic Exercise.     LEAH WHITNEY, PT        Physician's Signature: _________________________________________ Date: ________________

## 2024-09-21 ENCOUNTER — HOSPITAL ENCOUNTER (OUTPATIENT)
Facility: HOSPITAL | Age: 89
Discharge: HOME | End: 2024-09-23
Attending: STUDENT IN AN ORGANIZED HEALTH CARE EDUCATION/TRAINING PROGRAM | Admitting: STUDENT IN AN ORGANIZED HEALTH CARE EDUCATION/TRAINING PROGRAM
Payer: MEDICARE

## 2024-09-21 DIAGNOSIS — N30.00 ACUTE CYSTITIS WITHOUT HEMATURIA: ICD-10-CM

## 2024-09-21 DIAGNOSIS — R53.81 PHYSICAL DECONDITIONING: ICD-10-CM

## 2024-09-21 DIAGNOSIS — R07.9 CHEST PAIN: ICD-10-CM

## 2024-09-21 DIAGNOSIS — I60.9 SAH (SUBARACHNOID HEMORRHAGE): ICD-10-CM

## 2024-09-21 DIAGNOSIS — I60.9 SUBARACHNOID HEMORRHAGE: Primary | ICD-10-CM

## 2024-09-21 DIAGNOSIS — R33.9 URINARY RETENTION: ICD-10-CM

## 2024-09-21 DIAGNOSIS — I48.19 PERSISTENT ATRIAL FIBRILLATION: ICD-10-CM
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
## 2024-09-21 DIAGNOSIS — R13.10 DYSPHAGIA, UNSPECIFIED TYPE: ICD-10-CM

## 2024-09-21 DIAGNOSIS — R40.20 LOSS OF CONSCIOUSNESS: ICD-10-CM

## 2024-09-21 LAB
BASOPHILS # BLD AUTO: 0.05 K/UL (ref 0–0.2)
BASOPHILS NFR BLD: 0.6 % (ref 0–1.9)
DIFFERENTIAL METHOD BLD: ABNORMAL
EOSINOPHIL # BLD AUTO: 0.1 K/UL (ref 0–0.5)
EOSINOPHIL NFR BLD: 1.7 % (ref 0–8)
ERYTHROCYTE [DISTWIDTH] IN BLOOD BY AUTOMATED COUNT: 13.2 % (ref 11.5–14.5)
HCT VFR BLD AUTO: 43.8 % (ref 40–54)
HGB BLD-MCNC: 14.4 G/DL (ref 14–18)
IMM GRANULOCYTES # BLD AUTO: 0.16 K/UL (ref 0–0.04)
IMM GRANULOCYTES NFR BLD AUTO: 1.9 % (ref 0–0.5)
LYMPHOCYTES # BLD AUTO: 2.2 K/UL (ref 1–4.8)
LYMPHOCYTES NFR BLD: 26.2 % (ref 18–48)
MCH RBC QN AUTO: 34.3 PG (ref 27–31)
MCHC RBC AUTO-ENTMCNC: 32.9 G/DL (ref 32–36)
MCV RBC AUTO: 104 FL (ref 82–98)
MONOCYTES # BLD AUTO: 0.9 K/UL (ref 0.3–1)
MONOCYTES NFR BLD: 11 % (ref 4–15)
NEUTROPHILS # BLD AUTO: 5 K/UL (ref 1.8–7.7)
NEUTROPHILS NFR BLD: 58.6 % (ref 38–73)
NRBC BLD-RTO: 0 /100 WBC
PLATELET # BLD AUTO: 208 K/UL (ref 150–450)
PMV BLD AUTO: 12.3 FL (ref 9.2–12.9)
RBC # BLD AUTO: 4.2 M/UL (ref 4.6–6.2)
WBC # BLD AUTO: 8.44 K/UL (ref 3.9–12.7)

## 2024-09-21 PROCEDURE — 80053 COMPREHEN METABOLIC PANEL: CPT

## 2024-09-21 PROCEDURE — 99285 EMERGENCY DEPT VISIT HI MDM: CPT | Mod: 25

## 2024-09-21 PROCEDURE — 85025 COMPLETE CBC W/AUTO DIFF WBC: CPT

## 2024-09-21 PROCEDURE — 83880 ASSAY OF NATRIURETIC PEPTIDE: CPT

## 2024-09-21 PROCEDURE — 84484 ASSAY OF TROPONIN QUANT: CPT

## 2024-09-21 PROCEDURE — 93005 ELECTROCARDIOGRAM TRACING: CPT

## 2024-09-21 PROCEDURE — 85730 THROMBOPLASTIN TIME PARTIAL: CPT

## 2024-09-21 PROCEDURE — 93010 ELECTROCARDIOGRAM REPORT: CPT | Mod: ,,, | Performed by: INTERNAL MEDICINE

## 2024-09-21 NOTE — Clinical Note
Diagnosis: Acute cystitis without hematuria [336761]   Future Attending Provider: MARSHALL DEL ROSARIO [[id number]]

## 2024-09-22 PROBLEM — I60.9 SAH (SUBARACHNOID HEMORRHAGE): Status: ACTIVE | Noted: 2024-09-22

## 2024-09-22 PROBLEM — R33.9 URINARY RETENTION: Status: ACTIVE | Noted: 2024-09-22

## 2024-09-22 PROBLEM — N30.00 ACUTE CYSTITIS WITHOUT HEMATURIA: Status: ACTIVE | Noted: 2024-09-22

## 2024-09-22 LAB
ALBUMIN SERPL BCP-MCNC: 3.5 G/DL (ref 3.5–5.2)
ALP SERPL-CCNC: 47 U/L (ref 55–135)
ALT SERPL W/O P-5'-P-CCNC: 18 U/L (ref 10–44)
ANION GAP SERPL CALC-SCNC: 10 MMOL/L (ref 8–16)
ANION GAP SERPL CALC-SCNC: 9 MMOL/L (ref 8–16)
APTT PPP: 26 SEC (ref 21–32)
AST SERPL-CCNC: 22 U/L (ref 10–40)
BACTERIA #/AREA URNS AUTO: ABNORMAL /HPF
BASOPHILS # BLD AUTO: 0.04 K/UL (ref 0–0.2)
BASOPHILS NFR BLD: 0.5 % (ref 0–1.9)
BILIRUB SERPL-MCNC: 0.5 MG/DL (ref 0.1–1)
BILIRUB UR QL STRIP: NEGATIVE
BNP SERPL-MCNC: 258 PG/ML (ref 0–99)
BUN SERPL-MCNC: 45 MG/DL (ref 8–23)
BUN SERPL-MCNC: 51 MG/DL (ref 8–23)
CALCIUM SERPL-MCNC: 10.1 MG/DL (ref 8.7–10.5)
CALCIUM SERPL-MCNC: 10.4 MG/DL (ref 8.7–10.5)
CHLORIDE SERPL-SCNC: 108 MMOL/L (ref 95–110)
CHLORIDE SERPL-SCNC: 110 MMOL/L (ref 95–110)
CLARITY UR REFRACT.AUTO: ABNORMAL
CO2 SERPL-SCNC: 15 MMOL/L (ref 23–29)
CO2 SERPL-SCNC: 19 MMOL/L (ref 23–29)
COLOR UR AUTO: YELLOW
CREAT SERPL-MCNC: 1.4 MG/DL (ref 0.5–1.4)
CREAT SERPL-MCNC: 1.6 MG/DL (ref 0.5–1.4)
DIFFERENTIAL METHOD BLD: ABNORMAL
EOSINOPHIL # BLD AUTO: 0.2 K/UL (ref 0–0.5)
EOSINOPHIL NFR BLD: 1.9 % (ref 0–8)
ERYTHROCYTE [DISTWIDTH] IN BLOOD BY AUTOMATED COUNT: 13.1 % (ref 11.5–14.5)
EST. GFR  (NO RACE VARIABLE): 41.2 ML/MIN/1.73 M^2
EST. GFR  (NO RACE VARIABLE): 48 ML/MIN/1.73 M^2
ESTIMATED AVG GLUCOSE: 137 MG/DL (ref 68–131)
GLUCOSE SERPL-MCNC: 155 MG/DL (ref 70–110)
GLUCOSE SERPL-MCNC: 157 MG/DL (ref 70–110)
GLUCOSE UR QL STRIP: ABNORMAL
HBA1C MFR BLD: 6.4 % (ref 4–5.6)
HCT VFR BLD AUTO: 41.8 % (ref 40–54)
HGB BLD-MCNC: 13.5 G/DL (ref 14–18)
HGB UR QL STRIP: ABNORMAL
HYALINE CASTS UR QL AUTO: 1 /LPF
IMM GRANULOCYTES # BLD AUTO: 0.1 K/UL (ref 0–0.04)
IMM GRANULOCYTES NFR BLD AUTO: 1.1 % (ref 0–0.5)
KETONES UR QL STRIP: NEGATIVE
LEUKOCYTE ESTERASE UR QL STRIP: ABNORMAL
LYMPHOCYTES # BLD AUTO: 1.8 K/UL (ref 1–4.8)
LYMPHOCYTES NFR BLD: 21.1 % (ref 18–48)
MAGNESIUM SERPL-MCNC: 2 MG/DL (ref 1.6–2.6)
MCH RBC QN AUTO: 33.3 PG (ref 27–31)
MCHC RBC AUTO-ENTMCNC: 32.3 G/DL (ref 32–36)
MCV RBC AUTO: 103 FL (ref 82–98)
MICROSCOPIC COMMENT: ABNORMAL
MONOCYTES # BLD AUTO: 0.8 K/UL (ref 0.3–1)
MONOCYTES NFR BLD: 9.2 % (ref 4–15)
NEUTROPHILS # BLD AUTO: 5.8 K/UL (ref 1.8–7.7)
NEUTROPHILS NFR BLD: 66.2 % (ref 38–73)
NITRITE UR QL STRIP: NEGATIVE
NRBC BLD-RTO: 0 /100 WBC
OHS QRS DURATION: 112 MS
OHS QTC CALCULATION: 413 MS
PH UR STRIP: 6 [PH] (ref 5–8)
PHOSPHATE SERPL-MCNC: 3.1 MG/DL (ref 2.7–4.5)
PLATELET # BLD AUTO: 209 K/UL (ref 150–450)
PMV BLD AUTO: 11.8 FL (ref 9.2–12.9)
POTASSIUM SERPL-SCNC: 4.8 MMOL/L (ref 3.5–5.1)
POTASSIUM SERPL-SCNC: 4.8 MMOL/L (ref 3.5–5.1)
PROT SERPL-MCNC: 6.3 G/DL (ref 6–8.4)
PROT UR QL STRIP: ABNORMAL
RBC # BLD AUTO: 4.06 M/UL (ref 4.6–6.2)
RBC #/AREA URNS AUTO: 22 /HPF (ref 0–4)
SODIUM SERPL-SCNC: 134 MMOL/L (ref 136–145)
SODIUM SERPL-SCNC: 137 MMOL/L (ref 136–145)
SP GR UR STRIP: 1.02 (ref 1–1.03)
SQUAMOUS #/AREA URNS AUTO: 0 /HPF
TROPONIN I SERPL DL<=0.01 NG/ML-MCNC: 0.06 NG/ML (ref 0–0.03)
TROPONIN I SERPL DL<=0.01 NG/ML-MCNC: 0.07 NG/ML (ref 0–0.03)
URN SPEC COLLECT METH UR: ABNORMAL
WBC # BLD AUTO: 8.73 K/UL (ref 3.9–12.7)
WBC #/AREA URNS AUTO: >100 /HPF (ref 0–5)
WBC CLUMPS UR QL AUTO: ABNORMAL
YEAST UR QL AUTO: ABNORMAL

## 2024-09-22 PROCEDURE — 87086 URINE CULTURE/COLONY COUNT: CPT

## 2024-09-22 PROCEDURE — 84484 ASSAY OF TROPONIN QUANT: CPT

## 2024-09-22 PROCEDURE — 51701 INSERT BLADDER CATHETER: CPT

## 2024-09-22 PROCEDURE — G0378 HOSPITAL OBSERVATION PER HR: HCPCS

## 2024-09-22 PROCEDURE — 84100 ASSAY OF PHOSPHORUS: CPT

## 2024-09-22 PROCEDURE — 81001 URINALYSIS AUTO W/SCOPE: CPT

## 2024-09-22 PROCEDURE — A4216 STERILE WATER/SALINE, 10 ML: HCPCS | Performed by: PHYSICIAN ASSISTANT

## 2024-09-22 PROCEDURE — 25000003 PHARM REV CODE 250: Performed by: PHYSICIAN ASSISTANT

## 2024-09-22 PROCEDURE — 25000003 PHARM REV CODE 250

## 2024-09-22 PROCEDURE — 63600175 PHARM REV CODE 636 W HCPCS

## 2024-09-22 PROCEDURE — 83036 HEMOGLOBIN GLYCOSYLATED A1C: CPT

## 2024-09-22 PROCEDURE — 96365 THER/PROPH/DIAG IV INF INIT: CPT

## 2024-09-22 PROCEDURE — 80048 BASIC METABOLIC PNL TOTAL CA: CPT

## 2024-09-22 PROCEDURE — 81003 URINALYSIS AUTO W/O SCOPE: CPT

## 2024-09-22 PROCEDURE — 83735 ASSAY OF MAGNESIUM: CPT

## 2024-09-22 PROCEDURE — 85025 COMPLETE CBC W/AUTO DIFF WBC: CPT

## 2024-09-22 PROCEDURE — 96375 TX/PRO/DX INJ NEW DRUG ADDON: CPT

## 2024-09-22 RX ORDER — CHOLECALCIFEROL (VITAMIN D3) 25 MCG
2000 TABLET ORAL DAILY
Status: DISCONTINUED | OUTPATIENT
Start: 2024-09-22 | End: 2024-09-23 | Stop reason: HOSPADM

## 2024-09-22 RX ORDER — ACETAMINOPHEN 325 MG/1
650 TABLET ORAL EVERY 4 HOURS PRN
Status: DISCONTINUED | OUTPATIENT
Start: 2024-09-22 | End: 2024-09-23 | Stop reason: HOSPADM

## 2024-09-22 RX ORDER — NALOXONE HCL 0.4 MG/ML
0.02 VIAL (ML) INJECTION
Status: DISCONTINUED | OUTPATIENT
Start: 2024-09-22 | End: 2024-09-23 | Stop reason: HOSPADM

## 2024-09-22 RX ORDER — HYDROMORPHONE HYDROCHLORIDE 1 MG/ML
0.5 INJECTION, SOLUTION INTRAMUSCULAR; INTRAVENOUS; SUBCUTANEOUS
Status: COMPLETED | OUTPATIENT
Start: 2024-09-22 | End: 2024-09-22

## 2024-09-22 RX ORDER — IBUPROFEN 200 MG
24 TABLET ORAL
Status: DISCONTINUED | OUTPATIENT
Start: 2024-09-22 | End: 2024-09-23 | Stop reason: HOSPADM

## 2024-09-22 RX ORDER — IBUPROFEN 200 MG
24 TABLET ORAL
Status: DISCONTINUED | OUTPATIENT
Start: 2024-09-22 | End: 2024-09-22

## 2024-09-22 RX ORDER — ONDANSETRON 8 MG/1
8 TABLET, ORALLY DISINTEGRATING ORAL EVERY 8 HOURS PRN
Status: DISCONTINUED | OUTPATIENT
Start: 2024-09-22 | End: 2024-09-23 | Stop reason: HOSPADM

## 2024-09-22 RX ORDER — GLUCAGON 1 MG
1 KIT INJECTION
Status: DISCONTINUED | OUTPATIENT
Start: 2024-09-22 | End: 2024-09-23 | Stop reason: HOSPADM

## 2024-09-22 RX ORDER — ALUMINUM HYDROXIDE, MAGNESIUM HYDROXIDE, AND SIMETHICONE 1200; 120; 1200 MG/30ML; MG/30ML; MG/30ML
30 SUSPENSION ORAL 4 TIMES DAILY PRN
Status: DISCONTINUED | OUTPATIENT
Start: 2024-09-22 | End: 2024-09-23 | Stop reason: HOSPADM

## 2024-09-22 RX ORDER — IPRATROPIUM BROMIDE AND ALBUTEROL SULFATE 2.5; .5 MG/3ML; MG/3ML
3 SOLUTION RESPIRATORY (INHALATION) EVERY 6 HOURS PRN
Status: DISCONTINUED | OUTPATIENT
Start: 2024-09-22 | End: 2024-09-23 | Stop reason: HOSPADM

## 2024-09-22 RX ORDER — MEGESTROL ACETATE 40 MG/ML
400 SUSPENSION ORAL DAILY
Status: DISCONTINUED | OUTPATIENT
Start: 2024-09-22 | End: 2024-09-23 | Stop reason: HOSPADM

## 2024-09-22 RX ORDER — METOPROLOL SUCCINATE 100 MG/1
100 TABLET, EXTENDED RELEASE ORAL 2 TIMES DAILY
Status: DISCONTINUED | OUTPATIENT
Start: 2024-09-22 | End: 2024-09-23 | Stop reason: HOSPADM

## 2024-09-22 RX ORDER — TAMSULOSIN HYDROCHLORIDE 0.4 MG/1
0.4 CAPSULE ORAL DAILY
Status: DISCONTINUED | OUTPATIENT
Start: 2024-09-22 | End: 2024-09-23 | Stop reason: HOSPADM

## 2024-09-22 RX ORDER — MIRTAZAPINE 7.5 MG/1
7.5 TABLET, FILM COATED ORAL NIGHTLY
Status: DISCONTINUED | OUTPATIENT
Start: 2024-09-22 | End: 2024-09-23 | Stop reason: HOSPADM

## 2024-09-22 RX ORDER — IBUPROFEN 200 MG
16 TABLET ORAL
Status: DISCONTINUED | OUTPATIENT
Start: 2024-09-22 | End: 2024-09-22

## 2024-09-22 RX ORDER — SIMETHICONE 80 MG
1 TABLET,CHEWABLE ORAL 4 TIMES DAILY PRN
Status: DISCONTINUED | OUTPATIENT
Start: 2024-09-22 | End: 2024-09-23 | Stop reason: HOSPADM

## 2024-09-22 RX ORDER — INSULIN ASPART 100 [IU]/ML
0-5 INJECTION, SOLUTION INTRAVENOUS; SUBCUTANEOUS
Status: DISCONTINUED | OUTPATIENT
Start: 2024-09-22 | End: 2024-09-23 | Stop reason: HOSPADM

## 2024-09-22 RX ORDER — LABETALOL HYDROCHLORIDE 5 MG/ML
5 INJECTION, SOLUTION INTRAVENOUS
Status: COMPLETED | OUTPATIENT
Start: 2024-09-22 | End: 2024-09-22

## 2024-09-22 RX ORDER — PROCHLORPERAZINE EDISYLATE 5 MG/ML
5 INJECTION INTRAMUSCULAR; INTRAVENOUS EVERY 6 HOURS PRN
Status: DISCONTINUED | OUTPATIENT
Start: 2024-09-22 | End: 2024-09-23 | Stop reason: HOSPADM

## 2024-09-22 RX ORDER — IBUPROFEN 200 MG
16 TABLET ORAL
Status: DISCONTINUED | OUTPATIENT
Start: 2024-09-22 | End: 2024-09-23 | Stop reason: HOSPADM

## 2024-09-22 RX ORDER — MUPIROCIN 20 MG/G
OINTMENT TOPICAL DAILY
Status: DISCONTINUED | OUTPATIENT
Start: 2024-09-22 | End: 2024-09-23 | Stop reason: HOSPADM

## 2024-09-22 RX ORDER — POLYETHYLENE GLYCOL 3350 17 G/17G
17 POWDER, FOR SOLUTION ORAL DAILY PRN
Status: DISCONTINUED | OUTPATIENT
Start: 2024-09-22 | End: 2024-09-23 | Stop reason: HOSPADM

## 2024-09-22 RX ORDER — SODIUM CHLORIDE 0.9 % (FLUSH) 0.9 %
10 SYRINGE (ML) INJECTION EVERY 8 HOURS
Status: DISCONTINUED | OUTPATIENT
Start: 2024-09-22 | End: 2024-09-23 | Stop reason: HOSPADM

## 2024-09-22 RX ORDER — GLUCAGON 1 MG
1 KIT INJECTION
Status: DISCONTINUED | OUTPATIENT
Start: 2024-09-22 | End: 2024-09-22

## 2024-09-22 RX ORDER — FENOFIBRATE 200 MG/1
200 CAPSULE ORAL
Status: DISCONTINUED | OUTPATIENT
Start: 2024-09-23 | End: 2024-09-23 | Stop reason: HOSPADM

## 2024-09-22 RX ORDER — TALC
6 POWDER (GRAM) TOPICAL NIGHTLY PRN
Status: DISCONTINUED | OUTPATIENT
Start: 2024-09-22 | End: 2024-09-23 | Stop reason: HOSPADM

## 2024-09-22 RX ADMIN — METOPROLOL SUCCINATE 100 MG: 100 TABLET, EXTENDED RELEASE ORAL at 10:09

## 2024-09-22 RX ADMIN — SODIUM CHLORIDE 10 ML: 9 INJECTION, SOLUTION INTRAMUSCULAR; INTRAVENOUS; SUBCUTANEOUS at 10:09

## 2024-09-22 RX ADMIN — MUPIROCIN: 20 OINTMENT TOPICAL at 10:09

## 2024-09-22 RX ADMIN — HYDROMORPHONE HYDROCHLORIDE 0.5 MG: 1 INJECTION, SOLUTION INTRAMUSCULAR; INTRAVENOUS; SUBCUTANEOUS at 07:09

## 2024-09-22 RX ADMIN — MIRTAZAPINE 7.5 MG: 7.5 TABLET, FILM COATED ORAL at 10:09

## 2024-09-22 RX ADMIN — SACUBITRIL AND VALSARTAN 1 TABLET: 24; 26 TABLET, FILM COATED ORAL at 10:09

## 2024-09-22 RX ADMIN — CHOLECALCIFEROL TAB 25 MCG (1000 UNIT) 2000 UNITS: 25 TAB at 10:09

## 2024-09-22 RX ADMIN — CEFTRIAXONE 1 G: 1 INJECTION, POWDER, FOR SOLUTION INTRAMUSCULAR; INTRAVENOUS at 07:09

## 2024-09-22 RX ADMIN — LABETALOL HYDROCHLORIDE 5 MG: 5 INJECTION INTRAVENOUS at 01:09

## 2024-09-22 NOTE — ED NOTES
Pt's SBP now meets criteria for labetolol administration. Dr. Urbina informed, states okay to give dose of labetolol now.

## 2024-09-22 NOTE — PLAN OF CARE
Problem: Adult Inpatient Plan of Care  Goal: Plan of Care Review  Outcome: Progressing  Goal: Patient-Specific Goal (Individualized)  Outcome: Progressing  Goal: Absence of Hospital-Acquired Illness or Injury  Outcome: Progressing  Goal: Optimal Comfort and Wellbeing  Outcome: Progressing  Goal: Readiness for Transition of Care  Outcome: Progressing     Problem: Infection  Goal: Absence of Infection Signs and Symptoms  Outcome: Progressing     Problem: Diabetes Comorbidity  Goal: Blood Glucose Level Within Targeted Range  Outcome: Progressing     Problem: Wound  Goal: Optimal Coping  Outcome: Progressing  Goal: Optimal Functional Ability  Outcome: Progressing  Goal: Absence of Infection Signs and Symptoms  Outcome: Progressing  Goal: Improved Oral Intake  Outcome: Progressing  Goal: Optimal Pain Control and Function  Outcome: Progressing  Goal: Skin Health and Integrity  Outcome: Progressing  Goal: Optimal Wound Healing  Outcome: Progressing     Problem: Fall Injury Risk  Goal: Absence of Fall and Fall-Related Injury  Outcome: Progressing     Problem: Skin Injury Risk Increased  Goal: Skin Health and Integrity  Outcome: Progressing

## 2024-09-22 NOTE — ED PROVIDER NOTES
Encounter Date: 9/21/2024       History     Chief Complaint   Patient presents with    Fall     Leg broke on barstool and fell backwards striking back of head on wood flood. + blood thinners. Family states patient did not have LOC. Wife states patient normally AAOx4. Patient currently states year is 1974.      88-year-old male with past medical history of bladder cancer in remission, AFib on Eliquis, T2DM now presenting with chief complaint of confusion following a fall.  Patient was accompanied by his wife who reports that tonight she gave him his medications and when she turned around the patient fell over backwards hitting his head on the ground.  Patient did not lose consciousness but was not oriented to time following the fall.  No preceding chest pain, shortness of breath, confusion prior to the fall and patient's wife reports the patient was normal before he fell.  Patient's wife tells me that on the ground the patient appeared to be choking on his pill required stimulation to regain normal breathing.  Upon arrival to the emergency department the patient was awake, alert but not oriented to time.    The history is provided by the patient and a relative.     Review of patient's allergies indicates:   Allergen Reactions    Niacin      Other reaction(s): Rash  Other reaction(s): Itching     Past Medical History:   Diagnosis Date    *Atrial fibrillation     Chronic kidney disease     Deep vein thrombosis     Hyperlipidemia     Hypertension     Metabolic syndrome     Type 2 diabetes mellitus, without long-term current use of insulin 12/13/2021     Past Surgical History:   Procedure Laterality Date    BIOPSY OF BLADDER N/A 4/12/2024    Procedure: BIOPSY, BLADDER;  Surgeon: Wellington Story MD;  Location: [facility] OR [address] [place] [address];  Service: Urology;  Laterality: N/A;    BLADDER FULGURATION N/A 4/12/2024    Procedure: FULGURATION, BLADDER;  Surgeon: Wellington Story MD;  Location: [facility] OR [address] [place] [address];  Service: Urology;  Laterality:  N/A;    CATARACT EXTRACTION      CHOLECYSTECTOMY      CYSTOSCOPY N/A 2023    Procedure: CYSTOSCOPY;  Surgeon: Marcelino Moffett MD;  Location: [facility] OR [address] [place] [address];  Service: Urology;  Laterality: N/A;  90 minutes    DILATION OF URETHRA N/A 2024    Procedure: DILATION, URETHRA;  Surgeon: Wellington Story MD;  Location: [facility] OR [address] [place] [address];  Service: Urology;  Laterality: N/A;    EYE SURGERY      INJECTION OF FACET JOINT Bilateral 2021    Procedure: FACET JOINT INJECTION BILATERAL L4/L5 DIRECT REFERRAL;  Surgeon: Philipp Iyer MD;  Location: [facility] PAIN MGT;  Service: Pain Management;  Laterality: Bilateral;  NEEDS CONSENT, ELIQUIS CLEARANCE IN CHART    RETROGRADE PYELOGRAPHY Bilateral 2023    Procedure: PYELOGRAM, RETROGRADE;  Surgeon: Marcelino Moffett MD;  Location: [address];  Service: Urology;  Laterality: Bilateral;  90 minutes    SKIN CANCER EXCISION      TONSILLECTOMY      TURBT (TRANSURETHRAL RESECTION OF BLADDER TUMOR) N/A 2023    Procedure: TURBT (TRANSURETHRAL RESECTION OF BLADDER TUMOR);  Surgeon: Marcelino Moffett MD;  Location: [address];  Service: Urology;  Laterality: N/A;  90 minutes    TURBT (TRANSURETHRAL RESECTION OF BLADDER TUMOR) N/A 2024    Procedure: TURBT (TRANSURETHRAL RESECTION OF BLADDER TUMOR);  Surgeon: Wellington Story MD;  Location: [address];  Service: Urology;  Laterality: N/A;     Family History   Problem Relation Name Age of Onset    Diabetes Maternal Aunt      Heart attack Neg Hx      Heart disease Neg Hx      Heart failure Neg Hx      Hyperlipidemia Neg Hx      Hypertension Neg Hx      Stroke Neg Hx       Social History     Tobacco Use    Smoking status: Former     Current packs/day: 0.00     Average packs/day: 2.0 packs/day for 20.0 years (40.0 ttl pk-yrs)     Types: Cigarettes     Start date: 1963     Quit date: 1983     Years since quittin.3     Passive exposure: Never    Smokeless tobacco: Never   Substance Use  Topics    Alcohol use: Yes     Alcohol/week: 1.0 standard drink of alcohol     Types: 1 Standard drinks or equivalent per week     Comment: 3 drinks per week    Drug use: No     Review of Systems  See HPI     Physical Exam     Initial Vitals [09/21/24 2218]   BP Pulse Resp Temp SpO2   134/78 95 18 97.8 °F (36.6 °C) 98 %      MAP       --         Physical Exam    Nursing note and vitals reviewed.      ABC intact  Disability: Awake, alert, not oriented to time, bilateral pupils 4 mm and irregularly shaped (prior cataract surgery) and minimally reactive to light, moving all extremities  Secondary survey:  Patient exposed with abrasion over occiput with no other breaks in skin or deformities noted.   Patient exposed without any observed deformities of spine, no step-offs or crepitus.    Trauma survey negative for tenderness to palpation over skull, spine, ribcage, large joints, all extremities    Gen:  Awake and alert but not oriented to time, well nourished, appears stated age, no pallor, no jaundice, appears well hydrated  Eye: EOMI, no scleral icterus, no periorbital edema or ecchymosis  Head: Normocephalic, abrasion over back of head without active bleeding or underlying crepitus, scalp appears normal  ENT: Neck supple, no stridor, no masses, no drooling or voice changes  CVS: All distal pulses intact with normal rate and rhythm, no JVD, normal S1/S2, no murmur  Pulm: Normal breath sounds, no wheezes, rales or rhonchi, no increased work of breathing  Abd:  Nondistended, soft, nontender, no organomegaly, no CVAT  Ext: No edema, no lesions, rashes, or deformity  Neuro: GCS15  Cranial nerves intact  Pupils equal and reactive to light  RUE  - power/tone/sensation intact   RLE  - power/tone/sensation intact   LUE  - power/tone/sensation intact   LLE  - power/tone/sensation intact   No clonus  Psych: normal affect, cooperative, well groomed, makes good eye contact      ED Course   Procedures  Labs Reviewed   CBC W/ AUTO  DIFFERENTIAL - Abnormal       Result Value    WBC 8.44      RBC 4.20 (*)     Hemoglobin 14.4      Hematocrit 43.8       (*)     MCH 34.3 (*)     MCHC 32.9      RDW 13.2      Platelets 208      MPV 12.3      Immature Granulocytes 1.9 (*)     Gran # (ANC) 5.0      Immature Grans (Abs) 0.16 (*)     Lymph # 2.2      Mono # 0.9      Eos # 0.1      Baso # 0.05      nRBC 0      Gran % 58.6      Lymph % 26.2      Mono % 11.0      Eosinophil % 1.7      Basophil % 0.6      Differential Method Automated     COMPREHENSIVE METABOLIC PANEL - Abnormal    Sodium 137      Potassium 4.8      Chloride 108      CO2 19 (*)     Glucose 155 (*)     BUN 51 (*)     Creatinine 1.6 (*)     Calcium 10.4      Total Protein 6.3      Albumin 3.5      Total Bilirubin 0.5      Alkaline Phosphatase 47 (*)     AST 22      ALT 18      eGFR 41.2 (*)     Anion Gap 10     TROPONIN I - Abnormal    Troponin I 0.063 (*)    URINALYSIS, REFLEX TO URINE CULTURE - Abnormal    Specimen UA Urine, Catheterized      Color, UA Yellow      Appearance, UA Hazy (*)     pH, UA 6.0      Specific Gravity, UA 1.020      Protein, UA 1+ (*)     Glucose, UA 4+ (*)     Ketones, UA Negative      Bilirubin (UA) Negative      Occult Blood UA 3+ (*)     Nitrite, UA Negative      Leukocytes, UA 3+ (*)     Narrative:     Specimen Source->Urine   B-TYPE NATRIURETIC PEPTIDE - Abnormal     (*)    URINALYSIS MICROSCOPIC - Abnormal    RBC, UA 22 (*)     WBC, UA >100 (*)     WBC Clumps, UA Rare      Bacteria Few (*)     Yeast, UA None      Squam Epithel, UA 0      Hyaline Casts, UA 1      Microscopic Comment SEE COMMENT      Narrative:     Specimen Source->Urine   CULTURE, URINE   APTT    aPTT 26.0            Imaging Results              CT Head Without Contrast (Final result)  Result time 09/22/24 06:16:53      Final result by Iban Hickman MD (09/22/24 06:16:53)                   Impression:      Compared to prior head CT 09/21/2024, 23:36 hours grossly stable small  volume subarachnoid hemorrhage, most conspicuous in parasagittal sulcus in the posterior right frontal lobe and medial left occipital lobe.    No definite new or enlarging intracranial hemorrhage.    Electronically signed by resident: Earle San  Date:    09/22/2024  Time:    06:01    Electronically signed by: Iban Hickman  Date:    09/22/2024  Time:    06:16               Narrative:    EXAMINATION:  CT HEAD WITHOUT CONTRAST    CLINICAL HISTORY:  SAH follow up;    TECHNIQUE:  Low dose axial CT images obtained throughout the head without the use of intravenous contrast.  Axial, sagittal and coronal reconstructions were performed.    COMPARISON:  CT head 09/21/2024    FINDINGS:  Intracranial compartment:    Ventricles and sulci are unchanged in size without evidence of hydrocephalus.    Compared to prior CT performed 09/21/2024, 23:36 hours, grossly stable subarachnoid hemorrhage.  Reference tiny focus of subarachnoid hemorrhage in a parasagittal sulcus in the posterior right frontal lobe (series 2, image 23; series 601, image 70).  Similar subarachnoid blood about the medial left occipital sulci (axial series 2, image 11).  No edema, mass effect or major vascular distribution infarct.    Skull/extracranial contents (limited evaluation):    No displaced calvarial fracture.    Patchy mucosal thickening in the ethmoid air cells.  Mastoid air cells are clear.                                        CT Head Without Contrast (Final result)  Result time 09/22/24 00:00:27      Final result by Omar Patel MD (09/22/24 00:00:27)                   Impression:      Tiny acute subarachnoid hemorrhage in a sulcus of the right parasagittal posterior frontal lobe.  Recommend short-term close follow-up.    This report was flagged in Epic as abnormal.    The critical information above was relayed directly by Omar Patel MD by [facility] secure chat to Afia Jimenez RN on 9/21/2024 at 23:51.      Electronically signed  by: Omar Patel MD  Date:    09/22/2024  Time:    00:00               Narrative:    EXAMINATION:  CT HEAD WITHOUT CONTRAST    CLINICAL HISTORY:  Facial trauma, blunt;    TECHNIQUE:  Low dose axial images were obtained through the head.  Coronal and sagittal reformations were also performed. Contrast was not administered.    COMPARISON:  None.    FINDINGS:  Tiny subarachnoid hemorrhage in a parasagittal sulcus posterior right frontal lobe.  There is no evidence of acute infarct, intra-axial hemorrhage, or mass.  There is ventricular and sulcal enlargement consistent with generalized atrophy.  Mild confluent decreased supratentorial white matter attenuation most likely related to chronic nonspecific small vessel disease.   No mass-effect or midline shift.  There are no abnormal extra-axial fluid collections.  The paranasal sinuses and mastoid air cells are essentially clear .  The calvarium appears intact.  .                                       CT Cervical Spine Without Contrast (Final result)  Result time 09/22/24 00:26:26      Final result by mOar Patel MD (09/22/24 00:26:26)                   Impression:      No acute cervical fracture.    Heterogeneous low-attenuation nodule arising off the posterior aspect lower pole right lobe thyroid measuring up to 2.8 cm.  Further evaluation can be obtained with nonemergent thyroid ultrasound.    Additional findings as above.      Electronically signed by: Omar Patel MD  Date:    09/22/2024  Time:    00:26               Narrative:    EXAMINATION:  CT CERVICAL SPINE WITHOUT CONTRAST    CLINICAL HISTORY:  Neck trauma (Age >= 65y);    TECHNIQUE:  Low dose axial images, sagittal and coronal reformations were performed though the cervical spine.  Contrast was not administered.    COMPARISON:  None    FINDINGS:    Normal alignment. No prevertebral soft tissue thickening. The craniocervical junction, the dens and cervical vertebra appear adequately  maintained.Multilevel degenerative disc space narrowing C2-3, C4-5, C5-6 and C6-7.    Subcutaneous sebaceous cyst the right of midline measuring 1.9 cm.    Heterogeneous low-attenuation nodule arising off the posterior aspect lower pole right lobe thyroid measuring up to 2.8 cm.                                       Medications   cefTRIAXone (Rocephin) 1 g in D5W 100 mL IVPB (MB+) (has no administration in time range)   labetaloL injection 5 mg (5 mg Intravenous Given 9/22/24 0127)     Medical Decision Making  Initial assessment  88-year-old male with past medical history of bladder cancer in remission, AFib on Eliquis, T2DM now presenting with chief complaint of confusion following a fall. Patient is able to vocalise, breathing spontaneously, hemodynamically stable, awake and alert, moving all 4 limbs spontaneously.  Examination consistent with decreased orientation to time and abrasion over back of head.      Differential diagnosis  Intracranial bleeding  CVA  Concussion  Fractures including skull and C-spine  Electrolyte/metabolic derangements  ACS  Infection      ED management  On initial presentation patient was not oriented to time and high suspicion for intracranial bleeding versus head injury.  Decided to obtain imaging of head and neck in addition to broad workup for cause of fall.  Workup consistent with CBC and CMP grossly within normal limits for patient's baseline including CKD.  Troponin and BNP elevated but well within patient's baseline.  UA consistent with UTI and patient was given Rocephin.  Initial CT head showed subarachnoid hemorrhage and patient was discussed with neurosurgery who recommended maintaining blood pressure less than 150 and repeating CT scan in 4 hours but did not recommend reversing Eliquis or discussing patient with Neuro Critical Care.    Repeat CT scan obtained showed no change in bleed and on re-evaluation patient was reoriented to time and well-appearing without any focal  neurological deficits.  Was discussed with Hospital Medicine who agreed to admit the patient to their service for management of UTI in addition to further monitoring for worsening mental status.      Amount and/or Complexity of Data Reviewed  Labs: ordered. Decision-making details documented in ED Course.  Radiology: ordered. Decision-making details documented in ED Course.    Risk  Prescription drug management.               ED Course as of 09/22/24 0700   Sun Sep 22, 2024   0023 Attending attestation:   This is an 89-year-old male with a history significant for atrial fibrillation on Eliquis, hypertension presenting to the emergency department after a fall and head strike this evening.  Wife was out of the room, but she heard a commotion and found patient lying on the ground on his back next to a stool that had been returned as well.  He had an obvious wound to the back of his head, and she notes that he seemed a bit confused afterwards.  On my initial evaluation, his blood pressure is stable in the 150 systolic, heart rate in the 80s.  He is awake and alert and oriented to person and place.  His neurologic exam including bilateral strength and sensory testing reveals equal bilateral strength and sensation.  No cranial nerve deficits noted.  CT of the head and C-spine ordered, and initial interpretation of head CT shows tiny focus of subarachnoid blood in the right frontal lobe.  We have reached out to Neurosurgery who recommends blood pressure less than 150 systolic, repeat head CT in 4-6 hours.  If stable likely okay to go to floor under their care.  Does not recommend reversal at this time given the small size. [MB]   0047 Troponin I(!): 0.063  Baseline [PM]   0047 BNP(!): 258  Baseline [PM]   0057 WBC: 8.44 [PM]   0057 Hemoglobin: 14.4 [PM]   0057 Platelet Count: 208 [PM]   0057 Potassium: 4.8 [PM]   0057 BUN(!): 51 [PM]   0057 Creatinine(!): 1.6 [PM]   0057 Comprehensive metabolic panel(!)  Consistent with  CKD [PM]   0100 CT Cervical Spine Without Contrast  As per my independent interpretation no fracture or dislocation [PM]   0136 CT Head Without Contrast(!)  As per my independent interpretation tiny subarachnoid hemorrhage [PM]   0639 Leukocyte Esterase, UA(!): 3+ [PM]   0639 RBC, UA(!): 22 [PM]   0639 WBC, UA(!): >100 [PM]   0639 Bacteria, UA(!): Few [PM]      ED Course User Index  [MB] Marcelino Nieto MD  [PM] Ramon Urbina MD                           Clinical Impression:  Final diagnoses:  [R40.20] Loss of consciousness  [W19.XXXA] Fall  [N30.00] Acute cystitis without hematuria (Primary)  [I60.9] Subarachnoid hemorrhage          ED Disposition Condition    Observation Stable                Ramon Urbina MD  Resident  09/22/24 0700

## 2024-09-22 NOTE — ASSESSMENT & PLAN NOTE
Mild JER on CKD  - Creatinine today Estimated Creatinine Clearance: 30 mL/min (based on SCr of 1.4 mg/dL). (near baseline)  Recent Labs   Lab 09/21/24  2258 09/22/24  0831   CO2 19* 15*   BUN 51* 45*   CREATININE 1.6* 1.4   MG  --  2.0   PHOS  --  3.1   - Pt with urinary retention in the ED requiring straight cath x 1, likely post-renal   - UA infectious, see UTI   - Monitor BMP daily, replete electrolytes if necessary  - Renally adjust drugs to CrCl; avoid nephrotoxic drugs if possible  - Monitor I/Os

## 2024-09-22 NOTE — HPI
Cliff Magaña is a 89 y.o. M with HTN, HLD, A fib on eliquis, bladder cancer s/p chemotherapy in remission, T2DM, CKD, and DVT who presented to the ED with confusion s/p unwitnessed mechanical fall at home. The patient is accompanied by his wife, Nereyda, who reports he was in his usual state of health last night while she was giving his evening medications and fell shortly after. Pt reports tripping and falling backwards striking the back of his head on the ground. He did not lose consciousness but was confused and disoriented after falling. He is typically AAOx3 and ambulates with a cane for short distances and a rollater walker for longer distances at baseline. The patient denies preceding symptoms including fever, chills, chest pain, palpitations, SOB, cough, or confusion. He also denies abdominal pain, N/V/D, dysuria, leg swelling or pain, HA, syncope or recent sick contacts.       In the ED: VSSAF. CBC with mild, stable anemia, no leukocytosis. CMP with mild JER with Cr 1.6 from baseline ~1.3. Troponin 0.063 -> 0.065. . EKG c/w known, rate controlled A fib, no ST changes. Pt with difficulty urinating in the ED. Straight cath x1 performed and UA c/w UTI with >100 WBCs, 22 RBCs, 3+ leukocyte esterase. CXR without acute process. CT c-spine without acute fracture. CT head concerning for tiny subarachnoid hemorrhage in a parasagittal sulcus posterior right frontal lobe. NSGY consulted and 6hr repeat CTH was stable. Pt placed in observation for further management.

## 2024-09-22 NOTE — H&P
[facility] - Emergency Dept  [facility] Medicine  History & Physical    Patient Name: Cliff Magaña  MRN: [id number]  Patient Class: OP- Observation  Admission Date: 9/21/2024  Attending Physician: Nesha Swenson MD   Primary Care Provider: Munir Estrada MD         Patient information was obtained from patient, spouse/SO, and ER records.     Subjective:     Principal Problem:SAH (subarachnoid hemorrhage)    Chief Complaint:   Chief Complaint   Patient presents with    Fall     Leg broke on barstool and fell backwards striking back of head on wood flood. + blood thinners. Family states patient did not have LOC. Wife states patient normally AAOx4. Patient currently states year is 1974.         HPI: Cliff Magaña is a 89 y.o. M with HTN, HLD, A fib on eliquis, bladder cancer s/p chemotherapy in remission, T2DM, CKD, and DVT who presented to the ED with confusion s/p unwitnessed mechanical fall at home. The patient is accompanied by his wife, Nereyda, who reports he was in his usual state of health last night while she was giving his evening medications and fell shortly after. Pt reports tripping and falling backwards striking the back of his head on the ground. He did not lose consciousness but was confused and disoriented after falling. He is typically AAOx3 and ambulates with a cane for short distances and a rollater walker for longer distances at baseline. The patient denies preceding symptoms including fever, chills, chest pain, palpitations, SOB, cough, or confusion. He also denies abdominal pain, N/V/D, dysuria, leg swelling or pain, HA, syncope or recent sick contacts.       In the ED: VSSAF. CBC with mild, stable anemia, no leukocytosis. CMP with mild JER with Cr 1.6 from baseline ~1.3. Troponin 0.063 -> 0.065. . EKG c/w known, rate controlled A fib, no ST changes. Pt with difficulty urinating in the ED. Straight cath x1 performed and UA c/w UTI with >100 WBCs, 22 RBCs, 3+ leukocyte esterase. CXR  without acute process. CT c-spine without acute fracture. CT head concerning for tiny subarachnoid hemorrhage in a parasagittal sulcus posterior right frontal lobe. NSGY consulted and 6hr repeat CTH was stable. Pt placed in observation for further management.      Past Medical History:   Diagnosis Date    *Atrial fibrillation     Chronic kidney disease     Deep vein thrombosis     Hyperlipidemia     Hypertension     Metabolic syndrome     Type 2 diabetes mellitus, without long-term current use of insulin 12/13/2021       Past Surgical History:   Procedure Laterality Date    BIOPSY OF BLADDER N/A 4/12/2024    Procedure: BIOPSY, BLADDER;  Surgeon: Wellington Story MD;  Location: [facility] OR [address] [place] [address];  Service: Urology;  Laterality: N/A;    BLADDER FULGURATION N/A 4/12/2024    Procedure: FULGURATION, BLADDER;  Surgeon: Wellington Story MD;  Location: [facility] OR [address] [place] [address];  Service: Urology;  Laterality: N/A;    CATARACT EXTRACTION      CHOLECYSTECTOMY      CYSTOSCOPY N/A 11/16/2023    Procedure: CYSTOSCOPY;  Surgeon: Marcelino Moffett MD;  Location: [facility] OR [address] [place] [address];  Service: Urology;  Laterality: N/A;  90 minutes    DILATION OF URETHRA N/A 4/12/2024    Procedure: DILATION, URETHRA;  Surgeon: Wellington Story MD;  Location: [facility] OR [address] [place] [address];  Service: Urology;  Laterality: N/A;    EYE SURGERY      INJECTION OF FACET JOINT Bilateral 4/28/2021    Procedure: FACET JOINT INJECTION BILATERAL L4/L5 DIRECT REFERRAL;  Surgeon: Philipp Iyer MD;  Location: [facility] PAIN MGT;  Service: Pain Management;  Laterality: Bilateral;  NEEDS CONSENT, ELIQUIS CLEARANCE IN CHART    RETROGRADE PYELOGRAPHY Bilateral 11/16/2023    Procedure: PYELOGRAM, RETROGRADE;  Surgeon: Marcelino Moffett MD;  Location: [address];  Service: Urology;  Laterality: Bilateral;  90 minutes    SKIN CANCER EXCISION      TONSILLECTOMY      TURBT (TRANSURETHRAL RESECTION OF BLADDER TUMOR) N/A 11/16/2023    Procedure: TURBT (TRANSURETHRAL RESECTION OF BLADDER TUMOR);   Surgeon: Marcelino Moffett MD;  Location: [facility] OR [address] [place] [address];  Service: Urology;  Laterality: N/A;  90 minutes    TURBT (TRANSURETHRAL RESECTION OF BLADDER TUMOR) N/A 4/12/2024    Procedure: TURBT (TRANSURETHRAL RESECTION OF BLADDER TUMOR);  Surgeon: Wellington Story MD;  Location: [facility] OR [address] [place] [address];  Service: Urology;  Laterality: N/A;       Review of patient's allergies indicates:   Allergen Reactions    Niacin      Other reaction(s): Rash  Other reaction(s): Itching       No current facility-administered medications on file prior to encounter.     Current Outpatient Medications on File Prior to Encounter   Medication Sig    acetaminophen (TYLENOL) 650 MG TbSR Take 650 mg by mouth every 8 (eight) hours.    cholecalciferol, vitamin D3, (VITAMIN D3) 50 mcg (2,000 unit) Cap capsule Take by mouth once daily.    cyclobenzaprine (FLEXERIL) 5 MG tablet Take 1 tablet (5 mg total) by mouth 3 (three) times daily as needed for Muscle spasms.    ELIQUIS 5 mg Tab TAKE 1 TABLET TWICE A DAY    fenofibrate micronized (LOFIBRA) 200 MG Cap TAKE 1 CAPSULE DAILY WITH BREAKFAST    fenofibrate micronized (LOFIBRA) 200 MG Cap TAKE 1 CAPSULE DAILY WITH BREAKFAST    JARDIANCE 10 mg tablet TAKE 1 TABLET DAILY    loratadine (CLARITIN) 10 mg tablet Take 10 mg by mouth once daily.    megestroL (MEGACE) 400 mg/10 mL (40 mg/mL) Susp TAKE 10 MLS (400 MG TOTAL) BY MOUTH ONCE DAILY.    metFORMIN (GLUCOPHAGE-XR) 500 MG ER 24hr tablet TAKE 1 TABLET DAILY    metoprolol succinate (TOPROL-XL) 100 MG 24 hr tablet Take 1 tablet (100 mg total) by mouth 2 (two) times daily.    mirtazapine (REMERON) 7.5 MG Tab TAKE 1 TABLET BY MOUTH EVERY EVENING.    mupirocin (BACTROBAN) 2 % ointment Apply topically 2 (two) times daily.    ondansetron (ZOFRAN) 8 MG tablet Take 1 tablet (8 mg total) by mouth every 12 (twelve) hours as needed for Nausea.    sacubitriL-valsartan (ENTRESTO) 24-26 mg per tablet Take 1 tablet by mouth 2 (two) times daily.    [DISCONTINUED]  dexAMETHasone (DECADRON) 4 MG Tab TAKE EVERY 12 HOURS DAILY FOR DAYS 2 THOUGH 4 FOLLOWING CHEMOTHERAPY    [DISCONTINUED] OLANZapine (ZYPREXA) 5 MG tablet TAKE NIGHTLY FOR 4 DAYS FOLLOWING CHEMOTHERAPY     Family History       Problem Relation (Age of Onset)    Diabetes Maternal Aunt          Tobacco Use    Smoking status: Former     Current packs/day: 0.00     Average packs/day: 2.0 packs/day for 20.0 years (40.0 ttl pk-yrs)     Types: Cigarettes     Start date: 1963     Quit date: 1983     Years since quittin.3     Passive exposure: Never    Smokeless tobacco: Never   Substance and Sexual Activity    Alcohol use: Yes     Alcohol/week: 1.0 standard drink of alcohol     Types: 1 Standard drinks or equivalent per week     Comment: 3 drinks per week    Drug use: No    Sexual activity: Not Currently     Partners: Female     Review of Systems   Constitutional:  Positive for activity change and appetite change. Negative for chills and fever.   HENT:  Positive for trouble swallowing (chronic).    Eyes:  Negative for photophobia and visual disturbance.   Respiratory:  Negative for chest tightness, shortness of breath and wheezing.    Cardiovascular:  Negative for chest pain, palpitations and leg swelling.   Gastrointestinal:  Negative for abdominal pain, constipation, diarrhea, nausea and vomiting.   Genitourinary:  Positive for difficulty urinating and urgency. Negative for dysuria, frequency and hematuria.   Musculoskeletal:  Positive for arthralgias. Negative for back pain and gait problem.   Skin:  Positive for wound. Negative for color change and rash.   Neurological:  Positive for weakness (chronic). Negative for dizziness, syncope, light-headedness, numbness and headaches.   Psychiatric/Behavioral:  Positive for confusion. Negative for agitation. The patient is not nervous/anxious.      Objective:     Vital Signs (Most Recent):  Temp: 97.3 °F (36.3 °C) (24 0313)  Pulse: 90 (24 1030)  Resp:  15 (09/22/24 1030)  BP: (!) 117/56 (09/22/24 1030)  SpO2: 97 % (09/22/24 1030) Vital Signs (24h Range):  Temp:  [97.3 °F (36.3 °C)-97.8 °F (36.6 °C)] 97.3 °F (36.3 °C)  Pulse:  [80-95] 90  Resp:  [10-32] 15  SpO2:  [96 %-100 %] 97 %  BP: (105-182)/(51-78) 117/56     Weight: 60.4 kg (133 lb 2.5 oz)  Body mass index is 22.86 kg/m².     Physical Exam  Vitals and nursing note reviewed.   Constitutional:       General: He is not in acute distress.     Appearance: He is well-developed. He is ill-appearing.   HENT:      Head: Normocephalic.      Mouth/Throat:      Mouth: Mucous membranes are dry.   Eyes:      Extraocular Movements: Extraocular movements intact.      Conjunctiva/sclera: Conjunctivae normal.   Cardiovascular:      Rate and Rhythm: Normal rate. Rhythm irregular.      Heart sounds: Normal heart sounds.   Pulmonary:      Effort: Pulmonary effort is normal. No respiratory distress.      Breath sounds: Normal breath sounds. No wheezing.   Abdominal:      General: Bowel sounds are normal. There is no distension.      Palpations: Abdomen is soft.      Tenderness: There is no abdominal tenderness.   Musculoskeletal:         General: No tenderness. Normal range of motion.      Cervical back: Normal range of motion and neck supple. No tenderness.      Right lower leg: No edema.      Left lower leg: No edema.   Skin:     General: Skin is warm and dry.      Capillary Refill: Capillary refill takes less than 2 seconds.      Findings: Abrasion (posterior scalp) and bruising present. No rash.   Neurological:      Mental Status: He is alert and oriented to person, place, and time. Mental status is at baseline.      Cranial Nerves: No cranial nerve deficit.      Motor: Weakness (generalized) present.   Psychiatric:         Behavior: Behavior normal.         Thought Content: Thought content normal.         Judgment: Judgment normal.                Significant Labs: All pertinent labs within the past 24 hours have been  "reviewed.    Significant Imaging: I have reviewed all pertinent imaging results/findings within the past 24 hours.  Assessment/Plan:     * SAH (subarachnoid hemorrhage)  Mechanical Fall  Debility   - 89 y.o. male with A fib on eliquis who presents with confusion s/p mechanical fall now with small right frontal lobe traumatic SAH:  - CTH 9/21: tiny subarachnoid hemorrhage in a parasagittal sulcus posterior right frontal lobe  - CTH 9/22: stable compared to prior   - NSGY consulted, appreciate recs:   All labs and diagnostics reviewed  CTH and 6h scan stable  No acute surgical intervention at this time  Admit to medicine for suspected UTI  Pain medications per primary team  Pt can follow up in our clinic in 2 weeks with repeat CT head. Our office will arrange appointment.  - PT/OT consulted, appreciate recs    Acute cystitis without hematuria  Complicated UTI  Urinary Retention   - 89 y.o. M with brief episode of confusion presenting with acute urinary retention and UTI   - Afebrile without leukocytosis   - CMP with mild JER with Cr 1.6 from baseline ~1.3. Pt with difficulty urinating in the ED. Straight cath x1 performed    - start flomax daily   - continue bladder scans q6h with plan to place jaquez to gravity for continued retention   - UA c/w UTI with >100 WBCs, 22 RBCs, 3+ leukocyte esterase  - previous history of UTI with pan-sensitive e.coli, no history of ESBL   - start empiric ceftriaxone 1g daily   - urine culture pending    DM type 2, controlled, with complication  Patient's FSGs are controlled on current medication regimen.  Last A1c reviewed-   Lab Results   Component Value Date    HGBA1C 8.0 (H) 07/01/2024     Most recent fingerstick glucose reviewed- No results for input(s): "POCTGLUCOSE" in the last 24 hours.  Current correctional scale  Low  Decrease anti-hyperglycemic dose as follows-   Antihyperglycemics (From admission, onward)      Start     Stop Route Frequency Ordered    09/22/24 1335  insulin " aspart U-100 pen 0-5 Units         -- SubQ Before meals & nightly PRN 09/22/24 1235        - Hold oral hypoglycemics while patient is in the hospital.    Persistent atrial fibrillation  Chronic anticoagulation   Patient with Persistent (7 days or more) atrial fibrillation which is controlled currently with Beta Blocker. Patient is currently in atrial fibrillation.[id number] Score: 5. Anticoagulation indicated. Anticoagulation done with eliquis .  - Hold eliquis for SAH, restart when clinically appropriate     Hyperlipidemia associated with type 2 diabetes mellitus  - Continue statin    Hypertension associated with diabetes  - Controlled on admission   - Latest BP and vitals reviewed  - Continue home meds for HTN:   Hypertension Medications               metoprolol succinate (TOPROL-XL) 100 MG 24 hr tablet Take 1 tablet (100 mg total) by mouth 2 (two) times daily.    sacubitriL-valsartan (ENTRESTO) 24-26 mg per tablet Take 1 tablet by mouth 2 (two) times daily.   - Goal SBP 120s-140s. Utilize p.r.n. antihypertensives only if patient's BP >180/110 & develop symptoms of worsening CP or SOB.    CKD stage 3 due to type 2 diabetes mellitus  Mild JER on CKD  - Creatinine today Estimated Creatinine Clearance: 30 mL/min (based on SCr of 1.4 mg/dL). (near baseline)  Recent Labs   Lab 09/21/24  2258 09/22/24  0831   CO2 19* 15*   BUN 51* 45*   CREATININE 1.6* 1.4   MG  --  2.0   PHOS  --  3.1   - Pt with urinary retention in the ED requiring straight cath x 1, likely post-renal   - UA infectious, see UTI   - Monitor BMP daily, replete electrolytes if necessary  - Renally adjust drugs to CrCl; avoid nephrotoxic drugs if possible  - Monitor I/Os       VTE Risk Mitigation (From admission, onward)           Ordered     Reason for No Pharmacological VTE Prophylaxis  Once        Question:  Reasons:  Answer:  Active Bleeding    09/22/24 0755     IP VTE HIGH RISK PATIENT  Once         09/22/24 0755     Place sequential compression  device  Until discontinued         09/22/24 0755                         On 09/22/2024, patient should be placed in hospital observation services under my care in collaboration with Dr. Nseha Swenson.           Afsaneh Thrasher PA-C  Department of Hospital Medicine  [facility] - Emergency Dept

## 2024-09-22 NOTE — ED NOTES
Nurses Note -- 4 Eyes      9/22/2024   10:46 AM      Skin assessed during: Admit      [] No Altered Skin Integrity Present    []Prevention Measures Documented      [x] Yes- Altered Skin Integrity Present or Discovered   [] LDA Added if Not in Epic (Describe Wound)   [] New Altered Skin Integrity was Present on Admit and Documented in LDA   [] Wound Image Taken    Wound Care Consulted? No    Attending Nurse:  Deniz Claros RN/Staff Member:  ronnie

## 2024-09-22 NOTE — HPI
Cliff Magaña is a 89 y.o. male who  has a past medical history of Atrial fibrillation on Eliquis, bladder cancer in remission, CKD, DVT, HLD, HTN, and T2DM who presents with confusion s/p mechanical fall. Pt had witnessed fall by wife who said pt fell over backwards and hit his head on the ground and barstool fell on him. Wife states Pt asked to turn on lights after he fell, despite the lights being on. Denies LOC. Not oriented to time after the fall. Pt had no preceding symptoms, including CP, SOB, or confusion. CT head concerning for tiny subarachnoid hemorrhage in a parasagittal sulcus posterior right frontal lobe.

## 2024-09-22 NOTE — ED NOTES
Pt unable to void, unsuccessful with external urinary catheter and urinal. Pt unable to ambulate at this time. MD Carlitos aware of retention, states to straight cath patient.

## 2024-09-22 NOTE — ASSESSMENT & PLAN NOTE
Complicated UTI  Urinary Retention   - 89 y.o. M with brief episode of confusion presenting with acute urinary retention and UTI   - Afebrile without leukocytosis   - CMP with mild JER with Cr 1.6 from baseline ~1.3. Pt with difficulty urinating in the ED. Straight cath x1 performed    - start flomax daily   - continue bladder scans q6h with plan to place jaquez to gravity for continued retention   - UA c/w UTI with >100 WBCs, 22 RBCs, 3+ leukocyte esterase  - previous history of UTI with pan-sensitive e.coli, no history of ESBL   - start empiric ceftriaxone 1g daily   - urine culture pending

## 2024-09-22 NOTE — ED NOTES
Patient identifiers for Cliff Magaña 89 y.o. male checked and correct.  Chief Complaint   Patient presents with    Fall     Leg broke on barstool and fell backwards striking back of head on wood flood. + blood thinners. Family states patient did not have LOC. Wife states patient normally AAOx4. Patient currently states year is 1974.      Past Medical History:   Diagnosis Date    *Atrial fibrillation     Chronic kidney disease     Deep vein thrombosis     Hyperlipidemia     Hypertension     Metabolic syndrome     Type 2 diabetes mellitus, without long-term current use of insulin 12/13/2021     Allergies reported:   Review of patient's allergies indicates:   Allergen Reactions    Niacin      Other reaction(s): Rash  Other reaction(s): Itching       Appearance: Pt awake, alert & oriented to person, place & time. Pt in no acute distress at present time. Pt is clean and well groomed with clothes appropriately fastened.   Skin: wounds present as documented  Musculoskeletal: Patient moving all extremities well, no obvious swelling or deformities noted.   Respiratory: Respirations spontaneous, even, and non-labored. Visible chest rise noted. Airway is open and patent. No accessory muscle use noted.   Neurologic: Sensation is intact. Speech is clear and appropriate. Eyes open spontaneously, behavior appropriate to situation, follows commands, facial expression symmetrical, bilateral hand grasp equal and even, purposeful motor response noted.  Cardiac: All peripheral pulses present. No Bilateral lower extremity edema. Cap refill is <3 seconds.  Abdomen: Abdomen soft, non distended, non tender to palpation.   : Pt voids independently, denies dysuria, hematuria, frequency.

## 2024-09-22 NOTE — ED NOTES
Per Dr. Urbina, goal for patient is to maintain SBP <140. SBP currently 135, labetolol to be held. Dr. Urbina informed.

## 2024-09-22 NOTE — NURSING
Nurses Note -- 4 Eyes      9/22/2024   6:21 PM      Skin assessed during: Admit      [] No Altered Skin Integrity Present    []Prevention Measures Documented      [x] Yes- Altered Skin Integrity Present or Discovered   [] LDA Added if Not in Epic (Describe Wound)   [x] New Altered Skin Integrity was Present on Admit and Documented in LDA   [x] Wound Image Taken    Wound Care Consulted? Yes    Attending Nurse:  Lyn Claros RN/Staff Member:   Isabelle

## 2024-09-22 NOTE — ASSESSMENT & PLAN NOTE
Chronic anticoagulation   Patient with Persistent (7 days or more) atrial fibrillation which is controlled currently with Beta Blocker. Patient is currently in atrial fibrillation.[id number] Score: 5. Anticoagulation indicated. Anticoagulation done with eliquis .  - Hold eliquis for SAH, restart when clinically appropriate

## 2024-09-22 NOTE — ASSESSMENT & PLAN NOTE
Cliff Magaña is a 89 y.o. male who has a past medical history of Atrial fibrillation on Eliquis, CKD, DVT, HLD, HTN, and T2DM who presents with confusion s/p mechanical fall now with small right frontal lobe traumatic SAH:    Imaging:  CTH 9/21: tiny subarachnoid hemorrhage in a parasagittal sulcus posterior right frontal lobe  CT Csp w/o 9/21: no fracture, thyroid nodule  CTH 9/22: stable    Plan:  Patient seen by NSGY at bedside  All labs and diagnostics reviewed  CTH and 6h scan stable  No acute surgical intervention at this time  Consider admit to medicine for suspected UTI  Pain medications per primary team  Pt can follow up in our clinic in 2 weeks with repeat CT head. Our office will arrange appointment.  Neurosurgery will sign off at this time. Please contact us with any questions.    Plan discussed with Dr. Medeiros

## 2024-09-22 NOTE — ED NOTES
Telemetry Verification   Patient placed on Telemetry Box  Verified with War Room  Box # [id number]   Monitor Tech    Rate 80   Rhythm ns

## 2024-09-22 NOTE — ASSESSMENT & PLAN NOTE
Mechanical Fall  Debility   - 89 y.o. male with A fib on eliquis who presents with confusion s/p mechanical fall now with small right frontal lobe traumatic SAH:  - CTH 9/21: tiny subarachnoid hemorrhage in a parasagittal sulcus posterior right frontal lobe  - CTH 9/22: stable compared to prior   - NSGY consulted, appreciate recs:   All labs and diagnostics reviewed  CTH and 6h scan stable  No acute surgical intervention at this time  Admit to medicine for suspected UTI  Pain medications per primary team  Pt can follow up in our clinic in 2 weeks with repeat CT head. Our office will arrange appointment.  - PT/OT consulted, appreciate recs

## 2024-09-22 NOTE — CONSULTS
[facility] - Emergency Dept  Neurosurgery  Consult Note    Inpatient consult to Neurosurgery  Consult performed by: Aureliano Menendez MD  Consult ordered by: Ramon Urbina MD        Subjective:     Chief Complaint/Reason for Admission: traumatic SAH    History of Present Illness: Cliff Magaña is a 89 y.o. male who  has a past medical history of Atrial fibrillation on Eliquis, bladder cancer in remission, CKD, DVT, HLD, HTN, and T2DM who presents with confusion s/p mechanical fall. Pt had witnessed fall by wife who said pt fell over backwards and hit his head on the ground and barstool fell on him. Wife states Pt asked to turn on lights after he fell, despite the lights being on. Denies LOC. Not oriented to time after the fall. Pt had no preceding symptoms, including CP, SOB, or confusion. CT head concerning for tiny subarachnoid hemorrhage in a parasagittal sulcus posterior right frontal lobe.    (Not in a hospital admission)      Review of patient's allergies indicates:   Allergen Reactions    Niacin      Other reaction(s): Rash  Other reaction(s): Itching       Past Medical History:   Diagnosis Date    *Atrial fibrillation     Chronic kidney disease     Deep vein thrombosis     Hyperlipidemia     Hypertension     Metabolic syndrome     Type 2 diabetes mellitus, without long-term current use of insulin 12/13/2021     Past Surgical History:   Procedure Laterality Date    BIOPSY OF BLADDER N/A 4/12/2024    Procedure: BIOPSY, BLADDER;  Surgeon: Wellington Story MD;  Location: [address];  Service: Urology;  Laterality: N/A;    BLADDER FULGURATION N/A 4/12/2024    Procedure: FULGURATION, BLADDER;  Surgeon: Wellington Story MD;  Location: [address];  Service: Urology;  Laterality: N/A;    CATARACT EXTRACTION      CHOLECYSTECTOMY      CYSTOSCOPY N/A 11/16/2023    Procedure: CYSTOSCOPY;  Surgeon: Marcelino Moffett MD;  Location: [address];  Service: Urology;  Laterality: N/A;  90 minutes    DILATION OF  URETHRA N/A 2024    Procedure: DILATION, URETHRA;  Surgeon: Wellington Story MD;  Location: [facility] OR [address] [place] [address];  Service: Urology;  Laterality: N/A;    EYE SURGERY      INJECTION OF FACET JOINT Bilateral 2021    Procedure: FACET JOINT INJECTION BILATERAL L4/L5 DIRECT REFERRAL;  Surgeon: Philipp Iyer MD;  Location: [facility] PAIN MGT;  Service: Pain Management;  Laterality: Bilateral;  NEEDS CONSENT, ELIQUIS CLEARANCE IN CHART    RETROGRADE PYELOGRAPHY Bilateral 2023    Procedure: PYELOGRAM, RETROGRADE;  Surgeon: Marcelino Moffett MD;  Location: [facility] OR [address] [place] [address];  Service: Urology;  Laterality: Bilateral;  90 minutes    SKIN CANCER EXCISION      TONSILLECTOMY      TURBT (TRANSURETHRAL RESECTION OF BLADDER TUMOR) N/A 2023    Procedure: TURBT (TRANSURETHRAL RESECTION OF BLADDER TUMOR);  Surgeon: Marcelino Moffett MD;  Location: [facility] OR [address] [place] [address];  Service: Urology;  Laterality: N/A;  90 minutes    TURBT (TRANSURETHRAL RESECTION OF BLADDER TUMOR) N/A 2024    Procedure: TURBT (TRANSURETHRAL RESECTION OF BLADDER TUMOR);  Surgeon: Wellington Story MD;  Location: [facility] OR [address] [place] [address];  Service: Urology;  Laterality: N/A;     Family History       Problem Relation (Age of Onset)    Diabetes Maternal Aunt          Tobacco Use    Smoking status: Former     Current packs/day: 0.00     Average packs/day: 2.0 packs/day for 20.0 years (40.0 ttl pk-yrs)     Types: Cigarettes     Start date: 1963     Quit date: 1983     Years since quittin.3     Passive exposure: Never    Smokeless tobacco: Never   Substance and Sexual Activity    Alcohol use: Yes     Alcohol/week: 1.0 standard drink of alcohol     Types: 1 Standard drinks or equivalent per week     Comment: 3 drinks per week    Drug use: No    Sexual activity: Not Currently     Partners: Female     Review of Systems  Objective:     Weight: 60.4 kg (133 lb 2.5 oz)  Body mass index is 22.86 kg/m².  Vital Signs (Most Recent):  Temp: 97.3 °F (36.3 °C)  (09/22/24 0313)  Pulse: 83 (09/22/24 0630)  Resp: 20 (09/22/24 0747)  BP: 130/60 (09/22/24 0630)  SpO2: 100 % (09/22/24 0630) Vital Signs (24h Range):  Temp:  [97.3 °F (36.3 °C)-97.8 °F (36.6 °C)] 97.3 °F (36.3 °C)  Pulse:  [80-95] 83  Resp:  [10-32] 20  SpO2:  [98 %-100 %] 100 %  BP: (105-182)/(51-78) 130/60                         Male External Urinary Catheter 09/22/24 0150 (Active)          Physical Exam       Neurosurgery Physical Exam  Neuro Exam  General: AOx3, GCS E4V5M6   CNII-XII: Intact on exam, PERRL, visual fields grossly intact, EOMi, facial sensation preserved, no facial asymmetry, tongue/uvula/palate midline, shoulder shrug equal, no pronator drift   Extremities: 5/5 motor throughout, sensorium intact throughout, coordination intact throughout, DTRs 2+, no pathological reflexes, no sensory level present     General: Awake, Alert, Oriented   Head: abrasion to posterior scalp  Eyes: Pupils equal, EOMi   Neck: Supple, normal ROM, no tenderness to palpation   CVS: Normal rate and rhythm, distal pulses present   Pulm: Symmetric expansion, no respiratory distress   GI: Abdomen soft, nondistended, nontender   MSK: Moves all extremities without restriction, atraumatic   Skin: Dry, intact   Psych: Normal thought content and cognition      Significant Labs:  Recent Labs   Lab 09/21/24 2258   *      K 4.8      CO2 19*   BUN 51*   CREATININE 1.6*   CALCIUM 10.4     Recent Labs   Lab 09/21/24 2258 09/22/24 0831   WBC 8.44 8.73   HGB 14.4 13.5*   HCT 43.8 41.8    209     Recent Labs   Lab 09/21/24 2258   APTT 26.0     Microbiology Results (last 7 days)       Procedure Component Value Units Date/Time    Urine culture [[id number]] Collected: 09/22/24 0221    Order Status: No result Specimen: Urine Updated: 09/22/24 0312          Recent Lab Results         09/22/24 0831 09/22/24 0221 09/21/24  2258   09/21/24  2237        Albumin     3.5         ALP     47         ALT     18          Anion Gap     10         Appearance, UA   Hazy           PTT     26.0  Comment: Refer to local heparin nomogram for intensity/dose specific   therapeutic   range.           AST     22         Bacteria, UA   Few           Baso # 0.04     0.05         Basophil % 0.5     0.6         Bilirubin (UA)   Negative           BILIRUBIN TOTAL     0.5  Comment: For infants and newborns, interpretation of results should be based  on gestational age, weight and in agreement with clinical  observations.    Premature Infant recommended reference ranges:  Up to 24 hours.............<8.0 mg/dL  Up to 48 hours............<12.0 mg/dL  3-5 days..................<15.0 mg/dL  6-29 days.................<15.0 mg/dL           BNP     258  Comment: Values of less than 100 pg/ml are consistent with non-CHF populations.         BUN     51         Calcium     10.4         Chloride     108         CO2     19         Color, UA   Yellow           Creatinine     1.6         Differential Method Automated     Automated         eGFR     41.2         Eos # 0.2     0.1         Eos % 1.9     1.7         Glucose     155         Glucose, UA   4+           Gran # (ANC) 5.8     5.0         Gran % 66.2     58.6         Hematocrit 41.8     43.8         Hemoglobin 13.5     14.4         Hyaline Casts, UA   1           Immature Grans (Abs) 0.10  Comment: Mild elevation in immature granulocytes is non specific and   can be seen in a variety of conditions including stress response,   acute inflammation, trauma and pregnancy. Correlation with other   laboratory and clinical findings is essential.       0.16  Comment: Mild elevation in immature granulocytes is non specific and   can be seen in a variety of conditions including stress response,   acute inflammation, trauma and pregnancy. Correlation with other   laboratory and clinical findings is essential.           Immature Granulocytes 1.1     1.9         Ketones, UA   Negative           Leukocyte Esterase, UA    3+           Lymph # 1.8     2.2         Lymph % 21.1     26.2         MCH 33.3     34.3         MCHC 32.3     32.9              104         Microscopic Comment   SEE COMMENT  Comment: Other formed elements not mentioned in the report are not   present in the microscopic examination.              Mono # 0.8     0.9         Mono % 9.2     11.0         MPV 11.8     12.3         NITRITE UA   Negative           nRBC 0     0         Blood, UA   3+           QRS Duration       112       OHS QTC Calculation       413       pH, UA   6.0           Platelet Count 209     208         Potassium     4.8         PROTEIN TOTAL     6.3         Protein, UA   1+  Comment: Recommend a 24 hour urine protein or a urine   protein/creatinine ratio if globulin induced proteinuria is  clinically suspected.             RBC 4.06     4.20         RBC, UA   22           RDW 13.1     13.2         Sodium     137         Spec Grav UA   1.020           Specimen UA   Urine, Catheterized           Squam Epithel, UA   0           Troponin I     0.063  Comment: The reference interval for Troponin I represents the 99th percentile   cutoff   for our facility and is consistent with 3rd generation assay   performance.           WBC Clumps, UA   Rare           WBC, UA   >100           WBC 8.73     8.44         Yeast, UA   None                 All pertinent labs from the last 24 hours have been reviewed.    Significant Diagnostics:  CT: CT Head Without Contrast    Result Date: 9/22/2024  Tiny acute subarachnoid hemorrhage in a sulcus of the right parasagittal posterior frontal lobe.  Recommend short-term close follow-up. This report was flagged in Epic as abnormal. The critical information above was relayed directly by Omar Patel MD by [facility] secure chat to Afia Jimenez RN on 9/21/2024 at 23:51. Electronically signed by: Omar Patel MD Date:    09/22/2024 Time:    00:00   MRI: No results found in the last 24 hours.  I have reviewed all  pertinent imaging results/findings within the past 24 hours.  I have reviewed and interpreted all pertinent imaging results/findings within the past 24 hours.  Assessment/Plan:     SAH (subarachnoid hemorrhage)  Cliff Magaña is a 89 y.o. male who has a past medical history of Atrial fibrillation on Eliquis, CKD, DVT, HLD, HTN, and T2DM who presents with confusion s/p mechanical fall now with small right frontal lobe traumatic SAH:    Imaging:  CTH 9/21: tiny subarachnoid hemorrhage in a parasagittal sulcus posterior right frontal lobe  CT Csp w/o 9/21: no fracture, thyroid nodule  CTH 9/22: stable    Plan:  Patient seen by NSGY at bedside  All labs and diagnostics reviewed  CTH and 6h scan stable  No acute surgical intervention at this time  Consider admit to medicine for suspected UTI  Pain medications per primary team  Pt can follow up in our clinic in 2 weeks with repeat CT head. Our office will arrange appointment.  Neurosurgery will sign off at this time. Please contact us with any questions.    Plan discussed with Dr. Medeiros          Thank you for your consult. We will sign off. Please contact us if you have any additional questions.    Aureliano Menendez MD  Neurosurgery  [facility] - Emergency Dept

## 2024-09-22 NOTE — ASSESSMENT & PLAN NOTE
- Controlled on admission   - Latest BP and vitals reviewed  - Continue home meds for HTN:   Hypertension Medications               metoprolol succinate (TOPROL-XL) 100 MG 24 hr tablet Take 1 tablet (100 mg total) by mouth 2 (two) times daily.    sacubitriL-valsartan (ENTRESTO) 24-26 mg per tablet Take 1 tablet by mouth 2 (two) times daily.   - Goal SBP 120s-140s. Utilize p.r.n. antihypertensives only if patient's BP >180/110 & develop symptoms of worsening CP or SOB.

## 2024-09-22 NOTE — SUBJECTIVE & OBJECTIVE
(Not in a hospital admission)      Review of patient's allergies indicates:   Allergen Reactions    Niacin      Other reaction(s): Rash  Other reaction(s): Itching       Past Medical History:   Diagnosis Date    *Atrial fibrillation     Chronic kidney disease     Deep vein thrombosis     Hyperlipidemia     Hypertension     Metabolic syndrome     Type 2 diabetes mellitus, without long-term current use of insulin 12/13/2021     Past Surgical History:   Procedure Laterality Date    BIOPSY OF BLADDER N/A 4/12/2024    Procedure: BIOPSY, BLADDER;  Surgeon: Wellington Story MD;  Location: [address];  Service: Urology;  Laterality: N/A;    BLADDER FULGURATION N/A 4/12/2024    Procedure: FULGURATION, BLADDER;  Surgeon: Wellington Story MD;  Location: [address];  Service: Urology;  Laterality: N/A;    CATARACT EXTRACTION      CHOLECYSTECTOMY      CYSTOSCOPY N/A 11/16/2023    Procedure: CYSTOSCOPY;  Surgeon: Marcelino Moffett MD;  Location: [address];  Service: Urology;  Laterality: N/A;  90 minutes    DILATION OF URETHRA N/A 4/12/2024    Procedure: DILATION, URETHRA;  Surgeon: Wellington Story MD;  Location: [address];  Service: Urology;  Laterality: N/A;    EYE SURGERY      INJECTION OF FACET JOINT Bilateral 4/28/2021    Procedure: FACET JOINT INJECTION BILATERAL L4/L5 DIRECT REFERRAL;  Surgeon: Philipp Iyer MD;  Location: [facility] PAIN MGT;  Service: Pain Management;  Laterality: Bilateral;  NEEDS CONSENT, ELIQUIS CLEARANCE IN CHART    RETROGRADE PYELOGRAPHY Bilateral 11/16/2023    Procedure: PYELOGRAM, RETROGRADE;  Surgeon: Marcelino Moffett MD;  Location: [address];  Service: Urology;  Laterality: Bilateral;  90 minutes    SKIN CANCER EXCISION      TONSILLECTOMY      TURBT (TRANSURETHRAL RESECTION OF BLADDER TUMOR) N/A 11/16/2023    Procedure: TURBT (TRANSURETHRAL RESECTION OF BLADDER TUMOR);  Surgeon: Marcelino Moffett MD;  Location: [address];  Service: Urology;  Laterality: N/A;  90  minutes    TURBT (TRANSURETHRAL RESECTION OF BLADDER TUMOR) N/A 2024    Procedure: TURBT (TRANSURETHRAL RESECTION OF BLADDER TUMOR);  Surgeon: Wellington Story MD;  Location: [facility] OR [address] [place] [address];  Service: Urology;  Laterality: N/A;     Family History       Problem Relation (Age of Onset)    Diabetes Maternal Aunt          Tobacco Use    Smoking status: Former     Current packs/day: 0.00     Average packs/day: 2.0 packs/day for 20.0 years (40.0 ttl pk-yrs)     Types: Cigarettes     Start date: 1963     Quit date: 1983     Years since quittin.3     Passive exposure: Never    Smokeless tobacco: Never   Substance and Sexual Activity    Alcohol use: Yes     Alcohol/week: 1.0 standard drink of alcohol     Types: 1 Standard drinks or equivalent per week     Comment: 3 drinks per week    Drug use: No    Sexual activity: Not Currently     Partners: Female     Review of Systems  Objective:     Weight: 60.4 kg (133 lb 2.5 oz)  Body mass index is 22.86 kg/m².  Vital Signs (Most Recent):  Temp: 97.3 °F (36.3 °C) (24 0313)  Pulse: 83 (24 0630)  Resp: 20 (24 0747)  BP: 130/60 (24 0630)  SpO2: 100 % (24 0630) Vital Signs (24h Range):  Temp:  [97.3 °F (36.3 °C)-97.8 °F (36.6 °C)] 97.3 °F (36.3 °C)  Pulse:  [80-95] 83  Resp:  [10-32] 20  SpO2:  [98 %-100 %] 100 %  BP: (105-182)/(51-78) 130/60                         Male External Urinary Catheter 24 0150 (Active)          Physical Exam       Neurosurgery Physical Exam  Neuro Exam  General: AOx3, GCS E4V5M6   CNII-XII: Intact on exam, PERRL, visual fields grossly intact, EOMi, facial sensation preserved, no facial asymmetry, tongue/uvula/palate midline, shoulder shrug equal, no pronator drift   Extremities: 5/5 motor throughout, sensorium intact throughout, coordination intact throughout, DTRs 2+, no pathological reflexes, no sensory level present     General: Awake, Alert, Oriented   Head: abrasion to posterior scalp  Eyes: Pupils  equal, EOMi   Neck: Supple, normal ROM, no tenderness to palpation   CVS: Normal rate and rhythm, distal pulses present   Pulm: Symmetric expansion, no respiratory distress   GI: Abdomen soft, nondistended, nontender   MSK: Moves all extremities without restriction, atraumatic   Skin: Dry, intact   Psych: Normal thought content and cognition      Significant Labs:  Recent Labs   Lab 09/21/24 2258   *      K 4.8      CO2 19*   BUN 51*   CREATININE 1.6*   CALCIUM 10.4     Recent Labs   Lab 09/21/24 2258 09/22/24 0831   WBC 8.44 8.73   HGB 14.4 13.5*   HCT 43.8 41.8    209     Recent Labs   Lab 09/21/24 2258   APTT 26.0     Microbiology Results (last 7 days)       Procedure Component Value Units Date/Time    Urine culture [[id number]] Collected: 09/22/24 0221    Order Status: No result Specimen: Urine Updated: 09/22/24 0312          Recent Lab Results         09/22/24 0831 09/22/24 0221 09/21/24 2258 09/21/24 2237        Albumin     3.5         ALP     47         ALT     18         Anion Gap     10         Appearance, UA   Hazy           PTT     26.0  Comment: Refer to local heparin nomogram for intensity/dose specific   therapeutic   range.           AST     22         Bacteria, UA   Few           Baso # 0.04     0.05         Basophil % 0.5     0.6         Bilirubin (UA)   Negative           BILIRUBIN TOTAL     0.5  Comment: For infants and newborns, interpretation of results should be based  on gestational age, weight and in agreement with clinical  observations.    Premature Infant recommended reference ranges:  Up to 24 hours.............<8.0 mg/dL  Up to 48 hours............<12.0 mg/dL  3-5 days..................<15.0 mg/dL  6-29 days.................<15.0 mg/dL           BNP     258  Comment: Values of less than 100 pg/ml are consistent with non-CHF populations.         BUN     51         Calcium     10.4         Chloride     108         CO2     19         Color, UA    Yellow           Creatinine     1.6         Differential Method Automated     Automated         eGFR     41.2         Eos # 0.2     0.1         Eos % 1.9     1.7         Glucose     155         Glucose, UA   4+           Gran # (ANC) 5.8     5.0         Gran % 66.2     58.6         Hematocrit 41.8     43.8         Hemoglobin 13.5     14.4         Hyaline Casts, UA   1           Immature Grans (Abs) 0.10  Comment: Mild elevation in immature granulocytes is non specific and   can be seen in a variety of conditions including stress response,   acute inflammation, trauma and pregnancy. Correlation with other   laboratory and clinical findings is essential.       0.16  Comment: Mild elevation in immature granulocytes is non specific and   can be seen in a variety of conditions including stress response,   acute inflammation, trauma and pregnancy. Correlation with other   laboratory and clinical findings is essential.           Immature Granulocytes 1.1     1.9         Ketones, UA   Negative           Leukocyte Esterase, UA   3+           Lymph # 1.8     2.2         Lymph % 21.1     26.2         MCH 33.3     34.3         MCHC 32.3     32.9              104         Microscopic Comment   SEE COMMENT  Comment: Other formed elements not mentioned in the report are not   present in the microscopic examination.              Mono # 0.8     0.9         Mono % 9.2     11.0         MPV 11.8     12.3         NITRITE UA   Negative           nRBC 0     0         Blood, UA   3+           QRS Duration       112       OHS QTC Calculation       413       pH, UA   6.0           Platelet Count 209     208         Potassium     4.8         PROTEIN TOTAL     6.3         Protein, UA   1+  Comment: Recommend a 24 hour urine protein or a urine   protein/creatinine ratio if globulin induced proteinuria is  clinically suspected.             RBC 4.06     4.20         RBC, UA   22           RDW 13.1     13.2         Sodium     137          Spec Grav UA   1.020           Specimen UA   Urine, Catheterized           Squam Epithel, UA   0           Troponin I     0.063  Comment: The reference interval for Troponin I represents the 99th percentile   cutoff   for our facility and is consistent with 3rd generation assay   performance.           WBC Clumps, UA   Rare           WBC, UA   >100           WBC 8.73     8.44         Yeast, UA   None                 All pertinent labs from the last 24 hours have been reviewed.    Significant Diagnostics:  CT: CT Head Without Contrast    Result Date: 9/22/2024  Tiny acute subarachnoid hemorrhage in a sulcus of the right parasagittal posterior frontal lobe.  Recommend short-term close follow-up. This report was flagged in Epic as abnormal. The critical information above was relayed directly by Omar Patel MD by [employer] secure chat to Afia Jimenez RN on 9/21/2024 at 23:51. Electronically signed by: Omar Patel MD Date:    09/22/2024 Time:    00:00   MRI: No results found in the last 24 hours.  I have reviewed all pertinent imaging results/findings within the past 24 hours.  I have reviewed and interpreted all pertinent imaging results/findings within the past 24 hours.

## 2024-09-22 NOTE — ED TRIAGE NOTES
Leg broke on barstool and fell backwards striking back of head on wood flood. + blood thinners. Family states patient did not have LOC. Wife states patient normally AAOx4. Patient currently states year is 1974.

## 2024-09-22 NOTE — PHARMACY MED REC
"Admission Medication History     The home medication history was taken by Dori Lawton.    You may go to "Admission" then "Reconcile Home Medications" tabs to review and/or act upon these items.     The home medication list has been updated by the Pharmacy department.   Please read ALL comments highlighted in yellow.   Please address this information as you see fit.    Feel free to contact us if you have any questions or require assistance.      Medications listed below were obtained from: [employer] [employer]  Current Outpatient Medications on File Prior to Encounter   Medication Sig    acetaminophen (TYLENOL) 650 MG TbSR Take 650 mg by mouth every 8 (eight) hours.    cholecalciferol, vitamin D3, (VITAMIN D3) 50 mcg (2,000 unit) Cap capsule Take by mouth once daily.    cyclobenzaprine (FLEXERIL) 5 MG tablet Take 1 tablet (5 mg total) by mouth 3 (three) times daily as needed for Muscle spasms.    ELIQUIS 5 mg Tab TAKE 1 TABLET TWICE A DAY    fenofibrate micronized (LOFIBRA) 200 MG Cap TAKE 1 CAPSULE DAILY WITH BREAKFAST    JARDIANCE 10 mg tablet TAKE 1 TABLET DAILY    loratadine (CLARITIN) 10 mg tablet Take 10 mg by mouth once daily.    megestroL (MEGACE) 400 mg/10 mL (40 mg/mL) Susp TAKE 10 MLS (400 MG TOTAL) BY MOUTH ONCE DAILY.    metFORMIN (GLUCOPHAGE-XR) 500 MG ER 24hr tablet TAKE 1 TABLET DAILY    metoprolol succinate (TOPROL-XL) 100 MG 24 hr tablet Take 1 tablet (100 mg total) by mouth 2 (two) times daily.    mirtazapine (REMERON) 7.5 MG Tab TAKE 1 TABLET BY MOUTH EVERY EVENING.    mupirocin (BACTROBAN) 2 % ointment Apply topically 2 (two) times daily.    ondansetron (ZOFRAN) 8 MG tablet Take 1 tablet (8 mg total) by mouth every 12 (twelve) hours as needed for Nausea.    sacubitriL-valsartan (ENTRESTO) 24-26 mg per tablet Take 1 tablet by mouth 2 (two) times daily.              Dori Lawton  EXT [id number]                  .        "

## 2024-09-22 NOTE — ED NOTES
Nurses Note -- 4 Eyes      9/22/2024   1:50 AM      Skin assessed during: Admit      [] No Altered Skin Integrity Present    []Prevention Measures Documented      [x] Yes- Altered Skin Integrity Present or Discovered   [x] LDA Added if Not in Epic (Describe Wound)   [x] New Altered Skin Integrity was Present on Admit and Documented in LDA   [x] Wound Image Taken    Wound Care Consulted? No    Attending Nurse:  Afia Claros RN/Staff Member:   Courtney

## 2024-09-22 NOTE — SUBJECTIVE & OBJECTIVE
Past Medical History:   Diagnosis Date    *Atrial fibrillation     Chronic kidney disease     Deep vein thrombosis     Hyperlipidemia     Hypertension     Metabolic syndrome     Type 2 diabetes mellitus, without long-term current use of insulin 12/13/2021       Past Surgical History:   Procedure Laterality Date    BIOPSY OF BLADDER N/A 4/12/2024    Procedure: BIOPSY, BLADDER;  Surgeon: Wellington Story MD;  Location: [address];  Service: Urology;  Laterality: N/A;    BLADDER FULGURATION N/A 4/12/2024    Procedure: FULGURATION, BLADDER;  Surgeon: Wellington Story MD;  Location: [address];  Service: Urology;  Laterality: N/A;    CATARACT EXTRACTION      CHOLECYSTECTOMY      CYSTOSCOPY N/A 11/16/2023    Procedure: CYSTOSCOPY;  Surgeon: Marcelino Moffett MD;  Location: [address];  Service: Urology;  Laterality: N/A;  90 minutes    DILATION OF URETHRA N/A 4/12/2024    Procedure: DILATION, URETHRA;  Surgeon: Wellington Story MD;  Location: [address];  Service: Urology;  Laterality: N/A;    EYE SURGERY      INJECTION OF FACET JOINT Bilateral 4/28/2021    Procedure: FACET JOINT INJECTION BILATERAL L4/L5 DIRECT REFERRAL;  Surgeon: Philipp Iyer MD;  Location: [facility];  Service: Pain Management;  Laterality: Bilateral;  NEEDS CONSENT, ELIQUIS CLEARANCE IN CHART    RETROGRADE PYELOGRAPHY Bilateral 11/16/2023    Procedure: PYELOGRAM, RETROGRADE;  Surgeon: Marcelino Moffett MD;  Location: [address];  Service: Urology;  Laterality: Bilateral;  90 minutes    SKIN CANCER EXCISION      TONSILLECTOMY      TURBT (TRANSURETHRAL RESECTION OF BLADDER TUMOR) N/A 11/16/2023    Procedure: TURBT (TRANSURETHRAL RESECTION OF BLADDER TUMOR);  Surgeon: Marcelino Moffett MD;  Location: [address];  Service: Urology;  Laterality: N/A;  90 minutes    TURBT (TRANSURETHRAL RESECTION OF BLADDER TUMOR) N/A 4/12/2024    Procedure: TURBT (TRANSURETHRAL RESECTION OF BLADDER TUMOR);  Surgeon: Wellington Story MD;   Location: [facility] OR [address] [place] [address];  Service: Urology;  Laterality: N/A;       Review of patient's allergies indicates:   Allergen Reactions    Niacin      Other reaction(s): Rash  Other reaction(s): Itching       No current facility-administered medications on file prior to encounter.     Current Outpatient Medications on File Prior to Encounter   Medication Sig    acetaminophen (TYLENOL) 650 MG TbSR Take 650 mg by mouth every 8 (eight) hours.    cholecalciferol, vitamin D3, (VITAMIN D3) 50 mcg (2,000 unit) Cap capsule Take by mouth once daily.    cyclobenzaprine (FLEXERIL) 5 MG tablet Take 1 tablet (5 mg total) by mouth 3 (three) times daily as needed for Muscle spasms.    ELIQUIS 5 mg Tab TAKE 1 TABLET TWICE A DAY    fenofibrate micronized (LOFIBRA) 200 MG Cap TAKE 1 CAPSULE DAILY WITH BREAKFAST    fenofibrate micronized (LOFIBRA) 200 MG Cap TAKE 1 CAPSULE DAILY WITH BREAKFAST    JARDIANCE 10 mg tablet TAKE 1 TABLET DAILY    loratadine (CLARITIN) 10 mg tablet Take 10 mg by mouth once daily.    megestroL (MEGACE) 400 mg/10 mL (40 mg/mL) Susp TAKE 10 MLS (400 MG TOTAL) BY MOUTH ONCE DAILY.    metFORMIN (GLUCOPHAGE-XR) 500 MG ER 24hr tablet TAKE 1 TABLET DAILY    metoprolol succinate (TOPROL-XL) 100 MG 24 hr tablet Take 1 tablet (100 mg total) by mouth 2 (two) times daily.    mirtazapine (REMERON) 7.5 MG Tab TAKE 1 TABLET BY MOUTH EVERY EVENING.    mupirocin (BACTROBAN) 2 % ointment Apply topically 2 (two) times daily.    ondansetron (ZOFRAN) 8 MG tablet Take 1 tablet (8 mg total) by mouth every 12 (twelve) hours as needed for Nausea.    sacubitriL-valsartan (ENTRESTO) 24-26 mg per tablet Take 1 tablet by mouth 2 (two) times daily.    [DISCONTINUED] dexAMETHasone (DECADRON) 4 MG Tab TAKE EVERY 12 HOURS DAILY FOR DAYS 2 THOUGH 4 FOLLOWING CHEMOTHERAPY    [DISCONTINUED] OLANZapine (ZYPREXA) 5 MG tablet TAKE NIGHTLY FOR 4 DAYS FOLLOWING CHEMOTHERAPY     Family History       Problem Relation (Age of Onset)    Diabetes Maternal  Aunt          Tobacco Use    Smoking status: Former     Current packs/day: 0.00     Average packs/day: 2.0 packs/day for 20.0 years (40.0 ttl pk-yrs)     Types: Cigarettes     Start date: 1963     Quit date: 1983     Years since quittin.3     Passive exposure: Never    Smokeless tobacco: Never   Substance and Sexual Activity    Alcohol use: Yes     Alcohol/week: 1.0 standard drink of alcohol     Types: 1 Standard drinks or equivalent per week     Comment: 3 drinks per week    Drug use: No    Sexual activity: Not Currently     Partners: Female     Review of Systems   Constitutional:  Positive for activity change and appetite change. Negative for chills and fever.   HENT:  Positive for trouble swallowing (chronic).    Eyes:  Negative for photophobia and visual disturbance.   Respiratory:  Negative for chest tightness, shortness of breath and wheezing.    Cardiovascular:  Negative for chest pain, palpitations and leg swelling.   Gastrointestinal:  Negative for abdominal pain, constipation, diarrhea, nausea and vomiting.   Genitourinary:  Positive for difficulty urinating and urgency. Negative for dysuria, frequency and hematuria.   Musculoskeletal:  Positive for arthralgias. Negative for back pain and gait problem.   Skin:  Positive for wound. Negative for color change and rash.   Neurological:  Positive for weakness (chronic). Negative for dizziness, syncope, light-headedness, numbness and headaches.   Psychiatric/Behavioral:  Positive for confusion. Negative for agitation. The patient is not nervous/anxious.      Objective:     Vital Signs (Most Recent):  Temp: 97.3 °F (36.3 °C) (24 0313)  Pulse: 90 (24 1030)  Resp: 15 (24 1030)  BP: (!) 117/56 (24 1030)  SpO2: 97 % (24 1030) Vital Signs (24h Range):  Temp:  [97.3 °F (36.3 °C)-97.8 °F (36.6 °C)] 97.3 °F (36.3 °C)  Pulse:  [80-95] 90  Resp:  [10-32] 15  SpO2:  [96 %-100 %] 97 %  BP: (105-182)/(51-78) 117/56     Weight: 60.4  kg (133 lb 2.5 oz)  Body mass index is 22.86 kg/m².     Physical Exam  Vitals and nursing note reviewed.   Constitutional:       General: He is not in acute distress.     Appearance: He is well-developed. He is ill-appearing.   HENT:      Head: Normocephalic.      Mouth/Throat:      Mouth: Mucous membranes are dry.   Eyes:      Extraocular Movements: Extraocular movements intact.      Conjunctiva/sclera: Conjunctivae normal.   Cardiovascular:      Rate and Rhythm: Normal rate. Rhythm irregular.      Heart sounds: Normal heart sounds.   Pulmonary:      Effort: Pulmonary effort is normal. No respiratory distress.      Breath sounds: Normal breath sounds. No wheezing.   Abdominal:      General: Bowel sounds are normal. There is no distension.      Palpations: Abdomen is soft.      Tenderness: There is no abdominal tenderness.   Musculoskeletal:         General: No tenderness. Normal range of motion.      Cervical back: Normal range of motion and neck supple. No tenderness.      Right lower leg: No edema.      Left lower leg: No edema.   Skin:     General: Skin is warm and dry.      Capillary Refill: Capillary refill takes less than 2 seconds.      Findings: Abrasion (posterior scalp) and bruising present. No rash.   Neurological:      Mental Status: He is alert and oriented to person, place, and time. Mental status is at baseline.      Cranial Nerves: No cranial nerve deficit.      Motor: Weakness (generalized) present.   Psychiatric:         Behavior: Behavior normal.         Thought Content: Thought content normal.         Judgment: Judgment normal.                Significant Labs: All pertinent labs within the past 24 hours have been reviewed.    Significant Imaging: I have reviewed all pertinent imaging results/findings within the past 24 hours.

## 2024-09-22 NOTE — ASSESSMENT & PLAN NOTE
"Patient's FSGs are controlled on current medication regimen.  Last A1c reviewed-   Lab Results   Component Value Date    HGBA1C 8.0 (H) 07/01/2024     Most recent fingerstick glucose reviewed- No results for input(s): "POCTGLUCOSE" in the last 24 hours.  Current correctional scale  Low  Decrease anti-hyperglycemic dose as follows-   Antihyperglycemics (From admission, onward)      Start     Stop Route Frequency Ordered    09/22/24 1335  insulin aspart U-100 pen 0-5 Units         -- SubQ Before meals & nightly PRN 09/22/24 1235        - Hold oral hypoglycemics while patient is in the hospital.  "

## 2024-09-23 ENCOUNTER — TELEPHONE (OUTPATIENT)
Dept: INTERNAL MEDICINE | Facility: CLINIC | Age: 89
End: 2024-09-23
Payer: MEDICARE

## 2024-09-23 ENCOUNTER — TELEPHONE (OUTPATIENT)
Dept: NEUROSURGERY | Facility: CLINIC | Age: 89
End: 2024-09-23
Payer: MEDICARE

## 2024-09-23 ENCOUNTER — TELEPHONE (OUTPATIENT)
Dept: REHABILITATION | Facility: HOSPITAL | Age: 89
End: 2024-09-23
Payer: MEDICARE

## 2024-09-23 VITALS
HEIGHT: 64 IN | TEMPERATURE: 98 F | DIASTOLIC BLOOD PRESSURE: 55 MMHG | RESPIRATION RATE: 18 BRPM | OXYGEN SATURATION: 96 % | SYSTOLIC BLOOD PRESSURE: 100 MMHG | BODY MASS INDEX: 22.58 KG/M2 | HEART RATE: 87 BPM | WEIGHT: 132.25 LBS

## 2024-09-23 DIAGNOSIS — I62.00 NONTRAUMATIC SUBDURAL HEMORRHAGE, UNSPECIFIED: Primary | ICD-10-CM

## 2024-09-23 PROBLEM — R53.81 PHYSICAL DECONDITIONING: Status: ACTIVE | Noted: 2024-09-23

## 2024-09-23 LAB
ANION GAP SERPL CALC-SCNC: 10 MMOL/L (ref 8–16)
BACTERIA UR CULT: NORMAL
BASOPHILS # BLD AUTO: 0.07 K/UL (ref 0–0.2)
BASOPHILS NFR BLD: 0.9 % (ref 0–1.9)
BUN SERPL-MCNC: 37 MG/DL (ref 8–23)
CALCIUM SERPL-MCNC: 9.8 MG/DL (ref 8.7–10.5)
CHLORIDE SERPL-SCNC: 110 MMOL/L (ref 95–110)
CO2 SERPL-SCNC: 16 MMOL/L (ref 23–29)
CREAT SERPL-MCNC: 1.2 MG/DL (ref 0.5–1.4)
DIFFERENTIAL METHOD BLD: ABNORMAL
EOSINOPHIL # BLD AUTO: 0.2 K/UL (ref 0–0.5)
EOSINOPHIL NFR BLD: 3.1 % (ref 0–8)
ERYTHROCYTE [DISTWIDTH] IN BLOOD BY AUTOMATED COUNT: 13.1 % (ref 11.5–14.5)
EST. GFR  (NO RACE VARIABLE): 57.8 ML/MIN/1.73 M^2
GLUCOSE SERPL-MCNC: 128 MG/DL (ref 70–110)
HCT VFR BLD AUTO: 45.4 % (ref 40–54)
HGB BLD-MCNC: 15 G/DL (ref 14–18)
IMM GRANULOCYTES # BLD AUTO: 0.09 K/UL (ref 0–0.04)
IMM GRANULOCYTES NFR BLD AUTO: 1.2 % (ref 0–0.5)
LYMPHOCYTES # BLD AUTO: 1.9 K/UL (ref 1–4.8)
LYMPHOCYTES NFR BLD: 25.7 % (ref 18–48)
MAGNESIUM SERPL-MCNC: 2 MG/DL (ref 1.6–2.6)
MCH RBC QN AUTO: 34.2 PG (ref 27–31)
MCHC RBC AUTO-ENTMCNC: 33 G/DL (ref 32–36)
MCV RBC AUTO: 104 FL (ref 82–98)
MONOCYTES # BLD AUTO: 0.8 K/UL (ref 0.3–1)
MONOCYTES NFR BLD: 10.2 % (ref 4–15)
NEUTROPHILS # BLD AUTO: 4.4 K/UL (ref 1.8–7.7)
NEUTROPHILS NFR BLD: 58.9 % (ref 38–73)
NRBC BLD-RTO: 0 /100 WBC
PLATELET # BLD AUTO: 189 K/UL (ref 150–450)
PMV BLD AUTO: 12.1 FL (ref 9.2–12.9)
POCT GLUCOSE: 165 MG/DL (ref 70–110)
POCT GLUCOSE: 176 MG/DL (ref 70–110)
POTASSIUM SERPL-SCNC: 4.7 MMOL/L (ref 3.5–5.1)
RBC # BLD AUTO: 4.38 M/UL (ref 4.6–6.2)
SODIUM SERPL-SCNC: 136 MMOL/L (ref 136–145)
WBC # BLD AUTO: 7.48 K/UL (ref 3.9–12.7)

## 2024-09-23 PROCEDURE — 97116 GAIT TRAINING THERAPY: CPT

## 2024-09-23 PROCEDURE — S0179 MEGESTROL 20 MG: HCPCS

## 2024-09-23 PROCEDURE — 97535 SELF CARE MNGMENT TRAINING: CPT

## 2024-09-23 PROCEDURE — 25000003 PHARM REV CODE 250

## 2024-09-23 PROCEDURE — 92610 EVALUATE SWALLOWING FUNCTION: CPT

## 2024-09-23 PROCEDURE — 83735 ASSAY OF MAGNESIUM: CPT | Performed by: PHYSICIAN ASSISTANT

## 2024-09-23 PROCEDURE — G0378 HOSPITAL OBSERVATION PER HR: HCPCS

## 2024-09-23 PROCEDURE — 97165 OT EVAL LOW COMPLEX 30 MIN: CPT

## 2024-09-23 PROCEDURE — 80048 BASIC METABOLIC PNL TOTAL CA: CPT | Performed by: PHYSICIAN ASSISTANT

## 2024-09-23 PROCEDURE — 36415 COLL VENOUS BLD VENIPUNCTURE: CPT | Performed by: PHYSICIAN ASSISTANT

## 2024-09-23 PROCEDURE — 51798 US URINE CAPACITY MEASURE: CPT

## 2024-09-23 PROCEDURE — 85025 COMPLETE CBC W/AUTO DIFF WBC: CPT | Performed by: PHYSICIAN ASSISTANT

## 2024-09-23 PROCEDURE — 97161 PT EVAL LOW COMPLEX 20 MIN: CPT

## 2024-09-23 RX ORDER — TAMSULOSIN HYDROCHLORIDE 0.4 MG/1
0.4 CAPSULE ORAL DAILY
Qty: 30 CAPSULE | Refills: 11 | Status: SHIPPED | OUTPATIENT
Start: 2024-09-24 | End: 2025-09-24

## 2024-09-23 RX ADMIN — CHOLECALCIFEROL TAB 25 MCG (1000 UNIT) 2000 UNITS: 25 TAB at 08:09

## 2024-09-23 RX ADMIN — FENOFIBRATE 200 MG: 200 CAPSULE ORAL at 11:09

## 2024-09-23 RX ADMIN — TAMSULOSIN HYDROCHLORIDE 0.4 MG: 0.4 CAPSULE ORAL at 08:09

## 2024-09-23 RX ADMIN — MUPIROCIN: 20 OINTMENT TOPICAL at 08:09

## 2024-09-23 RX ADMIN — MEGESTROL ACETATE 400 MG: 40 SUSPENSION ORAL at 11:09

## 2024-09-23 RX ADMIN — METOPROLOL SUCCINATE 100 MG: 100 TABLET, EXTENDED RELEASE ORAL at 08:09

## 2024-09-23 NOTE — PLAN OF CARE
[facility]  Discharge Final Note    Primary Care Provider: Munir Estrada MD    Expected Discharge Date: 9/23/2024    Future Appointments   Date Time Provider Department Center   9/30/2024 11:30 AM Alize Wheeler NP [facility] IM [place]   10/1/2024  1:45 PM Katerin Miranda, PT [facility] OP [facility] [place]   10/2/2024 11:00 AM Nereyda Birmingham MD [facility] CARDIO [place]   10/3/2024 11:15 AM Daija May, PTA [facility]   10/8/2024 11:15 AM Katerin Miranda, PT [facility] [place] [facility]   10/10/2024 11:15 AM Katerin Miranda, PT [facility] [place]   10/14/2024 10:00 AM [facility]  CT1 [facility] CTSCAN [place]   10/14/2024  1:00 PM Nessa Delgado PA-C [facility] NEUROS[address]   10/15/2024 10:15 AM Arthur Medeiros MD [facility] NEUROS [place]   10/17/2024 10:30 AM Katerin Miranda, PT [facility] OP [facility] [place]   10/21/2024 12:30 PM PROCEDURE, UROLOGY ROOM 3 [facility] UROPRO [place]   10/22/2024 11:15 AM Daija May, PTA [facility] [place]   10/24/2024 11:15 AM Katerin Miranda, PT [facility] [place]   10/29/2024 11:15 AM Katerin Miranda, PT [facility] [place]   10/30/2024 11:00 AM LAB, [facility] LAB[facility] [place]   10/30/2024 12:00 PM [facility]  CT1 [facility] CTSCAN [place]   10/31/2024 11:15 AM Katerin Miranda, PT [facility] [place]   11/6/2024 10:00 AM LAB, [facility] LAB[facility] [place]   11/7/2024 10:30 AM Bhupendra Brar, DPM [facility] BETY [facility]   11/12/2024 11:00 AM Arash Gandara MD [facility] ENT [address]   11/12/2024  1:00 PM Johana Parsons DNP [facility] [place]         Final Discharge Note (most recent)       Final Note - 09/23/24 1746          Final Note    Assessment Type Final Discharge Note     Anticipated Discharge Disposition Home or Self Care     What phone number can be called within the next 1-3 days to see how you are doing after discharge? [phone]   Pt wife Nereyda number       Post-Acute Status    Coverage MEDICARE - MEDICARE PART A  & B     Discharge Delays None known at this time                 Pt discharge home w/ wife. Pt wife- Nereyda informed SW that pt will do outpatient therapy w/ [facility] (which pt was scheduled to start but was admitted) [phone] SW called and left VM. Pt aware of scheduled f/u appt and pt wife will be providing pt transportation home.     Discharge Plan A and Plan B have been determined by review of patient's clinical status, future medical and therapeutic needs, and coverage/benefits for post-acute care in coordination with multidisciplinary team members.      Important Message from Medicare             Contact Info       Munir Estrada MD   Specialty: Family Medicine   Relationship: PCP - General    [address] [facility] [address]   Phone: [phone]       Next Steps: Follow up    Instructions: Pt lorenzo Lizama sent a message to clinic nurse at pt's primary care office to contact pt to schedule his hospital f/u visit.

## 2024-09-23 NOTE — PLAN OF CARE
Problem: Adult Inpatient Plan of Care  Goal: Plan of Care Review  Outcome: Met  Goal: Patient-Specific Goal (Individualized)  Outcome: Met  Goal: Absence of Hospital-Acquired Illness or Injury  Outcome: Met  Goal: Optimal Comfort and Wellbeing  Outcome: Met  Goal: Readiness for Transition of Care  Outcome: Met     Problem: Infection  Goal: Absence of Infection Signs and Symptoms  Outcome: Met     Problem: Diabetes Comorbidity  Goal: Blood Glucose Level Within Targeted Range  Outcome: Met     Problem: Wound  Goal: Optimal Coping  Outcome: Met  Goal: Optimal Functional Ability  Outcome: Met  Goal: Absence of Infection Signs and Symptoms  Outcome: Met  Goal: Improved Oral Intake  Outcome: Met  Goal: Optimal Pain Control and Function  Outcome: Met  Goal: Skin Health and Integrity  Outcome: Met  Goal: Optimal Wound Healing  Outcome: Met     Problem: Fall Injury Risk  Goal: Absence of Fall and Fall-Related Injury  Outcome: Met     Problem: Skin Injury Risk Increased  Goal: Skin Health and Integrity  Outcome: Met     Problem: Mobility Impairment  Goal: Optimal Mobility  Outcome: Met     Problem: Swallowing Impairment  Goal: Optimal Eating and Swallowing Without Aspiration  Outcome: Met     Problem: Cardiac Impairment  Goal: Optimal Activity Tolerance  Outcome: Met     Problem: Chronic Kidney Disease  Goal: Optimal Coping with Chronic Illness  Outcome: Met  Goal: Electrolyte Balance  Outcome: Met  Goal: Fluid Balance  Outcome: Met  Goal: Optimal Functional Ability  Outcome: Met  Goal: Absence of Anemia Signs and Symptoms  Outcome: Met  Goal: Optimal Oral Intake  Outcome: Met  Goal: Acceptable Pain Control  Outcome: Met  Goal: Minimize Renal Failure Effects  Outcome: Met     Problem: UTI (Urinary Tract Infection)  Goal: Improved Infection Symptoms  Outcome: Met

## 2024-09-23 NOTE — PLAN OF CARE
Problem: Adult Inpatient Plan of Care  Goal: Plan of Care Review  Outcome: Progressing  Goal: Patient-Specific Goal (Individualized)  Outcome: Progressing  Goal: Absence of Hospital-Acquired Illness or Injury  Outcome: Progressing  Goal: Optimal Comfort and Wellbeing  Outcome: Progressing  Goal: Readiness for Transition of Care  Outcome: Progressing     Problem: Infection  Goal: Absence of Infection Signs and Symptoms  Outcome: Progressing     Problem: Diabetes Comorbidity  Goal: Blood Glucose Level Within Targeted Range  Outcome: Progressing     Problem: Wound  Goal: Optimal Coping  Outcome: Progressing  Goal: Optimal Functional Ability  Outcome: Progressing  Goal: Absence of Infection Signs and Symptoms  Outcome: Progressing  Goal: Improved Oral Intake  Outcome: Progressing  Goal: Optimal Pain Control and Function  Outcome: Progressing  Goal: Skin Health and Integrity  Outcome: Progressing  Goal: Optimal Wound Healing  Outcome: Progressing     Problem: Fall Injury Risk  Goal: Absence of Fall and Fall-Related Injury  Outcome: Progressing     Problem: Skin Injury Risk Increased  Goal: Skin Health and Integrity  Outcome: Progressing     Problem: Mobility Impairment  Goal: Optimal Mobility  Outcome: Progressing     Problem: Swallowing Impairment  Goal: Optimal Eating and Swallowing Without Aspiration  Outcome: Progressing     Problem: Cardiac Impairment  Goal: Optimal Activity Tolerance  Outcome: Progressing     Problem: Chronic Kidney Disease  Goal: Optimal Coping with Chronic Illness  Outcome: Progressing  Goal: Electrolyte Balance  Outcome: Progressing  Goal: Fluid Balance  Outcome: Progressing  Goal: Optimal Functional Ability  Outcome: Progressing  Goal: Absence of Anemia Signs and Symptoms  Outcome: Progressing  Goal: Optimal Oral Intake  Outcome: Progressing  Goal: Acceptable Pain Control  Outcome: Progressing  Goal: Minimize Renal Failure Effects  Outcome: Progressing     Problem: UTI (Urinary Tract  Infection)  Goal: Improved Infection Symptoms  Outcome: Progressing

## 2024-09-23 NOTE — PT/OT/SLP EVAL
Physical Therapy Co-Evaluation and Co-Treatment    Patient Name:  Cliff Magaña   MRN:  [id number]    Co-evaluation and co-treatment performed for this visit due to suspected patient need for two skilled therapists to ensure patient and staff safety and to accommodate for patient activity tolerance/pain management     Recommendations:     Discharge Recommendations: Low Intensity Therapy  Discharge Equipment Recommendations: none   Barriers to Discharge: None  Safest Mobility Level with Nursing: Ambulation with CGA with RW    Assessment:     Cliff Magaña is a 89 y.o. male admitted with a medical diagnosis of SAH (subarachnoid hemorrhage). He presents with the following impairments/functional limitations: weakness, impaired endurance, impaired self care skills, impaired functional mobility, gait instability, decreased safety awareness, impaired cognition. Pt presenting with HOB elevated and agreeable to completing therapy evaluation. Pt with good tolerance to session and requiring limited assistance to complete all visualized functional mobility using RW. Pt's spouse reports that pt typically has increased trunk flexion and slight R lateral lean with fairly poor RW management. Education provided on potential home environment modifications to improve depth perception with stairs and level changes as well as DME recommendations; pt and pt's spouse verbalized understanding. Pt safe to return home with family assistance following discharge. Recommend low intensity therapy following discharge once medically stable in order to reduce fall risk, reduce caregiver burden, improve quality of life, and return to PLOF. Pt would continue to benefit from skilled acute PT in order to address current deficits and progress functional mobility.     Rehab Prognosis: Good; patient would benefit from acute skilled PT services 4 x/week to address these deficits and reach maximum level of function.  Recent Surgery: * No surgery found *   "    Plan:     During this hospitalization, patient to be seen 4 x/week to address the identified rehab impairments via gait training, therapeutic activities, therapeutic exercises, neuromuscular re-education and progress toward the following goals:    Plan of Care Expires:  10/23/24    Subjective     Chief Complaint: None verbalized  Patient/Family Comments/Goals: "Is it December?"  Pain/Comfort:  Pain Rating 1: 0/10    Patients cultural, spiritual, Temple conflicts given the current situation: no    Living Environment:  Living Environment: Patient lives with their spouse in a single story house with number of outside stair(s): threshold, walk-in shower, grab bars, and built-in shower chair.  Prior Level of Function: Prior to admission, patient modified independent for funcitonal mobility using SPC for household and rollator for community ambulation. Pt requires assistance with LBD, but otherwise is independent with ADLs. Pt's spouse reports increased number of falls ascending step from sunroom back into den.   Equipment Used at Home: bath bench, cane, straight, rollator, grab bar.  DME owned (not currently used): none  Assistance Upon Discharge: spouse    Objective:     Communicated with nursing prior to session. Patient found HOB elevated with telemetry upon PT entry to room.    General Precautions: Standard, fall  Orthopedic Precautions:N/A    Braces: N/A    Exams:  Cognitive Exam:  Patient is oriented to Person, Place, Situation, Not oriented to time, follows commands 100% of the time  RLE ROM: WFL  RLE Strength: WFL  LLE ROM: WFL  LLE Strength: WFL  Sensation: Intact light touch to BLEs    Functional Mobility:  Bed Mobility:    Supine to Sit: stand by assistance  Sit to Supine: stand by assistance  Transfers:    Sit to Stand: contact guard assistance with rolling walker with cues for hand placement  Gait: Patient ambulated 120' with rolling walker and contact guard assistance.   Patient demonstrates " occasional unsteady gait, decreased step length, narrow base of support, decreased weight shift, decreased foot clearance, ambulates outside JEN of RW, flexed posture, and decreased memo.   Patient required cues for upright posture, gluteal activation, sequencing, rolling walker management, safe rolling walker usage, to ambulate within JEN of RW, increased step size, and increased foot clearance  All lines remained intact throughout ambulation trial, gait belt utilized.  Balance:   Static Sitting: Good, able to maintain for 5 minute(s) with stand by assistance  Dynamic Sitting: Good: Patient accepts moderate challenge, contact guard assistance  Static Standing: Good, able to maintain for 2 minute(s) with contact guard assistance  Dynamic Standing: Good: Patient accepts moderate challenge, contact guard assistance    Therapeutic Activities and Exercises:  Patient educated on role of acute care PT and PT POC, safety while in hospital including calling nurse for mobility, and call light usage.  Pt educated on the effects of bed rest and the importance of OOB activity. Pt encouraged to sit UIC majority of day as tolerated and continue daily ambulation with nursing assist. Pt verbalized understanding.  Pt educated on importance of maximal participation in therapy session in order to reduce negative effects of prolonged sedentary positioning.   Answered all questions within PT scope of practice and addressed functional mobility concerns.    AM-PAC 6 CLICK MOBILITY  Total Score:18     Patient left HOB elevated with all lines intact, call button in reach, RN notified, and spouse present.    GOALS:   Multidisciplinary Problems       Physical Therapy Goals          Problem: Physical Therapy    Goal Priority Disciplines Outcome Goal Variances Interventions   Physical Therapy Goal     PT, PT/OT Progressing     Description: Goals to be met by: 10/23/2024     Patient will increase functional independence with mobility by  performin. Supine to sit with [place]  2. Sit to supine with [place]  3. Sit to stand transfer with Supervision  4. Gait  x 200 feet with Stand-by Assistance using LRAD.   5. Ascend/descend 1 stair with no Handrails Contact Guard Assistance using LRAD.   6. Lower extremity exercise program x15 reps per handout, with assistance as needed                         History:     Past Medical History:   Diagnosis Date    *Atrial fibrillation     Chronic kidney disease     Deep vein thrombosis     Hyperlipidemia     Hypertension     Metabolic syndrome     Type 2 diabetes mellitus, without long-term current use of insulin 2021       Past Surgical History:   Procedure Laterality Date    BIOPSY OF BLADDER N/A 2024    Procedure: BIOPSY, BLADDER;  Surgeon: Wellington Story MD;  Location: [facility] OR [address] [place] [address];  Service: Urology;  Laterality: N/A;    BLADDER FULGURATION N/A 2024    Procedure: FULGURATION, BLADDER;  Surgeon: Wellington Story MD;  Location: [address];  Service: Urology;  Laterality: N/A;    CATARACT EXTRACTION      CHOLECYSTECTOMY      CYSTOSCOPY N/A 2023    Procedure: CYSTOSCOPY;  Surgeon: Marcelnio Moffett MD;  Location: [address];  Service: Urology;  Laterality: N/A;  90 minutes    DILATION OF URETHRA N/A 2024    Procedure: DILATION, URETHRA;  Surgeon: Wellington Story MD;  Location: [facility] OR [address] [place] [address];  Service: Urology;  Laterality: N/A;    EYE SURGERY      INJECTION OF FACET JOINT Bilateral 2021    Procedure: FACET JOINT INJECTION BILATERAL L4/L5 DIRECT REFERRAL;  Surgeon: Philipp Iyer MD;  Location: [facility] PAIN MGT;  Service: Pain Management;  Laterality: Bilateral;  NEEDS CONSENT, ELIQUIS CLEARANCE IN CHART    RETROGRADE PYELOGRAPHY Bilateral 2023    Procedure: PYELOGRAM, RETROGRADE;  Surgeon: Marcelino Moffett MD;  Location: [address];  Service: Urology;  Laterality: Bilateral;  90 minutes    SKIN CANCER EXCISION      TONSILLECTOMY      TURBT  (TRANSURETHRAL RESECTION OF BLADDER TUMOR) N/A 11/16/2023    Procedure: TURBT (TRANSURETHRAL RESECTION OF BLADDER TUMOR);  Surgeon: Marcelino Moffett MD;  Location: [facility] OR [address] [place] [address];  Service: Urology;  Laterality: N/A;  90 minutes    TURBT (TRANSURETHRAL RESECTION OF BLADDER TUMOR) N/A 4/12/2024    Procedure: TURBT (TRANSURETHRAL RESECTION OF BLADDER TUMOR);  Surgeon: Wellington Story MD;  Location: [facility] OR [address] [place] [address];  Service: Urology;  Laterality: N/A;       Time Tracking:     PT Received On: 09/23/24  PT Start Time: 1027     PT Stop Time: 1044  PT Total Time (min): 17 min     Billable Minutes: Evaluation 9 Gait Training 8      09/23/2024

## 2024-09-23 NOTE — HOSPITAL COURSE
Mr. Magaña was placed in observation for a traumatic subarachnoid hemorrhage after fall from standing as described above. NSGY was consulted and repeat CTH was stable without progression of bleeding. Eliquis was held with plan to continue holding until NSGY follow-up in clinic. PT/OT consulted and recommended low intensity therapy with outpatient PT/OT and SLP was consulted and recommended minced & moist diet with nectar thick liquids. Pt was seen and evaluated by me this morning, reports feeling well, and is eager to discharge home. All questions were answered with the patient and his wife at bedside and via phone. Patient & his family acknowledged understanding of discharge instructions and feels safe to discharge home. Patient was discharged on 9/23/2024 in stable condition with PCP, NSGY, GI, and urology follow-up. Education regarding condition provided and return precautions given.     Physical Exam  Gen: in NAD, appears stated age  Neuro: AAOx3, motor, sensory, and strength grossly intact BL  HEENT: EOMI, PERRLA; no JVD appreciated; mild abrasion to posterior scalp with routine healing   CVS: RRR, no m/r/g  Resp: lungs CTAB, no w/r/r; no belabored breathing or accessory muscle use appreciated   Abd: NTND, soft to palpation  Extrem: no UE or LE edema BL

## 2024-09-23 NOTE — PLAN OF CARE
Problem: SLP  Goal: SLP Goal  Description: Speech Pathology Goals  To be met by 10/7/24    1. Pt will exhibit a functional swallow for tolerance of a minced and moist diet with nectar thick liquids in order to maintain adequate hydration and nutrition  2. Pt will participate in ongoing diagnostic dysphagia therapy    3. Pt will undergo formal speech and language evaluation to determine presence/severity of speech, language, and/or cognitive linguistic impairment to establish the most appropriate treatment plan          Outcome: Progressing     Clinical swallow evaluation completed. Recommend a minced and moist diet (IDDSI 5) with mildly thick/nectar thick liquids (IDDSI 2) and meds whole 1 at a time. SLP to continue to follow.

## 2024-09-23 NOTE — DISCHARGE INSTRUCTIONS
You were admitted for monitoring after a small subarachnoid hemorrhage, which you obtained from hitting your head when you fell. For this reason, continue to hold your eliquis until your neurosurgery follow-up and be sure to go to your neurosurgery follow-up.   Cleanse your scalp abrasion with soapy water and keep it open to the air (uncovered). You can also clean with over the counter hibiclens if you'd like. Avoid hats or other direct contact with dirty clothes or fabrics while it is healing.   You were started on a new medication called tamsulosin to help prevent urinary retention, but you do not have a urinary tract infection. Your urine culture was negative. We sent a request to follow-up with urology for further testing to see why you may have trouble urinating.   Physical therapy & occupational therapy evaluated you and decided you would be a great candidate for outpatient PT, which I ordered for you. They should call you to schedule your first appointment.   Our speech therapy team is recommending you continue a minced and moist diet and thicken all fluids with powdered thickener, which can be found over the counter. We have also placed a GI follow-up for your difficulty swallowing.     It has been a pleasure caring for you, and I hope you continue to feel better and stronger every day! Please do not hesitate to reach out to me with any questions or concerns.     Afsaneh Thrasher PA-C  [facility]  [phone]

## 2024-09-23 NOTE — PLAN OF CARE
D/c Dispo pending therapy recs per team.     Vannessa Escamilla, MIO   [facility]    Case Management Dept  [phone]

## 2024-09-23 NOTE — PLAN OF CARE
Pt liaison Alda sent a message to clinic nurse at pt's primary care office to contact pt to schedule his hospital f/u visit.

## 2024-09-23 NOTE — PLAN OF CARE
Pt engaged well in therapy this date.     Problem: Occupational Therapy  Goal: Occupational Therapy Goal  Description: Goals to be met by: 10/26/24     Patient will increase functional independence with ADLs by performing:    UE Dressing with [place].  LE Dressing with [place].  Grooming while standing at sink with Set-up Assistance.  Toileting from toilet with Modified [place] for hygiene and clothing management.   Supine to sit with Modified [place].  Step transfer with Modified [place]  Toilet transfer to toilet with Modified [place].    Outcome: Progressing

## 2024-09-23 NOTE — PT/OT/SLP EVAL
Speech Language Pathology Evaluation  Bedside Swallow    Patient Name:  Cliff Magaña   MRN:  [id number]  Admitting Diagnosis: SAH (subarachnoid hemorrhage)    Recommendations:                 General Recommendations:  Dysphagia therapy, Speech language evaluation, and Cognitive-linguistic evaluation  Diet recommendations:  Minced & Moist Diet - IDDSI Level 5, Mildly thick/Nectar thick liquids - IDDSI Level 2   To achieve nectar thick liquids, mix 1 packet thickener to every SIX (6) oz of liquid  Pre-thickened liquids have PINK label with triangle #2   Aspiration Precautions: 1 bite/sip at a time, Assistance with thickening liquids, Eliminate distractions, Feed only when awake/alert, HOB to 90 degrees, Meds whole 1 at a time, Small bites/sips, and Standard aspiration precautions   General Precautions: Standard, aspiration, fall, nectar thick  Communication strategies:  none    Assessment:     Cliff Magaña is a 89 y.o. male with an SLP diagnosis of baseline Dysphagia. He presents with reports of continued coughing/choking events approximately once a week causing increased family stress and anxiety surrounding oral intake. Dysphagia does not appear to be exacerbated by new findings of SAH. Following extensive discussion with pt and family, will opt for trial of thickened liquids to determine effectiveness/appropriateness of further diet modifications. SLP to continue to follow.      History:     Past Medical History:   Diagnosis Date    *Atrial fibrillation     Chronic kidney disease     Deep vein thrombosis     Hyperlipidemia     Hypertension     Metabolic syndrome     Type 2 diabetes mellitus, without long-term current use of insulin 12/13/2021       Past Surgical History:   Procedure Laterality Date    BIOPSY OF BLADDER N/A 4/12/2024    Procedure: BIOPSY, BLADDER;  Surgeon: Wellington Story MD;  Location: [facility] OR [address] [place] [address];  Service: Urology;  Laterality: N/A;    BLADDER FULGURATION N/A 4/12/2024    Procedure:  FULGURATION, BLADDER;  Surgeon: Wellington Story MD;  Location: [address];  Service: Urology;  Laterality: N/A;    CATARACT EXTRACTION      CHOLECYSTECTOMY      CYSTOSCOPY N/A 11/16/2023    Procedure: CYSTOSCOPY;  Surgeon: Marcelino Moffett MD;  Location: [address];  Service: Urology;  Laterality: N/A;  90 minutes    DILATION OF URETHRA N/A 4/12/2024    Procedure: DILATION, URETHRA;  Surgeon: Wellington Story MD;  Location: [address];  Service: Urology;  Laterality: N/A;    EYE SURGERY      INJECTION OF FACET JOINT Bilateral 4/28/2021    Procedure: FACET JOINT INJECTION BILATERAL L4/L5 DIRECT REFERRAL;  Surgeon: Philipp Iyer MD;  Location: [facility] PAIN MG;  Service: Pain Management;  Laterality: Bilateral;  NEEDS CONSENT, ELIQUIS CLEARANCE IN CHART    RETROGRADE PYELOGRAPHY Bilateral 11/16/2023    Procedure: PYELOGRAM, RETROGRADE;  Surgeon: Marcelino Moffett MD;  Location: [address];  Service: Urology;  Laterality: Bilateral;  90 minutes    SKIN CANCER EXCISION      TONSILLECTOMY      TURBT (TRANSURETHRAL RESECTION OF BLADDER TUMOR) N/A 11/16/2023    Procedure: TURBT (TRANSURETHRAL RESECTION OF BLADDER TUMOR);  Surgeon: Marcelino Moffett MD;  Location: [address];  Service: Urology;  Laterality: N/A;  90 minutes    TURBT (TRANSURETHRAL RESECTION OF BLADDER TUMOR) N/A 4/12/2024    Procedure: TURBT (TRANSURETHRAL RESECTION OF BLADDER TUMOR);  Surgeon: Wellington Story MD;  Location: [address];  Service: Urology;  Laterality: N/A;       Social History: Patient lives with his spouse.    Prior Intubation HX:  n/a    Modified Barium Swallow: 8/2/24  - Delayed initiation  - Adequate soft palate elevation  - Reduced laryngeal elevation and anterior hyoid excursion  - Reduced tongue base retraction  - Incomplete epiglottic inversion  - Complete laryngeal vestibular closure  - Reduced pharyngeal stripping wave  - Adequate PE segment opening.  -  Moderate pharyngeal residue inBOT,  "valleculae, pyriforms, hypopharynx, and posterior pharyngeal wall  This was worse with thicker consistencies and solids.    IMPRESSIONS:      1. Oral/pharyngeal dysphagia characterized by weak and delayed swallow. This resulted in simón penetration on thin liquids and moderate pharyngeal residue following swallows.      2. No cervical esophageal swallowing abnormalities were noted.     3. History of  invasive bladder cancer s/p chemo/XRT.     RECOMMENDATIONS/PLAN OF CARE:      1. Continue regular diet consistency with minced and moist solids and thin liquids with the following considerations     2. Compensatory strategies include:   Take small sips to reduce laryngeal penetration of thin liquids  Chop meats and dense solids and add gravy or sauces to facilitate better clearance through the pharynx and increase intake of solids.  Take additional effortful "dry"/saliva swallows to help clear pooling in the throat.    Alternate food bites with small sips of liquid to clear food from the throat.  Take medications by mouth with water.     3. Initiate dysphagia treatment to address generalized weakness since recent surgeries.     4. Review of the swallow study results by the referring physician for further management of the patients concerns.     5. Contact [facility] at [phone] or via the [facility] portal with any further questions or concerns.     Chest X-Rays: 9/22/24- Monitoring leads overlie the chest.  Grossly unchanged cardiomediastinal contours.  Atherosclerosis of the aorta.     Chronic appearing minor interstitial coarsening appears similar in appearance to prior study performed 02/02/2024, 11:40 hours.  No definite acute appearing focal airspace consolidation seen.  No evidence of pneumothorax or large volume pleural effusion.     No acute findings in the visualized abdomen.  Operative sequela project in the right hemiabdomen.     Osseous and soft tissue structures appear without definite " "acute change.    Prior diet: Soft solids with thin liquids though spouse reported pt often only consumes 2-3 bits per meal before reporting satiety.    Occupation/hobbies/homemaking: none stated    Subjective     Spoke with nursing prior to session. Pt found resting in bed upon SLP entry into room. Pt agreeable to participate in all aspects of session.      Patient goals: to improve swallowing     Pain/Comfort:  Pain Rating 1: 0/10    Respiratory Status: Room air    Objective:     Oral Musculature Evaluation  Oral Musculature: WFL  Dentition: present and adequate  Secretion Management: adequate  Mucosal Quality: adequate  Mandibular Strength and Mobility: WFL  Oral Labial Strength and Mobility: functional seal, functional coordination, functional pursing  Lingual Strength and Mobility: functional protrusion, functional anterior elevation, functional lateral movement  Voice Prior to PO Intake: clear, low volume    Bedside Swallow Eval:   Consistencies Assessed:  Thin liquids: self-fed by the tsp and single open cup sips  Nectar thick liquids: self-fed by the single open cup sips  Puree: self-fed by the tsp of applesauce  Semi solid: self-fed vanessa cracker pieces coated in applesauce    Oral Phase:   WFL    Pharyngeal Phase:   no overt clinical signs/symptoms of aspiration  no overt clinical signs/symptoms of pharyngeal dysphagia    Compensatory Strategies  None    Treatment: Pt with notable dysphagia history prompting completion of MBSS ~2 months ago. Spouse endorsed they implemented recommendations for minced and moist diet though she noted pt often only consumes a few bites before declining ongoing oral intake. Coughing//choking events noted to occur approximately once per week where spouse endorses pt turns read, appears to "gasp for air," and needs time to recover from incident; spouse endorse this causes her stress surrounding oral intake as she feels "he can't catch his breath and I have to slap him on the " "back." SLP spoke with pt and family extensively regarding recommendations per most recent MBSS, overall impressions, trialing nectar thick liquids vs continuing with thin liquids, and ongoing SLP POC. Pt and fmaily amenable to trialing thickened liquids with ongoing close monitoring per SLP. Reviewed thickened liquids, proper procedures for achieving nectar thick consistencies, and differences of thickened vs thin liquids. Whiteboard additionally updated with diet consistency recommendations and prethickened liquid markers. Pt and spouse ultimately verbalized understanding and had no additional questions or concerns upon SLP exit.      Goals:   Multidisciplinary Problems       SLP Goals          Problem: SLP    Goal Priority Disciplines Outcome   SLP Goal     SLP Progressing   Description: Speech Pathology Goals  To be met by 10/7/24    1. Pt will exhibit a functional swallow for tolerance of a minced and moist diet with nectar thick liquids in order to maintain adequate hydration and nutrition  2. Pt will participate in ongoing diagnostic dysphagia therapy    3. Pt will undergo formal speech and language evaluation to determine presence/severity of speech, language, and/or cognitive linguistic impairment to establish the most appropriate treatment plan                               Plan:     Patient to be seen:  4 x/week   Plan of Care expires:  10/23/24  Plan of Care reviewed with:  patient, spouse   SLP Follow-Up:  Yes       Discharge recommendations:  Low Intensity Therapy   Barriers to Discharge:  Level of Skilled Assistance Needed      Time Tracking:     SLP Treatment Date:   09/23/24  Speech Start Time:  1057  Speech Stop Time:  1123     Speech Total Time (min):  26 min    Billable Minutes: Eval Swallow and Oral Function 10 and Self Care/Home Management Training 16      09/23/2024       "

## 2024-09-23 NOTE — PLAN OF CARE
[facility] - Observation 11H  Initial Discharge Assessment       Primary Care Provider: Munir Estrada MD    Admission Diagnosis: Subarachnoid hemorrhage [I60.9]  Loss of consciousness [R40.20]  Fall [W19.XXXA]  Acute cystitis without hematuria [N30.00]  Chest pain [R07.9]    Admission Date: 9/21/2024  Expected Discharge Date: 9/23/2024         Payor: MEDICARE / Plan: MEDICARE PART A & B / Product Type: Government /     Extended Emergency Contact Information  Primary Emergency Contact: Nereyda Magaña  Mobile Phone: [phone]  Relation: Spouse  Secondary Emergency Contact: Jennifer Walkerty  Mobile Phone: [phone]  Relation: Daughter    Discharge Plan A: Home, Home with family  Discharge Plan B: Home, Home with family      [facility] - [place], [place] - [address] [place] [address]  [address] [facility] [address]  Phone: [phone] Fax: [phone]    [id number] [place] - [address]  [address]  [place] [address]  Phone: [phone] Fax: [phone]    [facility] - [place] - [address]  [address]  [place] [address]  Phone: [phone] Fax: [phone]      Initial Assessment (most recent)       Adult Discharge Assessment - 09/23/24 1418          Discharge Assessment    Assessment Type Discharge Planning Assessment     Confirmed/corrected address, phone number and insurance Yes     Confirmed Demographics Correct on Facesheet     Source of Information patient   Pt wife Nereyda at bedside    Does patient/caregiver understand observation status Yes     Communicated HAILEY with patient/caregiver Yes   Pt discharging today 9/23/24    Reason For Admission SAH (subarachnoid hemorrhage)     People in Home spouse     Facility Arrived From: Home     Do you expect to return to your current living situation? Yes     Do you have help at home or someone to help you manage your care at home? Yes     Who  are your caregiver(s) and their phone number(s)? Wife- Nereyda [phone]     Prior to hospitilization cognitive status: Alert/Oriented     Current cognitive status: Alert/Oriented     Walking or Climbing Stairs Difficulty yes     Walking or Climbing Stairs ambulation difficulty, requires equipment;ambulation difficulty, assistance 1 person     Mobility Management Pt uses a rollator and cane per pt wife     Dressing/Bathing Difficulty no     Home Layout Able to live on 1st floor     Equipment Currently Used at Home rollator;cane, quad     Readmission within 30 days? No     Patient currently being followed by outpatient case management? No     Do you take prescription medications? Yes     Do you have prescription coverage? Yes     Coverage MEDICARE - MEDICARE PART A & B     Do you have any problems affording any of your prescribed medications? No     Is the patient taking medications as prescribed? yes     Who is going to help you get home at discharge? Wife Nereyda     How do you get to doctors appointments? family or friend will provide     Are you on dialysis? No     Do you take coumadin? No   Pt on Eliquis per pt wife    Discharge Plan A Home;Home with family     Discharge Plan B Home;Home with family     DME Needed Upon Discharge  none     Discharge Plan discussed with: Patient;Spouse/sig other     Name(s) and Number(s) Wife-Nereyda [phone]        Physical Activity    On average, how many days per week do you engage in moderate to strenuous exercise (like a brisk walk)? 7 days   With his HME per wife    On average, how many minutes do you engage in exercise at this level? 10 min        Financial Resource Strain    How hard is it for you to pay for the very basics like food, housing, medical care, and heating? Not hard at all        Housing Stability    In the last 12 months, was there a time when you were not able to pay the mortgage or rent on time? No     At any time in the past 12 months, were you  homeless or living in a shelter (including now)? No        Transportation Needs    Has the lack of transportation kept you from medical appointments, meetings, work or from getting things needed for daily living? No        Food Insecurity    Within the past 12 months, you worried that your food would run out before you got the money to buy more. Never true     Within the past 12 months, the food you bought just didn't last and you didn't have money to get more. Never true        Stress    Do you feel stress - tense, restless, nervous, or anxious, or unable to sleep at night because your mind is troubled all the time - these days? To some extent        Social Isolation    How often do you feel lonely or isolated from those around you?  Sometimes        Alcohol Use    Q1: How often do you have a drink containing alcohol? 2-4 times a month     Q2: How many drinks containing alcohol do you have on a typical day when you are drinking? 3 or 4     Q3: How often do you have six or more drinks on one occasion? Never        Utilities    In the past 12 months has the electric, gas, oil, or water company threatened to shut off services in your home? No        Health Literacy    How often do you need to have someone help you when you read instructions, pamphlets, or other written material from your doctor or pharmacy? Sometimes                 SW met with patient and pt wife- Nereyda  in room to complete DPA. Questions answered / contact numbers provided.  Use [facility]REFERR[facility] for any necessary medications at time of discharge. Pt and wife live together in a 1 story home; 1 step per pt. Per pt wife pt was independent before the fall and now pt does uses HME (rollator and cane and she help assist when needed).Pt is not on HD or home oxygen but is on Eliquis BT. Per pt wife pt is set up with [facility] and was supposed to be starting outpatient therapy this past week but was  hospitalized and she has spoken with a rep there and they will start therapy soon (SW called [phone] , [facility], no answer, left VM).  Nereyda will be assisting with help upon discharge.  Nereyda also will be providing transportation home. Hospital follow up will be scheduled with PCP (ADIN reached out to [facility] team via in basket to schedule appt). ADIN verified PCP and insurance.    Discharge Plan A and Plan B have been determined by review of patient's clinical status, future medical and therapeutic needs, and coverage/benefits for post-acute care in coordination with multidisciplinary team members.    MIO More   [facility]    Case Management Dept  [phone]

## 2024-09-23 NOTE — PT/OT/SLP EVAL
Occupational Therapy  Co -  Evaluation with PT    Co-evaluation/treatment performed due to patient's multiple deficits requiring two skilled therapists to appropriately and safely assess patient's strength and endurance while facilitating functional tasks in addition to accommodating for patient's activity tolerance.       Name: Cliff Magaña  MRN: [id number]  Admitting Diagnosis: SAH (subarachnoid hemorrhage)  Recent Surgery: * No surgery found *      Recommendations:     Discharge Recommendations: Low Intensity Therapy  Discharge Equipment Recommendations:  none  Barriers to discharge:  None    Assessment:     Cliff Magaña is a 89 y.o. male with a medical diagnosis of SAH (subarachnoid hemorrhage).  He presents with performance deficits affecting function: weakness, impaired endurance, impaired self care skills, impaired functional mobility, gait instability, decreased safety awareness, impaired cognition.      Pt engaged well in therapy this date, encountered supine in bed with wife present, pleasant demeanor and agreeable to therapy.  Pt able to complete bed mobility with light assistance and STS transfer with light assistance, functional mobility with RW and light assistance. Pt presents with forward flexion while walking and general unsteadiness.  Pt reports fall and is a fall risk, pt's wife consulted with PT re: how to emphasize step in pt's home that pt continues to trip over. Patient currently demonstrates a need for low intensity therapy on a scheduled basis secondary to a decline in functional status due to illness.       Rehab Prognosis: Good; patient would benefit from acute skilled OT services to address these deficits and reach maximum level of function.       Plan:     Patient to be seen 3 x/week to address the above listed problems via self-care/home management, therapeutic activities, therapeutic exercises  Plan of Care Expires: 10/23/24  Plan of Care Reviewed with: patient, spouse    Subjective  "    Chief Complaint: "Sure I'll get up"   Patient/Family Comments/goals: Get better, return home     Occupational Profile:  Living Environment: Pt lives with wife in [facility] with 0 THERESA, WIS with BIB and gb.   Previous level of function: Pt reports being able to walk independently at times, and other times with mod I with cane for short distances, and rollator for long distances. Pt needs A with socks and independence bathing  Roles and Routines:   Equipment Used at Home: rollator, cane, quad  Assistance upon Discharge: wife    Pain/Comfort:  Pain Rating 1: 0/10  Pain Rating Post-Intervention 1: 0/10    Patients cultural, spiritual, Hoahaoism conflicts given the current situation: no    Objective:     Communicated with: RN prior to session.  Patient found supine with telemetry upon OT entry to room.    General Precautions: Standard, fall, aspiration, nectar thick  Orthopedic Precautions: N/A  Braces: N/A  Respiratory Status: Room air    Occupational Performance:    Bed Mobility:    Patient completed Rolling/Turning to Left with  stand by assistance  Patient completed Rolling/Turning to Right with stand by assistance  Patient completed Scooting/Bridging with stand by assistance  Patient completed Supine to Sit with stand by assistance  Patient completed Sit to Supine with stand by assistance  Pt able to sit EOB with good upright balance with SBA    Functional Mobility/Transfers:  Patient completed Sit <> Stand Transfer with contact guard assistance  with  rolling walker   Functional Mobility: Pt engaging in functional mobility to simulate household/community distances with CGA and utilizing RW in order to maximize functional activity tolerance and standing balance required for engagement in occupations of choice, walked with forward flexion and hips posteriorly requiring frequent cues    Activities of Daily Living:  Grooming: stand by assistance washing face with warm washcloth   Upper Body Dressing: minimum " assistance donning/doffing 2nd gown as yahir   Lower Body Dressing: minimum assistance donning bilateral nonslip socks   Toileting: total assistance managing [place]     Cognitive/Visual Perceptual:  Cognitive/Psychosocial Skills:     -       Oriented to: AOx2   -       Follows Commands/attention:Follows multistep  commands  -       Communication: clear/fluent  -       Memory: Impaired STM, Impaired LTM, and Poor immediate recall  -       Safety awareness/insight to disability: impaired   -       Mood/Affect/Coping skills/emotional control: Pleasant  Visual/Perceptual:      -Intact      Physical Exam:  Balance:    -       Sitting: Good.  Standing: Fair  Postural examination/scapula alignment:    -       Rounded shoulders  Skin integrity: Visible skin intact  Edema:  None noted  Sensation:    -       Intact  Motor Planning:    -       Intact  Dominant hand:    -       right  Upper Extremity Range of Motion:     -       Right Upper Extremity: WFL  -       Left Upper Extremity: WFL  Upper Extremity Strength:    -       Right Upper Extremity: WFL  -       Left Upper Extremity: WFL   Strength:    -       Right Upper Extremity: WFL  -       Left Upper Extremity: WFL  Fine Motor Coordination:    -       Intact  Neurological:    -       Impaired    AMPAC 6 Click ADL:  AMPAC Total Score: 19    Treatment & Education:  Pt educated on role of OT, POC, and goals for therapy.    POC was dicussed with patient/caregiver, who was included in its development and is in agreement with the identified goals and treatment plan.   Patient and family aware of patient's deficits and therapy progression.   Time provided for therapeutic counseling and discussion of health disposition.   Educated on importance of EOB/OOB mobility, maintaining routine, sitting up in chair, and maximizing independence with ADLs during admission   Pt completed ADLs and functional mobility for treatment session as noted above   Pt/caregiver verbalized  understanding and expressed no further concerns/questions.  Updated communication board with level of assist required      Patient left HOB elevated with all lines intact, call button in reach, RN notified, and wife present    GOALS:   Multidisciplinary Problems       Occupational Therapy Goals          Problem: Occupational Therapy    Goal Priority Disciplines Outcome Interventions   Occupational Therapy Goal     OT, PT/OT Progressing    Description: Goals to be met by: 10/26/24     Patient will increase functional independence with ADLs by performing:    UE Dressing with [place].  LE Dressing with [place].  Grooming while standing at sink with Set-up Assistance.  Toileting from toilet with Modified [place] for hygiene and clothing management.   Supine to sit with Modified [place].  Step transfer with Modified [place]  Toilet transfer to toilet with Modified [place].                         History:     Past Medical History:   Diagnosis Date    *Atrial fibrillation     Chronic kidney disease     Deep vein thrombosis     Hyperlipidemia     Hypertension     Metabolic syndrome     Type 2 diabetes mellitus, without long-term current use of insulin 12/13/2021         Past Surgical History:   Procedure Laterality Date    BIOPSY OF BLADDER N/A 4/12/2024    Procedure: BIOPSY, BLADDER;  Surgeon: Wellington Story MD;  Location: [address];  Service: Urology;  Laterality: N/A;    BLADDER FULGURATION N/A 4/12/2024    Procedure: FULGURATION, BLADDER;  Surgeon: Wellington Story MD;  Location: [facility] OR [address] [place] [address];  Service: Urology;  Laterality: N/A;    CATARACT EXTRACTION      CHOLECYSTECTOMY      CYSTOSCOPY N/A 11/16/2023    Procedure: CYSTOSCOPY;  Surgeon: Marcelino Moffett MD;  Location: [address];  Service: Urology;  Laterality: N/A;  90 minutes    DILATION OF URETHRA N/A 4/12/2024    Procedure: DILATION, URETHRA;  Surgeon: Wellington Story MD;  Location: [address];  Service: Urology;   Laterality: N/A;    EYE SURGERY      INJECTION OF FACET JOINT Bilateral 4/28/2021    Procedure: FACET JOINT INJECTION BILATERAL L4/L5 DIRECT REFERRAL;  Surgeon: Philipp Iyer MD;  Location: [facility];  Service: Pain Management;  Laterality: Bilateral;  NEEDS CONSENT, ELIQUIS CLEARANCE IN CHART    RETROGRADE PYELOGRAPHY Bilateral 11/16/2023    Procedure: PYELOGRAM, RETROGRADE;  Surgeon: Marcelino Moffett MD;  Location: [address];  Service: Urology;  Laterality: Bilateral;  90 minutes    SKIN CANCER EXCISION      TONSILLECTOMY      TURBT (TRANSURETHRAL RESECTION OF BLADDER TUMOR) N/A 11/16/2023    Procedure: TURBT (TRANSURETHRAL RESECTION OF BLADDER TUMOR);  Surgeon: Marcelino Moffett MD;  Location: [address];  Service: Urology;  Laterality: N/A;  90 minutes    TURBT (TRANSURETHRAL RESECTION OF BLADDER TUMOR) N/A 4/12/2024    Procedure: TURBT (TRANSURETHRAL RESECTION OF BLADDER TUMOR);  Surgeon: Wellington Story MD;  Location: [address];  Service: Urology;  Laterality: N/A;       Time Tracking:     OT Date of Treatment: 09/23/24  OT Start Time: 1027  OT Stop Time: 1044  OT Total Time (min): 17 min    Billable Minutes:Evaluation 8  Self Care/Home Management 9    9/23/2024

## 2024-09-23 NOTE — TELEPHONE ENCOUNTER
----- Message from Alda Adams sent at 9/23/2024  2:30 PM CDT -----  Contact: Cliff   Marilyn from [facility] would like a call back to Cliff about scheduling a hospital follow up   I was not able to schedule

## 2024-09-23 NOTE — PLAN OF CARE
PT evaluation complete and appropriate goals established.    Problem: Physical Therapy  Goal: Physical Therapy Goal  Description: Goals to be met by: 10/23/2024     Patient will increase functional independence with mobility by performin. Supine to sit with Turner  2. Sit to supine with Turner  3. Sit to stand transfer with Supervision  4. Gait  x 200 feet with Stand-by Assistance using LRAD.   5. Ascend/descend 1 stair with no Handrails Contact Guard Assistance using LRAD.   6. Lower extremity exercise program x15 reps per handout, with assistance as needed    Outcome: Progressing     2024

## 2024-09-23 NOTE — TELEPHONE ENCOUNTER
Spoke with patient's wife who confirmed patient is currently in hospital status post fall. Notified her we will cancel his PT appointments this week and will just need new orders/medical clearance for him to return to [facility] once discharged. Well wishes given and wife expresses understanding of instruction.    Katerin Miranda, PT, DPT

## 2024-09-24 ENCOUNTER — PATIENT MESSAGE (OUTPATIENT)
Dept: ADMINISTRATIVE | Facility: OTHER | Age: 89
End: 2024-09-24
Payer: MEDICARE

## 2024-09-24 ENCOUNTER — PATIENT MESSAGE (OUTPATIENT)
Dept: INTERNAL MEDICINE | Facility: CLINIC | Age: 89
End: 2024-09-24
Payer: MEDICARE

## 2024-09-24 ENCOUNTER — DOCUMENTATION ONLY (OUTPATIENT)
Dept: REHABILITATION | Facility: HOSPITAL | Age: 89
End: 2024-09-24

## 2024-09-24 ENCOUNTER — PATIENT OUTREACH (OUTPATIENT)
Dept: ADMINISTRATIVE | Facility: CLINIC | Age: 89
End: 2024-09-24
Payer: MEDICARE

## 2024-09-24 NOTE — PROGRESS NOTES
Physical therapist and physical therapy assistant(s) met face to face on 09/24/2024 to discuss patient's treatment plan and progress towards established goals. Patient will be seen by a physical therapist minimally every 6th visit or every 30 days.    LEAH WHITNEY PT

## 2024-09-24 NOTE — PROGRESS NOTES
C3 nurse attempted to contact Cliff Magaña for a TCC post hospital discharge follow up call. No answer. Left voicemail with callback information. The patient has a scheduled HOSFU appointment with Alize Wheeler on 09/30/24 @ 4873.

## 2024-09-24 NOTE — TELEPHONE ENCOUNTER
I spoke to pt's wife, pt needs a f/u appt with the neurosurgeon.  Pt have a few follow up appts. She have some concern getting him to all of these appts.  She received from Karina to check on pt. Karina was able to assist with the multiple appts.   I can disregard this request.

## 2024-09-24 NOTE — DISCHARGE SUMMARY
[facility] [address] [place] [address] Medicine  Discharge Summary      Patient Name: Cliff Magaña  MRN: [id number]  ANA: [id number]  Patient Class: OP- Observation  Admission Date: 9/21/2024  Hospital Length of Stay: 0 days  Discharge Date and Time: 9/23/2024  2:23 PM  Attending Physician: No att. providers found   Discharging Provider: Afsaneh Thrasher PA-C  Primary Care Provider: Munir Estrada MD  [facility] Medicine Team: [facility] MED  Afsaneh Thrasher PA-C  Primary Care Team: [facility]    HPI:   Cliff Magaña is a 89 y.o. M with HTN, HLD, A fib on eliquis, bladder cancer s/p chemotherapy in remission, T2DM, CKD, and DVT who presented to the ED with confusion s/p unwitnessed mechanical fall at home. The patient is accompanied by his wife, Nereyda, who reports he was in his usual state of health last night while she was giving his evening medications and fell shortly after. Pt reports tripping and falling backwards striking the back of his head on the ground. He did not lose consciousness but was confused and disoriented after falling. He is typically AAOx3 and ambulates with a cane for short distances and a rollater walker for longer distances at baseline. The patient denies preceding symptoms including fever, chills, chest pain, palpitations, SOB, cough, or confusion. He also denies abdominal pain, N/V/D, dysuria, leg swelling or pain, HA, syncope or recent sick contacts.       In the ED: VSSAF. CBC with mild, stable anemia, no leukocytosis. CMP with mild JER with Cr 1.6 from baseline ~1.3. Troponin 0.063 -> 0.065. . EKG c/w known, rate controlled A fib, no ST changes. Pt with difficulty urinating in the ED. Straight cath x1 performed and UA c/w UTI with >100 WBCs, 22 RBCs, 3+ leukocyte esterase. CXR without acute process. CT c-spine without acute fracture. CT head concerning for tiny subarachnoid hemorrhage in a parasagittal sulcus posterior right frontal lobe. NSGY consulted and 6hr repeat CT  was stable. Pt placed in observation for further management.      * No surgery found *      Hospital Course:   Mr. Magaña was placed in observation for a traumatic subarachnoid hemorrhage after fall from standing as described above. NSGY was consulted and repeat CTH was stable without progression of bleeding. Eliquis was held with plan to continue holding until NSGY follow-up in clinic. PT/OT consulted and recommended low intensity therapy with outpatient PT/OT and SLP was consulted and recommended minced & moist diet with nectar thick liquids. Pt was seen and evaluated by me this morning, reports feeling well, and is eager to discharge home. All questions were answered with the patient and his wife at bedside and via phone. Patient & his family acknowledged understanding of discharge instructions and feels safe to discharge home. Patient was discharged on 9/23/2024 in stable condition with PCP, NSGY, GI, and urology follow-up. Education regarding condition provided and return precautions given.     Physical Exam  Gen: in NAD, appears stated age  Neuro: AAOx3, motor, sensory, and strength grossly intact BL  HEENT: EOMI, PERRLA; no JVD appreciated; mild abrasion to posterior scalp with routine healing   CVS: RRR, no m/r/g  Resp: lungs CTAB, no w/r/r; no belabored breathing or accessory muscle use appreciated   Abd: NTND, soft to palpation  Extrem: no UE or LE edema BL     Goals of Care Treatment Preferences:  Code Status: Full Code    Living Will: Yes     What is most important right now is to focus on symptom/pain control.  Accordingly, we have decided that the best plan to meet the patient's goals includes continuing with treatment.      SDOH Screening:  The patient was screened for utility difficulties, food insecurity, transport difficulties, housing insecurity, and interpersonal safety and there were no concerns identified this admission.     Consults:   Consults (From admission, onward)          Status Ordering  Provider     Inpatient consult to Neurosurgery  Once        Provider:  (Not yet assigned)    XAVIER Hughes            No new Assessment & Plan notes have been filed under this hospital service since the last note was generated.  Service: Hospital Medicine    Final Active Diagnoses:    Diagnosis Date Noted POA    PRINCIPAL PROBLEM:  SAH (subarachnoid hemorrhage) [I60.9] 09/22/2024 Yes    Acute cystitis without hematuria [N30.00] 09/22/2024 Yes    Urinary retention [R33.9] 09/22/2024 Yes    DM type 2, controlled, with complication [E11.8] 12/14/2022 Yes    Persistent atrial fibrillation [I48.19] 06/11/2014 Yes     Chronic    Hyperlipidemia associated with type 2 diabetes mellitus [E11.69, E78.5] 05/22/2013 Yes    Hypertension associated with diabetes [E11.59, I15.2] 05/22/2013 Yes    Chronic anticoagulation [Z79.01] 11/23/2012 Not Applicable    CKD stage 3 due to type 2 diabetes mellitus [E11.22, N18.30] 11/23/2012 Yes      Problems Resolved During this Admission:       Discharged Condition: good    Disposition: Home or Self Care    Follow Up:   Follow-up Information       Munir Estrada MD Follow up.    Specialty: Family Medicine  Why: Pt lorenzo Lizama sent a message to clinic nurse at pt's primary care office to contact pt to schedule his hospital f/u visit.  Contact information:  [address] [facility] [address]  [phone]                           Patient Instructions:      Ambulatory referral/consult to Urology   Standing Status: Future   Referral Priority: Urgent Referral Type: Consultation   Referral Reason: Specialty Services Required   Requested Specialty: Urology   Number of Visits Requested: 1     Ambulatory referral/consult to Internal Medicine   Standing Status: Future   Referral Priority: Urgent Referral Type: Consultation   Referral Reason: Specialty Services Required   Requested Specialty: Internal Medicine   Number of Visits Requested: 1     Ambulatory referral/consult to  Neurosurgery   Standing Status: Future   Referral Priority: Urgent Referral Type: Consultation   Referral Reason: Specialty Services Required   Requested Specialty: Neurosurgery   Number of Visits Requested: 1     Ambulatory referral/consult to Physical/Occupational Therapy   Standing Status: Future   Referral Priority: Urgent Referral Type: Physical Medicine   Referral Reason: Specialty Services Required   Number of Visits Requested: 1     Ambulatory referral/consult to Gastroenterology   Standing Status: Future   Referral Priority: Urgent Referral Type: Consultation   Referral Reason: Specialty Services Required   Requested Specialty: Gastroenterology   Number of Visits Requested: 1     Notify your health care provider if you experience any of the following:  temperature >100.4     Notify your health care provider if you experience any of the following:  severe uncontrolled pain     Notify your health care provider if you experience any of the following:  persistent dizziness, light-headedness, or visual disturbances     Notify your health care provider if you experience any of the following:  increased confusion or weakness     Activity as tolerated       Significant Diagnostic Studies: X-Ray Chest AP Portable  Narrative: EXAMINATION:  XR CHEST AP PORTABLE    CLINICAL HISTORY:  r/o aspiration vs pulmonary edema;    TECHNIQUE:  Single frontal view of the chest was performed.    COMPARISON:  Chest radiograph performed 02/02/2024, 11:48 hours    FINDINGS:  Monitoring leads overlie the chest.  Grossly unchanged cardiomediastinal contours.  Atherosclerosis of the aorta.    Chronic appearing minor interstitial coarsening appears similar in appearance to prior study performed 02/02/2024, 11:40 hours.  No definite acute appearing focal airspace consolidation seen.  No evidence of pneumothorax or large volume pleural effusion.    No acute findings in the visualized abdomen.  Operative sequela project in the right  hemiabdomen.    Osseous and soft tissue structures appear without definite acute change.  Impression: No convincing evidence of acute cardiopulmonary disease.    Electronically signed by: Iban Hickman  Date:    09/22/2024  Time:    09:13  CT Head Without Contrast  Narrative: EXAMINATION:  CT HEAD WITHOUT CONTRAST    CLINICAL HISTORY:  SAH follow up;    TECHNIQUE:  Low dose axial CT images obtained throughout the head without the use of intravenous contrast.  Axial, sagittal and coronal reconstructions were performed.    COMPARISON:  CT head 09/21/2024    FINDINGS:  Intracranial compartment:    Ventricles and sulci are unchanged in size without evidence of hydrocephalus.    Compared to prior CT performed 09/21/2024, 23:36 hours, grossly stable subarachnoid hemorrhage.  Reference tiny focus of subarachnoid hemorrhage in a parasagittal sulcus in the posterior right frontal lobe (series 2, image 23; series 601, image 70).  Similar subarachnoid blood about the medial left occipital sulci (axial series 2, image 11).  No edema, mass effect or major vascular distribution infarct.    Skull/extracranial contents (limited evaluation):    No displaced calvarial fracture.    Patchy mucosal thickening in the ethmoid air cells.  Mastoid air cells are clear.  Impression: Compared to prior head CT 09/21/2024, 23:36 hours grossly stable small volume subarachnoid hemorrhage, most conspicuous in parasagittal sulcus in the posterior right frontal lobe and medial left occipital lobe.    No definite new or enlarging intracranial hemorrhage.    Electronically signed by resident: Earle San  Date:    09/22/2024  Time:    06:01    Electronically signed by: Iban Hickman  Date:    09/22/2024  Time:    06:16  CT Cervical Spine Without Contrast  Narrative: EXAMINATION:  CT CERVICAL SPINE WITHOUT CONTRAST    CLINICAL HISTORY:  Neck trauma (Age >= 65y);    TECHNIQUE:  Low dose axial images, sagittal and coronal reformations were performed  though the cervical spine.  Contrast was not administered.    COMPARISON:  None    FINDINGS:    Normal alignment. No prevertebral soft tissue thickening. The craniocervical junction, the dens and cervical vertebra appear adequately maintained.Multilevel degenerative disc space narrowing C2-3, C4-5, C5-6 and C6-7.    Subcutaneous sebaceous cyst the right of midline measuring 1.9 cm.    Heterogeneous low-attenuation nodule arising off the posterior aspect lower pole right lobe thyroid measuring up to 2.8 cm.  Impression: No acute cervical fracture.    Heterogeneous low-attenuation nodule arising off the posterior aspect lower pole right lobe thyroid measuring up to 2.8 cm.  Further evaluation can be obtained with nonemergent thyroid ultrasound.    Additional findings as above.    Electronically signed by: Omar Patel MD  Date:    09/22/2024  Time:    00:26  CT Head Without Contrast  Narrative: EXAMINATION:  CT HEAD WITHOUT CONTRAST    CLINICAL HISTORY:  Facial trauma, blunt;    TECHNIQUE:  Low dose axial images were obtained through the head.  Coronal and sagittal reformations were also performed. Contrast was not administered.    COMPARISON:  None.    FINDINGS:  Tiny subarachnoid hemorrhage in a parasagittal sulcus posterior right frontal lobe.  There is no evidence of acute infarct, intra-axial hemorrhage, or mass.  There is ventricular and sulcal enlargement consistent with generalized atrophy.  Mild confluent decreased supratentorial white matter attenuation most likely related to chronic nonspecific small vessel disease.   No mass-effect or midline shift.  There are no abnormal extra-axial fluid collections.  The paranasal sinuses and mastoid air cells are essentially clear .  The calvarium appears intact.  .  Impression: Tiny acute subarachnoid hemorrhage in a sulcus of the right parasagittal posterior frontal lobe.  Recommend short-term close follow-up.    This report was flagged in Epic as abnormal.    The  critical information above was relayed directly by Omar Patel MD by [facility] secure chat to Afia Jimenez RN on 9/21/2024 at 23:51.    Electronically signed by: Omar Patel MD  Date:    09/22/2024  Time:    00:00        Pending Diagnostic Studies:       None           Medications:  Reconciled Home Medications:      Medication List        START taking these medications      tamsulosin 0.4 mg Cap  Commonly known as: FLOMAX  Take 1 capsule (0.4 mg total) by mouth once daily.            CHANGE how you take these medications      apixaban 5 mg Tab  Commonly known as: ELIQUIS  Take 1 tablet (5 mg total) by mouth 2 (two) times daily. HOLD until neurosurgery follow-up  What changed: additional instructions            CONTINUE taking these medications      acetaminophen 650 MG Tbsr  Commonly known as: TYLENOL  Take 650 mg by mouth every 8 (eight) hours.     cholecalciferol (vitamin D3) 50 mcg (2,000 unit) Cap capsule  Commonly known as: VITAMIN D3  Take by mouth once daily.     ENTRESTO 24-26 mg per tablet  Generic drug: sacubitriL-valsartan  Take 1 tablet by mouth 2 (two) times daily.     fenofibrate micronized 200 MG Cap  Commonly known as: LOFIBRA  TAKE 1 CAPSULE DAILY WITH BREAKFAST     JARDIANCE 10 mg tablet  Generic drug: empagliflozin  TAKE 1 TABLET DAILY     loratadine 10 mg tablet  Commonly known as: CLARITIN  Take 10 mg by mouth once daily.     metFORMIN 500 MG ER 24hr tablet  Commonly known as: GLUCOPHAGE-XR  TAKE 1 TABLET DAILY     metoprolol succinate 100 MG 24 hr tablet  Commonly known as: TOPROL-XL  Take 1 tablet (100 mg total) by mouth 2 (two) times daily.     mupirocin 2 % ointment  Commonly known as: BACTROBAN  Apply topically 2 (two) times daily.     ondansetron 8 MG tablet  Commonly known as: ZOFRAN  Take 1 tablet (8 mg total) by mouth every 12 (twelve) hours as needed for Nausea.            STOP taking these medications      cyclobenzaprine 5 MG tablet  Commonly known as: FLEXERIL     megestroL  400 mg/10 mL (40 mg/mL) Susp  Commonly known as: MEGACE     mirtazapine 7.5 MG Tab  Commonly known as: REMERON              Indwelling Lines/Drains at time of discharge:   Lines/Drains/Airways       None                   Time spent on the discharge of patient: 36 minutes         Afsaneh Thrasher PA-C  Department of Hospital Medicine  [facility]

## 2024-09-25 ENCOUNTER — PATIENT MESSAGE (OUTPATIENT)
Dept: ADMINISTRATIVE | Facility: OTHER | Age: 89
End: 2024-09-25
Payer: MEDICARE

## 2024-09-26 ENCOUNTER — PATIENT MESSAGE (OUTPATIENT)
Dept: ADMINISTRATIVE | Facility: OTHER | Age: 89
End: 2024-09-26
Payer: MEDICARE

## 2024-09-27 ENCOUNTER — PATIENT MESSAGE (OUTPATIENT)
Dept: ADMINISTRATIVE | Facility: OTHER | Age: 89
End: 2024-09-27
Payer: MEDICARE

## 2024-09-27 RX ORDER — FENOFIBRATE 200 MG/1
CAPSULE ORAL
Refills: 0 | OUTPATIENT
Start: 2024-09-27

## 2024-09-27 NOTE — TELEPHONE ENCOUNTER
LOV 3-7-24  Annual 8-18-23  Upcoming appt 9-30-24    Rx refill request from pt's pharmacy fenofibrate    I spoke to pt's wife, she will discuss the request at the upcoming visit

## 2024-09-27 NOTE — TELEPHONE ENCOUNTER
No care due was identified.  [facility] Embedded Care Due Messages. Reference number: [id number].   9/27/2024 2:14:52 PM CDT

## 2024-09-28 ENCOUNTER — PATIENT MESSAGE (OUTPATIENT)
Dept: ADMINISTRATIVE | Facility: OTHER | Age: 89
End: 2024-09-28
Payer: MEDICARE

## 2024-09-29 ENCOUNTER — PATIENT MESSAGE (OUTPATIENT)
Dept: ADMINISTRATIVE | Facility: OTHER | Age: 89
End: 2024-09-29
Payer: MEDICARE

## 2024-09-30 ENCOUNTER — PATIENT MESSAGE (OUTPATIENT)
Dept: ADMINISTRATIVE | Facility: OTHER | Age: 89
End: 2024-09-30
Payer: MEDICARE

## 2024-09-30 ENCOUNTER — LAB VISIT (OUTPATIENT)
Dept: LAB | Facility: HOSPITAL | Age: 89
End: 2024-09-30
Payer: MEDICARE

## 2024-09-30 ENCOUNTER — OFFICE VISIT (OUTPATIENT)
Dept: INTERNAL MEDICINE | Facility: CLINIC | Age: 89
End: 2024-09-30
Payer: MEDICARE

## 2024-09-30 VITALS
OXYGEN SATURATION: 100 % | BODY MASS INDEX: 23.11 KG/M2 | SYSTOLIC BLOOD PRESSURE: 80 MMHG | HEART RATE: 52 BPM | HEIGHT: 64 IN | WEIGHT: 135.38 LBS | DIASTOLIC BLOOD PRESSURE: 60 MMHG | TEMPERATURE: 97 F

## 2024-09-30 DIAGNOSIS — I15.2 HYPERTENSION ASSOCIATED WITH DIABETES: ICD-10-CM

## 2024-09-30 DIAGNOSIS — E11.69 HYPERLIPIDEMIA ASSOCIATED WITH TYPE 2 DIABETES MELLITUS: ICD-10-CM

## 2024-09-30 DIAGNOSIS — I60.9 SAH (SUBARACHNOID HEMORRHAGE): ICD-10-CM

## 2024-09-30 DIAGNOSIS — Z09 HOSPITAL DISCHARGE FOLLOW-UP: Primary | ICD-10-CM

## 2024-09-30 DIAGNOSIS — N18.30 CKD STAGE 3 DUE TO TYPE 2 DIABETES MELLITUS: ICD-10-CM

## 2024-09-30 DIAGNOSIS — Z23 NEED FOR VACCINATION: ICD-10-CM

## 2024-09-30 DIAGNOSIS — R33.9 URINARY RETENTION: ICD-10-CM

## 2024-09-30 DIAGNOSIS — E11.59 HYPERTENSION ASSOCIATED WITH DIABETES: ICD-10-CM

## 2024-09-30 DIAGNOSIS — E78.5 HYPERLIPIDEMIA ASSOCIATED WITH TYPE 2 DIABETES MELLITUS: ICD-10-CM

## 2024-09-30 DIAGNOSIS — E11.22 CKD STAGE 3 DUE TO TYPE 2 DIABETES MELLITUS: ICD-10-CM

## 2024-09-30 DIAGNOSIS — E11.8 DM TYPE 2, CONTROLLED, WITH COMPLICATION: ICD-10-CM

## 2024-09-30 LAB
ALBUMIN SERPL BCP-MCNC: 3.5 G/DL (ref 3.5–5.2)
ALP SERPL-CCNC: 53 U/L (ref 55–135)
ALT SERPL W/O P-5'-P-CCNC: 23 U/L (ref 10–44)
ANION GAP SERPL CALC-SCNC: 11 MMOL/L (ref 8–16)
AST SERPL-CCNC: 34 U/L (ref 10–40)
BILIRUB SERPL-MCNC: 0.4 MG/DL (ref 0.1–1)
BUN SERPL-MCNC: 44 MG/DL (ref 8–23)
CALCIUM SERPL-MCNC: 10.8 MG/DL (ref 8.7–10.5)
CHLORIDE SERPL-SCNC: 112 MMOL/L (ref 95–110)
CO2 SERPL-SCNC: 17 MMOL/L (ref 23–29)
CREAT SERPL-MCNC: 1.5 MG/DL (ref 0.5–1.4)
EST. GFR  (NO RACE VARIABLE): 44.2 ML/MIN/1.73 M^2
GLUCOSE SERPL-MCNC: 128 MG/DL (ref 70–110)
POTASSIUM SERPL-SCNC: 5 MMOL/L (ref 3.5–5.1)
PROT SERPL-MCNC: 6.7 G/DL (ref 6–8.4)
SODIUM SERPL-SCNC: 140 MMOL/L (ref 136–145)

## 2024-09-30 PROCEDURE — 36415 COLL VENOUS BLD VENIPUNCTURE: CPT | Mod: PO | Performed by: NURSE PRACTITIONER

## 2024-09-30 PROCEDURE — 90653 IIV ADJUVANT VACCINE IM: CPT | Mod: PBBFAC,PO

## 2024-09-30 PROCEDURE — 80053 COMPREHEN METABOLIC PANEL: CPT | Performed by: NURSE PRACTITIONER

## 2024-09-30 PROCEDURE — 99214 OFFICE O/P EST MOD 30 MIN: CPT | Mod: PBBFAC,PO,25 | Performed by: NURSE PRACTITIONER

## 2024-09-30 PROCEDURE — G0008 ADMIN INFLUENZA VIRUS VAC: HCPCS | Mod: PBBFAC,PO

## 2024-09-30 PROCEDURE — 99999PBSHW FLU VACCINE - ADJUVANTED: Mod: PBBFAC,,,

## 2024-09-30 PROCEDURE — 99999 PR PBB SHADOW E&M-EST. PATIENT-LVL IV: CPT | Mod: PBBFAC,,, | Performed by: NURSE PRACTITIONER

## 2024-09-30 RX ORDER — FENOFIBRATE 200 MG/1
200 CAPSULE ORAL
Qty: 90 CAPSULE | Refills: 3 | Status: SHIPPED | OUTPATIENT
Start: 2024-09-30

## 2024-09-30 RX ORDER — METFORMIN HYDROCHLORIDE 500 MG/1
500 TABLET, EXTENDED RELEASE ORAL DAILY
Qty: 90 TABLET | Refills: 3 | Status: SHIPPED | OUTPATIENT
Start: 2024-09-30

## 2024-10-01 ENCOUNTER — CLINICAL SUPPORT (OUTPATIENT)
Dept: REHABILITATION | Facility: HOSPITAL | Age: 89
End: 2024-10-01
Payer: MEDICARE

## 2024-10-01 ENCOUNTER — TELEPHONE (OUTPATIENT)
Dept: INTERNAL MEDICINE | Facility: CLINIC | Age: 89
End: 2024-10-01
Payer: MEDICARE

## 2024-10-01 ENCOUNTER — PATIENT MESSAGE (OUTPATIENT)
Dept: ADMINISTRATIVE | Facility: OTHER | Age: 89
End: 2024-10-01
Payer: MEDICARE

## 2024-10-01 DIAGNOSIS — Z74.09 IMPAIRED FUNCTIONAL MOBILITY, BALANCE, GAIT, AND ENDURANCE: Primary | ICD-10-CM

## 2024-10-01 DIAGNOSIS — Z09 HOSPITAL DISCHARGE FOLLOW-UP: Primary | ICD-10-CM

## 2024-10-01 DIAGNOSIS — R53.81 PHYSICAL DECONDITIONING: ICD-10-CM

## 2024-10-01 PROCEDURE — 97164 PT RE-EVAL EST PLAN CARE: CPT | Mod: KX,PN

## 2024-10-01 PROCEDURE — 97530 THERAPEUTIC ACTIVITIES: CPT | Mod: KX,PN

## 2024-10-01 NOTE — TELEPHONE ENCOUNTER
09/30 - 118/77  10/1 - 122/69    Spoke to patient wife who states the patient has been feeling well. BP readings at home as above. Discussed need to present to ER for dehydration/JER but family is apprehensive.     Has PT appointment today for 1:15 - advised to take BP cuff to appointment. Allow PT to check BP and then compare reading to home cuff readings. I am concerned his cuff may be reading higher than his actual readings.     If patient remains hypotensive will need to report back to ER - they are to call with readings from PT before deciding.

## 2024-10-01 NOTE — PROGRESS NOTES
[facility]   Physical Therapy Treatment Note   Re-evaluation     Name: Cliff Magaña  Clinic Number: [id number]    Therapy Diagnosis:   Encounter Diagnoses   Name Primary?    Impaired functional mobility, balance, gait, and endurance Yes    Physical deconditioning      Physician: Elizabeth Bland MD    Visit Date: 10/1/2024     Physician Orders: PT Eval and Treat   Medical Diagnosis from Referral: Risk for falls [Z91.81]   Evaluation Date: 9/20/2024  Authorization Period Expiration: 8/27/2025  Plan of Care Expiration: 11/1/2024  Progress Note Due: 10/18/2024  Date of Surgery: n/a  Visit # / Visits authorized: 1/20 + eval  FOTO: 1/ 3     Precautions: Standard and Fall     Time In: 1:45 PM  Time Out: 2:30 PM  Total Billable Time: 45 (1 re-eval + 1 TA) KX all moving forward    PTA Visit #: 0/5       Subjective     Patient reports: Feels no different than from initial evaluation. Ready to resume physical therapy. Has been compliant with home exercises but does have difficulty with single leg stance.       Doctor has concern for hypotension and wants PT to assess and compare to home blood pressure cuff. Low BP may be due to dehydration.  ___________________________    Discharge note from ED:  Admission Date: 9/21/2024  Discharge Date and Time: 9/23/2024  2:23 PM     Cliff Magaña is a 89 y.o. M with HTN, HLD, A fib on eliquis, bladder cancer s/p chemotherapy in remission, T2DM, CKD, and DVT who presented to the ED with confusion s/p unwitnessed mechanical fall at home. The patient is accompanied by his wife, Nereyda, who reports he was in his usual state of health last night while she was giving his evening medications and fell shortly after. Pt reports tripping and falling backwards striking the back of his head on the ground. He did not lose consciousness but was confused and disoriented after falling. He is typically AAOx3 and ambulates with a cane for short distances and a rollater walker  for longer distances at baseline. The patient denies preceding symptoms including fever, chills, chest pain, palpitations, SOB, cough, or confusion. He also denies abdominal pain, N/V/D, dysuria, leg swelling or pain, HA, syncope or recent sick contacts.     Mr. Magaña was placed in observation for a traumatic subarachnoid hemorrhage after fall from standing as described above.   ___________________________    He was compliant with home exercise program.  Response to previous treatment: IE last visit  Functional change: none     Pain: 0/10  Location: None reported    Objective      Objective Measures updated at progress report unless specified.     BP: At Rest    In clinic: 119/68 mmHg  Home device: 110/75 mmHg    MCDANIELS  BALANCE ASSESSMENT TOOL  1. Sitting to Standing    4 - able to stand without using hands and stabilize independently   2. Standing Unsupported   4 - able to stand safely 2 minutes without hold  3. Sitting Unsupported   4 - able to sit safely and securely 2 minutes  4. Standing to Sitting   4 - sits safely with minimal use of hands  5. Pivot Transfer   4 - able to trnasfer safely with minor use of hands  6. Standing with Eyes Closed   4 - albe to stand 10 seconds safely  7. Standing with Feet Together   4 - able to place feet together independently and stand 1 minute safely  8. Reaching Forward with Outstretched Arm   4 - can reach forward confidently 25 cm/10 inches  9. Retrieving Object from Floor   4 - able to  slipper safely and easily  10. Turning to Look Behind   4 - looks behind from both sides and weights shifts well  11. Turning 360 Degrees   2 - able to turn 360 safely but slowly  12. Placing Alternate Foot on Step   4 - able to stand independently safely and complete 8 steps in 20 seconds  13. Standing with One Foot in Front   3 - able to place foot ahead of other independently and hold 30 seconds --modified tandem  14. Standing on One Foot   2 - able to lift leg indepentdently and  "hold = or < 3 seconds    TOTAL SCORE: 51  Maximum: 56  (At IE 52/56)    Score interpretation:   0-20 = wheelchair bound  21-40 = walking with assistance  41-56 = independent    Fall risk cutoff scores:   Elderly and history of falls: <51/56   Elderly and no history of falls: <42/56   CVA: <45/56    MDC:  Parkinson's = 5 points  Acute CVA = 6.9 points  Chronic CVA = 4.7 points  Pulmonary Disease = 5.9 points  Progressive Dementia = 1.9 points    Initial Eval:  30 second STS: 12  Walking speed:  0.6 m/s  TU seconds    10/1/2024  30 second sit to stand: 11  Walking speed: 0.65 m/s without AD   TUG: 15.5 sec    Treatment     Cliff received the treatments listed below:      Therapeutic exercises to develop strength, endurance, ROM, flexibility, posture, and core stabilization for 00 minutes including:    Neuromuscular re-education activities to improve: Balance, Coordination, Kinesthetic, Sense, Proprioception, and Posture for 00 minutes. The following activities were included:      Therapeutic activities to improve functional performance for 20 minutes, including:    Hip 3-way: Performed hip abduction and extension 2x5 each   Modified single leg balance: 2x30"     Review HEP:   Heel Raises 2x15  Toe Raises 2x15   High Knee Marches x20   Sit to Stands x10   Single Leg Stance   Lateral steps around treatment table 2 laps in each direction with bilateral UE support 20'    Patient Education and Home Exercises       Education provided:   - Plan of Care    Written Home Exercises Provided: Yes. Exercises were reviewed and Cliff was able to demonstrate them prior to the end of the session.  Cliff demonstrated good  understanding of the education provided. See Electronic Medical Record under Patient Instructions for exercises provided during therapy sessions    Assessment     Cliff arrived to physical therapy without complaints and agreeable to treatment. Reevaluation was performed today following visit from ED that " resulted in a backwards fall and head wound. His reassessment presented similar to initial evaluation, with slight increase in walking speed without use of his AD. Today consisted on reviewing exercises which he performed with heightened levels of fatigue that required several seated breaks for recovery. He has a tendency to increase forward trunk lean the longer he stands and requires verbal cueing to remain upright. Patient can benefit from skilled physical therapy to progress LE endurance and strengthening to line with patient's goal of increasing travel/mobility safely within the community.     Cliff Is progressing well towards his goals.   Patient prognosis is Fair.     Patient will continue to benefit from skilled outpatient physical therapy to address the deficits listed in the problem list box on initial evaluation, provide pt/family education and to maximize pt's level of independence in the home and community environment.     Patient's spiritual, cultural and educational needs considered and pt agreeable to plan of care and goals.     Anticipated barriers to physical therapy: Fall Risk, Age    Goals:   Short Term Goals: 4 weeks   Patient will be compliant with HEP in order to maximize PT benefits  Patient will complete TUG in </= 16 seconds with least restrictive assistive device in order to reduce risk for falls and improve safety with functional mobility  Patient will perform 8 step taps within 20 seconds in order to reduce risk for falls and ambulate stairs safely    Long Term Goals: 6 weeks   Patient will score >/= 50% on FOTO limitation survey in order to improve self-perception of functional mobility deficits  Patient will increase walking speed to >0.8 m/s in order to improve BLE endurance and muscular power for transfers   Patient will perform bilateral single leg stance for >3 seconds in order to reduce risk for falls and improve safety with functional mobility  Patient will report 0 falls from  initiation of PT management  Patient will begin some form of home/community fitness in order to sustain progress gained in PT     Plan     Plan of care Certification: 9/20/2024 to 11/1/2024.    Continue modified single leg stance, walking endurance, low hurdles. Suggest light ankle weights or YTB and backwards stepping.        Caity Harrison, SPT

## 2024-10-02 ENCOUNTER — PATIENT MESSAGE (OUTPATIENT)
Dept: ADMINISTRATIVE | Facility: OTHER | Age: 89
End: 2024-10-02
Payer: MEDICARE

## 2024-10-02 ENCOUNTER — OFFICE VISIT (OUTPATIENT)
Dept: CARDIOLOGY | Facility: CLINIC | Age: 89
End: 2024-10-02
Payer: MEDICARE

## 2024-10-02 ENCOUNTER — HOSPITAL ENCOUNTER (EMERGENCY)
Facility: HOSPITAL | Age: 89
Discharge: HOME | End: 2024-10-02
Attending: EMERGENCY MEDICINE
Payer: MEDICARE

## 2024-10-02 ENCOUNTER — PATIENT MESSAGE (OUTPATIENT)
Dept: UROLOGY | Facility: CLINIC | Age: 89
End: 2024-10-02

## 2024-10-02 ENCOUNTER — OFFICE VISIT (OUTPATIENT)
Dept: UROLOGY | Facility: CLINIC | Age: 89
End: 2024-10-02
Payer: MEDICARE

## 2024-10-02 ENCOUNTER — TELEPHONE (OUTPATIENT)
Dept: INTERNAL MEDICINE | Facility: CLINIC | Age: 89
End: 2024-10-02
Payer: MEDICARE

## 2024-10-02 ENCOUNTER — PATIENT MESSAGE (OUTPATIENT)
Dept: INTERNAL MEDICINE | Facility: CLINIC | Age: 89
End: 2024-10-02
Payer: MEDICARE

## 2024-10-02 VITALS
WEIGHT: 137 LBS | HEIGHT: 64 IN | HEART RATE: 76 BPM | DIASTOLIC BLOOD PRESSURE: 56 MMHG | BODY MASS INDEX: 23.39 KG/M2 | SYSTOLIC BLOOD PRESSURE: 92 MMHG

## 2024-10-02 VITALS
SYSTOLIC BLOOD PRESSURE: 93 MMHG | DIASTOLIC BLOOD PRESSURE: 51 MMHG | HEART RATE: 95 BPM | BODY MASS INDEX: 23.41 KG/M2 | WEIGHT: 137.13 LBS | HEIGHT: 64 IN

## 2024-10-02 VITALS
WEIGHT: 132 LBS | HEART RATE: 85 BPM | OXYGEN SATURATION: 100 % | RESPIRATION RATE: 20 BRPM | HEIGHT: 64 IN | SYSTOLIC BLOOD PRESSURE: 119 MMHG | BODY MASS INDEX: 22.53 KG/M2 | DIASTOLIC BLOOD PRESSURE: 59 MMHG | TEMPERATURE: 98 F

## 2024-10-02 DIAGNOSIS — I70.0 ATHEROSCLEROSIS OF AORTA: ICD-10-CM

## 2024-10-02 DIAGNOSIS — N18.30 CKD STAGE 3 DUE TO TYPE 2 DIABETES MELLITUS: ICD-10-CM

## 2024-10-02 DIAGNOSIS — E11.59 HYPERTENSION ASSOCIATED WITH DIABETES: Primary | ICD-10-CM

## 2024-10-02 DIAGNOSIS — E11.69 HYPERLIPIDEMIA ASSOCIATED WITH TYPE 2 DIABETES MELLITUS: ICD-10-CM

## 2024-10-02 DIAGNOSIS — I60.9 SAH (SUBARACHNOID HEMORRHAGE): ICD-10-CM

## 2024-10-02 DIAGNOSIS — I95.9 HYPOTENSION: ICD-10-CM

## 2024-10-02 DIAGNOSIS — I50.33 ACUTE ON CHRONIC HEART FAILURE WITH PRESERVED EJECTION FRACTION: ICD-10-CM

## 2024-10-02 DIAGNOSIS — I48.19 PERSISTENT ATRIAL FIBRILLATION: Chronic | ICD-10-CM

## 2024-10-02 DIAGNOSIS — E83.52 HYPERCALCEMIA: ICD-10-CM

## 2024-10-02 DIAGNOSIS — E78.5 HYPERLIPIDEMIA ASSOCIATED WITH TYPE 2 DIABETES MELLITUS: ICD-10-CM

## 2024-10-02 DIAGNOSIS — N30.00 ACUTE CYSTITIS WITHOUT HEMATURIA: Primary | ICD-10-CM

## 2024-10-02 DIAGNOSIS — C67.0 MALIGNANT NEOPLASM OF TRIGONE OF URINARY BLADDER: Primary | ICD-10-CM

## 2024-10-02 DIAGNOSIS — E11.22 CKD STAGE 3 DUE TO TYPE 2 DIABETES MELLITUS: ICD-10-CM

## 2024-10-02 DIAGNOSIS — Z79.01 CHRONIC ANTICOAGULATION: ICD-10-CM

## 2024-10-02 DIAGNOSIS — I15.2 HYPERTENSION ASSOCIATED WITH DIABETES: Primary | ICD-10-CM

## 2024-10-02 DIAGNOSIS — E11.8 DM TYPE 2, CONTROLLED, WITH COMPLICATION: ICD-10-CM

## 2024-10-02 PROBLEM — R79.89 ELEVATED TROPONIN: Status: RESOLVED | Noted: 2023-11-21 | Resolved: 2024-10-02

## 2024-10-02 LAB
ALBUMIN SERPL BCP-MCNC: 3.4 G/DL (ref 3.5–5.2)
ALP SERPL-CCNC: 69 U/L (ref 55–135)
ALT SERPL W/O P-5'-P-CCNC: 20 U/L (ref 10–44)
ANION GAP SERPL CALC-SCNC: 8 MMOL/L (ref 8–16)
AST SERPL-CCNC: 26 U/L (ref 10–40)
BACTERIA #/AREA URNS AUTO: ABNORMAL /HPF
BASOPHILS # BLD AUTO: 0.06 K/UL (ref 0–0.2)
BASOPHILS NFR BLD: 0.8 % (ref 0–1.9)
BILIRUB SERPL-MCNC: 0.4 MG/DL (ref 0.1–1)
BILIRUB UR QL STRIP: NEGATIVE
BUN SERPL-MCNC: 41 MG/DL (ref 8–23)
CALCIUM SERPL-MCNC: 10.2 MG/DL (ref 8.7–10.5)
CHLORIDE SERPL-SCNC: 112 MMOL/L (ref 95–110)
CLARITY UR REFRACT.AUTO: CLEAR
CO2 SERPL-SCNC: 18 MMOL/L (ref 23–29)
COLOR UR AUTO: YELLOW
CREAT SERPL-MCNC: 1.3 MG/DL (ref 0.5–1.4)
DIFFERENTIAL METHOD BLD: ABNORMAL
EOSINOPHIL # BLD AUTO: 0.2 K/UL (ref 0–0.5)
EOSINOPHIL NFR BLD: 1.9 % (ref 0–8)
ERYTHROCYTE [DISTWIDTH] IN BLOOD BY AUTOMATED COUNT: 13.1 % (ref 11.5–14.5)
EST. GFR  (NO RACE VARIABLE): 52.5 ML/MIN/1.73 M^2
GLUCOSE SERPL-MCNC: 134 MG/DL (ref 70–110)
GLUCOSE UR QL STRIP: ABNORMAL
HCT VFR BLD AUTO: 40.8 % (ref 40–54)
HGB BLD-MCNC: 13.3 G/DL (ref 14–18)
HGB UR QL STRIP: NEGATIVE
IMM GRANULOCYTES # BLD AUTO: 0.1 K/UL (ref 0–0.04)
IMM GRANULOCYTES NFR BLD AUTO: 1.3 % (ref 0–0.5)
KETONES UR QL STRIP: NEGATIVE
LEUKOCYTE ESTERASE UR QL STRIP: ABNORMAL
LYMPHOCYTES # BLD AUTO: 1.9 K/UL (ref 1–4.8)
LYMPHOCYTES NFR BLD: 23.3 % (ref 18–48)
MAGNESIUM SERPL-MCNC: 2.1 MG/DL (ref 1.6–2.6)
MCH RBC QN AUTO: 34.1 PG (ref 27–31)
MCHC RBC AUTO-ENTMCNC: 32.6 G/DL (ref 32–36)
MCV RBC AUTO: 105 FL (ref 82–98)
MICROSCOPIC COMMENT: ABNORMAL
MONOCYTES # BLD AUTO: 0.8 K/UL (ref 0.3–1)
MONOCYTES NFR BLD: 9.6 % (ref 4–15)
NEUTROPHILS # BLD AUTO: 5 K/UL (ref 1.8–7.7)
NEUTROPHILS NFR BLD: 63.1 % (ref 38–73)
NITRITE UR QL STRIP: NEGATIVE
NRBC BLD-RTO: 0 /100 WBC
OHS QRS DURATION: 118 MS
OHS QTC CALCULATION: 423 MS
PH UR STRIP: 5 [PH] (ref 5–8)
PHOSPHATE SERPL-MCNC: 3.1 MG/DL (ref 2.7–4.5)
PLATELET # BLD AUTO: 213 K/UL (ref 150–450)
PMV BLD AUTO: 12.3 FL (ref 9.2–12.9)
POTASSIUM SERPL-SCNC: 4.9 MMOL/L (ref 3.5–5.1)
PROCALCITONIN SERPL IA-MCNC: 0.11 NG/ML
PROT SERPL-MCNC: 6.4 G/DL (ref 6–8.4)
PROT UR QL STRIP: ABNORMAL
RBC # BLD AUTO: 3.9 M/UL (ref 4.6–6.2)
RBC #/AREA URNS AUTO: 3 /HPF (ref 0–4)
SODIUM SERPL-SCNC: 138 MMOL/L (ref 136–145)
SP GR UR STRIP: 1.03 (ref 1–1.03)
SQUAMOUS #/AREA URNS AUTO: 0 /HPF
TROPONIN I SERPL DL<=0.01 NG/ML-MCNC: 0.06 NG/ML (ref 0–0.03)
URN SPEC COLLECT METH UR: ABNORMAL
WBC # BLD AUTO: 7.95 K/UL (ref 3.9–12.7)
WBC #/AREA URNS AUTO: 78 /HPF (ref 0–5)
YEAST UR QL AUTO: ABNORMAL

## 2024-10-02 PROCEDURE — 99214 OFFICE O/P EST MOD 30 MIN: CPT | Mod: S$PBB,,, | Performed by: UROLOGY

## 2024-10-02 PROCEDURE — 84100 ASSAY OF PHOSPHORUS: CPT | Performed by: EMERGENCY MEDICINE

## 2024-10-02 PROCEDURE — 93010 ELECTROCARDIOGRAM REPORT: CPT | Mod: ,,, | Performed by: INTERNAL MEDICINE

## 2024-10-02 PROCEDURE — 99213 OFFICE O/P EST LOW 20 MIN: CPT | Mod: PBBFAC,25,27 | Performed by: UROLOGY

## 2024-10-02 PROCEDURE — 25000003 PHARM REV CODE 250: Performed by: EMERGENCY MEDICINE

## 2024-10-02 PROCEDURE — G2211 COMPLEX E/M VISIT ADD ON: HCPCS | Mod: S$PBB,,, | Performed by: UROLOGY

## 2024-10-02 PROCEDURE — 85025 COMPLETE CBC W/AUTO DIFF WBC: CPT | Performed by: EMERGENCY MEDICINE

## 2024-10-02 PROCEDURE — 81001 URINALYSIS AUTO W/SCOPE: CPT | Performed by: EMERGENCY MEDICINE

## 2024-10-02 PROCEDURE — 63600175 PHARM REV CODE 636 W HCPCS: Performed by: EMERGENCY MEDICINE

## 2024-10-02 PROCEDURE — 87086 URINE CULTURE/COLONY COUNT: CPT | Performed by: EMERGENCY MEDICINE

## 2024-10-02 PROCEDURE — 96365 THER/PROPH/DIAG IV INF INIT: CPT

## 2024-10-02 PROCEDURE — 84145 PROCALCITONIN (PCT): CPT | Performed by: EMERGENCY MEDICINE

## 2024-10-02 PROCEDURE — 99214 OFFICE O/P EST MOD 30 MIN: CPT | Mod: PBBFAC,PO | Performed by: INTERNAL MEDICINE

## 2024-10-02 PROCEDURE — 99999 PR PBB SHADOW E&M-EST. PATIENT-LVL IV: CPT | Mod: PBBFAC,,, | Performed by: INTERNAL MEDICINE

## 2024-10-02 PROCEDURE — 80053 COMPREHEN METABOLIC PANEL: CPT | Performed by: EMERGENCY MEDICINE

## 2024-10-02 PROCEDURE — 96361 HYDRATE IV INFUSION ADD-ON: CPT

## 2024-10-02 PROCEDURE — 99999 PR PBB SHADOW E&M-EST. PATIENT-LVL III: CPT | Mod: PBBFAC,,, | Performed by: UROLOGY

## 2024-10-02 PROCEDURE — 99285 EMERGENCY DEPT VISIT HI MDM: CPT | Mod: 25,27

## 2024-10-02 PROCEDURE — 93005 ELECTROCARDIOGRAM TRACING: CPT

## 2024-10-02 PROCEDURE — 83735 ASSAY OF MAGNESIUM: CPT | Performed by: EMERGENCY MEDICINE

## 2024-10-02 PROCEDURE — 84484 ASSAY OF TROPONIN QUANT: CPT | Performed by: EMERGENCY MEDICINE

## 2024-10-02 RX ORDER — CEPHALEXIN 500 MG/1
500 CAPSULE ORAL EVERY 12 HOURS
Qty: 14 CAPSULE | Refills: 0 | Status: SHIPPED | OUTPATIENT
Start: 2024-10-02 | End: 2024-10-09

## 2024-10-02 RX ADMIN — CEFTRIAXONE 1 G: 1 INJECTION, POWDER, FOR SOLUTION INTRAMUSCULAR; INTRAVENOUS at 04:10

## 2024-10-02 RX ADMIN — SODIUM CHLORIDE 1000 ML: 9 INJECTION, SOLUTION INTRAVENOUS at 12:10

## 2024-10-02 NOTE — TELEPHONE ENCOUNTER
----- Message from Med Assistant Varma sent at 10/2/2024 10:48 AM CDT -----  Regarding: Blood Pressure reading  [facility] Staff  Caller: Nereyda Magaña(wife) # caller ID() (Yesterday,  3:30 PM)  Caller did not stay on the phone long enough or give me a chance to pull up the pt chart in [facility]    Patient would like to get medical advice.  Symptoms (please be specific):   Had a call from Nereyda Magaña about her  Cliff Magaña for [facility]. She did not let me get a word in at all. She just rattled off some b/p numbers 119/68 and 110/75, left her call back number which I pulled off of caller ID() and she abruptly hung up.  How long have you had these symptoms: N/A  Would you like a call back, or a response through your [facility] portal?:   call back  Pharmacy name and phone # (copy from chart):   N/A  Comments:

## 2024-10-02 NOTE — TELEPHONE ENCOUNTER
Patient attended therapy yesterday.   In therapy his BP was 119/68 and his home cuff 110/75.     Urology appointment today BP 93/51     Patient coming to [facility] for Cardiology follow up - will repeat labs.     Attempted to call patient spouse to notify of need for labs and to assess patient. No answer and mailbox full.

## 2024-10-02 NOTE — PROGRESS NOTES
Subjective:   Patient ID:  Cliff Magaña is a 89 y.o. male who presents for follow-up of Hyperlipidemia, Hypertension, and Atrial Fibrillation      HPI: Patient with bladder cancer, s/p surgery, XRT and chemotherapy with slow recovery present with several days of low blood pressure and general weakness.  Last week he fell and suffered small subdural hematoma. Eliquis was held and patient was scheduled for a follow p head CT and neurology visit next week.  Last night he skipped a dose of Metoprolol per advise of a digital medicine program.  Yesterday patient was seen by PCP office PA ( where low BP was also noted) and today by urology. Today he is feeling weak and unsteady on his feet. No fevers, aches or gi ar new  symptoms.    We reviewed most recent blood work and cardiac testing.    Lab Results   Component Value Date     09/30/2024    K 5.0 09/30/2024     (H) 09/30/2024    CO2 17 (L) 09/30/2024    BUN 44 (H) 09/30/2024    CREATININE 1.5 (H) 09/30/2024     (H) 09/30/2024    HGBA1C 6.4 (H) 09/22/2024    MG 2.0 09/23/2024    AST 34 09/30/2024    ALT 23 09/30/2024    ALBUMIN 3.5 09/30/2024    PROT 6.7 09/30/2024    BILITOT 0.4 09/30/2024    WBC 7.48 09/23/2024    HGB 15.0 09/23/2024    HCT 45.4 09/23/2024    HCT 50 02/02/2024     (H) 09/23/2024     09/23/2024    INR 2.8 12/17/2013    INR 1.8 (H) 06/07/2011    PSA 0.43 06/06/2013    TSH 1.619 08/26/2024    CHOL 123 08/14/2024    HDL 21 (L) 08/14/2024    LDLCALC 43.4 (L) 08/14/2024    TRIG 293 (H) 08/14/2024       No results found in the last 24 hours.     Review of Systems   Constitutional: Positive for malaise/fatigue.   HENT:  Positive for hearing loss.    Eyes: Negative.    Cardiovascular:  Positive for irregular heartbeat. Negative for chest pain, claudication, dyspnea on exertion, leg swelling, near-syncope, palpitations and syncope.   Respiratory: Negative.  Negative for cough, shortness of breath, snoring and wheezing.   "  Endocrine: Negative.  Negative for cold intolerance, heat intolerance, polydipsia, polyphagia and polyuria.   Skin: Negative.    Musculoskeletal:  Positive for back pain.   Gastrointestinal: Negative.    Genitourinary: Negative.    Neurological:  Positive for excessive daytime sleepiness, light-headedness, loss of balance and weakness.   Psychiatric/Behavioral: Negative.         Objective:   Physical Exam  Vitals and nursing note reviewed.   Constitutional:       Appearance: He is well-developed.      Comments: BP (!) 92/56   Pulse 76   Ht 5' 4" (1.626 m)   Wt 62.1 kg (137 lb 0.3 oz)   BMI 23.52 kg/m² Ill-appearing and pale.     HENT:      Head: Normocephalic.   Eyes:      Pupils: Pupils are equal, round, and reactive to light.   Neck:      Thyroid: No thyromegaly.      Vascular: No carotid bruit.   Cardiovascular:      Rate and Rhythm: Normal rate. Rhythm irregularly irregular.      Pulses: Intact distal pulses.           Carotid pulses are 2+ on the right side and 2+ on the left side.       Radial pulses are 2+ on the right side and 2+ on the left side.        Femoral pulses are 2+ on the right side and 2+ on the left side.       Popliteal pulses are 2+ on the right side and 2+ on the left side.        Dorsalis pedis pulses are 2+ on the right side and 2+ on the left side.        Posterior tibial pulses are 2+ on the right side and 2+ on the left side.      Heart sounds: Normal heart sounds. No murmur heard.     No friction rub. No gallop.   Pulmonary:      Effort: Pulmonary effort is normal. No respiratory distress.      Breath sounds: Normal breath sounds. No wheezing or rales.   Chest:      Chest wall: No tenderness.   Abdominal:      General: Bowel sounds are normal.      Palpations: Abdomen is soft.   Musculoskeletal:         General: Normal range of motion.      Cervical back: Normal range of motion and neck supple.   Skin:     General: Skin is warm and dry.   Neurological:      Mental Status: He is " alert and oriented to person, place, and time.           Assessment and Plan:     Problem List Items Addressed This Visit          Cardiology Problems    Hypertension associated with diabetes - Primary    Hyperlipidemia associated with type 2 diabetes mellitus    Persistent atrial fibrillation (Chronic)    Atherosclerosis of aorta    Acute on chronic heart failure with preserved ejection fraction       Other    CKD stage 3 due to type 2 diabetes mellitus    Chronic anticoagulation    DM type 2, controlled, with complication    Hypercalcemia    SAH (subarachnoid hemorrhage)       Patient's Medications   New Prescriptions    No medications on file   Previous Medications    ACETAMINOPHEN (TYLENOL) 650 MG TBSR    Take 650 mg by mouth 2 (two) times a day.    APIXABAN (ELIQUIS) 5 MG TAB    Take 1 tablet (5 mg total) by mouth 2 (two) times daily. HOLD until PCP follow-up    CHOLECALCIFEROL, VITAMIN D3, (VITAMIN D3) 50 MCG (2,000 UNIT) CAP CAPSULE    Take by mouth once daily.    EMPAGLIFLOZIN (JARDIANCE) 10 MG TABLET    Take 1 tablet (10 mg total) by mouth once daily.    FENOFIBRATE MICRONIZED (LOFIBRA) 200 MG CAP    Take 1 capsule (200 mg total) by mouth daily with breakfast.    LORATADINE (CLARITIN) 10 MG TABLET    Take 10 mg by mouth once daily.    METFORMIN (GLUCOPHAGE-XR) 500 MG ER 24HR TABLET    Take 1 tablet (500 mg total) by mouth once daily.    METOPROLOL SUCCINATE (TOPROL-XL) 100 MG 24 HR TABLET    Take 1 tablet (100 mg total) by mouth 2 (two) times daily.    ONDANSETRON (ZOFRAN) 8 MG TABLET    Take 1 tablet (8 mg total) by mouth every 12 (twelve) hours as needed for Nausea.    SACUBITRIL-VALSARTAN (ENTRESTO) 24-26 MG PER TABLET    Take 1 tablet by mouth 2 (two) times daily.   Modified Medications    No medications on file   Discontinued Medications    METOPROLOL SUCCINATE (TOPROL-XL) 12.5 MG 24 HR SPLIT TABLET    Take 100 mg by mouth 2 (two) times daily.    MUPIROCIN (BACTROBAN) 2 % OINTMENT    Apply topically 2  (two) times daily.    TAMSULOSIN (FLOMAX) 0.4 MG CAP    Take 1 capsule (0.4 mg total) by mouth once daily.     Suspect dehydration and possible UTI. Patient was advised to go to ER for testing and treatment.  Due to fall risk and recent subdural hematoma with fluctuating renal functions ( unable to properly dose Eliquis) would recommend Watchman procedure. Patient was seen in 2014 by Dr. Sheffield. Will ask for reassessment.  Continue Metoprolol, Jardiance and Entresto for now. Final adjustments after hospital evaluation.  Cardiology ED fellow was notified.  Subsequent follow up with [facility] cardiology in 3 months      No follow-ups on file.

## 2024-10-02 NOTE — ED NOTES
"The patient was changed into a gown and the white board was updated. Wife is concerned for dehydration. Reports low blood pressure. Advised to come to er by his doctors. Denies fever or chills. Pt states pt "eats minimally"  "

## 2024-10-02 NOTE — DISCHARGE INSTRUCTIONS
You were seen in the emergency department for abdominal pain and dysuria. Your labs, exam, and vitals are reassuring. You have a urinary tract infection. We gave you some antibiotics.  We think you're safe to go home.  Please follow-up with your primary care provider.  Please return for any new or worsening pain, black or bloody stool, persistent nausea and vomiting, fevers, chills, dizziness, or you become concerned in any other way.     fever, sore throat, body aches/cough

## 2024-10-02 NOTE — PROGRESS NOTES
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  He received 5 of 6 weeks of cisplatin therapy  Surveillance cystoscopy and CT urogram on 03/04/2024 showing residual tumor at the bladder dome  Underwent TURBT on 4/12/24 and the path was negative for malignancy  Surveillance cystoscopy 7/22/24 negative for residual tumor    History of Present Illness:   Patient is a 89 y.o. male who returns to clinic for evaluation of muscle invasive bladder cancer. See above urologic oncology problem list for more details.  He was now status post chemoradiation with single agent cisplatin and external beam radiotherapy to the bladder dome.    Patient recently experienced a fall and was hospitalized with a SAH. States he was unable to provide urine specimen while in ED and was straight catheterized and started on flomax. Urine culture resulted no growth. No documented elevated bladder scans during that visit. He denies difficulty urinating prior to visit. He has not noticed any changes in urinary symptoms since starting flomax. He has nocturia x1-2 but denies feeling of incomplete emptying or weak stream. He denies hematuria.     He reports two episodes of fecal incontinence in the last few months.       Allergies:  Review of patient's allergies indicates:   Allergen Reactions    Niacin      Other reaction(s): Rash  Other reaction(s): Itching        Medications per EMR:  (Not in a hospital admission)      Past Medical History:  Past Medical History:   Diagnosis Date    *Atrial fibrillation     Chronic kidney disease     Deep vein thrombosis     Hyperlipidemia     Hypertension     Metabolic syndrome     Type 2 diabetes mellitus, without long-term current use of insulin 12/13/2021         Past Surgical History:  Past Surgical History:   Procedure Laterality Date    BIOPSY OF BLADDER N/A 4/12/2024    Procedure: BIOPSY, BLADDER;  Surgeon: Wellington Story MD;  Location: [address];  Service: Urology;  Laterality: N/A;    BLADDER FULGURATION N/A 4/12/2024    Procedure: FULGURATION, BLADDER;  Surgeon: Wellington Story MD;  Location: [address];  Service: Urology;  Laterality: N/A;    CATARACT EXTRACTION      CHOLECYSTECTOMY      CYSTOSCOPY N/A 11/16/2023    Procedure: CYSTOSCOPY;  Surgeon: Marcelino Moffett MD;  Location: [address];  Service: Urology;  Laterality: N/A;  90 minutes    DILATION OF URETHRA N/A 4/12/2024    Procedure: DILATION, URETHRA;  Surgeon: Wellington Story MD;  Location: [address];  Service: Urology;  Laterality: N/A;    EYE SURGERY      INJECTION OF FACET JOINT Bilateral 4/28/2021    Procedure: FACET JOINT INJECTION BILATERAL L4/L5 DIRECT REFERRAL;  Surgeon: Philipp Iyer MD;  Location: [facility];  Service: Pain Management;  Laterality: Bilateral;  NEEDS CONSENT, ELIQUIS CLEARANCE IN CHART    RETROGRADE PYELOGRAPHY Bilateral 11/16/2023    Procedure: PYELOGRAM, RETROGRADE;  Surgeon: Marcelino Moffett MD;  Location: [address];  Service: Urology;  Laterality: Bilateral;  90 minutes    SKIN CANCER EXCISION      TONSILLECTOMY      TURBT (TRANSURETHRAL RESECTION OF BLADDER TUMOR) N/A 11/16/2023    Procedure: TURBT (TRANSURETHRAL RESECTION OF BLADDER TUMOR);  Surgeon: Marcelino Moffett MD;  Location: [address];  Service: Urology;  Laterality: N/A;  90 minutes    TURBT (TRANSURETHRAL RESECTION OF BLADDER TUMOR) N/A 4/12/2024    Procedure: TURBT (TRANSURETHRAL RESECTION OF BLADDER TUMOR);  Surgeon: Wellington Story MD;  Location: [address];  Service: Urology;  Laterality: N/A;        Family History:  Family History   Problem Relation Name Age of Onset    Diabetes Maternal Aunt      Heart attack Neg Hx      Heart disease Neg Hx      Heart  "failure Neg Hx      Hyperlipidemia Neg Hx      Hypertension Neg Hx      Stroke Neg Hx          Social History:  Social History     Tobacco Use    Smoking status: Former     Current packs/day: 0.00     Average packs/day: 2.0 packs/day for 20.0 years (40.0 ttl pk-yrs)     Types: Cigarettes     Start date: 1963     Quit date: 1983     Years since quittin.3     Passive exposure: Never    Smokeless tobacco: Never   Substance Use Topics    Alcohol use: Yes     Alcohol/week: 1.0 standard drink of alcohol     Types: 1 Standard drinks or equivalent per week     Comment: 3 drinks per week          OBJECTIVE:     Vitals:    10/02/24 0902   BP: (!) 93/51   BP Location: Left arm   Patient Position: Sitting   Pulse: 95   Weight: 62.2 kg (137 lb 2 oz)   Height: 5' 4" (1.626 m)            General Appearance: Alert, cooperative, no distress, frail in appearance  Head: Normocephalic  Eyes: Clear conjunctiva  Neck: No obvious LND or JVD  Lungs: Normal chest excursion, no accessory muscle use  Chest: Regular rate rhythm by palpation, no pedal edema  Abdomen: Soft, non-tender, non-distended, no rebound, guarding, rigidity  -genital exam: Circumcised phallus, bilaterally descended testes, erythema around the glans penis and shaft penis consistent with balanitis  Extremities: Atraumatic   Lymph Nodes: No appreciable lymph adenopathy  Neurologic: No gross gait, motor or sensory deficits        LABS:    CBC:  Lab Results   Component Value Date    WBC 7.48 2024    HGB 15.0 2024    HCT 45.4 2024     (H) 2024     2024         BMP:  Lab Results   Component Value Date     2024    K 5.0 2024     (H) 2024    CO2 17 (L) 2024    BUN 44 (H) 2024    CREATININE 1.5 (H) 2024    CALCIUM 10.8 (H) 2024    ANIONGAP 11 2024    EGFRNORACEVR 44.2 (A) 2024       ASSESSMENT/PLAN:     Assessment:  80-year-old male with a history of muscle " invasive bladder cancer with enhancement and tumor recurrence at the dome of the urinary bladder status post chemoradiation, TURBT was negative for recurrent malignancy, physical exam consistent with balanitis    Plan:  -recommend stopping flomax as he is not seeing any benefit  - Patient is scheduled for cystoscopy in 2 weeks for continued surveillance    - code applied: patient requires or will require a continuous, longitudinal, and active collaborative plan of care related to this patient's health condition, bladder cancer --the management of which requires the direction of a practitioner with specialized clinical knowledge, skill, and expertise.

## 2024-10-02 NOTE — ED PROVIDER NOTES
Encounter Date: 10/2/2024       History     Chief Complaint   Patient presents with    Multiple complaints     [facility]en [facility], called today to come to er for dehydration     89-year-old male with a history of AFib, CKD, hypertension, diabetes presenting after being told to report to the emergency department due to concerns for dehydration.  Patient was seen by his primary care provider and cardiologist earlier today due to follow-up from a recent fall with subdural hematoma.  Patient denies any new complaints.  He states he has felt fine, has had a normal appetite for him which included oatmeal and coffee this morning.  Denies chest pain, shortness of breath, nausea, vomiting, dizziness.  Patient states he was told to come to the emergency department for further evaluation.  He otherwise reports feeling asymptomatic.        Review of patient's allergies indicates:   Allergen Reactions    Niacin      Other reaction(s): Rash  Other reaction(s): Itching     Past Medical History:   Diagnosis Date    *Atrial fibrillation     Chronic kidney disease     Deep vein thrombosis     Hyperlipidemia     Hypertension     Metabolic syndrome     Type 2 diabetes mellitus, without long-term current use of insulin 12/13/2021     Past Surgical History:   Procedure Laterality Date    BIOPSY OF BLADDER N/A 4/12/2024    Procedure: BIOPSY, BLADDER;  Surgeon: Wellington Story MD;  Location: [address];  Service: Urology;  Laterality: N/A;    BLADDER FULGURATION N/A 4/12/2024    Procedure: FULGURATION, BLADDER;  Surgeon: Wellington Story MD;  Location: [address];  Service: Urology;  Laterality: N/A;    CATARACT EXTRACTION      CHOLECYSTECTOMY      CYSTOSCOPY N/A 11/16/2023    Procedure: CYSTOSCOPY;  Surgeon: Marcelino Moffett MD;  Location: [address];  Service: Urology;  Laterality: N/A;  90 minutes    DILATION OF URETHRA N/A 4/12/2024    Procedure: DILATION, URETHRA;  Surgeon: Wellington Story MD;  Location: [address]  [facility];  Service: Urology;  Laterality: N/A;    EYE SURGERY      INJECTION OF FACET JOINT Bilateral 2021    Procedure: FACET JOINT INJECTION BILATERAL L4/L5 DIRECT REFERRAL;  Surgeon: Philipp Iyer MD;  Location: [facility];  Service: Pain Management;  Laterality: Bilateral;  NEEDS CONSENT, ELIQUIS CLEARANCE IN CHART    RETROGRADE PYELOGRAPHY Bilateral 2023    Procedure: PYELOGRAM, RETROGRADE;  Surgeon: Marcelino Moffett MD;  Location: [address];  Service: Urology;  Laterality: Bilateral;  90 minutes    SKIN CANCER EXCISION      TONSILLECTOMY      TURBT (TRANSURETHRAL RESECTION OF BLADDER TUMOR) N/A 2023    Procedure: TURBT (TRANSURETHRAL RESECTION OF BLADDER TUMOR);  Surgeon: Marcelino Moffett MD;  Location: [facility] [address] [place] [address];  Service: Urology;  Laterality: N/A;  90 minutes    TURBT (TRANSURETHRAL RESECTION OF BLADDER TUMOR) N/A 2024    Procedure: TURBT (TRANSURETHRAL RESECTION OF BLADDER TUMOR);  Surgeon: Wellington Story MD;  Location: [address];  Service: Urology;  Laterality: N/A;     Family History   Problem Relation Name Age of Onset    Diabetes Maternal Aunt      Heart attack Neg Hx      Heart disease Neg Hx      Heart failure Neg Hx      Hyperlipidemia Neg Hx      Hypertension Neg Hx      Stroke Neg Hx       Social History     Tobacco Use    Smoking status: Former     Current packs/day: 0.00     Average packs/day: 2.0 packs/day for 20.0 years (40.0 ttl pk-yrs)     Types: Cigarettes     Start date: 1963     Quit date: 1983     Years since quittin.3     Passive exposure: Never    Smokeless tobacco: Never   Substance Use Topics    Alcohol use: Yes     Alcohol/week: 1.0 standard drink of alcohol     Types: 1 Standard drinks or equivalent per week     Comment: 3 drinks per week    Drug use: No     Review of Systems   Constitutional:  Negative for chills and fever.   Respiratory:  Negative for shortness of breath.    Cardiovascular:  Negative for chest pain.    Gastrointestinal:  Negative for nausea and vomiting.   Genitourinary:  Negative for dysuria.   Musculoskeletal:  Negative for back pain.   Skin:  Negative for rash.   Neurological:  Negative for weakness.       Physical Exam     Initial Vitals [10/02/24 1215]   BP Pulse Resp Temp SpO2   (!) 108/54 84 18 97.8 °F (36.6 °C) 100 %      MAP       --         Physical Exam    Nursing note and vitals reviewed.  Constitutional: He appears well-developed and well-nourished. He is not diaphoretic. No distress.   HENT:   Head: Normocephalic and atraumatic.   Eyes: Conjunctivae and EOM are normal. Pupils are equal, round, and reactive to light. No scleral icterus.   Neck: Neck supple.   Normal range of motion.  Cardiovascular:  Normal rate, regular rhythm, normal heart sounds and intact distal pulses.     Exam reveals no gallop and no friction rub.       No murmur heard.  Pulmonary/Chest: Breath sounds normal. No stridor. No respiratory distress. He has no wheezes. He has no rhonchi. He has no rales.   Abdominal: Abdomen is soft. Bowel sounds are normal. He exhibits no distension. There is no abdominal tenderness. There is no rebound and no guarding.   Musculoskeletal:         General: No tenderness or edema. Normal range of motion.      Cervical back: Normal range of motion and neck supple.     Neurological: He is alert and oriented to person, place, and time. He has normal strength. No cranial nerve deficit.   Skin: Skin is warm and dry. No rash noted.   Psychiatric: He has a normal mood and affect. His behavior is normal.         ED Course   Procedures  Labs Reviewed   CBC W/ AUTO DIFFERENTIAL - Abnormal       Result Value    WBC 7.95      RBC 3.90 (*)     Hemoglobin 13.3 (*)     Hematocrit 40.8       (*)     MCH 34.1 (*)     MCHC 32.6      RDW 13.1      Platelets 213      MPV 12.3      Immature Granulocytes 1.3 (*)     Gran # (ANC) 5.0      Immature Grans (Abs) 0.10 (*)     Lymph # 1.9      Mono # 0.8      Eos # 0.2       Baso # 0.06      nRBC 0      Gran % 63.1      Lymph % 23.3      Mono % 9.6      Eosinophil % 1.9      Basophil % 0.8      Differential Method Automated     COMPREHENSIVE METABOLIC PANEL - Abnormal    Sodium 138      Potassium 4.9      Chloride 112 (*)     CO2 18 (*)     Glucose 134 (*)     BUN 41 (*)     Creatinine 1.3      Calcium 10.2      Total Protein 6.4      Albumin 3.4 (*)     Total Bilirubin 0.4      Alkaline Phosphatase 69      AST 26      ALT 20      eGFR 52.5 (*)     Anion Gap 8     URINALYSIS, REFLEX TO URINE CULTURE - Abnormal    Specimen UA Urine, Clean Catch      Color, UA Yellow      Appearance, UA Clear      pH, UA 5.0      Specific Gravity, UA 1.030      Protein, UA Trace (*)     Glucose, UA 4+ (*)     Ketones, UA Negative      Bilirubin (UA) Negative      Occult Blood UA Negative      Nitrite, UA Negative      Leukocytes, UA 3+ (*)     Narrative:     Specimen Source->Urine   TROPONIN I - Abnormal    Troponin I 0.056 (*)    URINALYSIS MICROSCOPIC - Abnormal    RBC, UA 3      WBC, UA 78 (*)     Bacteria Occasional      Yeast, UA None      Squam Epithel, UA 0      Microscopic Comment SEE COMMENT      Narrative:     Specimen Source->Urine   CULTURE, URINE   MAGNESIUM    Magnesium 2.1     PHOSPHORUS    Phosphorus 3.1     PROCALCITONIN    Procalcitonin 0.11       EKG Readings: (Independently Interpreted)   Initial Reading: No STEMI. Rhythm: Atrial Fibrillation. Heart Rate: 85.   No ST segment elevation or depression concerning for acute ischemia.        ECG Results              EKG 12-lead (Final result)        Collection Time Result Time QRS Duration OHS QTC Calculation    10/02/24 12:51:23 10/02/24 16:58:58 118 423                     Final result by Interface, Lab In [facility] (10/02/24 16:59:06)                   Narrative:    Test Reason : I95.9,    Vent. Rate : 085 BPM     Atrial Rate : 111 BPM     P-R Int : 000 ms          QRS Dur : 118 ms      QT Int : 356 ms       P-R-T Axes : 000 -50 157  degrees     QTc Int : 423 ms    Atrial fibrillation  Left axis deviation  Low anterior forces  ST and T wave abnormality, consider lateral ischemia  Abnormal ECG  When compared with ECG of 21-SEP-2024 22:37,  No significant change was found  Confirmed by ÁNGEL WORTHINGTON MD ([id number]) on 10/2/2024 4:58:55 PM    Referred By: LIZETTE   SELF           Confirmed By:ÁNGEL WORTHINGTON MD                                  Imaging Results              X-Ray Chest AP Portable (Final result)  Result time 10/02/24 13:21:54      Final result by Deniz Serrano MD (10/02/24 13:21:54)                   Impression:      See above      Electronically signed by: Deniz Serrano MD  Date:    10/02/2024  Time:    13:21               Narrative:    EXAMINATION:  XR CHEST AP PORTABLE    CLINICAL HISTORY:  Sepsis;    TECHNIQUE:  Single frontal view of the chest was performed.    COMPARISON:  No 09/22/2024 ne    FINDINGS:  Heart size normal.  The lungs are clear.  No pleural effusion                                       Medications   sodium chloride 0.9% bolus 1,000 mL 1,000 mL (0 mLs Intravenous Stopped 10/2/24 1425)   cefTRIAXone (Rocephin) 1 g in D5W 100 mL IVPB (MB+) (0 g Intravenous Stopped 10/2/24 1640)     Medical Decision Making                        Medical Decision Making:   Initial Assessment:   89-year-old male presenting due to concern for dehydration and hypotension.  Notes from prior clinic visits reviewed.  Patient was noted to be hypotensive previously.  On arrival today he is 108/54.  He notes compliance with metoprolol and Entresto.  Review of prior hospital records seems to indicate the patient's baseline is in the mid 100s systolic dating back to at least August.  Lab results reviewed from yesterday.  Shows mild dehydration/mild JER.  Workup today reveals UTI.  No sinus systemic illness or sepsis.  Get patient given IV fluids with improved blood pressure.  Patient tolerating p.o..  Patient without further complaints.   Otherwise well-appearing.  Believe he is safe for discharge home.  Discussed return precautions.  Differential Diagnosis:   Chronic hypotension, JER, dehydration, unlikely sepsis             Clinical Impression:  Final diagnoses:  [I95.9] Hypotension  [N30.00] Acute cystitis without hematuria (Primary)          ED Disposition Condition    Discharge Stable          ED Prescriptions       Medication Sig Dispense Start Date End Date Auth. Provider    cephALEXin (KEFLEX) 500 MG capsule Take 1 capsule (500 mg total) by mouth every 12 (twelve) hours. for 7 days 14 capsule 10/2/2024 10/9/2024 Go Holliday MD          Follow-up Information       Follow up With Specialties Details Why Contact Info    Munir Estrada MD Family Medicine Schedule an appointment as soon as possible for a visit   [address] [facility] [address]  [phone]      [facility] - Emergency Dept Emergency Medicine  As needed, If symptoms worsen [address] [facility] [address] [phone]             Go Holliday MD  10/02/24 204

## 2024-10-03 ENCOUNTER — PATIENT MESSAGE (OUTPATIENT)
Dept: ADMINISTRATIVE | Facility: OTHER | Age: 89
End: 2024-10-03
Payer: MEDICARE

## 2024-10-03 ENCOUNTER — PATIENT OUTREACH (OUTPATIENT)
Dept: EMERGENCY MEDICINE | Facility: HOSPITAL | Age: 89
End: 2024-10-03
Payer: MEDICARE

## 2024-10-04 ENCOUNTER — PATIENT MESSAGE (OUTPATIENT)
Dept: ADMINISTRATIVE | Facility: OTHER | Age: 89
End: 2024-10-04
Payer: MEDICARE

## 2024-10-04 DIAGNOSIS — E11.59 HYPERTENSION ASSOCIATED WITH DIABETES: ICD-10-CM

## 2024-10-04 DIAGNOSIS — I48.19 PERSISTENT ATRIAL FIBRILLATION: Primary | Chronic | ICD-10-CM

## 2024-10-04 DIAGNOSIS — I15.2 HYPERTENSION ASSOCIATED WITH DIABETES: ICD-10-CM

## 2024-10-04 DIAGNOSIS — I50.33 ACUTE ON CHRONIC HEART FAILURE WITH PRESERVED EJECTION FRACTION: ICD-10-CM

## 2024-10-04 LAB — BACTERIA UR CULT: NORMAL

## 2024-10-05 ENCOUNTER — PATIENT MESSAGE (OUTPATIENT)
Dept: ADMINISTRATIVE | Facility: OTHER | Age: 89
End: 2024-10-05
Payer: MEDICARE

## 2024-10-06 ENCOUNTER — PATIENT MESSAGE (OUTPATIENT)
Dept: ADMINISTRATIVE | Facility: OTHER | Age: 89
End: 2024-10-06
Payer: MEDICARE

## 2024-10-07 ENCOUNTER — PATIENT MESSAGE (OUTPATIENT)
Dept: ADMINISTRATIVE | Facility: OTHER | Age: 89
End: 2024-10-07
Payer: MEDICARE

## 2024-10-07 ENCOUNTER — PATIENT MESSAGE (OUTPATIENT)
Dept: UROLOGY | Facility: CLINIC | Age: 89
End: 2024-10-07
Payer: MEDICARE

## 2024-10-07 RX ORDER — SACUBITRIL AND VALSARTAN 24; 26 MG/1; MG/1
1 TABLET, FILM COATED ORAL 2 TIMES DAILY
Qty: 180 TABLET | Refills: 3 | Status: SHIPPED | OUTPATIENT
Start: 2024-10-07

## 2024-10-08 ENCOUNTER — PATIENT MESSAGE (OUTPATIENT)
Dept: ADMINISTRATIVE | Facility: OTHER | Age: 89
End: 2024-10-08
Payer: MEDICARE

## 2024-10-08 ENCOUNTER — CLINICAL SUPPORT (OUTPATIENT)
Dept: REHABILITATION | Facility: HOSPITAL | Age: 89
End: 2024-10-08
Payer: MEDICARE

## 2024-10-08 DIAGNOSIS — R53.81 PHYSICAL DECONDITIONING: ICD-10-CM

## 2024-10-08 DIAGNOSIS — Z74.09 IMPAIRED FUNCTIONAL MOBILITY, BALANCE, GAIT, AND ENDURANCE: Primary | ICD-10-CM

## 2024-10-08 PROCEDURE — 97112 NEUROMUSCULAR REEDUCATION: CPT | Mod: KX,PN

## 2024-10-08 PROCEDURE — 97530 THERAPEUTIC ACTIVITIES: CPT | Mod: KX,PN

## 2024-10-08 NOTE — PROGRESS NOTES
"[facility]   Physical Therapy Treatment Note      Name: Cliff Magaña  Clinic Number: [id number]    Therapy Diagnosis:   Encounter Diagnoses   Name Primary?    Impaired functional mobility, balance, gait, and endurance Yes    Physical deconditioning      Physician: Elizabeth Bland MD    Visit Date: 10/8/2024     Physician Orders: PT Eval and Treat   Medical Diagnosis from Referral: Risk for falls [Z91.81]   Evaluation Date: 9/20/2024  Authorization Period Expiration: 8/27/2025  Plan of Care Expiration: 11/1/2024  Progress Note Due: 10/18/2024  Date of Surgery: n/a  Visit # / Visits authorized: 2/20 + eval  FOTO: 1/3     Precautions: Standard and Fall     Time In: 11:15 AM  Time Out: 12:00 PM  Total Billable Time: 45 minutes (1NMR + 2TA) KX all moving forward    PTA Visit #: 0/5       Subjective     Patient reports: Keeping up with exercises at home; no new complaints.  He was compliant with home exercise program.  Response to previous treatment: no adverse response  Functional change: none     Pain: 0/10  Location: None reported    Objective      Objective Measures updated at progress report unless specified.     Treatment     Cliff received the treatments listed below:      Therapeutic exercises to develop strength, endurance, ROM, flexibility, posture, and core stabilization for 00 minutes including:    Neuromuscular re-education activities to improve: Balance, Coordination, Kinesthetic, Sense, Proprioception, and Posture for 15 minutes. The following activities were included:  - Stepper bike x 6 minutes level 4 single peak with bilateral upper extremity / bilateral lower extremity reciprocal motion   - Modified single leg stance + red med ball chest press 2x15 each leg leading  - Reciprocal step taps to 4" step 2x45"    Therapeutic activities to improve functional performance for 30 minutes, inluding:  - 6" forward/reciprocal lore step overs x 4 lengths x 8 feet each with single " "upper extremity support  - 6" lateral step overs x 4 lengths x 8 feet each with single upper extremity support  - Lateral band steps with yellow theraband around ankles x 6 lengths x 10 feet each with bilateral upper extremity support  - Retro walks with right upper extremity support x 4 lengths x 10 feet each  - Forward march walks with right upper extremity support x 4 lengths x 10 feet each  - Reciprocal step ups to 4" step x60" each leg leading with right upper extremity support  - Lateral step up and over to 4" step 2x60"    Patient Education and Home Exercises       Education provided:   - Plan of Care    Written Home Exercises Provided: Yes. Exercises were reviewed and Cliff was able to demonstrate them prior to the end of the session.  Cliff demonstrated good  understanding of the education provided. See Electronic Medical Record under Patient Instructions for exercises provided during therapy sessions    Assessment     Cliff arrived to physical therapy without complaints and agreeable to treatment. He tends to fatigue quickly and requires frequent cuing for upright posture with standing activity. Seated rest required for fatigue recovery but appropriate volume of activity still accomplished. No loss of balance noted throughout visit. He would benefit from continued PT services to achieve functional goals.    Cliff Is progressing well towards his goals.   Patient prognosis is Fair.     Patient will continue to benefit from skilled outpatient physical therapy to address the deficits listed in the problem list box on initial evaluation, provide pt/family education and to maximize pt's level of independence in the home and community environment.     Patient's spiritual, cultural and educational needs considered and pt agreeable to plan of care and goals.     Anticipated barriers to physical therapy: Fall Risk, Age    Goals:   Short Term Goals: 4 weeks   Patient will be compliant with HEP in order to " maximize PT benefits (met)  Patient will complete TUG in </= 16 seconds with least restrictive assistive device in order to reduce risk for falls and improve safety with functional mobility  Patient will perform 8 step taps within 20 seconds in order to reduce risk for falls and ambulate stairs safely    Long Term Goals: 6 weeks   Patient will score >/= 50% on FOTO limitation survey in order to improve self-perception of functional mobility deficits  Patient will increase walking speed to >0.8 m/s in order to improve BLE endurance and muscular power for transfers   Patient will perform bilateral single leg stance for >3 seconds in order to reduce risk for falls and improve safety with functional mobility  Patient will report 0 falls from initiation of PT management  Patient will begin some form of home/community fitness in order to sustain progress gained in PT     Plan     Plan of care Certification: 9/20/2024 to 11/1/2024.    Continue functional endurance and general balance    LEAH WHITNEY, PT

## 2024-10-09 ENCOUNTER — PATIENT MESSAGE (OUTPATIENT)
Dept: ADMINISTRATIVE | Facility: OTHER | Age: 89
End: 2024-10-09
Payer: MEDICARE

## 2024-10-10 ENCOUNTER — PATIENT MESSAGE (OUTPATIENT)
Dept: ADMINISTRATIVE | Facility: OTHER | Age: 89
End: 2024-10-10
Payer: MEDICARE

## 2024-10-10 ENCOUNTER — CLINICAL SUPPORT (OUTPATIENT)
Dept: REHABILITATION | Facility: HOSPITAL | Age: 89
End: 2024-10-10
Payer: MEDICARE

## 2024-10-10 DIAGNOSIS — Z74.09 IMPAIRED FUNCTIONAL MOBILITY, BALANCE, GAIT, AND ENDURANCE: Primary | ICD-10-CM

## 2024-10-10 DIAGNOSIS — R53.81 PHYSICAL DECONDITIONING: ICD-10-CM

## 2024-10-10 PROCEDURE — 97112 NEUROMUSCULAR REEDUCATION: CPT | Mod: KX,PN,CQ

## 2024-10-10 PROCEDURE — 97530 THERAPEUTIC ACTIVITIES: CPT | Mod: KX,PN,CQ

## 2024-10-10 NOTE — PROGRESS NOTES
"[facility]   Physical Therapy Treatment Note      Name: Cliff Magaña  Clinic Number: [id number]    Therapy Diagnosis:   Encounter Diagnoses   Name Primary?    Impaired functional mobility, balance, gait, and endurance Yes    Physical deconditioning      Physician: Elizabeth Bland MD    Visit Date: 10/10/2024     Physician Orders: PT Eval and Treat   Medical Diagnosis from Referral: Risk for falls [Z91.81]   Evaluation Date: 9/20/2024  Authorization Period Expiration: 8/27/2025  Plan of Care Expiration: 11/1/2024  Progress Note Due: 10/18/2024  Date of Surgery: n/a  Visit # / Visits authorized: 3/20 + eval  FOTO: 1/3     Precautions: Standard and Fall     Time In: 11:20 AM  Time Out: 12:00 PM  Total Billable Time: 40 minutes (1NMR + 2TA) KX all moving forward    PTA Visit #: 1/5     Subjective     Patient reports: Keeping up with exercises at home; no new complaints.  He was compliant with home exercise program.  Response to previous treatment: no adverse response  Functional change: none     Pain: 0/10  Location: None reported    Objective      Objective Measures updated at progress report unless specified.     Treatment     Cliff received the treatments listed below:      Therapeutic exercises to develop strength, endurance, ROM, flexibility, posture, and core stabilization for 00 minutes including:    Neuromuscular re-education activities to improve: Balance, Coordination, Kinesthetic, Sense, Proprioception, and Posture for 15 minutes. The following activities were included:  - Stepper bike x 6 minutes level 4 single peak with bilateral upper extremity / bilateral lower extremity reciprocal motion   - Modified single leg stance + red med ball chest press 2x15 each leg leading  - Reciprocal step taps to 4" step 2x45"    Therapeutic activities to improve functional performance for 30 minutes, inluding:  - 6" forward/reciprocal lore step overs x 4 lengths x 8 feet each with single " "upper extremity support  - 6" lateral step overs x 4 lengths x 8 feet each with single upper extremity support  - Lateral band steps with yellow theraband around ankles x 6 lengths x 10 feet each with bilateral upper extremity support  - Retro walks with right upper extremity support x 4 lengths x 10 feet each  - Forward march walks with right upper extremity support x 4 lengths x 10 feet each  - Reciprocal step ups to 4" step x60" each leg leading with right upper extremity support  - Lateral step up and over to 4" step 2x60"    Patient Education and Home Exercises       Education provided:   - Plan of Care    Written Home Exercises Provided: Yes. Exercises were reviewed and Cliff was able to demonstrate them prior to the end of the session.  Cliff demonstrated good  understanding of the education provided. See Electronic Medical Record under Patient Instructions for exercises provided during therapy sessions    Assessment     Cliff arrived to session without complaints and agreeable to treatment. Ambulating with multipoint cane but often appeared unsteady with in clinic ambulation and reaching for nearby mats and tables. Deconditioned state persists, fatigues quickly and needs multiple seated rest breaks.  Continues to require frequent cuing for upright posture with standing activity. Seated rest required for fatigue recovery but appropriate volume of activity still accomplished. No loss of balance noted throughout visit. He would benefit from continued PT services to achieve functional goals.    Cliff Is progressing well towards his goals.   Patient prognosis is Fair.     Patient will continue to benefit from skilled outpatient physical therapy to address the deficits listed in the problem list box on initial evaluation, provide pt/family education and to maximize pt's level of independence in the home and community environment.     Patient's spiritual, cultural and educational needs considered and pt " agreeable to plan of care and goals.     Anticipated barriers to physical therapy: Fall Risk, Age    Goals:   Short Term Goals: 4 weeks   Patient will be compliant with HEP in order to maximize PT benefits (met)  Patient will complete TUG in </= 16 seconds with least restrictive assistive device in order to reduce risk for falls and improve safety with functional mobility  Patient will perform 8 step taps within 20 seconds in order to reduce risk for falls and ambulate stairs safely    Long Term Goals: 6 weeks   Patient will score >/= 50% on FOTO limitation survey in order to improve self-perception of functional mobility deficits  Patient will increase walking speed to >0.8 m/s in order to improve BLE endurance and muscular power for transfers   Patient will perform bilateral single leg stance for >3 seconds in order to reduce risk for falls and improve safety with functional mobility  Patient will report 0 falls from initiation of PT management  Patient will begin some form of home/community fitness in order to sustain progress gained in PT     Plan     Plan of care Certification: 9/20/2024 to 11/1/2024.    Continue functional endurance and general balance    Daija May, PTA

## 2024-10-11 ENCOUNTER — PATIENT MESSAGE (OUTPATIENT)
Dept: ADMINISTRATIVE | Facility: OTHER | Age: 89
End: 2024-10-11
Payer: MEDICARE

## 2024-10-12 ENCOUNTER — PATIENT MESSAGE (OUTPATIENT)
Dept: ADMINISTRATIVE | Facility: OTHER | Age: 89
End: 2024-10-12
Payer: MEDICARE

## 2024-10-13 ENCOUNTER — PATIENT MESSAGE (OUTPATIENT)
Dept: ADMINISTRATIVE | Facility: OTHER | Age: 89
End: 2024-10-13
Payer: MEDICARE

## 2024-10-14 ENCOUNTER — PATIENT MESSAGE (OUTPATIENT)
Dept: ADMINISTRATIVE | Facility: OTHER | Age: 89
End: 2024-10-14
Payer: MEDICARE

## 2024-10-14 ENCOUNTER — HOSPITAL ENCOUNTER (OUTPATIENT)
Dept: RADIOLOGY | Facility: HOSPITAL | Age: 89
Discharge: HOME | End: 2024-10-14
Attending: PHYSICIAN ASSISTANT
Payer: MEDICARE

## 2024-10-14 DIAGNOSIS — I62.00 NONTRAUMATIC SUBDURAL HEMORRHAGE, UNSPECIFIED: ICD-10-CM

## 2024-10-14 PROCEDURE — 70450 CT HEAD/BRAIN W/O DYE: CPT | Mod: TC

## 2024-10-14 PROCEDURE — 70450 CT HEAD/BRAIN W/O DYE: CPT | Mod: 26,,, | Performed by: STUDENT IN AN ORGANIZED HEALTH CARE EDUCATION/TRAINING PROGRAM

## 2024-10-15 ENCOUNTER — PATIENT MESSAGE (OUTPATIENT)
Dept: CARDIOLOGY | Facility: CLINIC | Age: 89
End: 2024-10-15
Payer: MEDICARE

## 2024-10-15 ENCOUNTER — OFFICE VISIT (OUTPATIENT)
Dept: NEUROSURGERY | Facility: CLINIC | Age: 89
End: 2024-10-15
Payer: MEDICARE

## 2024-10-15 ENCOUNTER — PATIENT MESSAGE (OUTPATIENT)
Dept: ADMINISTRATIVE | Facility: OTHER | Age: 89
End: 2024-10-15
Payer: MEDICARE

## 2024-10-15 DIAGNOSIS — I60.9 SAH (SUBARACHNOID HEMORRHAGE): ICD-10-CM

## 2024-10-15 PROCEDURE — 99214 OFFICE O/P EST MOD 30 MIN: CPT | Mod: S$PBB,,, | Performed by: NEUROLOGICAL SURGERY

## 2024-10-15 PROCEDURE — 99212 OFFICE O/P EST SF 10 MIN: CPT | Mod: PBBFAC | Performed by: NEUROLOGICAL SURGERY

## 2024-10-15 PROCEDURE — 99999 PR PBB SHADOW E&M-EST. PATIENT-LVL II: CPT | Mod: PBBFAC,,, | Performed by: NEUROLOGICAL SURGERY

## 2024-10-15 NOTE — PROGRESS NOTES
Subjective:   I, Stephen Dawn, attest that this documentation has been prepared under the direction and in the presence of Arthur Medeiros MD.     Patient ID: Cliff Magaña is a 89 y.o. male     Chief Complaint: No chief complaint on file.      HPI  Mr. Cliff Magaña is a 89 y.o. gentleman with subarachnoid hemorrhage presents to the clinic to establish care. Today the patient reports he had sustained a fall recently. The patient's wife is present during this visit and she reports the patient was not oriented to time or place shortly after the fall. She also reports the patient may have blacked out during this fall.     Review of Systems   Constitutional:  Negative for activity change, appetite change, fatigue, fever and unexpected weight change.   HENT:  Negative for facial swelling.    Eyes: Negative.    Respiratory: Negative.     Cardiovascular: Negative.    Gastrointestinal:  Negative for diarrhea, nausea and vomiting.   Endocrine: Negative.    Genitourinary: Negative.    Musculoskeletal:  Negative for back pain, joint swelling, myalgias and neck pain.   Neurological:  Negative for dizziness, seizures, weakness, numbness and headaches.   Psychiatric/Behavioral: Negative.          Past Medical History:   Diagnosis Date    *Atrial fibrillation     Chronic kidney disease     Deep vein thrombosis     Hyperlipidemia     Hypertension     Metabolic syndrome     Type 2 diabetes mellitus, without long-term current use of insulin 12/13/2021       Objective:    There were no vitals filed for this visit.   Physical Exam  Constitutional:       General: He is not in acute distress.     Appearance: Normal appearance.   HENT:      Head: Normocephalic and atraumatic.   Pulmonary:      Effort: Pulmonary effort is normal.   Musculoskeletal:      Cervical back: Neck supple.   Neurological:      Mental Status: He is alert and oriented to person, place, and time.      GCS: GCS eye subscore is 4. GCS verbal subscore is 5. GCS motor  subscore is 6.      Cranial Nerves: No cranial nerve deficit.            IMAGING:  CT Head Without Contrast 10/14/2024:  Interval resolution of previously seen subarachnoid hemorrhage. No evidence of acute intracranial pathology.        I have personally reviewed the images with the pt.      I, Dr. Arthur Medeiros, personally performed the services described in this documentation. All medical record entries made by the scribe, Stephen Dawn, were at my direction and in my presence.  I have reviewed the chart and agree that the record reflects my personal performance and is accurate and complete. Arthur Medeiros MD. 10/15/2024    Assessment:       1. SAH (subarachnoid hemorrhage)         Plan:   I have personally reviewed the CT Head Without Contrast with the pt which shows Interval resolution of previously seen subarachnoid hemorrhage. No evidence of acute intracranial pathology.     I recommend the patient can continue back on his anticoagulation medication.     Pt advised to contact us with any questions, concerns, or if he experiences any new or worsening symptoms.

## 2024-10-15 NOTE — PATIENT INSTRUCTIONS
I have personally reviewed the CT Head Without Contrast with the pt which shows Interval resolution of previously seen subarachnoid hemorrhage. No evidence of acute intracranial pathology.     I recommend the patient can continue back on his anticoagulation medication.     Pt advised to contact us with any questions, concerns, or if he experiences any new or worsening symptoms.

## 2024-10-16 ENCOUNTER — PATIENT MESSAGE (OUTPATIENT)
Dept: ADMINISTRATIVE | Facility: OTHER | Age: 89
End: 2024-10-16
Payer: MEDICARE

## 2024-10-17 ENCOUNTER — PATIENT MESSAGE (OUTPATIENT)
Dept: ADMINISTRATIVE | Facility: OTHER | Age: 89
End: 2024-10-17
Payer: MEDICARE

## 2024-10-17 ENCOUNTER — PATIENT MESSAGE (OUTPATIENT)
Dept: REHABILITATION | Facility: HOSPITAL | Age: 89
End: 2024-10-17
Payer: MEDICARE

## 2024-10-17 DIAGNOSIS — I48.19 PERSISTENT ATRIAL FIBRILLATION: ICD-10-CM

## 2024-10-17 NOTE — TELEPHONE ENCOUNTER
Patient can restart Eliquis at 2.5mg BID. Please send a script. If he has 5 mg left at home he can break it in h gracia and take half 2 x day until he gets new script.  Please make an appointment with Dr. Sheffield to discuss other options.

## 2024-10-18 ENCOUNTER — TELEPHONE (OUTPATIENT)
Dept: UROLOGY | Facility: CLINIC | Age: 89
End: 2024-10-18
Payer: MEDICARE

## 2024-10-18 NOTE — TELEPHONE ENCOUNTER
Left message for patient. Called to confirm appt... Parking available on [address], arrive 15 minutes prior to check-in on the 2nd floor of the [facility]. Thank you!

## 2024-10-19 ENCOUNTER — PATIENT MESSAGE (OUTPATIENT)
Dept: ADMINISTRATIVE | Facility: OTHER | Age: 89
End: 2024-10-19
Payer: MEDICARE

## 2024-10-20 ENCOUNTER — PATIENT MESSAGE (OUTPATIENT)
Dept: ADMINISTRATIVE | Facility: OTHER | Age: 89
End: 2024-10-20
Payer: MEDICARE

## 2024-10-21 ENCOUNTER — PROCEDURE VISIT (OUTPATIENT)
Dept: UROLOGY | Facility: CLINIC | Age: 89
End: 2024-10-21
Payer: MEDICARE

## 2024-10-21 ENCOUNTER — PATIENT MESSAGE (OUTPATIENT)
Dept: ADMINISTRATIVE | Facility: OTHER | Age: 89
End: 2024-10-21
Payer: MEDICARE

## 2024-10-21 VITALS
HEART RATE: 86 BPM | RESPIRATION RATE: 17 BRPM | TEMPERATURE: 97 F | BODY MASS INDEX: 21.95 KG/M2 | SYSTOLIC BLOOD PRESSURE: 100 MMHG | WEIGHT: 127.88 LBS | DIASTOLIC BLOOD PRESSURE: 55 MMHG

## 2024-10-21 DIAGNOSIS — N32.89 BLADDER MASS: ICD-10-CM

## 2024-10-21 PROCEDURE — 52000 CYSTOURETHROSCOPY: CPT | Mod: S$PBB,,, | Performed by: UROLOGY

## 2024-10-21 PROCEDURE — 52000 CYSTOURETHROSCOPY: CPT | Mod: PBBFAC | Performed by: UROLOGY

## 2024-10-21 RX ORDER — LIDOCAINE HYDROCHLORIDE 20 MG/ML
JELLY TOPICAL
Status: COMPLETED | OUTPATIENT
Start: 2024-10-21 | End: 2024-10-21

## 2024-10-21 RX ORDER — CIPROFLOXACIN 500 MG/1
500 TABLET ORAL
Status: COMPLETED | OUTPATIENT
Start: 2024-10-21 | End: 2024-10-21

## 2024-10-21 RX ADMIN — CIPROFLOXACIN 500 MG: 500 TABLET ORAL at 01:10

## 2024-10-21 RX ADMIN — LIDOCAINE HYDROCHLORIDE: 20 JELLY TOPICAL at 12:10

## 2024-10-21 NOTE — PROCEDURES
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  He received 5 of 6 weeks of cisplatin therapy  Surveillance cystoscopy and CT urogram on 03/04/2024 showing residual tumor at the bladder dome  Underwent TURBT on 4/12/24 and the path was negative for malignancy     Informed consent:  The risks, benefits, complications, treatment options, and expected outcomes were discussed with the patient. The patient concurred with the proposed plan and provided informed consent.     Anesthesia: Lidocaine jelly 2%     Procedure:  The patient was placed in the lithotomy position, was prepped and draped in the usual manner using sterile technique, and 2% lidocaine jelly instilled into the urethra.  A procedural timeout was performed identifying the patient, all in attendance were in agreement, including the patient. A 17 F flexible cystoscope was then inserted into the urethra and the urethra and bladder carefully examined.  The following findings were noted:     Findings:   1. Normal anterior urethra without evidence of strictures or tumors   2. Prostatic urethra open without signs of obstruction  3. Bladder with healing tissue and fibrinous material at the dome, no evidence for residual malignancy.  Ureteral orifices in orthotopic position bilaterally        Specimens: None   Complications: None; patient tolerated the procedure well          Disposition: Home after brief observation   Condition: Stable     Assessment: 88M with MIBC s/p chemorads and repeat TURBT without current endoscopic evidence for  recurrence    Plan: Follow up in 3 months with repeat cysto/cytology    Attending Attestation:      I personally performed the procedure.     Wellington Story  1:49 PM  10/21/2024

## 2024-10-21 NOTE — PATIENT INSTRUCTIONS
What to Expect After a Cystoscopy  For the next 24-48 hours, you may feel a mild burning when you urinate. This burning is normal and expected. Drink plenty of water to dilute the urine to help relieve the burning sensation. You may also see a small amount of blood in your urine after the procedure.    Unless you are already taking antibiotics, you may be given an antibiotic after the test to prevent infection.    Signs and Symptoms to Report  Call the [facility] at [phone] if you develop any of the following:  Fever of 101 degrees or higher  Chills or persistent bleeding  Inability to urinate

## 2024-10-22 ENCOUNTER — PATIENT MESSAGE (OUTPATIENT)
Dept: ELECTROPHYSIOLOGY | Facility: CLINIC | Age: 89
End: 2024-10-22
Payer: MEDICARE

## 2024-10-22 ENCOUNTER — PATIENT MESSAGE (OUTPATIENT)
Dept: ADMINISTRATIVE | Facility: OTHER | Age: 89
End: 2024-10-22
Payer: MEDICARE

## 2024-10-23 ENCOUNTER — PATIENT MESSAGE (OUTPATIENT)
Dept: ADMINISTRATIVE | Facility: OTHER | Age: 89
End: 2024-10-23
Payer: MEDICARE

## 2024-10-23 ENCOUNTER — PATIENT MESSAGE (OUTPATIENT)
Dept: INTERNAL MEDICINE | Facility: CLINIC | Age: 89
End: 2024-10-23
Payer: MEDICARE

## 2024-10-23 ENCOUNTER — PATIENT MESSAGE (OUTPATIENT)
Dept: OTOLARYNGOLOGY | Facility: CLINIC | Age: 89
End: 2024-10-23
Payer: MEDICARE

## 2024-10-24 ENCOUNTER — PATIENT MESSAGE (OUTPATIENT)
Dept: ADMINISTRATIVE | Facility: OTHER | Age: 89
End: 2024-10-24
Payer: MEDICARE

## 2024-10-25 ENCOUNTER — PATIENT MESSAGE (OUTPATIENT)
Dept: ADMINISTRATIVE | Facility: OTHER | Age: 89
End: 2024-10-25
Payer: MEDICARE

## 2024-10-25 ENCOUNTER — CLINICAL SUPPORT (OUTPATIENT)
Dept: REHABILITATION | Facility: HOSPITAL | Age: 89
End: 2024-10-25
Payer: MEDICARE

## 2024-10-25 DIAGNOSIS — R53.81 PHYSICAL DECONDITIONING: ICD-10-CM

## 2024-10-25 DIAGNOSIS — Z74.09 IMPAIRED FUNCTIONAL MOBILITY, BALANCE, GAIT, AND ENDURANCE: Primary | ICD-10-CM

## 2024-10-25 PROCEDURE — 97530 THERAPEUTIC ACTIVITIES: CPT | Mod: KX,PN,CQ

## 2024-10-25 PROCEDURE — 97112 NEUROMUSCULAR REEDUCATION: CPT | Mod: KX,PN,CQ

## 2024-10-25 NOTE — PROGRESS NOTES
"[facility]   Physical Therapy Treatment Note      Name: Cliff Magaña  Clinic Number: [id number]    Therapy Diagnosis:   Encounter Diagnoses   Name Primary?    Impaired functional mobility, balance, gait, and endurance Yes    Physical deconditioning      Physician: Elizabeth Bland MD    Visit Date: 10/25/2024     Physician Orders: PT Eval and Treat   Medical Diagnosis from Referral: Risk for falls [Z91.81]   Evaluation Date: 9/20/2024  Authorization Period Expiration: 8/27/2025  Plan of Care Expiration: 11/1/2024  Progress Note Due: 10/18/2024  Date of Surgery: n/a  Visit # / Visits authorized: 4/20 + eval  FOTO: 1/3     Precautions: Standard and Fall     Time In: 10:30 AM  Time Out: 11:15 AM  Total Billable Time: 45 minutes (1NMR + 2TA) KX all moving forward    PTA Visit #: 2/5     Subjective     Patient reports: Returns after a 2 week break from treatment due to sickness/diarrhea but feeling better today  He was compliant with home exercise program.  Response to previous treatment: no adverse response  Functional change: none     Pain: 0/10  Location: None reported    Objective      Objective Measures updated at progress report unless specified.     Treatment     Cliff received the treatments listed below:      Therapeutic exercises to develop strength, endurance, ROM, flexibility, posture, and core stabilization for 00 minutes including:    Neuromuscular re-education activities to improve: Balance, Coordination, Kinesthetic, Sense, Proprioception, and Posture for 15 minutes. The following activities were included:  - Stepper bike x 6 minutes level 4 single peak with bilateral upper extremity / bilateral lower extremity reciprocal motion   - Modified single leg stance + red med ball chest press 2x15 each leg leading  - Reciprocal step taps to 4" step 2x45"    Therapeutic activities to improve functional performance for 30 minutes, inluding:  - 6" hurdles forward/reciprocal step overs x " "4 lengths x 8 feet each with single upper extremity support  - 6" hurdles lateral step overs x 4 lengths x 8 feet each with single upper extremity support  - Lateral band steps with yellow theraband around ankles x 6 lengths x 10 feet each with bilateral upper extremity support  - Retro walks with right upper extremity support x 4 lengths x 10 feet each  - Forward march walks with right upper extremity support x 4 lengths x 10 feet each  - Reciprocal step ups to 4" step x60" each leg leading with right upper extremity support  - Lateral step up and over to 4" step 2x60"    Patient Education and Home Exercises       Education provided:   - Plan of Care    Written Home Exercises Provided: Yes. Exercises were reviewed and Cliff was able to demonstrate them prior to the end of the session.  Cliff demonstrated good  understanding of the education provided. See Electronic Medical Record under Patient Instructions for exercises provided during therapy sessions    Assessment     Cliff arrived to session without complaints and agreeable to treatment. Ambulating with multipoint cane but often appeared unsteady with in clinic ambulation and reaching for nearby mats and tables. Deconditioned state persists. He fatigues quickly and needs multiple seated rest breaks, requesting one after each exercise.  Continues to require frequent cuing for upright posture with standing activity. Seated rest required for fatigue recovery but appropriate volume of activity still accomplished. No loss of balance noted throughout visit. He would benefit from continued PT services to achieve functional goals.    Cliff Is progressing well towards his goals.   Patient prognosis is Fair.     Patient will continue to benefit from skilled outpatient physical therapy to address the deficits listed in the problem list box on initial evaluation, provide pt/family education and to maximize pt's level of independence in the home and community " environment.     Patient's spiritual, cultural and educational needs considered and pt agreeable to plan of care and goals.     Anticipated barriers to physical therapy: Fall Risk, Age    Goals:   Short Term Goals: 4 weeks   Patient will be compliant with HEP in order to maximize PT benefits (met)  Patient will complete TUG in </= 16 seconds with least restrictive assistive device in order to reduce risk for falls and improve safety with functional mobility  Patient will perform 8 step taps within 20 seconds in order to reduce risk for falls and ambulate stairs safely    Long Term Goals: 6 weeks   Patient will score >/= 50% on FOTO limitation survey in order to improve self-perception of functional mobility deficits  Patient will increase walking speed to >0.8 m/s in order to improve BLE endurance and muscular power for transfers   Patient will perform bilateral single leg stance for >3 seconds in order to reduce risk for falls and improve safety with functional mobility  Patient will report 0 falls from initiation of PT management  Patient will begin some form of home/community fitness in order to sustain progress gained in PT     Plan     Plan of care Certification: 9/20/2024 to 11/1/2024.    Continue functional endurance and general balance    Daija May, PTA

## 2024-10-26 ENCOUNTER — PATIENT MESSAGE (OUTPATIENT)
Dept: ADMINISTRATIVE | Facility: OTHER | Age: 89
End: 2024-10-26
Payer: MEDICARE

## 2024-10-27 ENCOUNTER — PATIENT MESSAGE (OUTPATIENT)
Dept: ADMINISTRATIVE | Facility: OTHER | Age: 89
End: 2024-10-27
Payer: MEDICARE

## 2024-10-28 ENCOUNTER — OFFICE VISIT (OUTPATIENT)
Dept: SURGERY | Facility: CLINIC | Age: 89
End: 2024-10-28
Payer: MEDICARE

## 2024-10-28 ENCOUNTER — TELEPHONE (OUTPATIENT)
Dept: GASTROENTEROLOGY | Facility: CLINIC | Age: 89
End: 2024-10-28
Payer: MEDICARE

## 2024-10-28 ENCOUNTER — PATIENT MESSAGE (OUTPATIENT)
Dept: ADMINISTRATIVE | Facility: OTHER | Age: 89
End: 2024-10-28
Payer: MEDICARE

## 2024-10-28 ENCOUNTER — OFFICE VISIT (OUTPATIENT)
Dept: URGENT CARE | Facility: CLINIC | Age: 89
End: 2024-10-28
Payer: MEDICARE

## 2024-10-28 ENCOUNTER — TELEPHONE (OUTPATIENT)
Dept: ELECTROPHYSIOLOGY | Facility: CLINIC | Age: 89
End: 2024-10-28
Payer: MEDICARE

## 2024-10-28 VITALS
DIASTOLIC BLOOD PRESSURE: 75 MMHG | OXYGEN SATURATION: 98 % | HEART RATE: 83 BPM | SYSTOLIC BLOOD PRESSURE: 110 MMHG | TEMPERATURE: 98 F | WEIGHT: 127.88 LBS | BODY MASS INDEX: 21.83 KG/M2 | HEIGHT: 64 IN | RESPIRATION RATE: 20 BRPM

## 2024-10-28 VITALS
DIASTOLIC BLOOD PRESSURE: 78 MMHG | BODY MASS INDEX: 21.83 KG/M2 | HEIGHT: 64 IN | WEIGHT: 127.88 LBS | SYSTOLIC BLOOD PRESSURE: 121 MMHG

## 2024-10-28 DIAGNOSIS — L72.0 INFECTED EPIDERMOID CYST: Primary | ICD-10-CM

## 2024-10-28 DIAGNOSIS — I48.19 PERSISTENT ATRIAL FIBRILLATION: Primary | Chronic | ICD-10-CM

## 2024-10-28 DIAGNOSIS — L08.9 INFECTED EPIDERMOID CYST: Primary | ICD-10-CM

## 2024-10-28 DIAGNOSIS — I60.9 SAH (SUBARACHNOID HEMORRHAGE): ICD-10-CM

## 2024-10-28 DIAGNOSIS — D23.5 CYST, DERMOID, TRUNK: Primary | ICD-10-CM

## 2024-10-28 PROCEDURE — 99999 PR PBB SHADOW E&M-EST. PATIENT-LVL III: CPT | Mod: PBBFAC,,, | Performed by: STUDENT IN AN ORGANIZED HEALTH CARE EDUCATION/TRAINING PROGRAM

## 2024-10-28 PROCEDURE — 99213 OFFICE O/P EST LOW 20 MIN: CPT | Mod: S$GLB,,, | Performed by: NURSE PRACTITIONER

## 2024-10-28 PROCEDURE — 99213 OFFICE O/P EST LOW 20 MIN: CPT | Mod: PBBFAC,PN | Performed by: STUDENT IN AN ORGANIZED HEALTH CARE EDUCATION/TRAINING PROGRAM

## 2024-10-28 RX ORDER — DOXYCYCLINE 100 MG/1
100 CAPSULE ORAL EVERY 12 HOURS
Qty: 14 CAPSULE | Refills: 0 | Status: SHIPPED | OUTPATIENT
Start: 2024-10-28 | End: 2024-10-28

## 2024-10-28 RX ORDER — DOXYCYCLINE 100 MG/1
100 CAPSULE ORAL EVERY 12 HOURS
Qty: 14 CAPSULE | Refills: 0 | Status: SHIPPED | OUTPATIENT
Start: 2024-10-28 | End: 2024-11-04

## 2024-10-29 ENCOUNTER — CLINICAL SUPPORT (OUTPATIENT)
Dept: REHABILITATION | Facility: HOSPITAL | Age: 89
End: 2024-10-29
Payer: MEDICARE

## 2024-10-29 ENCOUNTER — PATIENT MESSAGE (OUTPATIENT)
Dept: OTHER | Facility: OTHER | Age: 89
End: 2024-10-29
Payer: MEDICARE

## 2024-10-29 ENCOUNTER — PATIENT MESSAGE (OUTPATIENT)
Dept: ADMINISTRATIVE | Facility: OTHER | Age: 89
End: 2024-10-29
Payer: MEDICARE

## 2024-10-29 DIAGNOSIS — Z74.09 IMPAIRED FUNCTIONAL MOBILITY, BALANCE, GAIT, AND ENDURANCE: Primary | ICD-10-CM

## 2024-10-29 DIAGNOSIS — R53.81 PHYSICAL DECONDITIONING: ICD-10-CM

## 2024-10-29 PROCEDURE — 97112 NEUROMUSCULAR REEDUCATION: CPT | Mod: KX,PN

## 2024-10-29 PROCEDURE — 97530 THERAPEUTIC ACTIVITIES: CPT | Mod: KX,PN

## 2024-10-30 ENCOUNTER — HOSPITAL ENCOUNTER (OUTPATIENT)
Dept: RADIOLOGY | Facility: HOSPITAL | Age: 89
Discharge: HOME | End: 2024-10-30
Attending: INTERNAL MEDICINE
Payer: MEDICARE

## 2024-10-30 ENCOUNTER — PATIENT MESSAGE (OUTPATIENT)
Dept: ADMINISTRATIVE | Facility: OTHER | Age: 89
End: 2024-10-30
Payer: MEDICARE

## 2024-10-30 DIAGNOSIS — C67.9 MALIGNANT NEOPLASM OF URINARY BLADDER, UNSPECIFIED SITE: ICD-10-CM

## 2024-10-30 PROCEDURE — 74176 CT ABD & PELVIS W/O CONTRAST: CPT | Mod: TC

## 2024-10-30 PROCEDURE — 74176 CT ABD & PELVIS W/O CONTRAST: CPT | Mod: 26,,, | Performed by: RADIOLOGY

## 2024-10-30 PROCEDURE — 71250 CT THORAX DX C-: CPT | Mod: TC

## 2024-10-30 PROCEDURE — 71250 CT THORAX DX C-: CPT | Mod: 26,,, | Performed by: RADIOLOGY

## 2024-10-31 ENCOUNTER — PATIENT MESSAGE (OUTPATIENT)
Dept: ADMINISTRATIVE | Facility: OTHER | Age: 89
End: 2024-10-31
Payer: MEDICARE

## 2024-10-31 ENCOUNTER — CLINICAL SUPPORT (OUTPATIENT)
Dept: REHABILITATION | Facility: HOSPITAL | Age: 89
End: 2024-10-31
Payer: MEDICARE

## 2024-10-31 DIAGNOSIS — R53.81 PHYSICAL DECONDITIONING: ICD-10-CM

## 2024-10-31 DIAGNOSIS — Z74.09 IMPAIRED FUNCTIONAL MOBILITY, BALANCE, GAIT, AND ENDURANCE: Primary | ICD-10-CM

## 2024-10-31 PROCEDURE — 97112 NEUROMUSCULAR REEDUCATION: CPT | Mod: KX,PN

## 2024-10-31 PROCEDURE — 97530 THERAPEUTIC ACTIVITIES: CPT | Mod: KX,PN

## 2024-11-01 ENCOUNTER — PATIENT MESSAGE (OUTPATIENT)
Dept: ADMINISTRATIVE | Facility: OTHER | Age: 89
End: 2024-11-01
Payer: MEDICARE

## 2024-11-01 ENCOUNTER — TELEPHONE (OUTPATIENT)
Dept: PALLIATIVE MEDICINE | Facility: CLINIC | Age: 89
End: 2024-11-01
Payer: MEDICARE

## 2024-11-02 ENCOUNTER — PATIENT MESSAGE (OUTPATIENT)
Dept: ADMINISTRATIVE | Facility: OTHER | Age: 89
End: 2024-11-02
Payer: MEDICARE

## 2024-11-02 ENCOUNTER — PATIENT MESSAGE (OUTPATIENT)
Dept: CARDIOLOGY | Facility: CLINIC | Age: 89
End: 2024-11-02
Payer: MEDICARE

## 2024-11-02 DIAGNOSIS — I48.19 PERSISTENT ATRIAL FIBRILLATION: ICD-10-CM

## 2024-11-03 ENCOUNTER — PATIENT MESSAGE (OUTPATIENT)
Dept: ADMINISTRATIVE | Facility: OTHER | Age: 89
End: 2024-11-03
Payer: MEDICARE

## 2024-11-04 ENCOUNTER — OFFICE VISIT (OUTPATIENT)
Dept: SURGERY | Facility: CLINIC | Age: 89
End: 2024-11-04
Payer: MEDICARE

## 2024-11-04 ENCOUNTER — PATIENT MESSAGE (OUTPATIENT)
Dept: ADMINISTRATIVE | Facility: OTHER | Age: 89
End: 2024-11-04
Payer: MEDICARE

## 2024-11-04 VITALS
DIASTOLIC BLOOD PRESSURE: 76 MMHG | BODY MASS INDEX: 23.07 KG/M2 | WEIGHT: 135.13 LBS | SYSTOLIC BLOOD PRESSURE: 132 MMHG | HEIGHT: 64 IN | HEART RATE: 130 BPM

## 2024-11-04 DIAGNOSIS — D23.5 CYST, DERMOID, TRUNK: Primary | ICD-10-CM

## 2024-11-04 PROCEDURE — 99999 PR PBB SHADOW E&M-EST. PATIENT-LVL III: CPT | Mod: PBBFAC,,, | Performed by: STUDENT IN AN ORGANIZED HEALTH CARE EDUCATION/TRAINING PROGRAM

## 2024-11-04 PROCEDURE — 10060 I&D ABSCESS SIMPLE/SINGLE: CPT | Mod: PBBFAC,PN | Performed by: STUDENT IN AN ORGANIZED HEALTH CARE EDUCATION/TRAINING PROGRAM

## 2024-11-04 PROCEDURE — 99213 OFFICE O/P EST LOW 20 MIN: CPT | Mod: PBBFAC,PN | Performed by: STUDENT IN AN ORGANIZED HEALTH CARE EDUCATION/TRAINING PROGRAM

## 2024-11-05 ENCOUNTER — OFFICE VISIT (OUTPATIENT)
Dept: HEMATOLOGY/ONCOLOGY | Facility: CLINIC | Age: 89
End: 2024-11-05
Payer: MEDICARE

## 2024-11-05 ENCOUNTER — PATIENT MESSAGE (OUTPATIENT)
Dept: ADMINISTRATIVE | Facility: OTHER | Age: 89
End: 2024-11-05
Payer: MEDICARE

## 2024-11-05 ENCOUNTER — PATIENT MESSAGE (OUTPATIENT)
Dept: PALLIATIVE MEDICINE | Facility: CLINIC | Age: 89
End: 2024-11-05
Payer: MEDICARE

## 2024-11-05 ENCOUNTER — CLINICAL SUPPORT (OUTPATIENT)
Dept: REHABILITATION | Facility: HOSPITAL | Age: 89
End: 2024-11-05
Payer: MEDICARE

## 2024-11-05 VITALS
SYSTOLIC BLOOD PRESSURE: 124 MMHG | HEIGHT: 64 IN | BODY MASS INDEX: 22.18 KG/M2 | RESPIRATION RATE: 17 BRPM | TEMPERATURE: 98 F | DIASTOLIC BLOOD PRESSURE: 66 MMHG | WEIGHT: 129.94 LBS | OXYGEN SATURATION: 100 % | HEART RATE: 90 BPM

## 2024-11-05 DIAGNOSIS — E04.2 MULTIPLE THYROID NODULES: ICD-10-CM

## 2024-11-05 DIAGNOSIS — E11.22 CKD STAGE 3 DUE TO TYPE 2 DIABETES MELLITUS: ICD-10-CM

## 2024-11-05 DIAGNOSIS — N18.30 CKD STAGE 3 DUE TO TYPE 2 DIABETES MELLITUS: ICD-10-CM

## 2024-11-05 DIAGNOSIS — E11.59 HYPERTENSION ASSOCIATED WITH DIABETES: ICD-10-CM

## 2024-11-05 DIAGNOSIS — C67.9 MALIGNANT NEOPLASM OF URINARY BLADDER, UNSPECIFIED SITE: Primary | ICD-10-CM

## 2024-11-05 DIAGNOSIS — R13.10 DYSPHAGIA, UNSPECIFIED TYPE: ICD-10-CM

## 2024-11-05 DIAGNOSIS — R53.81 PHYSICAL DECONDITIONING: ICD-10-CM

## 2024-11-05 DIAGNOSIS — Z74.09 IMPAIRED FUNCTIONAL MOBILITY, BALANCE, GAIT, AND ENDURANCE: Primary | ICD-10-CM

## 2024-11-05 DIAGNOSIS — I48.19 PERSISTENT ATRIAL FIBRILLATION: Chronic | ICD-10-CM

## 2024-11-05 DIAGNOSIS — I15.2 HYPERTENSION ASSOCIATED WITH DIABETES: ICD-10-CM

## 2024-11-05 PROCEDURE — 97112 NEUROMUSCULAR REEDUCATION: CPT | Mod: KX,PN,CQ

## 2024-11-05 PROCEDURE — 97530 THERAPEUTIC ACTIVITIES: CPT | Mod: KX,PN,CQ

## 2024-11-05 PROCEDURE — 99213 OFFICE O/P EST LOW 20 MIN: CPT | Mod: PBBFAC | Performed by: INTERNAL MEDICINE

## 2024-11-05 PROCEDURE — 99999 PR PBB SHADOW E&M-EST. PATIENT-LVL III: CPT | Mod: PBBFAC,,, | Performed by: INTERNAL MEDICINE

## 2024-11-05 NOTE — PROGRESS NOTES
Subjective     Patient ID: Cliff Magaña is a 89 y.o. male.    Chief Complaint: Bladder Cancer    HPI    Diagnosis: Muscle invasive bladder cancer post chemo/XRT     Returns for follow up  Reviewed labs- last week BG was elevated from his baseline- high calorie Boost may be the issue as 2 cans per day  Reports limited appetite otherwise  Weight has been stable    He still has voice changes  8/20/2024 U/S thyroid bx: nothing met criteria for biopsy  Prior swallow study and dietary guidance given  GI appt pending  ENT referral discussed    In the interval:     - 10/21/2024 Cystoscope:  Findings:   1. Normal anterior urethra without evidence of strictures or tumors   2. Prostatic urethra open without signs of obstruction  3. Bladder with healing tissue and fibrinous material at the dome, no evidence for residual malignancy.  Ureteral orifices in orthotopic position bilaterally      - 10/30/2024 CT C/A/P:  FINDINGS:  CHEST:  Neck and thoracic soft tissues:  No lymphadenopathy.  Stable 2.0 cm right thyroid lobe nodule and left thyroid lobe calcification.  Partially visualized soft tissue density in the upper back subcutaneous tissues, possibly the sebaceous cyst noted on prior CT chest..  Thoracic aorta:Left-sided aortic arch with 3 branch vessels.  Normal caliber, contour, and course.  Moderate atherosclerotic calcification.  Heart: Normal in size. No pericardial effusion.  Multi-vessel coronary artery atherosclerosis.  Calcification of the aortic annulus.  Normal diameter of the main pulmonary artery.  Hilum/Mediastinum: No pathologically enlarged hilar or mediastinal lymph nodes.  Airways: The trachea is midline and proximal airways are patent.  Lungs/Pleura: Similar appearance of 1.3 cm subpleural nodule in the right lung base (series 3, image 226).  Calcified pleural plaque noted.  No new pulmonary nodules..  No consolidation, pleural effusion, or pneumothorax.  Esophagus: The esophagus maintains a normal course  and caliber.  ABDOMEN/PELVIS:  Stomach: Small hiatal hernia.  No gastric wall thickening.  Liver: The liver is normal in size and attenuation.  Stable left hepatic lobe calcification.  No focal hepatic abnormality on this noncontrast study.  Gallbladder/Biliary System: Gallbladder is surgically absent.  No intrahepatic or extrahepatic biliary ductal dilatation.  Spleen: Normal size without focal abnormality.  Pancreas: Pancreatic atrophy with prominent pancreatic duct and scattered parenchymal calcifications, stable from prior.  Findings may represent sequela of chronic pancreatitis.  No mass or peripancreatic fat stranding.  Adrenal glands: No adrenal nodules.  Renal and Urinary system: Unchanged bilateral renal cortical thinning and nonspecific perinephric stranding.  Stable renal cysts bilaterally and subcentimeter hypodensities that are too small to characterize.    No hydronephrosis.  The ureters appear normal in course and caliber. Patient is s/p TURBT.  Anterior bladder wall thickening and new linear hyperdensities along the anterior superior bladder wall (sagittal series 10, image 64), possibly related to post treatment or postoperative change.  Stable mild pre vesicle fat stranding.  Reproductive: Dystrophic prostatic calcifications.  Bowel: The visualized loops of small and large bowel show no evidence of obstruction or inflammation.  The appendix appears normal.  Peritoneum: No free fluid or intraperitoneal free air.  Lymph Nodes: No pathologic braden enlargement in the abdomen or pelvis.  Vasculature: The abdominal aorta and its branch vessels are normal in course and caliber.  Moderate aortoiliac atherosclerotic calcifications.  Bones: Degenerative changes of the spine.  No evidence of acute fracture or osseus destructive process.  Soft tissues: Small fat containing umbilical hernia.  Impression:  Patient is status post TURBT and chemoradiation for urothelial carcinoma of the bladder.  New linear  hyperdensities within the bladder, possibly representing post treatment or postoperative change.  Otherwise there is stable bladder wall thickening and perivesical fat stranding.  Right lung base pleural based nodular opacity, stable from 2023.  Calcified pleural plaques noted.  Sequela of chronic pancreatitis.  Additional findings above.      - 2/2024 scans without signs of metastatic disease     - cystoscopy with Dr. Story on 3/4/2024 revealing concern for residual tumor bladder dome but TURBT for 4/12/2024 was negative on Pathology  Pathology:  1. BLADDER, RIGHT LATERAL WALL, BIOPSY:   Small fragment of urothelial mucosa with reactive changes.    Negative for dysplasia or malignancy.    2. BLADDER, POSTERIOR WALL, BIOPSY:   Heavily denuded urothelial mucosa with reactive changes.    Negative for dysplasia or malignancy.    3. BLADDER, LEFT LATERAL WALL, BIOPSY:   Small fragment of urothelial mucosa with reactive changes.    Negative for dysplasia or malignancy.    4. BLADDER, POSTERIOR PATCH, BIOPSY:   Heavily denuded urothelial mucosa with marked chronic inflammation and reactive changes.   Negative for dysplasia or malignancy.    5. BLADDER, ANTERIOR WALL TUMOR, TRANSURETHRAL RESECTION:   Polypoid cystitis and granulation tissue with squamous metaplasia of urothelial mucosa.    Negative for dysplasia or malignancy.      Reports the following:  + fatigue  Weight stable in low 120s (never regained to baseline of 140 lbs)- lowest weight was 117 lbs and then started Marinol  Patient states food does not really taste good anymore- feels like he is being punished  Also feels like his mouth is dry all the time  Likes fruits smoothies, oatmeal with fruit, ice cream, soups, scrambled eggs  Wife notes other things she cooks that is agonizing to watch as no interest     States just the last 2 weeks he has returned to [employer]- but this the only outing  Sleeps a lot     No pain     Frequent urination     Most recent  imaging:  - 2/29/2024 CT Urogram Abd/Pelvis:  FINDINGS:  Stable mild cardiomegaly.  Coronary artery calcific atherosclerosis.  Visualized inferior lungs are clear.  Stable small left hepatic lobe calcification.  No suspicious hepatic lesion.  Gallbladder is surgically absent.  No biliary ductal dilatation.  Spleen and adrenal glands are unremarkable.  Stable appearance of the pancreas with atrophy, scattered calcifications, and mild duct dilatation measuring 4 mm.  Findings can be seen with sequela of chronic pancreatitis.  Bilateral renal cortical thinning.  Multiple bilateral renal cysts and subcentimeter renal hypodensities too small to characterize.  No solid enhancing renal mass.  No renal stone.  No hydronephrosis or ureteral dilatation.  Opacification of the bilateral renal collecting systems and ureters without suspicious filling defect.  Patient is status post TURBT.  There is increased heterogeneous wall thickening and enhancement along the anterior bladder (series 4, image 144) concerning for residual/recurrent tumor.  Mild perivesical stranding and vascular prominence, similar to prior exam.  Small hiatal hernia.  Colonic diverticulosis.  No evidence of bowel obstruction or inflammation.  No free intraperitoneal fluid or air.  No pathologically enlarged abdominopelvic lymph nodes.  Abdominal aorta is normal caliber.  Moderate/severe calcific atherosclerosis.  Degenerative changes of the osseous structures.  No acute fracture or aggressive osseous lesion  Impression:  1. Increased heterogeneous wall thickening and enhancement along the anterior bladder concerning for residual/recurrent tumor.  2. No evidence of metastatic disease.  3. Additional findings as above.     Oncology History:  - bladder cancer incidentally found on imaging as he had been under surveillance for renal cysts  (10/17/2023 ultrasound showed stable left renal cysts and new bladder masses)     -  11/16/2023 TURBT of bladder  tumor  Findings:   1. Right postero-lateral papillary bladder wall tumor (3-4cm) with diffusely erythematous and nodular surrounding tissue, right < 1cm papillary bladder wall tumor immediately lateral to the larger postero-lateral papillary tumor, and a right nodular 4cm bladder tumor with high grade and possibly invasive appearance. Tumors resected, hemostasis achieved.  Several other patches of erythema were diffusely identified on the left lateral bladder wall.  2. Bimanual exam post-TURBT showed freely mobile bladder without abnormalities  3. Bilateral retrograde pyelogram showed delicate calices with no evidence of hydronephrosis, no filling defects, and ureter normal in course and caliber, bilaterally  Pathology:  1. Bladder, right posterolateral wall, transurethral resection of bladder tumor:  - Noninvasive high-grade papillary urothelial carcinoma  - Muscularis propria present  - Multiple H&E levels evaluated  2. Bladder, right anterior, transurethral resection of bladder tumor:  - Invasive high-grade papillary urothelial carcinoma  - Tumor invades muscularis propria  - Intact expression of DNA mismatch repair proteins in tumor by immunohistochemistry (see comment)  COMMENT:  Immunohistochemistry (IHC) Testing for Mismatch Repair (MMR) Proteins:  MLH1 - Intact nuclear expression  MSH2 - Intact nuclear expression  MSH6 - Intact nuclear expression  PMS2 - Intact nuclear expression  Background nonneoplastic tissue/internal control with intact nuclear expression  IHC Interpretation  No loss of nuclear expression of MMR proteins: low probability of microsatellite instability  There are exceptions to the above IHC interpretations. These results should not be considered in isolation, and clinical correlation with genetic counseling is recommended to assess the need for germline testing.     - 11/25/2023 CT Abd/Pelvis:  FINDINGS:  Lung base: Bilateral small volume pleural effusion increased in size compared to  recent prior, associated with mild compression atelectasis.  Visualized portion of heart: Coronary artery calcification.  Liver: Normal in size.  Left lobe small calcified granuloma.  No suspicious focal lesion.  Gallbladder: Cholecystectomy.  Bile duct: No intra-or extrahepatic biliary ductal dilatation.  Spleen: Unremarkable.  Pancreas: Parenchymal atrophy.  Multiple parenchymal calcifications suggesting chronic pancreatitis.  No suspicious focal lesion.  No pancreatic ductal dilatation.  No adjacent inflammatory changes or fluid collections.  Adrenal glands: Unremarkable.  Genitourinary: Bilateral renal cysts.  Subcentimeter hypodensities in both kidneys, which are too small to characterize.  The ureters appear normal in course and caliber.  No evidence of nephrolithiasis or hydroureteronephrosis.  Urinary bladder: Postsurgical change in the anterior bladder wall from TURBT.  There are small foci of air in the bladder, likely iatrogenic.  Reproductive organs: Unremarkable.  GI tract: Small hiatal hernia.  The stomach is unremarkable.  The visualized loops of small and large bowel show no evidence of obstruction or inflammation. Diverticulosis.  Appendix unremarkable.  Peritoneum: No free air or fluid.  Retroperitoneum: No significant adenopathy.  Vasculature: Normal caliber of abdominal aorta with moderate calcified and noncalcified atherosclerosis.  Abdominal wall: Tiny fat containing umbilical hernia.  Bones: Stable T12-L1 intervertebral disc calcification.  No suspicious sclerotic lesions.  No acute fracture.  Impression:  Postoperative changes from of TURBT.  No lymphadenopathy.  No distant metastases.  Other findings as described.     - 12/6/2023 CT Chest:  FINDINGS:  Comparison is 11/21/2023.  No mediastinal, hilar or axillary adenopathy is seen.  There is a right lower pole thyroid nodule.  There are coronary calcifications.  There is a left upper pole renal cyst.  There is a small hiatal hernia.  Trachea  and bronchi are patent.  There is no evidence of interstitial lung disease, bronchiectasis, airway trapping, or emphysema.  No suspicious pulmonary nodules, masses, or infiltrates are seen.  There is a calcified left lung granuloma.  Bones demonstrate nothing focal.  Impression:  No significant abnormality seen.  Right lobe thyroid nodule.  Coronary artery calcifications.  Left upper pole renal cyst.     Last radiation session on 2/8/24     PMH:  Active Ambulatory Problems        Diagnosis   Date Noted       CKD stage 3 due to type 2 diabetes mellitus     11/23/2012       Chronic anticoagulation 11/23/2012       Hypertension associated with diabetes     05/22/2013       Hyperlipidemia associated with type 2 diabetes mellitus     05/22/2013       Persistent atrial fibrillation      06/11/2014       Atherosclerosis of aorta      11/18/2020       Sensorineural hearing loss (SNHL) of both ears  11/19/2020       Risk for falls    12/15/2021       Renal cyst  12/14/2022       DM type 2, controlled, with complication  12/14/2022       Bladder tumor     11/16/2023       UTI (urinary tract infection) 11/21/2023       Acute hypoxic respiratory failure   11/21/2023       Elevated troponin 11/21/2023       Thyroid nodules   11/21/2023     Resolved Ambulatory Problems        Diagnosis   Date Noted       Atrial fibrillation     08/02/2012       Long term (current) use of anticoagulants 08/02/2012       Family history of diabetes mellitus 11/23/2012       Ex-cigarette smoker     11/23/2012       Metabolic syndrome      11/23/2012       Screen for colon cancer 05/07/2014       Ex-smoker   11/09/2015       Hyperglycemia     12/16/2015       Abscess of chest wall   06/25/2018       Sebaceous cyst    07/25/2018       Decreased back mobility 08/20/2019       Decreased strength of trunk and back      08/20/2019       Decreased range of motion of both hips    08/20/2019       Pain aggravated by walking    08/20/2019       Hamstring  tightness of both lower extremities   08/20/2019       Impaired mobility and endurance     08/20/2019       Low back pain     11/19/2020       Chronic pain      04/28/2021       Type 2 diabetes mellitus, without long-term current use of insulin      12/13/2021       CKD stage 3 secondary to diabetes   12/13/2021       Encounter for preventive health examination     12/19/2022     Past Medical History:  Diagnosis   Date       *Atrial fibrillation           Chronic kidney disease         Deep vein thrombosis           Hyperlipidemia           Hypertension        Cholecystectomy  Tonsillectomy  Skin cancer removal     FH:  Paternal Aunt- recalls cancer affecting scalp     SH:    Retired [employer], followed by off shore work,   4 children (1 passed as an infant)  Quit tobacco in the 1980s  + EtOH    Review of Systems   Constitutional:  Positive for appetite change (dysphagia) and fatigue (better). Negative for activity change, chills, fever and unexpected weight change.   HENT:  Positive for hearing loss (hearing aids bilat) and trouble swallowing. Negative for nasal congestion, dental problem, postnasal drip, rhinorrhea, sinus pressure/congestion, sore throat and tinnitus.         Dry mouth   Eyes:  Negative for visual disturbance.   Respiratory:  Negative for cough, chest tightness, shortness of breath and wheezing.    Cardiovascular:  Negative for chest pain, palpitations and leg swelling.   Gastrointestinal:  Negative for abdominal distention, abdominal pain, blood in stool, change in bowel habit, constipation, diarrhea, nausea and vomiting.   Genitourinary:  Negative for decreased urine volume, difficulty urinating, dysuria, frequency (better), hematuria and urgency (better).   Musculoskeletal:  Negative for arthralgias, back pain, myalgias, neck pain and neck stiffness.   Integumentary:  Positive for rash (improved).        Dry skin   Neurological:  Positive for weakness. Negative for dizziness,  light-headedness, numbness and headaches.   Psychiatric/Behavioral:  Negative for agitation, behavioral problems, confusion, dysphoric mood and sleep disturbance. The patient is not nervous/anxious.           Objective     Physical Exam  Vitals and nursing note reviewed.   Constitutional:       General: He is not in acute distress.     Appearance: Normal appearance. He is not ill-appearing.      Comments: Looks better!  ECOG= 1-2  Has wheelchair   HENT:      Head: Normocephalic and atraumatic.      Comments: Hearing aids     Mouth/Throat:      Mouth: Mucous membranes are moist.      Pharynx: Oropharynx is clear. No oropharyngeal exudate or posterior oropharyngeal erythema.   Eyes:      General: No scleral icterus.     Extraocular Movements: Extraocular movements intact.      Conjunctiva/sclera: Conjunctivae normal.      Pupils: Pupils are equal, round, and reactive to light.   Cardiovascular:      Rate and Rhythm: Normal rate and regular rhythm.      Heart sounds: No murmur heard.     No friction rub.   Pulmonary:      Effort: Pulmonary effort is normal. No respiratory distress.      Breath sounds: Normal breath sounds. No wheezing, rhonchi or rales.   Abdominal:      General: Abdomen is flat. Bowel sounds are normal. There is no distension.      Palpations: Abdomen is soft. There is no mass.      Tenderness: There is no abdominal tenderness. There is no guarding or rebound.      Comments: No organomegaly   Musculoskeletal:         General: No swelling, tenderness or deformity. Normal range of motion.      Right lower leg: No edema.      Left lower leg: No edema.   Skin:     General: Skin is warm and dry.      Coloration: Skin is not jaundiced or pale.      Findings: No bruising, erythema or rash.   Neurological:      General: No focal deficit present.      Mental Status: He is alert and oriented to person, place, and time. Mental status is at baseline.      Sensory: No sensory deficit.      Motor: Weakness  (generalized) present.      Gait: Gait normal.   Psychiatric:         Mood and Affect: Mood normal.         Behavior: Behavior normal.         Thought Content: Thought content normal.         Judgment: Judgment normal.       Labs- reviewed     Assessment and Plan     1. Malignant neoplasm of urinary bladder, unspecified site    2. CKD stage 3 due to type 2 diabetes mellitus    3. Hypertension associated with diabetes    4. Persistent atrial fibrillation    5. Dysphagia, unspecified type    6. Thyroid nodules      Advanced bladder cancer- completed chemo/XRT  Negative cystoscope x 2 and negative CT scans for recurrence     Dysphagia and scans with aspiration evidence prior  Had swallowing study and follows diet recs  Sugar high- nutritionist referral  ENT referral     CHF, DM, HTN, a fib- managed with PCP and parameters clinically stable     Thyroid nodules- Thyroid ultrasound no criteria for bx      RTC 3 months  Scans in 6 months    Route Chart for Scheduling    Med Onc Chart Routing      Follow up with physician    Follow up with [facility] 3 months. cbc, cmp prior   Infusion scheduling note    Injection scheduling note    Labs    Imaging    Pharmacy appointment    Other referrals

## 2024-11-05 NOTE — PROGRESS NOTES
"[facility]   Physical Therapy Treatment Note      Name: Cliff Magaña  Clinic Number: [id number]    Therapy Diagnosis:   Encounter Diagnoses   Name Primary?    Impaired functional mobility, balance, gait, and endurance Yes    Physical deconditioning      Physician: Elizabeth Bland MD    Visit Date: 11/5/2024     Physician Orders: PT Eval and Treat   Medical Diagnosis from Referral: Risk for falls [Z91.81]   Evaluation Date: 9/20/2024  Authorization Period Expiration: 8/27/2025  Plan of Care Expiration: 11/29/2024  Progress Note Due: 11/29/2024  Date of Surgery: n/a  Visit # / Visits authorized: 7/20 + eval  FOTO: 2/3 - next at discharge      Precautions: Standard and Fall     Time In: 9:00 AM  Time Out: 9:45 AM  Total Billable Time: 45 minutes (1NMR + 2TA) KX all moving forward    PTA Visit #: 1/5     Subjective     Patient reports: No complaints, feeling good at this time.  He was compliant with home exercise program.  Response to previous treatment: no adverse response  Functional change: ongoing     Pain: 0/10  Location: None reported    Objective      Objective Measures updated at progress report unless specified.     Treatment     Cliff received the treatments listed below:      Therapeutic exercises to develop strength, endurance, ROM, flexibility, posture, and core stabilization for 00 minutes including:     Neuromuscular re-education activities to improve: Balance, Coordination, Kinesthetic, Sense, Proprioception, and Posture for 20 minutes. The following activities were included:  - Stepper bike x 6 minutes level 5 double peak with bilateral upper extremity / bilateral lower extremity reciprocal motion   - Modified single leg stance + red med ball chest press 2x15 each leg leading  - Reciprocal step taps to 4" step 2x45" without upper extremity support     Therapeutic activities to improve functional performance for 25 minutes, inluding:  - Mini squats with chair behind 2x10  - " "6" hurdles forward/reciprocal step overs x 4 lengths x 8 feet each with single upper extremity support with 1# ankle weights  - 6" hurdles lateral step overs x 4 lengths x 8 feet each with single upper extremity support with 1# ankle weights  - Lateral step up and over to 4" step 2x60" with 1# ankle weights  - Reciprocal step ups to 4" step x60" each leg leading with right upper extremity support with 1# ankle weights      Patient Education and Home Exercises       Education provided:   - Plan of Care    Written Home Exercises Provided: Yes. Exercises were reviewed and Cliff was able to demonstrate them prior to the end of the session.  Cliff demonstrated good  understanding of the education provided. See Electronic Medical Record under Patient Instructions for exercises provided during therapy sessions    Assessment     Cliff arrived to session without complaints and agreeable to treatment.  Good tolerance of treatment with patient only requesting two brief seated rest breaks for fatigue recovery.  Verbal cuing for postural awareness throughout standing portion of treatment, specifically for fully upright posture, with short lived improvement.  Session focused on functional strengthening, endurance based activity, and low level balance.  No adverse response to treatment noted.  He would benefit from continued PT services to achieve remaining functional goals.      Cliff Is progressing well towards his goals.   Patient prognosis is Fair.     Patient will continue to benefit from skilled outpatient physical therapy to address the deficits listed in the problem list box on initial evaluation, provide pt/family education and to maximize pt's level of independence in the home and community environment.     Patient's spiritual, cultural and educational needs considered and pt agreeable to plan of care and goals.     Anticipated barriers to physical therapy: Fall Risk, Age    Goals:   Short Term Goals: 4 weeks "   Patient will be compliant with HEP in order to maximize PT benefits (met)  Patient will complete TUG in </= 16 seconds with least restrictive assistive device in order to reduce risk for falls and improve safety with functional mobility (progressing, not met)  Patient will perform 8 step taps within 20 seconds in order to reduce risk for falls and ambulate stairs safely (met)    Long Term Goals: 6 weeks   Patient will score >/= 50% on FOTO limitation survey in order to improve self-perception of functional mobility deficits (met)  Patient will increase walking speed to >0.8 m/s in order to improve bilateral lower extremity endurance and muscular power for transfers (progressing, not met)  Patient will perform bilateral single leg stance for >3 seconds in order to reduce risk for falls and improve safety with functional mobility (progressing, not met)  Patient will report 0 falls from initiation of PT management (not met - 1 fall at home since initiating PT)  Patient will begin some form of home/community fitness in order to sustain progress gained in PT (progressing, not met)    Plan     Continue functional endurance and general balance    Daija May, PTA

## 2024-11-07 ENCOUNTER — PATIENT MESSAGE (OUTPATIENT)
Dept: ADMINISTRATIVE | Facility: OTHER | Age: 89
End: 2024-11-07
Payer: MEDICARE

## 2024-11-07 ENCOUNTER — OFFICE VISIT (OUTPATIENT)
Dept: PODIATRY | Facility: CLINIC | Age: 89
End: 2024-11-07
Payer: MEDICARE

## 2024-11-07 VITALS
DIASTOLIC BLOOD PRESSURE: 64 MMHG | BODY MASS INDEX: 22.2 KG/M2 | WEIGHT: 130.06 LBS | SYSTOLIC BLOOD PRESSURE: 98 MMHG | HEIGHT: 64 IN | HEART RATE: 74 BPM

## 2024-11-07 DIAGNOSIS — E11.49 TYPE 2 DIABETES MELLITUS WITH NEUROLOGICAL MANIFESTATIONS: ICD-10-CM

## 2024-11-07 DIAGNOSIS — B35.1 ONYCHOMYCOSIS: ICD-10-CM

## 2024-11-07 DIAGNOSIS — E11.51 TYPE 2 DIABETES MELLITUS WITH PERIPHERAL VASCULAR DISEASE: Primary | ICD-10-CM

## 2024-11-07 PROCEDURE — 11721 DEBRIDE NAIL 6 OR MORE: CPT | Mod: PBBFAC,PN | Performed by: PODIATRIST

## 2024-11-07 PROCEDURE — 99999 PR PBB SHADOW E&M-EST. PATIENT-LVL III: CPT | Mod: PBBFAC,,, | Performed by: PODIATRIST

## 2024-11-07 PROCEDURE — 99213 OFFICE O/P EST LOW 20 MIN: CPT | Mod: PBBFAC,PN | Performed by: PODIATRIST

## 2024-11-07 NOTE — PROGRESS NOTES
Subjective:      Patient ID: Cliff Magaña is a 89 y.o. male.    Chief Complaint: Diabetic Foot Exam      Cliff is a 89 y.o. male who presents to the clinic upon referral from Dr. Estrada  for evaluation and treatment of diabetic feet. Cliff has a past medical history of *Atrial fibrillation, Chronic kidney disease, Deep vein thrombosis, Hyperlipidemia, Hypertension, Metabolic syndrome, and Type 2 diabetes mellitus, without long-term current use of insulin (12/13/2021). Patient relates no major problem with feet. Only complaints today consist of thickened toenails that he has difficulty trimming.  He will sometimes get a pedicure.  He also relates he gets intermittent cramping to both legs at night.  History of chronic low back pain with right lower extremity symptoms.    04/29/2022: Returns for routine nail care. No new concerns.     07/29/2022: Returns for routine nail care. No new concerns.     11/17/2022:  Returns routine foot care.  No new concerns.    02/16/2023:  Returns for routine foot care.  Due for annual foot exam.  Complains of intermittent cramping to the left foot that is brief in duration.  Denies any claudication symptoms. Followed by Cardiology.     05/18/23: Returns for routine foot care.  No new concerns.  Accompanied by his wife.    08/17/2023: Returns for routine foot care.  No new concerns.    11/09/2023:  Returns for routine foot care.  Accompanied by his wife.  No new concerns.    02/09/2024:  Returns for annual foot exam and routine foot care.  Accompanied by his wife.  Patient has had significant weight loss secondary to loss of appetite post chemotherapy and radiation therapy for bladder cancer.  Recently admitted for UTI and discharged.    05/10/2024: Returns for routine foot care.  No new concerns.    08/12/2024: Returns for routine foot care.  Accompanied by his wife.  New wound discovered during the exam today that was not known per his wife.  No pain noted.    11/07/2024: Returns  "for routine foot care.  Accompanied by his wife.  No new concerns.    PCP: Munir Estrada MD Danielle Zaval NP on 09/30/2024  Date Last Seen by PCP:  03/07/2024    Current shoe gear: Casual shoes    Hemoglobin A1C   Date Value Ref Range Status   09/22/2024 6.4 (H) 4.0 - 5.6 % Final     Comment:     ADA Screening Guidelines:  5.7-6.4%  Consistent with prediabetes  >or=6.5%  Consistent with diabetes    High levels of fetal hemoglobin interfere with the HbA1C  assay. Heterozygous hemoglobin variants (HbS, HgC, etc)do  not significantly interfere with this assay.   However, presence of multiple variants may affect accuracy.     07/01/2024 8.0 (H) 4.0 - 5.6 % Final     Comment:     ADA Screening Guidelines:  5.7-6.4%  Consistent with prediabetes  >or=6.5%  Consistent with diabetes    High levels of fetal hemoglobin interfere with the HbA1C  assay. Heterozygous hemoglobin variants (HbS, HgC, etc)do  not significantly interfere with this assay.   However, presence of multiple variants may affect accuracy.     12/22/2023 6.9 (H) 4.0 - 5.6 % Final     Comment:     ADA Screening Guidelines:  5.7-6.4%  Consistent with prediabetes  >or=6.5%  Consistent with diabetes    High levels of fetal hemoglobin interfere with the HbA1C  assay. Heterozygous hemoglobin variants (HbS, HgC, etc)do  not significantly interfere with this assay.   However, presence of multiple variants may affect accuracy.       Vitals:    11/07/24 1047   BP: 98/64   Pulse: 74   Weight: 59 kg (130 lb 1.1 oz)   Height: 5' 4" (1.626 m)   PainSc: 0-No pain      Past Medical History:   Diagnosis Date    *Atrial fibrillation     Chronic kidney disease     Deep vein thrombosis     Hyperlipidemia     Hypertension     Metabolic syndrome     Type 2 diabetes mellitus, without long-term current use of insulin 12/13/2021       Past Surgical History:   Procedure Laterality Date    BIOPSY OF BLADDER N/A 4/12/2024    Procedure: BIOPSY, BLADDER;  Surgeon: Wellington Story, " MD;  Location: [address];  Service: Urology;  Laterality: N/A;    BLADDER FULGURATION N/A 4/12/2024    Procedure: FULGURATION, BLADDER;  Surgeon: Wellington Story MD;  Location: [facility] OR [address] [place] [address];  Service: Urology;  Laterality: N/A;    CATARACT EXTRACTION      CHOLECYSTECTOMY      CYSTOSCOPY N/A 11/16/2023    Procedure: CYSTOSCOPY;  Surgeon: Marcelino Moffett MD;  Location: [address];  Service: Urology;  Laterality: N/A;  90 minutes    DILATION OF URETHRA N/A 4/12/2024    Procedure: DILATION, URETHRA;  Surgeon: Wellington Story MD;  Location: [address];  Service: Urology;  Laterality: N/A;    EYE SURGERY      INJECTION OF FACET JOINT Bilateral 4/28/2021    Procedure: FACET JOINT INJECTION BILATERAL L4/L5 DIRECT REFERRAL;  Surgeon: Philipp Iyer MD;  Location: [facility] PAIN MGT;  Service: Pain Management;  Laterality: Bilateral;  NEEDS CONSENT, ELIQUIS CLEARANCE IN CHART    RETROGRADE PYELOGRAPHY Bilateral 11/16/2023    Procedure: PYELOGRAM, RETROGRADE;  Surgeon: Marcelino Moffett MD;  Location: [address];  Service: Urology;  Laterality: Bilateral;  90 minutes    SKIN CANCER EXCISION      TONSILLECTOMY      TURBT (TRANSURETHRAL RESECTION OF BLADDER TUMOR) N/A 11/16/2023    Procedure: TURBT (TRANSURETHRAL RESECTION OF BLADDER TUMOR);  Surgeon: Marcelino Moffett MD;  Location: [address];  Service: Urology;  Laterality: N/A;  90 minutes    TURBT (TRANSURETHRAL RESECTION OF BLADDER TUMOR) N/A 4/12/2024    Procedure: TURBT (TRANSURETHRAL RESECTION OF BLADDER TUMOR);  Surgeon: Wellington Story MD;  Location: [address];  Service: Urology;  Laterality: N/A;       Family History   Problem Relation Name Age of Onset    Diabetes Maternal Aunt      Heart attack Neg Hx      Heart disease Neg Hx      Heart failure Neg Hx      Hyperlipidemia Neg Hx      Hypertension Neg Hx      Stroke Neg Hx         Social History     Socioeconomic History    Marital status:      Spouse name: Nereyda     Number of children: 4   Tobacco Use    Smoking status: Former     Current packs/day: 0.00     Average packs/day: 2.0 packs/day for 20.0 years (40.0 ttl pk-yrs)     Types: Cigarettes     Start date: 1963     Quit date: 1983     Years since quittin.4     Passive exposure: Never    Smokeless tobacco: Never   Substance and Sexual Activity    Alcohol use: Yes     Alcohol/week: 1.0 standard drink of alcohol     Types: 1 Standard drinks or equivalent per week     Comment: 3 drinks per week    Drug use: No    Sexual activity: Not Currently     Partners: Female     Social Drivers of Health     Financial Resource Strain: Low Risk  (2024)    Overall Financial Resource Strain (CARDIA)     Difficulty of Paying Living Expenses: Not hard at all   Food Insecurity: No Food Insecurity (2024)    Hunger Vital Sign     Worried About Running Out of Food in the Last Year: Never true     Ran Out of Food in the Last Year: Never true   Transportation Needs: No Transportation Needs (2024)    TRANSPORTATION NEEDS     Transportation : No   Physical Activity: Insufficiently Active (2024)    Exercise Vital Sign     Days of Exercise per Week: 7 days     Minutes of Exercise per Session: 10 min   Stress: Stress Concern Present (2024)    [employer] - Occupational Stress Questionnaire     Feeling of Stress : To some extent   Housing Stability: Low Risk  (2024)    Housing Stability Vital Sign     Unable to Pay for Housing in the Last Year: No     Homeless in the Last Year: No       Current Outpatient Medications   Medication Sig Dispense Refill    acetaminophen (TYLENOL) 650 MG TbSR Take 650 mg by mouth 2 (two) times a day.      apixaban (ELIQUIS) 2.5 mg Tab Take 1 tablet (2.5 mg total) by mouth 2 (two) times daily. 180 tablet 3    cholecalciferol, vitamin D3, (VITAMIN D3) 50 mcg (2,000 unit) Cap capsule Take by mouth once daily.      empagliflozin (JARDIANCE) 10 mg tablet Take  1 tablet (10 mg total) by mouth once daily. 90 tablet 3    fenofibrate micronized (LOFIBRA) 200 MG Cap Take 1 capsule (200 mg total) by mouth daily with breakfast. 90 capsule 3    loratadine (CLARITIN) 10 mg tablet Take 10 mg by mouth once daily.      metFORMIN (GLUCOPHAGE-XR) 500 MG ER 24hr tablet Take 1 tablet (500 mg total) by mouth once daily. 90 tablet 3    metoprolol succinate (TOPROL-XL) 100 MG 24 hr tablet Take 1 tablet (100 mg total) by mouth 2 (two) times daily. 180 tablet 1    ondansetron (ZOFRAN) 8 MG tablet Take 1 tablet (8 mg total) by mouth every 12 (twelve) hours as needed for Nausea. 30 tablet 2    sacubitriL-valsartan (ENTRESTO) 24-26 mg per tablet Take 1 tablet by mouth 2 (two) times daily. 180 tablet 3     No current facility-administered medications for this visit.       Review of patient's allergies indicates:   Allergen Reactions    Niacin      Other reaction(s): Rash  Other reaction(s): Itching           Review of Systems   Constitutional: Negative for chills, fever and malaise/fatigue.   HENT:  Negative for congestion and hearing loss.    Cardiovascular:  Negative for chest pain, claudication and leg swelling.   Respiratory:  Negative for cough and shortness of breath.    Skin:  Positive for nail changes.   Musculoskeletal:  Positive for back pain and muscle cramps. Negative for joint pain and muscle weakness.   Gastrointestinal:  Negative for nausea and vomiting.   Neurological:  Positive for paresthesias. Negative for numbness and weakness.   Psychiatric/Behavioral:  Negative for altered mental status.            Objective:      Physical Exam  Constitutional:       General: He is not in acute distress.     Appearance: Normal appearance. He is not ill-appearing.   Cardiovascular:      Pulses:           Dorsalis pedis pulses are 0 on the right side and 0 on the left side.        Posterior tibial pulses are 0 on the right side and 0 on the left side.      Comments: Weakly monophasic left  distal AT with relatively strong monophasic peroneal and biphasic PT with Doppler left foot.    Mild nonpitting edema to lower extremity bilateral.  No hair growth bilateral lower extremity.  Mild rubor on dependency bilateral lower extremity.  Musculoskeletal:      Comments: No pain with ROM or MMT bilateral lower extremity.    No significant digital deformity bilateral.  Rectus appearing foot type bilateral.   Feet:      Right foot:      Protective Sensation: 10 sites tested.  9 sites sensed.      Skin integrity: Dry skin present.      Toenail Condition: Right toenails are abnormally thick and long.      Left foot:      Protective Sensation: 10 sites tested.  10 sites sensed.      Skin integrity: Dry skin present.      Toenail Condition: Left toenails are abnormally thick and long.   Skin:     General: Skin is warm.      Capillary Refill: Capillary refill takes 2 to 3 seconds.      Findings: No ecchymosis or erythema.      Nails: There is no clubbing.      Comments: Second toenail bilateral is growing a superior direction, thickened and discolored yellow with some mild loosening.  Nails 1, 3, 4 and 5 bilateral are mildly elongated 2-3 mm, thickened with patchy yellow discoloration mild underlying debris.    Ulceration overlying the dorsal distal lateral aspect of the left 5th toe with eschar base and slightly raised demarcating margin.  No surrounding erythema.  No drainage.  No localized pain on palpation.    Skin is atrophied to lower extremity bilateral.   Neurological:      Mental Status: He is alert and oriented to person, place, and time.      Sensory: Sensory deficit present.      Motor: Motor function is intact.      Comments: Absent vibratory sensation right foot and decreased left foot.             Assessment:       Encounter Diagnoses   Name Primary?    Type 2 diabetes mellitus with peripheral vascular disease Yes    Type 2 diabetes mellitus with neurological manifestations     Onychomycosis           Plan:       Cliff was seen today for diabetic foot exam.    Diagnoses and all orders for this visit:    Type 2 diabetes mellitus with peripheral vascular disease  -     Routine Foot Care    Type 2 diabetes mellitus with neurological manifestations  -     Routine Foot Care    Onychomycosis  -     Routine Foot Care      I counseled the patient on his conditions, their implications and medical management.    Shoe inspection. Diabetic Foot Education. Patient reminded of the importance of good nutrition and blood sugar control to help prevent podiatric complications of diabetes. Patient instructed on proper foot hygeine. We discussed wearing proper shoe gear, daily foot inspections, never walking without protective shoe gear, never putting sharp instruments to feet.    Routine foot care per attached note. Patient relates relief following the procedure. He will continue to monitor the areas daily, inspect his feet, wear protective shoe gear when ambulatory, moisturizer to maintain skin integrity.    RTC within 3 months or p.r.n. as discussed    Assisted by Mony Kim DPM PGY 3    A portion of this note was generated by voice recognition software and may contain spelling and grammar errors.

## 2024-11-07 NOTE — PROCEDURES
"Routine Foot Care    Date/Time: 11/7/2024 10:30 AM    Performed by: Bhupendra Brar DPM  Authorized by: Bhupendra Brar DPM    Time out: Immediately prior to procedure a "time out" was called to verify the correct patient, procedure, equipment, support staff and site/side marked as required.    Consent Done?:  Yes (Verbal)  Hyperkeratotic Skin Lesions?: No      Nail Care Type:  Debride  Location(s): All  (Left 1st Toe, Left 3rd Toe, Left 2nd Toe, Left 4th Toe, Left 5th Toe, Right 1st Toe, Right 2nd Toe, Right 3rd Toe, Right 4th Toe and Right 5th Toe)  Patient tolerance:  Patient tolerated the procedure well with no immediate complications     Used sterile nail nipper. Assisted by Mony Kim DPM PGY 3    "

## 2024-11-08 ENCOUNTER — PATIENT MESSAGE (OUTPATIENT)
Dept: ADMINISTRATIVE | Facility: OTHER | Age: 89
End: 2024-11-08
Payer: MEDICARE

## 2024-11-08 NOTE — PROGRESS NOTES
"Cliff Magaña is a 89 y.o. male patient.    Pulse: (!) 130 (11/04/24 1048)  BP: 132/76 (11/04/24 1048)  Weight: 61.3 kg (135 lb 2.3 oz) (11/04/24 1048)  Height: 5' 4" (162.6 cm) (11/04/24 1048)       Incision & Drainage back infected sebaceous cyst    Date/Time: 11/4/2024 10:20 AM    Performed by: Jac Carranza MD  Authorized by: Jac Carranza MD    Time out: Immediately prior to procedure a "time out" was called to verify the correct patient, procedure, equipment, support staff and site/side marked as required.    Consent Done?:  Yes (Written)    Type:  Abscess  Body area:  Trunk  Location details:  Back  Anesthesia:  Local infiltration  Complexity:  Simple  Drainage:  Pus  Drainage amount:  Moderate  Wound treatment:  Incision and wound left open    Purulent cyst contents, irrigated, packed      11/8/2024      "

## 2024-11-08 NOTE — PROGRESS NOTES
Patient ID: Cliff Magaña is a 89 y.o. male.    Chief Complaint: No chief complaint on file.      HPI:  HPI  89M known to me from previous cyst excision on chest. That has done well. No issues.   Now with right upper back cyst with pain, swelling for about 2 weeks. Known cyst there for many years but never bothered him  Has been on abx with little benefit.  Stopped eliquis on his own about a week ago.       Review of Systems   Constitutional:  Negative for chills, diaphoresis and fever.   HENT:  Negative for trouble swallowing.    Respiratory:  Negative for cough, shortness of breath, wheezing and stridor.    Cardiovascular:  Negative for chest pain and palpitations.   Gastrointestinal:  Negative for abdominal distention, abdominal pain, blood in stool, diarrhea, nausea and vomiting.   Endocrine: Negative for cold intolerance and heat intolerance.   Genitourinary:  Negative for difficulty urinating.   Musculoskeletal:  Negative for back pain.   Skin:  Negative for rash.   Allergic/Immunologic: Negative for immunocompromised state.   Neurological:  Negative for dizziness, syncope and numbness.   Hematological:  Negative for adenopathy.   Psychiatric/Behavioral:  Negative for agitation.        Current Outpatient Medications   Medication Sig Dispense Refill    acetaminophen (TYLENOL) 650 MG TbSR Take 650 mg by mouth 2 (two) times a day.      cholecalciferol, vitamin D3, (VITAMIN D3) 50 mcg (2,000 unit) Cap capsule Take by mouth once daily.      empagliflozin (JARDIANCE) 10 mg tablet Take 1 tablet (10 mg total) by mouth once daily. 90 tablet 3    fenofibrate micronized (LOFIBRA) 200 MG Cap Take 1 capsule (200 mg total) by mouth daily with breakfast. 90 capsule 3    loratadine (CLARITIN) 10 mg tablet Take 10 mg by mouth once daily.      metFORMIN (GLUCOPHAGE-XR) 500 MG ER 24hr tablet Take 1 tablet (500 mg total) by mouth once daily. 90 tablet 3    metoprolol succinate (TOPROL-XL) 100 MG 24 hr tablet Take 1 tablet (100  mg total) by mouth 2 (two) times daily. 180 tablet 1    ondansetron (ZOFRAN) 8 MG tablet Take 1 tablet (8 mg total) by mouth every 12 (twelve) hours as needed for Nausea. 30 tablet 2    sacubitriL-valsartan (ENTRESTO) 24-26 mg per tablet Take 1 tablet by mouth 2 (two) times daily. 180 tablet 3    apixaban (ELIQUIS) 2.5 mg Tab Take 1 tablet (2.5 mg total) by mouth 2 (two) times daily. 180 tablet 3     No current facility-administered medications for this visit.       Review of patient's allergies indicates:   Allergen Reactions    Niacin      Other reaction(s): Rash  Other reaction(s): Itching       Past Medical History:   Diagnosis Date    *Atrial fibrillation     Chronic kidney disease     Deep vein thrombosis     Hyperlipidemia     Hypertension     Metabolic syndrome     Type 2 diabetes mellitus, without long-term current use of insulin 12/13/2021       Past Surgical History:   Procedure Laterality Date    BIOPSY OF BLADDER N/A 4/12/2024    Procedure: BIOPSY, BLADDER;  Surgeon: Wellington Story MD;  Location: [address];  Service: Urology;  Laterality: N/A;    BLADDER FULGURATION N/A 4/12/2024    Procedure: FULGURATION, BLADDER;  Surgeon: Wellington Story MD;  Location: [address];  Service: Urology;  Laterality: N/A;    CATARACT EXTRACTION      CHOLECYSTECTOMY      CYSTOSCOPY N/A 11/16/2023    Procedure: CYSTOSCOPY;  Surgeon: Marcelino Moffett MD;  Location: [address];  Service: Urology;  Laterality: N/A;  90 minutes    DILATION OF URETHRA N/A 4/12/2024    Procedure: DILATION, URETHRA;  Surgeon: Wellington Story MD;  Location: [address];  Service: Urology;  Laterality: N/A;    EYE SURGERY      INJECTION OF FACET JOINT Bilateral 4/28/2021    Procedure: FACET JOINT INJECTION BILATERAL L4/L5 DIRECT REFERRAL;  Surgeon: Philipp Iyer MD;  Location: [facility] PAIN MGT;  Service: Pain Management;  Laterality: Bilateral;  NEEDS CONSENT, ELIQUIS CLEARANCE IN CHART    RETROGRADE PYELOGRAPHY Bilateral  2023    Procedure: PYELOGRAM, RETROGRADE;  Surgeon: Marcelino Moffett MD;  Location: [facility] OR 1ST FLR;  Service: Urology;  Laterality: Bilateral;  90 minutes    SKIN CANCER EXCISION      TONSILLECTOMY      TURBT (TRANSURETHRAL RESECTION OF BLADDER TUMOR) N/A 2023    Procedure: TURBT (TRANSURETHRAL RESECTION OF BLADDER TUMOR);  Surgeon: Marcelino Moffett MD;  Location: [facility] OR 1ST FLR;  Service: Urology;  Laterality: N/A;  90 minutes    TURBT (TRANSURETHRAL RESECTION OF BLADDER TUMOR) N/A 2024    Procedure: TURBT (TRANSURETHRAL RESECTION OF BLADDER TUMOR);  Surgeon: Wellington Story MD;  Location: [facility] OR 1ST FLR;  Service: Urology;  Laterality: N/A;       Social History     Socioeconomic History    Marital status:      Spouse name: Nereyda    Number of children: 4   Tobacco Use    Smoking status: Former     Current packs/day: 0.00     Average packs/day: 2.0 packs/day for 20.0 years (40.0 ttl pk-yrs)     Types: Cigarettes     Start date: 1963     Quit date: 1983     Years since quittin.4     Passive exposure: Never    Smokeless tobacco: Never   Substance and Sexual Activity    Alcohol use: Yes     Alcohol/week: 1.0 standard drink of alcohol     Types: 1 Standard drinks or equivalent per week     Comment: 3 drinks per week    Drug use: No    Sexual activity: Not Currently     Partners: Female     Social Drivers of Health     Financial Resource Strain: Low Risk  (2024)    Overall Financial Resource Strain (CARDIA)     Difficulty of Paying Living Expenses: Not hard at all   Food Insecurity: No Food Insecurity (2024)    Hunger Vital Sign     Worried About Running Out of Food in the Last Year: Never true     Ran Out of Food in the Last Year: Never true   Transportation Needs: No Transportation Needs (2024)    TRANSPORTATION NEEDS     Transportation : No   Physical Activity: Insufficiently Active (2024)    Exercise Vital Sign     Days of Exercise per Week: 7 days      Minutes of Exercise per Session: 10 min   Stress: Stress Concern Present (9/23/2024)    [employer] - Occupational Stress Questionnaire     Feeling of Stress : To some extent   Housing Stability: Low Risk  (9/23/2024)    Housing Stability Vital Sign     Unable to Pay for Housing in the Last Year: No     Homeless in the Last Year: No       Vitals:    11/04/24 1048   BP: 132/76   Pulse: (!) 130       Physical Exam  Constitutional:       General: He is not in acute distress.  HENT:      Head: Normocephalic and atraumatic.   Eyes:      General: No scleral icterus.  Cardiovascular:      Rate and Rhythm: Normal rate.   Pulmonary:      Effort: Pulmonary effort is normal. No respiratory distress.      Breath sounds: No stridor.   Abdominal:      Palpations: Abdomen is soft.      Tenderness: There is no abdominal tenderness.   Lymphadenopathy:      Cervical: No cervical adenopathy.   Skin:     General: Skin is warm.      Findings: No erythema.          Neurological:      Mental Status: He is alert and oriented to person, place, and time.   Psychiatric:         Behavior: Behavior normal.     Body mass index is 23.2 kg/m².      Assessment & Plan:  89M with infected sebaceous cyst  I&D in office

## 2024-11-09 ENCOUNTER — PATIENT MESSAGE (OUTPATIENT)
Dept: ADMINISTRATIVE | Facility: OTHER | Age: 89
End: 2024-11-09
Payer: MEDICARE

## 2024-11-10 ENCOUNTER — PATIENT MESSAGE (OUTPATIENT)
Dept: ADMINISTRATIVE | Facility: OTHER | Age: 89
End: 2024-11-10
Payer: MEDICARE

## 2024-11-11 ENCOUNTER — PATIENT MESSAGE (OUTPATIENT)
Dept: ADMINISTRATIVE | Facility: OTHER | Age: 89
End: 2024-11-11
Payer: MEDICARE

## 2024-11-11 ENCOUNTER — CLINICAL SUPPORT (OUTPATIENT)
Dept: REHABILITATION | Facility: HOSPITAL | Age: 89
End: 2024-11-11
Payer: MEDICARE

## 2024-11-11 DIAGNOSIS — R53.81 PHYSICAL DECONDITIONING: ICD-10-CM

## 2024-11-11 DIAGNOSIS — Z74.09 IMPAIRED FUNCTIONAL MOBILITY, BALANCE, GAIT, AND ENDURANCE: Primary | ICD-10-CM

## 2024-11-11 PROCEDURE — 97112 NEUROMUSCULAR REEDUCATION: CPT | Mod: KX,PN,CQ

## 2024-11-11 PROCEDURE — 97530 THERAPEUTIC ACTIVITIES: CPT | Mod: KX,PN,CQ

## 2024-11-11 NOTE — PROGRESS NOTES
"[facility]   Physical Therapy Treatment Note      Name: Cliff Magaña  Clinic Number: [id number]    Therapy Diagnosis:   Encounter Diagnoses   Name Primary?    Impaired functional mobility, balance, gait, and endurance Yes    Physical deconditioning      Physician: Elizabeth Bland MD    Visit Date: 11/11/2024     Physician Orders: PT Eval and Treat   Medical Diagnosis from Referral: Risk for falls [Z91.81]   Evaluation Date: 9/20/2024  Authorization Period Expiration: 8/27/2025  Plan of Care Expiration: 11/29/2024  Progress Note Due: 11/29/2024  Date of Surgery: n/a  Visit # / Visits authorized: 8/20 + eval  FOTO: 2/3 - next at discharge      Precautions: Standard and Fall     Time In: 1:45 AM  Time Out: 2:30 AM  Total Billable Time: 45 minutes (1NMR + 2TA) KX all moving forward    PTA Visit #: 2/5     Subjective     Patient reports: No complaints, feeling good at this time.  He was compliant with home exercise program.  Response to previous treatment: no adverse response  Functional change: ongoing     Pain: 0/10  Location: None reported    Objective      Objective Measures updated at progress report unless specified.     Treatment     Cliff received the treatments listed below:      Therapeutic exercises to develop strength, endurance, ROM, flexibility, posture, and core stabilization for 00 minutes including:     Neuromuscular re-education activities to improve: Balance, Coordination, Kinesthetic, Sense, Proprioception, and Posture for 20 minutes. The following activities were included:  - Stepper bike x 6 minutes level 5 double peak with bilateral upper extremity / bilateral lower extremity reciprocal motion   - Modified single leg stance + red med ball chest press 2x15 each leg leading  - Reciprocal step taps to 4" step 2x45" without upper extremity support     Therapeutic activities to improve functional performance for 25 minutes, inluding:  - Lateral steps 4 lengths x 10 feet >> " "Standing marches x20 >> Mini squats x20 >> Calf raises x20 >> seated rest >> lateral steps 4 lengths x 10 feet >> standing marches x20 >> Mini squats x20>> calf raises x20 >> Seated rest  - Mini squats with chair behind 2x10  - 6" hurdles forward/reciprocal step overs x 4 lengths x 8 feet each with single upper extremity support with 1# ankle weights  - 6" hurdles lateral step overs x 4 lengths x 8 feet each with single upper extremity support with 1# ankle weights  - Lateral step up and over to 4" step 2x60" with 1# ankle weights  - Reciprocal step ups to 4" step x60" each leg leading with right upper extremity support with 1# ankle weights      Patient Education and Home Exercises       Education provided:   - Plan of Care    Written Home Exercises Provided: Yes. Exercises were reviewed and Cliff was able to demonstrate them prior to the end of the session.  Cliff demonstrated good  understanding of the education provided. See Electronic Medical Record under Patient Instructions for exercises provided during therapy sessions    Assessment     Cliff arrived to session without complaints and agreeable to treatment.  He returns after a ten day break in which he reports doing his exercises twice.  Decreased tolerance of treatment today with patient attempting to sit following every exercise.  He was receptive to encouragement to limit time spent seated and to take standing rest breaks when possible.  No adverse response to treatment noted.  He would benefit from continued PT services to achieve remaining functional goals.      Cliff Is progressing well towards his goals.   Patient prognosis is Fair.     Patient will continue to benefit from skilled outpatient physical therapy to address the deficits listed in the problem list box on initial evaluation, provide pt/family education and to maximize pt's level of independence in the home and community environment.     Patient's spiritual, cultural and educational " needs considered and pt agreeable to plan of care and goals.     Anticipated barriers to physical therapy: Fall Risk, Age    Goals:   Short Term Goals: 4 weeks   Patient will be compliant with HEP in order to maximize PT benefits (met)  Patient will complete TUG in </= 16 seconds with least restrictive assistive device in order to reduce risk for falls and improve safety with functional mobility (progressing, not met)  Patient will perform 8 step taps within 20 seconds in order to reduce risk for falls and ambulate stairs safely (met)    Long Term Goals: 6 weeks   Patient will score >/= 50% on FOTO limitation survey in order to improve self-perception of functional mobility deficits (met)  Patient will increase walking speed to >0.8 m/s in order to improve bilateral lower extremity endurance and muscular power for transfers (progressing, not met)  Patient will perform bilateral single leg stance for >3 seconds in order to reduce risk for falls and improve safety with functional mobility (progressing, not met)  Patient will report 0 falls from initiation of PT management (not met - 1 fall at home since initiating PT)  Patient will begin some form of home/community fitness in order to sustain progress gained in PT (progressing, not met)    Plan     Continue functional endurance and general balance    Daija May, PTA

## 2024-11-12 ENCOUNTER — PATIENT MESSAGE (OUTPATIENT)
Dept: ADMINISTRATIVE | Facility: OTHER | Age: 89
End: 2024-11-12
Payer: MEDICARE

## 2024-11-12 ENCOUNTER — OFFICE VISIT (OUTPATIENT)
Dept: PALLIATIVE MEDICINE | Facility: CLINIC | Age: 89
End: 2024-11-12
Payer: MEDICARE

## 2024-11-12 DIAGNOSIS — Z51.5 ENCOUNTER FOR PALLIATIVE CARE: Primary | ICD-10-CM

## 2024-11-12 DIAGNOSIS — G47.00 INSOMNIA, UNSPECIFIED TYPE: ICD-10-CM

## 2024-11-12 DIAGNOSIS — R63.0 ANOREXIA: ICD-10-CM

## 2024-11-12 DIAGNOSIS — R53.83 FATIGUE, UNSPECIFIED TYPE: ICD-10-CM

## 2024-11-12 DIAGNOSIS — C67.9 MALIGNANT NEOPLASM OF URINARY BLADDER, UNSPECIFIED SITE: ICD-10-CM

## 2024-11-12 DIAGNOSIS — F43.21 ADJUSTMENT DISORDER WITH DEPRESSED MOOD: ICD-10-CM

## 2024-11-12 PROCEDURE — 99215 OFFICE O/P EST HI 40 MIN: CPT | Mod: 95,,, | Performed by: NURSE PRACTITIONER

## 2024-11-12 NOTE — Clinical Note
He's doing very well. They request to go prn with our visits for now. I'm happy to see him again at any time. Best, Johana

## 2024-11-12 NOTE — PROGRESS NOTES
The patient location is: [place]  The chief complaint leading to consultation is: symptom mgmt    Visit type: audiovisual    Face to Face time with patient: 25 minutes     40 minutes of total time spent on the encounter, which includes face to face time and non-face to face time preparing to see the patient (eg, review of tests), Obtaining and/or reviewing separately obtained history, Documenting clinical information in the electronic or other health record, Independently interpreting results (not separately reported) and communicating results to the patient/family/caregiver, or Care coordination (not separately reported).       Each patient to whom he or she provides medical services by telemedicine is:  (1) informed of the relationship between the physician and patient and the respective role of any other health care provider with respect to management of the patient; and (2) notified that he or she may decline to receive medical services by telemedicine and may withdraw from such care at any time.    Notes:     Consult Note  Palliative Care      Consult Requested By: No ref. provider found  Reason for Consult: symptom mgmt/ACP      ASSESSMENT/PLAN:     Plan/Recommendations:    11/12/2024:  - no changes made    Bladder cancer  - following w Dr. Bland   - s/p chemoRT, last RT 2/8/2024  - surveillance     Encounter for palliative care  - Patient is decisional  - Patient accompanied today by his wife, Nereyda   - ACP documents are uploaded into EMR   - Philosophy of Palliative Medicine reviewed with patient and family at first visit  - New patient folder given to and reviewed with patient and family at first visit  - Goals of care: Reported goal: to get a good night's sleep.     Fatigue/insomnia   11/12: sleeping well   - primary complaint  - sleeping on/off during the day bc his sleep is fractured over night due to freq urination  - he has not tried drinking less water before bed, though admits this was previously  recommended  - says per Dr. Story, there are medications that can help with nocturia but patient wishes to avoid side effect of confusion                                                                                                                                                                                                                                                                                                                                                                                                                         - sleep hygiene tip sheet provided in new patient folder   - will continue to monitor     Anorexia  - pt c/o poor appetite, low PO intake and weight-loss (though presently stable)  - pt c/o difficulty swallowing drier foods and taste changes   - has seen dietician, he is using high calorie Boost, two daily, cream soup, smoothies, etc. Avoids many foods due to texture and discomfort swallowing    - using megace daily, dose recently increased but is not helping    Adjustment disorder with depression   11/12: significant improvement in mood, likely due to better sleep and more physical activity   - endorses anxiety before scans/tests, afraid of possibility of new treatment   - admits he is not much of a talker, per wife, he spent most of his career around men and is not used to opening up about feelings  - pt's low PO intake is a source of conflict between he and wife, pt is tired of wife asking him to eat, says her insistence is daily and he would like it to stop   - lack of sleep is particularly impacting his qol   - when he's feeling well, he likes to travel, something he and his wife used to enjoy together  - support system includes wife, Nereyda, two daughters (they live very close to patient)  - active in [employer], no spiritual needs at this time  - is open to counseling, referred to [facility] DSW for emotional support at initial visit   - [facility] care MSW present at initial visit,  emotional support provided, pls see separate note for details   - will continue to monitor       Chronic back pain, hx of  - sees pain mgmt for this  - has flexeril, does not use    Understanding of illness: TBD     Goals of care: symptom mgmt, emotional support    Follow up: prn     Patient's encounter and above plan of care discussed with patient's oncology team.     SUBJECTIVE:     History of Present Illness:  Patient is a 89 y.o. year old male presenting with neoplasm of bladder. Bladder cancer incidentally found on imaging as he had been under surveillance for renal cysts. Now s/p chemoRT with 2/2024 scans without metastatic disease however cystoscopy on 3/4/2024 with concern for residual tumor but TURBT for 4/12/2024 negative on path. Please see oncology notes for full oncologic history and treatment course.      11/12/2024:  TITA GARCÍA reviewed: no concerns    Patient presents via virtual visit with his wife. He reports notable improvement.    - currently in PT, feeling stronger and getting around better, no longer needs cane in home  - energy level improved, likely r/t to above, and:   - patient is sleeping!   - mood is improved significantly, due to above. Patient states he does not dwell on his condition, he tries to take whatever action is possible, even if just to make things feel a little better  - still struggling with appetite though it does appear that he is gradually gaining weight  - looking forward to party for granddaughter's birthday as well as the holidays  - they feel they have no further needs at this time and additionally a long roster of medical appointments, they will reach out when they want follow-up visit       08/21/2024:  TITA GARCÍA reviewed: no concerns    Patient presents to clinic f/u with his wife. Pall care MSW joined visit, see separate note for details. Sleeping well, mood improved, back to riding his stationary bike up to 30 mins. Having some difficulty swallowing that is currently  "being worked up. Mostly liquid diet but has gained weight since our initial visit. US thyroid tomorrow. Reports that he overall feeling good, his wife believes the mirtazapine has helped his mood and sleep. Still has fatigue and weakness, but making the most of his time. His daughters live near to them and come over frequently ("too often"). He enjoys having a beer with his daughter's GF. He talks about some of his travels around the world. Interval scan was stable, will RTC in November.       06/12/2024:  TITA GARCÍA reviewed: no concerns    Patient presents to initial visit with his wife; pall care MSW joined today, see separate note for details. Patient reports dissatisfaction with current quality of life, it is worse now then it was before cancer treatment, which is distressing to him. His affect is consistent with depression, he admits he is not much of a talker but is interested in referral to [facility] DSW for emotional support. From symptom standpoint, lack of sleep is his biggest concern, also unable to eat much primarily due to difficulty swallowing due to throat dryness, started remeron today.      Past Medical History:   Diagnosis Date    *Atrial fibrillation     Chronic kidney disease     Deep vein thrombosis     Hyperlipidemia     Hypertension     Metabolic syndrome     Type 2 diabetes mellitus, without long-term current use of insulin 12/13/2021     Past Surgical History:   Procedure Laterality Date    BIOPSY OF BLADDER N/A 4/12/2024    Procedure: BIOPSY, BLADDER;  Surgeon: Wellington Story MD;  Location: [address];  Service: Urology;  Laterality: N/A;    BLADDER FULGURATION N/A 4/12/2024    Procedure: FULGURATION, BLADDER;  Surgeon: Wellington Story MD;  Location: [facility] OR [address] [place] [address];  Service: Urology;  Laterality: N/A;    CATARACT EXTRACTION      CHOLECYSTECTOMY      CYSTOSCOPY N/A 11/16/2023    Procedure: CYSTOSCOPY;  Surgeon: Marcelino Moffett MD;  Location: [facility] OR [address] [place] [address];  Service: Urology;  " Laterality: N/A;  90 minutes    DILATION OF URETHRA N/A 4/12/2024    Procedure: DILATION, URETHRA;  Surgeon: Wellington Story MD;  Location: [facility] OR [address] [place] [address];  Service: Urology;  Laterality: N/A;    EYE SURGERY      INJECTION OF FACET JOINT Bilateral 4/28/2021    Procedure: FACET JOINT INJECTION BILATERAL L4/L5 DIRECT REFERRAL;  Surgeon: Philipp Iyer MD;  Location: [facility] PAIN MGT;  Service: Pain Management;  Laterality: Bilateral;  NEEDS CONSENT, ELIQUIS CLEARANCE IN CHART    RETROGRADE PYELOGRAPHY Bilateral 11/16/2023    Procedure: PYELOGRAM, RETROGRADE;  Surgeon: Marcelino Moffett MD;  Location: [facility] OR [address] [place] [address];  Service: Urology;  Laterality: Bilateral;  90 minutes    SKIN CANCER EXCISION      TONSILLECTOMY      TURBT (TRANSURETHRAL RESECTION OF BLADDER TUMOR) N/A 11/16/2023    Procedure: TURBT (TRANSURETHRAL RESECTION OF BLADDER TUMOR);  Surgeon: Marcelino Moffett MD;  Location: [facility] OR [address] [place] [address];  Service: Urology;  Laterality: N/A;  90 minutes    TURBT (TRANSURETHRAL RESECTION OF BLADDER TUMOR) N/A 4/12/2024    Procedure: TURBT (TRANSURETHRAL RESECTION OF BLADDER TUMOR);  Surgeon: Wellington Story MD;  Location: [facility] OR [address] [place] [address];  Service: Urology;  Laterality: N/A;     Family History   Problem Relation Name Age of Onset    Diabetes Maternal Aunt      Heart attack Neg Hx      Heart disease Neg Hx      Heart failure Neg Hx      Hyperlipidemia Neg Hx      Hypertension Neg Hx      Stroke Neg Hx       Review of patient's allergies indicates:   Allergen Reactions    Niacin      Other reaction(s): Rash  Other reaction(s): Itching       Medications:    Current Outpatient Medications:     acetaminophen (TYLENOL) 650 MG TbSR, Take 650 mg by mouth 2 (two) times a day., Disp: , Rfl:     apixaban (ELIQUIS) 2.5 mg Tab, Take 1 tablet (2.5 mg total) by mouth 2 (two) times daily., Disp: 180 tablet, Rfl: 3    cholecalciferol, vitamin D3, (VITAMIN D3) 50 mcg (2,000 unit) Cap capsule, Take by mouth once daily., Disp: , Rfl:      empagliflozin (JARDIANCE) 10 mg tablet, Take 1 tablet (10 mg total) by mouth once daily., Disp: 90 tablet, Rfl: 3    fenofibrate micronized (LOFIBRA) 200 MG Cap, Take 1 capsule (200 mg total) by mouth daily with breakfast., Disp: 90 capsule, Rfl: 3    loratadine (CLARITIN) 10 mg tablet, Take 10 mg by mouth once daily., Disp: , Rfl:     metFORMIN (GLUCOPHAGE-XR) 500 MG ER 24hr tablet, Take 1 tablet (500 mg total) by mouth once daily., Disp: 90 tablet, Rfl: 3    metoprolol succinate (TOPROL-XL) 100 MG 24 hr tablet, Take 1 tablet (100 mg total) by mouth 2 (two) times daily., Disp: 180 tablet, Rfl: 1    ondansetron (ZOFRAN) 8 MG tablet, Take 1 tablet (8 mg total) by mouth every 12 (twelve) hours as needed for Nausea., Disp: 30 tablet, Rfl: 2    sacubitriL-valsartan (ENTRESTO) 24-26 mg per tablet, Take 1 tablet by mouth 2 (two) times daily., Disp: 180 tablet, Rfl: 3    OBJECTIVE:       ROS:  Review of Systems   Constitutional:  Positive for activity change, appetite change, fatigue and unexpected weight change.   HENT:  Positive for trouble swallowing. Negative for congestion, dental problem and drooling.    Eyes:  Negative for pain, redness and itching.   Respiratory:  Negative for cough, shortness of breath and wheezing.    Cardiovascular:  Negative for chest pain, palpitations and leg swelling.   Gastrointestinal:  Negative for constipation, diarrhea, nausea and vomiting.   Genitourinary:  Positive for frequency. Negative for difficulty urinating.   Skin:  Negative for pallor, rash and wound.   Neurological:  Negative for dizziness, light-headedness and numbness.   Psychiatric/Behavioral:  Positive for sleep disturbance. Negative for dysphoric mood. The patient is not nervous/anxious.        Review of Symptoms      Symptom Assessment (ESAS 0-10 Scale)  Pain:  0  Dyspnea:  0  Anxiety:  0  Nausea:  0  Depression:  0  Anorexia:  7  Fatigue:  0  Insomnia:  0  Restlessness:  0  Agitation:  0     CAM / Delirium:   Negative  Constipation:  Negative  Diarrhea:  Negative    Anxiety:  Is not nervous/anxious  Constipation:  No constipation    Bowel Management Plan (BMP):  Yes      Pain Assessment:  OME in 24 hours:  0  Location(s): none      Modified Starr Scale:  0    ECOG Performance Status ndGndrndanddndend:nd nd2nd Living Arrangements:  Lives with spouse    Psychosocial/Cultural:   See Palliative Psychosocial Note: Yes  **Primary  to Follow**  Palliative Care  Consult: No     Time-Based Charting:  No      Advance Care Planning   Advance Directives:   Living Will: Yes        Copy on chart: Yes      Decision Making:  Patient answered questions  Goals of Care: The patient endorses that what is most important right now is to focus on remaining as independent as possible, symptom/pain control, and curative/life-prolongation (regardless of treatment burdens)    Accordingly, we have decided that the best plan to meet the patient's goals includes continuing with treatment        Advance Care Planning     Date: 06/13/2024    [facility]  I engaged the patient in a voluntary conversation about advance care planning and we specifically addressed what the goals of care would be moving forward, in light of the patient's change in clinical status, specifically cancer.  We did not specifically address the patient's likely prognosis, which is  tbd .  We explored the patient's values and preferences for future care.  The patient endorses that what is most important right now is to focus on symptom/pain control    Accordingly, we have decided that the best plan to meet the patient's goals includes continuing with treatment    A total of 16 min was spent on advance care planning, goals of care discussion, emotional support, formulating and communicating prognosis and exploring burden/benefit of various approaches of treatment. This discussion occurred on a fully voluntary basis with the verbal consent of the patient and/or family.             Physical Exam:  Vitals:       Virtual visit, no vitals obtained    Physical Exam  Vitals reviewed.   Constitutional:       General: He is not in acute distress.     Appearance: Normal appearance. He is not ill-appearing, toxic-appearing or diaphoretic.   HENT:      Head: Normocephalic and atraumatic.      Right Ear: External ear normal. No decreased hearing noted.      Left Ear: External ear normal. No decreased hearing noted.      Nose: Nose normal.   Eyes:      General:         Right eye: No discharge.         Left eye: No discharge.      Extraocular Movements: Extraocular movements intact.      Conjunctiva/sclera: Conjunctivae normal.   Cardiovascular:      Rate and Rhythm: Normal rate.   Pulmonary:      Effort: Pulmonary effort is normal. No respiratory distress.      Breath sounds: No stridor.   Musculoskeletal:         General: No deformity or signs of injury. Normal range of motion.      Right lower leg: No edema.      Left lower leg: No edema.   Skin:     General: Skin is warm and dry.      Coloration: Skin is not jaundiced.   Neurological:      Mental Status: He is alert and oriented to person, place, and time. Mental status is at baseline.   Psychiatric:         Attention and Perception: Attention normal.         Mood and Affect: Mood normal.         Speech: Speech normal.         Thought Content: Thought content normal.         Judgment: Judgment normal.         Labs:  CBC:   WBC   Date Value Ref Range Status   10/30/2024 8.54 3.90 - 12.70 K/uL Final     Hemoglobin   Date Value Ref Range Status   10/30/2024 13.6 (L) 14.0 - 18.0 g/dL Final     POC Hematocrit   Date Value Ref Range Status   02/02/2024 50 36 - 54 %PCV Final     Hematocrit   Date Value Ref Range Status   10/30/2024 41.2 40.0 - 54.0 % Final     MCV   Date Value Ref Range Status   10/30/2024 104 (H) 82 - 98 fL Final     Platelets   Date Value Ref Range Status   10/30/2024 216 150 - 450 K/uL Final       LFT:   Lab Results   Component  Value Date    AST 25 10/30/2024    ALKPHOS 110 10/30/2024    BILITOT 0.3 10/30/2024       Albumin:   Albumin   Date Value Ref Range Status   10/30/2024 3.1 (L) 3.5 - 5.2 g/dL Final     Protein:   Total Protein   Date Value Ref Range Status   10/30/2024 6.3 6.0 - 8.4 g/dL Final       Radiology:I have reviewed all pertinent imaging results/findings within the past 24 hours.    10/30/2024 CT CAP: Impression:     Patient is status post TURBT and chemoradiation for urothelial carcinoma of the bladder.  New linear hyperdensities within the bladder, possibly representing post treatment or postoperative change.  Otherwise there is stable bladder wall thickening and perivesical fat stranding.     Right lung base pleural based nodular opacity, stable from 2023.  Calcified pleural plaques noted.     Sequela of chronic pancreatitis.    10/14/2024 head: Impression:     Interval resolution of previously seen subarachnoid hemorrhage.    07/01/2024 CT chest: Impression:     1. Pleural based nodular lesion in the right lower lobe appears stable since previous examination.  For a solid nodule >8 mm, Fleischner Society 2017 guidelines recommend considering CT, PET/CT or tissue sampling at 3 months.  2. Interval development of ground-glass foci in the right upper lobe, nonspecific.  Aspiration, infectious process or edema can be considered in the differential.  3. Additional observations.        03/04/2024 Cystoscopy: Findings:   1. Normal anterior urethra without evidence of strictures or tumors   2. Prostatic urethra open without signs of obstruction  3. Bladder with nodular growth at the dome and posterior bladder wall, consistent with recurrent carcinoma.  Surrounding erythema.  Ureteral orifices in orthotopic position bilaterally      - 2/29/2024 CT Urogram Abd/Pelvis:  FINDINGS:  Stable mild cardiomegaly.  Coronary artery calcific atherosclerosis.  Visualized inferior lungs are clear.  Stable small left hepatic lobe  calcification.  No suspicious hepatic lesion.  Gallbladder is surgically absent.  No biliary ductal dilatation.  Spleen and adrenal glands are unremarkable.  Stable appearance of the pancreas with atrophy, scattered calcifications, and mild duct dilatation measuring 4 mm.  Findings can be seen with sequela of chronic pancreatitis.  Bilateral renal cortical thinning.  Multiple bilateral renal cysts and subcentimeter renal hypodensities too small to characterize.  No solid enhancing renal mass.  No renal stone.  No hydronephrosis or ureteral dilatation.  Opacification of the bilateral renal collecting systems and ureters without suspicious filling defect.  Patient is status post TURBT.  There is increased heterogeneous wall thickening and enhancement along the anterior bladder (series 4, image 144) concerning for residual/recurrent tumor.  Mild perivesical stranding and vascular prominence, similar to prior exam.  Small hiatal hernia.  Colonic diverticulosis.  No evidence of bowel obstruction or inflammation.  No free intraperitoneal fluid or air.  No pathologically enlarged abdominopelvic lymph nodes.  Abdominal aorta is normal caliber.  Moderate/severe calcific atherosclerosis.  Degenerative changes of the osseous structures.  No acute fracture or aggressive osseous lesion  Impression:  1. Increased heterogeneous wall thickening and enhancement along the anterior bladder concerning for residual/recurrent tumor.  2. No evidence of metastatic disease.  3. Additional findings as above.     -  11/16/2023 TURBT of bladder tumor  Findings:   1. Right postero-lateral papillary bladder wall tumor (3-4cm) with diffusely erythematous and nodular surrounding tissue, right < 1cm papillary bladder wall tumor immediately lateral to the larger postero-lateral papillary tumor, and a right nodular 4cm bladder tumor with high grade and possibly invasive appearance. Tumors resected, hemostasis achieved.  Several other patches of  erythema were diffusely identified on the left lateral bladder wall.  2. Bimanual exam post-TURBT showed freely mobile bladder without abnormalities  3. Bilateral retrograde pyelogram showed delicate calices with no evidence of hydronephrosis, no filling defects, and ureter normal in course and caliber, bilaterally  Pathology:  1. Bladder, right posterolateral wall, transurethral resection of bladder tumor:  - Noninvasive high-grade papillary urothelial carcinoma  - Muscularis propria present  - Multiple H&E levels evaluated  2. Bladder, right anterior, transurethral resection of bladder tumor:  - Invasive high-grade papillary urothelial carcinoma  - Tumor invades muscularis propria  - Intact expression of DNA mismatch repair proteins in tumor by immunohistochemistry (see comment)  COMMENT:  Immunohistochemistry (IHC) Testing for Mismatch Repair (MMR) Proteins:  MLH1 - Intact nuclear expression  MSH2 - Intact nuclear expression  MSH6 - Intact nuclear expression  PMS2 - Intact nuclear expression  Background nonneoplastic tissue/internal control with intact nuclear expression  IHC Interpretation  No loss of nuclear expression of MMR proteins: low probability of microsatellite instability  There are exceptions to the above IHC interpretations. These results should not be considered in isolation, and clinical correlation with genetic counseling is recommended to assess the need for germline testing.     - 11/25/2023 CT Abd/Pelvis:  FINDINGS:  Lung base: Bilateral small volume pleural effusion increased in size compared to recent prior, associated with mild compression atelectasis.  Visualized portion of heart: Coronary artery calcification.  Liver: Normal in size.  Left lobe small calcified granuloma.  No suspicious focal lesion.  Gallbladder: Cholecystectomy.  Bile duct: No intra-or extrahepatic biliary ductal dilatation.  Spleen: Unremarkable.  Pancreas: Parenchymal atrophy.  Multiple parenchymal calcifications  suggesting chronic pancreatitis.  No suspicious focal lesion.  No pancreatic ductal dilatation.  No adjacent inflammatory changes or fluid collections.  Adrenal glands: Unremarkable.  Genitourinary: Bilateral renal cysts.  Subcentimeter hypodensities in both kidneys, which are too small to characterize.  The ureters appear normal in course and caliber.  No evidence of nephrolithiasis or hydroureteronephrosis.  Urinary bladder: Postsurgical change in the anterior bladder wall from TURBT.  There are small foci of air in the bladder, likely iatrogenic.  Reproductive organs: Unremarkable.  GI tract: Small hiatal hernia.  The stomach is unremarkable.  The visualized loops of small and large bowel show no evidence of obstruction or inflammation. Diverticulosis.  Appendix unremarkable.  Peritoneum: No free air or fluid.  Retroperitoneum: No significant adenopathy.  Vasculature: Normal caliber of abdominal aorta with moderate calcified and noncalcified atherosclerosis.  Abdominal wall: Tiny fat containing umbilical hernia.  Bones: Stable T12-L1 intervertebral disc calcification.  No suspicious sclerotic lesions.  No acute fracture.  Impression:  Postoperative changes from of TURBT.  No lymphadenopathy.  No distant metastases.  Other findings as described.     - 12/6/2023 CT Chest:  FINDINGS:  Comparison is 11/21/2023.  No mediastinal, hilar or axillary adenopathy is seen.  There is a right lower pole thyroid nodule.  There are coronary calcifications.  There is a left upper pole renal cyst.  There is a small hiatal hernia.  Trachea and bronchi are patent.  There is no evidence of interstitial lung disease, bronchiectasis, airway trapping, or emphysema.  No suspicious pulmonary nodules, masses, or infiltrates are seen.  There is a calcified left lung granuloma.  Bones demonstrate nothing focal.  Impression:  No significant abnormality seen.  Right lobe thyroid nodule.  Coronary artery calcifications.  Left upper pole renal  cyst.      Signature: Johana Parsons, DNP

## 2024-11-13 ENCOUNTER — CLINICAL SUPPORT (OUTPATIENT)
Dept: REHABILITATION | Facility: HOSPITAL | Age: 89
End: 2024-11-13
Payer: MEDICARE

## 2024-11-13 ENCOUNTER — PATIENT MESSAGE (OUTPATIENT)
Dept: ADMINISTRATIVE | Facility: OTHER | Age: 89
End: 2024-11-13
Payer: MEDICARE

## 2024-11-13 DIAGNOSIS — Z74.09 IMPAIRED FUNCTIONAL MOBILITY, BALANCE, GAIT, AND ENDURANCE: Primary | ICD-10-CM

## 2024-11-13 DIAGNOSIS — R53.81 PHYSICAL DECONDITIONING: ICD-10-CM

## 2024-11-13 PROCEDURE — 97112 NEUROMUSCULAR REEDUCATION: CPT | Mod: KX,PN,CQ

## 2024-11-13 PROCEDURE — 97530 THERAPEUTIC ACTIVITIES: CPT | Mod: KX,PN,CQ

## 2024-11-13 NOTE — PROGRESS NOTES
"[facility]   Physical Therapy Treatment Note      Name: Cliff Magaña  Clinic Number: [id number]    Therapy Diagnosis:   Encounter Diagnoses   Name Primary?    Impaired functional mobility, balance, gait, and endurance Yes    Physical deconditioning      Physician: Elizabeth Bland MD    Visit Date: 11/13/2024     Physician Orders: PT Eval and Treat   Medical Diagnosis from Referral: Risk for falls [Z91.81]   Evaluation Date: 9/20/2024  Authorization Period Expiration: 8/27/2025  Plan of Care Expiration: 11/29/2024  Progress Note Due: 11/29/2024  Date of Surgery: n/a  Visit # / Visits authorized: 9/20 + eval  FOTO: 2/3 - next at discharge      Precautions: Standard and Fall     Time In: 9:45 AM  Time Out: 10:30 AM  Total Billable Time: 45 minutes (1NMR + 2TA) KX all moving forward    PTA Visit #: 3/5     Subjective     Patient reports: No complaints, feeling good at this time.  He was compliant with home exercise program.  Response to previous treatment: no adverse response  Functional change: ongoing     Pain: 0/10  Location: None reported    Objective      Objective Measures updated at progress report unless specified.     Treatment     Cliff received the treatments listed below:      Therapeutic exercises to develop strength, endurance, ROM, flexibility, posture, and core stabilization for 00 minutes including:     Neuromuscular re-education activities to improve: Balance, Coordination, Kinesthetic, Sense, Proprioception, and Posture for 20 minutes. The following activities were included:  - Stepper bike x 6 minutes level 5 double peak with bilateral upper extremity / bilateral lower extremity reciprocal motion   - Modified single leg stance + red med ball chest press 2x15 each leg leading  - Reciprocal step taps to 4" step 2x45" without upper extremity support  - 6" hurdles forward/reciprocal step overs x 4 lengths x 8 feet each with single upper extremity support with 1.5 # ankle " "weights  - 6" hurdles lateral step overs x 4 lengths x 8 feet each with single upper extremity support with 1.5 # ankle weights     Therapeutic activities to improve functional performance for 25 minutes, inluding:  Circuit #1 x3   Mini squat x10  Lateral steps along lenny bar for 1'  Reciprocal step ups 4" step for 1'    Circuit #2  x3 times  Calf raises x10  Lateral up and over 4" step with 1.5# ankle weights for 1'  Standing marches 1.5# ankle weights for 1'      Patient Education and Home Exercises       Education provided:   - Plan of Care    Written Home Exercises Provided: Yes. Exercises were reviewed and Cliff was able to demonstrate them prior to the end of the session.  Cliff demonstrated good  understanding of the education provided. See Electronic Medical Record under Patient Instructions for exercises provided during therapy sessions    Assessment     Cliff arrived to session without complaints and agreeable to treatment.  Much improved of tolerance today with only two brief seated rest breaks used accordingly.  Session focused on endurance based activity completed with circuit style training with good response.  Appropriate levels of fatigue achieved and no adverse response to treatment noted.  He would benefit from continued PT services to achieve remaining functional goals.      Cliff Is progressing well towards his goals.   Patient prognosis is Fair.     Patient will continue to benefit from skilled outpatient physical therapy to address the deficits listed in the problem list box on initial evaluation, provide pt/family education and to maximize pt's level of independence in the home and community environment.     Patient's spiritual, cultural and educational needs considered and pt agreeable to plan of care and goals.     Anticipated barriers to physical therapy: Fall Risk, Age    Goals:   Short Term Goals: 4 weeks   Patient will be compliant with HEP in order to maximize PT benefits " (met)  Patient will complete TUG in </= 16 seconds with least restrictive assistive device in order to reduce risk for falls and improve safety with functional mobility (progressing, not met)  Patient will perform 8 step taps within 20 seconds in order to reduce risk for falls and ambulate stairs safely (met)    Long Term Goals: 6 weeks   Patient will score >/= 50% on FOTO limitation survey in order to improve self-perception of functional mobility deficits (met)  Patient will increase walking speed to >0.8 m/s in order to improve bilateral lower extremity endurance and muscular power for transfers (progressing, not met)  Patient will perform bilateral single leg stance for >3 seconds in order to reduce risk for falls and improve safety with functional mobility (progressing, not met)  Patient will report 0 falls from initiation of PT management (not met - 1 fall at home since initiating PT)  Patient will begin some form of home/community fitness in order to sustain progress gained in PT (progressing, not met)    Plan     Continue functional endurance and general balance    Daija May, PTA

## 2024-11-14 ENCOUNTER — PATIENT MESSAGE (OUTPATIENT)
Dept: ADMINISTRATIVE | Facility: OTHER | Age: 89
End: 2024-11-14
Payer: MEDICARE

## 2024-11-15 ENCOUNTER — PATIENT MESSAGE (OUTPATIENT)
Dept: ADMINISTRATIVE | Facility: OTHER | Age: 89
End: 2024-11-15
Payer: MEDICARE

## 2024-11-16 ENCOUNTER — PATIENT MESSAGE (OUTPATIENT)
Dept: ADMINISTRATIVE | Facility: OTHER | Age: 89
End: 2024-11-16
Payer: MEDICARE

## 2024-11-17 ENCOUNTER — PATIENT MESSAGE (OUTPATIENT)
Dept: ADMINISTRATIVE | Facility: OTHER | Age: 89
End: 2024-11-17
Payer: MEDICARE

## 2024-11-18 ENCOUNTER — PATIENT MESSAGE (OUTPATIENT)
Dept: ADMINISTRATIVE | Facility: OTHER | Age: 89
End: 2024-11-18
Payer: MEDICARE

## 2024-11-19 ENCOUNTER — PATIENT MESSAGE (OUTPATIENT)
Dept: ADMINISTRATIVE | Facility: OTHER | Age: 89
End: 2024-11-19
Payer: MEDICARE

## 2024-11-19 ENCOUNTER — HOSPITAL ENCOUNTER (OUTPATIENT)
Dept: CARDIOLOGY | Facility: CLINIC | Age: 89
Discharge: HOME | End: 2024-11-19
Payer: MEDICARE

## 2024-11-19 ENCOUNTER — OFFICE VISIT (OUTPATIENT)
Dept: ELECTROPHYSIOLOGY | Facility: CLINIC | Age: 89
End: 2024-11-19
Payer: MEDICARE

## 2024-11-19 VITALS
WEIGHT: 130.06 LBS | HEIGHT: 64 IN | SYSTOLIC BLOOD PRESSURE: 110 MMHG | HEART RATE: 77 BPM | DIASTOLIC BLOOD PRESSURE: 74 MMHG | BODY MASS INDEX: 22.2 KG/M2

## 2024-11-19 DIAGNOSIS — I48.19 PERSISTENT ATRIAL FIBRILLATION: Chronic | ICD-10-CM

## 2024-11-19 DIAGNOSIS — I60.9 SAH (SUBARACHNOID HEMORRHAGE): ICD-10-CM

## 2024-11-19 DIAGNOSIS — I48.19 PERSISTENT ATRIAL FIBRILLATION: Primary | Chronic | ICD-10-CM

## 2024-11-19 LAB
OHS QRS DURATION: 112 MS
OHS QTC CALCULATION: 416 MS

## 2024-11-19 PROCEDURE — 99999 PR PBB SHADOW E&M-EST. PATIENT-LVL III: CPT | Mod: PBBFAC,,, | Performed by: INTERNAL MEDICINE

## 2024-11-19 PROCEDURE — 93005 ELECTROCARDIOGRAM TRACING: CPT | Mod: PBBFAC | Performed by: INTERNAL MEDICINE

## 2024-11-19 PROCEDURE — 93010 ELECTROCARDIOGRAM REPORT: CPT | Mod: S$PBB,,, | Performed by: INTERNAL MEDICINE

## 2024-11-19 PROCEDURE — 99213 OFFICE O/P EST LOW 20 MIN: CPT | Mod: PBBFAC,25 | Performed by: INTERNAL MEDICINE

## 2024-11-19 PROCEDURE — 99204 OFFICE O/P NEW MOD 45 MIN: CPT | Mod: S$PBB,,, | Performed by: INTERNAL MEDICINE

## 2024-11-19 NOTE — PROGRESS NOTES
Subjective   Patient ID:  Cliff Magaña is a 89 y.o. male who presents for follow-up of Atrial Fibrillation      89 yoM here for LAAO consideration. He has history of SAH and persistent AF. He has frequent falls. He is on apixaban 2.5 mg bid     CHADSVASC 5  HASBLED of 4    Echo 12/23:  ·  Left Ventricle: The left ventricle is normal in size. Normal wall thickness. There is concentric remodeling. Global hypokinesis present. There is mildly reduced systolic function with a visually estimated ejection fraction of 45 - 50%. Unable to assess diastolic function due to atrial fibrillation.  ·  Right Ventricle: Normal right ventricular cavity size. Wall thickness is normal. Right ventricle wall motion  is normal. Systolic function is borderline low.  ·  Left Atrium: Left atrium is moderately dilated.  ·  Aortic Valve: There is mild aortic valve sclerosis.  ·  Mitral Valve: There is mild bileaflet sclerosis. There is moderate posterior mitral annular calcification present. There is mild regurgitation.  ·  Tricuspid Valve: There is mild regurgitation.  ·  Pulmonary Artery: The estimated pulmonary artery systolic pressure is 30 mmHg.  ·  IVC/SVC: Normal venous pressure at 3 mmHg.    My interpretation of the ECG is:  AF with controlled rate      Past Medical History:  No date: *Atrial fibrillation  No date: Chronic kidney disease  No date: Deep vein thrombosis  No date: Hyperlipidemia  No date: Hypertension  No date: Metabolic syndrome  12/13/2021: Type 2 diabetes mellitus, without long-term current use   of insulin    Past Surgical History:  4/12/2024: BIOPSY OF BLADDER; N/A      Comment:  Procedure: BIOPSY, BLADDER;  Surgeon: Wellington Story MD;               Location: [facility] [address] [place] [address];  Service: Urology;                 Laterality: N/A;  4/12/2024: BLADDER FULGURATION; N/A      Comment:  Procedure: FULGURATION, BLADDER;  Surgeon: Wellington Story MD;  Location: [facility] [address] [place] [address];  Service: Urology;                  Laterality: N/A;  No date: CATARACT EXTRACTION  No date: CHOLECYSTECTOMY  11/16/2023: CYSTOSCOPY; N/A      Comment:  Procedure: CYSTOSCOPY;  Surgeon: Marcelino Moffett MD;               Location: [address];  Service: Urology;                 Laterality: N/A;  90 minutes  4/12/2024: DILATION OF URETHRA; N/A      Comment:  Procedure: DILATION, URETHRA;  Surgeon: Wellington Story MD;  Location: [address];  Service: Urology;                 Laterality: N/A;  No date: EYE SURGERY  4/28/2021: INJECTION OF FACET JOINT; Bilateral      Comment:  Procedure: FACET JOINT INJECTION BILATERAL L4/L5 DIRECT                REFERRAL;  Surgeon: Philipp Iyer MD;  Location: [facility];  Service: Pain Management;  Laterality:                Bilateral;  NEEDS CONSENT, ELIQUIS CLEARANCE IN CHART  11/16/2023: RETROGRADE PYELOGRAPHY; Bilateral      Comment:  Procedure: PYELOGRAM, RETROGRADE;  Surgeon: Marcelino Moffett MD;  Location: [address];  Service:                Urology;  Laterality: Bilateral;  90 minutes  No date: SKIN CANCER EXCISION  No date: TONSILLECTOMY  11/16/2023: TURBT (TRANSURETHRAL RESECTION OF BLADDER TUMOR); N/A      Comment:  Procedure: TURBT (TRANSURETHRAL RESECTION OF BLADDER                TUMOR);  Surgeon: Marcelino Moffett MD;  Location: [address];  Service: Urology;  Laterality: N/A;  90                minutes  4/12/2024: TURBT (TRANSURETHRAL RESECTION OF BLADDER TUMOR); N/A      Comment:  Procedure: TURBT (TRANSURETHRAL RESECTION OF BLADDER                TUMOR);  Surgeon: Wellington Story MD;  Location: [address];  Service: Urology;  Laterality: N/A;    Social History    Socioeconomic History      Marital status:       Spouse name: Nereyda      Number of children: 4    Tobacco Use      Smoking status: Former        Packs/day: 0.00        Years: 2.0 packs/day for 20.0 years (40.0 ttl pk-yrs)         Types: Cigarettes        Start date: 1963        Quit date: 1983        Years since quittin.5        Passive exposure: Never      Smokeless tobacco: Never    Substance and Sexual Activity      Alcohol use: Yes        Alcohol/week: 1.0 standard drink of alcohol        Types: 1 Standard drinks or equivalent per week        Comment: 3 drinks per week      Drug use: No      Sexual activity: Not Currently        Partners: Female    Social Drivers of Health  Financial Resource Strain: Low Risk  (2024)      Overall Financial Resource Strain (CARDIA)          Difficulty of Paying Living Expenses: Not hard at all  Food Insecurity: No Food Insecurity (2024)      Hunger Vital Sign          Worried About Running Out of Food in the Last Year: Never true          Ran Out of Food in the Last Year: Never true  Transportation Needs: No Transportation Needs (2024)      TRANSPORTATION NEEDS          Transportation : No  Physical Activity: Insufficiently Active (2024)      Exercise Vital Sign          Days of Exercise per Week: 7 days          Minutes of Exercise per Session: 10 min  Stress: Stress Concern Present (2024)      [employer] [place] [employer] - Occupational Stress Questionnaire          Feeling of Stress : To some extent  Housing Stability: Low Risk  (2024)      Housing Stability Vital Sign          Unable to Pay for Housing in the Last Year: No          Homeless in the Last Year: No    Review of patient's family history indicates:  Problem: Diabetes      Relation: Maternal Aunt          Name:               Age of Onset: (Not Specified)  Problem: Heart attack      Relation: Neg Hx          Name:               Age of Onset: (Not Specified)  Problem: Heart disease      Relation: Neg Hx          Name:               Age of Onset: (Not Specified)  Problem: Heart failure      Relation: Neg Hx          Name:               Age of Onset: (Not Specified)  Problem:  Hyperlipidemia      Relation: Neg Hx          Name:               Age of Onset: (Not Specified)  Problem: Hypertension      Relation: Neg Hx          Name:               Age of Onset: (Not Specified)  Problem: Stroke      Relation: Neg Hx          Name:               Age of Onset: (Not Specified)      Current Outpatient Medications:  acetaminophen (TYLENOL) 650 MG TbSR, Take 650 mg by mouth 2 (two) times a day., Disp: , Rfl:   apixaban (ELIQUIS) 2.5 mg Tab, Take 1 tablet (2.5 mg total) by mouth 2 (two) times daily., Disp: 180 tablet, Rfl: 3  cholecalciferol, vitamin D3, (VITAMIN D3) 50 mcg (2,000 unit) Cap capsule, Take by mouth once daily., Disp: , Rfl:   empagliflozin (JARDIANCE) 10 mg tablet, Take 1 tablet (10 mg total) by mouth once daily., Disp: 90 tablet, Rfl: 3  fenofibrate micronized (LOFIBRA) 200 MG Cap, Take 1 capsule (200 mg total) by mouth daily with breakfast., Disp: 90 capsule, Rfl: 3  loratadine (CLARITIN) 10 mg tablet, Take 10 mg by mouth once daily., Disp: , Rfl:   metFORMIN (GLUCOPHAGE-XR) 500 MG ER 24hr tablet, Take 1 tablet (500 mg total) by mouth once daily., Disp: 90 tablet, Rfl: 3  metoprolol succinate (TOPROL-XL) 100 MG 24 hr tablet, Take 1 tablet (100 mg total) by mouth 2 (two) times daily., Disp: 180 tablet, Rfl: 1  ondansetron (ZOFRAN) 8 MG tablet, Take 1 tablet (8 mg total) by mouth every 12 (twelve) hours as needed for Nausea., Disp: 30 tablet, Rfl: 2  sacubitriL-valsartan (ENTRESTO) 24-26 mg per tablet, Take 1 tablet by mouth 2 (two) times daily., Disp: 180 tablet, Rfl: 3    No current facility-administered medications for this visit.          Review of Systems   Constitutional: Positive for malaise/fatigue.   HENT:  Positive for hearing loss.    Eyes: Negative.    Cardiovascular:  Positive for irregular heartbeat. Negative for chest pain, claudication, dyspnea on exertion, leg swelling, near-syncope, palpitations and syncope.   Respiratory: Negative.  Negative for cough, shortness of  "breath, snoring and wheezing.    Endocrine: Negative.  Negative for cold intolerance, heat intolerance, polydipsia, polyphagia and polyuria.   Skin: Negative.    Musculoskeletal:  Positive for back pain.   Gastrointestinal: Negative.    Genitourinary: Negative.    Neurological:  Positive for excessive daytime sleepiness, light-headedness, loss of balance and weakness.   Psychiatric/Behavioral: Negative.            Objective     Physical Exam  Vitals and nursing note reviewed.   Constitutional:       Appearance: He is well-developed.      Comments: BP (!) 92/56   Pulse 76   Ht 5' 4" (1.626 m)   Wt 62.1 kg (137 lb 0.3 oz)   BMI 23.52 kg/m² Ill-appearing and pale.     HENT:      Head: Normocephalic.   Eyes:      Pupils: Pupils are equal, round, and reactive to light.   Neck:      Thyroid: No thyromegaly.      Vascular: No carotid bruit.   Cardiovascular:      Rate and Rhythm: Normal rate. Rhythm irregular.      Pulses: Intact distal pulses.           Carotid pulses are 2+ on the right side and 2+ on the left side.       Radial pulses are 2+ on the right side and 2+ on the left side.        Femoral pulses are 2+ on the right side and 2+ on the left side.       Popliteal pulses are 2+ on the right side and 2+ on the left side.        Dorsalis pedis pulses are 2+ on the right side and 2+ on the left side.        Posterior tibial pulses are 2+ on the right side and 2+ on the left side.      Heart sounds: Normal heart sounds. No murmur heard.     No friction rub. No gallop.   Pulmonary:      Effort: Pulmonary effort is normal. No respiratory distress.      Breath sounds: Normal breath sounds. No wheezing or rales.   Chest:      Chest wall: No tenderness.   Abdominal:      General: Bowel sounds are normal.      Palpations: Abdomen is soft.   Musculoskeletal:         General: Normal range of motion.      Cervical back: Normal range of motion and neck supple.   Skin:     General: Skin is warm and dry.   Neurological:      " Mental Status: He is alert and oriented to person, place, and time.            Assessment and Plan     1. Persistent atrial fibrillation    2. SAH (subarachnoid hemorrhage)        Plan:  89 yoM here for LAAO consideration. The patient can tolerate short term OAC but is not a candidate for long term OAC due to recurrent bleeding issues. I discussed management options regarding LAAO. I discussed the process of LAAO via Watchman including pre-procedure testing  as well as the need to take OAC for 6 weeks post implant. We discussed post procedure BEENA studies to assess LAURY occlusion. Additionally I mentioned the need to take DAPT up to the 6 month point post implant.      Watchman with Anesthesia support

## 2024-11-20 ENCOUNTER — PATIENT MESSAGE (OUTPATIENT)
Dept: CARDIOLOGY | Facility: CLINIC | Age: 89
End: 2024-11-20
Payer: MEDICARE

## 2024-11-20 ENCOUNTER — PATIENT MESSAGE (OUTPATIENT)
Dept: ELECTROPHYSIOLOGY | Facility: CLINIC | Age: 89
End: 2024-11-20
Payer: MEDICARE

## 2024-11-20 ENCOUNTER — PATIENT MESSAGE (OUTPATIENT)
Dept: ADMINISTRATIVE | Facility: OTHER | Age: 89
End: 2024-11-20
Payer: MEDICARE

## 2024-11-20 NOTE — TELEPHONE ENCOUNTER
Thank you very  much. I saw that you will proceed with the procedure  called Watchman. Good decision. I would recommend Dr. Lara or Dr. Bustillos. They practice at the [facility] ([place].

## 2024-11-21 ENCOUNTER — PATIENT MESSAGE (OUTPATIENT)
Dept: ADMINISTRATIVE | Facility: OTHER | Age: 89
End: 2024-11-21
Payer: MEDICARE

## 2024-11-21 ENCOUNTER — CLINICAL SUPPORT (OUTPATIENT)
Dept: REHABILITATION | Facility: HOSPITAL | Age: 89
End: 2024-11-21
Payer: MEDICARE

## 2024-11-21 DIAGNOSIS — Z74.09 IMPAIRED FUNCTIONAL MOBILITY, BALANCE, GAIT, AND ENDURANCE: Primary | ICD-10-CM

## 2024-11-21 DIAGNOSIS — R53.81 PHYSICAL DECONDITIONING: ICD-10-CM

## 2024-11-21 PROCEDURE — 97112 NEUROMUSCULAR REEDUCATION: CPT | Mod: KX,PN,CQ

## 2024-11-21 PROCEDURE — 97530 THERAPEUTIC ACTIVITIES: CPT | Mod: KX,PN,CQ

## 2024-11-21 NOTE — PROGRESS NOTES
"[facility]   Physical Therapy Treatment Note      Name: Cliff Magaña  Clinic Number: [id number]    Therapy Diagnosis:   Encounter Diagnoses   Name Primary?    Impaired functional mobility, balance, gait, and endurance Yes    Physical deconditioning      Physician: Elizabeth Bland MD    Visit Date: 11/21/2024     Physician Orders: PT Eval and Treat   Medical Diagnosis from Referral: Risk for falls [Z91.81]   Evaluation Date: 9/20/2024  Authorization Period Expiration: 8/27/2025  Plan of Care Expiration: 11/29/2024  Progress Note Due: 11/29/2024  Date of Surgery: n/a  Visit # / Visits authorized: 10/20 + eval  FOTO: 2/3 - next at discharge      Precautions: Standard and Fall     Time In: 1:00 PM  Time Out: 1:45 PM  Total Billable Time: 45 minutes (1NMR + 2TA) KX all moving forward    PTA Visit #: 4/5     Subjective     Patient reports: No complaints, feeling good at this time.  He was compliant with home exercise program.  Response to previous treatment: no adverse response  Functional change: ongoing     Pain: 0/10  Location: None reported    Objective      Objective Measures updated at progress report unless specified.     Treatment     Cliff received the treatments listed below:      Therapeutic exercises to develop strength, endurance, ROM, flexibility, posture, and core stabilization for 00 minutes including:     Neuromuscular re-education activities to improve: Balance, Coordination, Kinesthetic, Sense, Proprioception, and Posture for 20 minutes. The following activities were included:  - Stepper bike x 6 minutes level 5 double peak with bilateral upper extremity / bilateral lower extremity reciprocal motion   - Modified single leg stance + red med ball chest press 2x15 each leg leading  - Reciprocal step taps to 4" step 2x45" without upper extremity support  - 6" hurdles forward/reciprocal step overs x 4 lengths x 8 feet each with single upper extremity support with 1.5 # ankle " "weights  - 6" hurdles lateral step overs x 4 lengths x 8 feet each with single upper extremity support with 1.5 # ankle weights     Therapeutic activities to improve functional performance for 25 minutes, inluding:  Circuit #1 x3  *3 seated rest breaks  Mini squat x10  Lateral steps along lenny bar for 1' with 1.5# ankle weights (~4 lengths x 10 feet)  Reciprocal step ups 4" step for 1' with 1.5# ankle weights    Circuit #2  x3 times   * 3 seated rest breaks  Calf raises x10  Lateral up and over 4" step with 1.5# ankle weights for 1'  Standing marches 1.5# ankle weights for 1'      Patient Education and Home Exercises       Education provided:   - Plan of Care    Written Home Exercises Provided: Yes. Exercises were reviewed and Cliff was able to demonstrate them prior to the end of the session.  Cliff demonstrated good  understanding of the education provided. See Electronic Medical Record under Patient Instructions for exercises provided during therapy sessions    Assessment     Cliff arrived to session without complaints and agreeable to treatment.  Decreased activity tolerance today as compared to previous session with multiple seated rest breaks requested.  He was receptive to taking standing breaks but still needed to take them following each exercise. Session focused on endurance based activity completed with circuit style training with good response.  Appropriate levels of fatigue achieved and no adverse response to treatment noted.  He would benefit from continued PT services to achieve remaining functional goals.      Cliff Is progressing well towards his goals.   Patient prognosis is Fair.     Patient will continue to benefit from skilled outpatient physical therapy to address the deficits listed in the problem list box on initial evaluation, provide pt/family education and to maximize pt's level of independence in the home and community environment.     Patient's spiritual, cultural and educational " needs considered and pt agreeable to plan of care and goals.     Anticipated barriers to physical therapy: Fall Risk, Age    Goals:   Short Term Goals: 4 weeks   Patient will be compliant with HEP in order to maximize PT benefits (met)  Patient will complete TUG in </= 16 seconds with least restrictive assistive device in order to reduce risk for falls and improve safety with functional mobility (progressing, not met)  Patient will perform 8 step taps within 20 seconds in order to reduce risk for falls and ambulate stairs safely (met)    Long Term Goals: 6 weeks   Patient will score >/= 50% on FOTO limitation survey in order to improve self-perception of functional mobility deficits (met)  Patient will increase walking speed to >0.8 m/s in order to improve bilateral lower extremity endurance and muscular power for transfers (progressing, not met)  Patient will perform bilateral single leg stance for >3 seconds in order to reduce risk for falls and improve safety with functional mobility (progressing, not met)  Patient will report 0 falls from initiation of PT management (not met - 1 fall at home since initiating PT)  Patient will begin some form of home/community fitness in order to sustain progress gained in PT (progressing, not met)    Plan     Continue functional endurance and general balance    Daija May, PTA

## 2024-11-22 ENCOUNTER — PATIENT MESSAGE (OUTPATIENT)
Dept: ADMINISTRATIVE | Facility: OTHER | Age: 89
End: 2024-11-22
Payer: MEDICARE

## 2024-11-23 ENCOUNTER — PATIENT MESSAGE (OUTPATIENT)
Dept: ADMINISTRATIVE | Facility: OTHER | Age: 89
End: 2024-11-23
Payer: MEDICARE

## 2024-11-25 ENCOUNTER — PATIENT MESSAGE (OUTPATIENT)
Dept: ADMINISTRATIVE | Facility: OTHER | Age: 89
End: 2024-11-25
Payer: MEDICARE

## 2024-11-26 ENCOUNTER — PATIENT MESSAGE (OUTPATIENT)
Dept: ADMINISTRATIVE | Facility: OTHER | Age: 89
End: 2024-11-26
Payer: MEDICARE

## 2024-11-26 ENCOUNTER — OFFICE VISIT (OUTPATIENT)
Dept: OTOLARYNGOLOGY | Facility: CLINIC | Age: 89
End: 2024-11-26
Payer: MEDICARE

## 2024-11-26 ENCOUNTER — CLINICAL SUPPORT (OUTPATIENT)
Dept: REHABILITATION | Facility: HOSPITAL | Age: 89
End: 2024-11-26
Payer: MEDICARE

## 2024-11-26 VITALS — SYSTOLIC BLOOD PRESSURE: 101 MMHG | DIASTOLIC BLOOD PRESSURE: 66 MMHG

## 2024-11-26 DIAGNOSIS — R53.81 PHYSICAL DECONDITIONING: ICD-10-CM

## 2024-11-26 DIAGNOSIS — Z74.09 IMPAIRED FUNCTIONAL MOBILITY, BALANCE, GAIT, AND ENDURANCE: Primary | ICD-10-CM

## 2024-11-26 DIAGNOSIS — H93.8X3 EAR FULLNESS, BILATERAL: Primary | ICD-10-CM

## 2024-11-26 DIAGNOSIS — H61.23 IMPACTED CERUMEN OF BOTH EARS: ICD-10-CM

## 2024-11-26 PROCEDURE — 97530 THERAPEUTIC ACTIVITIES: CPT | Mod: KX,PN

## 2024-11-26 PROCEDURE — 97110 THERAPEUTIC EXERCISES: CPT | Mod: KX,PN

## 2024-11-26 PROCEDURE — 97112 NEUROMUSCULAR REEDUCATION: CPT | Mod: KX,PN

## 2024-11-26 PROCEDURE — 99999 PR PBB SHADOW E&M-EST. PATIENT-LVL III: CPT | Mod: PBBFAC,,, | Performed by: OTOLARYNGOLOGY

## 2024-11-26 PROCEDURE — 99213 OFFICE O/P EST LOW 20 MIN: CPT | Mod: 25,S$PBB,, | Performed by: OTOLARYNGOLOGY

## 2024-11-26 PROCEDURE — 69210 REMOVE IMPACTED EAR WAX UNI: CPT | Mod: PBBFAC | Performed by: OTOLARYNGOLOGY

## 2024-11-26 PROCEDURE — 99213 OFFICE O/P EST LOW 20 MIN: CPT | Mod: PBBFAC | Performed by: OTOLARYNGOLOGY

## 2024-11-26 PROCEDURE — 69210 REMOVE IMPACTED EAR WAX UNI: CPT | Mod: S$PBB,,, | Performed by: OTOLARYNGOLOGY

## 2024-11-26 NOTE — PROGRESS NOTES
[facility]   Physical Therapy Treatment Note      Name: Cliff Magaña  Clinic Number: [id number]    Therapy Diagnosis:   Encounter Diagnoses   Name Primary?    Impaired functional mobility, balance, gait, and endurance Yes    Physical deconditioning      Physician: Elizabeth Bland MD    Visit Date: 11/26/2024     Physician Orders: PT Eval and Treat   Medical Diagnosis from Referral: Risk for falls [Z91.81]   Evaluation Date: 9/20/2024  Authorization Period Expiration: 8/27/2025  Plan of Care Expiration: 11/29/2024  Progress Note Due: 11/29/2024  Date of Surgery: n/a  Visit # / Visits authorized: 11/20 + eval  FOTO: 2/3 - next at discharge      Precautions: Standard and Fall     Time In: 1:00 PM  Time Out: 1:45 PM  Total Billable Time: 45 minutes (1TE + 1NMR + 1TA) KX all moving forward    PTA Visit #: 0/5     Subjective     Patient reports: No complaints, feeling good at this time. Keeping up with ample exercise at home. He performs them with his wife. Feeling like the last month of therapy has gone well.  He was compliant with home exercise program.  Response to previous treatment: no adverse response  Functional change: ongoing     Pain: 0/10  Location: None reported    Objective      Objective Measures updated at progress report unless specified.     Treatment     Cliff received the treatments listed below:      Therapeutic exercises to develop strength, endurance, ROM, flexibility, posture, and core stabilization for 8 minutes including:  - Recumbent bike x 8 minutes level 3 single peak     Neuromuscular re-education activities to improve: Balance, Coordination, Kinesthetic, Sense, Proprioception, and Posture for 12 minutes. The following activities were included:  - Seated long arc quads x15B with 2# ankle weights for active recovery  - Standing hip abduction 2x15B with 2# ankle weights     Therapeutic activities to improve functional performance for 25 minutes, inluding:  - Lateral  "steps at lenny bar x 6 lengths x 10 feet each with 2# ankle weights  - Standing reciprocal march 2x45" with 2# ankle weights  - Mini squat 2x10  - 4" step up and over with bilateral upper extremity support 2x45"  - 4" step up forward x45" each leg  - Session ended with brisk gym lap x250'    Not today:  - Modified single leg stance + red med ball chest press 2x15 each leg leading  - Reciprocal step taps to 4" step 2x45" without upper extremity support  - 6" hurdles forward/reciprocal step overs x 4 lengths x 8 feet each with single upper extremity support with 1.5 # ankle weights  - 6" hurdles lateral step overs x 4 lengths x 8 feet each with single upper extremity support with 1.5 # ankle weights    Patient Education and Home Exercises       Education provided:   - Plan of Care    Written Home Exercises Provided: Yes. Exercises were reviewed and Cliff was able to demonstrate them prior to the end of the session.  Cliff demonstrated good  understanding of the education provided. See Electronic Medical Record under Patient Instructions for exercises provided during therapy sessions    Assessment     Cliff arrived to session without complaints and agreeable to treatment. He was able to maximize standing time today with additional rest breaks in standing versus seated requested today. Occasional verbal cuing required for erect posture with pertinent standing activities. He is progressing well in terms of general activity tolerance and would benefit from goal reassessment at next visit to determine progress toward functional goals.    Cliff Is progressing well towards his goals.   Patient prognosis is Fair.     Patient will continue to benefit from skilled outpatient physical therapy to address the deficits listed in the problem list box on initial evaluation, provide pt/family education and to maximize pt's level of independence in the home and community environment.     Patient's spiritual, cultural and " educational needs considered and pt agreeable to plan of care and goals.     Anticipated barriers to physical therapy: Fall Risk, Age    Goals:   Short Term Goals: 4 weeks   Patient will be compliant with HEP in order to maximize PT benefits (met)  Patient will complete TUG in </= 16 seconds with least restrictive assistive device in order to reduce risk for falls and improve safety with functional mobility (progressing, not met)  Patient will perform 8 step taps within 20 seconds in order to reduce risk for falls and ambulate stairs safely (met)    Long Term Goals: 6 weeks   Patient will score >/= 50% on FOTO limitation survey in order to improve self-perception of functional mobility deficits (met)  Patient will increase walking speed to >0.8 m/s in order to improve bilateral lower extremity endurance and muscular power for transfers (progressing, not met)  Patient will perform bilateral single leg stance for >3 seconds in order to reduce risk for falls and improve safety with functional mobility (progressing, not met)  Patient will report 0 falls from initiation of PT management (not met - 1 fall at home since initiating PT)  Patient will begin some form of home/community fitness in order to sustain progress gained in PT (progressing, not met)    Plan     Reassess next    LEAH WHITNEY PT

## 2024-11-26 NOTE — PROGRESS NOTES
Subjective:       Patient ID: Cliff Magaña is a 89 y.o. male.    Chief Complaint: Ear Fullness and Cerumen Impaction    Ear Fullness   Episode onset: Last seen in July of this year for similar complaint. The problem occurs constantly. There has been no fever. The patient is experiencing no pain. Pertinent negatives include no coughing, diarrhea, ear discharge, headaches, hearing loss, neck pain, rash, rhinorrhea or vomiting. He has tried nothing for the symptoms. His past medical history is significant for hearing loss.     Review of Systems   Constitutional:  Negative for activity change, appetite change and fever.   HENT:  Negative for congestion, ear discharge, ear pain, hearing loss, nosebleeds, postnasal drip, rhinorrhea and sneezing.    Eyes:  Negative for redness and visual disturbance.   Respiratory:  Negative for apnea, cough, shortness of breath and wheezing.    Cardiovascular:  Negative for chest pain and palpitations.   Gastrointestinal:  Negative for diarrhea and vomiting.   Genitourinary:  Negative for difficulty urinating and frequency.   Musculoskeletal:  Negative for arthralgias, back pain, gait problem and neck pain.   Skin:  Negative for color change and rash.   Neurological:  Negative for dizziness, speech difficulty, weakness and headaches.   Hematological:  Negative for adenopathy. Does not bruise/bleed easily.   Psychiatric/Behavioral:  Negative for agitation and behavioral problems.        Objective:        Constitutional:   Vital signs are normal. He appears well-developed and well-nourished. He is active. Normal speech.      Head:  Normocephalic and atraumatic. Salivary glands normal.  Facial strength is normal.      Ears:    Right Ear: Decreased hearing is noted.   Left Ear: Decreased hearing is noted.   PROCEDURE NOTE:  Ceruminosis is noted in both EACs obscuring visualization of normal anatomic landmarks.  Wax was removed by manual debridement and suctioning utilizing the assistance of  binocular microscopy revealing EACs and TMs WNL. The patient tolerated this procedure without difficulty. The subjective decrease noted in hearing pre-cleaning was resolved post-cleaning.          Nose:  Nose normal including turbinates, nasal mucosa, sinuses and nasal septum.     Mouth/Throat  Oropharynx clear and moist without lesions or asymmetry and normal uvula midline.     Neck:  Neck normal without thyromegaly masses, asymmetry, normal tracheal structure, crepitus, and tenderness, thyroid normal, trachea normal, phonation normal and full range of motion with neck supple.     Psychiatric:   He has a normal mood and affect. His speech is normal and behavior is normal.     Neurological:   He has neurological normal, alert and oriented.     Skin:   No abrasions, lacerations, lesions, or rashes.       Assessment:       1. Ear fullness, bilateral    2. Impacted cerumen of both ears        Plan:       1. Hearing conservation strongly recommended.  2. Trial of amplification is not currently indicated.  3. Re-check of hearing after 89 years of age.  4. F/U with PCP as per schedule.

## 2024-11-27 ENCOUNTER — CLINICAL SUPPORT (OUTPATIENT)
Dept: REHABILITATION | Facility: HOSPITAL | Age: 89
End: 2024-11-27
Payer: MEDICARE

## 2024-11-27 ENCOUNTER — PATIENT MESSAGE (OUTPATIENT)
Dept: ADMINISTRATIVE | Facility: OTHER | Age: 89
End: 2024-11-27
Payer: MEDICARE

## 2024-11-27 DIAGNOSIS — R53.81 PHYSICAL DECONDITIONING: ICD-10-CM

## 2024-11-27 DIAGNOSIS — Z74.09 IMPAIRED FUNCTIONAL MOBILITY, BALANCE, GAIT, AND ENDURANCE: Primary | ICD-10-CM

## 2024-11-27 DIAGNOSIS — I48.19 PERSISTENT ATRIAL FIBRILLATION: Primary | Chronic | ICD-10-CM

## 2024-11-27 PROCEDURE — 97530 THERAPEUTIC ACTIVITIES: CPT | Mod: KX,PN

## 2024-11-27 PROCEDURE — 97110 THERAPEUTIC EXERCISES: CPT | Mod: KX,PN

## 2024-11-27 NOTE — PROGRESS NOTES
"[facility]   Physical Therapy Treatment Note / Discharge Summary     Name: Cliff Magaña  Clinic Number: [id number]    Therapy Diagnosis:   Encounter Diagnoses   Name Primary?    Impaired functional mobility, balance, gait, and endurance Yes    Physical deconditioning      Physician: Elizabeth Bland MD    Visit Date: 11/27/2024     Physician Orders: PT Eval and Treat   Medical Diagnosis from Referral: Risk for falls [Z91.81]   Evaluation Date: 9/20/2024  Authorization Period Expiration: 8/27/2025  Plan of Care Expiration: 11/29/2024  Progress Note Due: 11/29/2024  Date of Surgery: n/a  Visit # / Visits authorized: 12/20 + eval  FOTO: discharge FOTO completed     Precautions: Standard and Fall     Time In: 11:21 AM  Time Out: 12:00 PM  Total Billable Time: 39 minutes (1TE + 2TA) KX all moving forward    PTA Visit #: 0/5     Subjective     Patient reports: Agreeable to discharge today.  He was compliant with home exercise program.  Response to previous treatment: No adverse response  Functional change: See goals     Pain: 0/10  Location: None reported    Objective      Objective Measures updated at progress report unless specified.     Timed Up and Go: 15.5 seconds without AD    Self-Selected Walking Speed: 0.54 meters/second    Single Leg Stance: <3 seconds bilaterally    FOTO/Activities-specific Balance Confidence (ABC) Scale: 80.6%    Treatment     Cliff received the treatments listed below:      Therapeutic exercises to develop strength, endurance, ROM, flexibility, posture, and core stabilization for 16 minutes including:  - Recumbent stepper x 6 minutes level 5 single peak  - HEP review:   - Calf raises x15   - Toe raises x15   - Standing hip abduction x10 each    - Standing hip flexion marches x10 each   - Mini squat x10   - Tandem stance x15" each     Therapeutic activities to improve functional performance for 23 minutes, inluding:  - Reassessment (see above) and goal review   - " Patient/wife education on ACSM/CDC recommendations for weekly exercise including examples of various exercise domains     Patient Education and Home Exercises       Education provided:   - Plan of Care    Written Home Exercises Provided: Yes. Exercises were reviewed and Cliff was able to demonstrate them prior to the end of the session.  Cliff demonstrated good  understanding of the education provided. See Electronic Medical Record under Patient Instructions for exercises provided during therapy sessions    Assessment     Cliff arrived to session without complaints and agreeable to reassessment. He has progressed well toward functional goals, demonstrating improvements in realms of balance confidence, fall risk, and independence with regular exercise. Due to capacity for continued home exercise coupled with degree of goal achievement, he is appropriate for discharge at this time. He understands he can contact PT with any questions/concerns post-discharge.    Cliff Is progressing well towards his goals.   Patient prognosis is Fair.     Patient's spiritual, cultural and educational needs considered and pt agreeable to plan of care and goals.     Anticipated barriers to physical therapy: Fall Risk, Age    Goals:   Short Term Goals: 4 weeks   Patient will be compliant with HEP in order to maximize PT benefits (met)  Patient will complete TUG in </= 16 seconds with least restrictive assistive device in order to reduce risk for falls and improve safety with functional mobility (met)  Patient will perform 8 step taps within 20 seconds in order to reduce risk for falls and ambulate stairs safely (met)    Long Term Goals: 6 weeks   Patient will score >/= 50% on FOTO limitation survey in order to improve self-perception of functional mobility deficits (met)  Patient will increase walking speed to >0.8 m/s in order to improve bilateral lower extremity endurance and muscular power for transfers (progressing, not  met)  Patient will perform bilateral single leg stance for >3 seconds in order to reduce risk for falls and improve safety with functional mobility (progressing, not met)  Patient will report 0 falls from initiation of PT management (not met - 1 fall at home since initiating PT)  Patient will begin some form of home/community fitness in order to sustain progress gained in PT (met)    Plan     Discharge PT    LEAH WHITNEY, PT

## 2024-11-28 ENCOUNTER — PATIENT MESSAGE (OUTPATIENT)
Dept: ADMINISTRATIVE | Facility: OTHER | Age: 89
End: 2024-11-28
Payer: MEDICARE

## 2024-11-29 ENCOUNTER — PATIENT MESSAGE (OUTPATIENT)
Dept: ADMINISTRATIVE | Facility: OTHER | Age: 89
End: 2024-11-29
Payer: MEDICARE

## 2024-11-30 ENCOUNTER — PATIENT MESSAGE (OUTPATIENT)
Dept: ADMINISTRATIVE | Facility: OTHER | Age: 89
End: 2024-11-30
Payer: MEDICARE

## 2024-12-02 ENCOUNTER — PATIENT MESSAGE (OUTPATIENT)
Dept: ADMINISTRATIVE | Facility: OTHER | Age: 89
End: 2024-12-02
Payer: MEDICARE

## 2024-12-02 ENCOUNTER — PATIENT MESSAGE (OUTPATIENT)
Dept: OTOLARYNGOLOGY | Facility: CLINIC | Age: 89
End: 2024-12-02
Payer: MEDICARE

## 2024-12-02 ENCOUNTER — PATIENT MESSAGE (OUTPATIENT)
Dept: OTHER | Facility: OTHER | Age: 89
End: 2024-12-02
Payer: MEDICARE

## 2024-12-02 DIAGNOSIS — I48.19 PERSISTENT ATRIAL FIBRILLATION: Primary | Chronic | ICD-10-CM

## 2024-12-03 ENCOUNTER — PATIENT MESSAGE (OUTPATIENT)
Dept: INTERNAL MEDICINE | Facility: CLINIC | Age: 89
End: 2024-12-03
Payer: MEDICARE

## 2024-12-03 ENCOUNTER — PATIENT MESSAGE (OUTPATIENT)
Dept: ADMINISTRATIVE | Facility: OTHER | Age: 89
End: 2024-12-03
Payer: MEDICARE

## 2024-12-04 ENCOUNTER — PATIENT MESSAGE (OUTPATIENT)
Dept: ADMINISTRATIVE | Facility: OTHER | Age: 89
End: 2024-12-04
Payer: MEDICARE

## 2024-12-05 ENCOUNTER — PATIENT MESSAGE (OUTPATIENT)
Dept: ADMINISTRATIVE | Facility: OTHER | Age: 89
End: 2024-12-05
Payer: MEDICARE

## 2024-12-06 ENCOUNTER — PATIENT MESSAGE (OUTPATIENT)
Dept: ADMINISTRATIVE | Facility: OTHER | Age: 89
End: 2024-12-06
Payer: MEDICARE

## 2024-12-07 ENCOUNTER — PATIENT MESSAGE (OUTPATIENT)
Dept: ADMINISTRATIVE | Facility: OTHER | Age: 89
End: 2024-12-07
Payer: MEDICARE

## 2024-12-09 ENCOUNTER — PATIENT MESSAGE (OUTPATIENT)
Dept: ADMINISTRATIVE | Facility: OTHER | Age: 89
End: 2024-12-09
Payer: MEDICARE

## 2024-12-10 ENCOUNTER — PATIENT MESSAGE (OUTPATIENT)
Dept: ADMINISTRATIVE | Facility: OTHER | Age: 89
End: 2024-12-10
Payer: MEDICARE

## 2024-12-11 ENCOUNTER — PATIENT MESSAGE (OUTPATIENT)
Dept: ADMINISTRATIVE | Facility: OTHER | Age: 89
End: 2024-12-11
Payer: MEDICARE

## 2024-12-12 ENCOUNTER — PATIENT MESSAGE (OUTPATIENT)
Dept: ADMINISTRATIVE | Facility: OTHER | Age: 89
End: 2024-12-12
Payer: MEDICARE

## 2024-12-13 ENCOUNTER — PATIENT MESSAGE (OUTPATIENT)
Dept: ADMINISTRATIVE | Facility: OTHER | Age: 89
End: 2024-12-13
Payer: MEDICARE

## 2024-12-14 ENCOUNTER — PATIENT MESSAGE (OUTPATIENT)
Dept: ADMINISTRATIVE | Facility: OTHER | Age: 89
End: 2024-12-14
Payer: MEDICARE

## 2024-12-15 ENCOUNTER — PATIENT MESSAGE (OUTPATIENT)
Dept: ADMINISTRATIVE | Facility: OTHER | Age: 89
End: 2024-12-15
Payer: MEDICARE

## 2024-12-16 ENCOUNTER — PATIENT MESSAGE (OUTPATIENT)
Dept: ADMINISTRATIVE | Facility: OTHER | Age: 89
End: 2024-12-16
Payer: MEDICARE

## 2024-12-17 ENCOUNTER — PATIENT MESSAGE (OUTPATIENT)
Dept: ADMINISTRATIVE | Facility: OTHER | Age: 89
End: 2024-12-17
Payer: MEDICARE

## 2024-12-18 ENCOUNTER — PATIENT MESSAGE (OUTPATIENT)
Dept: ADMINISTRATIVE | Facility: OTHER | Age: 89
End: 2024-12-18
Payer: MEDICARE

## 2024-12-19 ENCOUNTER — PATIENT MESSAGE (OUTPATIENT)
Dept: ADMINISTRATIVE | Facility: OTHER | Age: 89
End: 2024-12-19
Payer: MEDICARE

## 2024-12-20 ENCOUNTER — PATIENT MESSAGE (OUTPATIENT)
Dept: ADMINISTRATIVE | Facility: OTHER | Age: 89
End: 2024-12-20
Payer: MEDICARE

## 2024-12-21 ENCOUNTER — PATIENT MESSAGE (OUTPATIENT)
Dept: ADMINISTRATIVE | Facility: OTHER | Age: 89
End: 2024-12-21
Payer: MEDICARE

## 2024-12-22 ENCOUNTER — PATIENT MESSAGE (OUTPATIENT)
Dept: ADMINISTRATIVE | Facility: OTHER | Age: 89
End: 2024-12-22
Payer: MEDICARE

## 2024-12-23 ENCOUNTER — LAB VISIT (OUTPATIENT)
Dept: LAB | Facility: HOSPITAL | Age: 89
End: 2024-12-23
Payer: MEDICARE

## 2024-12-23 ENCOUNTER — PATIENT MESSAGE (OUTPATIENT)
Dept: ADMINISTRATIVE | Facility: OTHER | Age: 89
End: 2024-12-23
Payer: MEDICARE

## 2024-12-23 DIAGNOSIS — E11.8 DM TYPE 2, CONTROLLED, WITH COMPLICATION: ICD-10-CM

## 2024-12-23 DIAGNOSIS — E78.5 HYPERLIPIDEMIA ASSOCIATED WITH TYPE 2 DIABETES MELLITUS: ICD-10-CM

## 2024-12-23 DIAGNOSIS — E11.69 HYPERLIPIDEMIA ASSOCIATED WITH TYPE 2 DIABETES MELLITUS: ICD-10-CM

## 2024-12-23 DIAGNOSIS — Z09 HOSPITAL DISCHARGE FOLLOW-UP: ICD-10-CM

## 2024-12-23 LAB
ALBUMIN SERPL BCP-MCNC: 3.6 G/DL (ref 3.5–5.2)
ALBUMIN SERPL BCP-MCNC: 3.6 G/DL (ref 3.5–5.2)
ALP SERPL-CCNC: 56 U/L (ref 40–150)
ALP SERPL-CCNC: 56 U/L (ref 40–150)
ALT SERPL W/O P-5'-P-CCNC: 21 U/L (ref 10–44)
ALT SERPL W/O P-5'-P-CCNC: 21 U/L (ref 10–44)
ANION GAP SERPL CALC-SCNC: 13 MMOL/L (ref 8–16)
ANION GAP SERPL CALC-SCNC: 13 MMOL/L (ref 8–16)
AST SERPL-CCNC: 31 U/L (ref 10–40)
AST SERPL-CCNC: 31 U/L (ref 10–40)
BILIRUB SERPL-MCNC: 0.8 MG/DL (ref 0.1–1)
BILIRUB SERPL-MCNC: 0.8 MG/DL (ref 0.1–1)
BUN SERPL-MCNC: 30 MG/DL (ref 8–23)
BUN SERPL-MCNC: 30 MG/DL (ref 8–23)
CALCIUM SERPL-MCNC: 9.9 MG/DL (ref 8.7–10.5)
CALCIUM SERPL-MCNC: 9.9 MG/DL (ref 8.7–10.5)
CHLORIDE SERPL-SCNC: 104 MMOL/L (ref 95–110)
CHLORIDE SERPL-SCNC: 104 MMOL/L (ref 95–110)
CHOLEST SERPL-MCNC: 166 MG/DL (ref 120–199)
CHOLEST/HDLC SERPL: 5.9 {RATIO} (ref 2–5)
CO2 SERPL-SCNC: 19 MMOL/L (ref 23–29)
CO2 SERPL-SCNC: 19 MMOL/L (ref 23–29)
CREAT SERPL-MCNC: 1.2 MG/DL (ref 0.5–1.4)
CREAT SERPL-MCNC: 1.2 MG/DL (ref 0.5–1.4)
EST. GFR  (NO RACE VARIABLE): 57.8 ML/MIN/1.73 M^2
EST. GFR  (NO RACE VARIABLE): 57.8 ML/MIN/1.73 M^2
ESTIMATED AVG GLUCOSE: 192 MG/DL (ref 68–131)
GLUCOSE SERPL-MCNC: 195 MG/DL (ref 70–110)
GLUCOSE SERPL-MCNC: 195 MG/DL (ref 70–110)
HBA1C MFR BLD: 8.3 % (ref 4–5.6)
HDLC SERPL-MCNC: 28 MG/DL (ref 40–75)
HDLC SERPL: 16.9 % (ref 20–50)
LDLC SERPL CALC-MCNC: 65.2 MG/DL (ref 63–159)
NONHDLC SERPL-MCNC: 138 MG/DL
POTASSIUM SERPL-SCNC: 4.6 MMOL/L (ref 3.5–5.1)
POTASSIUM SERPL-SCNC: 4.6 MMOL/L (ref 3.5–5.1)
PROT SERPL-MCNC: 7.5 G/DL (ref 6–8.4)
PROT SERPL-MCNC: 7.5 G/DL (ref 6–8.4)
SODIUM SERPL-SCNC: 136 MMOL/L (ref 136–145)
SODIUM SERPL-SCNC: 136 MMOL/L (ref 136–145)
TRIGL SERPL-MCNC: 364 MG/DL (ref 30–150)

## 2024-12-23 PROCEDURE — 80053 COMPREHEN METABOLIC PANEL: CPT | Performed by: NURSE PRACTITIONER

## 2024-12-23 PROCEDURE — 80061 LIPID PANEL: CPT | Performed by: NURSE PRACTITIONER

## 2024-12-23 PROCEDURE — 83036 HEMOGLOBIN GLYCOSYLATED A1C: CPT | Performed by: NURSE PRACTITIONER

## 2024-12-23 PROCEDURE — 36415 COLL VENOUS BLD VENIPUNCTURE: CPT | Mod: PO | Performed by: NURSE PRACTITIONER

## 2024-12-26 ENCOUNTER — PATIENT MESSAGE (OUTPATIENT)
Dept: ADMINISTRATIVE | Facility: OTHER | Age: 89
End: 2024-12-26
Payer: MEDICARE

## 2024-12-27 NOTE — PROGRESS NOTES
Cliff Magaña  1935        Subjective     Chief Complaint: Annual Exam      History of Present Illness:  Mr. Cliff Magaña is a 89 y.o. male who presents to clinic for annual. Follows with Dr. Munir Estrada.      Having loss of appetite with little taste after cancer treatment.  Started to take high calorie boosts (at least twice a day) to help with nutritional status which wife (at visit) noticed weight gain.  Denies chest pain, dyspnea, n/v/f/c, changes in bowel movements.  No recent falls.  Uses cane.  Uses rollator walker for long distance walks.  Better with movement s/p PT.      Review of Systems   Constitutional:  Negative for fever.   HENT:  Negative for sore throat.    Respiratory:  Negative for shortness of breath.    Cardiovascular:  Negative for chest pain, palpitations and leg swelling.   Gastrointestinal:  Negative for abdominal pain, blood in stool, constipation and diarrhea.   Genitourinary:  Negative for dysuria and hematuria.   Musculoskeletal:  Negative for falls.   Neurological:  Negative for dizziness.        PAST HISTORY:     Past Medical History:   Diagnosis Date    *Atrial fibrillation     Chronic kidney disease     Deep vein thrombosis     Hyperlipidemia     Hypertension     Metabolic syndrome     Type 2 diabetes mellitus, without long-term current use of insulin 12/13/2021       Past Surgical History:   Procedure Laterality Date    BIOPSY OF BLADDER N/A 4/12/2024    Procedure: BIOPSY, BLADDER;  Surgeon: Wellington Story MD;  Location: [address];  Service: Urology;  Laterality: N/A;    BLADDER FULGURATION N/A 4/12/2024    Procedure: FULGURATION, BLADDER;  Surgeon: Wellington Story MD;  Location: [address];  Service: Urology;  Laterality: N/A;    CATARACT EXTRACTION      CHOLECYSTECTOMY      CYSTOSCOPY N/A 11/16/2023    Procedure: CYSTOSCOPY;  Surgeon: Marcelino Moffett MD;  Location: [address];  Service: Urology;  Laterality: N/A;  90 minutes    DILATION OF URETHRA  N/A 2024    Procedure: DILATION, URETHRA;  Surgeon: Wellington Story MD;  Location: [facility] OR [address] [place] [address];  Service: Urology;  Laterality: N/A;    EYE SURGERY      INJECTION OF FACET JOINT Bilateral 2021    Procedure: FACET JOINT INJECTION BILATERAL L4/L5 DIRECT REFERRAL;  Surgeon: Philipp Iyer MD;  Location: [facility] PAIN MGT;  Service: Pain Management;  Laterality: Bilateral;  NEEDS CONSENT, ELIQUIS CLEARANCE IN CHART    RETROGRADE PYELOGRAPHY Bilateral 2023    Procedure: PYELOGRAM, RETROGRADE;  Surgeon: Marcelino Moffett MD;  Location: [facility] OR [address] [place] [address];  Service: Urology;  Laterality: Bilateral;  90 minutes    SKIN CANCER EXCISION      TONSILLECTOMY      TURBT (TRANSURETHRAL RESECTION OF BLADDER TUMOR) N/A 2023    Procedure: TURBT (TRANSURETHRAL RESECTION OF BLADDER TUMOR);  Surgeon: Marcelino Moffett MD;  Location: [facility] OR [address] [place] [address];  Service: Urology;  Laterality: N/A;  90 minutes    TURBT (TRANSURETHRAL RESECTION OF BLADDER TUMOR) N/A 2024    Procedure: TURBT (TRANSURETHRAL RESECTION OF BLADDER TUMOR);  Surgeon: Wellington Story MD;  Location: [facility] OR [address] [place] [address];  Service: Urology;  Laterality: N/A;       Family History   Problem Relation Name Age of Onset    Diabetes Maternal Aunt      Heart attack Neg Hx      Heart disease Neg Hx      Heart failure Neg Hx      Hyperlipidemia Neg Hx      Hypertension Neg Hx      Stroke Neg Hx         Social History     Socioeconomic History    Marital status:      Spouse name: Nereyda    Number of children: 4   Tobacco Use    Smoking status: Former     Current packs/day: 0.00     Average packs/day: 2.0 packs/day for 20.0 years (40.0 ttl pk-yrs)     Types: Cigarettes     Start date: 1963     Quit date: 1983     Years since quittin.6     Passive exposure: Never    Smokeless tobacco: Never   Substance and Sexual Activity    Alcohol use: Yes     Alcohol/week: 1.0 standard drink of alcohol     Types: 1 Standard drinks or equivalent per week      Comment: 3 drinks per week    Drug use: No    Sexual activity: Not Currently     Partners: Female     Social Drivers of Health     Financial Resource Strain: Low Risk  (12/28/2024)    Overall Financial Resource Strain (CARDIA)     Difficulty of Paying Living Expenses: Not hard at all   Food Insecurity: No Food Insecurity (12/28/2024)    Hunger Vital Sign     Worried About Running Out of Food in the Last Year: Never true     Ran Out of Food in the Last Year: Never true   Transportation Needs: No Transportation Needs (9/23/2024)    TRANSPORTATION NEEDS     Transportation : No   Physical Activity: Insufficiently Active (12/28/2024)    Exercise Vital Sign     Days of Exercise per Week: 4 days     Minutes of Exercise per Session: 20 min   Stress: No Stress Concern Present (12/28/2024)    [employer] [place] [employer] - Occupational Stress Questionnaire     Feeling of Stress : Only a little   Housing Stability: Low Risk  (12/28/2024)    Housing Stability Vital Sign     Unable to Pay for Housing in the Last Year: No     Homeless in the Last Year: No       MEDICATIONS & ALLERGIES:     Current Outpatient Medications on File Prior to Visit   Medication Sig    ELIQUIS 5 mg Tab Take 5 mg by mouth 2 (two) times daily.    hydrocortisone 2.5 % cream SMARTSIG:In Ear(s) 1-2 Times Daily PRN    acetaminophen (TYLENOL) 650 MG TbSR Take 650 mg by mouth 2 (two) times a day.    apixaban (ELIQUIS) 2.5 mg Tab Take 1 tablet (2.5 mg total) by mouth 2 (two) times daily.    cholecalciferol, vitamin D3, (VITAMIN D3) 50 mcg (2,000 unit) Cap capsule Take by mouth once daily.    empagliflozin (JARDIANCE) 10 mg tablet Take 1 tablet (10 mg total) by mouth once daily.    fenofibrate micronized (LOFIBRA) 200 MG Cap Take 1 capsule (200 mg total) by mouth daily with breakfast.    loratadine (CLARITIN) 10 mg tablet Take 10 mg by mouth once daily.    metoprolol succinate (TOPROL-XL) 100 MG 24 hr tablet TAKE 1 TABLET TWICE A DAY     "sacubitriL-valsartan (ENTRESTO) 24-26 mg per tablet Take 1 tablet by mouth 2 (two) times daily.    [DISCONTINUED] metFORMIN (GLUCOPHAGE-XR) 500 MG ER 24hr tablet Take 1 tablet (500 mg total) by mouth once daily.    [DISCONTINUED] metoprolol succinate (TOPROL-XL) 100 MG 24 hr tablet Take 1 tablet (100 mg total) by mouth 2 (two) times daily.     No current facility-administered medications on file prior to visit.       Review of patient's allergies indicates:   Allergen Reactions    Niacin      Other reaction(s): Rash  Other reaction(s): Itching       OBJECTIVE:     Vital Signs:  Vitals:    12/30/24 1028   BP: 118/60   BP Location: Right arm   Patient Position: Sitting   Pulse: 85   Resp: 16   Temp: 97.2 °F (36.2 °C)   TempSrc: Oral   SpO2: 98%   Weight: 61.6 kg (135 lb 12.9 oz)   Height: 5' 7" (1.702 m)       Body mass index is 21.27 kg/m².     Physical Exam:  Physical Exam  Vitals and nursing note reviewed.   Constitutional:       General: He is not in acute distress.     Appearance: He is not ill-appearing.   HENT:      Head: Normocephalic and atraumatic.      Mouth/Throat:      Mouth: Mucous membranes are moist.      Pharynx: Oropharynx is clear. No oropharyngeal exudate or posterior oropharyngeal erythema.   Eyes:      Extraocular Movements: Extraocular movements intact.      Conjunctiva/sclera: Conjunctivae normal.   Cardiovascular:      Rate and Rhythm: Normal rate and regular rhythm.   Pulmonary:      Effort: Pulmonary effort is normal. No respiratory distress.      Breath sounds: Normal breath sounds. No wheezing or rales.   Chest:      Chest wall: No tenderness.   Abdominal:      Palpations: Abdomen is soft.      Tenderness: There is no abdominal tenderness. There is no guarding.   Musculoskeletal:         General: Normal range of motion.      Cervical back: Normal range of motion and neck supple. No tenderness.      Right lower leg: No edema.      Left lower leg: No edema.   Lymphadenopathy:      Cervical: " "No cervical adenopathy.   Skin:     General: Skin is warm and dry.   Neurological:      Mental Status: He is alert and oriented to person, place, and time.            Laboratory  Lab Results   Component Value Date    WBC 8.54 10/30/2024    HGB 13.6 (L) 10/30/2024    HCT 41.2 10/30/2024     (H) 10/30/2024     10/30/2024     Lab Results   Component Value Date     (H) 12/23/2024     (H) 12/23/2024     12/23/2024     12/23/2024    K 4.6 12/23/2024    K 4.6 12/23/2024     12/23/2024     12/23/2024    CO2 19 (L) 12/23/2024    CO2 19 (L) 12/23/2024    BUN 30 (H) 12/23/2024    BUN 30 (H) 12/23/2024    CREATININE 1.2 12/23/2024    CREATININE 1.2 12/23/2024    CALCIUM 9.9 12/23/2024    CALCIUM 9.9 12/23/2024    MG 2.1 10/02/2024     Lab Results   Component Value Date    INR 2.8 12/17/2013    INR 2.4 10/22/2013    INR 2.7 08/28/2013     Lab Results   Component Value Date    HGBA1C 8.3 (H) 12/23/2024     No results for input(s): "POCTGLUCOSE" in the last 72 hours.      Health Maintenance         Date Due Completion Date    RSV Vaccine (Age 60+ and Pregnant patients) (1 - 1-dose 75+ series) Never done ---    COVID-19 Vaccine (5 - 2024-25 season) 09/01/2024 8/25/2022    TETANUS VACCINE 03/02/2025 3/2/2015    Hemoglobin A1c 03/23/2025 12/23/2024    Diabetes Urine Screening 08/14/2025 8/14/2024    Eye Exam 08/23/2025 8/23/2024    Lipid Panel 12/23/2025 12/23/2024            ASSESSMENT & PLAN:   89 y.o. male who was seen today in clinic for wellness visit    Type 2 diabetes mellitus with chronic kidney disease, without long-term current use of insulin, unspecified CKD stage  -     metFORMIN (GLUCOPHAGE-XR) 500 MG ER 24hr tablet; Take 1 tablet (500 mg total) by mouth 2 (two) times daily with meals.  Dispense: 90 tablet; Refill: 3  -     HEMOGLOBIN A1C; Future; Expected date: 03/30/2025    Hyperlipidemia associated with type 2 diabetes mellitus    Atherosclerosis of " aorta    Hypertension associated with diabetes    CKD stage 3 due to type 2 diabetes mellitus    Persistent atrial fibrillation    Chronic heart failure with preserved ejection fraction         1. Type 2 diabetes mellitus with chronic kidney disease, without long-term current use of insulin, unspecified CKD stage    2. Hyperlipidemia associated with type 2 diabetes mellitus    3. Atherosclerosis of aorta    4. Hypertension associated with diabetes    5. CKD stage 3 due to type 2 diabetes mellitus    6. Persistent atrial fibrillation    7. Chronic heart failure with preserved ejection fraction        Last A1c elevated from priors, 8.3 from 6.4.  Eye exam utd.  On metformin 500 qd, jardiance 10mg.  Recommend increasing metformin dose to 500mg bid.  Discussed importance of strict diabetic diet.  Consider digital medicine diabetes program vs diabetic [facility] referral if continuing to be elevated.  Repeat A1c in 3 months.  2/3.  Recent lipid panel with elevated TG, allergic to niacin.  Continue fenofibrate at max dose.  Follow up with cardiology and consideration of injectables.  Stable, continue home meds  4.  Stable, continue home meds  5.  Stable, continue to monitor and avoid NSAIDs, nephrotoxic agents as able  6.  Stable, continue home meds.  Planning Watchman with fluctuating renal function and high fall risk.  Follow up with cardiologist and EP.  7.  Stable, continue home meds          RTC in 3 months with PCP with labs 1 wk prior    Prakash Mark MD  [facility]

## 2024-12-30 ENCOUNTER — OFFICE VISIT (OUTPATIENT)
Dept: INTERNAL MEDICINE | Facility: CLINIC | Age: 89
End: 2024-12-30
Payer: MEDICARE

## 2024-12-30 VITALS
WEIGHT: 135.81 LBS | DIASTOLIC BLOOD PRESSURE: 60 MMHG | BODY MASS INDEX: 21.31 KG/M2 | HEART RATE: 85 BPM | OXYGEN SATURATION: 98 % | RESPIRATION RATE: 16 BRPM | SYSTOLIC BLOOD PRESSURE: 118 MMHG | TEMPERATURE: 97 F | HEIGHT: 67 IN

## 2024-12-30 DIAGNOSIS — E11.69 HYPERLIPIDEMIA ASSOCIATED WITH TYPE 2 DIABETES MELLITUS: ICD-10-CM

## 2024-12-30 DIAGNOSIS — I48.19 PERSISTENT ATRIAL FIBRILLATION: ICD-10-CM

## 2024-12-30 DIAGNOSIS — E78.5 HYPERLIPIDEMIA ASSOCIATED WITH TYPE 2 DIABETES MELLITUS: ICD-10-CM

## 2024-12-30 DIAGNOSIS — I50.32 CHRONIC HEART FAILURE WITH PRESERVED EJECTION FRACTION: ICD-10-CM

## 2024-12-30 DIAGNOSIS — I15.2 HYPERTENSION ASSOCIATED WITH DIABETES: ICD-10-CM

## 2024-12-30 DIAGNOSIS — N18.30 CKD STAGE 3 DUE TO TYPE 2 DIABETES MELLITUS: ICD-10-CM

## 2024-12-30 DIAGNOSIS — E11.22 CKD STAGE 3 DUE TO TYPE 2 DIABETES MELLITUS: ICD-10-CM

## 2024-12-30 DIAGNOSIS — E11.22 TYPE 2 DIABETES MELLITUS WITH CHRONIC KIDNEY DISEASE, WITHOUT LONG-TERM CURRENT USE OF INSULIN, UNSPECIFIED CKD STAGE: Primary | ICD-10-CM

## 2024-12-30 DIAGNOSIS — E11.59 HYPERTENSION ASSOCIATED WITH DIABETES: ICD-10-CM

## 2024-12-30 DIAGNOSIS — I70.0 ATHEROSCLEROSIS OF AORTA: ICD-10-CM

## 2024-12-30 PROCEDURE — 99214 OFFICE O/P EST MOD 30 MIN: CPT | Mod: S$PBB,,,

## 2024-12-30 PROCEDURE — 99999 PR PBB SHADOW E&M-EST. PATIENT-LVL IV: CPT | Mod: PBBFAC,,,

## 2024-12-30 PROCEDURE — 99214 OFFICE O/P EST MOD 30 MIN: CPT | Mod: PBBFAC,PO

## 2024-12-30 RX ORDER — METFORMIN HYDROCHLORIDE 500 MG/1
500 TABLET, EXTENDED RELEASE ORAL 2 TIMES DAILY WITH MEALS
Qty: 90 TABLET | Refills: 3 | Status: SHIPPED | OUTPATIENT
Start: 2024-12-30

## 2024-12-30 RX ORDER — APIXABAN 5 MG/1
5 TABLET, FILM COATED ORAL 2 TIMES DAILY
COMMUNITY
Start: 2024-10-28

## 2024-12-30 RX ORDER — HYDROCORTISONE 25 MG/G
CREAM TOPICAL
COMMUNITY
Start: 2024-12-17

## 2025-01-01 ENCOUNTER — PATIENT MESSAGE (OUTPATIENT)
Dept: ADMINISTRATIVE | Facility: OTHER | Age: OVER 89
End: 2025-01-01
Payer: MEDICARE

## 2025-01-03 ENCOUNTER — PATIENT MESSAGE (OUTPATIENT)
Dept: ADMINISTRATIVE | Facility: OTHER | Age: OVER 89
End: 2025-01-03
Payer: MEDICARE

## 2025-01-04 ENCOUNTER — PATIENT MESSAGE (OUTPATIENT)
Dept: ADMINISTRATIVE | Facility: OTHER | Age: OVER 89
End: 2025-01-04
Payer: MEDICARE

## 2025-01-06 ENCOUNTER — OFFICE VISIT (OUTPATIENT)
Dept: SURGERY | Facility: CLINIC | Age: OVER 89
End: 2025-01-06
Payer: MEDICARE

## 2025-01-06 VITALS
HEART RATE: 102 BPM | SYSTOLIC BLOOD PRESSURE: 124 MMHG | HEIGHT: 67 IN | DIASTOLIC BLOOD PRESSURE: 69 MMHG | WEIGHT: 137.81 LBS | BODY MASS INDEX: 21.63 KG/M2 | TEMPERATURE: 97 F

## 2025-01-06 DIAGNOSIS — L72.3 SEBACEOUS CYST: Primary | ICD-10-CM

## 2025-01-06 PROCEDURE — 99999 PR PBB SHADOW E&M-EST. PATIENT-LVL III: CPT | Mod: PBBFAC,,, | Performed by: STUDENT IN AN ORGANIZED HEALTH CARE EDUCATION/TRAINING PROGRAM

## 2025-01-06 PROCEDURE — 99213 OFFICE O/P EST LOW 20 MIN: CPT | Mod: S$PBB,,, | Performed by: STUDENT IN AN ORGANIZED HEALTH CARE EDUCATION/TRAINING PROGRAM

## 2025-01-06 PROCEDURE — 99213 OFFICE O/P EST LOW 20 MIN: CPT | Mod: PBBFAC,PN | Performed by: STUDENT IN AN ORGANIZED HEALTH CARE EDUCATION/TRAINING PROGRAM

## 2025-01-07 ENCOUNTER — PATIENT MESSAGE (OUTPATIENT)
Dept: ADMINISTRATIVE | Facility: OTHER | Age: OVER 89
End: 2025-01-07
Payer: MEDICARE

## 2025-01-07 ENCOUNTER — OFFICE VISIT (OUTPATIENT)
Dept: CARDIOLOGY | Facility: CLINIC | Age: OVER 89
End: 2025-01-07
Payer: MEDICARE

## 2025-01-07 VITALS
HEART RATE: 106 BPM | SYSTOLIC BLOOD PRESSURE: 112 MMHG | WEIGHT: 139.75 LBS | BODY MASS INDEX: 21.89 KG/M2 | DIASTOLIC BLOOD PRESSURE: 74 MMHG

## 2025-01-07 DIAGNOSIS — I70.0 ATHEROSCLEROSIS OF AORTA: ICD-10-CM

## 2025-01-07 DIAGNOSIS — I48.19 PERSISTENT ATRIAL FIBRILLATION: ICD-10-CM

## 2025-01-07 DIAGNOSIS — I50.32 CHRONIC HEART FAILURE WITH PRESERVED EJECTION FRACTION: ICD-10-CM

## 2025-01-07 PROCEDURE — 99999 PR PBB SHADOW E&M-EST. PATIENT-LVL III: CPT | Mod: PBBFAC,,, | Performed by: INTERNAL MEDICINE

## 2025-01-07 PROCEDURE — 99204 OFFICE O/P NEW MOD 45 MIN: CPT | Mod: S$PBB,,, | Performed by: INTERNAL MEDICINE

## 2025-01-07 PROCEDURE — 99213 OFFICE O/P EST LOW 20 MIN: CPT | Mod: PBBFAC | Performed by: INTERNAL MEDICINE

## 2025-01-07 NOTE — PROGRESS NOTES
Patient ID: Cliff Magaña is a 89 y.o. male.    Chief Complaint: No chief complaint on file.      HPI:  HPI  89M known to me from previous cyst excision on chest. That has done well. No issues.   Now with right upper back cyst with pain, swelling for about 2 weeks. Known cyst there for many years but never bothered him  Has been on abx with little benefit.  Stopped eliquis on his own about a week ago.     1/7/2024  Healing well s/p I&D  No issues  No pain  No drainage        Review of Systems   Constitutional:  Negative for chills, diaphoresis and fever.   HENT:  Negative for trouble swallowing.    Respiratory:  Negative for cough, shortness of breath, wheezing and stridor.    Cardiovascular:  Negative for chest pain and palpitations.   Gastrointestinal:  Negative for abdominal distention, abdominal pain, blood in stool, diarrhea, nausea and vomiting.   Endocrine: Negative for cold intolerance and heat intolerance.   Genitourinary:  Negative for difficulty urinating.   Musculoskeletal:  Negative for back pain.   Skin:  Negative for rash.   Allergic/Immunologic: Negative for immunocompromised state.   Neurological:  Negative for dizziness, syncope and numbness.   Hematological:  Negative for adenopathy.   Psychiatric/Behavioral:  Negative for agitation.        Current Outpatient Medications   Medication Sig Dispense Refill    metFORMIN (GLUCOPHAGE-XR) 500 MG ER 24hr tablet Take 1 tablet (500 mg total) by mouth 2 (two) times daily with meals. 90 tablet 3    acetaminophen (TYLENOL) 650 MG TbSR Take 650 mg by mouth 2 (two) times a day.      apixaban (ELIQUIS) 2.5 mg Tab Take 1 tablet (2.5 mg total) by mouth 2 (two) times daily. 180 tablet 3    cholecalciferol, vitamin D3, (VITAMIN D3) 50 mcg (2,000 unit) Cap capsule Take by mouth once daily.      empagliflozin (JARDIANCE) 10 mg tablet Take 1 tablet (10 mg total) by mouth once daily. 90 tablet 3    fenofibrate micronized (LOFIBRA) 200 MG Cap Take 1 capsule (200 mg  total) by mouth daily with breakfast. 90 capsule 3    hydrocortisone 2.5 % cream SMARTSIG:In Ear(s) 1-2 Times Daily PRN      loratadine (CLARITIN) 10 mg tablet Take 10 mg by mouth once daily.      metoprolol succinate (TOPROL-XL) 100 MG 24 hr tablet TAKE 1 TABLET TWICE A  tablet 1    sacubitriL-valsartan (ENTRESTO) 24-26 mg per tablet Take 1 tablet by mouth 2 (two) times daily. 180 tablet 3     No current facility-administered medications for this visit.       Review of patient's allergies indicates:   Allergen Reactions    Niacin      Other reaction(s): Rash  Other reaction(s): Itching       Past Medical History:   Diagnosis Date    *Atrial fibrillation     Chronic kidney disease     Deep vein thrombosis     Hyperlipidemia     Hypertension     Metabolic syndrome     Type 2 diabetes mellitus, without long-term current use of insulin 12/13/2021       Past Surgical History:   Procedure Laterality Date    BIOPSY OF BLADDER N/A 4/12/2024    Procedure: BIOPSY, BLADDER;  Surgeon: Wellington Story MD;  Location: [address];  Service: Urology;  Laterality: N/A;    BLADDER FULGURATION N/A 4/12/2024    Procedure: FULGURATION, BLADDER;  Surgeon: Wellington Story MD;  Location: [address];  Service: Urology;  Laterality: N/A;    CATARACT EXTRACTION      CHOLECYSTECTOMY      CYSTOSCOPY N/A 11/16/2023    Procedure: CYSTOSCOPY;  Surgeon: Marcelino Moffett MD;  Location: [address];  Service: Urology;  Laterality: N/A;  90 minutes    DILATION OF URETHRA N/A 4/12/2024    Procedure: DILATION, URETHRA;  Surgeon: Wellington Story MD;  Location: [address];  Service: Urology;  Laterality: N/A;    EYE SURGERY      INJECTION OF FACET JOINT Bilateral 4/28/2021    Procedure: FACET JOINT INJECTION BILATERAL L4/L5 DIRECT REFERRAL;  Surgeon: Philipp Iyer MD;  Location: [facility]IN [facility];  Service: Pain Management;  Laterality: Bilateral;  NEEDS CONSENT, ELIQUIS CLEARANCE IN CHART    RETROGRADE PYELOGRAPHY Bilateral  2023    Procedure: PYELOGRAM, RETROGRADE;  Surgeon: Marcelino Moffett MD;  Location: [facility] OR [facility];  Service: Urology;  Laterality: Bilateral;  90 minutes    SKIN CANCER EXCISION      TONSILLECTOMY      TURBT (TRANSURETHRAL RESECTION OF BLADDER TUMOR) N/A 2023    Procedure: TURBT (TRANSURETHRAL RESECTION OF BLADDER TUMOR);  Surgeon: Marcelino Moffett MD;  Location: [facility] OR 1ST FLR;  Service: Urology;  Laterality: N/A;  90 minutes    TURBT (TRANSURETHRAL RESECTION OF BLADDER TUMOR) N/A 2024    Procedure: TURBT (TRANSURETHRAL RESECTION OF BLADDER TUMOR);  Surgeon: Wellington Story MD;  Location: [facility] OR 1ST FLR;  Service: Urology;  Laterality: N/A;       Social History     Socioeconomic History    Marital status:      Spouse name: Nereyda    Number of children: 4   Tobacco Use    Smoking status: Former     Current packs/day: 0.00     Average packs/day: 2.0 packs/day for 20.0 years (40.0 ttl pk-yrs)     Types: Cigarettes     Start date: 1963     Quit date: 1983     Years since quittin.6     Passive exposure: Never    Smokeless tobacco: Never   Substance and Sexual Activity    Alcohol use: Yes     Alcohol/week: 1.0 standard drink of alcohol     Types: 1 Standard drinks or equivalent per week     Comment: 3 drinks per week    Drug use: No    Sexual activity: Not Currently     Partners: Female     Social Drivers of Health     Financial Resource Strain: Low Risk  (2024)    Overall Financial Resource Strain (CARDIA)     Difficulty of Paying Living Expenses: Not hard at all   Food Insecurity: No Food Insecurity (2024)    Hunger Vital Sign     Worried About Running Out of Food in the Last Year: Never true     Ran Out of Food in the Last Year: Never true   Transportation Needs: No Transportation Needs (2024)    TRANSPORTATION NEEDS     Transportation : No   Physical Activity: Insufficiently Active (2024)    Exercise Vital Sign     Days of Exercise per Week: 4  days     Minutes of Exercise per Session: 20 min   Stress: No Stress Concern Present (12/28/2024)    [employer] [place] [employer] - Occupational Stress Questionnaire     Feeling of Stress : Only a little   Housing Stability: Low Risk  (12/28/2024)    Housing Stability Vital Sign     Unable to Pay for Housing in the Last Year: No     Homeless in the Last Year: No       Vitals:    01/06/25 1030   BP: 124/69   Pulse: 102   Temp: 97 °F (36.1 °C)       Physical Exam  Constitutional:       General: He is not in acute distress.  HENT:      Head: Normocephalic and atraumatic.   Eyes:      General: No scleral icterus.  Cardiovascular:      Rate and Rhythm: Normal rate.   Pulmonary:      Effort: Pulmonary effort is normal. No respiratory distress.      Breath sounds: No stridor.   Abdominal:      Palpations: Abdomen is soft.      Tenderness: There is no abdominal tenderness.   Lymphadenopathy:      Cervical: No cervical adenopathy.   Skin:     General: Skin is warm.      Findings: No erythema.          Neurological:      Mental Status: He is alert and oriented to person, place, and time.   Psychiatric:         Behavior: Behavior normal.     Body mass index is 21.58 kg/m².      Assessment & Plan:  89M with sebaceous cyst  Discussed excision of scar vs obs  He prefers to leave it alone for now  Let us know if anything changes  RTC prn

## 2025-01-07 NOTE — PROGRESS NOTES
HISTORY:    89-year-old male with a history of atrial fibrillation, hypertension, hyperlipidemia, heart failure preserved ejection fraction, bladder cancer status post chemotherapy-surgery-XRT '23, SDH secondary to fall presenting for initial evaluation by me.    Patient comes in to establish cardiac care.  Previously followed by Dr. [place].     The patient denies any symptoms of chest pain, shortness of breath. No light headedness or dizziness.     Activity levels mild at this time. Uses a cane or wheelchair outside the house. Mainly due to debility post cancer treatment. Decreased appetite with weight loss. Strength actually improving over the last year. Does PT exercises at home.     Dx'd w afib 15 years ago. Had a fall in late '24 w SDH on apixiban. No h/o MI/CMP. H/o HFpEF.     Tolerates metoprolol 100 x 1, sacubitril-valsartan 24-26 x 2, empagliflozin 10 x 1, fenofibrate 200 x 1, apixaban 2.5 x 2.    PHYSICAL EXAM:    Vitals:    01/07/25 1047   BP: 112/74   Pulse: 106       NAD, A+Ox3.  No jvd, no bruit.  Irreg, irreg nml s1,s2. No murmurs.  CTA B no wheezes or crackles.  No edema.    LABS/STUDIES (imaging reviewed during clinic visit):    October 2024 hemoglobin 13.6/.  December creatinine 1.2/BUN 30/GFR 58.  Albumin 3.6.  /HDL 28/LDL 65/.  September .  December A1c 8.3.  August TSH normal.    ECG November 2024 demonstrates atrial fibrillation.  Poor R-wave progression.  TTE December 2023 normal LV size with an EF of 45-50% in atrial fibrillation.  Moderate posterior MAC with mild MR.  CVP 3.    NST 2022 no evidence of ischemia or scar.      ASSESSMENT & PLAN:    1. Persistent atrial fibrillation    2. Atherosclerosis of aorta    3. Chronic heart failure with preserved ejection fraction              Patient with permanent atrial fibrillation.  Rate controlled on metoprolol 100 x 1.  Tolerating apixaban 2.5 x 2, however, patient with a history of fall and SDH.  Following with   Yohannes with plan for Watchman, which I agree with.    History of heart failure preserved ejection fraction with good volume status at this time on metoprolol 100 x 1, sacubitril-valsartan 24-26 x 2, empagliflozin 10 x 1. Actually a bit dry.     Blood pressure is controlled on current regimen.    Patient with LDL of 65 on fenofibrate.  History of intolerance to niacin.    [id number] score: 5  HAS-BLED score: 4    If you answer NO to any of the four criteria below, the patient does not meet the WATCHMAN implant eligibility requirements.     Patient has non-valvular atrial fibrillation: Yes  Patient has an increased risk for stroke and is recommended for oral anticoagulation (OAC): Yes  Patient is suitable for short-term anticoagulation therapy but deemed unable to take long-term OAC: Yes  Patient has an appropriate rationale to seek a non-pharmacological alternative to OACs. Specific factors include:  Increase risk of bleeding and history of fall with subdural hemorrhage.    Follow up in about 9 months (around 10/7/2025).      Lili Lara MD

## 2025-01-08 ENCOUNTER — PATIENT MESSAGE (OUTPATIENT)
Dept: ADMINISTRATIVE | Facility: OTHER | Age: OVER 89
End: 2025-01-08
Payer: MEDICARE

## 2025-01-09 ENCOUNTER — PATIENT MESSAGE (OUTPATIENT)
Dept: ADMINISTRATIVE | Facility: OTHER | Age: OVER 89
End: 2025-01-09
Payer: MEDICARE

## 2025-01-10 ENCOUNTER — PATIENT MESSAGE (OUTPATIENT)
Dept: ADMINISTRATIVE | Facility: OTHER | Age: OVER 89
End: 2025-01-10
Payer: MEDICARE

## 2025-01-10 ENCOUNTER — PATIENT MESSAGE (OUTPATIENT)
Dept: ELECTROPHYSIOLOGY | Facility: CLINIC | Age: OVER 89
End: 2025-01-10
Payer: MEDICARE

## 2025-01-11 ENCOUNTER — PATIENT MESSAGE (OUTPATIENT)
Dept: ADMINISTRATIVE | Facility: OTHER | Age: OVER 89
End: 2025-01-11
Payer: MEDICARE

## 2025-01-13 ENCOUNTER — PATIENT MESSAGE (OUTPATIENT)
Dept: ADMINISTRATIVE | Facility: OTHER | Age: OVER 89
End: 2025-01-13
Payer: MEDICARE

## 2025-01-13 DIAGNOSIS — Z00.00 ENCOUNTER FOR MEDICARE ANNUAL WELLNESS EXAM: ICD-10-CM

## 2025-01-14 ENCOUNTER — PATIENT MESSAGE (OUTPATIENT)
Dept: ADMINISTRATIVE | Facility: OTHER | Age: OVER 89
End: 2025-01-14
Payer: MEDICARE

## 2025-01-15 ENCOUNTER — PATIENT MESSAGE (OUTPATIENT)
Dept: ADMINISTRATIVE | Facility: OTHER | Age: OVER 89
End: 2025-01-15
Payer: MEDICARE

## 2025-01-16 ENCOUNTER — TELEPHONE (OUTPATIENT)
Dept: GASTROENTEROLOGY | Facility: CLINIC | Age: OVER 89
End: 2025-01-16
Payer: MEDICARE

## 2025-01-16 ENCOUNTER — PATIENT MESSAGE (OUTPATIENT)
Dept: ADMINISTRATIVE | Facility: OTHER | Age: OVER 89
End: 2025-01-16
Payer: MEDICARE

## 2025-01-17 ENCOUNTER — PATIENT MESSAGE (OUTPATIENT)
Dept: ADMINISTRATIVE | Facility: OTHER | Age: OVER 89
End: 2025-01-17
Payer: MEDICARE

## 2025-01-17 ENCOUNTER — OFFICE VISIT (OUTPATIENT)
Dept: GASTROENTEROLOGY | Facility: CLINIC | Age: OVER 89
End: 2025-01-17
Payer: MEDICARE

## 2025-01-17 ENCOUNTER — TELEPHONE (OUTPATIENT)
Dept: GASTROENTEROLOGY | Facility: CLINIC | Age: OVER 89
End: 2025-01-17

## 2025-01-17 VITALS
DIASTOLIC BLOOD PRESSURE: 65 MMHG | WEIGHT: 138.44 LBS | BODY MASS INDEX: 21.68 KG/M2 | SYSTOLIC BLOOD PRESSURE: 109 MMHG | HEART RATE: 83 BPM

## 2025-01-17 DIAGNOSIS — R13.10 DYSPHAGIA, UNSPECIFIED TYPE: Primary | ICD-10-CM

## 2025-01-17 DIAGNOSIS — R15.9 INCONTINENCE OF FECES, UNSPECIFIED FECAL INCONTINENCE TYPE: ICD-10-CM

## 2025-01-17 PROCEDURE — 99213 OFFICE O/P EST LOW 20 MIN: CPT | Mod: PBBFAC | Performed by: STUDENT IN AN ORGANIZED HEALTH CARE EDUCATION/TRAINING PROGRAM

## 2025-01-17 PROCEDURE — 99204 OFFICE O/P NEW MOD 45 MIN: CPT | Mod: S$PBB,,, | Performed by: STUDENT IN AN ORGANIZED HEALTH CARE EDUCATION/TRAINING PROGRAM

## 2025-01-17 PROCEDURE — 99999 PR PBB SHADOW E&M-EST. PATIENT-LVL III: CPT | Mod: PBBFAC,,, | Performed by: STUDENT IN AN ORGANIZED HEALTH CARE EDUCATION/TRAINING PROGRAM

## 2025-01-17 NOTE — PROGRESS NOTES
[facility] Consultation Note    Reason for Consult:  The primary encounter diagnosis was Dysphagia, unspecified type. A diagnosis of Incontinence of feces, unspecified fecal incontinence type was also pertinent to this visit.    PCP:   Munir Estrada   [address] [facility] [address]    Referring MD:  Afsaneh Thrasher, Gavin  [address] [place] [address]    HPI:  This is a 89 y.o. male here for evaluation of dysphagia.    Today, he informs me that began shortly after his chemotherapy concluded in January 2024.  He has a history of bladder cancer for which he was treated with chemo and radiation.  He notes trouble swallowing solids, but no difficulty with liquids.  He would drink liquids to help his food pass.     He also informs me that when he eats, he experiences a runny nose.      His dysphagia is slowly improving over the last 12 months.  He is able to tolerate a broader variety of foods.  He still tries to eat a smaller portion, but can tolerate more foods.      He denies any significant dysphagia.    He was seen by speech pathology and had a MBS.  This was notable for delayed initiation of the pharyngeal phase and flash penetration.  There note comments on oropharyngeal dysphagia with a weak/delayed swallow.    Additionally, he has intermittent fecal incontinence.  This occurs roughly once weekly in the AM.    Last colonoscopy was in May 2014 and normal.      Objective Findings:    Vital Signs:  /65   Pulse 83   Wt 62.8 kg (138 lb 7.2 oz)   BMI 21.68 kg/m²   Body mass index is 21.68 kg/m².    Physical Exam:  General Appearance: Well appearing in no acute distress        Assessment:  1. Dysphagia, unspecified type    2. Incontinence of feces, unspecified fecal incontinence type      The patient is 89-year-old man who presents to GI clinic in the setting of intermittent dysphagia to solids.    The patient has intermittent dysphagia to solid  foods that is improving over the course of last few weeks.  I suspect his dysphagia is multifactorial and largely driven by a motility abnormality.  He was evaluated by speech pathology and noted to have oropharyngeal dysphagia.  No clear esophageal abnormality was noted on his modified barium swallow, but I do think it be reasonable to obtain a complete esophagram.  I will order this today.    We did discuss the role of an EGD, but given his age and multiple comorbidities, he and his wife would prefer a more conservative approach.    Regarding his incontinence, he will begin a daily fiber supplement.      Follow up in about 3 months (around 4/17/2025).      Order summary:  Orders Placed This Encounter    FL Esophagram Complete         Thank you so much for allowing me to participate in the care of Cliff Torres MD

## 2025-01-18 ENCOUNTER — PATIENT MESSAGE (OUTPATIENT)
Dept: ADMINISTRATIVE | Facility: OTHER | Age: OVER 89
End: 2025-01-18
Payer: MEDICARE

## 2025-01-19 ENCOUNTER — PATIENT MESSAGE (OUTPATIENT)
Dept: ADMINISTRATIVE | Facility: OTHER | Age: OVER 89
End: 2025-01-19
Payer: MEDICARE

## 2025-01-20 ENCOUNTER — PATIENT MESSAGE (OUTPATIENT)
Dept: ADMINISTRATIVE | Facility: OTHER | Age: OVER 89
End: 2025-01-20
Payer: MEDICARE

## 2025-01-21 ENCOUNTER — PATIENT MESSAGE (OUTPATIENT)
Dept: ADMINISTRATIVE | Facility: OTHER | Age: OVER 89
End: 2025-01-21
Payer: MEDICARE

## 2025-01-22 ENCOUNTER — PATIENT MESSAGE (OUTPATIENT)
Dept: UROLOGY | Facility: CLINIC | Age: OVER 89
End: 2025-01-22
Payer: MEDICARE

## 2025-01-23 ENCOUNTER — PATIENT MESSAGE (OUTPATIENT)
Dept: ADMINISTRATIVE | Facility: OTHER | Age: OVER 89
End: 2025-01-23
Payer: MEDICARE

## 2025-01-24 ENCOUNTER — PATIENT MESSAGE (OUTPATIENT)
Dept: ADMINISTRATIVE | Facility: OTHER | Age: OVER 89
End: 2025-01-24
Payer: MEDICARE

## 2025-01-24 ENCOUNTER — TELEPHONE (OUTPATIENT)
Dept: UROLOGY | Facility: CLINIC | Age: OVER 89
End: 2025-01-24
Payer: MEDICARE

## 2025-01-24 NOTE — TELEPHONE ENCOUNTER
Spoke with pt  & his wife re: confirming procedure appt for tomorrow.   Discussed location & arrival time for 12:45.   Pt verbalized understanding

## 2025-01-25 ENCOUNTER — PATIENT MESSAGE (OUTPATIENT)
Dept: ADMINISTRATIVE | Facility: OTHER | Age: OVER 89
End: 2025-01-25
Payer: MEDICARE

## 2025-01-26 ENCOUNTER — PATIENT MESSAGE (OUTPATIENT)
Dept: ADMINISTRATIVE | Facility: OTHER | Age: OVER 89
End: 2025-01-26
Payer: MEDICARE

## 2025-01-27 ENCOUNTER — PROCEDURE VISIT (OUTPATIENT)
Dept: UROLOGY | Facility: CLINIC | Age: OVER 89
End: 2025-01-27
Payer: MEDICARE

## 2025-01-27 ENCOUNTER — PATIENT MESSAGE (OUTPATIENT)
Dept: ADMINISTRATIVE | Facility: OTHER | Age: OVER 89
End: 2025-01-27
Payer: MEDICARE

## 2025-01-27 VITALS
RESPIRATION RATE: 17 BRPM | WEIGHT: 136.88 LBS | HEART RATE: 81 BPM | BODY MASS INDEX: 21.44 KG/M2 | TEMPERATURE: 97 F | SYSTOLIC BLOOD PRESSURE: 102 MMHG | DIASTOLIC BLOOD PRESSURE: 63 MMHG

## 2025-01-27 DIAGNOSIS — C67.0 MALIGNANT NEOPLASM OF TRIGONE OF URINARY BLADDER: ICD-10-CM

## 2025-01-27 DIAGNOSIS — N32.89 BLADDER MASS: ICD-10-CM

## 2025-01-27 DIAGNOSIS — Z85.51 HX OF BLADDER CANCER: Primary | ICD-10-CM

## 2025-01-27 PROCEDURE — 88112 CYTOPATH CELL ENHANCE TECH: CPT | Performed by: PATHOLOGY

## 2025-01-27 PROCEDURE — 52000 CYSTOURETHROSCOPY: CPT | Mod: PBBFAC | Performed by: UROLOGY

## 2025-01-27 RX ORDER — LIDOCAINE HYDROCHLORIDE 20 MG/ML
JELLY TOPICAL
Status: COMPLETED | OUTPATIENT
Start: 2025-01-27 | End: 2025-01-27

## 2025-01-27 RX ORDER — BETAMETHASONE VALERATE 1 MG/G
CREAM TOPICAL 2 TIMES DAILY
Qty: 45 G | Refills: 0 | Status: SHIPPED | OUTPATIENT
Start: 2025-01-27 | End: 2025-02-06

## 2025-01-27 RX ADMIN — LIDOCAINE HYDROCHLORIDE: 20 JELLY TOPICAL at 01:01

## 2025-01-27 NOTE — PROCEDURES
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  He received 5 of 6 weeks of cisplatin therapy  Surveillance cystoscopy and CT urogram on 03/04/2024 showing residual tumor at the bladder dome  Underwent TURBT on 4/12/24 and the path was negative for malignancy     Informed consent:  The risks, benefits, complications, treatment options, and expected outcomes were discussed with the patient. The patient concurred with the proposed plan and provided informed consent.     Anesthesia: Lidocaine jelly 2%     Procedure:  The patient was placed in the lithotomy position, was prepped and draped in the usual manner using sterile technique, and 2% lidocaine jelly instilled into the urethra.  A procedural timeout was performed identifying the patient, all in attendance were in agreement, including the patient. A 17 F flexible cystoscope was then inserted into the urethra and the urethra and bladder carefully examined.  The following findings were noted:     Findings:   1. Normal anterior urethra without evidence of strictures or tumors   2. Prostatic urethra open without signs of obstruction  3. Bladder with healing tissue and fibrinous material at the dome, no evidence for residual malignancy.  Ureteral orifices in orthotopic position bilaterally      Specimens: None   Complications: None; patient tolerated the procedure well          Disposition: Home after brief observation   Condition: Stable     Assessment: 88M with MIBC s/p chemorads and repeat TURBT without current endoscopic evidence for  recurrence    Plan: Follow up in 3 months with repeat cysto/cytology  -CT AP, CXR now; another in 3 months          Attending Attestation:      I personally performed the procedure.     Wellington Story  1:49 PM  01/27/2025

## 2025-01-27 NOTE — PATIENT INSTRUCTIONS
What to Expect After a Cystoscopy  For the next 24-48 hours, you may feel a mild burning when you urinate. This burning is normal and expected. Drink plenty of water to dilute the urine to help relieve the burning sensation. You may also see a small amount of blood in your urine after the procedure.    Unless you are already taking antibiotics, you may be given an antibiotic after the test to prevent infection.    Signs and Symptoms to Report  Call the [facility] at [phone] if you develop any of the following:  Fever of 101 degrees or higher  Chills or persistent bleeding  Inability to urinate

## 2025-01-28 ENCOUNTER — PATIENT MESSAGE (OUTPATIENT)
Dept: ADMINISTRATIVE | Facility: OTHER | Age: OVER 89
End: 2025-01-28
Payer: MEDICARE

## 2025-01-28 ENCOUNTER — PATIENT MESSAGE (OUTPATIENT)
Dept: UROLOGY | Facility: CLINIC | Age: OVER 89
End: 2025-01-28
Payer: MEDICARE

## 2025-01-29 ENCOUNTER — PATIENT MESSAGE (OUTPATIENT)
Dept: ADMINISTRATIVE | Facility: OTHER | Age: OVER 89
End: 2025-01-29
Payer: MEDICARE

## 2025-01-29 ENCOUNTER — HOSPITAL ENCOUNTER (OUTPATIENT)
Dept: RADIOLOGY | Facility: HOSPITAL | Age: OVER 89
Discharge: HOME | End: 2025-01-29
Attending: UROLOGY
Payer: MEDICARE

## 2025-01-29 DIAGNOSIS — C67.0 MALIGNANT NEOPLASM OF TRIGONE OF URINARY BLADDER: ICD-10-CM

## 2025-01-29 DIAGNOSIS — N32.89 BLADDER MASS: ICD-10-CM

## 2025-01-29 LAB
FINAL PATHOLOGIC DIAGNOSIS: NORMAL
Lab: NORMAL

## 2025-01-29 PROCEDURE — 25500020 PHARM REV CODE 255: Performed by: UROLOGY

## 2025-01-29 PROCEDURE — 71046 X-RAY EXAM CHEST 2 VIEWS: CPT | Mod: 26,,, | Performed by: INTERNAL MEDICINE

## 2025-01-29 PROCEDURE — 74178 CT ABD&PLV WO CNTR FLWD CNTR: CPT | Mod: 26,,, | Performed by: INTERNAL MEDICINE

## 2025-01-29 PROCEDURE — 74178 CT ABD&PLV WO CNTR FLWD CNTR: CPT | Mod: TC

## 2025-01-29 PROCEDURE — 71046 X-RAY EXAM CHEST 2 VIEWS: CPT | Mod: TC,FY

## 2025-01-29 RX ADMIN — IOHEXOL 150 ML: 350 INJECTION, SOLUTION INTRAVENOUS at 01:01

## 2025-01-30 ENCOUNTER — TELEPHONE (OUTPATIENT)
Dept: ELECTROPHYSIOLOGY | Facility: CLINIC | Age: OVER 89
End: 2025-01-30
Payer: MEDICARE

## 2025-01-30 ENCOUNTER — PATIENT MESSAGE (OUTPATIENT)
Dept: ADMINISTRATIVE | Facility: OTHER | Age: OVER 89
End: 2025-01-30
Payer: MEDICARE

## 2025-01-30 NOTE — TELEPHONE ENCOUNTER
Spoke with spouse to offer sooner date, reports pt has a redness to groin that he is being treated for that was started earlier this week, informed spouse he should continue to the medication and to update us next week if the area is improved or not

## 2025-02-04 ENCOUNTER — OFFICE VISIT (OUTPATIENT)
Dept: PODIATRY | Facility: CLINIC | Age: OVER 89
End: 2025-02-04
Payer: MEDICARE

## 2025-02-04 VITALS — SYSTOLIC BLOOD PRESSURE: 114 MMHG | HEART RATE: 83 BPM | DIASTOLIC BLOOD PRESSURE: 67 MMHG

## 2025-02-04 DIAGNOSIS — B35.1 ONYCHOMYCOSIS: ICD-10-CM

## 2025-02-04 DIAGNOSIS — E11.51 TYPE 2 DIABETES MELLITUS WITH PERIPHERAL VASCULAR DISEASE: Primary | ICD-10-CM

## 2025-02-04 DIAGNOSIS — E11.49 TYPE 2 DIABETES MELLITUS WITH NEUROLOGICAL MANIFESTATIONS: ICD-10-CM

## 2025-02-04 PROCEDURE — 99213 OFFICE O/P EST LOW 20 MIN: CPT | Mod: PBBFAC,PN | Performed by: PODIATRIST

## 2025-02-04 PROCEDURE — 11721 DEBRIDE NAIL 6 OR MORE: CPT | Mod: PBBFAC,PN | Performed by: PODIATRIST

## 2025-02-04 PROCEDURE — 99213 OFFICE O/P EST LOW 20 MIN: CPT | Mod: 25,S$PBB,, | Performed by: PODIATRIST

## 2025-02-04 PROCEDURE — 99999 PR PBB SHADOW E&M-EST. PATIENT-LVL III: CPT | Mod: PBBFAC,,, | Performed by: PODIATRIST

## 2025-02-04 NOTE — PROCEDURES
"Routine Foot Care    Date/Time: 2/4/2025 1:00 PM    Performed by: Bhupendra Brar DPM  Authorized by: Bhupendra Brar DPM    Time out: Immediately prior to procedure a "time out" was called to verify the correct patient, procedure, equipment, support staff and site/side marked as required.    Consent Done?:  Yes (Verbal)  Hyperkeratotic Skin Lesions?: No      Nail Care Type:  Debride  Location(s): All  (Left 1st Toe, Left 3rd Toe, Left 2nd Toe, Left 4th Toe, Left 5th Toe, Right 1st Toe, Right 2nd Toe, Right 3rd Toe, Right 4th Toe and Right 5th Toe)  Patient tolerance:  Patient tolerated the procedure well with no immediate complications     Used sterile nail nipper.        "

## 2025-02-04 NOTE — PROGRESS NOTES
Subjective:      Patient ID: Cliff Magaña is a 89 y.o. male.    Chief Complaint: Diabetes Mellitus (PCP Dr. Mark, 12/30/24), Diabetic Foot Exam, and Routine Foot Care      Cliff is a 89 y.o. male who presents to the clinic upon referral from Dr. Estrada  for evaluation and treatment of diabetic feet. Cliff has a past medical history of *Atrial fibrillation, Chronic kidney disease, Deep vein thrombosis, Hyperlipidemia, Hypertension, Metabolic syndrome, and Type 2 diabetes mellitus, without long-term current use of insulin (12/13/2021). Patient relates no major problem with feet. Only complaints today consist of thickened toenails that he has difficulty trimming.  He will sometimes get a pedicure.  He also relates he gets intermittent cramping to both legs at night.  History of chronic low back pain with right lower extremity symptoms.    04/29/2022: Returns for routine nail care. No new concerns.     07/29/2022: Returns for routine nail care. No new concerns.     11/17/2022:  Returns routine foot care.  No new concerns.    02/16/2023:  Returns for routine foot care.  Due for annual foot exam.  Complains of intermittent cramping to the left foot that is brief in duration.  Denies any claudication symptoms. Followed by Cardiology.     05/18/23: Returns for routine foot care.  No new concerns.  Accompanied by his wife.    08/17/2023: Returns for routine foot care.  No new concerns.    11/09/2023:  Returns for routine foot care.  Accompanied by his wife.  No new concerns.    02/09/2024:  Returns for annual foot exam and routine foot care.  Accompanied by his wife.  Patient has had significant weight loss secondary to loss of appetite post chemotherapy and radiation therapy for bladder cancer.  Recently admitted for UTI and discharged.    05/10/2024: Returns for routine foot care.  No new concerns.    08/12/2024: Returns for routine foot care.  Accompanied by his wife.  New wound discovered during the exam today  that was not known per his wife.  No pain noted.    11/07/2024: Returns for routine foot care.  Accompanied by his wife.  No new concerns.    02/04/2025: Returns for routine foot care.  Accompanied by his wife.  Pending cardiac ablation next week.  Due for annual foot exam today.  No new concerns noted.    PCP: Munir Estrada MD Andrew Schroth MD on 12/13/2024  Date Last Seen by PCP:  03/07/2024    Current shoe gear: Casual shoes    Hemoglobin A1C   Date Value Ref Range Status   12/23/2024 8.3 (H) 4.0 - 5.6 % Final     Comment:     ADA Screening Guidelines:  5.7-6.4%  Consistent with prediabetes  >or=6.5%  Consistent with diabetes    High levels of fetal hemoglobin interfere with the HbA1C  assay. Heterozygous hemoglobin variants (HbS, HgC, etc)do  not significantly interfere with this assay.   However, presence of multiple variants may affect accuracy.     09/22/2024 6.4 (H) 4.0 - 5.6 % Final     Comment:     ADA Screening Guidelines:  5.7-6.4%  Consistent with prediabetes  >or=6.5%  Consistent with diabetes    High levels of fetal hemoglobin interfere with the HbA1C  assay. Heterozygous hemoglobin variants (HbS, HgC, etc)do  not significantly interfere with this assay.   However, presence of multiple variants may affect accuracy.     07/01/2024 8.0 (H) 4.0 - 5.6 % Final     Comment:     ADA Screening Guidelines:  5.7-6.4%  Consistent with prediabetes  >or=6.5%  Consistent with diabetes    High levels of fetal hemoglobin interfere with the HbA1C  assay. Heterozygous hemoglobin variants (HbS, HgC, etc)do  not significantly interfere with this assay.   However, presence of multiple variants may affect accuracy.       Vitals:    02/04/25 1259   BP: 114/67   Pulse: 83   PainSc: 0-No pain      Past Medical History:   Diagnosis Date    *Atrial fibrillation     Chronic kidney disease     Deep vein thrombosis     Hyperlipidemia     Hypertension     Metabolic syndrome     Type 2 diabetes mellitus, without long-term  current use of insulin 12/13/2021       Past Surgical History:   Procedure Laterality Date    BIOPSY OF BLADDER N/A 4/12/2024    Procedure: BIOPSY, BLADDER;  Surgeon: Wellington Story MD;  Location: [address];  Service: Urology;  Laterality: N/A;    BLADDER FULGURATION N/A 4/12/2024    Procedure: FULGURATION, BLADDER;  Surgeon: Wellington Story MD;  Location: [address];  Service: Urology;  Laterality: N/A;    CATARACT EXTRACTION      CHOLECYSTECTOMY      CYSTOSCOPY N/A 11/16/2023    Procedure: CYSTOSCOPY;  Surgeon: Marcelino Moffett MD;  Location: [address];  Service: Urology;  Laterality: N/A;  90 minutes    DILATION OF URETHRA N/A 4/12/2024    Procedure: DILATION, URETHRA;  Surgeon: Wellington Story MD;  Location: [address];  Service: Urology;  Laterality: N/A;    EYE SURGERY      INJECTION OF FACET JOINT Bilateral 4/28/2021    Procedure: FACET JOINT INJECTION BILATERAL L4/L5 DIRECT REFERRAL;  Surgeon: Philipp Iyer MD;  Location: [facility];  Service: Pain Management;  Laterality: Bilateral;  NEEDS CONSENT, ELIQUIS CLEARANCE IN CHART    RETROGRADE PYELOGRAPHY Bilateral 11/16/2023    Procedure: PYELOGRAM, RETROGRADE;  Surgeon: Marcelino Moffett MD;  Location: [address];  Service: Urology;  Laterality: Bilateral;  90 minutes    SKIN CANCER EXCISION      TONSILLECTOMY      TURBT (TRANSURETHRAL RESECTION OF BLADDER TUMOR) N/A 11/16/2023    Procedure: TURBT (TRANSURETHRAL RESECTION OF BLADDER TUMOR);  Surgeon: Marcelino Moffett MD;  Location: [address];  Service: Urology;  Laterality: N/A;  90 minutes    TURBT (TRANSURETHRAL RESECTION OF BLADDER TUMOR) N/A 4/12/2024    Procedure: TURBT (TRANSURETHRAL RESECTION OF BLADDER TUMOR);  Surgeon: Wellington Story MD;  Location: [address];  Service: Urology;  Laterality: N/A;       Family History   Problem Relation Name Age of Onset    Diabetes Maternal Aunt      Heart attack Neg Hx      Heart disease Neg Hx      Heart failure Neg  Hx      Hyperlipidemia Neg Hx      Hypertension Neg Hx      Stroke Neg Hx         Social History     Socioeconomic History    Marital status:      Spouse name: Nereyda    Number of children: 4   Tobacco Use    Smoking status: Former     Current packs/day: 0.00     Average packs/day: 2.0 packs/day for 20.0 years (40.0 ttl pk-yrs)     Types: Cigarettes     Start date: 1963     Quit date: 1983     Years since quittin.7     Passive exposure: Never    Smokeless tobacco: Never   Substance and Sexual Activity    Alcohol use: Yes     Alcohol/week: 1.0 standard drink of alcohol     Types: 1 Standard drinks or equivalent per week     Comment: 3 drinks per week    Drug use: No    Sexual activity: Not Currently     Partners: Female     Social Drivers of Health     Financial Resource Strain: Low Risk  (2024)    Overall Financial Resource Strain (CARDIA)     Difficulty of Paying Living Expenses: Not hard at all   Food Insecurity: No Food Insecurity (2024)    Hunger Vital Sign     Worried About Running Out of Food in the Last Year: Never true     Ran Out of Food in the Last Year: Never true   Transportation Needs: No Transportation Needs (2024)    TRANSPORTATION NEEDS     Transportation : No   Physical Activity: Insufficiently Active (2024)    Exercise Vital Sign     Days of Exercise per Week: 4 days     Minutes of Exercise per Session: 20 min   Stress: No Stress Concern Present (2024)    [employer] - Occupational Stress Questionnaire     Feeling of Stress : Only a little   Housing Stability: Low Risk  (2024)    Housing Stability Vital Sign     Unable to Pay for Housing in the Last Year: No     Homeless in the Last Year: No       Current Outpatient Medications   Medication Sig Dispense Refill    acetaminophen (TYLENOL) 650 MG TbSR Take 650 mg by mouth 2 (two) times a day.      apixaban (ELIQUIS) 2.5 mg Tab Take 1 tablet (2.5 mg total) by mouth 2  (two) times daily. 180 tablet 3    betamethasone valerate 0.1% (VALISONE) 0.1 % Crea Apply topically 2 (two) times daily. Apply twice daily for 10 days for 10 days 45 g 0    cholecalciferol, vitamin D3, (VITAMIN D3) 50 mcg (2,000 unit) Cap capsule Take by mouth once daily.      empagliflozin (JARDIANCE) 10 mg tablet Take 1 tablet (10 mg total) by mouth once daily. 90 tablet 3    fenofibrate micronized (LOFIBRA) 200 MG Cap Take 1 capsule (200 mg total) by mouth daily with breakfast. 90 capsule 3    loratadine (CLARITIN) 10 mg tablet Take 10 mg by mouth once daily.      metFORMIN (GLUCOPHAGE-XR) 500 MG ER 24hr tablet Take 1 tablet (500 mg total) by mouth 2 (two) times daily with meals. 90 tablet 3    metoprolol succinate (TOPROL-XL) 100 MG 24 hr tablet TAKE 1 TABLET TWICE A  tablet 1    sacubitriL-valsartan (ENTRESTO) 24-26 mg per tablet Take 1 tablet by mouth 2 (two) times daily. 180 tablet 3    hydrocortisone 2.5 % cream SMARTSIG:In Ear(s) 1-2 Times Daily PRN       No current facility-administered medications for this visit.       Review of patient's allergies indicates:   Allergen Reactions    Niacin      Other reaction(s): Rash  Other reaction(s): Itching           Review of Systems   Constitutional: Negative for chills, fever and malaise/fatigue.   HENT:  Negative for congestion and hearing loss.    Cardiovascular:  Negative for chest pain, claudication and leg swelling.   Respiratory:  Negative for cough and shortness of breath.    Skin:  Positive for nail changes.   Musculoskeletal:  Positive for back pain and muscle cramps. Negative for joint pain and muscle weakness.   Gastrointestinal:  Negative for nausea and vomiting.   Neurological:  Positive for paresthesias. Negative for numbness and weakness.   Psychiatric/Behavioral:  Negative for altered mental status.            Objective:      Physical Exam  Constitutional:       General: He is not in acute distress.     Appearance: Normal appearance. He is  not ill-appearing.   Cardiovascular:      Pulses:           Dorsalis pedis pulses are detected w/ Doppler on the right side and detected w/ Doppler on the left side.        Posterior tibial pulses are detected w/ Doppler on the right side and detected w/ Doppler on the left side.      Comments: Monophasic PT and DP bilateral with arrhythmia noted using Doppler.  Mild nonpitting edema to lower extremity bilateral.  No hair growth bilateral lower extremity.  Mild rubor on dependency bilateral lower extremity.  Musculoskeletal:      Comments: No pain with ROM or MMT bilateral lower extremity.    No significant digital deformity bilateral.  Rectus appearing foot type bilateral.   Feet:      Right foot:      Protective Sensation: 10 sites tested.  10 sites sensed.      Skin integrity: Dry skin present.      Toenail Condition: Right toenails are abnormally thick and long.      Left foot:      Protective Sensation: 10 sites tested.  10 sites sensed.      Skin integrity: Dry skin present.      Toenail Condition: Left toenails are abnormally thick and long.   Skin:     General: Skin is warm.      Capillary Refill: Capillary refill takes 2 to 3 seconds.      Findings: No ecchymosis or erythema.      Nails: There is no clubbing.      Comments: Second toenail bilateral is growing a superior direction, thickened and discolored yellow with some mild loosening.  Nails 1, 3, 4 and 5 bilateral are mildly elongated 2-3 mm, thickened with patchy yellow discoloration mild underlying debris.    Ulceration overlying the dorsal distal lateral aspect of the left 5th toe with eschar base and slightly raised demarcating margin.  No surrounding erythema.  No drainage.  No localized pain on palpation.    Skin is atrophied to lower extremity bilateral.   Neurological:      Mental Status: He is alert and oriented to person, place, and time.      Sensory: Sensory deficit present.      Motor: Motor function is intact.      Comments: Absent  vibratory sensation right foot and decreased left foot.             Assessment:       Encounter Diagnoses   Name Primary?    Type 2 diabetes mellitus with peripheral vascular disease Yes    Type 2 diabetes mellitus with neurological manifestations     Onychomycosis          Plan:       Cliff was seen today for diabetes mellitus, diabetic foot exam and routine foot care.    Diagnoses and all orders for this visit:    Type 2 diabetes mellitus with peripheral vascular disease  -     Routine Foot Care  -     Foot Exam Performed    Type 2 diabetes mellitus with neurological manifestations  -     Routine Foot Care  -     Foot Exam Performed    Onychomycosis  -     Routine Foot Care      I counseled the patient on his conditions, their implications and medical management.    Shoe inspection. Diabetic Foot Education. Patient reminded of the importance of good nutrition and blood sugar control to help prevent podiatric complications of diabetes. Patient instructed on proper foot hygeine. We discussed wearing proper shoe gear, daily foot inspections, never walking without protective shoe gear, never putting sharp instruments to feet.    Routine foot care per attached note. Patient relates relief following the procedure. He will continue to monitor the areas daily, inspect his feet, wear protective shoe gear when ambulatory, moisturizer to maintain skin integrity.    Comprehensive annual foot exam completed today.  Patient found to be intermediate risk for foot complication.    RTC within 3 months or p.r.n. as discussed    A portion of this note was generated by voice recognition software and may contain spelling and grammar errors.

## 2025-02-05 ENCOUNTER — PATIENT MESSAGE (OUTPATIENT)
Dept: ADMINISTRATIVE | Facility: OTHER | Age: OVER 89
End: 2025-02-05
Payer: MEDICARE

## 2025-02-06 ENCOUNTER — PATIENT MESSAGE (OUTPATIENT)
Dept: ADMINISTRATIVE | Facility: OTHER | Age: OVER 89
End: 2025-02-06
Payer: MEDICARE

## 2025-02-07 ENCOUNTER — PATIENT MESSAGE (OUTPATIENT)
Dept: ADMINISTRATIVE | Facility: OTHER | Age: OVER 89
End: 2025-02-07
Payer: MEDICARE

## 2025-02-08 ENCOUNTER — PATIENT MESSAGE (OUTPATIENT)
Dept: ADMINISTRATIVE | Facility: OTHER | Age: OVER 89
End: 2025-02-08
Payer: MEDICARE

## 2025-02-10 ENCOUNTER — PATIENT MESSAGE (OUTPATIENT)
Dept: ADMINISTRATIVE | Facility: OTHER | Age: OVER 89
End: 2025-02-10
Payer: MEDICARE

## 2025-02-11 ENCOUNTER — LAB VISIT (OUTPATIENT)
Dept: LAB | Facility: HOSPITAL | Age: OVER 89
End: 2025-02-11
Attending: INTERNAL MEDICINE
Payer: MEDICARE

## 2025-02-11 ENCOUNTER — PATIENT MESSAGE (OUTPATIENT)
Dept: ADMINISTRATIVE | Facility: OTHER | Age: OVER 89
End: 2025-02-11
Payer: MEDICARE

## 2025-02-11 DIAGNOSIS — I48.19 PERSISTENT ATRIAL FIBRILLATION: Chronic | ICD-10-CM

## 2025-02-11 DIAGNOSIS — I48.19 PERSISTENT ATRIAL FIBRILLATION: Primary | Chronic | ICD-10-CM

## 2025-02-11 LAB
ANION GAP SERPL CALC-SCNC: 9 MMOL/L (ref 8–16)
APTT PPP: 27.1 SEC (ref 21–32)
BUN SERPL-MCNC: 39 MG/DL (ref 8–23)
CALCIUM SERPL-MCNC: 10.2 MG/DL (ref 8.7–10.5)
CHLORIDE SERPL-SCNC: 109 MMOL/L (ref 95–110)
CO2 SERPL-SCNC: 20 MMOL/L (ref 23–29)
CREAT SERPL-MCNC: 1.2 MG/DL (ref 0.5–1.4)
ERYTHROCYTE [DISTWIDTH] IN BLOOD BY AUTOMATED COUNT: 13.8 % (ref 11.5–14.5)
EST. GFR  (NO RACE VARIABLE): 57.8 ML/MIN/1.73 M^2
GLUCOSE SERPL-MCNC: 172 MG/DL (ref 70–110)
HCT VFR BLD AUTO: 48.5 % (ref 40–54)
HGB BLD-MCNC: 15 G/DL (ref 14–18)
INR PPP: 1.1 (ref 0.8–1.2)
MCH RBC QN AUTO: 31.4 PG (ref 27–31)
MCHC RBC AUTO-ENTMCNC: 30.9 G/DL (ref 32–36)
MCV RBC AUTO: 102 FL (ref 82–98)
PLATELET # BLD AUTO: 215 K/UL (ref 150–450)
PMV BLD AUTO: 13.6 FL (ref 9.2–12.9)
POTASSIUM SERPL-SCNC: 4.6 MMOL/L (ref 3.5–5.1)
PROTHROMBIN TIME: 12.1 SEC (ref 9–12.5)
RBC # BLD AUTO: 4.78 M/UL (ref 4.6–6.2)
SODIUM SERPL-SCNC: 138 MMOL/L (ref 136–145)
WBC # BLD AUTO: 4.85 K/UL (ref 3.9–12.7)

## 2025-02-11 PROCEDURE — 85027 COMPLETE CBC AUTOMATED: CPT | Performed by: INTERNAL MEDICINE

## 2025-02-11 PROCEDURE — 36415 COLL VENOUS BLD VENIPUNCTURE: CPT | Performed by: INTERNAL MEDICINE

## 2025-02-11 PROCEDURE — 85610 PROTHROMBIN TIME: CPT | Performed by: INTERNAL MEDICINE

## 2025-02-11 PROCEDURE — 85730 THROMBOPLASTIN TIME PARTIAL: CPT | Performed by: INTERNAL MEDICINE

## 2025-02-11 PROCEDURE — 80048 BASIC METABOLIC PNL TOTAL CA: CPT | Performed by: INTERNAL MEDICINE

## 2025-02-12 ENCOUNTER — PATIENT MESSAGE (OUTPATIENT)
Dept: ADMINISTRATIVE | Facility: OTHER | Age: OVER 89
End: 2025-02-12
Payer: MEDICARE

## 2025-02-12 ENCOUNTER — TELEPHONE (OUTPATIENT)
Dept: ELECTROPHYSIOLOGY | Facility: CLINIC | Age: OVER 89
End: 2025-02-12
Payer: MEDICARE

## 2025-02-12 NOTE — TELEPHONE ENCOUNTER
Spoke to patient     CONFIRMED procedure arrival time of 10:00 AM on 2/13/2025    Reiterated instructions including:  -Directions to check in desk  -NPO after midnight night prior to procedure  -High importance of HOLDING Jardiance, Metformin, and Eliquis the morning of the procedure. Patient verbalized understanding.   -Confirmed compliance of Eliquis. Informed patient to take the morning and evening dose on 2/12/25 and do not take the morning of the procedure. Patient verbalized understanding.   -Pre-procedure LABS Reviewed. No alerts noted.   -Confirmed absence  of implanted device/stimulator   -Confirmed no fever, cough, or shortness of breath in the past 30 days  -Confirmed no redness, rash, irritation, or yeast infection to groin area. Patient did report redness near the groin area a 2 weeks ago; was prescribed Betamethasone ointment that has been completed on 2/6/2025. Patient reports no active redness, infection,or  irritation near groin currently.   -Reviewed current visitor policy    Patient verbalized understanding of above and appreciated the call.

## 2025-02-13 ENCOUNTER — ANESTHESIA (OUTPATIENT)
Dept: MEDSURG UNIT | Facility: HOSPITAL | Age: OVER 89
End: 2025-02-13
Payer: MEDICARE

## 2025-02-13 ENCOUNTER — HOSPITAL ENCOUNTER (INPATIENT)
Facility: HOSPITAL | Age: OVER 89
LOS: 1 days | Discharge: HOME | DRG: 274 | End: 2025-02-13
Attending: INTERNAL MEDICINE | Admitting: INTERNAL MEDICINE
Payer: MEDICARE

## 2025-02-13 ENCOUNTER — ANESTHESIA EVENT (OUTPATIENT)
Dept: MEDSURG UNIT | Facility: HOSPITAL | Age: OVER 89
End: 2025-02-13
Payer: MEDICARE

## 2025-02-13 VITALS
OXYGEN SATURATION: 100 % | SYSTOLIC BLOOD PRESSURE: 113 MMHG | HEIGHT: 64 IN | HEART RATE: 71 BPM | TEMPERATURE: 98 F | DIASTOLIC BLOOD PRESSURE: 60 MMHG | RESPIRATION RATE: 19 BRPM | WEIGHT: 134 LBS | BODY MASS INDEX: 22.88 KG/M2

## 2025-02-13 DIAGNOSIS — I49.9 ARRHYTHMIA: ICD-10-CM

## 2025-02-13 DIAGNOSIS — I48.91 ATRIAL FIBRILLATION: ICD-10-CM

## 2025-02-13 DIAGNOSIS — I48.0 PAF (PAROXYSMAL ATRIAL FIBRILLATION): ICD-10-CM

## 2025-02-13 LAB
ABO + RH BLD: NORMAL
BLD GP AB SCN CELLS X3 SERPL QL: NORMAL
OHS QRS DURATION: 112 MS
OHS QTC CALCULATION: 416 MS
POC ACTIVATED CLOTTING TIME K: 176 SEC (ref 74–137)
POCT GLUCOSE: 138 MG/DL (ref 70–110)
SAMPLE: ABNORMAL
SPECIMEN OUTDATE: NORMAL

## 2025-02-13 PROCEDURE — 36415 COLL VENOUS BLD VENIPUNCTURE: CPT | Performed by: NURSE PRACTITIONER

## 2025-02-13 PROCEDURE — C1769 GUIDE WIRE: HCPCS | Performed by: INTERNAL MEDICINE

## 2025-02-13 PROCEDURE — 63600175 PHARM REV CODE 636 W HCPCS: Performed by: INTERNAL MEDICINE

## 2025-02-13 PROCEDURE — 37000008 HC ANESTHESIA 1ST 15 MINUTES: Performed by: INTERNAL MEDICINE

## 2025-02-13 PROCEDURE — 86920 COMPATIBILITY TEST SPIN: CPT | Performed by: NURSE PRACTITIONER

## 2025-02-13 PROCEDURE — C1894 INTRO/SHEATH, NON-LASER: HCPCS | Performed by: INTERNAL MEDICINE

## 2025-02-13 PROCEDURE — 25000003 PHARM REV CODE 250: Performed by: NURSE ANESTHETIST, CERTIFIED REGISTERED

## 2025-02-13 PROCEDURE — 25500020 PHARM REV CODE 255: Performed by: INTERNAL MEDICINE

## 2025-02-13 PROCEDURE — 37000009 HC ANESTHESIA EA ADD 15 MINS: Performed by: INTERNAL MEDICINE

## 2025-02-13 PROCEDURE — 02L73DK OCCLUSION OF LEFT ATRIAL APPENDAGE WITH INTRALUMINAL DEVICE, PERCUTANEOUS APPROACH: ICD-10-PCS | Performed by: INTERNAL MEDICINE

## 2025-02-13 PROCEDURE — 11000001 HC ACUTE MED/SURG PRIVATE ROOM

## 2025-02-13 PROCEDURE — C1889 IMPLANT/INSERT DEVICE, NOC: HCPCS | Performed by: INTERNAL MEDICINE

## 2025-02-13 PROCEDURE — 63600175 PHARM REV CODE 636 W HCPCS: Performed by: NURSE ANESTHETIST, CERTIFIED REGISTERED

## 2025-02-13 PROCEDURE — C1760 CLOSURE DEV, VASC: HCPCS | Performed by: INTERNAL MEDICINE

## 2025-02-13 PROCEDURE — 27201423 OPTIME MED/SURG SUP & DEVICES STERILE SUPPLY: Performed by: INTERNAL MEDICINE

## 2025-02-13 PROCEDURE — 33340 PERQ CLSR TCAT L ATR APNDGE: CPT | Performed by: INTERNAL MEDICINE

## 2025-02-13 PROCEDURE — 93005 ELECTROCARDIOGRAM TRACING: CPT

## 2025-02-13 PROCEDURE — 82962 GLUCOSE BLOOD TEST: CPT | Performed by: INTERNAL MEDICINE

## 2025-02-13 PROCEDURE — 86850 RBC ANTIBODY SCREEN: CPT | Performed by: NURSE PRACTITIONER

## 2025-02-13 DEVICE — LEFT ATRIAL APPENDAGE CLOSURE DEVICE WITH DELIVERY SYSTEM
Type: IMPLANTABLE DEVICE | Site: HEART | Status: FUNCTIONAL
Brand: WATCHMAN FLX™ PRO

## 2025-02-13 RX ORDER — ONDANSETRON HYDROCHLORIDE 2 MG/ML
4 INJECTION, SOLUTION INTRAVENOUS ONCE AS NEEDED
Status: DISCONTINUED | OUTPATIENT
Start: 2025-02-13 | End: 2025-02-13 | Stop reason: HOSPADM

## 2025-02-13 RX ORDER — HEPARIN SODIUM 1000 [USP'U]/ML
INJECTION, SOLUTION INTRAVENOUS; SUBCUTANEOUS
Status: DISCONTINUED | OUTPATIENT
Start: 2025-02-13 | End: 2025-02-13

## 2025-02-13 RX ORDER — ONDANSETRON 8 MG/1
8 TABLET, ORALLY DISINTEGRATING ORAL EVERY 8 HOURS PRN
Status: DISCONTINUED | OUTPATIENT
Start: 2025-02-13 | End: 2025-02-13 | Stop reason: HOSPADM

## 2025-02-13 RX ORDER — ONDANSETRON HYDROCHLORIDE 2 MG/ML
INJECTION, SOLUTION INTRAVENOUS
Status: DISCONTINUED | OUTPATIENT
Start: 2025-02-13 | End: 2025-02-13

## 2025-02-13 RX ORDER — CEFAZOLIN 2 G/1
2 INJECTION, POWDER, FOR SOLUTION INTRAMUSCULAR; INTRAVENOUS
Status: DISCONTINUED | OUTPATIENT
Start: 2025-02-13 | End: 2025-02-13 | Stop reason: HOSPADM

## 2025-02-13 RX ORDER — IODIXANOL 320 MG/ML
INJECTION, SOLUTION INTRAVASCULAR
Status: DISCONTINUED | OUTPATIENT
Start: 2025-02-13 | End: 2025-02-13 | Stop reason: HOSPADM

## 2025-02-13 RX ORDER — LIDOCAINE HYDROCHLORIDE 20 MG/ML
INJECTION, SOLUTION INFILTRATION; PERINEURAL
Status: DISCONTINUED | OUTPATIENT
Start: 2025-02-13 | End: 2025-02-13 | Stop reason: HOSPADM

## 2025-02-13 RX ORDER — CEFAZOLIN SODIUM 1 G/3ML
INJECTION, POWDER, FOR SOLUTION INTRAMUSCULAR; INTRAVENOUS
Status: DISCONTINUED | OUTPATIENT
Start: 2025-02-13 | End: 2025-02-13

## 2025-02-13 RX ORDER — ROCURONIUM BROMIDE 10 MG/ML
INJECTION, SOLUTION INTRAVENOUS
Status: DISCONTINUED | OUTPATIENT
Start: 2025-02-13 | End: 2025-02-13

## 2025-02-13 RX ORDER — PROPOFOL 10 MG/ML
VIAL (ML) INTRAVENOUS
Status: DISCONTINUED | OUTPATIENT
Start: 2025-02-13 | End: 2025-02-13

## 2025-02-13 RX ORDER — LIDOCAINE HYDROCHLORIDE 20 MG/ML
INJECTION INTRAVENOUS
Status: DISCONTINUED | OUTPATIENT
Start: 2025-02-13 | End: 2025-02-13

## 2025-02-13 RX ORDER — PROCHLORPERAZINE EDISYLATE 5 MG/ML
5 INJECTION INTRAMUSCULAR; INTRAVENOUS EVERY 30 MIN PRN
Status: DISCONTINUED | OUTPATIENT
Start: 2025-02-13 | End: 2025-02-13 | Stop reason: HOSPADM

## 2025-02-13 RX ORDER — PROTAMINE SULFATE 10 MG/ML
INJECTION, SOLUTION INTRAVENOUS
Status: DISCONTINUED | OUTPATIENT
Start: 2025-02-13 | End: 2025-02-13

## 2025-02-13 RX ORDER — DIPHENHYDRAMINE HYDROCHLORIDE 50 MG/ML
25 INJECTION INTRAMUSCULAR; INTRAVENOUS EVERY 6 HOURS PRN
Status: DISCONTINUED | OUTPATIENT
Start: 2025-02-13 | End: 2025-02-13 | Stop reason: HOSPADM

## 2025-02-13 RX ORDER — HEPARIN SOD,PORCINE/0.9 % NACL 1000/500ML
INTRAVENOUS SOLUTION INTRAVENOUS
Status: DISCONTINUED | OUTPATIENT
Start: 2025-02-13 | End: 2025-02-13 | Stop reason: HOSPADM

## 2025-02-13 RX ORDER — HYDROMORPHONE HYDROCHLORIDE 1 MG/ML
0.2 INJECTION, SOLUTION INTRAMUSCULAR; INTRAVENOUS; SUBCUTANEOUS EVERY 5 MIN PRN
Status: DISCONTINUED | OUTPATIENT
Start: 2025-02-13 | End: 2025-02-13 | Stop reason: HOSPADM

## 2025-02-13 RX ORDER — DEXAMETHASONE SODIUM PHOSPHATE 4 MG/ML
INJECTION, SOLUTION INTRA-ARTICULAR; INTRALESIONAL; INTRAMUSCULAR; INTRAVENOUS; SOFT TISSUE
Status: DISCONTINUED | OUTPATIENT
Start: 2025-02-13 | End: 2025-02-13

## 2025-02-13 RX ORDER — HYDROCODONE BITARTRATE AND ACETAMINOPHEN 500; 5 MG/1; MG/1
TABLET ORAL
Status: DISCONTINUED | OUTPATIENT
Start: 2025-02-13 | End: 2025-02-13 | Stop reason: HOSPADM

## 2025-02-13 RX ORDER — PHENYLEPHRINE HYDROCHLORIDE 10 MG/ML
INJECTION INTRAVENOUS CONTINUOUS PRN
Status: DISCONTINUED | OUTPATIENT
Start: 2025-02-13 | End: 2025-02-13

## 2025-02-13 RX ORDER — ACETAMINOPHEN 325 MG/1
650 TABLET ORAL EVERY 4 HOURS PRN
Status: DISCONTINUED | OUTPATIENT
Start: 2025-02-13 | End: 2025-02-13

## 2025-02-13 RX ORDER — FENTANYL CITRATE 50 UG/ML
25 INJECTION, SOLUTION INTRAMUSCULAR; INTRAVENOUS EVERY 5 MIN PRN
Status: DISCONTINUED | OUTPATIENT
Start: 2025-02-13 | End: 2025-02-13 | Stop reason: HOSPADM

## 2025-02-13 RX ORDER — ACETAMINOPHEN 325 MG/1
650 TABLET ORAL EVERY 4 HOURS PRN
Status: DISCONTINUED | OUTPATIENT
Start: 2025-02-13 | End: 2025-02-13 | Stop reason: HOSPADM

## 2025-02-13 RX ADMIN — ROCURONIUM BROMIDE 70 MG: 10 INJECTION INTRAVENOUS at 12:02

## 2025-02-13 RX ADMIN — PHENYLEPHRINE HYDROCHLORIDE 200 MCG: 10 INJECTION INTRAVENOUS at 12:02

## 2025-02-13 RX ADMIN — ROCURONIUM BROMIDE 10 MG: 10 INJECTION INTRAVENOUS at 01:02

## 2025-02-13 RX ADMIN — HEPARIN SODIUM 6100 UNITS: 1000 INJECTION, SOLUTION INTRAVENOUS; SUBCUTANEOUS at 01:02

## 2025-02-13 RX ADMIN — PROTAMINE SULFATE 60 MG: 10 INJECTION, SOLUTION INTRAVENOUS at 02:02

## 2025-02-13 RX ADMIN — CEFAZOLIN 2 G: 330 INJECTION, POWDER, FOR SOLUTION INTRAMUSCULAR; INTRAVENOUS at 12:02

## 2025-02-13 RX ADMIN — SODIUM CHLORIDE: 0.9 INJECTION, SOLUTION INTRAVENOUS at 12:02

## 2025-02-13 RX ADMIN — ONDANSETRON 4 MG: 2 INJECTION INTRAMUSCULAR; INTRAVENOUS at 02:02

## 2025-02-13 RX ADMIN — PROPOFOL 90 MG: 10 INJECTION, EMULSION INTRAVENOUS at 12:02

## 2025-02-13 RX ADMIN — HEPARIN SODIUM 2000 UNITS: 1000 INJECTION, SOLUTION INTRAVENOUS; SUBCUTANEOUS at 01:02

## 2025-02-13 RX ADMIN — PHENYLEPHRINE HYDROCHLORIDE 0.3 MCG/KG/MIN: 10 INJECTION INTRAVENOUS at 01:02

## 2025-02-13 RX ADMIN — SUGAMMADEX 200 MG: 100 INJECTION, SOLUTION INTRAVENOUS at 02:02

## 2025-02-13 RX ADMIN — DEXAMETHASONE SODIUM PHOSPHATE 4 MG: 4 INJECTION, SOLUTION INTRAMUSCULAR; INTRAVENOUS at 12:02

## 2025-02-13 RX ADMIN — LIDOCAINE HYDROCHLORIDE 40 MG: 20 INJECTION INTRAVENOUS at 12:02

## 2025-02-13 NOTE — H&P
[facility] - Short Stay Cardiac Unit  Cardiac Electrophysiology  History and Physical     Admission Date: 2/13/2025  Code Status: Prior   Attending Provider: Marcelino Sheffield MD   Principal Problem:SAH (subarachnoid hemorrhage)    Subjective:     Chief Complaint:  SAH+ AF     HPI:  Mr. Magaña is an 89 y.o. male with peristent AF who presents for LAAO device implantation.    He has history of SAH along with frequent falls. He is currently on apixaban 2.5 mg bid   CHADSVASC 5  HASBLED of 4  Echo 12/23:45 - 50%  ECG: pending    Past Medical History:   Diagnosis Date    *Atrial fibrillation     Chronic kidney disease     Deep vein thrombosis     Hyperlipidemia     Hypertension     Metabolic syndrome     Type 2 diabetes mellitus, without long-term current use of insulin 12/13/2021       Past Surgical History:   Procedure Laterality Date    BIOPSY OF BLADDER N/A 4/12/2024    Procedure: BIOPSY, BLADDER;  Surgeon: Wellington Story MD;  Location: [facility] OR [address] [place] [address];  Service: Urology;  Laterality: N/A;    BLADDER FULGURATION N/A 4/12/2024    Procedure: FULGURATION, BLADDER;  Surgeon: Wellington Story MD;  Location: [facility] OR [address] [place] [address];  Service: Urology;  Laterality: N/A;    CATARACT EXTRACTION      CHOLECYSTECTOMY      CYSTOSCOPY N/A 11/16/2023    Procedure: CYSTOSCOPY;  Surgeon: Marcelino Moffett MD;  Location: [facility] OR [address] [place] [address];  Service: Urology;  Laterality: N/A;  90 minutes    DILATION OF URETHRA N/A 4/12/2024    Procedure: DILATION, URETHRA;  Surgeon: Wellington Story MD;  Location: [facility] OR [address] [place] [address];  Service: Urology;  Laterality: N/A;    EYE SURGERY      INJECTION OF FACET JOINT Bilateral 4/28/2021    Procedure: FACET JOINT INJECTION BILATERAL L4/L5 DIRECT REFERRAL;  Surgeon: Philipp Iyer MD;  Location: [facility] PAIN MGT;  Service: Pain Management;  Laterality: Bilateral;  NEEDS CONSENT, ELIQUIS CLEARANCE IN CHART    RETROGRADE PYELOGRAPHY Bilateral 11/16/2023    Procedure: PYELOGRAM, RETROGRADE;  Surgeon: Marcelino Moffett MD;   [facility];  Service: Urology;  Laterality: Bilateral;  90 minutes    SKIN CANCER EXCISION      TONSILLECTOMY      TURBT (TRANSURETHRAL RESECTION OF BLADDER TUMOR) N/A 2023    Procedure: TURBT (TRANSURETHRAL RESECTION OF BLADDER TUMOR);  Surgeon: Marcelino Moffett MD;  Location: [facility] OR [address] [place] [address];  Service: Urology;  Laterality: N/A;  90 minutes    TURBT (TRANSURETHRAL RESECTION OF BLADDER TUMOR) N/A 2024    Procedure: TURBT (TRANSURETHRAL RESECTION OF BLADDER TUMOR);  Surgeon: Wellington Story MD;  Location: [facility] OR [address] [place] [address];  Service: Urology;  Laterality: N/A;       Review of patient's allergies indicates:   Allergen Reactions    Niacin      Other reaction(s): Rash  Other reaction(s): Itching       No current facility-administered medications on file prior to encounter.     Current Outpatient Medications on File Prior to Encounter   Medication Sig    acetaminophen (TYLENOL) 650 MG TbSR Take 650 mg by mouth 2 (two) times a day.    apixaban (ELIQUIS) 2.5 mg Tab Take 1 tablet (2.5 mg total) by mouth 2 (two) times daily.    cholecalciferol, vitamin D3, (VITAMIN D3) 50 mcg (2,000 unit) Cap capsule Take by mouth once daily.    empagliflozin (JARDIANCE) 10 mg tablet Take 1 tablet (10 mg total) by mouth once daily.    fenofibrate micronized (LOFIBRA) 200 MG Cap Take 1 capsule (200 mg total) by mouth daily with breakfast.    loratadine (CLARITIN) 10 mg tablet Take 10 mg by mouth once daily.    sacubitriL-valsartan (ENTRESTO) 24-26 mg per tablet Take 1 tablet by mouth 2 (two) times daily.     Family History       Problem Relation (Age of Onset)    Diabetes Maternal Aunt          Tobacco Use    Smoking status: Former     Current packs/day: 0.00     Average packs/day: 2.0 packs/day for 20.0 years (40.0 ttl pk-yrs)     Types: Cigarettes     Start date: 1963     Quit date: 1983     Years since quittin.7     Passive exposure: Never    Smokeless tobacco: Never   Substance and Sexual  Activity    Alcohol use: Yes     Alcohol/week: 1.0 standard drink of alcohol     Types: 1 Standard drinks or equivalent per week     Comment: 3 drinks per week    Drug use: No    Sexual activity: Not Currently     Partners: Female     Review of Systems   All other systems reviewed and are negative.    Objective:     Vital Signs (Most Recent):    Vital Signs (24h Range):             There is no height or weight on file to calculate BMI.             Physical Exam  Vitals and nursing note reviewed.   Constitutional:       Appearance: Normal appearance.   HENT:      Head: Normocephalic.   Cardiovascular:      Rate and Rhythm: Normal rate. Rhythm irregular.      Pulses: Normal pulses.      Heart sounds: Normal heart sounds.   Pulmonary:      Effort: Pulmonary effort is normal.      Breath sounds: Normal breath sounds.   Abdominal:      General: Abdomen is flat.   Musculoskeletal:      Right lower leg: No edema.      Left lower leg: No edema.   Skin:     General: Skin is warm.   Neurological:      Mental Status: He is alert.            Significant Labs: All pertinent lab results from the last 24 hours have been reviewed.    Assessment and Plan:     * SAH (subarachnoid hemorrhage)  89 yoM here for LAAO device implantation.    The patient can tolerate short term OAC but is not a candidate for long term OAC due to recurrent bleeding issues. I discussed management options regarding LAAO. I discussed the process of LAAO via Watchman including pre-procedure testing  as well as the need to take OAC for 6 weeks post implant. We discussed post procedure BEENA studies to assess LAURY occlusion. Additionally I mentioned the need to take DAPT up to the 6 month point post implant.           Ira Roberts MD  Cardiac Electrophysiology  [facility] - Short Stay Cardiac Unit

## 2025-02-13 NOTE — SUBJECTIVE & OBJECTIVE
Past Medical History:   Diagnosis Date    *Atrial fibrillation     Chronic kidney disease     Deep vein thrombosis     Hyperlipidemia     Hypertension     Metabolic syndrome     Type 2 diabetes mellitus, without long-term current use of insulin 12/13/2021       Past Surgical History:   Procedure Laterality Date    BIOPSY OF BLADDER N/A 4/12/2024    Procedure: BIOPSY, BLADDER;  Surgeon: Wellington Story MD;  Location: [address];  Service: Urology;  Laterality: N/A;    BLADDER FULGURATION N/A 4/12/2024    Procedure: FULGURATION, BLADDER;  Surgeon: Wellington Story MD;  Location: [address];  Service: Urology;  Laterality: N/A;    CATARACT EXTRACTION      CHOLECYSTECTOMY      CYSTOSCOPY N/A 11/16/2023    Procedure: CYSTOSCOPY;  Surgeon: Marcelino Moffett MD;  Location: [address];  Service: Urology;  Laterality: N/A;  90 minutes    DILATION OF URETHRA N/A 4/12/2024    Procedure: DILATION, URETHRA;  Surgeon: Wellington Story MD;  Location: [address];  Service: Urology;  Laterality: N/A;    EYE SURGERY      INJECTION OF FACET JOINT Bilateral 4/28/2021    Procedure: FACET JOINT INJECTION BILATERAL L4/L5 DIRECT REFERRAL;  Surgeon: Philipp Iyer MD;  Location: [facility];  Service: Pain Management;  Laterality: Bilateral;  NEEDS CONSENT, ELIQUIS CLEARANCE IN CHART    RETROGRADE PYELOGRAPHY Bilateral 11/16/2023    Procedure: PYELOGRAM, RETROGRADE;  Surgeon: Marcelino Moffett MD;  Location: [address];  Service: Urology;  Laterality: Bilateral;  90 minutes    SKIN CANCER EXCISION      TONSILLECTOMY      TURBT (TRANSURETHRAL RESECTION OF BLADDER TUMOR) N/A 11/16/2023    Procedure: TURBT (TRANSURETHRAL RESECTION OF BLADDER TUMOR);  Surgeon: Marcelino Moffett MD;  Location: [address];  Service: Urology;  Laterality: N/A;  90 minutes    TURBT (TRANSURETHRAL RESECTION OF BLADDER TUMOR) N/A 4/12/2024    Procedure: TURBT (TRANSURETHRAL RESECTION OF BLADDER TUMOR);  Surgeon: Wellington Story MD;   Location: [facility] OR [address] [place] [address];  Service: Urology;  Laterality: N/A;       Review of patient's allergies indicates:   Allergen Reactions    Niacin      Other reaction(s): Rash  Other reaction(s): Itching       No current facility-administered medications on file prior to encounter.     Current Outpatient Medications on File Prior to Encounter   Medication Sig    acetaminophen (TYLENOL) 650 MG TbSR Take 650 mg by mouth 2 (two) times a day.    apixaban (ELIQUIS) 2.5 mg Tab Take 1 tablet (2.5 mg total) by mouth 2 (two) times daily.    cholecalciferol, vitamin D3, (VITAMIN D3) 50 mcg (2,000 unit) Cap capsule Take by mouth once daily.    empagliflozin (JARDIANCE) 10 mg tablet Take 1 tablet (10 mg total) by mouth once daily.    fenofibrate micronized (LOFIBRA) 200 MG Cap Take 1 capsule (200 mg total) by mouth daily with breakfast.    loratadine (CLARITIN) 10 mg tablet Take 10 mg by mouth once daily.    sacubitriL-valsartan (ENTRESTO) 24-26 mg per tablet Take 1 tablet by mouth 2 (two) times daily.     Family History       Problem Relation (Age of Onset)    Diabetes Maternal Aunt          Tobacco Use    Smoking status: Former     Current packs/day: 0.00     Average packs/day: 2.0 packs/day for 20.0 years (40.0 ttl pk-yrs)     Types: Cigarettes     Start date: 1963     Quit date: 1983     Years since quittin.7     Passive exposure: Never    Smokeless tobacco: Never   Substance and Sexual Activity    Alcohol use: Yes     Alcohol/week: 1.0 standard drink of alcohol     Types: 1 Standard drinks or equivalent per week     Comment: 3 drinks per week    Drug use: No    Sexual activity: Not Currently     Partners: Female     Review of Systems   All other systems reviewed and are negative.    Objective:     Vital Signs (Most Recent):    Vital Signs (24h Range):             There is no height or weight on file to calculate BMI.             Physical Exam  Vitals and nursing note reviewed.   Constitutional:       Appearance:  Normal appearance.   HENT:      Head: Normocephalic.   Cardiovascular:      Rate and Rhythm: Normal rate. Rhythm irregular.      Pulses: Normal pulses.      Heart sounds: Normal heart sounds.   Pulmonary:      Effort: Pulmonary effort is normal.      Breath sounds: Normal breath sounds.   Abdominal:      General: Abdomen is flat.   Musculoskeletal:      Right lower leg: No edema.      Left lower leg: No edema.   Skin:     General: Skin is warm.   Neurological:      Mental Status: He is alert.            Significant Labs: All pertinent lab results from the last 24 hours have been reviewed.

## 2025-02-13 NOTE — ASSESSMENT & PLAN NOTE
89 yoM here for LAAO device implantation.    The patient can tolerate short term OAC but is not a candidate for long term OAC due to recurrent bleeding issues. I discussed management options regarding LAAO. I discussed the process of LAAO via Watchman including pre-procedure testing  as well as the need to take OAC for 6 weeks post implant. We discussed post procedure BEENA studies to assess LAURY occlusion. Additionally I mentioned the need to take DAPT up to the 6 month point post implant.

## 2025-02-13 NOTE — BRIEF OP NOTE
Brief Operative Note:    : Marcelino Sheffield MD     Referring Physician: Marcelino Sheffield     All Operators: Surgeon(s):  Marcelino Sheffield, Marcelino Best MD  Provider, [facility] Diagnostic  Ira Roberts, Ira Carlisle MD Davis, Arthur P Jr., Kyler Basurto Jr., Jocelyne Esparza, Jocelyne Esparza, MD [place], Nereyda ACOSTA MD     Preoperative Diagnosis: PAF (paroxysmal atrial fibrillation) [I48.0]     Postop Diagnosis: PAF (paroxysmal atrial fibrillation) [I48.0]    Treatments/Procedures: Procedure(s) (LRB):  Left atrial appendage occlusion (N/A)  ECHOCARDIOGRAM, TRANSESOPHAGEAL (N/A)    Findings:    Successful LAURY closure with a 24mm WatchmanFlx device  See catheterization report for full details.    Estimated Blood loss: 20 cc    Specimens removed: No    Recommendations:   - Routine post-cath care as per orders  - resume eliquis tomorrow     Kyler Smith Jr

## 2025-02-13 NOTE — DISCHARGE INSTRUCTIONS
Recommendations following Watchman implantation:  Continue OAC for 6 weeks after implantation  Follow up BEENA in 6 weeks.   If BEENA shows well seated device without DRT and abscence of peridevice leak (or <5 mm) will stop Eliquis and start clopidogrel and Aspirin 81 mg daily   6 months after device implantation Aspirin monotherapy    Groin site management, precautions, and restrictions:  Remove the bandages over your groin area the morning after your procedure. You can shower after you remove these bandages. Keep the groin sites clean and dry. You do not need to apply ointments or bandages to the area.   If oozing from groin site occurs, apply pressure without letting up for 15 minutes and lay flat for 1 hour. If bleeding has resolved, you can continue to monitor. If the bleeding continues or there is significant swelling or pain in the groin area, please visit the nearest ER for evaluation and treatment. DO NOT STOP TAKING YOUR BLOOD THINNER UNLESS INSTRUCTED BY A PHYSICIAN.   Do not take baths or submerge your groin area or at least 1 week or when the puncture sites in your groin have completely healed  Do not lift anything over 10 lbs for the first week after your procedure, and avoid strenuous activity during this time to allow for the groin sites to heal. After 1 week, there are no activity restrictions.  Please contact the electrophysiology clinic or go to the ER if you experience: severe chest pain, shortness of breath, bleeding or swelling of the groin sites, or any other concerns.

## 2025-02-13 NOTE — HOSPITAL COURSE
Patient underwent successful implantation of 24 mm watchman device.     Recommendations following Watchman implantation:  Continue OAC for 6 weeks after implantation  Follow up BEENA in 6 weeks.   If BEENA shows well seated device without DRT and abscence of peridevice leak (or <5 mm) will stop Eliquis and start clopidogrel and Aspirin 81 mg daily   6 months after device implantation Aspirin monotherapy

## 2025-02-13 NOTE — TRANSFER OF CARE
"Anesthesia Transfer of Care Note    Patient: Cliff GOODEN Pond    Procedure(s) Performed: Procedure(s) (LRB):  Left atrial appendage occlusion (N/A)  ECHOCARDIOGRAM, TRANSESOPHAGEAL (N/A)    Patient location: PACU    Anesthesia Type: general    Transport from OR: Transported from OR on 6-10 L/min O2 by face mask with adequate spontaneous ventilation    Post pain: adequate analgesia    Post assessment: no apparent anesthetic complications and tolerated procedure well    Post vital signs: stable    Level of consciousness: awake and alert    Nausea/Vomiting: no nausea/vomiting    Complications: none    Transfer of care protocol was followed    Last vitals: Visit Vitals  /70 (BP Location: Left arm, Patient Position: Lying)   Pulse 71   Temp 35.9 °C (96.6 °F) (Temporal)   Resp 18   Ht 5' 4" (1.626 m)   Wt 60.8 kg (134 lb)   SpO2 100%   BMI 23.00 kg/m²     "

## 2025-02-13 NOTE — ANESTHESIA POSTPROCEDURE EVALUATION
Anesthesia Post Evaluation    Patient: Cliff R Pond    Procedure(s) Performed: Procedure(s) (LRB):  Left atrial appendage occlusion (N/A)  ECHOCARDIOGRAM, TRANSESOPHAGEAL (N/A)    Final Anesthesia Type: general      Patient participation: Yes- Able to Participate  Level of consciousness: awake and alert  Post-procedure vital signs: reviewed and stable  Pain control: Pain has been treated.  Airway patency: patent    PONV status: Absent or treated.  Anesthetic complications: no      Cardiovascular status: hemodynamically stable  Respiratory status: unassisted  Hydration status: euvolemic  Follow-up not needed.              Vitals Value Taken Time   /56 02/13/25 1502   Temp 36.7 °C (98 °F) 02/13/25 1430   Pulse 66 02/13/25 1513   Resp 14 02/13/25 1513   SpO2 100 % 02/13/25 1513   Vitals shown include unfiled device data.      No case tracking events are documented in the log.      Pain/Shweta Score: Shweta Score: 9 (2/13/2025  2:45 PM)

## 2025-02-13 NOTE — DISCHARGE SUMMARY
[facility] - Short Stay Cardiac Unit  Cardiac Electrophysiology  Discharge Summary      Patient Name: Cliff Magaña  MRN: [id number]  Admission Date: 2/13/2025  Hospital Length of Stay: 0 days  Discharge Date and Time:  02/13/2025 4:47 PM  Attending Physician: Marcelino Sheffield MD    Discharging Provider: Ira Roberts MD  Primary Care Physician: Munir Estrada MD    HPI:   Mr. Magaña is an 89 y.o. male with peristent AF who presents for LAAO device implantation.    He has history of SAH along with frequent falls. He is currently on apixaban 2.5 mg bid   CHADSVASC 5  HASBLED of 4  Echo 12/23:45 - 50%  ECG: pending    Procedure(s) (LRB):  Left atrial appendage occlusion (N/A)  ECHOCARDIOGRAM, TRANSESOPHAGEAL (N/A)     Indwelling Lines/Drains at time of discharge:  Lines/Drains/Airways       None                   Hospital Course:  Patient underwent successful implantation of 24 mm watchman device.     Recommendations following Watchman implantation:  Continue OAC for 6 weeks after implantation  Follow up BEENA in 6 weeks.   If BEENA shows well seated device without DRT and abscence of peridevice leak (or <5 mm) will stop Eliquis and start clopidogrel and Aspirin 81 mg daily   6 months after device implantation Aspirin monotherapy           Goals of Care Treatment Preferences:  Code Status: Full Code    Living Will: Yes     What is most important right now is to focus on symptom/pain control.  Accordingly, we have decided that the best plan to meet the patient's goals includes continuing with treatment.      Consults:     Significant Diagnostic Studies: N/A    Pending Diagnostic Studies:       None            Final Active Diagnoses:    Diagnosis Date Noted POA    PRINCIPAL PROBLEM:  SAH (subarachnoid hemorrhage) [I60.9] 09/22/2024 Yes      Problems Resolved During this Admission:     No new Assessment & Plan notes have been filed under this hospital service since the last note was generated.  Service:  Arrhythmia      Discharged Condition: stable    Disposition:     Follow Up:   Follow-up Information       Marcelino Sheffield MD Follow up in 3 month(s).    Specialties: Electrophysiology, Cardiovascular Disease, Cardiology  Contact information:  [facility]  [facility] [address] [phone]                           Patient Instructions:   No discharge procedures on file.  Medications:  Reconciled Home Medications:      Medication List        CONTINUE taking these medications      acetaminophen 650 MG Tbsr  Commonly known as: TYLENOL  Take 650 mg by mouth 2 (two) times a day.     apixaban 2.5 mg Tab  Commonly known as: ELIQUIS  Take 1 tablet (2.5 mg total) by mouth 2 (two) times daily.     betamethasone valerate 0.1% 0.1 % Crea  Commonly known as: VALISONE  Apply topically 2 (two) times daily. Apply twice daily for 10 days for 10 days     cholecalciferol (vitamin D3) 50 mcg (2,000 unit) Cap capsule  Commonly known as: VITAMIN D3  Take by mouth once daily.     empagliflozin 10 mg tablet  Commonly known as: JARDIANCE  Take 1 tablet (10 mg total) by mouth once daily.     ENTRESTO 24-26 mg per tablet  Generic drug: sacubitriL-valsartan  Take 1 tablet by mouth 2 (two) times daily.     fenofibrate micronized 200 MG Cap  Commonly known as: LOFIBRA  Take 1 capsule (200 mg total) by mouth daily with breakfast.     loratadine 10 mg tablet  Commonly known as: CLARITIN  Take 10 mg by mouth once daily.     metFORMIN 500 MG ER 24hr tablet  Commonly known as: GLUCOPHAGE-XR  Take 1 tablet (500 mg total) by mouth 2 (two) times daily with meals.     metoprolol succinate 100 MG 24 hr tablet  Commonly known as: TOPROL-XL  TAKE 1 TABLET TWICE A DAY              Time spent on the discharge of patient: 45 minutes    Ira Roberts MD  Cardiac Electrophysiology  [facility] - Short Stay Cardiac Unit

## 2025-02-13 NOTE — CONSULTS
[facility], [place]  BEENA Consult      Cliff Magaña  YOB: 1935  Medical Record Number:  [id number]  Attending Physician:  Marcelino Sheffield MD   Current Principal Problem:  SAH (subarachnoid hemorrhage)    History     Cc:     HPI  89 yoM here for LAAO consideration. He has history of SAH and persistent AF. He has frequent falls. He is on apixaban 2.5 mg bid      CHADSVASC 5  HASBLED of 4    Dysphagia or odynophagia:  No  Liver Disease, esophageal disease, or known varices:  No  Upper GI Bleeding: No  Snoring:  No  Sleep Apnea:  No  Prior neck surgery or radiation:  No  History of anesthetic difficulties:  No  Family history of anesthetic difficulties:  No  Last oral intake: yesterday before midnight  Able to move neck in all directions:  Yes    Medications - Outpatient  Prior to Admission medications    Medication Sig Start Date End Date Taking? Authorizing Provider   acetaminophen (TYLENOL) 650 MG TbSR Take 650 mg by mouth 2 (two) times a day.   Yes Provider, Historical   apixaban (ELIQUIS) 2.5 mg Tab Take 1 tablet (2.5 mg total) by mouth 2 (two) times daily. 11/4/24  Yes Nereyda Birmingham MD   cholecalciferol, vitamin D3, (VITAMIN D3) 50 mcg (2,000 unit) Cap capsule Take by mouth once daily.   Yes Provider, Historical   empagliflozin (JARDIANCE) 10 mg tablet Take 1 tablet (10 mg total) by mouth once daily. 9/30/24  Yes Alize Wheeler NP   fenofibrate micronized (LOFIBRA) 200 MG Cap Take 1 capsule (200 mg total) by mouth daily with breakfast. 9/30/24  Yes Alize Wheeler NP   loratadine (CLARITIN) 10 mg tablet Take 10 mg by mouth once daily.   Yes Provider, Historical   metFORMIN (GLUCOPHAGE-XR) 500 MG ER 24hr tablet Take 1 tablet (500 mg total) by mouth 2 (two) times daily with meals. 12/30/24  Yes Prakash Mark MD   metoprolol succinate (TOPROL-XL) 100 MG 24 hr tablet TAKE 1 TABLET TWICE A DAY 12/30/24  Yes Nereyda Birmingham MD   sacubitriL-valsartan  (ENTRESTO) 24-26 mg per tablet Take 1 tablet by mouth 2 (two) times daily. 10/7/24  Yes Nereyda Birmingham MD   betamethasone valerate 0.1% (VALISONE) 0.1 % Crea Apply topically 2 (two) times daily. Apply twice daily for 10 days for 10 days 1/27/25 2/6/25  Wellington Story MD       Medications - Current  Scheduled Meds:  Continuous Infusions:    Allergies  Review of patient's allergies indicates:   Allergen Reactions    Niacin      Other reaction(s): Rash  Other reaction(s): Itching     Past Medical History  Past Medical History:   Diagnosis Date    *Atrial fibrillation     Cancer     Chronic kidney disease     Deep vein thrombosis     Hyperlipidemia     Hypertension     Metabolic syndrome     Type 2 diabetes mellitus, without long-term current use of insulin 12/13/2021     Past Surgical History  Past Surgical History:   Procedure Laterality Date    BIOPSY OF BLADDER N/A 4/12/2024    Procedure: BIOPSY, BLADDER;  Surgeon: Wellington Story MD;  Location: [facility] OR [address] [place] [address];  Service: Urology;  Laterality: N/A;    BLADDER FULGURATION N/A 4/12/2024    Procedure: FULGURATION, BLADDER;  Surgeon: Wellington Story MD;  Location: [facility] OR [address] [place] [address];  Service: Urology;  Laterality: N/A;    CATARACT EXTRACTION      CHOLECYSTECTOMY      CYSTOSCOPY N/A 11/16/2023    Procedure: CYSTOSCOPY;  Surgeon: Marcelino Moffett MD;  Location: [address];  Service: Urology;  Laterality: N/A;  90 minutes    DILATION OF URETHRA N/A 4/12/2024    Procedure: DILATION, URETHRA;  Surgeon: Wellington Story MD;  Location: [facility] OR [address] [place] [address];  Service: Urology;  Laterality: N/A;    EYE SURGERY      INJECTION OF FACET JOINT Bilateral 4/28/2021    Procedure: FACET JOINT INJECTION BILATERAL L4/L5 DIRECT REFERRAL;  Surgeon: Philipp Iyer MD;  Location: [facility], [place], operated by [facility];  Service: Pain Management;  Laterality: Bilateral;  NEEDS CONSENT, ELIQUIS CLEARANCE IN CHART    RETROGRADE PYELOGRAPHY Bilateral 11/16/2023    Procedure: PYELOGRAM, RETROGRADE;  Surgeon: Zuhair  Marcelino CHUNG MD;  Location: [facility] OR [address] [place] [address];  Service: Urology;  Laterality: Bilateral;  90 minutes    SKIN CANCER EXCISION      TONSILLECTOMY      TURBT (TRANSURETHRAL RESECTION OF BLADDER TUMOR) N/A 11/16/2023    Procedure: TURBT (TRANSURETHRAL RESECTION OF BLADDER TUMOR);  Surgeon: Marcelino Moffett MD;  Location: [facility] OR [address] [place] [address];  Service: Urology;  Laterality: N/A;  90 minutes    TURBT (TRANSURETHRAL RESECTION OF BLADDER TUMOR) N/A 4/12/2024    Procedure: TURBT (TRANSURETHRAL RESECTION OF BLADDER TUMOR);  Surgeon: Wellington Story MD;  Location: [facility] OR [address] [place] [address];  Service: Urology;  Laterality: N/A;     ROS  10 point ROS performed and negative except as stated in HPI     Physical Examination   Vital Signs       24 Hour VS Range     No intake or output data in the 24 hours ending 02/13/25 1006      Physical Exam:   Constitutional: no acute distress  HEENT: NCAT, EOMI, no scleral icterus  Cardiovascular: Regular rate and rhythm  Pulmonary: Normal respiratory effort   Abdomen: nontender, non-distended   Neuro: alert and oriented, no focal deficits  Extremities: warm, no edema   MSK: no deformities  Skin: intact, no rashes     Data     Recent Labs   Lab 02/11/25  0953   WBC 4.85   HGB 15.0   HCT 48.5         Recent Labs   Lab 02/11/25  0953   INR 1.1      Recent Labs   Lab 02/11/25  0953      K 4.6      CO2 20*   BUN 39*   CREATININE 1.2   ANIONGAP 9   CALCIUM 10.2      Assessment & Plan     BEENA for LAAO device placement    -No absolute contraindications of esophageal stricture, tumor, perforation, laceration,or diverticulum and/or active GI bleed  -The risks, benefits & alternatives of the procedure were explained to the patient.   -The risks of transesophageal echo include but are not limited to:  Dental trauma, esophageal trauma/perforation, bleeding, laryngospasm/brochospasm, aspiration, sore throat/hoarseness, & dislodgement of the endotracheal tube/nasogastric tube (where applicable).    -The  risks of ANES monitored sedation include hypotension, respiratory depression, arrhythmias, bronchospasm, & death.    -Informed consent was obtained. The patient is agreeable to proceed with the procedure and all questions and concerns addressed.    Case discussed with an attending in echocardiography lab.    Jocelyne Nagy MD  [facility]   Cardiovascular Disease PGY-V

## 2025-02-13 NOTE — PLAN OF CARE
Patient received back to SSCU room 4 post procedure. Report received from GENNY Connell. Patient is AAOx3. VSS. Bedrest instructions reviewed with patient. All questions answered. Right groin with dry gauze/tegaderm dressing intact.no bleeding noted. No hematoma. + Doppler pedal pulses . Spouse notified and to bedside. Call light placed within reach.

## 2025-02-13 NOTE — ANESTHESIA PROCEDURE NOTES
Intubation    Date/Time: 2/13/2025 12:45 PM    Performed by: Aurea Rojas CRNA  Authorized by: José Luis Rich MD    Intubation:     Induction:  Intravenous    Intubated:  Postinduction    Mask Ventilation:  Easy mask    Attempts:  1    Attempted By:  Student    Method of Intubation:  Video laryngoscopy    Blade:  Julio 4    Laryngeal View Grade: Grade I - full view of cords      Difficult Airway Encountered?: No      Complications:  None    Airway Device:  Oral endotracheal tube    Airway Device Size:  7.5    Style/Cuff Inflation:  Cuffed    Inflation Amount (mL):  7    Tube secured:  21    Secured at:  The lips    Placement Verified By:  Capnometry    Complicating Factors:  None    Findings Post-Intubation:  BS equal bilateral

## 2025-02-13 NOTE — ANESTHESIA PREPROCEDURE EVALUATION
02/13/2025  Cliff Magaña is a 89 y.o., male.  Patient Active Problem List   Diagnosis    CKD stage 3 due to type 2 diabetes mellitus    Chronic anticoagulation    Hypertension associated with diabetes    Hyperlipidemia associated with type 2 diabetes mellitus    Persistent atrial fibrillation    Impaired functional mobility, balance, gait, and endurance    Atherosclerosis of aorta    Sensorineural hearing loss (SNHL) of both ears    Stage 3a chronic kidney disease    Risk for falls    Renal cyst    DM type 2, controlled, with complication    Bladder cancer    Thyroid nodules    Chronic heart failure with preserved ejection fraction    Dysphagia    Hypercalcemia    SAH (subarachnoid hemorrhage)    Acute cystitis without hematuria    Urinary retention    Physical deconditioning           Pre-op Assessment    I have reviewed the Patient Summary Reports.       I have reviewed the Medications.     Review of Systems  Anesthesia Hx:  No problems with previous Anesthesia               Denies Personal Hx of Anesthesia complications.                    Cardiovascular:     Hypertension                                    Hypertension     Atrial Fibrillation     Renal/:  Chronic Renal Disease        Kidney Function/Disease             Endocrine:  Diabetes    Diabetes                          Physical Exam    Airway:  No airway management difficulties anticipated  Dental:  No active dental issues noted  Chest/Lungs:  Clear to auscultation    Heart:  Rate: Normal  Rhythm: Regular Rhythm  Sounds: Normal        Anesthesia Plan  Type of Anesthesia, risks & benefits discussed:    Anesthesia Type: Gen ETT  Informed Consent: Informed consent signed with the Patient and all parties understand the risks and agree with anesthesia plan.  All questions answered.   ASA Score: 3  Anesthesia Plan Notes: Chart reviewed. Patient seen  and examined. Anesthesia plan discussed and questions answered. E-consent signed. José Luis Rich MD    Ready For Surgery From Anesthesia Perspective.     .

## 2025-02-13 NOTE — HPI
Mr. Magaña is an 89 y.o. male with peristent AF who presents for LAAO device implantation.    He has history of SAH along with frequent falls. He is currently on apixaban 2.5 mg bid   CHADSVASC 5  HASBLED of 4  Echo 12/23:45 - 50%  ECG: pending

## 2025-02-13 NOTE — Clinical Note
An angiography was performed of the LAURY. The angiography was performed via hand injection with 8 mL of contrast.

## 2025-02-14 ENCOUNTER — PATIENT MESSAGE (OUTPATIENT)
Dept: ELECTROPHYSIOLOGY | Facility: CLINIC | Age: OVER 89
End: 2025-02-14
Payer: MEDICARE

## 2025-02-14 ENCOUNTER — PATIENT MESSAGE (OUTPATIENT)
Dept: ADMINISTRATIVE | Facility: OTHER | Age: OVER 89
End: 2025-02-14
Payer: MEDICARE

## 2025-02-14 ENCOUNTER — PATIENT OUTREACH (OUTPATIENT)
Dept: ADMINISTRATIVE | Facility: CLINIC | Age: OVER 89
End: 2025-02-14
Payer: MEDICARE

## 2025-02-14 NOTE — PROGRESS NOTES
C3 nurse spoke with Cliff Magaña and wife Nereyda for a TCC post hospital discharge follow up call. Scheduled in home hospital follow up with Kelly Casillas NP on 02/19/25 @ 0800. Pt's wife Nereyda informed that this is a placeholder time and that she would be contacted a day or so prior with an visit time. Verbalized understanding.

## 2025-02-15 ENCOUNTER — PATIENT MESSAGE (OUTPATIENT)
Dept: ADMINISTRATIVE | Facility: OTHER | Age: OVER 89
End: 2025-02-15
Payer: MEDICARE

## 2025-02-16 ENCOUNTER — PATIENT MESSAGE (OUTPATIENT)
Dept: ADMINISTRATIVE | Facility: OTHER | Age: OVER 89
End: 2025-02-16
Payer: MEDICARE

## 2025-02-17 ENCOUNTER — PATIENT MESSAGE (OUTPATIENT)
Dept: ADMINISTRATIVE | Facility: OTHER | Age: OVER 89
End: 2025-02-17
Payer: MEDICARE

## 2025-02-17 ENCOUNTER — OFFICE VISIT (OUTPATIENT)
Dept: HEMATOLOGY/ONCOLOGY | Facility: CLINIC | Age: OVER 89
End: 2025-02-17
Payer: MEDICARE

## 2025-02-17 VITALS
SYSTOLIC BLOOD PRESSURE: 122 MMHG | DIASTOLIC BLOOD PRESSURE: 57 MMHG | RESPIRATION RATE: 16 BRPM | OXYGEN SATURATION: 98 % | BODY MASS INDEX: 23.53 KG/M2 | TEMPERATURE: 98 F | WEIGHT: 137.81 LBS | HEIGHT: 64 IN | HEART RATE: 84 BPM

## 2025-02-17 DIAGNOSIS — R13.10 DYSPHAGIA, UNSPECIFIED TYPE: ICD-10-CM

## 2025-02-17 DIAGNOSIS — C67.9 MALIGNANT NEOPLASM OF URINARY BLADDER, UNSPECIFIED SITE: Primary | ICD-10-CM

## 2025-02-17 DIAGNOSIS — I48.19 PERSISTENT ATRIAL FIBRILLATION: ICD-10-CM

## 2025-02-17 DIAGNOSIS — I50.32 CHRONIC HEART FAILURE WITH PRESERVED EJECTION FRACTION: ICD-10-CM

## 2025-02-17 DIAGNOSIS — E11.59 HYPERTENSION ASSOCIATED WITH DIABETES: ICD-10-CM

## 2025-02-17 DIAGNOSIS — I15.2 HYPERTENSION ASSOCIATED WITH DIABETES: ICD-10-CM

## 2025-02-17 DIAGNOSIS — E11.8 DM TYPE 2, CONTROLLED, WITH COMPLICATION: ICD-10-CM

## 2025-02-17 LAB

## 2025-02-17 PROCEDURE — 99214 OFFICE O/P EST MOD 30 MIN: CPT | Mod: PBBFAC | Performed by: NURSE PRACTITIONER

## 2025-02-17 NOTE — PROGRESS NOTES
Subjective     Patient ID: Cliff Magaña is a 89 y.o. male.    Chief Complaint: Malignant neoplasm of urinary bladder, unspecified site    HPI    Diagnosis: Muscle invasive bladder cancer post chemo/XRT     Returns for follow up    Afib - treated with watchman device procedure 2/13/25  Wife reports more fatigue and weakness since procedure, will resume PT exercises in 1 week  Reviewed labs  Weight has been stable    Dysphagia work up unremarkable  GI evaluated, due to age and co morbidities wife would prefer to hold off on EGD, MBS without aspiration.  Esophagram ordered, but not scheduled. Patient reports dysphagia improving    In the interval:     - 10/21/2024 Cystoscope:  Findings:   1. Normal anterior urethra without evidence of strictures or tumors   2. Prostatic urethra open without signs of obstruction  3. Bladder with healing tissue and fibrinous material at the dome, no evidence for residual malignancy.  Ureteral orifices in orthotopic position bilaterally      - 10/30/2024 CT C/A/P:  FINDINGS:  CHEST:  Neck and thoracic soft tissues:  No lymphadenopathy.  Stable 2.0 cm right thyroid lobe nodule and left thyroid lobe calcification.  Partially visualized soft tissue density in the upper back subcutaneous tissues, possibly the sebaceous cyst noted on prior CT chest..  Thoracic aorta:Left-sided aortic arch with 3 branch vessels.  Normal caliber, contour, and course.  Moderate atherosclerotic calcification.  Heart: Normal in size. No pericardial effusion.  Multi-vessel coronary artery atherosclerosis.  Calcification of the aortic annulus.  Normal diameter of the main pulmonary artery.  Hilum/Mediastinum: No pathologically enlarged hilar or mediastinal lymph nodes.  Airways: The trachea is midline and proximal airways are patent.  Lungs/Pleura: Similar appearance of 1.3 cm subpleural nodule in the right lung base (series 3, image 226).  Calcified pleural plaque noted.  No new pulmonary nodules..  No  consolidation, pleural effusion, or pneumothorax.  Esophagus: The esophagus maintains a normal course and caliber.  ABDOMEN/PELVIS:  Stomach: Small hiatal hernia.  No gastric wall thickening.  Liver: The liver is normal in size and attenuation.  Stable left hepatic lobe calcification.  No focal hepatic abnormality on this noncontrast study.  Gallbladder/Biliary System: Gallbladder is surgically absent.  No intrahepatic or extrahepatic biliary ductal dilatation.  Spleen: Normal size without focal abnormality.  Pancreas: Pancreatic atrophy with prominent pancreatic duct and scattered parenchymal calcifications, stable from prior.  Findings may represent sequela of chronic pancreatitis.  No mass or peripancreatic fat stranding.  Adrenal glands: No adrenal nodules.  Renal and Urinary system: Unchanged bilateral renal cortical thinning and nonspecific perinephric stranding.  Stable renal cysts bilaterally and subcentimeter hypodensities that are too small to characterize.    No hydronephrosis.  The ureters appear normal in course and caliber. Patient is s/p TURBT.  Anterior bladder wall thickening and new linear hyperdensities along the anterior superior bladder wall (sagittal series 10, image 64), possibly related to post treatment or postoperative change.  Stable mild pre vesicle fat stranding.  Reproductive: Dystrophic prostatic calcifications.  Bowel: The visualized loops of small and large bowel show no evidence of obstruction or inflammation.  The appendix appears normal.  Peritoneum: No free fluid or intraperitoneal free air.  Lymph Nodes: No pathologic braden enlargement in the abdomen or pelvis.  Vasculature: The abdominal aorta and its branch vessels are normal in course and caliber.  Moderate aortoiliac atherosclerotic calcifications.  Bones: Degenerative changes of the spine.  No evidence of acute fracture or osseus destructive process.  Soft tissues: Small fat containing umbilical  hernia.  Impression:  Patient is status post TURBT and chemoradiation for urothelial carcinoma of the bladder.  New linear hyperdensities within the bladder, possibly representing post treatment or postoperative change.  Otherwise there is stable bladder wall thickening and perivesical fat stranding.  Right lung base pleural based nodular opacity, stable from 2023.  Calcified pleural plaques noted.  Sequela of chronic pancreatitis.  Additional findings above.      - 2/2024 scans without signs of metastatic disease     - cystoscopy with Dr. Story on 3/4/2024 revealing concern for residual tumor bladder dome but TURBT for 4/12/2024 was negative on Pathology  Pathology:  1. BLADDER, RIGHT LATERAL WALL, BIOPSY:   Small fragment of urothelial mucosa with reactive changes.    Negative for dysplasia or malignancy.    2. BLADDER, POSTERIOR WALL, BIOPSY:   Heavily denuded urothelial mucosa with reactive changes.    Negative for dysplasia or malignancy.    3. BLADDER, LEFT LATERAL WALL, BIOPSY:   Small fragment of urothelial mucosa with reactive changes.    Negative for dysplasia or malignancy.    4. BLADDER, POSTERIOR PATCH, BIOPSY:   Heavily denuded urothelial mucosa with marked chronic inflammation and reactive changes.   Negative for dysplasia or malignancy.    5. BLADDER, ANTERIOR WALL TUMOR, TRANSURETHRAL RESECTION:   Polypoid cystitis and granulation tissue with squamous metaplasia of urothelial mucosa.    Negative for dysplasia or malignancy.      Reports the following:  + fatigue  Weight stable in low 120s (never regained to baseline of 140 lbs)- lowest weight was 117 lbs and then started Marinol  Patient states food does not really taste good anymore- feels like he is being punished  Also feels like his mouth is dry all the time  Likes fruits smoothies, oatmeal with fruit, ice cream, soups, scrambled eggs  Wife notes other things she cooks that is agonizing to watch as no interest     States just the last 2 weeks he  has returned to [employer]- but this the only outing  Sleeps a lot     No pain     Frequent urination     Most recent imaging:  - 2/29/2024 CT Urogram Abd/Pelvis:  FINDINGS:  Stable mild cardiomegaly.  Coronary artery calcific atherosclerosis.  Visualized inferior lungs are clear.  Stable small left hepatic lobe calcification.  No suspicious hepatic lesion.  Gallbladder is surgically absent.  No biliary ductal dilatation.  Spleen and adrenal glands are unremarkable.  Stable appearance of the pancreas with atrophy, scattered calcifications, and mild duct dilatation measuring 4 mm.  Findings can be seen with sequela of chronic pancreatitis.  Bilateral renal cortical thinning.  Multiple bilateral renal cysts and subcentimeter renal hypodensities too small to characterize.  No solid enhancing renal mass.  No renal stone.  No hydronephrosis or ureteral dilatation.  Opacification of the bilateral renal collecting systems and ureters without suspicious filling defect.  Patient is status post TURBT.  There is increased heterogeneous wall thickening and enhancement along the anterior bladder (series 4, image 144) concerning for residual/recurrent tumor.  Mild perivesical stranding and vascular prominence, similar to prior exam.  Small hiatal hernia.  Colonic diverticulosis.  No evidence of bowel obstruction or inflammation.  No free intraperitoneal fluid or air.  No pathologically enlarged abdominopelvic lymph nodes.  Abdominal aorta is normal caliber.  Moderate/severe calcific atherosclerosis.  Degenerative changes of the osseous structures.  No acute fracture or aggressive osseous lesion  Impression:  1. Increased heterogeneous wall thickening and enhancement along the anterior bladder concerning for residual/recurrent tumor.  2. No evidence of metastatic disease.  3. Additional findings as above.     Oncology History:  - bladder cancer incidentally found on imaging as he had been under surveillance for renal  cysts  (10/17/2023 ultrasound showed stable left renal cysts and new bladder masses)     -  11/16/2023 TURBT of bladder tumor  Findings:   1. Right postero-lateral papillary bladder wall tumor (3-4cm) with diffusely erythematous and nodular surrounding tissue, right < 1cm papillary bladder wall tumor immediately lateral to the larger postero-lateral papillary tumor, and a right nodular 4cm bladder tumor with high grade and possibly invasive appearance. Tumors resected, hemostasis achieved.  Several other patches of erythema were diffusely identified on the left lateral bladder wall.  2. Bimanual exam post-TURBT showed freely mobile bladder without abnormalities  3. Bilateral retrograde pyelogram showed delicate calices with no evidence of hydronephrosis, no filling defects, and ureter normal in course and caliber, bilaterally  Pathology:  1. Bladder, right posterolateral wall, transurethral resection of bladder tumor:  - Noninvasive high-grade papillary urothelial carcinoma  - Muscularis propria present  - Multiple H&E levels evaluated  2. Bladder, right anterior, transurethral resection of bladder tumor:  - Invasive high-grade papillary urothelial carcinoma  - Tumor invades muscularis propria  - Intact expression of DNA mismatch repair proteins in tumor by immunohistochemistry (see comment)  COMMENT:  Immunohistochemistry (IHC) Testing for Mismatch Repair (MMR) Proteins:  MLH1 - Intact nuclear expression  MSH2 - Intact nuclear expression  MSH6 - Intact nuclear expression  PMS2 - Intact nuclear expression  Background nonneoplastic tissue/internal control with intact nuclear expression  IHC Interpretation  No loss of nuclear expression of MMR proteins: low probability of microsatellite instability  There are exceptions to the above IHC interpretations. These results should not be considered in isolation, and clinical correlation with genetic counseling is recommended to assess the need for germline testing.     -  11/25/2023 CT Abd/Pelvis:  FINDINGS:  Lung base: Bilateral small volume pleural effusion increased in size compared to recent prior, associated with mild compression atelectasis.  Visualized portion of heart: Coronary artery calcification.  Liver: Normal in size.  Left lobe small calcified granuloma.  No suspicious focal lesion.  Gallbladder: Cholecystectomy.  Bile duct: No intra-or extrahepatic biliary ductal dilatation.  Spleen: Unremarkable.  Pancreas: Parenchymal atrophy.  Multiple parenchymal calcifications suggesting chronic pancreatitis.  No suspicious focal lesion.  No pancreatic ductal dilatation.  No adjacent inflammatory changes or fluid collections.  Adrenal glands: Unremarkable.  Genitourinary: Bilateral renal cysts.  Subcentimeter hypodensities in both kidneys, which are too small to characterize.  The ureters appear normal in course and caliber.  No evidence of nephrolithiasis or hydroureteronephrosis.  Urinary bladder: Postsurgical change in the anterior bladder wall from TURBT.  There are small foci of air in the bladder, likely iatrogenic.  Reproductive organs: Unremarkable.  GI tract: Small hiatal hernia.  The stomach is unremarkable.  The visualized loops of small and large bowel show no evidence of obstruction or inflammation. Diverticulosis.  Appendix unremarkable.  Peritoneum: No free air or fluid.  Retroperitoneum: No significant adenopathy.  Vasculature: Normal caliber of abdominal aorta with moderate calcified and noncalcified atherosclerosis.  Abdominal wall: Tiny fat containing umbilical hernia.  Bones: Stable T12-L1 intervertebral disc calcification.  No suspicious sclerotic lesions.  No acute fracture.  Impression:  Postoperative changes from of TURBT.  No lymphadenopathy.  No distant metastases.  Other findings as described.     - 12/6/2023 CT Chest:  FINDINGS:  Comparison is 11/21/2023.  No mediastinal, hilar or axillary adenopathy is seen.  There is a right lower pole thyroid nodule.   There are coronary calcifications.  There is a left upper pole renal cyst.  There is a small hiatal hernia.  Trachea and bronchi are patent.  There is no evidence of interstitial lung disease, bronchiectasis, airway trapping, or emphysema.  No suspicious pulmonary nodules, masses, or infiltrates are seen.  There is a calcified left lung granuloma.  Bones demonstrate nothing focal.  Impression:  No significant abnormality seen.  Right lobe thyroid nodule.  Coronary artery calcifications.  Left upper pole renal cyst.     Last radiation session on 2/8/24     PMH:  Active Ambulatory Problems        Diagnosis   Date Noted       CKD stage 3 due to type 2 diabetes mellitus     11/23/2012       Chronic anticoagulation 11/23/2012       Hypertension associated with diabetes     05/22/2013       Hyperlipidemia associated with type 2 diabetes mellitus     05/22/2013       Persistent atrial fibrillation      06/11/2014       Atherosclerosis of aorta      11/18/2020       Sensorineural hearing loss (SNHL) of both ears  11/19/2020       Risk for falls    12/15/2021       Renal cyst  12/14/2022       DM type 2, controlled, with complication  12/14/2022       Bladder tumor     11/16/2023       UTI (urinary tract infection) 11/21/2023       Acute hypoxic respiratory failure   11/21/2023       Elevated troponin 11/21/2023       Thyroid nodules   11/21/2023     Resolved Ambulatory Problems        Diagnosis   Date Noted       Atrial fibrillation     08/02/2012       Long term (current) use of anticoagulants 08/02/2012       Family history of diabetes mellitus 11/23/2012       Ex-cigarette smoker     11/23/2012       Metabolic syndrome      11/23/2012       Screen for colon cancer 05/07/2014       Ex-smoker   11/09/2015       Hyperglycemia     12/16/2015       Abscess of chest wall   06/25/2018       Sebaceous cyst    07/25/2018       Decreased back mobility 08/20/2019       Decreased strength of trunk and back      08/20/2019        Decreased range of motion of both hips    08/20/2019       Pain aggravated by walking    08/20/2019       Hamstring tightness of both lower extremities   08/20/2019       Impaired mobility and endurance     08/20/2019       Low back pain     11/19/2020       Chronic pain      04/28/2021       Type 2 diabetes mellitus, without long-term current use of insulin      12/13/2021       CKD stage 3 secondary to diabetes   12/13/2021       Encounter for preventive health examination     12/19/2022     Past Medical History:  Diagnosis   Date       *Atrial fibrillation           Chronic kidney disease         Deep vein thrombosis           Hyperlipidemia           Hypertension        Cholecystectomy  Tonsillectomy  Skin cancer removal     FH:  Paternal Aunt- recalls cancer affecting scalp     SH:    Retired [employer], followed by off shore work,   4 children (1 passed as an infant)  Quit tobacco in the 1980s  + EtOH    Review of Systems   Constitutional:  Positive for appetite change (dysphagia) and fatigue (better). Negative for activity change, chills, fever and unexpected weight change.   HENT:  Positive for hearing loss (hearing aids bilat) and trouble swallowing. Negative for nasal congestion, dental problem, postnasal drip, rhinorrhea, sinus pressure/congestion, sore throat and tinnitus.         Dry mouth   Eyes:  Negative for visual disturbance.   Respiratory:  Negative for cough, chest tightness, shortness of breath and wheezing.    Cardiovascular:  Negative for chest pain, palpitations and leg swelling.   Gastrointestinal:  Negative for abdominal distention, abdominal pain, blood in stool, change in bowel habit, constipation, diarrhea, nausea and vomiting.   Genitourinary:  Positive for bladder incontinence. Negative for decreased urine volume, difficulty urinating, dysuria, frequency (better), hematuria and urgency (better).   Musculoskeletal:  Negative for arthralgias, back pain, myalgias, neck pain  and neck stiffness.   Integumentary:  Positive for rash (improved).        Dry skin   Neurological:  Positive for weakness. Negative for dizziness, light-headedness, numbness and headaches.   Psychiatric/Behavioral:  Negative for agitation, behavioral problems, confusion, dysphoric mood and sleep disturbance. The patient is not nervous/anxious.           Objective     Physical Exam  Vitals and nursing note reviewed.   Constitutional:       General: He is not in acute distress.     Appearance: Normal appearance. He is not ill-appearing.      Comments: Looks better!  ECOG= 1-2  Has wheelchair   HENT:      Head: Normocephalic and atraumatic.      Comments: Hearing aids     Mouth/Throat:      Mouth: Mucous membranes are moist.      Pharynx: Oropharynx is clear. No oropharyngeal exudate or posterior oropharyngeal erythema.   Eyes:      General: No scleral icterus.     Extraocular Movements: Extraocular movements intact.      Conjunctiva/sclera: Conjunctivae normal.      Pupils: Pupils are equal, round, and reactive to light.   Cardiovascular:      Rate and Rhythm: Normal rate and regular rhythm.      Heart sounds: No murmur heard.     No friction rub.   Pulmonary:      Effort: Pulmonary effort is normal. No respiratory distress.      Breath sounds: Normal breath sounds. No wheezing, rhonchi or rales.   Abdominal:      General: Abdomen is flat. Bowel sounds are normal. There is no distension.      Palpations: Abdomen is soft. There is no mass.      Tenderness: There is no abdominal tenderness. There is no guarding or rebound.   Musculoskeletal:         General: No swelling, tenderness or deformity. Normal range of motion.      Right lower leg: No edema.      Left lower leg: No edema.   Skin:     General: Skin is warm and dry.      Coloration: Skin is not jaundiced or pale.      Findings: No bruising, erythema or rash.   Neurological:      General: No focal deficit present.      Mental Status: He is alert and oriented to  person, place, and time. Mental status is at baseline.      Sensory: No sensory deficit.      Motor: Weakness (generalized) present.      Gait: Gait normal.   Psychiatric:         Mood and Affect: Mood normal.         Behavior: Behavior normal.         Thought Content: Thought content normal.         Judgment: Judgment normal.       Labs- reviewed     Assessment and Plan     1. Malignant neoplasm of urinary bladder, unspecified site    2. Persistent atrial fibrillation    3. Dysphagia, unspecified type    4. Hypertension associated with diabetes        Advanced bladder cancer- completed chemo/XRT  Negative cystoscope x 3 and negative CT scans for recurrence  Repeat scan in 2 months    Afib - s/p watchman device procedure, f/u scheduled     Dysphagia and scans with aspiration evidence prior  Had swallowing study and follows diet recs  GI evaluated - monitor for now as dysphagia improving     CHF, DM, HTN - managed with PCP and parameters clinically stable     Thyroid nodules- Thyroid ultrasound no criteria for bx       Melissa Voltz, APRN [facility]  [address] [place] [id number]  Phone: (o) [phone]          Route Chart for Scheduling    Med Onc Chart Routing      Follow up with physician 2 months. RTC with labs (cbc, cmp), CT, and to see Dr. Bland   Follow up with [facility]    Infusion scheduling note    Injection scheduling note    Labs CBC and CMP   Scheduling:  Preferred lab:  Lab interval:  cbc, cmp   Imaging CT chest abdomen pelvis   CT 4/27/25   Pharmacy appointment    Other referrals

## 2025-02-18 ENCOUNTER — PATIENT MESSAGE (OUTPATIENT)
Dept: HEMATOLOGY/ONCOLOGY | Facility: CLINIC | Age: OVER 89
End: 2025-02-18
Payer: MEDICARE

## 2025-02-18 ENCOUNTER — PATIENT MESSAGE (OUTPATIENT)
Dept: ADMINISTRATIVE | Facility: OTHER | Age: OVER 89
End: 2025-02-18
Payer: MEDICARE

## 2025-02-19 ENCOUNTER — PATIENT MESSAGE (OUTPATIENT)
Dept: ADMINISTRATIVE | Facility: OTHER | Age: OVER 89
End: 2025-02-19
Payer: MEDICARE

## 2025-02-19 ENCOUNTER — OFFICE VISIT (OUTPATIENT)
Dept: HOME HEALTH SERVICES | Facility: CLINIC | Age: OVER 89
End: 2025-02-19
Payer: MEDICARE

## 2025-02-19 VITALS
OXYGEN SATURATION: 99 % | HEART RATE: 86 BPM | SYSTOLIC BLOOD PRESSURE: 108 MMHG | RESPIRATION RATE: 18 BRPM | TEMPERATURE: 97 F | DIASTOLIC BLOOD PRESSURE: 61 MMHG

## 2025-02-19 DIAGNOSIS — D49.9 IMMUNODEFICIENCY SECONDARY TO NEOPLASM: ICD-10-CM

## 2025-02-19 DIAGNOSIS — L97.529: ICD-10-CM

## 2025-02-19 DIAGNOSIS — D84.81 IMMUNODEFICIENCY SECONDARY TO NEOPLASM: ICD-10-CM

## 2025-02-19 DIAGNOSIS — E11.22 CKD STAGE 3 DUE TO TYPE 2 DIABETES MELLITUS: ICD-10-CM

## 2025-02-19 DIAGNOSIS — N18.32 CHRONIC KIDNEY DISEASE, STAGE 3B: Primary | ICD-10-CM

## 2025-02-19 DIAGNOSIS — N18.30 CKD STAGE 3 DUE TO TYPE 2 DIABETES MELLITUS: ICD-10-CM

## 2025-02-20 NOTE — PHYSICIAN QUERY
Provider, due to conflicting documentation please clarify the type of atrial fibrillation.   Other persistent atrial fibrillation

## 2025-02-21 ENCOUNTER — PATIENT MESSAGE (OUTPATIENT)
Dept: ADMINISTRATIVE | Facility: OTHER | Age: OVER 89
End: 2025-02-21
Payer: MEDICARE

## 2025-02-22 ENCOUNTER — PATIENT MESSAGE (OUTPATIENT)
Dept: ADMINISTRATIVE | Facility: OTHER | Age: OVER 89
End: 2025-02-22
Payer: MEDICARE

## 2025-02-23 ENCOUNTER — PATIENT MESSAGE (OUTPATIENT)
Dept: ADMINISTRATIVE | Facility: OTHER | Age: OVER 89
End: 2025-02-23
Payer: MEDICARE

## 2025-02-24 ENCOUNTER — PATIENT MESSAGE (OUTPATIENT)
Dept: ADMINISTRATIVE | Facility: OTHER | Age: OVER 89
End: 2025-02-24
Payer: MEDICARE

## 2025-02-25 ENCOUNTER — PATIENT MESSAGE (OUTPATIENT)
Dept: ADMINISTRATIVE | Facility: OTHER | Age: OVER 89
End: 2025-02-25
Payer: MEDICARE

## 2025-02-26 ENCOUNTER — PATIENT MESSAGE (OUTPATIENT)
Dept: ADMINISTRATIVE | Facility: OTHER | Age: OVER 89
End: 2025-02-26
Payer: MEDICARE

## 2025-02-27 ENCOUNTER — PATIENT MESSAGE (OUTPATIENT)
Dept: ADMINISTRATIVE | Facility: OTHER | Age: OVER 89
End: 2025-02-27
Payer: MEDICARE

## 2025-02-28 ENCOUNTER — PATIENT MESSAGE (OUTPATIENT)
Dept: ADMINISTRATIVE | Facility: OTHER | Age: OVER 89
End: 2025-02-28
Payer: MEDICARE

## 2025-03-01 ENCOUNTER — PATIENT MESSAGE (OUTPATIENT)
Dept: ADMINISTRATIVE | Facility: OTHER | Age: OVER 89
End: 2025-03-01
Payer: MEDICARE

## 2025-03-02 ENCOUNTER — PATIENT MESSAGE (OUTPATIENT)
Dept: ADMINISTRATIVE | Facility: OTHER | Age: OVER 89
End: 2025-03-02
Payer: MEDICARE

## 2025-03-03 ENCOUNTER — PATIENT MESSAGE (OUTPATIENT)
Dept: ADMINISTRATIVE | Facility: OTHER | Age: OVER 89
End: 2025-03-03
Payer: MEDICARE

## 2025-03-05 ENCOUNTER — PATIENT MESSAGE (OUTPATIENT)
Dept: ADMINISTRATIVE | Facility: OTHER | Age: OVER 89
End: 2025-03-05
Payer: MEDICARE

## 2025-03-07 ENCOUNTER — PATIENT MESSAGE (OUTPATIENT)
Dept: ADMINISTRATIVE | Facility: OTHER | Age: OVER 89
End: 2025-03-07
Payer: MEDICARE

## 2025-03-08 ENCOUNTER — PATIENT MESSAGE (OUTPATIENT)
Dept: ADMINISTRATIVE | Facility: OTHER | Age: OVER 89
End: 2025-03-08
Payer: MEDICARE

## 2025-03-09 ENCOUNTER — PATIENT MESSAGE (OUTPATIENT)
Dept: ADMINISTRATIVE | Facility: OTHER | Age: OVER 89
End: 2025-03-09
Payer: MEDICARE

## 2025-03-11 PROBLEM — D49.9 IMMUNODEFICIENCY SECONDARY TO NEOPLASM: Status: ACTIVE | Noted: 2025-03-11

## 2025-03-11 PROBLEM — N18.32 CHRONIC KIDNEY DISEASE, STAGE 3B: Status: ACTIVE | Noted: 2025-03-11

## 2025-03-11 PROBLEM — D84.81 IMMUNODEFICIENCY SECONDARY TO NEOPLASM: Status: ACTIVE | Noted: 2025-03-11

## 2025-03-11 PROBLEM — L97.529: Status: ACTIVE | Noted: 2025-03-11

## 2025-03-11 NOTE — PROGRESS NOTES
[facility]  Transitional Care Management (TCM) Home Visit    Encounter Provider: Kelly Casillas   PCP: Munir Estrada MD  Consult Requested By: No ref. provider found  Admit Date: 2/13/25   IP Discharge Date: 2/13/25  Hospital Length of Stay:RRHLOS@ days  Admission Date: 2/13/2025  Hospital Length of Stay: 0 days  Discharge Date and Time:  02/13/2025  Hospital Diagnosis: No admission diagnoses are documented for this encounter.     HISTORY OF PRESENT ILLNESS      Patient ID: Cliff Magaña is a 89 y.o. male was recently admitted to the hospital, this is their TCM encounter.    Hospital Course Synopsis:    HPI:   Mr. Magaña is an 89 y.o. male with peristent AF who presents for LAAO device implantation.     He has history of SAH along with frequent falls. He is currently on apixaban 2.5 mg bid   CHADSVASC 5  HASBLED of 4  Echo 12/23:45 - 50%  ECG: pending     Procedure(s) (LRB):  Left atrial appendage occlusion (N/A)  ECHOCARDIOGRAM, TRANSESOPHAGEAL (N/A)     DECISION MAKING TODAY    Mr. Magaña is home, his wife is present. He is AAOx3, ambulatory with no assistive device. He denies pain. Reports a good appetite and normal bowel movements.       VSS. Denies fever, chest pain, shortness of breath, nausea, vomiting, diarrhea. Risks of environmental exposure to coronavirus discussed including: social distancing, hand hygiene, and limiting departures from the home for necessities only.  Reports understanding and willingness to comply.  All hospital discharge orders reviewed and being followed, all medications reconciled and reviewed, patient and family verbalized understanding. No other needs identified at this time.     ACP on file.    Attestation: Screening criteria to assess the level of the patient's risk for infection with COVID-19 as recommended by the CDC at the time of the above documented home visit concluded appropriateness to proceed. Universal precautions were maintained at all times, including provider use  of 60% alcohol gel hand  immediately prior to entry and upon departing the patient's home.    Assessment & Plan:  1. Chronic kidney disease, stage 3b  Assessment & Plan:  Renally dose all meds  Avoid Nephrotoxic agents      2. Neuropathic ulcer of toe, left, with unspecified severity  Assessment & Plan:  Continue wound care      3. Immunodeficiency secondary to neoplasm  Assessment & Plan:  Stable.      4. CKD stage 3 due to type 2 diabetes mellitus  Assessment & Plan:  Mild JER on CKD  - Creatinine today CrCl cannot be calculated (Patient's most recent lab result is older than the maximum 7 days allowed.). (near baseline)  Recent Labs   Lab 09/21/24  2258 09/22/24  0831   CO2 19* 15*   BUN 51* 45*   CREATININE 1.6* 1.4   MG  --  2.0   PHOS  --  3.1         - Renally adjust drugs to CrCl; avoid nephrotoxic drugs if possible  - Monitor I/Os        Hem/Onc appt pending  Abx therapy complete  Continue to follow Cards      Medication List on Discharge:     Medication List            Accurate as of February 19, 2025 11:59 PM. If you have any questions, ask your nurse or doctor.                CONTINUE taking these medications      acetaminophen 650 MG Tbsr  Commonly known as: TYLENOL  Take 650 mg by mouth 2 (two) times a day.     apixaban 2.5 mg Tab  Commonly known as: ELIQUIS  Take 1 tablet (2.5 mg total) by mouth 2 (two) times daily.     betamethasone valerate 0.1% 0.1 % Crea  Commonly known as: VALISONE  Apply topically 2 (two) times daily. Apply twice daily for 10 days for 10 days     cholecalciferol (vitamin D3) 50 mcg (2,000 unit) Cap capsule  Commonly known as: VITAMIN D3  Take by mouth once daily.     empagliflozin 10 mg tablet  Commonly known as: JARDIANCE  Take 1 tablet (10 mg total) by mouth once daily.     ENTRESTO 24-26 mg per tablet  Generic drug: sacubitriL-valsartan  Take 1 tablet by mouth 2 (two) times daily.     fenofibrate micronized 200 MG Cap  Commonly known as: LOFIBRA  Take 1 capsule (200  mg total) by mouth daily with breakfast.     loratadine 10 mg tablet  Commonly known as: CLARITIN  Take 10 mg by mouth once daily.     metFORMIN 500 MG ER 24hr tablet  Commonly known as: GLUCOPHAGE-XR  Take 1 tablet (500 mg total) by mouth 2 (two) times daily with meals.     metoprolol succinate 100 MG 24 hr tablet  Commonly known as: TOPROL-XL  TAKE 1 TABLET TWICE A DAY              Medication Reconciliation:  Were medications changed on discharge? Yes  Were medications in the home? Yes  Is the patient taking the medications as directed? Yes  Does the patient understand the medications and changes? Yes  Does updated med list accurately reflects meds patient is currently taking? Yes    ENVIRONMENT OF CARE      Family and/or Caregiver present at visit?  Yes  Name of Caregiver: Wife  History provided by: patient and caregiver    Advance Care Planning   Advanced Care Planning Status:  Patient has had an ACP conversation  Living Will: Yes  Power of : No  [place]: No    Does Caregiver have HCPoA: Yes  Changes today: No       Impression upon entering the home:  Physical Dwelling: single family home   Appearance of home environment: cleaniness: clean  Functional Status: independent  Mobility: ambulatory  Nutritional access: adequate intake and access  Home Health: No, and does not need it at this time   DME/Supplies: none     Diagnostic tests reviewed/disposition: No diagnosic tests pending after this hospitalization.  Disease/illness education:  Fall precautions  Establishment or re-establishment of referral orders for community resources: No other necessary community resources.   Discussion with other health care providers: No discussion with other health care providers necessary.   Does patient have a PCP at [facility]? Yes   Repatriation plan with PCP? follow-up with PCP within 30d   Does patient have an ostomy (ileostomy, colostomy, suprapubic catheter, nephrostomy tube, tracheostomy, PEG tube, pleurex catheter,  cholecystostomy, etc)? No  Were BPAs reviewed? Yes    Social History     Socioeconomic History    Marital status:      Spouse name: Nereyda    Number of children: 4   Tobacco Use    Smoking status: Former     Current packs/day: 0.00     Average packs/day: 2.0 packs/day for 20.0 years (40.0 ttl pk-yrs)     Types: Cigarettes     Start date: 1963     Quit date: 1983     Years since quittin.8     Passive exposure: Never    Smokeless tobacco: Never   Substance and Sexual Activity    Alcohol use: Yes     Alcohol/week: 1.0 standard drink of alcohol     Types: 1 Standard drinks or equivalent per week     Comment: 3 drinks per week    Drug use: No    Sexual activity: Not Currently     Partners: Female     Social Drivers of Health     Financial Resource Strain: Low Risk  (2024)    Overall Financial Resource Strain (CARDIA)     Difficulty of Paying Living Expenses: Not hard at all   Food Insecurity: No Food Insecurity (2024)    Hunger Vital Sign     Worried About Running Out of Food in the Last Year: Never true     Ran Out of Food in the Last Year: Never true   Transportation Needs: No Transportation Needs (2024)    TRANSPORTATION NEEDS     Transportation : No   Physical Activity: Insufficiently Active (2024)    Exercise Vital Sign     Days of Exercise per Week: 4 days     Minutes of Exercise per Session: 20 min   Stress: No Stress Concern Present (2024)    [employer] - Occupational Stress Questionnaire     Feeling of Stress : Only a little   Housing Stability: Unknown (2024)    Housing Stability Vital Sign     Unable to Pay for Housing in the Last Year: No     Homeless in the Last Year: No         OBJECTIVE:     Vital Signs:  Vitals:    25 1452   BP: 108/61   Pulse: 86   Resp: 18   Temp: 97.3 °F (36.3 °C)       Review of Systems    Physical Exam:  Physical Exam    INSTRUCTIONS FOR PATIENT:     Scheduled Follow-up, Appts Reviewed with  Modifications if Needed: Yes  Future Appointments   Date Time Provider Department Center   3/18/2025 11:00 AM Arash Gandara MD [facility] ENT [facility]   3/24/2025  8:00 AM LAB, [facility]   3/25/2025  9:00 AM 3PREP, JAMES RUBIA [facility] SSCU [facility]   3/25/2025 11:00 AM INPATIENT, BEENA [facility] ECHOSTR [facility]   4/2/2025 10:30 AM Munir Estrada MD [facility] IM [place]   4/21/2025  1:45 PM PROCEDURE, UROLOGY ROOM 2 [facility] UROPRO [place]   4/25/2025  2:30 PM LAB, HEMON CANCER BLDG [facility] LAB HO [place] [facility]   4/25/2025  3:00 PM [facility] BCC CT1 [facility] CT DEBBIE [place]   4/29/2025 10:30 AM Elizabeth Bland MD [facility] HEMONC2 [place]   5/6/2025  1:00 PM Bhupendra Brar, DPM [facility] BETY [facility]       Signature: Kelly Casillas, NP    Transition of Care Visit:  I have reviewed and updated the history and problem list.  I have reconciled the medication list.  I have discussed the hospitalization and current medical issues, prognosis and plans with the patient/family.

## 2025-03-11 NOTE — ASSESSMENT & PLAN NOTE
Mild JER on CKD  - Creatinine today CrCl cannot be calculated (Patient's most recent lab result is older than the maximum 7 days allowed.). (near baseline)  Recent Labs   Lab 09/21/24  2258 09/22/24  0831   CO2 19* 15*   BUN 51* 45*   CREATININE 1.6* 1.4   MG  --  2.0   PHOS  --  3.1         - Renally adjust drugs to CrCl; avoid nephrotoxic drugs if possible  - Monitor I/Os

## 2025-03-11 NOTE — PATIENT INSTRUCTIONS
Instructions:  - [facility] Nurse Practitioner to schedule home follow-up visit with patient as needed.  - Continue all medications, treatments and therapies as ordered.   - Follow all instructions, recommendations as discussed.  - Maintain Safety Precautions at all times.  - Attend all medical appointments as scheduled.  - For worsening symptoms: call Primary Care Physician or Nurse Practitioner.  - For emergencies, call 911 or immediately report to the nearest emergency room.  - Limit Risks of environmental exposure to coronavirus/COVID-19 as discussed including: social distancing, hand hygiene, and limiting departures from the home for necessities only.

## 2025-03-12 ENCOUNTER — PATIENT MESSAGE (OUTPATIENT)
Dept: ADMINISTRATIVE | Facility: OTHER | Age: OVER 89
End: 2025-03-12
Payer: MEDICARE

## 2025-03-13 ENCOUNTER — PATIENT MESSAGE (OUTPATIENT)
Dept: ADMINISTRATIVE | Facility: OTHER | Age: OVER 89
End: 2025-03-13
Payer: MEDICARE

## 2025-03-14 ENCOUNTER — PATIENT MESSAGE (OUTPATIENT)
Dept: ADMINISTRATIVE | Facility: OTHER | Age: OVER 89
End: 2025-03-14
Payer: MEDICARE

## 2025-03-15 ENCOUNTER — PATIENT MESSAGE (OUTPATIENT)
Dept: ADMINISTRATIVE | Facility: OTHER | Age: OVER 89
End: 2025-03-15
Payer: MEDICARE

## 2025-03-17 ENCOUNTER — PATIENT MESSAGE (OUTPATIENT)
Dept: ADMINISTRATIVE | Facility: OTHER | Age: OVER 89
End: 2025-03-17
Payer: MEDICARE

## 2025-03-18 ENCOUNTER — OFFICE VISIT (OUTPATIENT)
Dept: OTOLARYNGOLOGY | Facility: CLINIC | Age: OVER 89
End: 2025-03-18
Payer: MEDICARE

## 2025-03-18 ENCOUNTER — PATIENT MESSAGE (OUTPATIENT)
Dept: ADMINISTRATIVE | Facility: OTHER | Age: OVER 89
End: 2025-03-18
Payer: MEDICARE

## 2025-03-18 VITALS — DIASTOLIC BLOOD PRESSURE: 69 MMHG | SYSTOLIC BLOOD PRESSURE: 108 MMHG | HEART RATE: 76 BPM

## 2025-03-18 DIAGNOSIS — H93.8X3 EAR FULLNESS, BILATERAL: Primary | ICD-10-CM

## 2025-03-18 DIAGNOSIS — H61.23 IMPACTED CERUMEN OF BOTH EARS: ICD-10-CM

## 2025-03-18 PROCEDURE — 99999 PR PBB SHADOW E&M-EST. PATIENT-LVL III: CPT | Mod: PBBFAC,,, | Performed by: OTOLARYNGOLOGY

## 2025-03-18 PROCEDURE — 99213 OFFICE O/P EST LOW 20 MIN: CPT | Mod: PBBFAC | Performed by: OTOLARYNGOLOGY

## 2025-03-18 NOTE — PROGRESS NOTES
Subjective:       Patient ID: Cliff Magaña is a 89 y.o. male.    Chief Complaint: Ear Fullness and Cerumen Impaction    Ear Fullness   This is a recurrent problem. The current episode started more than 1 month ago. The problem has been waxing and waning. There has been no fever. The patient is experiencing no pain. Pertinent negatives include no coughing, diarrhea, ear discharge, headaches, hearing loss, neck pain, rash, rhinorrhea or vomiting. He has tried nothing for the symptoms. His past medical history is significant for hearing loss.     Review of Systems   Constitutional:  Negative for activity change, appetite change and fever.   HENT:  Negative for congestion, ear discharge, ear pain, hearing loss, nosebleeds, postnasal drip, rhinorrhea and sneezing.    Eyes:  Negative for redness and visual disturbance.   Respiratory:  Negative for apnea, cough, shortness of breath and wheezing.    Cardiovascular:  Negative for chest pain and palpitations.   Gastrointestinal:  Negative for diarrhea and vomiting.   Genitourinary:  Negative for difficulty urinating and frequency.   Musculoskeletal:  Negative for arthralgias, back pain, gait problem and neck pain.   Skin:  Negative for color change and rash.   Neurological:  Negative for dizziness, speech difficulty, weakness and headaches.   Hematological:  Negative for adenopathy. Does not bruise/bleed easily.   Psychiatric/Behavioral:  Negative for agitation and behavioral problems.        Objective:        Constitutional:   Vital signs are normal. He appears well-developed and well-nourished. He is active. Normal speech.      Head:  Normocephalic and atraumatic. Salivary glands normal.  Facial strength is normal.      Ears:    PROCEDURE NOTE:  Ceruminosis is noted in both EAC obscuring visualization of normal anatomic landmaks.  Wax was removed by manual debridement and suctioning utilizing the assistance of binocular microscopy revealing EACs and TMs WNL. The patient  tolerated this procedure without difficulty. The subjective decrease noted in hearing pre-cleaning was resolved post-cleaning.          Nose:  Nose normal including turbinates, nasal mucosa, sinuses and nasal septum.     Mouth/Throat  Oropharynx clear and moist without lesions or asymmetry and normal uvula midline.     Neck:  Neck normal without thyromegaly masses, asymmetry, normal tracheal structure, crepitus, and tenderness, thyroid normal, trachea normal, phonation normal and full range of motion with neck supple.     Psychiatric:   He has a normal mood and affect. His speech is normal and behavior is normal.     Neurological:   He has neurological normal, alert and oriented.     Skin:   No abrasions, lacerations, lesions, or rashes.       Assessment:       1. Ear fullness, bilateral    2. Impacted cerumen of both ears        Plan:       1. Hearing conservation strongly recommended.  2. Trial of amplification bilaterally also recommended (patient not interested).  3. Re-check of hearing in 18-24 months or sooner if subjective change noted.  4. F/U with PCP as per schedule and me in 6 months.

## 2025-03-19 ENCOUNTER — PATIENT MESSAGE (OUTPATIENT)
Dept: ADMINISTRATIVE | Facility: OTHER | Age: OVER 89
End: 2025-03-19
Payer: MEDICARE

## 2025-03-20 ENCOUNTER — PATIENT MESSAGE (OUTPATIENT)
Dept: ADMINISTRATIVE | Facility: OTHER | Age: OVER 89
End: 2025-03-20
Payer: MEDICARE

## 2025-03-21 ENCOUNTER — PATIENT MESSAGE (OUTPATIENT)
Dept: ADMINISTRATIVE | Facility: OTHER | Age: OVER 89
End: 2025-03-21
Payer: MEDICARE

## 2025-03-22 ENCOUNTER — PATIENT MESSAGE (OUTPATIENT)
Dept: ADMINISTRATIVE | Facility: OTHER | Age: OVER 89
End: 2025-03-22
Payer: MEDICARE

## 2025-03-23 ENCOUNTER — PATIENT MESSAGE (OUTPATIENT)
Dept: ADMINISTRATIVE | Facility: OTHER | Age: OVER 89
End: 2025-03-23
Payer: MEDICARE

## 2025-03-24 ENCOUNTER — LAB VISIT (OUTPATIENT)
Dept: LAB | Facility: HOSPITAL | Age: OVER 89
End: 2025-03-24
Payer: MEDICARE

## 2025-03-24 ENCOUNTER — PATIENT MESSAGE (OUTPATIENT)
Dept: ADMINISTRATIVE | Facility: OTHER | Age: OVER 89
End: 2025-03-24
Payer: MEDICARE

## 2025-03-24 DIAGNOSIS — E11.22 TYPE 2 DIABETES MELLITUS WITH CHRONIC KIDNEY DISEASE, WITHOUT LONG-TERM CURRENT USE OF INSULIN, UNSPECIFIED CKD STAGE: ICD-10-CM

## 2025-03-24 LAB
EAG (OHS): 146 MG/DL (ref 68–131)
HBA1C MFR BLD: 6.7 % (ref 4–5.6)

## 2025-03-24 PROCEDURE — 83036 HEMOGLOBIN GLYCOSYLATED A1C: CPT

## 2025-03-24 PROCEDURE — 36415 COLL VENOUS BLD VENIPUNCTURE: CPT

## 2025-03-25 ENCOUNTER — PATIENT MESSAGE (OUTPATIENT)
Dept: ADMINISTRATIVE | Facility: OTHER | Age: OVER 89
End: 2025-03-25
Payer: MEDICARE

## 2025-03-25 ENCOUNTER — HOSPITAL ENCOUNTER (OUTPATIENT)
Dept: CARDIOLOGY | Facility: HOSPITAL | Age: OVER 89
Discharge: HOME | End: 2025-03-25
Attending: INTERNAL MEDICINE | Admitting: INTERNAL MEDICINE
Payer: MEDICARE

## 2025-03-25 ENCOUNTER — HOSPITAL ENCOUNTER (OUTPATIENT)
Facility: HOSPITAL | Age: OVER 89
Discharge: HOME | End: 2025-03-25
Attending: INTERNAL MEDICINE | Admitting: INTERNAL MEDICINE
Payer: MEDICARE

## 2025-03-25 ENCOUNTER — ANESTHESIA (OUTPATIENT)
Dept: MEDSURG UNIT | Facility: HOSPITAL | Age: OVER 89
End: 2025-03-25
Payer: MEDICARE

## 2025-03-25 ENCOUNTER — PATIENT MESSAGE (OUTPATIENT)
Dept: ELECTROPHYSIOLOGY | Facility: CLINIC | Age: OVER 89
End: 2025-03-25
Payer: MEDICARE

## 2025-03-25 ENCOUNTER — ANESTHESIA EVENT (OUTPATIENT)
Dept: MEDSURG UNIT | Facility: HOSPITAL | Age: OVER 89
End: 2025-03-25
Payer: MEDICARE

## 2025-03-25 ENCOUNTER — RESULTS FOLLOW-UP (OUTPATIENT)
Dept: INTERNAL MEDICINE | Facility: CLINIC | Age: OVER 89
End: 2025-03-25

## 2025-03-25 VITALS
SYSTOLIC BLOOD PRESSURE: 121 MMHG | HEIGHT: 64 IN | WEIGHT: 129 LBS | OXYGEN SATURATION: 96 % | DIASTOLIC BLOOD PRESSURE: 58 MMHG | HEART RATE: 69 BPM | RESPIRATION RATE: 18 BRPM | TEMPERATURE: 98 F | BODY MASS INDEX: 22.02 KG/M2

## 2025-03-25 VITALS
SYSTOLIC BLOOD PRESSURE: 136 MMHG | BODY MASS INDEX: 22.02 KG/M2 | WEIGHT: 129 LBS | DIASTOLIC BLOOD PRESSURE: 58 MMHG | HEIGHT: 64 IN

## 2025-03-25 DIAGNOSIS — I48.91 ATRIAL FIBRILLATION: ICD-10-CM

## 2025-03-25 DIAGNOSIS — Z95.818 PRESENCE OF LEFT ATRIAL APPENDAGE CLOSURE DEVICE: Primary | ICD-10-CM

## 2025-03-25 DIAGNOSIS — Z95.818 PRESENCE OF WATCHMAN LEFT ATRIAL APPENDAGE CLOSURE DEVICE: ICD-10-CM

## 2025-03-25 DIAGNOSIS — I48.19 PERSISTENT ATRIAL FIBRILLATION: Chronic | ICD-10-CM

## 2025-03-25 DIAGNOSIS — I48.19 PERSISTENT ATRIAL FIBRILLATION: ICD-10-CM

## 2025-03-25 LAB
ASCENDING AORTA: 3.5 CM
BSA FOR ECHO PROCEDURE: 1.63 M2
OHS QRS DURATION: 104 MS
OHS QTC CALCULATION: 417 MS
POCT GLUCOSE: 140 MG/DL (ref 70–110)
SINUS: 3.6 CM
STJ: 2.8 CM

## 2025-03-25 PROCEDURE — 93320 DOPPLER ECHO COMPLETE: CPT | Mod: 26,,, | Performed by: STUDENT IN AN ORGANIZED HEALTH CARE EDUCATION/TRAINING PROGRAM

## 2025-03-25 PROCEDURE — 82962 GLUCOSE BLOOD TEST: CPT

## 2025-03-25 PROCEDURE — 25000003 PHARM REV CODE 250

## 2025-03-25 PROCEDURE — 93325 DOPPLER ECHO COLOR FLOW MAPG: CPT | Mod: 26,,, | Performed by: STUDENT IN AN ORGANIZED HEALTH CARE EDUCATION/TRAINING PROGRAM

## 2025-03-25 PROCEDURE — 63600175 PHARM REV CODE 636 W HCPCS

## 2025-03-25 PROCEDURE — 37000008 HC ANESTHESIA 1ST 15 MINUTES

## 2025-03-25 PROCEDURE — 93312 ECHO TRANSESOPHAGEAL: CPT | Mod: 26,,, | Performed by: STUDENT IN AN ORGANIZED HEALTH CARE EDUCATION/TRAINING PROGRAM

## 2025-03-25 PROCEDURE — D9220A PRA ANESTHESIA: Mod: ANES,,, | Performed by: ANESTHESIOLOGY

## 2025-03-25 PROCEDURE — D9220A PRA ANESTHESIA: Mod: CRNA,,,

## 2025-03-25 PROCEDURE — 93010 ELECTROCARDIOGRAM REPORT: CPT | Mod: ,,, | Performed by: INTERNAL MEDICINE

## 2025-03-25 PROCEDURE — 93005 ELECTROCARDIOGRAM TRACING: CPT

## 2025-03-25 PROCEDURE — 93325 DOPPLER ECHO COLOR FLOW MAPG: CPT

## 2025-03-25 PROCEDURE — 37000009 HC ANESTHESIA EA ADD 15 MINS

## 2025-03-25 RX ORDER — PROPOFOL 10 MG/ML
VIAL (ML) INTRAVENOUS
Status: DISCONTINUED | OUTPATIENT
Start: 2025-03-25 | End: 2025-03-25

## 2025-03-25 RX ORDER — LIDOCAINE HYDROCHLORIDE 20 MG/ML
INJECTION, SOLUTION EPIDURAL; INFILTRATION; INTRACAUDAL; PERINEURAL
Status: DISCONTINUED | OUTPATIENT
Start: 2025-03-25 | End: 2025-03-25

## 2025-03-25 RX ORDER — GLUCAGON 1 MG
1 KIT INJECTION
OUTPATIENT
Start: 2025-03-25

## 2025-03-25 RX ORDER — ASPIRIN 81 MG/1
81 TABLET ORAL DAILY
Qty: 30 TABLET | Refills: 11 | Status: SHIPPED | OUTPATIENT
Start: 2025-03-25 | End: 2026-03-25

## 2025-03-25 RX ORDER — PHENYLEPHRINE HYDROCHLORIDE 10 MG/ML
INJECTION INTRAVENOUS
Status: DISCONTINUED | OUTPATIENT
Start: 2025-03-25 | End: 2025-03-25

## 2025-03-25 RX ORDER — ETOMIDATE 2 MG/ML
INJECTION INTRAVENOUS
Status: DISCONTINUED | OUTPATIENT
Start: 2025-03-25 | End: 2025-03-25

## 2025-03-25 RX ORDER — ONDANSETRON HYDROCHLORIDE 2 MG/ML
4 INJECTION, SOLUTION INTRAVENOUS DAILY PRN
OUTPATIENT
Start: 2025-03-25

## 2025-03-25 RX ORDER — CLOPIDOGREL BISULFATE 75 MG/1
75 TABLET ORAL DAILY
Qty: 30 TABLET | Refills: 5 | Status: SHIPPED | OUTPATIENT
Start: 2025-03-25

## 2025-03-25 RX ORDER — FENTANYL CITRATE 50 UG/ML
25 INJECTION, SOLUTION INTRAMUSCULAR; INTRAVENOUS EVERY 5 MIN PRN
Refills: 0 | OUTPATIENT
Start: 2025-03-25

## 2025-03-25 RX ORDER — DEXMEDETOMIDINE HYDROCHLORIDE 100 UG/ML
INJECTION, SOLUTION INTRAVENOUS
Status: DISCONTINUED | OUTPATIENT
Start: 2025-03-25 | End: 2025-03-25

## 2025-03-25 RX ADMIN — PROPOFOL 150 MCG/KG/MIN: 10 INJECTION, EMULSION INTRAVENOUS at 11:03

## 2025-03-25 RX ADMIN — ETOMIDATE 6 MG: 2 INJECTION, SOLUTION INTRAVENOUS at 11:03

## 2025-03-25 RX ADMIN — PHENYLEPHRINE HYDROCHLORIDE 100 MCG: 10 INJECTION INTRAVENOUS at 11:03

## 2025-03-25 RX ADMIN — PHENYLEPHRINE HYDROCHLORIDE 200 MCG: 10 INJECTION INTRAVENOUS at 11:03

## 2025-03-25 RX ADMIN — GLYCOPYRROLATE 0.2 MG: 0.2 INJECTION, SOLUTION INTRAMUSCULAR; INTRAVENOUS at 11:03

## 2025-03-25 RX ADMIN — PROPOFOL 35 MG: 10 INJECTION, EMULSION INTRAVENOUS at 11:03

## 2025-03-25 RX ADMIN — DEXMEDETOMIDINE 8 MCG: 200 INJECTION, SOLUTION INTRAVENOUS at 11:03

## 2025-03-25 RX ADMIN — ETOMIDATE 4 MG: 2 INJECTION, SOLUTION INTRAVENOUS at 11:03

## 2025-03-25 RX ADMIN — SODIUM CHLORIDE: 0.9 INJECTION, SOLUTION INTRAVENOUS at 11:03

## 2025-03-25 RX ADMIN — LIDOCAINE HYDROCHLORIDE 100 MG: 20 INJECTION, SOLUTION EPIDURAL; INFILTRATION; INTRACAUDAL; PERINEURAL at 11:03

## 2025-03-25 NOTE — ANESTHESIA PREPROCEDURE EVALUATION
03/25/2025  Cliff Magaña is a 89 y.o., male.  Pre-operative evaluation for Procedure(s) (LRB):  Transesophageal echo (BEENA) intra-procedure log documentation (N/A)    Cliff Magaña is a 89 y.o. male with hx of CKD, HTN, DM, SAH, HFrEF(45%)AF presenting for BEENA for follow up for watchmen procedure. NPO since yesterday 6 pm. No chest pain or shortness or breath. No orthopnea.       Patient Active Problem List   Diagnosis    CKD stage 3 due to type 2 diabetes mellitus    Chronic anticoagulation    Hypertension associated with diabetes    Hyperlipidemia associated with type 2 diabetes mellitus    Persistent atrial fibrillation    Impaired functional mobility, balance, gait, and endurance    Atherosclerosis of aorta    Sensorineural hearing loss (SNHL) of both ears    Stage 3a chronic kidney disease    Risk for falls    Renal cyst    DM type 2, controlled, with complication    Bladder cancer    Thyroid nodules    Chronic heart failure with preserved ejection fraction    Dysphagia    Hypercalcemia    SAH (subarachnoid hemorrhage)    Acute cystitis without hematuria    Urinary retention    Physical deconditioning    Chronic kidney disease, stage 3b    Neuropathic ulcer of toe, left, with unspecified severity    Immunodeficiency secondary to neoplasm       Past Surgical History:   Procedure Laterality Date    BIOPSY OF BLADDER N/A 4/12/2024    Procedure: BIOPSY, BLADDER;  Surgeon: Wellington Story MD;  Location: [facility] OR [address] [place] [address];  Service: Urology;  Laterality: N/A;    BLADDER FULGURATION N/A 4/12/2024    Procedure: FULGURATION, BLADDER;  Surgeon: Wellington Story MD;  Location: [facility] OR [address] [place] [address];  Service: Urology;  Laterality: N/A;    CATARACT EXTRACTION      CHOLECYSTECTOMY      CYSTOSCOPY N/A 11/16/2023    Procedure: CYSTOSCOPY;  Surgeon: Marcelino Moffett MD;  Location: [facility] OR [address] [place] [address];  Service: Urology;   "Laterality: N/A;  90 minutes    DILATION OF URETHRA N/A 4/12/2024    Procedure: DILATION, URETHRA;  Surgeon: Wellington Story MD;  Location: [facility] OR [address] [place] [address];  Service: Urology;  Laterality: N/A;    EYE SURGERY      INJECTION OF FACET JOINT Bilateral 4/28/2021    Procedure: FACET JOINT INJECTION BILATERAL L4/L5 DIRECT REFERRAL;  Surgeon: Philipp Iyer MD;  Location: [facility] PAIN MGT;  Service: Pain Management;  Laterality: Bilateral;  NEEDS CONSENT, ELIQUIS CLEARANCE IN CHART    OCCLUSION OF LEFT ATRIAL APPENDAGE N/A 2/13/2025    Procedure: Left atrial appendage occlusion;  Surgeon: Marcelino Sheffield MD;  Location: [facility] EP LAB;  Service: Cardiology;  Laterality: N/A;  AF, BEENA, Watchman, BSCI, GEN, MB, 3 Prep    RETROGRADE PYELOGRAPHY Bilateral 11/16/2023    Procedure: PYELOGRAM, RETROGRADE;  Surgeon: Marcelino Moffett MD;  Location: [address];  Service: Urology;  Laterality: Bilateral;  90 minutes    SKIN CANCER EXCISION      TONSILLECTOMY      TRANSESOPHAGEAL ECHOCARDIOGRAPHY N/A 2/13/2025    Procedure: ECHOCARDIOGRAM, TRANSESOPHAGEAL;  Surgeon: Nereyda Birmingham MD;  Location: [facility] EP LAB;  Service: Cardiology;  Laterality: N/A;    TURBT (TRANSURETHRAL RESECTION OF BLADDER TUMOR) N/A 11/16/2023    Procedure: TURBT (TRANSURETHRAL RESECTION OF BLADDER TUMOR);  Surgeon: Marcelino Moffett MD;  Location: [address];  Service: Urology;  Laterality: N/A;  90 minutes    TURBT (TRANSURETHRAL RESECTION OF BLADDER TUMOR) N/A 4/12/2024    Procedure: TURBT (TRANSURETHRAL RESECTION OF BLADDER TUMOR);  Surgeon: Wellington Story MD;  Location: [facility] OR [address] [place] [address];  Service: Urology;  Laterality: N/A;       Social History[1]    Medications Ordered Prior to Encounter[2]    Review of patient's allergies indicates:   Allergen Reactions    Niacin      Other reaction(s): Rash  Other reaction(s): Itching         CBC: No results for input(s): "WBC", "RBC", "HGB", "HCT", "PLT", "MCV", "MCH", "MCHC" in the last 72 " "hours.    CMP: No results for input(s): "NA", "K", "CL", "CO2", "BUN", "CREATININE", "GLU", "MG", "PHOS", "CALCIUM", "ALBUMIN", "PROT", "ALKPHOS", "ALT", "AST", "BILITOT" in the last 72 hours.    INR  No results for input(s): "PT", "INR", "PROTIME", "APTT" in the last 72 hours.      Diagnostic Studies:    EKG:   Results for orders placed or performed during the hospital encounter of 02/13/25   EKG 12-lead    Collection Time: 02/13/25 11:00 AM   Result Value Ref Range    QRS Duration 112 ms    [facility]HS QTC Calculation 416 ms    Narrative    Test Reason : I49.9,    Vent. Rate :  64 BPM     Atrial Rate :    BPM     P-R Int :    ms          QRS Dur : 112 ms      QT Int : 404 ms       P-R-T Axes :    -53 144 degrees    QTcB Int : 416 ms    Atrial fibrillation  Left axis deviation  Inferior infarct ,age undetermined vs due to IVCD  Anterolateral infarct (cited on or before 02-Oct-2024) vs due to IVCD  Abnormal ECG  When compared with ECG of 19-Nov-2024 13:30,  Questionable change in initial forces of Lateral leads  Confirmed by Scooter Melendez ([id number]) on 2/13/2025 11:49:55 AM    Referred By: ABHIJIT MAC           Confirmed By: Scooter Melendez       TTE:  Results for orders placed or performed during the hospital encounter of 12/12/23   Echo   Result Value Ref Range    RA Width 3.23 cm    LA Vol (MOD) 60.78 cm3    Left Atrium Major Axis 5.61 cm    Left Atrium Minor Axis 6.05 cm    RA Major Axis 5.28 cm    LV Diastolic Volume 81.33 mL    LV Systolic Volume 53.45 mL    PV Peak D Puneet 0.37 m/s    PV Peak S Puneet 0.17 m/s    TR Max Puneet 2.61 m/s    PV mean gradient 1 mmHg    RVOT peak VTI 13.47 cm    RVOT peak puneet 0.70 m/s    Ao VTI 28.97 cm    Ao peak puneet 1.49 m/s    LVOT peak VTI 16.93 cm    LVOT peak puneet 0.94 m/s    LVOT diameter 2.0 cm    MV "A" wave duration 9.71 msec    PV peak gradient 2     AV mean gradient 5 mmHg    TAPSE 2.05 cm    RVDD 2.65 cm    LA size 4.16 cm    Ascending aorta 3.42 cm    STJ 2.87 cm    Sinus 3.45 cm    LVIDs " 3.57 2.1 - 4.0 cm    PW 0.98 0.6 - 1.1 cm    IVS 1.00 0.6 - 1.1 cm    LVIDd 4.26 3.5 - 6.0 cm    PV PEAK VELOCITY 0.78 m/s    LA WIDTH 4.2 cm    FS 16 (A) 28 - 44 %    LA Vol 86.46 cm3    LV mass 138.43 g    Left Ventricle Relative Wall Thickness 0.46 cm    AV valve area 1.84 cm²    AV Velocity Ratio 0.63     AV index (prosthetic) 0.58     Pulm vein S/D ratio 0.46     LVOT area 3.1 cm2    LVOT stroke volume 53.16 cm3    AV peak gradient 9 mmHg    Triscuspid Valve Regurgitation Peak Gradient 27 mmHg    AUTUMN by Velocity Ratio 1.98 cm²    BSA 1.69 m2    LV Systolic Volume Index 31.8 mL/m2    LV Diastolic Volume Index 48.41 mL/m2    LV Mass Index 82 g/m2    REA 51.5 mL/m2    REA (MOD) 36.2 mL/m2    ZLVIDS 1.63     ZLVIDD -0.96     TV resting pulmonary artery pressure 30 mmHg    RV TB RVSP 6 mmHg    Est. RA pres 3 mmHg    Narrative      Left Ventricle: The left ventricle is normal in size. Normal wall   thickness. There is concentric remodeling. Global hypokinesis present.   There is mildly reduced systolic function with a visually estimated   ejection fraction of 45 - 50%. Unable to assess diastolic function due to   atrial fibrillation.    Right Ventricle: Normal right ventricular cavity size. Wall thickness   is normal. Right ventricle wall motion  is normal. Systolic function is   borderline low.    Left Atrium: Left atrium is moderately dilated.    Aortic Valve: There is mild aortic valve sclerosis.    Mitral Valve: There is mild bileaflet sclerosis. There is moderate   posterior mitral annular calcification present. There is mild   regurgitation.    Tricuspid Valve: There is mild regurgitation.    Pulmonary Artery: The estimated pulmonary artery systolic pressure is   30 mmHg.    IVC/SVC: Normal venous pressure at 3 mmHg.       EF   Date Value Ref Range Status   06/14/2021 55 % Final      No results found. However, due to the size of the patient record, not all encounters were searched. Please check Results Review  "for a complete set of results.    BEENA:  No results found. However, due to the size of the patient record, not all encounters were searched. Please check Results Review for a complete set of results.    Stress Test:  No results found for this or any previous visit.       LHC:  Results for orders placed during the hospital encounter of 02/13/25    Cardiac catheterization    Conclusion    The estimated blood loss was none.    The left atrial appendage closure was successful with 24 mm Watchman device.    The left atrial appendage closure was successful .    The procedure log was documented by Documenter: Isaura Hearn RN; Barb Davidson RN and verified by Marcelino Sheffield MD.    Date: 2/13/2025  Time: 2:28 PM       PFT:  No results found for: "FEV1", "FVC", "WUK1QMF", "TLC", "DLCO"     ALLERGIES:     Review of patient's allergies indicates:   Allergen Reactions    Niacin      Other reaction(s): Rash  Other reaction(s): Itching     LDA:          Lines/Drains/Airways       None                  Anesthesia Evaluation      Airway   Mallampati: II  TM distance: < 4 cm  Neck ROM: Normal ROM  Dental    (+) Intact    Pulmonary    Cardiovascular   (+) hypertension, dysrhythmias    Rate: Normal    Neuro/Psych      GI/Hepatic/Renal      Endo/Other    (+) diabetes mellitus  Abdominal                           Pre-op Assessment    I have reviewed the Patient Summary Reports.     I have reviewed the Nursing Notes. I have reviewed the NPO Status.   I have reviewed the Medications.     Review of Systems  Anesthesia Hx:  No problems with previous Anesthesia             Denies Family Hx of Anesthesia complications.    Denies Personal Hx of Anesthesia complications.                    Cardiovascular:     Hypertension    Dysrhythmias atrial fibrillation      hyperlipidemia    S/p Watchman                           Pulmonary:  Pulmonary Normal                       Renal/:  Renal/ Normal               "   Hepatic/GI:  Hepatic/GI Normal                    Neurological:  Neurology Normal                                      Endocrine:  Diabetes               Physical Exam  General: Well nourished    Airway:  Mallampati: II   Mouth Opening: Normal  TM Distance: < 4 cm  Tongue: Normal  Neck ROM: Normal ROM    Dental:  Intact    Chest/Lungs:  Normal Respiratory Rate    Heart:  Rate: Normal        Anesthesia Plan  Type of Anesthesia, risks & benefits discussed:    Anesthesia Type: Gen Natural Airway, MAC  Intra-op Monitoring Plan: Standard ASA Monitors  Post Op Pain Control Plan: multimodal analgesia and IV/PO Opioids PRN  Induction:  IV  Airway Plan: Direct, Post-Induction  Informed Consent: Informed consent signed with the Patient and all parties understand the risks and agree with anesthesia plan.  All questions answered. Patient consented to blood products? Yes  ASA Score: 3  Day of Surgery Review of History & Physical: H&P Update referred to the surgeon/provider.    Ready For Surgery From Anesthesia Perspective.     .           [1]   Social History  Socioeconomic History    Marital status:      Spouse name: Nereyda    Number of children: 4   Tobacco Use    Smoking status: Former     Current packs/day: 0.00     Average packs/day: 2.0 packs/day for 20.0 years (40.0 ttl pk-yrs)     Types: Cigarettes     Start date: 1963     Quit date: 1983     Years since quittin.8     Passive exposure: Never    Smokeless tobacco: Never   Substance and Sexual Activity    Alcohol use: Yes     Alcohol/week: 1.0 standard drink of alcohol     Types: 1 Standard drinks or equivalent per week     Comment: 3 drinks per week    Drug use: No    Sexual activity: Not Currently     Partners: Female     Social Drivers of Health     Financial Resource Strain: Low Risk  (2024)    Overall Financial Resource Strain (CARDIA)     Difficulty of Paying Living Expenses: Not hard at all   Food Insecurity: No Food Insecurity  (12/28/2024)    Hunger Vital Sign     Worried About Running Out of Food in the Last Year: Never true     Ran Out of Food in the Last Year: Never true   Transportation Needs: No Transportation Needs (9/23/2024)    TRANSPORTATION NEEDS     Transportation : No   Physical Activity: Insufficiently Active (12/28/2024)    Exercise Vital Sign     Days of Exercise per Week: 4 days     Minutes of Exercise per Session: 20 min   Stress: No Stress Concern Present (12/28/2024)    [employer] - Occupational Stress Questionnaire     Feeling of Stress : Only a little   Housing Stability: Unknown (12/28/2024)    Housing Stability Vital Sign     Unable to Pay for Housing in the Last Year: No     Homeless in the Last Year: No   [2]   No current facility-administered medications on file prior to encounter.     Current Outpatient Medications on File Prior to Encounter   Medication Sig Dispense Refill    acetaminophen (TYLENOL) 650 MG TbSR Take 650 mg by mouth 2 (two) times a day.      apixaban (ELIQUIS) 2.5 mg Tab Take 1 tablet (2.5 mg total) by mouth 2 (two) times daily. 180 tablet 3    betamethasone valerate 0.1% (VALISONE) 0.1 % Crea Apply topically 2 (two) times daily. Apply twice daily for 10 days for 10 days (Patient not taking: Reported on 2/17/2025) 45 g 0    cholecalciferol, vitamin D3, (VITAMIN D3) 50 mcg (2,000 unit) Cap capsule Take by mouth once daily.      empagliflozin (JARDIANCE) 10 mg tablet Take 1 tablet (10 mg total) by mouth once daily. 90 tablet 3    fenofibrate micronized (LOFIBRA) 200 MG Cap Take 1 capsule (200 mg total) by mouth daily with breakfast. 90 capsule 3    loratadine (CLARITIN) 10 mg tablet Take 10 mg by mouth once daily.      metFORMIN (GLUCOPHAGE-XR) 500 MG ER 24hr tablet Take 1 tablet (500 mg total) by mouth 2 (two) times daily with meals. 90 tablet 3    metoprolol succinate (TOPROL-XL) 100 MG 24 hr tablet TAKE 1 TABLET TWICE A  tablet 1    sacubitriL-valsartan  (ENTRESTO) 24-26 mg per tablet Take 1 tablet by mouth 2 (two) times daily. 180 tablet 3

## 2025-03-25 NOTE — TRANSFER OF CARE
"Anesthesia Transfer of Care Note    Patient: Cliff GOODEN Pond    Procedure(s) Performed: Procedure(s) (LRB):  Transesophageal echo (BEENA) intra-procedure log documentation (N/A)    Patient location: Other: BEENA recovery    Anesthesia Type: general    Transport from OR: Transported from OR on room air with adequate spontaneous ventilation    Post pain: adequate analgesia    Post assessment: no apparent anesthetic complications and tolerated procedure well    Post vital signs: stable    Level of consciousness: awake    Nausea/Vomiting: no nausea/vomiting    Complications: none    Transfer of care protocol was followed      Last vitals: Visit Vitals  BP (!) 136/58   Pulse 73   Temp 36.4 °C (97.5 °F) (Temporal)   Resp 17   Ht 5' 4" (1.626 m)   Wt 58.5 kg (129 lb)   SpO2 98%   BMI 22.14 kg/m²     "

## 2025-03-25 NOTE — NURSING
Received report from GENNY Sarabia and Fredy;CLAIRE rodriguez. Patient s/p BEENA, AAOx4. VSS, no c/o pain or discomfort at this time, resp even and unlabored.Post procedure protocol reviewed with patient and patient's wife. Understanding verbalized. Wife at bedside. Nurse call bell within reach.

## 2025-03-25 NOTE — DISCHARGE SUMMARY
[facility]  Cardiology  Discharge Summary      Patient Name: Cliff Magaña  MRN: [id number]  Admission Date: 3/25/2025  Hospital Length of Stay: 0 days  Discharge Date and Time:  03/25/2025 1:03 PM  Attending Physician: Marcelino Sheffield MD    Discharging Provider: Nila Rodriguez PA-C  Primary Care Physician: Munir Estrada MD    HPI:   Cliff Magaña is an 89 year old male who has a history of SAH and persistent AF. He has frequent falls. He is on apixaban 2.5 mg bid who presented to [facility] on 2/13 with successful implantation of LAURY Watchman device placement. He presents today for 6 week BEENA evaluation of LAAO device placement.         Today, in good spirits. He denies any active cardiac complaints. Accompanied by his wife.     Procedure(s) (LRB):  Transesophageal echo (BEENA) intra-procedure log documentation (N/A)     Indwelling Lines/Drains at time of discharge:  Lines/Drains/Airways       None     Hospital Course:  Tolerated BEENA without complication. Watchman without evidence of DRT and absence of mitzi-device leak (or <5 mm). Will stop OAC and start plavix 75 mg daily (including ASA 81 mg daily) for 6 months per Dr. Sheffield. Patient to follow up with Dr. Jordan outpatient.       Goals of Care Treatment Preferences:  Code Status: Full Code    Living Will: Yes     What is most important right now is to focus on symptom/pain control.  Accordingly, we have decided that the best plan to meet the patient's goals includes continuing with treatment.      Significant Diagnostic Studies: Cardiac Graphics: Echocardiogram:  BEENA  BEENA report pending; Formal report to follow. Preliminary BEENA with no evidence or mitzi-device leak      Pending Diagnostic Studies:       None            There are no hospital problems to display for this patient.    No new Assessment & Plan notes have been filed under this hospital service since the last note was generated.  Service: Cardiology      Discharged Condition:  stable    Disposition: Home or Self Care    Follow Up:   Follow-up Information       Marcelino Sheffield MD. Schedule an appointment as soon as possible for a visit in 3 month(s).    Specialties: Electrophysiology, Cardiovascular Disease, Cardiology  Contact information:  [facility] [id number] [phone]                           Patient Instructions:   No discharge procedures on file.  Medications:  Reconciled Home Medications:      Medication List        START taking these medications      aspirin 81 MG EC tablet  Commonly known as: ECOTRIN  Take 1 tablet (81 mg total) by mouth once daily.     clopidogreL 75 mg tablet  Commonly known as: PLAVIX  Take 1 tablet (75 mg total) by mouth once daily.            CONTINUE taking these medications      acetaminophen 650 MG Tbsr  Commonly known as: TYLENOL  Take 650 mg by mouth 2 (two) times a day.     betamethasone valerate 0.1% 0.1 % Crea  Commonly known as: VALISONE  Apply topically 2 (two) times daily. Apply twice daily for 10 days for 10 days     cholecalciferol (vitamin D3) 50 mcg (2,000 unit) Cap capsule  Commonly known as: VITAMIN D3  Take by mouth once daily.     empagliflozin 10 mg tablet  Commonly known as: JARDIANCE  Take 1 tablet (10 mg total) by mouth once daily.     ENTRESTO 24-26 mg per tablet  Generic drug: sacubitriL-valsartan  Take 1 tablet by mouth 2 (two) times daily.     fenofibrate micronized 200 MG Cap  Commonly known as: LOFIBRA  Take 1 capsule (200 mg total) by mouth daily with breakfast.     loratadine 10 mg tablet  Commonly known as: CLARITIN  Take 10 mg by mouth once daily.     metFORMIN 500 MG ER 24hr tablet  Commonly known as: GLUCOPHAGE-XR  Take 1 tablet (500 mg total) by mouth 2 (two) times daily with meals.     metoprolol succinate 100 MG 24 hr tablet  Commonly known as: TOPROL-XL  TAKE 1 TABLET TWICE A DAY            STOP taking these medications      apixaban 2.5 mg Tab  Commonly known as: ELIQUIS              Time  spent on the discharge of patient: 35 minutes    Nila Rodriguez PA-C  Cardiology  [facility]

## 2025-03-25 NOTE — HOSPITAL COURSE
Tolerated BEENA without complication. Watchman without evidence of DRT and absence of mitzi-device leak (or <5 mm). Will stop OAC and start plavix 75 mg daily (including ASA 81 mg daily) for 6 months per Dr. Sheffield. Patient to follow up with Dr. Jordan outpatient.

## 2025-03-25 NOTE — ANESTHESIA POSTPROCEDURE EVALUATION
Anesthesia Post Evaluation    Patient: Cliff R Pond    Procedure(s) Performed: Procedure(s) (LRB):  Transesophageal echo (BEENA) intra-procedure log documentation (N/A)    Final Anesthesia Type: general      Patient location during evaluation: PACU  Patient participation: Yes- Able to Participate  Level of consciousness: awake and alert  Post-procedure vital signs: reviewed and stable  Pain management: adequate  Airway patency: patent    PONV status at discharge: No PONV  Anesthetic complications: no      Cardiovascular status: hemodynamically stable  Respiratory status: spontaneous ventilation  Follow-up not needed.              Vitals Value Taken Time   /46 03/25/25 12:18   Temp 98.0 03/25/25 12:28   Pulse 66 03/25/25 12:27   Resp 16 03/25/25 12:27   SpO2 94 % 03/25/25 12:27   Vitals shown include unfiled device data.      No case tracking events are documented in the log.      Pain/Shweta Score: No data recorded

## 2025-03-25 NOTE — DISCHARGE INSTRUCTIONS
Medications:  -Continue to take your home medications as listed on your medication list after you are discharged.  -STOP Eliquis 2.5 mg BID     New Medications:  -Plavix 75 mg by mouth once daily (for 6 months)  -Aspirin 81 mg by mouth once daily (indefinitely)     Diet  -You may resume oral intake after you are discharged, as long you have no swallowing difficulties.    Because you have received sedation for this procedure:  -Limit activity for the remainder of the day.  -Do not smoke for at least 6 hours and until you are fully awake and alert.  -Do not drink alcoholic beverage for 24 hours.  -Do not drive for 24 hours.  -Defer important decision making until the following day.     Go to the Emergency Department if you develop:   -Bleeding  -Weakness or numbness  -Visual, gait or speech disturbance  -New chest pain, palpitations, shortness of breath, rapid heart beat, or fainting  -Fever    Follow up:  -Dr. Sheffield or Brigida Love NP in 3 months. Call or message the office to schedule.    Any need to reschedule or cancel procedures, or any questions regarding your procedures should be addressed directly with the Arrhythmia Department Nurses at the following phone number: [phone].

## 2025-03-25 NOTE — NURSING
Patient discharged per MD orders. Instructions given on medications, wound care, activity, signs of infection, when to call MD, and follow up appointments. Pt and spouse verbalized understanding. Telemetry and PIV removed. Patient transferred off of unit via wheelchair.

## 2025-03-25 NOTE — H&P
[facility]  BEENA History and Physical      Cliff Magaña  YOB: 1935  Medical Record Number:  [id number]  Attending Physician:  Marcelino Sheffield MD   Date of Admission: 3/25/2025       Hospital Day:  0  Current Principal Problem:  <principal problem not specified>    Patient information was obtained from patient and past medical records.  History     Cc: Presence of LAAO device     HPI  Cliff Magaña is an 89 year old male who has a history of SAH and persistent AF. He has frequent falls. He is on apixaban 2.5 mg bid who presented to [facility] on 2/13 with successful implantation of LAURY Watchman device placement. He presents today for 6 week BEENA evaluation of LAAO device placement.       Today, in good spirits. He denies any active cardiac complaints. Accompanied by his wife.     Anticoagulant/antiplatelets: Eliquis 2.5 mg BID   ECG: Atrial fibrillation at 66 bpm   Platelet count: 215  INR: 1.1    History of stroke:  no  Dysphagia or odynophagia:  no  Liver Disease, esophageal disease, or known varices:  no  Upper GI Bleeding:  no  Snoring:  no   Sleep Apnea:  no  Prior neck surgery or radiation:  no  History of anesthetic difficulties:  no  Family history of anesthetic difficulties:  no  Last oral intake: last pm   Able to move neck in all directions:  yes  Use of GLP-1:  no      Medications - Outpatient  Prior to Admission medications    Medication Sig Start Date End Date Taking? Authorizing Provider   acetaminophen (TYLENOL) 650 MG TbSR Take 650 mg by mouth 2 (two) times a day.    Provider, Historical   apixaban (ELIQUIS) 2.5 mg Tab Take 1 tablet (2.5 mg total) by mouth 2 (two) times daily. 11/4/24   Nereyda Birmingham MD   betamethasone valerate 0.1% (VALISONE) 0.1 % Crea Apply topically 2 (two) times daily. Apply twice daily for 10 days for 10 days  Patient not taking: Reported on 2/17/2025 1/27/25 2/6/25  Wellington Story MD   cholecalciferol, vitamin D3, (VITAMIN  D3) 50 mcg (2,000 unit) Cap capsule Take by mouth once daily.    Provider, Historical   empagliflozin (JARDIANCE) 10 mg tablet Take 1 tablet (10 mg total) by mouth once daily. 9/30/24   Alize Wheeler NP   fenofibrate micronized (LOFIBRA) 200 MG Cap Take 1 capsule (200 mg total) by mouth daily with breakfast. 9/30/24   Alize Wheeler NP   loratadine (CLARITIN) 10 mg tablet Take 10 mg by mouth once daily.    Provider, Historical   metFORMIN (GLUCOPHAGE-XR) 500 MG ER 24hr tablet Take 1 tablet (500 mg total) by mouth 2 (two) times daily with meals. 12/30/24   Prakash Mark MD   metoprolol succinate (TOPROL-XL) 100 MG 24 hr tablet TAKE 1 TABLET TWICE A DAY 12/30/24   Nereyda Birmingham MD   sacubitriL-valsartan (ENTRESTO) 24-26 mg per tablet Take 1 tablet by mouth 2 (two) times daily. 10/7/24   Nereyda Birmingham MD       Medications - Current  Scheduled Meds:  Continuous Infusions:  PRN Meds:.      Allergies  Review of patient's allergies indicates:   Allergen Reactions    Niacin      Other reaction(s): Rash  Other reaction(s): Itching       Past Medical History  Past Medical History:   Diagnosis Date    *Atrial fibrillation     Cancer     Chronic kidney disease     Deep vein thrombosis     Hyperlipidemia     Hypertension     Metabolic syndrome     Type 2 diabetes mellitus, without long-term current use of insulin 12/13/2021       Past Surgical History  Past Surgical History:   Procedure Laterality Date    BIOPSY OF BLADDER N/A 4/12/2024    Procedure: BIOPSY, BLADDER;  Surgeon: Wellington Story MD;  Location: [facility] OR [address] [place] [address];  Service: Urology;  Laterality: N/A;    BLADDER FULGURATION N/A 4/12/2024    Procedure: FULGURATION, BLADDER;  Surgeon: Wellington Story MD;  Location: [facility] OR [address] [place] [address];  Service: Urology;  Laterality: N/A;    CATARACT EXTRACTION      CHOLECYSTECTOMY      CYSTOSCOPY N/A 11/16/2023    Procedure: CYSTOSCOPY;  Surgeon: Marcelino Moffett MD;  Location: [facility] OR [address] [place] [address];  Service: Urology;   Laterality: N/A;  90 minutes    DILATION OF URETHRA N/A 4/12/2024    Procedure: DILATION, URETHRA;  Surgeon: Wellington Story MD;  Location: [facility] OR [address] [place] [address];  Service: Urology;  Laterality: N/A;    EYE SURGERY      INJECTION OF FACET JOINT Bilateral 4/28/2021    Procedure: FACET JOINT INJECTION BILATERAL L4/L5 DIRECT REFERRAL;  Surgeon: Philipp Iyer MD;  Location: [facility] PAIN MGT;  Service: Pain Management;  Laterality: Bilateral;  NEEDS CONSENT, ELIQUIS CLEARANCE IN CHART    OCCLUSION OF LEFT ATRIAL APPENDAGE N/A 2/13/2025    Procedure: Left atrial appendage occlusion;  Surgeon: Marcelino Sheffield MD;  Location: [facility] EP LAB;  Service: Cardiology;  Laterality: N/A;  AF, BEENA, Watchman, BSCI, GEN, MB, 3 Prep    RETROGRADE PYELOGRAPHY Bilateral 11/16/2023    Procedure: PYELOGRAM, RETROGRADE;  Surgeon: Marcelino Moffett MD;  Location: [address];  Service: Urology;  Laterality: Bilateral;  90 minutes    SKIN CANCER EXCISION      TONSILLECTOMY      TRANSESOPHAGEAL ECHOCARDIOGRAPHY N/A 2/13/2025    Procedure: ECHOCARDIOGRAM, TRANSESOPHAGEAL;  Surgeon: Nereyda Birmingham MD;  Location: [facility] EP LAB;  Service: Cardiology;  Laterality: N/A;    TURBT (TRANSURETHRAL RESECTION OF BLADDER TUMOR) N/A 11/16/2023    Procedure: TURBT (TRANSURETHRAL RESECTION OF BLADDER TUMOR);  Surgeon: Marcelino Moffett MD;  Location: [facility] OR [address] [place] [address];  Service: Urology;  Laterality: N/A;  90 minutes    TURBT (TRANSURETHRAL RESECTION OF BLADDER TUMOR) N/A 4/12/2024    Procedure: TURBT (TRANSURETHRAL RESECTION OF BLADDER TUMOR);  Surgeon: Wellington Story MD;  Location: [facility] OR [address] [place] [address];  Service: Urology;  Laterality: N/A;       Social History  Social History     Socioeconomic History    Marital status:      Spouse name: Nereyda    Number of children: 4   Tobacco Use    Smoking status: Former     Current packs/day: 0.00     Average packs/day: 2.0 packs/day for 20.0 years (40.0 ttl pk-yrs)     Types: Cigarettes     Start date:  1963     Quit date: 1983     Years since quittin.8     Passive exposure: Never    Smokeless tobacco: Never   Substance and Sexual Activity    Alcohol use: Yes     Alcohol/week: 1.0 standard drink of alcohol     Types: 1 Standard drinks or equivalent per week     Comment: occasionally    Drug use: No    Sexual activity: Not Currently     Partners: Female     Social Drivers of Health     Financial Resource Strain: Low Risk  (2024)    Overall Financial Resource Strain (CARDIA)     Difficulty of Paying Living Expenses: Not hard at all   Food Insecurity: No Food Insecurity (2024)    Hunger Vital Sign     Worried About Running Out of Food in the Last Year: Never true     Ran Out of Food in the Last Year: Never true   Transportation Needs: No Transportation Needs (2024)    TRANSPORTATION NEEDS     Transportation : No   Physical Activity: Insufficiently Active (2024)    Exercise Vital Sign     Days of Exercise per Week: 4 days     Minutes of Exercise per Session: 20 min   Stress: No Stress Concern Present (2024)    [employer] - Occupational Stress Questionnaire     Feeling of Stress : Only a little   Housing Stability: Unknown (2024)    Housing Stability Vital Sign     Unable to Pay for Housing in the Last Year: No     Homeless in the Last Year: No       ROS  10 point ROS performed and negative except as stated in HPI     Physical Examination     Vital Signs  24 Hour VS Range    Temp:  [97.5 °F (36.4 °C)]   Pulse:  [73]   Resp:  [17]   BP: (136-142)/(58-62)   SpO2:  [98 %]   No intake or output data in the 24 hours ending 25 1054      Physical Exam:   Constitutional: no acute distress  HEENT: NCAT, EOMI, no scleral icterus  Cardiovascular: Irregularly irregular rate and rhythm   Pulmonary: Normal respiratory effort   Abdomen: nontender, non-distended   Neuro: alert and oriented, no focal deficits  Extremities: warm, no edema   MSK: no  "deformities  Integument: intact, no rashes     Data       No results for input(s): "WBC", "HGB", "HCT", "PLT" in the last 168 hours.     No results for input(s): "PROTIME", "INR" in the last 168 hours.     No results for input(s): "NA", "K", "CL", "CO2", "BUN", "CREATININE", "ANIONGAP", "CALCIUM" in the last 168 hours.     No results for input(s): "PROT", "ALBUMIN", "BILITOT", "ALKPHOS", "AST", "ALT" in the last 168 hours.     No results for input(s): "TROPONINI" in the last 168 hours.     BNP (pg/mL)   Date Value   09/21/2024 258 (H)   11/23/2023 342 (H)   11/21/2023 223 (H)       No results for input(s): "LABBLOO" in the last 168 hours.     Assessment & Plan     #Atrial fibrillation   -patient presents for 6 week BEENA evaluation of LAAO device placement   -on Eliquis for CVA prophylaxis, last dose last night        BEENA 02/13/25    BEENA done to assist with Watchman placeement.    S/p 24 mm Watchman device implantation with compressions of 23%-29% and no mitzi-device leaks.    Left Ventricle: There is normal systolic function with a visually estimated ejection fraction of 60 - 65%.    Right Ventricle: Normal right ventricular cavity size. Systolic function is normal.    Left Atrium: Left atrium is dilated. The left atrial appendage has a chicken wing morphology. Appendage velocity is reduced at less than 40 cm/sec. There is no thrombus in the left atrial appendage.    Right Atrium: Right atrium is dilated.    Aortic Valve: The aortic valve is a trileaflet valve. There is mild aortic valve sclerosis.    Mitral Valve: There is mild regurgitation with a centrally directed jet.    Pericardium: There is a small effusion that did not change throughout the procedure    TTE 12/12/23    Left Ventricle: The left ventricle is normal in size. Normal wall thickness. There is concentric remodeling. Global hypokinesis present. There is mildly reduced systolic function with a visually estimated ejection fraction of 45 - 50%. Unable to " assess diastolic function due to atrial fibrillation.    Right Ventricle: Normal right ventricular cavity size. Wall thickness is normal. Right ventricle wall motion  is normal. Systolic function is borderline low.    Left Atrium: Left atrium is moderately dilated.    Aortic Valve: There is mild aortic valve sclerosis.    Mitral Valve: There is mild bileaflet sclerosis. There is moderate posterior mitral annular calcification present. There is mild regurgitation.    Tricuspid Valve: There is mild regurgitation.    Pulmonary Artery: The estimated pulmonary artery systolic pressure is 30 mmHg.    IVC/SVC: Normal venous pressure at 3 mmHg.    -No absolute contraindications of esophageal stricture, tumor, perforation, laceration,or diverticulum and/or active GI bleed.  -The risks, benefits & alternatives of the procedure were explained to the patient.   -The risks of transesophageal echo include but are not limited to:  Dental trauma, esophageal trauma/perforation, bleeding, laryngospasm/brochospasm, aspiration, sore throat/hoarseness, & dislodgement of the endotracheal tube/nasogastric tube (where applicable).    -The risks of moderate sedation include hypotension, respiratory depression, arrhythmias, bronchospasm, & death.    -Informed consent was obtained. The patient is agreeable to proceed with the procedure and all questions and concerns addressed.    Case was discussed with an attending physician in echocardiography lab prior to procedure.    Nila Rodriguez PA-C  [facility]

## 2025-03-27 ENCOUNTER — PATIENT MESSAGE (OUTPATIENT)
Dept: ADMINISTRATIVE | Facility: OTHER | Age: OVER 89
End: 2025-03-27
Payer: MEDICARE

## 2025-03-28 ENCOUNTER — PATIENT MESSAGE (OUTPATIENT)
Dept: ADMINISTRATIVE | Facility: OTHER | Age: OVER 89
End: 2025-03-28
Payer: MEDICARE

## 2025-03-29 ENCOUNTER — PATIENT MESSAGE (OUTPATIENT)
Dept: ADMINISTRATIVE | Facility: OTHER | Age: OVER 89
End: 2025-03-29
Payer: MEDICARE

## 2025-03-31 ENCOUNTER — PATIENT MESSAGE (OUTPATIENT)
Dept: ADMINISTRATIVE | Facility: OTHER | Age: OVER 89
End: 2025-03-31
Payer: MEDICARE

## 2025-04-01 ENCOUNTER — PATIENT MESSAGE (OUTPATIENT)
Dept: ADMINISTRATIVE | Facility: OTHER | Age: OVER 89
End: 2025-04-01
Payer: MEDICARE

## 2025-04-02 ENCOUNTER — PATIENT MESSAGE (OUTPATIENT)
Dept: ADMINISTRATIVE | Facility: OTHER | Age: OVER 89
End: 2025-04-02
Payer: MEDICARE

## 2025-04-02 ENCOUNTER — OFFICE VISIT (OUTPATIENT)
Dept: INTERNAL MEDICINE | Facility: CLINIC | Age: OVER 89
End: 2025-04-02
Payer: MEDICARE

## 2025-04-02 VITALS
TEMPERATURE: 98 F | WEIGHT: 137.13 LBS | BODY MASS INDEX: 23.41 KG/M2 | SYSTOLIC BLOOD PRESSURE: 132 MMHG | RESPIRATION RATE: 16 BRPM | DIASTOLIC BLOOD PRESSURE: 58 MMHG | HEART RATE: 68 BPM | HEIGHT: 64 IN | OXYGEN SATURATION: 97 %

## 2025-04-02 DIAGNOSIS — I15.2 HYPERTENSION ASSOCIATED WITH DIABETES: ICD-10-CM

## 2025-04-02 DIAGNOSIS — C67.9 MALIGNANT NEOPLASM OF URINARY BLADDER, UNSPECIFIED SITE: ICD-10-CM

## 2025-04-02 DIAGNOSIS — M54.40 LOW BACK PAIN WITH SCIATICA, SCIATICA LATERALITY UNSPECIFIED, UNSPECIFIED BACK PAIN LATERALITY, UNSPECIFIED CHRONICITY: ICD-10-CM

## 2025-04-02 DIAGNOSIS — I50.32 CHRONIC HEART FAILURE WITH PRESERVED EJECTION FRACTION: ICD-10-CM

## 2025-04-02 DIAGNOSIS — E11.8 DM TYPE 2, CONTROLLED, WITH COMPLICATION: ICD-10-CM

## 2025-04-02 DIAGNOSIS — I70.0 ATHEROSCLEROSIS OF AORTA: ICD-10-CM

## 2025-04-02 DIAGNOSIS — E11.22 CKD STAGE 3 DUE TO TYPE 2 DIABETES MELLITUS: ICD-10-CM

## 2025-04-02 DIAGNOSIS — D84.81 IMMUNODEFICIENCY SECONDARY TO NEOPLASM: ICD-10-CM

## 2025-04-02 DIAGNOSIS — D49.9 IMMUNODEFICIENCY SECONDARY TO NEOPLASM: ICD-10-CM

## 2025-04-02 DIAGNOSIS — I48.19 PERSISTENT ATRIAL FIBRILLATION: Chronic | ICD-10-CM

## 2025-04-02 DIAGNOSIS — Z79.01 CHRONIC ANTICOAGULATION: ICD-10-CM

## 2025-04-02 DIAGNOSIS — E78.5 HYPERLIPIDEMIA ASSOCIATED WITH TYPE 2 DIABETES MELLITUS: ICD-10-CM

## 2025-04-02 DIAGNOSIS — N18.30 CKD STAGE 3 DUE TO TYPE 2 DIABETES MELLITUS: ICD-10-CM

## 2025-04-02 DIAGNOSIS — E11.69 HYPERLIPIDEMIA ASSOCIATED WITH TYPE 2 DIABETES MELLITUS: ICD-10-CM

## 2025-04-02 DIAGNOSIS — E11.59 HYPERTENSION ASSOCIATED WITH DIABETES: ICD-10-CM

## 2025-04-02 DIAGNOSIS — Z09 FOLLOW-UP EXAM, 3-6 MONTHS SINCE PREVIOUS EXAM: Primary | ICD-10-CM

## 2025-04-02 PROCEDURE — 99999 PR PBB SHADOW E&M-EST. PATIENT-LVL V: CPT | Mod: PBBFAC,,, | Performed by: FAMILY MEDICINE

## 2025-04-02 PROCEDURE — 99215 OFFICE O/P EST HI 40 MIN: CPT | Mod: S$PBB,,, | Performed by: FAMILY MEDICINE

## 2025-04-02 PROCEDURE — 99215 OFFICE O/P EST HI 40 MIN: CPT | Mod: PBBFAC,PO | Performed by: FAMILY MEDICINE

## 2025-04-02 PROCEDURE — G2211 COMPLEX E/M VISIT ADD ON: HCPCS | Mod: S$PBB,,, | Performed by: FAMILY MEDICINE

## 2025-04-02 NOTE — PROGRESS NOTES
Subjective     Patient ID: Cliff Magaña is a 89 y.o. male.    Chief Complaint: Follow-up (3 mo)  89-year-old white male with atrial fibrillation status post Watchman insertion, chronic heart failure with preserved ejection fraction, hypertension, hyperlipidemia, aortic atherosclerosis, type 2 diabetes, stage 3 chronic kidney disease, bladder cancer status post chemotherapy and radiation, and chronic low back pain with sciatica presents to clinic today accompanied by his wife for general exam.  He continues to be followed by Cardiology for treatment of hypertension, persistent atrial fibrillation, chronic heart failure with preserved ejection fraction, hyperlipidemia, and aortic atherosclerosis.  He is currently status post Watchman insertion and remains stable.  Postprocedure he will remain on aspirin 81 mg for life in his currently on Plavix 75 mg for the next 6 months.  He also remains stable on metoprolol 100 mg b.i.d., Entresto 24/26 mg b.i.d., and fenofibrate 200 mg daily.  He continues to be treated for type 2 diabetes which is now currently well controlled on metformin 500 mg b.i.d. and Jardiance 10 mg daily.  Current hemoglobin A1c is 6.7.  He continues to be followed by Nephrology for stage 3 chronic kidney disease.  He continues to be followed by oncology secondary to bladder cancer status post chemotherapy and radiation and remains stable.  He has a past surgical history of cholecystectomy, tonsillectomy, cataract repair, bladder biopsy, TURBT, Watchman insertion, and sebaceous cyst removal. He has a family history of his parents passing away in a motor vehicle accident. He also has a family history of diabetes. He is up-to-date with all vaccinations and screening exams.   Follow-up  Pertinent negatives include no abdominal pain, chest pain, chills, congestion, coughing, fatigue, fever, headaches, myalgias, nausea, neck pain, rash, sore throat or vomiting.     Review of Systems   Constitutional:   Negative for appetite change, chills, fatigue and fever.   HENT:  Negative for nasal congestion, ear pain, hearing loss, postnasal drip, rhinorrhea, sinus pressure/congestion, sore throat and tinnitus.    Eyes:  Negative for redness, itching and visual disturbance.   Respiratory:  Negative for cough, chest tightness and shortness of breath.    Cardiovascular:  Negative for chest pain and palpitations.   Gastrointestinal:  Negative for abdominal pain, constipation, diarrhea, nausea and vomiting.   Genitourinary:  Negative for decreased urine volume, difficulty urinating, dysuria, frequency, hematuria and urgency.   Musculoskeletal:  Negative for back pain, myalgias, neck pain and neck stiffness.   Integumentary:  Negative for rash.   Neurological:  Negative for dizziness, light-headedness and headaches.   Psychiatric/Behavioral: Negative.            Objective     Physical Exam  Vitals and nursing note reviewed.   Constitutional:       General: He is not in acute distress.     Appearance: Normal appearance. He is well-developed. He is not diaphoretic.   HENT:      Head: Normocephalic and atraumatic.      Right Ear: External ear normal.      Left Ear: External ear normal.      Nose: Nose normal.      Mouth/Throat:      Pharynx: No oropharyngeal exudate.   Eyes:      General: No scleral icterus.        Right eye: No discharge.         Left eye: No discharge.      Conjunctiva/sclera: Conjunctivae normal.      Pupils: Pupils are equal, round, and reactive to light.   Neck:      Thyroid: No thyromegaly.      Vascular: No JVD.      Trachea: No tracheal deviation.   Cardiovascular:      Rate and Rhythm: Normal rate and regular rhythm.      Heart sounds: Normal heart sounds. No murmur heard.     No friction rub. No gallop.   Pulmonary:      Effort: Pulmonary effort is normal. No respiratory distress.      Breath sounds: Normal breath sounds. No stridor. No wheezing, rhonchi or rales.   Chest:      Chest wall: No tenderness.    Abdominal:      General: Bowel sounds are normal. There is no distension.      Palpations: Abdomen is soft. There is no mass.      Tenderness: There is no abdominal tenderness. There is no guarding or rebound.   Musculoskeletal:         General: No tenderness. Normal range of motion.      Cervical back: Normal range of motion and neck supple.      Comments: Walks with assistance of a cane   Lymphadenopathy:      Cervical: No cervical adenopathy.   Skin:     General: Skin is warm and dry.      Coloration: Skin is not pale.      Findings: No erythema or rash.   Neurological:      Mental Status: He is alert and oriented to person, place, and time.   Psychiatric:         Mood and Affect: Mood normal.         Behavior: Behavior normal.         Thought Content: Thought content normal.         Judgment: Judgment normal.          Assessment and Plan     1. Follow-up exam, 3-6 months since previous exam    2. Persistent atrial fibrillation    3. Chronic anticoagulation    4. Chronic heart failure with preserved ejection fraction    5. Hypertension associated with diabetes    6. Hyperlipidemia associated with type 2 diabetes mellitus    7. Atherosclerosis of aorta  Overview:  Lumbar spine results      8. DM type 2, controlled, with complication    9. CKD stage 3 due to type 2 diabetes mellitus    10. 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 Low back pain with sciatica, sciatica laterality unspecified, unspecified back pain laterality, unspecified chronicity  -     Ambulatory referral/consult to Spine Care; Future; Expected date: 04/09/2025        1. Labs have been reviewed and are stable.    2, 3, 4, 5.  Patient is status post Watchman procedure for atrial fibrillation and remains stable.  Continue aspirin 81 mg daily, Plavix 75 mg daily, metoprolol 100 mg b.i.d., and Entresto 24/26 mg b.i.d..  Atrial fibrillation, chronic heart failure with preserved  ejection fraction, and hypertension are stable.  Continue follow-up with cardiology as scheduled.  6, 7.  Continue fenofibrate 200 mg daily.  Hyperlipidemia and atherosclerosis are stable.    8. Continue metformin 500 mg b.i.d. and Jardiance 10 mg daily.  Diabetes is well controlled.  Current hemoglobin A1c is 6.7.    9. Continue follow-up with nephrology as scheduled.  Stage 3 chronic kidney disease is stable.    10, 11.  Continue follow-up with oncology as scheduled.  Patient is status post chemotherapy, radiation, and surgical excision of a bladder tumor and remains stable.    12. Refer to back and spine for further evaluation and treatment of chronic back pain.  13. Return to clinic as needed or in 1 year for general exam.    I spent a total of 40 minutes on the day of the visit.This includes face to face time and non-face to face time preparing to see the patient (eg, review of tests), obtaining and/or reviewing separately obtained history, documenting clinical information in the electronic or other health record, independently interpreting results and communicating results to the patient/family/caregiver, or care coordinator.         Follow up in about 1 year (around 4/2/2026), or if symptoms worsen or fail to improve, for Annual exam.

## 2025-04-03 ENCOUNTER — PATIENT MESSAGE (OUTPATIENT)
Dept: ADMINISTRATIVE | Facility: OTHER | Age: OVER 89
End: 2025-04-03
Payer: MEDICARE

## 2025-04-04 ENCOUNTER — PATIENT MESSAGE (OUTPATIENT)
Dept: ADMINISTRATIVE | Facility: OTHER | Age: OVER 89
End: 2025-04-04
Payer: MEDICARE

## 2025-04-05 ENCOUNTER — PATIENT MESSAGE (OUTPATIENT)
Dept: ADMINISTRATIVE | Facility: OTHER | Age: OVER 89
End: 2025-04-05
Payer: MEDICARE

## 2025-04-07 ENCOUNTER — PATIENT MESSAGE (OUTPATIENT)
Dept: ADMINISTRATIVE | Facility: OTHER | Age: OVER 89
End: 2025-04-07
Payer: MEDICARE

## 2025-04-08 ENCOUNTER — PATIENT MESSAGE (OUTPATIENT)
Dept: ADMINISTRATIVE | Facility: OTHER | Age: OVER 89
End: 2025-04-08
Payer: MEDICARE

## 2025-04-09 ENCOUNTER — PATIENT MESSAGE (OUTPATIENT)
Dept: ADMINISTRATIVE | Facility: OTHER | Age: OVER 89
End: 2025-04-09
Payer: MEDICARE

## 2025-04-09 ENCOUNTER — HOSPITAL ENCOUNTER (OUTPATIENT)
Dept: RADIOLOGY | Facility: HOSPITAL | Age: OVER 89
Discharge: HOME | End: 2025-04-09
Attending: NURSE PRACTITIONER
Payer: MEDICARE

## 2025-04-09 ENCOUNTER — TELEPHONE (OUTPATIENT)
Dept: NEUROSURGERY | Facility: CLINIC | Age: OVER 89
End: 2025-04-09
Payer: MEDICARE

## 2025-04-09 ENCOUNTER — OFFICE VISIT (OUTPATIENT)
Dept: NEUROSURGERY | Facility: CLINIC | Age: OVER 89
End: 2025-04-09
Payer: MEDICARE

## 2025-04-09 VITALS
DIASTOLIC BLOOD PRESSURE: 73 MMHG | SYSTOLIC BLOOD PRESSURE: 111 MMHG | HEART RATE: 78 BPM | WEIGHT: 135.38 LBS | BODY MASS INDEX: 23.11 KG/M2 | HEIGHT: 64 IN

## 2025-04-09 DIAGNOSIS — Z74.09 IMPAIRED FUNCTIONAL MOBILITY, BALANCE, GAIT, AND ENDURANCE: ICD-10-CM

## 2025-04-09 DIAGNOSIS — M54.40 LOW BACK PAIN WITH SCIATICA, SCIATICA LATERALITY UNSPECIFIED, UNSPECIFIED BACK PAIN LATERALITY, UNSPECIFIED CHRONICITY: ICD-10-CM

## 2025-04-09 DIAGNOSIS — M54.9 DORSALGIA, UNSPECIFIED: Primary | ICD-10-CM

## 2025-04-09 DIAGNOSIS — M54.9 DORSALGIA, UNSPECIFIED: ICD-10-CM

## 2025-04-09 DIAGNOSIS — R53.81 PHYSICAL DECONDITIONING: ICD-10-CM

## 2025-04-09 PROCEDURE — 72082 X-RAY EXAM ENTIRE SPI 2/3 VW: CPT | Mod: 26,,, | Performed by: RADIOLOGY

## 2025-04-09 PROCEDURE — 99214 OFFICE O/P EST MOD 30 MIN: CPT | Mod: PBBFAC | Performed by: NURSE PRACTITIONER

## 2025-04-09 PROCEDURE — 99999 PR PBB SHADOW E&M-EST. PATIENT-LVL IV: CPT | Mod: PBBFAC,,, | Performed by: NURSE PRACTITIONER

## 2025-04-09 PROCEDURE — 72082 X-RAY EXAM ENTIRE SPI 2/3 VW: CPT | Mod: TC

## 2025-04-09 NOTE — PROGRESS NOTES
Neurosurgery  History & Physical    SUBJECTIVE:     History of Present Illness: Cliff Magaña is a 89 y.o. male with PMH of atrial fibrillation status post Watchman insertion on ASA 81mg and Plavix, chronic heart failure with preserved ejection fraction, hypertension, hyperlipidemia, aortic atherosclerosis, type 2 diabetes, stage 3 chronic kidney disease, bladder cancer status post chemotherapy and radiation. He is being seen in clinic today as a referral from his PCP to discuss concerns with his persistent low back pain. The patient has struggled with back pain for several years; however, his wife has noticed since his last hospitalization and past year that his posture has worsened and he is leaning forward more. The patient utilizes a cane for ambulation. Rates his pain today as a 1/10. Denies weakness, numbness or tingling, b/b dysfunction, or saddle anesthesia. He remains active with HEP.     Review of patient's allergies indicates:   Allergen Reactions    Niacin      Other reaction(s): Rash  Other reaction(s): Itching       Current Medications[1]    Past Medical History:   Diagnosis Date    *Atrial fibrillation     Cancer     Chronic kidney disease     Deep vein thrombosis     Hyperlipidemia     Hypertension     Metabolic syndrome     Type 2 diabetes mellitus, without long-term current use of insulin 12/13/2021     Past Surgical History:   Procedure Laterality Date    BIOPSY OF BLADDER N/A 4/12/2024    Procedure: BIOPSY, BLADDER;  Surgeon: Wellington Story MD;  Location: [address];  Service: Urology;  Laterality: N/A;    BLADDER FULGURATION N/A 4/12/2024    Procedure: FULGURATION, BLADDER;  Surgeon: Wellington Story MD;  Location: [facility] OR [address] [place] [address];  Service: Urology;  Laterality: N/A;    CATARACT EXTRACTION      CHOLECYSTECTOMY      CYSTOSCOPY N/A 11/16/2023    Procedure: CYSTOSCOPY;  Surgeon: Marcelino Moffett MD;  Location: [facility] OR [address] [place] [address];  Service: Urology;  Laterality: N/A;  90 minutes    DILATION OF  URETHRA N/A 4/12/2024    Procedure: DILATION, URETHRA;  Surgeon: Wellington Story MD;  Location: [facility] OR [facility] FLR;  Service: Urology;  Laterality: N/A;    ECHOCARDIOGRAM,TRANSESOPHAGEAL N/A 3/25/2025    Procedure: Transesophageal echo (BEENA) intra-procedure log documentation;  Surgeon: She, Dosc Diagnostic;  Location: [facility] EP LAB;  Service: Cardiology;  Laterality: N/A;  s/p Watchman, BEENA, MAC, DOSC, 3 Prep    EYE SURGERY      INJECTION OF FACET JOINT Bilateral 4/28/2021    Procedure: FACET JOINT INJECTION BILATERAL L4/L5 DIRECT REFERRAL;  Surgeon: Philipp Iyer MD;  Location: [facility] PAIN MGT;  Service: Pain Management;  Laterality: Bilateral;  NEEDS CONSENT, ELIQUIS CLEARANCE IN CHART    OCCLUSION OF LEFT ATRIAL APPENDAGE N/A 2/13/2025    Procedure: Left atrial appendage occlusion;  Surgeon: Marcelino Sheffield MD;  Location: [facility] EP LAB;  Service: Cardiology;  Laterality: N/A;  AF, BEENA, Watchman, BSCI, GEN, MB, 3 Prep    RETROGRADE PYELOGRAPHY Bilateral 11/16/2023    Procedure: PYELOGRAM, RETROGRADE;  Surgeon: Marcelino Moffett MD;  Location: [facility] OR [address] [place] [address];  Service: Urology;  Laterality: Bilateral;  90 minutes    SKIN CANCER EXCISION      TONSILLECTOMY      TRANSESOPHAGEAL ECHOCARDIOGRAPHY N/A 2/13/2025    Procedure: ECHOCARDIOGRAM, TRANSESOPHAGEAL;  Surgeon: Nereyda Birmingham MD;  Location: [facility] EP LAB;  Service: Cardiology;  Laterality: N/A;    TURBT (TRANSURETHRAL RESECTION OF BLADDER TUMOR) N/A 11/16/2023    Procedure: TURBT (TRANSURETHRAL RESECTION OF BLADDER TUMOR);  Surgeon: Marcelino Moffett MD;  [facility];  Service: Urology;  Laterality: N/A;  90 minutes    TURBT (TRANSURETHRAL RESECTION OF BLADDER TUMOR) N/A 4/12/2024    Procedure: TURBT (TRANSURETHRAL RESECTION OF BLADDER TUMOR);  Surgeon: Wellington Story MD;  [facility];  Service: Urology;  Laterality: N/A;     Family History       Problem Relation (Age of Onset)    Diabetes Maternal Aunt          Social  History     Socioeconomic History    Marital status:      Spouse name: Nereyda    Number of children: 4   Tobacco Use    Smoking status: Former     Current packs/day: 0.00     Average packs/day: 2.0 packs/day for 20.0 years (40.0 ttl pk-yrs)     Types: Cigarettes     Start date: 1963     Quit date: 1983     Years since quittin.9     Passive exposure: Never    Smokeless tobacco: Never   Substance and Sexual Activity    Alcohol use: Yes     Alcohol/week: 1.0 standard drink of alcohol     Types: 1 Standard drinks or equivalent per week     Comment: occasionally    Drug use: No    Sexual activity: Not Currently     Partners: Female     Social Drivers of Health     Financial Resource Strain: Low Risk  (2024)    Overall Financial Resource Strain (CARDIA)     Difficulty of Paying Living Expenses: Not hard at all   Food Insecurity: No Food Insecurity (2024)    Hunger Vital Sign     Worried About Running Out of Food in the Last Year: Never true     Ran Out of Food in the Last Year: Never true   Transportation Needs: No Transportation Needs (2024)    TRANSPORTATION NEEDS     Transportation : No   Physical Activity: Insufficiently Active (2024)    Exercise Vital Sign     Days of Exercise per Week: 4 days     Minutes of Exercise per Session: 20 min   Stress: No Stress Concern Present (2024)    [employer] - Occupational Stress Questionnaire     Feeling of Stress : Only a little   Housing Stability: Unknown (2024)    Housing Stability Vital Sign     Unable to Pay for Housing in the Last Year: No     Homeless in the Last Year: No       Review of Systems    OBJECTIVE:     Vital Signs     There is no height or weight on file to calculate BMI.      Neurosurgery Physical Exam  General: well developed, well nourished, no distress.   Head: normocephalic, atraumatic  Neurologic: Alert and oriented. Thought content appropriate.  GCS: Motor: 6/Verbal:  5/Eyes: 4 GCS Total: 15  Mental Status: Awake, Alert, Oriented x 4  Language: No aphasia  Speech: No dysarthria  Cranial nerves: face symmetric, tongue midline, CN II-XII grossly intact.   Eyes: pupils equal, round, reactive to light with accomodation, EOMI.   Pulmonary: normal respirations, no signs of respiratory distress  Abdomen: soft, non-distended  Skin: Skin is warm, dry and intact.  Sensory: intact to light touch throughout  Motor Strength:Moves all extremities spontaneously with good tone.  Full strength upper and lower extremities. No abnormal movements seen.     Diagnostic Results:  There is no new imaging to review for this encounter.      ASSESSMENT/PLAN:     Cliff Magaña is a 89 y.o. male seen in clinic today as a referral from his PCP to discuss concerns with his persistent low back pain and posture. I have ordered a dynamic x-ray and PT to assist with his pain and posture. We discussed that if the pain persists injections with pain management could be considered again as it did provide him with relief in the past. He verbalized understanding. I have encouraged him to contact the clinic with any questions, concerns, or adverse clinical changes. He verbalized understanding.      JOSE Schmitz  Neurosurgery  [facility]. [place].      Note dictated with voice recognition software, please excuse any grammatical errors.           [1]   Current Outpatient Medications   Medication Sig Dispense Refill    acetaminophen (TYLENOL) 650 MG TbSR Take 650 mg by mouth 2 (two) times a day.      aspirin (ECOTRIN) 81 MG EC tablet Take 1 tablet (81 mg total) by mouth once daily. 30 tablet 11    cholecalciferol, vitamin D3, (VITAMIN D3) 50 mcg (2,000 unit) Cap capsule Take by mouth once daily.      clopidogreL (PLAVIX) 75 mg tablet Take 1 tablet (75 mg total) by mouth once daily. 30 tablet 5    empagliflozin (JARDIANCE) 10 mg tablet Take 1 tablet (10 mg total) by mouth once daily. 90 tablet 3     fenofibrate micronized (LOFIBRA) 200 MG Cap Take 1 capsule (200 mg total) by mouth daily with breakfast. 90 capsule 3    loratadine (CLARITIN) 10 mg tablet Take 10 mg by mouth once daily.      metFORMIN (GLUCOPHAGE-XR) 500 MG ER 24hr tablet Take 1 tablet (500 mg total) by mouth 2 (two) times daily with meals. 90 tablet 3    metoprolol succinate (TOPROL-XL) 100 MG 24 hr tablet TAKE 1 TABLET TWICE A  tablet 1    sacubitriL-valsartan (ENTRESTO) 24-26 mg per tablet Take 1 tablet by mouth 2 (two) times daily. 180 tablet 3    betamethasone valerate 0.1% (VALISONE) 0.1 % Crea Apply topically 2 (two) times daily. Apply twice daily for 10 days for 10 days (Patient not taking: Reported on 2/17/2025) 45 g 0     No current facility-administered medications for this visit.

## 2025-04-10 ENCOUNTER — PATIENT MESSAGE (OUTPATIENT)
Dept: ADMINISTRATIVE | Facility: OTHER | Age: OVER 89
End: 2025-04-10
Payer: MEDICARE

## 2025-04-11 ENCOUNTER — PATIENT MESSAGE (OUTPATIENT)
Dept: ADMINISTRATIVE | Facility: OTHER | Age: OVER 89
End: 2025-04-11
Payer: MEDICARE

## 2025-04-12 ENCOUNTER — PATIENT MESSAGE (OUTPATIENT)
Dept: ADMINISTRATIVE | Facility: OTHER | Age: OVER 89
End: 2025-04-12
Payer: MEDICARE

## 2025-04-13 ENCOUNTER — PATIENT MESSAGE (OUTPATIENT)
Dept: ADMINISTRATIVE | Facility: OTHER | Age: OVER 89
End: 2025-04-13
Payer: MEDICARE

## 2025-04-14 ENCOUNTER — PATIENT MESSAGE (OUTPATIENT)
Dept: ADMINISTRATIVE | Facility: OTHER | Age: OVER 89
End: 2025-04-14
Payer: MEDICARE

## 2025-04-14 DIAGNOSIS — I48.0 PAROXYSMAL ATRIAL FIBRILLATION: Primary | ICD-10-CM

## 2025-04-15 ENCOUNTER — PATIENT MESSAGE (OUTPATIENT)
Dept: ADMINISTRATIVE | Facility: OTHER | Age: OVER 89
End: 2025-04-15
Payer: MEDICARE

## 2025-04-16 ENCOUNTER — PATIENT MESSAGE (OUTPATIENT)
Dept: ADMINISTRATIVE | Facility: OTHER | Age: OVER 89
End: 2025-04-16
Payer: MEDICARE

## 2025-04-17 ENCOUNTER — TELEPHONE (OUTPATIENT)
Dept: UROLOGY | Facility: CLINIC | Age: OVER 89
End: 2025-04-17
Payer: MEDICARE

## 2025-04-17 ENCOUNTER — PATIENT MESSAGE (OUTPATIENT)
Dept: ADMINISTRATIVE | Facility: OTHER | Age: OVER 89
End: 2025-04-17
Payer: MEDICARE

## 2025-04-17 NOTE — TELEPHONE ENCOUNTER
Spoke with pt re: confirming procedure appt for tomorrow.   Discussed location & arrival time for 1:30pm  Pt verbalized understanding

## 2025-04-18 ENCOUNTER — PATIENT MESSAGE (OUTPATIENT)
Dept: ADMINISTRATIVE | Facility: OTHER | Age: OVER 89
End: 2025-04-18
Payer: MEDICARE

## 2025-04-19 ENCOUNTER — PATIENT MESSAGE (OUTPATIENT)
Dept: ADMINISTRATIVE | Facility: OTHER | Age: OVER 89
End: 2025-04-19
Payer: MEDICARE

## 2025-04-20 ENCOUNTER — PATIENT MESSAGE (OUTPATIENT)
Dept: ADMINISTRATIVE | Facility: OTHER | Age: OVER 89
End: 2025-04-20
Payer: MEDICARE

## 2025-04-21 ENCOUNTER — PROCEDURE VISIT (OUTPATIENT)
Dept: UROLOGY | Facility: CLINIC | Age: OVER 89
End: 2025-04-21
Payer: MEDICARE

## 2025-04-21 ENCOUNTER — PATIENT MESSAGE (OUTPATIENT)
Dept: ADMINISTRATIVE | Facility: OTHER | Age: OVER 89
End: 2025-04-21
Payer: MEDICARE

## 2025-04-21 VITALS
WEIGHT: 132.25 LBS | HEART RATE: 87 BPM | DIASTOLIC BLOOD PRESSURE: 56 MMHG | BODY MASS INDEX: 22.71 KG/M2 | TEMPERATURE: 98 F | SYSTOLIC BLOOD PRESSURE: 115 MMHG | RESPIRATION RATE: 17 BRPM

## 2025-04-21 DIAGNOSIS — N32.89 BLADDER MASS: ICD-10-CM

## 2025-04-21 PROCEDURE — 88112 CYTOPATH CELL ENHANCE TECH: CPT | Mod: TC

## 2025-04-21 PROCEDURE — 52000 CYSTOURETHROSCOPY: CPT | Mod: PBBFAC | Performed by: UROLOGY

## 2025-04-21 RX ORDER — LIDOCAINE HYDROCHLORIDE 20 MG/ML
JELLY TOPICAL
Status: COMPLETED | OUTPATIENT
Start: 2025-04-21 | End: 2025-04-21

## 2025-04-21 RX ORDER — BETAMETHASONE VALERATE 1 MG/G
CREAM TOPICAL 2 TIMES DAILY
Qty: 45 G | Refills: 0 | Status: SHIPPED | OUTPATIENT
Start: 2025-04-21 | End: 2025-05-01

## 2025-04-21 RX ADMIN — LIDOCAINE HYDROCHLORIDE: 20 JELLY TOPICAL at 01:04

## 2025-04-21 NOTE — PATIENT INSTRUCTIONS
What to Expect After a Cystoscopy  For the next 24-48 hours, you may feel a mild burning when you urinate. This burning is normal and expected. Drink plenty of water to dilute the urine to help relieve the burning sensation. You may also see a small amount of blood in your urine after the procedure.    Unless you are already taking antibiotics, you may be given an antibiotic after the test to prevent infection.    Signs and Symptoms to Report  Call the [facility] at [phone] if you develop any of the following:  Fever of 101 degrees or higher  Chills or persistent bleeding  Inability to urinate

## 2025-04-21 NOTE — PROCEDURES
Cystoscopy    Date/Time: 4/21/2025 1:45 PM    Performed by: Wellington Story MD  Authorized by: Wellington Story MD      Office Cystoscopy Procedure Note    Date of Procedure: 04/21/2025    Indication:  Urothelial carcinoma of the bladder      Informed consent:  The risks, benefits, complications, treatment options, and expected outcomes were discussed with the patient. The patient concurred with the proposed plan and provided informed consent.     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  He received 5 of 6 weeks of cisplatin therapy  Surveillance cystoscopy and CT urogram on 03/04/2024 showing residual tumor at the bladder dome  Underwent TURBT on 4/12/24 and the path was negative for malignancy    Procedure:  The patient was placed in the lithotomy position, was prepped and draped in the usual manner using sterile technique, and 2% lidocaine jelly instilled into the urethra.  A procedural timeout was performed identifying the patient, all in attendance were in agreement, including the patient. A 17 F flexible cystoscope was then inserted into the urethra and the urethra and bladder carefully examined.  The following findings were noted:     Findings:   1. External genitals reveal an erythematous rash over the glans and phallus of the penis.  Normal anterior urethra without evidence of strictures or tumors   2. Prostatic urethra open without signs of obstruction  3. Bladder free of masses, tumors.  There was some fibrinous material at the dome consistent with radiation reaction from his prior tumor sites, this was biopsy negative in April 2024, no papillary regrowth  Ureteral orifices in orthotopic position bilaterally        Specimens: None   Complications: None; patient tolerated the procedure well           Disposition: Home after brief observation   Condition: Stable     Assessment:  89-year-old male status post trimodality therapy for muscle invasive bladder cancer    Plan:  Follow up in 3 months with a repeat cystoscopy and cytology  -we will begin corticosteroids, sent this to pharmacy on file, follow up in 2 weeks with physical exam    Attending Attestation:      I personally performed the procedure.     Wellington Story  2:37 PM  04/21/2025

## 2025-04-22 ENCOUNTER — PATIENT MESSAGE (OUTPATIENT)
Dept: ADMINISTRATIVE | Facility: OTHER | Age: OVER 89
End: 2025-04-22
Payer: MEDICARE

## 2025-04-22 LAB
ESTROGEN SERPL-MCNC: NORMAL PG/ML
INSULIN SERPL-ACNC: NORMAL U[IU]/ML
LAB AP CLINICAL INFORMATION: NORMAL
LAB AP GROSS DESCRIPTION: NORMAL
LAB AP PERFORMING LOCATION(S): NORMAL
LAB AP URINE CYTOLOGY INTERPRETATION SPECIMEN 1: NORMAL

## 2025-04-22 RX ORDER — CLOPIDOGREL BISULFATE 75 MG/1
75 TABLET ORAL DAILY
Qty: 90 TABLET | Refills: 3 | Status: SHIPPED | OUTPATIENT
Start: 2025-04-22

## 2025-04-23 ENCOUNTER — PATIENT MESSAGE (OUTPATIENT)
Dept: ADMINISTRATIVE | Facility: OTHER | Age: OVER 89
End: 2025-04-23
Payer: MEDICARE

## 2025-04-24 ENCOUNTER — PATIENT MESSAGE (OUTPATIENT)
Dept: ADMINISTRATIVE | Facility: OTHER | Age: OVER 89
End: 2025-04-24
Payer: MEDICARE

## 2025-04-25 ENCOUNTER — PATIENT MESSAGE (OUTPATIENT)
Dept: ADMINISTRATIVE | Facility: OTHER | Age: OVER 89
End: 2025-04-25
Payer: MEDICARE

## 2025-04-25 ENCOUNTER — HOSPITAL ENCOUNTER (OUTPATIENT)
Dept: RADIOLOGY | Facility: HOSPITAL | Age: OVER 89
Discharge: HOME | End: 2025-04-25
Attending: NURSE PRACTITIONER
Payer: MEDICARE

## 2025-04-25 DIAGNOSIS — C67.9 MALIGNANT NEOPLASM OF URINARY BLADDER, UNSPECIFIED SITE: ICD-10-CM

## 2025-04-25 LAB
CREAT SERPL-MCNC: 1.3 MG/DL (ref 0.5–1.4)
SAMPLE: NORMAL

## 2025-04-25 PROCEDURE — 25500020 PHARM REV CODE 255: Performed by: NURSE PRACTITIONER

## 2025-04-25 PROCEDURE — 71260 CT THORAX DX C+: CPT | Mod: 26,,, | Performed by: RADIOLOGY

## 2025-04-25 PROCEDURE — 74177 CT ABD & PELVIS W/CONTRAST: CPT | Mod: 26,,, | Performed by: RADIOLOGY

## 2025-04-25 PROCEDURE — A9698 NON-RAD CONTRAST MATERIALNOC: HCPCS | Performed by: NURSE PRACTITIONER

## 2025-04-25 PROCEDURE — 71260 CT THORAX DX C+: CPT | Mod: TC

## 2025-04-25 RX ADMIN — BARIUM SULFATE 450 ML: 20 SUSPENSION ORAL at 04:04

## 2025-04-25 RX ADMIN — IOHEXOL 85 ML: 350 INJECTION, SOLUTION INTRAVENOUS at 04:04

## 2025-04-26 ENCOUNTER — PATIENT MESSAGE (OUTPATIENT)
Dept: ADMINISTRATIVE | Facility: OTHER | Age: OVER 89
End: 2025-04-26
Payer: MEDICARE

## 2025-04-27 ENCOUNTER — PATIENT MESSAGE (OUTPATIENT)
Dept: ADMINISTRATIVE | Facility: OTHER | Age: OVER 89
End: 2025-04-27
Payer: MEDICARE

## 2025-04-28 ENCOUNTER — PATIENT MESSAGE (OUTPATIENT)
Dept: ADMINISTRATIVE | Facility: OTHER | Age: OVER 89
End: 2025-04-28
Payer: MEDICARE

## 2025-04-28 ENCOUNTER — CLINICAL SUPPORT (OUTPATIENT)
Dept: REHABILITATION | Facility: HOSPITAL | Age: OVER 89
End: 2025-04-28
Payer: MEDICARE

## 2025-04-28 DIAGNOSIS — Z74.09 IMPAIRED FUNCTIONAL MOBILITY, BALANCE, GAIT, AND ENDURANCE: Primary | ICD-10-CM

## 2025-04-28 DIAGNOSIS — M54.9 DORSALGIA, UNSPECIFIED: ICD-10-CM

## 2025-04-28 DIAGNOSIS — M54.40 LOW BACK PAIN WITH SCIATICA, SCIATICA LATERALITY UNSPECIFIED, UNSPECIFIED BACK PAIN LATERALITY, UNSPECIFIED CHRONICITY: ICD-10-CM

## 2025-04-28 PROCEDURE — 97161 PT EVAL LOW COMPLEX 20 MIN: CPT

## 2025-04-28 PROCEDURE — 97110 THERAPEUTIC EXERCISES: CPT

## 2025-04-28 NOTE — PROGRESS NOTES
Outpatient Rehab    Physical Therapy Evaluation    Patient Name: Cliff Magaña  MRN: [id number]  YOB: 1935  Encounter Date: 4/28/2025    Therapy Diagnosis:   Encounter Diagnoses   Name Primary?    Low back pain with sciatica, sciatica laterality unspecified, unspecified back pain laterality, unspecified chronicity     Dorsalgia, unspecified     Impaired functional mobility, balance, gait, and endurance Yes     Physician: Denisha Still NP    Physician Orders: Eval and Treat  Medical Diagnosis: Low back pain with sciatica, sciatica laterality unspecified, unspecified back pain laterality, unspecified chronicity  Dorsalgia, unspecified    Visit # / Visits Authorized:  1 / 1  Insurance Authorization Period: 4/9/2025 to 4/9/2026  Date of Evaluation: 4/28/2025  Plan of Care Certification: 4/28/2025 to 6/28/2025     Time In: 1400   Time Out: 1455  Total Time: 55   Total Billable Time:      Intake Outcome Measure for FOTO Survey    Therapist reviewed FOTO scores for Cliff Magaña on 4/28/2025.   FOTO report - see Media section or FOTO account episode details.     Intake Score: 52%         Subjective   History of Present Illness  Cliff is a 89 y.o. male                  History of Present Condition/Illness: Mr. Magaña reports having bladder cancer in 2023 with chemo that reduced his mobility levels substantially. He has since been trying to return to previous level of independence. Completed Physical Therapy in 2024 where he began with a rollator and was able to transition to a single point cane. He now presents with complaints of lower extremity fatigue and weakness along with balance issues. His wife is present as well and assists with subjective.     Activities of Daily Living      General Prior Level of Function Comments: No difficulty with squatting or stairs  General Current Level of Function Comments: Severe difficutly with squatting and stairs           Pain  No Pain Reported: Yes                  Living Arrangements  Living Situation  Housing: Home independently  Living Arrangements: Spouse/significant other            Past Medical History/Physical Systems Review:   Cliff Magaña  has a past medical history of *Atrial fibrillation, Cancer, Chronic kidney disease, Deep vein thrombosis, Hyperlipidemia, Hypertension, Metabolic syndrome, and Type 2 diabetes mellitus, without long-term current use of insulin.    Cliff Magaña  has a past surgical history that includes Cholecystectomy; Tonsillectomy; Cataract extraction; Eye surgery; Skin cancer excision; Injection of facet joint (Bilateral, 4/28/2021); turbt (transurethral resection of bladder tumor) (N/A, 11/16/2023); Cystoscopy (N/A, 11/16/2023); Retrograde pyelography (Bilateral, 11/16/2023); turbt (transurethral resection of bladder tumor) (N/A, 4/12/2024); Dilation of urethra (N/A, 4/12/2024); Biopsy of bladder (N/A, 4/12/2024); Bladder fulguration (N/A, 4/12/2024); Occlusion of left atrial appendage (N/A, 2/13/2025); Transesophageal echocardiography (N/A, 2/13/2025); and echocardiogram,transesophageal (N/A, 3/25/2025).    Cliff has a current medication list which includes the following prescription(s): acetaminophen, aspirin, betamethasone valerate 0.1%, cholecalciferol (vitamin d3), clopidogrel, empagliflozin, fenofibrate micronized, loratadine, metformin, metoprolol succinate, and entresto.    Review of patient's allergies indicates:   Allergen Reactions    Niacin      Other reaction(s): Rash  Other reaction(s): Itching        Objective      Mental status: alert, oriented to person, place, and time  Behavior:  calm and cooperative  Attention Span and Concentration:  Normal     Posture Alignment: increased kyphosis, increased forward lean with longer durations of standing     Gait Assessment:   AD used: quad cane used with increased kyphosis and cane positioned far in front of JEN, decreased memo        MCDANIELS  BALANCE ASSESSMENT TOOL  1.  Sitting to Standing              4 - able to stand without using hands and stabilize independently  2. Standing Unsupported              4 - able to stand safely 2 minutes without hold  3. Sitting Unsupported              4 - able to sit safely and securely 2 minutes  4. Standing to Sitting              4 - sits safely with minimal use of hands  5. Pivot Transfer              4 - able to trnasfer safely with minor use of hands  6. Standing with Eyes Closed              4 - albe to stand 10 seconds safely  7. Standing with Feet Together              4 - able to place feet together independently and stand 1 minute safely  8. Reaching Forward with Outstretched Arm              4 - can reach forward confidently 25 cm/10 inches  9. Retrieving Object from Floor              4 - able to  slipper safely and easily  10. Turning to Look Behind              4 - looks behind from both sides and weights shifts well  11. Turning 360 Degrees              3 - able to trun 360 safely one side only in 4 seconds or less  12. Placing Alternate Foot on Step              3 - able to stand independently and completely 8 steps > 20 seconds  13. Standing with One Foot in Front              4 - able to tandem stand independently and hold 30 seconds  14. Standing on One Foot              2 - able to lift leg indepentdently and hold = or < 3 seconds     TOTAL SCORE: 52  Maximum: 56     30 second STS: 12 (increased forward trunk lean)  TU seconds with quad cane    Balance:  Sharpened Rhomberg: WNL time with no postural sway (increased fwd trunk lean utilized)  SLS: R 5 sec, L 4 sec  Tandem: RFF 30 sec, LFF 30 sec          Treatment:   Therapeutic Exercise: 10 minutes    Education and instruction on home exercise program  Sit to stands with overhead reach 1lb 10x  Seated Thoracic extension 10x  Standing calf raise  at wall 10x    Manual Therapy: 0 minutes    Therapeutic Activities: 0 minutes    Balance/Neuromuscular Re-education: 0  minutes     Assessment & Plan   Assessment  Cliff presents with a condition of Low complexity.   Presentation of Symptoms: Stable  Will Comorbidities Impact Care: No       Functional Limitations: Activity tolerance, Ambulating on uneven surfaces, Completing self-care activities, Completing work/school activities, Decreased ambulation distance/endurance, Functional mobility, Gait limitations, Increased risk of fall, Maintaining balance, Painful locomotion/ambulation, Participating in leisure activities, Range of motion, Standing tolerance, Transfers, Squatting  Impairments: Abnormal gait, Abnormal or restricted range of motion, Impaired physical strength, Impaired balance, Pain with functional activity, Weight-bearing intolerance  Personal Factors Affecting Prognosis: Schedule, Transportation    Patient Goal for Therapy (PT): to become more independent and reduce fall risk  Prognosis: Excellent  Assessment Details: Patient demonstrates deficits in balance, lower extremity strength, and functional mobility. They would benefit from skilled PT services to normalize kinetic chain mobility, strength, and function to safely return to their prior level of activity.    Plan  From a physical therapy perspective, the patient would benefit from: Skilled Rehab Services    Planned therapy interventions include: Therapeutic exercise, Therapeutic activities, Neuromuscular re-education, Manual therapy, ADLs/IADLs, and Other (Comment). Dry Needling (prn)  Planned modalities to include: Biofeedback, Electrical stimulation - attended, Electrical stimulation - passive/unattended, Thermotherapy (hot pack), and Cryotherapy (cold pack).        Visit Frequency: 1 times Per Week for 8 Weeks.       This plan was discussed with Patient.   Discussion participants: Agreed Upon Plan of Care             Patient's spiritual, cultural, and educational needs considered and patient agreeable to plan of care and goals.           Goals:   Short Term  Goals (4 Weeks):   1.  Independent with initial HEP.  2. Pt will be able to perform TUG in 16 secs with use of AD placingdemonstrating overall improved functional mobility.   5. Patient will be able to perform single leg balance without upper extremity support for 10 seconds to demonstrate reduced fall risk    Long Term Goals (8 Weeks):   9.  Pt will be able to safely perform and tolerate high level ADL's without LOB.   10. Pt will have 0 falls from start of PT sessions.  11. Independent with updated HEP.   6.  Pt will be able to walk in neighborhood ~1/2 to 1 mile at safe and coordinated speed for community walking program.  14. Pt will perform floor to stand f/b stand to floor transfer with UE support with stand by assist and no cues for improved dynamic transfer, core stability and fall safety in home and community.  15. Pt will begin some form of community fitness to begin regular and consistent performance of exercise to continue maintenance of gains made in PT.       Boris Gale, PT

## 2025-04-29 ENCOUNTER — PATIENT MESSAGE (OUTPATIENT)
Dept: ADMINISTRATIVE | Facility: OTHER | Age: OVER 89
End: 2025-04-29
Payer: MEDICARE

## 2025-04-29 ENCOUNTER — OFFICE VISIT (OUTPATIENT)
Dept: HEMATOLOGY/ONCOLOGY | Facility: CLINIC | Age: OVER 89
End: 2025-04-29
Payer: MEDICARE

## 2025-04-29 VITALS
BODY MASS INDEX: 22.65 KG/M2 | OXYGEN SATURATION: 100 % | HEART RATE: 69 BPM | RESPIRATION RATE: 17 BRPM | DIASTOLIC BLOOD PRESSURE: 63 MMHG | SYSTOLIC BLOOD PRESSURE: 135 MMHG | TEMPERATURE: 97 F | HEIGHT: 64 IN | WEIGHT: 132.69 LBS

## 2025-04-29 DIAGNOSIS — R53.81 PHYSICAL DECONDITIONING: ICD-10-CM

## 2025-04-29 DIAGNOSIS — I50.32 CHRONIC HEART FAILURE WITH PRESERVED EJECTION FRACTION: ICD-10-CM

## 2025-04-29 DIAGNOSIS — C67.9 MALIGNANT NEOPLASM OF URINARY BLADDER, UNSPECIFIED SITE: Primary | ICD-10-CM

## 2025-04-29 DIAGNOSIS — I15.2 HYPERTENSION ASSOCIATED WITH DIABETES: ICD-10-CM

## 2025-04-29 DIAGNOSIS — E11.8 DM TYPE 2, CONTROLLED, WITH COMPLICATION: ICD-10-CM

## 2025-04-29 DIAGNOSIS — E11.59 HYPERTENSION ASSOCIATED WITH DIABETES: ICD-10-CM

## 2025-04-29 DIAGNOSIS — R13.10 DYSPHAGIA, UNSPECIFIED TYPE: ICD-10-CM

## 2025-04-29 DIAGNOSIS — I48.19 PERSISTENT ATRIAL FIBRILLATION: ICD-10-CM

## 2025-04-29 DIAGNOSIS — E04.2 MULTIPLE THYROID NODULES: ICD-10-CM

## 2025-04-29 PROCEDURE — 99214 OFFICE O/P EST MOD 30 MIN: CPT | Mod: PBBFAC | Performed by: INTERNAL MEDICINE

## 2025-04-29 PROCEDURE — 99999 PR PBB SHADOW E&M-EST. PATIENT-LVL IV: CPT | Mod: PBBFAC,,, | Performed by: INTERNAL MEDICINE

## 2025-04-29 NOTE — ASSESSMENT & PLAN NOTE
Dysphagia and scans with aspiration evidence prior  Had swallowing study and followed diet recs  GI evaluated - monitor for now as dysphagia improving  Now resolved

## 2025-04-29 NOTE — PROGRESS NOTES
Subjective     Patient ID: Cliff Magaña is a 89 y.o. male.    Chief Complaint: Malignant neoplasm of urinary bladder, unspecified site    HPI    Diagnosis: Muscle invasive bladder cancer post chemo/XRT     Interval history:  Presents to clinic for follow up of bladder cancer.  Reviewed recent labs and scans.  Last cystoscopy 4/21/25 clear and plan to repeat in 3 months.    Eating better. Appetite back up to normal. No longer having dysphagia.  More mobile.  Going to physical therapy once a week.      Most recent imaging:  CT chest abdomen pelvis 4/28/25:  Impression:     In patient with history a of urothelial carcinoma of the bladder status post TURBT and chemo radiation, stable post treatment changes with mild thickening of the anterior urinary bladder wall.  No significant detrimental change.     Stable right lung base pleural nodule opacity, unchanged dating back to 2023.     Chronic pancreatitis.    Cystoscopy 4/21/25:  Findings:   1. External genitals reveal an erythematous rash over the glans and phallus of the penis.  Normal anterior urethra without evidence of strictures or tumors   2. Prostatic urethra open without signs of obstruction  3. Bladder free of masses, tumors.  There was some fibrinous material at the dome consistent with radiation reaction from his prior tumor sites, this was biopsy negative in April 2024, no papillary regrowth  Ureteral orifices in orthotopic position bilaterally        Oncology History:  - bladder cancer incidentally found on imaging as he had been under surveillance for renal cysts  (10/17/2023 ultrasound showed stable left renal cysts and new bladder masses)     -  11/16/2023 TURBT of bladder tumor  Findings:   1. Right postero-lateral papillary bladder wall tumor (3-4cm) with diffusely erythematous and nodular surrounding tissue, right < 1cm papillary bladder wall tumor immediately lateral to the larger postero-lateral papillary tumor, and a right nodular 4cm bladder  tumor with high grade and possibly invasive appearance. Tumors resected, hemostasis achieved.  Several other patches of erythema were diffusely identified on the left lateral bladder wall.  2. Bimanual exam post-TURBT showed freely mobile bladder without abnormalities  3. Bilateral retrograde pyelogram showed delicate calices with no evidence of hydronephrosis, no filling defects, and ureter normal in course and caliber, bilaterally  Pathology:  1. Bladder, right posterolateral wall, transurethral resection of bladder tumor:  - Noninvasive high-grade papillary urothelial carcinoma  - Muscularis propria present  - Multiple H&E levels evaluated  2. Bladder, right anterior, transurethral resection of bladder tumor:  - Invasive high-grade papillary urothelial carcinoma  - Tumor invades muscularis propria  - Intact expression of DNA mismatch repair proteins in tumor by immunohistochemistry (see comment)  COMMENT:  Immunohistochemistry (IHC) Testing for Mismatch Repair (MMR) Proteins:  MLH1 - Intact nuclear expression  MSH2 - Intact nuclear expression  MSH6 - Intact nuclear expression  PMS2 - Intact nuclear expression  Background nonneoplastic tissue/internal control with intact nuclear expression  IHC Interpretation  No loss of nuclear expression of MMR proteins: low probability of microsatellite instability  There are exceptions to the above IHC interpretations. These results should not be considered in isolation, and clinical correlation with genetic counseling is recommended to assess the need for germline testing.     - 11/25/2023 CT Abd/Pelvis:  FINDINGS:  Lung base: Bilateral small volume pleural effusion increased in size compared to recent prior, associated with mild compression atelectasis.  Visualized portion of heart: Coronary artery calcification.  Liver: Normal in size.  Left lobe small calcified granuloma.  No suspicious focal lesion.  Gallbladder: Cholecystectomy.  Bile duct: No intra-or extrahepatic  biliary ductal dilatation.  Spleen: Unremarkable.  Pancreas: Parenchymal atrophy.  Multiple parenchymal calcifications suggesting chronic pancreatitis.  No suspicious focal lesion.  No pancreatic ductal dilatation.  No adjacent inflammatory changes or fluid collections.  Adrenal glands: Unremarkable.  Genitourinary: Bilateral renal cysts.  Subcentimeter hypodensities in both kidneys, which are too small to characterize.  The ureters appear normal in course and caliber.  No evidence of nephrolithiasis or hydroureteronephrosis.  Urinary bladder: Postsurgical change in the anterior bladder wall from TURBT.  There are small foci of air in the bladder, likely iatrogenic.  Reproductive organs: Unremarkable.  GI tract: Small hiatal hernia.  The stomach is unremarkable.  The visualized loops of small and large bowel show no evidence of obstruction or inflammation. Diverticulosis.  Appendix unremarkable.  Peritoneum: No free air or fluid.  Retroperitoneum: No significant adenopathy.  Vasculature: Normal caliber of abdominal aorta with moderate calcified and noncalcified atherosclerosis.  Abdominal wall: Tiny fat containing umbilical hernia.  Bones: Stable T12-L1 intervertebral disc calcification.  No suspicious sclerotic lesions.  No acute fracture.  Impression:  Postoperative changes from of TURBT.  No lymphadenopathy.  No distant metastases.  Other findings as described.     - 12/6/2023 CT Chest:  FINDINGS:  Comparison is 11/21/2023.  No mediastinal, hilar or axillary adenopathy is seen.  There is a right lower pole thyroid nodule.  There are coronary calcifications.  There is a left upper pole renal cyst.  There is a small hiatal hernia.  Trachea and bronchi are patent.  There is no evidence of interstitial lung disease, bronchiectasis, airway trapping, or emphysema.  No suspicious pulmonary nodules, masses, or infiltrates are seen.  There is a calcified left lung granuloma.  Bones demonstrate nothing  focal.  Impression:  No significant abnormality seen.  Right lobe thyroid nodule.  Coronary artery calcifications.  Left upper pole renal cyst.     Last radiation session on 2/8/24    - 2/29/2024 CT Urogram Abd/Pelvis:  FINDINGS:  Stable mild cardiomegaly.  Coronary artery calcific atherosclerosis.  Visualized inferior lungs are clear.  Stable small left hepatic lobe calcification.  No suspicious hepatic lesion.  Gallbladder is surgically absent.  No biliary ductal dilatation.  Spleen and adrenal glands are unremarkable.  Stable appearance of the pancreas with atrophy, scattered calcifications, and mild duct dilatation measuring 4 mm.  Findings can be seen with sequela of chronic pancreatitis.  Bilateral renal cortical thinning.  Multiple bilateral renal cysts and subcentimeter renal hypodensities too small to characterize.  No solid enhancing renal mass.  No renal stone.  No hydronephrosis or ureteral dilatation.  Opacification of the bilateral renal collecting systems and ureters without suspicious filling defect.  Patient is status post TURBT.  There is increased heterogeneous wall thickening and enhancement along the anterior bladder (series 4, image 144) concerning for residual/recurrent tumor.  Mild perivesical stranding and vascular prominence, similar to prior exam.  Small hiatal hernia.  Colonic diverticulosis.  No evidence of bowel obstruction or inflammation.  No free intraperitoneal fluid or air.  No pathologically enlarged abdominopelvic lymph nodes.  Abdominal aorta is normal caliber.  Moderate/severe calcific atherosclerosis.  Degenerative changes of the osseous structures.  No acute fracture or aggressive osseous lesion  Impression:  1. Increased heterogeneous wall thickening and enhancement along the anterior bladder concerning for residual/recurrent tumor.  2. No evidence of metastatic disease.  3. Additional findings as above.    - 2/2024 scans without signs of metastatic disease     - cystoscopy  with Dr. Story on 3/4/2024 revealing concern for residual tumor bladder dome but TURBT for 4/12/2024 was negative on Pathology  Pathology:  1. BLADDER, RIGHT LATERAL WALL, BIOPSY:   Small fragment of urothelial mucosa with reactive changes.    Negative for dysplasia or malignancy.    2. BLADDER, POSTERIOR WALL, BIOPSY:   Heavily denuded urothelial mucosa with reactive changes.    Negative for dysplasia or malignancy.    3. BLADDER, LEFT LATERAL WALL, BIOPSY:   Small fragment of urothelial mucosa with reactive changes.    Negative for dysplasia or malignancy.    4. BLADDER, POSTERIOR PATCH, BIOPSY:   Heavily denuded urothelial mucosa with marked chronic inflammation and reactive changes.   Negative for dysplasia or malignancy.    5. BLADDER, ANTERIOR WALL TUMOR, TRANSURETHRAL RESECTION:   Polypoid cystitis and granulation tissue with squamous metaplasia of urothelial mucosa.    Negative for dysplasia or malignancy.     - 10/21/2024 Cystoscope:  Findings:   1. Normal anterior urethra without evidence of strictures or tumors   2. Prostatic urethra open without signs of obstruction  3. Bladder with healing tissue and fibrinous material at the dome, no evidence for residual malignancy.  Ureteral orifices in orthotopic position bilaterally      - 10/30/2024 CT C/A/P:  FINDINGS:  CHEST:  Neck and thoracic soft tissues:  No lymphadenopathy.  Stable 2.0 cm right thyroid lobe nodule and left thyroid lobe calcification.  Partially visualized soft tissue density in the upper back subcutaneous tissues, possibly the sebaceous cyst noted on prior CT chest..  Thoracic aorta:Left-sided aortic arch with 3 branch vessels.  Normal caliber, contour, and course.  Moderate atherosclerotic calcification.  Heart: Normal in size. No pericardial effusion.  Multi-vessel coronary artery atherosclerosis.  Calcification of the aortic annulus.  Normal diameter of the main pulmonary artery.  Hilum/Mediastinum: No pathologically enlarged hilar or  mediastinal lymph nodes.  Airways: The trachea is midline and proximal airways are patent.  Lungs/Pleura: Similar appearance of 1.3 cm subpleural nodule in the right lung base (series 3, image 226).  Calcified pleural plaque noted.  No new pulmonary nodules..  No consolidation, pleural effusion, or pneumothorax.  Esophagus: The esophagus maintains a normal course and caliber.  ABDOMEN/PELVIS:  Stomach: Small hiatal hernia.  No gastric wall thickening.  Liver: The liver is normal in size and attenuation.  Stable left hepatic lobe calcification.  No focal hepatic abnormality on this noncontrast study.  Gallbladder/Biliary System: Gallbladder is surgically absent.  No intrahepatic or extrahepatic biliary ductal dilatation.  Spleen: Normal size without focal abnormality.  Pancreas: Pancreatic atrophy with prominent pancreatic duct and scattered parenchymal calcifications, stable from prior.  Findings may represent sequela of chronic pancreatitis.  No mass or peripancreatic fat stranding.  Adrenal glands: No adrenal nodules.  Renal and Urinary system: Unchanged bilateral renal cortical thinning and nonspecific perinephric stranding.  Stable renal cysts bilaterally and subcentimeter hypodensities that are too small to characterize.    No hydronephrosis.  The ureters appear normal in course and caliber. Patient is s/p TURBT.  Anterior bladder wall thickening and new linear hyperdensities along the anterior superior bladder wall (sagittal series 10, image 64), possibly related to post treatment or postoperative change.  Stable mild pre vesicle fat stranding.  Reproductive: Dystrophic prostatic calcifications.  Bowel: The visualized loops of small and large bowel show no evidence of obstruction or inflammation.  The appendix appears normal.  Peritoneum: No free fluid or intraperitoneal free air.  Lymph Nodes: No pathologic braden enlargement in the abdomen or pelvis.  Vasculature: The abdominal aorta and its branch vessels  are normal in course and caliber.  Moderate aortoiliac atherosclerotic calcifications.  Bones: Degenerative changes of the spine.  No evidence of acute fracture or osseus destructive process.  Soft tissues: Small fat containing umbilical hernia.  Impression:  Patient is status post TURBT and chemoradiation for urothelial carcinoma of the bladder.  New linear hyperdensities within the bladder, possibly representing post treatment or postoperative change.  Otherwise there is stable bladder wall thickening and perivesical fat stranding.  Right lung base pleural based nodular opacity, stable from 2023.  Calcified pleural plaques noted.  Sequela of chronic pancreatitis.  Additional findings above.     PMH:  Active Ambulatory Problems        Diagnosis   Date Noted       CKD stage 3 due to type 2 diabetes mellitus     11/23/2012       Chronic anticoagulation 11/23/2012       Hypertension associated with diabetes     05/22/2013       Hyperlipidemia associated with type 2 diabetes mellitus     05/22/2013       Persistent atrial fibrillation      06/11/2014       Atherosclerosis of aorta      11/18/2020       Sensorineural hearing loss (SNHL) of both ears  11/19/2020       Risk for falls    12/15/2021       Renal cyst  12/14/2022       DM type 2, controlled, with complication  12/14/2022       Bladder tumor     11/16/2023       UTI (urinary tract infection) 11/21/2023       Acute hypoxic respiratory failure   11/21/2023       Elevated troponin 11/21/2023       Thyroid nodules   11/21/2023     Resolved Ambulatory Problems        Diagnosis   Date Noted       Atrial fibrillation     08/02/2012       Long term (current) use of anticoagulants 08/02/2012       Family history of diabetes mellitus 11/23/2012       Ex-cigarette smoker     11/23/2012       Metabolic syndrome      11/23/2012       Screen for colon cancer 05/07/2014       Ex-smoker   11/09/2015       Hyperglycemia     12/16/2015       Abscess of chest wall   06/25/2018        Sebaceous cyst    07/25/2018       Decreased back mobility 08/20/2019       Decreased strength of trunk and back      08/20/2019       Decreased range of motion of both hips    08/20/2019       Pain aggravated by walking    08/20/2019       Hamstring tightness of both lower extremities   08/20/2019       Impaired mobility and endurance     08/20/2019       Low back pain     11/19/2020       Chronic pain      04/28/2021       Type 2 diabetes mellitus, without long-term current use of insulin      12/13/2021       CKD stage 3 secondary to diabetes   12/13/2021       Encounter for preventive health examination     12/19/2022     Past Medical History:  Diagnosis   Date       *Atrial fibrillation           Chronic kidney disease         Deep vein thrombosis           Hyperlipidemia           Hypertension        Cholecystectomy  Tonsillectomy  Skin cancer removal     FH:  Paternal Aunt- recalls cancer affecting scalp     SH:    Retired [employer], followed by off shore work,   4 children (1 passed as an infant)  Quit tobacco in the 1980s  + EtOH    Review of Systems   Constitutional:  Negative for activity change, appetite change, chills, fatigue, fever and unexpected weight change.   HENT:  Positive for hearing loss (hearing aids bilat) and trouble swallowing. Negative for nasal congestion, dental problem, postnasal drip, rhinorrhea, sinus pressure/congestion, sore throat and tinnitus.         Dry mouth   Eyes:  Negative for visual disturbance.   Respiratory:  Negative for cough, chest tightness, shortness of breath and wheezing.    Cardiovascular:  Negative for chest pain, palpitations and leg swelling.   Gastrointestinal:  Negative for abdominal distention, abdominal pain, blood in stool, change in bowel habit, constipation, diarrhea, nausea and vomiting.   Genitourinary:  Negative for bladder incontinence, decreased urine volume, difficulty urinating, dysuria, frequency (better), hematuria and urgency  (better).   Musculoskeletal:  Negative for arthralgias, back pain, myalgias, neck pain and neck stiffness.   Integumentary:  Positive for rash (improved).        Dry skin   Neurological:  Positive for weakness. Negative for dizziness, light-headedness, numbness and headaches.   Psychiatric/Behavioral:  Negative for agitation, behavioral problems, confusion, dysphoric mood and sleep disturbance. The patient is not nervous/anxious.           Objective     Physical Exam  Vitals and nursing note reviewed.   Constitutional:       General: He is not in acute distress.     Appearance: Normal appearance. He is not ill-appearing.      Comments: Looks better!  ECOG= 1-2  Has wheelchair   HENT:      Head: Normocephalic and atraumatic.      Comments: Hearing aids     Mouth/Throat:      Mouth: Mucous membranes are moist.      Pharynx: Oropharynx is clear. No oropharyngeal exudate or posterior oropharyngeal erythema.   Eyes:      General: No scleral icterus.     Extraocular Movements: Extraocular movements intact.      Conjunctiva/sclera: Conjunctivae normal.      Pupils: Pupils are equal, round, and reactive to light.   Cardiovascular:      Rate and Rhythm: Normal rate and regular rhythm.      Heart sounds: No murmur heard.     No friction rub.   Pulmonary:      Effort: Pulmonary effort is normal. No respiratory distress.      Breath sounds: Normal breath sounds. No wheezing, rhonchi or rales.   Abdominal:      General: Abdomen is flat. Bowel sounds are normal. There is no distension.      Palpations: Abdomen is soft. There is no mass.      Tenderness: There is no abdominal tenderness. There is no guarding or rebound.   Musculoskeletal:         General: No swelling, tenderness or deformity. Normal range of motion.      Right lower leg: No edema.      Left lower leg: No edema.   Skin:     General: Skin is warm and dry.      Coloration: Skin is not jaundiced or pale.      Findings: No bruising, erythema or rash.   Neurological:       General: No focal deficit present.      Mental Status: He is alert and oriented to person, place, and time. Mental status is at baseline.      Sensory: No sensory deficit.      Motor: Weakness (generalized) present.      Gait: Gait normal.   Psychiatric:         Mood and Affect: Mood normal.         Behavior: Behavior normal.         Thought Content: Thought content normal.         Judgment: Judgment normal.       Labs- reviewed     Assessment and Plan     1. Malignant neoplasm of urinary bladder, unspecified site  Assessment & Plan:  Negative cystoscope x 4 and negative CT scans for recurrence  Repeat scan in 3 months      2. Dysphagia, unspecified type  Assessment & Plan:  Dysphagia and scans with aspiration evidence prior  Had swallowing study and followed diet recs  GI evaluated - monitor for now as dysphagia improving  Now resolved      3. Persistent atrial fibrillation  Assessment & Plan:  s/p watchman device procedure, f/u scheduled      4. Chronic heart failure with preserved ejection fraction    5. DM type 2, controlled, with complication  Assessment & Plan:  - continue jardiance and metformin       6. Hypertension associated with diabetes  Assessment & Plan:  - bp controlled  - continue metoprolol succinate and sacubitril-valsartan      7. Thyroid nodules  Assessment & Plan:  Thyroid ultrasound no criteria for bx      8. Physical deconditioning  Assessment & Plan:  - continue physical therapy           KOLTON Ruby MD  [facility]  [facility] [place] [address]  Phone: (o) [phone]          Route Chart for Scheduling    Med Onc Chart Routing      Follow up with physician 3 months. rtc with labs (cbc, cmp), ct scan, and to see Dr. Bland   Follow up with [facility]    Infusion scheduling note    Injection scheduling note    Labs CBC and CMP   Scheduling:  Preferred lab:  Lab interval:  cbc, cmp   Imaging CT chest abdomen pelvis      Pharmacy appointment     Other referrals                 code applied: patient requires or will require a continuous, longitudinal, and active collaborative plan of care related to this patient's health condition, prostate cancer --the management of which requires the direction of a practitioner with specialized clinical knowledge, skill, and expertise.

## 2025-04-30 ENCOUNTER — PATIENT MESSAGE (OUTPATIENT)
Dept: ADMINISTRATIVE | Facility: OTHER | Age: OVER 89
End: 2025-04-30
Payer: MEDICARE

## 2025-05-01 ENCOUNTER — PATIENT MESSAGE (OUTPATIENT)
Dept: ADMINISTRATIVE | Facility: OTHER | Age: OVER 89
End: 2025-05-01
Payer: MEDICARE

## 2025-05-02 ENCOUNTER — PATIENT MESSAGE (OUTPATIENT)
Dept: ADMINISTRATIVE | Facility: OTHER | Age: OVER 89
End: 2025-05-02
Payer: MEDICARE

## 2025-05-03 ENCOUNTER — PATIENT MESSAGE (OUTPATIENT)
Dept: ADMINISTRATIVE | Facility: OTHER | Age: OVER 89
End: 2025-05-03
Payer: MEDICARE

## 2025-05-03 ENCOUNTER — PATIENT MESSAGE (OUTPATIENT)
Dept: HEMATOLOGY/ONCOLOGY | Facility: CLINIC | Age: OVER 89
End: 2025-05-03
Payer: MEDICARE

## 2025-05-05 ENCOUNTER — PATIENT MESSAGE (OUTPATIENT)
Dept: ADMINISTRATIVE | Facility: OTHER | Age: OVER 89
End: 2025-05-05
Payer: MEDICARE

## 2025-05-06 ENCOUNTER — OFFICE VISIT (OUTPATIENT)
Dept: PODIATRY | Facility: CLINIC | Age: OVER 89
End: 2025-05-06
Payer: MEDICARE

## 2025-05-06 ENCOUNTER — PATIENT MESSAGE (OUTPATIENT)
Dept: ADMINISTRATIVE | Facility: OTHER | Age: OVER 89
End: 2025-05-06
Payer: MEDICARE

## 2025-05-06 VITALS — SYSTOLIC BLOOD PRESSURE: 120 MMHG | HEART RATE: 64 BPM | DIASTOLIC BLOOD PRESSURE: 57 MMHG

## 2025-05-06 DIAGNOSIS — E11.49 TYPE 2 DIABETES MELLITUS WITH NEUROLOGICAL MANIFESTATIONS: ICD-10-CM

## 2025-05-06 DIAGNOSIS — B35.1 ONYCHOMYCOSIS: ICD-10-CM

## 2025-05-06 DIAGNOSIS — E11.51 TYPE 2 DIABETES MELLITUS WITH PERIPHERAL VASCULAR DISEASE: Primary | ICD-10-CM

## 2025-05-06 PROCEDURE — 99999 PR PBB SHADOW E&M-EST. PATIENT-LVL III: CPT | Mod: PBBFAC,,, | Performed by: PODIATRIST

## 2025-05-06 PROCEDURE — 11721 DEBRIDE NAIL 6 OR MORE: CPT | Mod: PBBFAC,PN | Performed by: PODIATRIST

## 2025-05-06 PROCEDURE — 99213 OFFICE O/P EST LOW 20 MIN: CPT | Mod: PBBFAC,PN | Performed by: PODIATRIST

## 2025-05-06 RX ORDER — NYSTATIN 100000 U/G
CREAM TOPICAL
COMMUNITY
Start: 2025-04-23

## 2025-05-06 NOTE — PROGRESS NOTES
Subjective:      Patient ID: Cliff Magaña is a 89 y.o. male.    Chief Complaint: Nail Care (3 month nail care f/u)      Cliff is a 89 y.o. male who presents to the clinic upon referral from Dr. Steff choi. provider found  for evaluation and treatment of diabetic feet. Cliff has a past medical history of *Atrial fibrillation, Cancer, Chronic kidney disease, Deep vein thrombosis, Hyperlipidemia, Hypertension, Metabolic syndrome, and Type 2 diabetes mellitus, without long-term current use of insulin (12/13/2021). Patient relates no major problem with feet. Only complaints today consist of thickened toenails that he has difficulty trimming.  He will sometimes get a pedicure.  He also relates he gets intermittent cramping to both legs at night.  History of chronic low back pain with right lower extremity symptoms.    04/29/2022: Returns for routine nail care. No new concerns.     07/29/2022: Returns for routine nail care. No new concerns.     11/17/2022:  Returns routine foot care.  No new concerns.    02/16/2023:  Returns for routine foot care.  Due for annual foot exam.  Complains of intermittent cramping to the left foot that is brief in duration.  Denies any claudication symptoms. Followed by Cardiology.     05/18/23: Returns for routine foot care.  No new concerns.  Accompanied by his wife.    08/17/2023: Returns for routine foot care.  No new concerns.    11/09/2023:  Returns for routine foot care.  Accompanied by his wife.  No new concerns.    02/09/2024:  Returns for annual foot exam and routine foot care.  Accompanied by his wife.  Patient has had significant weight loss secondary to loss of appetite post chemotherapy and radiation therapy for bladder cancer.  Recently admitted for UTI and discharged.    05/10/2024: Returns for routine foot care.  No new concerns.    08/12/2024: Returns for routine foot care.  Accompanied by his wife.  New wound discovered during the exam today that was not known per his wife.   No pain noted.    11/07/2024: Returns for routine foot care.  Accompanied by his wife.  No new concerns.    02/04/2025: Returns for routine foot care.  Accompanied by his wife.  Pending cardiac ablation next week.  Due for annual foot exam today.  No new concerns noted.    05/06/2025: Returns for routine foot care.  Accompanied by his wife.  No new concerns.    PCP: Munir Estrada MD Andrew Schroth MD on 12/13/2024  Date Last Seen by PCP:  03/07/2024    Current shoe gear: Casual shoes    Hemoglobin A1C   Date Value Ref Range Status   12/23/2024 8.3 (H) 4.0 - 5.6 % Final     Comment:     ADA Screening Guidelines:  5.7-6.4%  Consistent with prediabetes  >or=6.5%  Consistent with diabetes    High levels of fetal hemoglobin interfere with the HbA1C  assay. Heterozygous hemoglobin variants (HbS, HgC, etc)do  not significantly interfere with this assay.   However, presence of multiple variants may affect accuracy.     09/22/2024 6.4 (H) 4.0 - 5.6 % Final     Comment:     ADA Screening Guidelines:  5.7-6.4%  Consistent with prediabetes  >or=6.5%  Consistent with diabetes    High levels of fetal hemoglobin interfere with the HbA1C  assay. Heterozygous hemoglobin variants (HbS, HgC, etc)do  not significantly interfere with this assay.   However, presence of multiple variants may affect accuracy.     07/01/2024 8.0 (H) 4.0 - 5.6 % Final     Comment:     ADA Screening Guidelines:  5.7-6.4%  Consistent with prediabetes  >or=6.5%  Consistent with diabetes    High levels of fetal hemoglobin interfere with the HbA1C  assay. Heterozygous hemoglobin variants (HbS, HgC, etc)do  not significantly interfere with this assay.   However, presence of multiple variants may affect accuracy.       Hemoglobin A1c   Date Value Ref Range Status   03/24/2025 6.7 (H) 4.0 - 5.6 % Final     Comment:     ADA Screening Guidelines:  5.7-6.4%  Consistent with prediabetes  >=6.5%  Consistent with diabetes    High levels of fetal hemoglobin  interfere with the HbA1C  assay. Heterozygous hemoglobin variants (HbS, HgC, etc)do  not significantly interfere with this assay.   However, presence of multiple variants may affect accuracy.     Vitals:    05/06/25 1307   BP: (!) 120/57   Pulse: 64   PainSc: 0-No pain      Past Medical History:   Diagnosis Date    *Atrial fibrillation     Cancer     Chronic kidney disease     Deep vein thrombosis     Hyperlipidemia     Hypertension     Metabolic syndrome     Type 2 diabetes mellitus, without long-term current use of insulin 12/13/2021       Past Surgical History:   Procedure Laterality Date    BIOPSY OF BLADDER N/A 4/12/2024    Procedure: BIOPSY, BLADDER;  Surgeon: Wellington Story MD;  Location: [address];  Service: Urology;  Laterality: N/A;    BLADDER FULGURATION N/A 4/12/2024    Procedure: FULGURATION, BLADDER;  Surgeon: Wellington Story MD;  Location: [address];  Service: Urology;  Laterality: N/A;    CATARACT EXTRACTION      CHOLECYSTECTOMY      CYSTOSCOPY N/A 11/16/2023    Procedure: CYSTOSCOPY;  Surgeon: Marcelino Moffett MD;  Location: [facility] OR [address] [place] [address];  Service: Urology;  Laterality: N/A;  90 minutes    DILATION OF URETHRA N/A 4/12/2024    Procedure: DILATION, URETHRA;  Surgeon: Wellington Story MD;  Location: [address];  Service: Urology;  Laterality: N/A;    ECHOCARDIOGRAM,TRANSESOPHAGEAL N/A 3/25/2025    Procedure: Transesophageal echo (BEENA) intra-procedure log documentation;  Surgeon: Jacquelyn Nowak Diagnostic;  Location: [facility] EP LAB;  Service: Cardiology;  Laterality: N/A;  s/p Watchman, BEENA, MAC, DOSC, 3 Prep    EYE SURGERY      INJECTION OF FACET JOINT Bilateral 4/28/2021    Procedure: FACET JOINT INJECTION BILATERAL L4/L5 DIRECT REFERRAL;  Surgeon: Philipp Iyer MD;  Location: [facility] PAIN MGT;  Service: Pain Management;  Laterality: Bilateral;  NEEDS CONSENT, ELIQUIS CLEARANCE IN CHART    OCCLUSION OF LEFT ATRIAL APPENDAGE N/A 2/13/2025    Procedure: Left atrial appendage occlusion;   Surgeon: Marcelino Sheffield MD;  Location: [facility] EP LAB;  Service: Cardiology;  Laterality: N/A;  AF, BEENA, Watchman, BSCI, GEN, MB, 3 Prep    RETROGRADE PYELOGRAPHY Bilateral 2023    Procedure: PYELOGRAM, RETROGRADE;  Surgeon: Marcelino Moffett MD;  Location: [facility] OR [address] [place] [address];  Service: Urology;  Laterality: Bilateral;  90 minutes    SKIN CANCER EXCISION      TONSILLECTOMY      TRANSESOPHAGEAL ECHOCARDIOGRAPHY N/A 2025    Procedure: ECHOCARDIOGRAM, TRANSESOPHAGEAL;  Surgeon: Nereyda Birmingham MD;  Location: [facility] EP LAB;  Service: Cardiology;  Laterality: N/A;    TURBT (TRANSURETHRAL RESECTION OF BLADDER TUMOR) N/A 2023    Procedure: TURBT (TRANSURETHRAL RESECTION OF BLADDER TUMOR);  Surgeon: Marcelino Moffett MD;  Location: [facility] OR [address] [place] [address];  Service: Urology;  Laterality: N/A;  90 minutes    TURBT (TRANSURETHRAL RESECTION OF BLADDER TUMOR) N/A 2024    Procedure: TURBT (TRANSURETHRAL RESECTION OF BLADDER TUMOR);  Surgeon: Wellington Story MD;  Location: [facility] OR [address] [place] [address];  Service: Urology;  Laterality: N/A;       Family History   Problem Relation Name Age of Onset    Diabetes Maternal Aunt      Heart attack Neg Hx      Heart disease Neg Hx      Heart failure Neg Hx      Hyperlipidemia Neg Hx      Hypertension Neg Hx      Stroke Neg Hx         Social History     Socioeconomic History    Marital status:      Spouse name: Nereyda    Number of children: 4   Tobacco Use    Smoking status: Former     Current packs/day: 0.00     Average packs/day: 2.0 packs/day for 20.0 years (40.0 ttl pk-yrs)     Types: Cigarettes     Start date: 1963     Quit date: 1983     Years since quittin.9     Passive exposure: Never    Smokeless tobacco: Never   Substance and Sexual Activity    Alcohol use: Yes     Alcohol/week: 1.0 standard drink of alcohol     Types: 1 Standard drinks or equivalent per week     Comment: occasionally    Drug use: No    Sexual activity: Not Currently     Partners:  Female     Social Drivers of Health     Financial Resource Strain: Low Risk  (12/28/2024)    Overall Financial Resource Strain (CARDIA)     Difficulty of Paying Living Expenses: Not hard at all   Food Insecurity: No Food Insecurity (12/28/2024)    Hunger Vital Sign     Worried About Running Out of Food in the Last Year: Never true     Ran Out of Food in the Last Year: Never true   Transportation Needs: No Transportation Needs (9/23/2024)    TRANSPORTATION NEEDS     Transportation : No   Physical Activity: Insufficiently Active (12/28/2024)    Exercise Vital Sign     Days of Exercise per Week: 4 days     Minutes of Exercise per Session: 20 min   Stress: No Stress Concern Present (12/28/2024)    [employer] - Occupational Stress Questionnaire     Feeling of Stress : Only a little   Housing Stability: Unknown (12/28/2024)    Housing Stability Vital Sign     Unable to Pay for Housing in the Last Year: No     Homeless in the Last Year: No       Current Outpatient Medications   Medication Sig Dispense Refill    acetaminophen (TYLENOL) 650 MG TbSR Take 650 mg by mouth 2 (two) times a day.      aspirin (ECOTRIN) 81 MG EC tablet Take 1 tablet (81 mg total) by mouth once daily. 30 tablet 11    cholecalciferol, vitamin D3, (VITAMIN D3) 50 mcg (2,000 unit) Cap capsule Take by mouth once daily.      clopidogreL (PLAVIX) 75 mg tablet Take 1 tablet (75 mg total) by mouth once daily. 90 tablet 3    empagliflozin (JARDIANCE) 10 mg tablet Take 1 tablet (10 mg total) by mouth once daily. 90 tablet 3    fenofibrate micronized (LOFIBRA) 200 MG Cap Take 1 capsule (200 mg total) by mouth daily with breakfast. 90 capsule 3    loratadine (CLARITIN) 10 mg tablet Take 10 mg by mouth once daily.      metFORMIN (GLUCOPHAGE-XR) 500 MG ER 24hr tablet Take 1 tablet (500 mg total) by mouth 2 (two) times daily with meals. 90 tablet 3    metoprolol succinate (TOPROL-XL) 100 MG 24 hr tablet TAKE 1 TABLET TWICE A   tablet 1    nystatin (MYCOSTATIN) cream       sacubitriL-valsartan (ENTRESTO) 24-26 mg per tablet Take 1 tablet by mouth 2 (two) times daily. 180 tablet 3    betamethasone valerate 0.1% (VALISONE) 0.1 % Crea Apply topically 2 (two) times daily. Apply twice daily for 10 days for 10 days 45 g 0     No current facility-administered medications for this visit.       Review of patient's allergies indicates:   Allergen Reactions    Niacin      Other reaction(s): Rash  Other reaction(s): Itching           Review of Systems   Constitutional: Negative for chills, fever and malaise/fatigue.   HENT:  Negative for congestion and hearing loss.    Cardiovascular:  Negative for chest pain, claudication and leg swelling.   Respiratory:  Negative for cough and shortness of breath.    Skin:  Positive for nail changes.   Musculoskeletal:  Positive for back pain and muscle cramps. Negative for joint pain and muscle weakness.   Gastrointestinal:  Negative for nausea and vomiting.   Neurological:  Positive for paresthesias. Negative for numbness and weakness.   Psychiatric/Behavioral:  Negative for altered mental status.            Objective:      Physical Exam  Constitutional:       General: He is not in acute distress.     Appearance: Normal appearance. He is not ill-appearing.   Cardiovascular:      Pulses:           Dorsalis pedis pulses are detected w/ Doppler on the right side and detected w/ Doppler on the left side.        Posterior tibial pulses are detected w/ Doppler on the right side and detected w/ Doppler on the left side.      Comments: Monophasic PT and DP bilateral with arrhythmia noted using Doppler.  Mild nonpitting edema to lower extremity bilateral.  No hair growth bilateral lower extremity.  Mild rubor on dependency bilateral lower extremity.  Musculoskeletal:      Comments: No pain with ROM or MMT bilateral lower extremity.    No significant digital deformity bilateral.  Rectus appearing foot type bilateral.    Feet:      Right foot:      Protective Sensation: 10 sites tested.  10 sites sensed.      Skin integrity: Dry skin present.      Toenail Condition: Right toenails are abnormally thick and long.      Left foot:      Protective Sensation: 10 sites tested.  10 sites sensed.      Skin integrity: Dry skin present.      Toenail Condition: Left toenails are abnormally thick and long.   Skin:     General: Skin is warm.      Capillary Refill: Capillary refill takes 2 to 3 seconds.      Findings: No ecchymosis or erythema.      Nails: There is no clubbing.      Comments: Second toenail bilateral is growing a superior direction, thickened and discolored yellow with some mild loosening.  Nails 1, 3, 4 and 5 bilateral are mildly elongated 2-3 mm, thickened with patchy yellow discoloration mild underlying debris.    Ulceration overlying the dorsal distal lateral aspect of the left 5th toe with eschar base and slightly raised demarcating margin.  No surrounding erythema.  No drainage.  No localized pain on palpation.    Skin is atrophied to lower extremity bilateral.   Neurological:      Mental Status: He is alert and oriented to person, place, and time.      Sensory: Sensory deficit present.      Motor: Motor function is intact.      Comments: Absent vibratory sensation right foot and decreased left foot.             Assessment:       Encounter Diagnoses   Name Primary?    Type 2 diabetes mellitus with peripheral vascular disease Yes    Type 2 diabetes mellitus with neurological manifestations     Onychomycosis          Plan:       Cliff was seen today for nail care.    Diagnoses and all orders for this visit:    Type 2 diabetes mellitus with peripheral vascular disease  -     Routine Foot Care    Type 2 diabetes mellitus with neurological manifestations  -     Routine Foot Care    Onychomycosis  -     Routine Foot Care      I counseled the patient on his conditions, their implications and medical management.    Shoe  inspection. Diabetic Foot Education. Patient reminded of the importance of good nutrition and blood sugar control to help prevent podiatric complications of diabetes. Patient instructed on proper foot hygeine. We discussed wearing proper shoe gear, daily foot inspections, never walking without protective shoe gear, never putting sharp instruments to feet.    Routine foot care per attached note. Patient relates relief following the procedure. He will continue to monitor the areas daily, inspect his feet, wear protective shoe gear when ambulatory, moisturizer to maintain skin integrity.    RTC within 3 months or p.r.n. as discussed    A portion of this note was generated by voice recognition software and may contain spelling and grammar errors.

## 2025-05-06 NOTE — PROCEDURES
"Routine Foot Care    Date/Time: 5/6/2025 1:00 PM    Performed by: Bhupendra Brar DPM  Authorized by: Bhupendra Brar DPM    Time out: Immediately prior to procedure a "time out" was called to verify the correct patient, procedure, equipment, support staff and site/side marked as required.    Consent Done?:  Yes (Verbal)  Hyperkeratotic Skin Lesions?: No      Nail Care Type:  Debride  Location(s): All  (Left 1st Toe, Left 3rd Toe, Left 2nd Toe, Left 4th Toe, Left 5th Toe, Right 1st Toe, Right 2nd Toe, Right 3rd Toe, Right 4th Toe and Right 5th Toe)  Patient tolerance:  Patient tolerated the procedure well with no immediate complications     Used sterile nail nipper.        "

## 2025-05-07 ENCOUNTER — CLINICAL SUPPORT (OUTPATIENT)
Dept: REHABILITATION | Facility: HOSPITAL | Age: OVER 89
End: 2025-05-07
Payer: MEDICARE

## 2025-05-07 ENCOUNTER — PATIENT MESSAGE (OUTPATIENT)
Dept: ADMINISTRATIVE | Facility: OTHER | Age: OVER 89
End: 2025-05-07
Payer: MEDICARE

## 2025-05-07 DIAGNOSIS — Z74.09 IMPAIRED FUNCTIONAL MOBILITY, BALANCE, GAIT, AND ENDURANCE: Primary | ICD-10-CM

## 2025-05-07 PROCEDURE — 97112 NEUROMUSCULAR REEDUCATION: CPT

## 2025-05-07 PROCEDURE — 97530 THERAPEUTIC ACTIVITIES: CPT

## 2025-05-07 PROCEDURE — 97110 THERAPEUTIC EXERCISES: CPT

## 2025-05-07 NOTE — PROGRESS NOTES
Outpatient Rehab    Physical Therapy Visit    Patient Name: Cliff Magaña  MRN: [id number]  YOB: 1935  Encounter Date: 5/7/2025    Therapy Diagnosis:   Encounter Diagnosis   Name Primary?    Impaired functional mobility, balance, gait, and endurance Yes     Physician: Denisha Still NP    Physician Orders: Eval and Treat  Medical Diagnosis: Dorsalgia, unspecified  Low back pain with sciatica, sciatica laterality unspecified, unspecified back pain laterality, unspecified chronicity    Visit # / Visits Authorized:  1 / 10  Insurance Authorization Period: 5/7/2025 to 12/31/2025  Date of Evaluation: 4/28/2025  Plan of Care Certification: 4/28/2025 to 6/28/2025     PT/PTA: PT   Number of PTA visits since last PT visit:   Time In: 1255   Time Out: 1350  Total Time (in minutes): 55   Total Billable Time (in minutes): 55    FOTO:  Intake Score:  %  Survey Score 2:  %  Survey Score 3:  %         Subjective    Patient reports that his cane adjustment has helped. He is feeling more upright when walking.  No pain reported         Objective            Treatment:   Therapeutic Exercise: 25 minutes     NuStep 6 minutes for lower extremity endurance and strength lvl 3.0  Sit to stands with overhead reach 3lb 3x6  Seated Thoracic extension with 10x  Standing calf raise at wall 3x10     Manual Therapy: 0 minutes     Therapeutic Activities: 15 minutes     Bridges 2x10  Sidelying clams red thera band 2x10 both sides  Side stepping along edge of mat 5 laps with upper extremity support    Balance/Neuromuscular Re-education: 15 minutes     NBOS on air ex pad with 3lb press 2x10  3 laps of 4 inch hurdles with single point cane  Side stepping on air ex pad 2x 5 laps    Assessment & Plan   Assessment:     Patient presents for initial follow up with improvements in upright posture during gait. Patient challenged further today with functional activities promoting upright posture. Patient required CGA at times during lateral  stepping on unstable surface and lore walking. Patient with no adverse response. Will continue to promote balance and lower extremity strengthening activities to improve gait and decrease fall risk.    Patient will continue to benefit from skilled outpatient physical therapy to address the deficits listed in the problem list box on initial evaluation, provide pt/family education and to maximize pt's level of independence in the home and community environment.     Patient's spiritual, cultural, and educational needs considered and patient agreeable to plan of care and goals.           Plan: continue per initial plan of care    Goals:   Short Term Goals (4 Weeks):   1.  Independent with initial HEP.  2. Pt will be able to perform TUG in 16 secs with use of AD placingdemonstrating overall improved functional mobility.   5. Patient will be able to perform single leg balance without upper extremity support for 10 seconds to demonstrate reduced fall risk     Long Term Goals (8 Weeks):   9.  Pt will be able to safely perform and tolerate high level ADL's without LOB.   10. Pt will have 0 falls from start of PT sessions.  11. Independent with updated HEP.   6.  Pt will be able to walk in neighborhood ~1/2 to 1 mile at safe and coordinated speed for community walking program.  14. Pt will perform floor to stand f/b stand to floor transfer with UE support with stand by assist and no cues for improved dynamic transfer, core stability and fall safety in home and community.  15. Pt will begin some form of community fitness to begin regular and consistent performance of exercise to continue maintenance of gains made in PT.    Boris Gale, PT

## 2025-05-08 ENCOUNTER — PATIENT MESSAGE (OUTPATIENT)
Dept: ADMINISTRATIVE | Facility: OTHER | Age: OVER 89
End: 2025-05-08
Payer: MEDICARE

## 2025-05-09 ENCOUNTER — PATIENT MESSAGE (OUTPATIENT)
Dept: ADMINISTRATIVE | Facility: OTHER | Age: OVER 89
End: 2025-05-09
Payer: MEDICARE

## 2025-05-10 ENCOUNTER — PATIENT MESSAGE (OUTPATIENT)
Dept: ADMINISTRATIVE | Facility: OTHER | Age: OVER 89
End: 2025-05-10
Payer: MEDICARE

## 2025-05-11 ENCOUNTER — PATIENT MESSAGE (OUTPATIENT)
Dept: ADMINISTRATIVE | Facility: OTHER | Age: OVER 89
End: 2025-05-11
Payer: MEDICARE

## 2025-05-12 ENCOUNTER — PATIENT MESSAGE (OUTPATIENT)
Dept: ADMINISTRATIVE | Facility: OTHER | Age: OVER 89
End: 2025-05-12
Payer: MEDICARE

## 2025-05-13 ENCOUNTER — OFFICE VISIT (OUTPATIENT)
Dept: HEMATOLOGY/ONCOLOGY | Facility: CLINIC | Age: OVER 89
End: 2025-05-13
Payer: MEDICARE

## 2025-05-13 ENCOUNTER — PATIENT MESSAGE (OUTPATIENT)
Dept: ADMINISTRATIVE | Facility: OTHER | Age: OVER 89
End: 2025-05-13
Payer: MEDICARE

## 2025-05-13 ENCOUNTER — HOSPITAL ENCOUNTER (OUTPATIENT)
Dept: RADIOLOGY | Facility: HOSPITAL | Age: OVER 89
Discharge: HOME | End: 2025-05-13
Attending: NURSE PRACTITIONER
Payer: MEDICARE

## 2025-05-13 ENCOUNTER — RESULTS FOLLOW-UP (OUTPATIENT)
Dept: HEMATOLOGY/ONCOLOGY | Facility: CLINIC | Age: OVER 89
End: 2025-05-13

## 2025-05-13 VITALS
WEIGHT: 134.5 LBS | HEART RATE: 75 BPM | OXYGEN SATURATION: 97 % | RESPIRATION RATE: 18 BRPM | SYSTOLIC BLOOD PRESSURE: 127 MMHG | BODY MASS INDEX: 22.96 KG/M2 | HEIGHT: 64 IN | DIASTOLIC BLOOD PRESSURE: 62 MMHG

## 2025-05-13 DIAGNOSIS — R05.1 ACUTE COUGH: ICD-10-CM

## 2025-05-13 DIAGNOSIS — E11.59 HYPERTENSION ASSOCIATED WITH DIABETES: ICD-10-CM

## 2025-05-13 DIAGNOSIS — I50.32 CHRONIC HEART FAILURE WITH PRESERVED EJECTION FRACTION: ICD-10-CM

## 2025-05-13 DIAGNOSIS — Q55.64 HIDDEN PENIS: ICD-10-CM

## 2025-05-13 DIAGNOSIS — I48.19 PERSISTENT ATRIAL FIBRILLATION: Chronic | ICD-10-CM

## 2025-05-13 DIAGNOSIS — I15.2 HYPERTENSION ASSOCIATED WITH DIABETES: ICD-10-CM

## 2025-05-13 DIAGNOSIS — C67.1 MALIGNANT NEOPLASM OF DOME OF URINARY BLADDER: Primary | ICD-10-CM

## 2025-05-13 DIAGNOSIS — R53.81 PHYSICAL DECONDITIONING: ICD-10-CM

## 2025-05-13 PROCEDURE — 71046 X-RAY EXAM CHEST 2 VIEWS: CPT | Mod: 26,,, | Performed by: RADIOLOGY

## 2025-05-13 PROCEDURE — 99213 OFFICE O/P EST LOW 20 MIN: CPT | Mod: PBBFAC,25 | Performed by: NURSE PRACTITIONER

## 2025-05-13 PROCEDURE — 71046 X-RAY EXAM CHEST 2 VIEWS: CPT | Mod: TC

## 2025-05-13 PROCEDURE — 99999 PR PBB SHADOW E&M-EST. PATIENT-LVL III: CPT | Mod: PBBFAC,,, | Performed by: NURSE PRACTITIONER

## 2025-05-13 NOTE — ASSESSMENT & PLAN NOTE
Patient with new cough, crackles in bases  Obtain chest xray  Based on results, consider sooner f/u with cardiology

## 2025-05-13 NOTE — PROGRESS NOTES
Subjective     Patient ID: Cliff Magaña is a 89 y.o. male.    Chief Complaint: No chief complaint on file.    HPI    Diagnosis: Muscle invasive bladder cancer post chemo/XRT     Interval history:  Presents to clinic for multiple concerns with wife.      Fecal incontinence intermittently.  Episodes happen about every 7-10 days.  Does not seem to be food related.  Prior to incontinent episodes has regular bowel movements daily.  Seems to be a problem of not getting to the bathroom in time.      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  Has had cystoscopies with Dr. Story. Denies trouble emptying bladder, hematuria, or dysuria.      Scrotal rash is now improving. Had been prescribed creams by Dr. Story which did not help.  Evaluated by dermatology who prescribed antifungal, which has been beneficia.    Lastly started with cough Friday evening. Cough is productive. Has been taking coricidin and mucinex.  Sputum remains clear.  Denies fever.     Most recent imaging:  CT chest abdomen pelvis 4/28/25:  Impression:     In patient with history a of urothelial carcinoma of the bladder status post TURBT and chemo radiation, stable post treatment changes with mild thickening of the anterior urinary bladder wall.  No significant detrimental change.     Stable right lung base pleural nodule opacity, unchanged dating back to 2023.     Chronic pancreatitis.    Cystoscopy 4/21/25:  Findings:   1. External genitals reveal an erythematous rash over the glans and phallus of the penis.  Normal anterior urethra without evidence of strictures or tumors   2. Prostatic urethra open without signs of obstruction  3. Bladder free of masses, tumors.  There was some fibrinous material at the dome consistent with radiation reaction from his prior tumor sites, this was biopsy negative in April 2024, no papillary regrowth  Ureteral orifices in orthotopic position bilaterally        Oncology History:  - bladder cancer incidentally found on  imaging as he had been under surveillance for renal cysts  (10/17/2023 ultrasound showed stable left renal cysts and new bladder masses)     -  11/16/2023 TURBT of bladder tumor  Findings:   1. Right postero-lateral papillary bladder wall tumor (3-4cm) with diffusely erythematous and nodular surrounding tissue, right < 1cm papillary bladder wall tumor immediately lateral to the larger postero-lateral papillary tumor, and a right nodular 4cm bladder tumor with high grade and possibly invasive appearance. Tumors resected, hemostasis achieved.  Several other patches of erythema were diffusely identified on the left lateral bladder wall.  2. Bimanual exam post-TURBT showed freely mobile bladder without abnormalities  3. Bilateral retrograde pyelogram showed delicate calices with no evidence of hydronephrosis, no filling defects, and ureter normal in course and caliber, bilaterally  Pathology:  1. Bladder, right posterolateral wall, transurethral resection of bladder tumor:  - Noninvasive high-grade papillary urothelial carcinoma  - Muscularis propria present  - Multiple H&E levels evaluated  2. Bladder, right anterior, transurethral resection of bladder tumor:  - Invasive high-grade papillary urothelial carcinoma  - Tumor invades muscularis propria  - Intact expression of DNA mismatch repair proteins in tumor by immunohistochemistry (see comment)  COMMENT:  Immunohistochemistry (IHC) Testing for Mismatch Repair (MMR) Proteins:  MLH1 - Intact nuclear expression  MSH2 - Intact nuclear expression  MSH6 - Intact nuclear expression  PMS2 - Intact nuclear expression  Background nonneoplastic tissue/internal control with intact nuclear expression  IHC Interpretation  No loss of nuclear expression of MMR proteins: low probability of microsatellite instability  There are exceptions to the above IHC interpretations. These results should not be considered in isolation, and clinical correlation with genetic counseling is  recommended to assess the need for germline testing.     - 11/25/2023 CT Abd/Pelvis:  FINDINGS:  Lung base: Bilateral small volume pleural effusion increased in size compared to recent prior, associated with mild compression atelectasis.  Visualized portion of heart: Coronary artery calcification.  Liver: Normal in size.  Left lobe small calcified granuloma.  No suspicious focal lesion.  Gallbladder: Cholecystectomy.  Bile duct: No intra-or extrahepatic biliary ductal dilatation.  Spleen: Unremarkable.  Pancreas: Parenchymal atrophy.  Multiple parenchymal calcifications suggesting chronic pancreatitis.  No suspicious focal lesion.  No pancreatic ductal dilatation.  No adjacent inflammatory changes or fluid collections.  Adrenal glands: Unremarkable.  Genitourinary: Bilateral renal cysts.  Subcentimeter hypodensities in both kidneys, which are too small to characterize.  The ureters appear normal in course and caliber.  No evidence of nephrolithiasis or hydroureteronephrosis.  Urinary bladder: Postsurgical change in the anterior bladder wall from TURBT.  There are small foci of air in the bladder, likely iatrogenic.  Reproductive organs: Unremarkable.  GI tract: Small hiatal hernia.  The stomach is unremarkable.  The visualized loops of small and large bowel show no evidence of obstruction or inflammation. Diverticulosis.  Appendix unremarkable.  Peritoneum: No free air or fluid.  Retroperitoneum: No significant adenopathy.  Vasculature: Normal caliber of abdominal aorta with moderate calcified and noncalcified atherosclerosis.  Abdominal wall: Tiny fat containing umbilical hernia.  Bones: Stable T12-L1 intervertebral disc calcification.  No suspicious sclerotic lesions.  No acute fracture.  Impression:  Postoperative changes from of TURBT.  No lymphadenopathy.  No distant metastases.  Other findings as described.     - 12/6/2023 CT Chest:  FINDINGS:  Comparison is 11/21/2023.  No mediastinal, hilar or axillary  adenopathy is seen.  There is a right lower pole thyroid nodule.  There are coronary calcifications.  There is a left upper pole renal cyst.  There is a small hiatal hernia.  Trachea and bronchi are patent.  There is no evidence of interstitial lung disease, bronchiectasis, airway trapping, or emphysema.  No suspicious pulmonary nodules, masses, or infiltrates are seen.  There is a calcified left lung granuloma.  Bones demonstrate nothing focal.  Impression:  No significant abnormality seen.  Right lobe thyroid nodule.  Coronary artery calcifications.  Left upper pole renal cyst.     Last radiation session on 2/8/24    - 2/29/2024 CT Urogram Abd/Pelvis:  FINDINGS:  Stable mild cardiomegaly.  Coronary artery calcific atherosclerosis.  Visualized inferior lungs are clear.  Stable small left hepatic lobe calcification.  No suspicious hepatic lesion.  Gallbladder is surgically absent.  No biliary ductal dilatation.  Spleen and adrenal glands are unremarkable.  Stable appearance of the pancreas with atrophy, scattered calcifications, and mild duct dilatation measuring 4 mm.  Findings can be seen with sequela of chronic pancreatitis.  Bilateral renal cortical thinning.  Multiple bilateral renal cysts and subcentimeter renal hypodensities too small to characterize.  No solid enhancing renal mass.  No renal stone.  No hydronephrosis or ureteral dilatation.  Opacification of the bilateral renal collecting systems and ureters without suspicious filling defect.  Patient is status post TURBT.  There is increased heterogeneous wall thickening and enhancement along the anterior bladder (series 4, image 144) concerning for residual/recurrent tumor.  Mild perivesical stranding and vascular prominence, similar to prior exam.  Small hiatal hernia.  Colonic diverticulosis.  No evidence of bowel obstruction or inflammation.  No free intraperitoneal fluid or air.  No pathologically enlarged abdominopelvic lymph nodes.  Abdominal aorta  is normal caliber.  Moderate/severe calcific atherosclerosis.  Degenerative changes of the osseous structures.  No acute fracture or aggressive osseous lesion  Impression:  1. Increased heterogeneous wall thickening and enhancement along the anterior bladder concerning for residual/recurrent tumor.  2. No evidence of metastatic disease.  3. Additional findings as above.    - 2/2024 scans without signs of metastatic disease     - cystoscopy with Dr. Story on 3/4/2024 revealing concern for residual tumor bladder dome but TURBT for 4/12/2024 was negative on Pathology  Pathology:  1. BLADDER, RIGHT LATERAL WALL, BIOPSY:   Small fragment of urothelial mucosa with reactive changes.    Negative for dysplasia or malignancy.    2. BLADDER, POSTERIOR WALL, BIOPSY:   Heavily denuded urothelial mucosa with reactive changes.    Negative for dysplasia or malignancy.    3. BLADDER, LEFT LATERAL WALL, BIOPSY:   Small fragment of urothelial mucosa with reactive changes.    Negative for dysplasia or malignancy.    4. BLADDER, POSTERIOR PATCH, BIOPSY:   Heavily denuded urothelial mucosa with marked chronic inflammation and reactive changes.   Negative for dysplasia or malignancy.    5. BLADDER, ANTERIOR WALL TUMOR, TRANSURETHRAL RESECTION:   Polypoid cystitis and granulation tissue with squamous metaplasia of urothelial mucosa.    Negative for dysplasia or malignancy.     - 10/21/2024 Cystoscope:  Findings:   1. Normal anterior urethra without evidence of strictures or tumors   2. Prostatic urethra open without signs of obstruction  3. Bladder with healing tissue and fibrinous material at the dome, no evidence for residual malignancy.  Ureteral orifices in orthotopic position bilaterally      - 10/30/2024 CT C/A/P:  FINDINGS:  CHEST:  Neck and thoracic soft tissues:  No lymphadenopathy.  Stable 2.0 cm right thyroid lobe nodule and left thyroid lobe calcification.  Partially visualized soft tissue density in the upper back subcutaneous  tissues, possibly the sebaceous cyst noted on prior CT chest..  Thoracic aorta:Left-sided aortic arch with 3 branch vessels.  Normal caliber, contour, and course.  Moderate atherosclerotic calcification.  Heart: Normal in size. No pericardial effusion.  Multi-vessel coronary artery atherosclerosis.  Calcification of the aortic annulus.  Normal diameter of the main pulmonary artery.  Hilum/Mediastinum: No pathologically enlarged hilar or mediastinal lymph nodes.  Airways: The trachea is midline and proximal airways are patent.  Lungs/Pleura: Similar appearance of 1.3 cm subpleural nodule in the right lung base (series 3, image 226).  Calcified pleural plaque noted.  No new pulmonary nodules..  No consolidation, pleural effusion, or pneumothorax.  Esophagus: The esophagus maintains a normal course and caliber.  ABDOMEN/PELVIS:  Stomach: Small hiatal hernia.  No gastric wall thickening.  Liver: The liver is normal in size and attenuation.  Stable left hepatic lobe calcification.  No focal hepatic abnormality on this noncontrast study.  Gallbladder/Biliary System: Gallbladder is surgically absent.  No intrahepatic or extrahepatic biliary ductal dilatation.  Spleen: Normal size without focal abnormality.  Pancreas: Pancreatic atrophy with prominent pancreatic duct and scattered parenchymal calcifications, stable from prior.  Findings may represent sequela of chronic pancreatitis.  No mass or peripancreatic fat stranding.  Adrenal glands: No adrenal nodules.  Renal and Urinary system: Unchanged bilateral renal cortical thinning and nonspecific perinephric stranding.  Stable renal cysts bilaterally and subcentimeter hypodensities that are too small to characterize.    No hydronephrosis.  The ureters appear normal in course and caliber. Patient is s/p TURBT.  Anterior bladder wall thickening and new linear hyperdensities along the anterior superior bladder wall (sagittal series 10, image 64), possibly related to post  treatment or postoperative change.  Stable mild pre vesicle fat stranding.  Reproductive: Dystrophic prostatic calcifications.  Bowel: The visualized loops of small and large bowel show no evidence of obstruction or inflammation.  The appendix appears normal.  Peritoneum: No free fluid or intraperitoneal free air.  Lymph Nodes: No pathologic braden enlargement in the abdomen or pelvis.  Vasculature: The abdominal aorta and its branch vessels are normal in course and caliber.  Moderate aortoiliac atherosclerotic calcifications.  Bones: Degenerative changes of the spine.  No evidence of acute fracture or osseus destructive process.  Soft tissues: Small fat containing umbilical hernia.  Impression:  Patient is status post TURBT and chemoradiation for urothelial carcinoma of the bladder.  New linear hyperdensities within the bladder, possibly representing post treatment or postoperative change.  Otherwise there is stable bladder wall thickening and perivesical fat stranding.  Right lung base pleural based nodular opacity, stable from 2023.  Calcified pleural plaques noted.  Sequela of chronic pancreatitis.  Additional findings above.     PMH:  Active Ambulatory Problems        Diagnosis   Date Noted       CKD stage 3 due to type 2 diabetes mellitus     11/23/2012       Chronic anticoagulation 11/23/2012       Hypertension associated with diabetes     05/22/2013       Hyperlipidemia associated with type 2 diabetes mellitus     05/22/2013       Persistent atrial fibrillation      06/11/2014       Atherosclerosis of aorta      11/18/2020       Sensorineural hearing loss (SNHL) of both ears  11/19/2020       Risk for falls    12/15/2021       Renal cyst  12/14/2022       DM type 2, controlled, with complication  12/14/2022       Bladder tumor     11/16/2023       UTI (urinary tract infection) 11/21/2023       Acute hypoxic respiratory failure   11/21/2023       Elevated troponin 11/21/2023       Thyroid nodules    11/21/2023     Resolved Ambulatory Problems        Diagnosis   Date Noted       Atrial fibrillation     08/02/2012       Long term (current) use of anticoagulants 08/02/2012       Family history of diabetes mellitus 11/23/2012       Ex-cigarette smoker     11/23/2012       Metabolic syndrome      11/23/2012       Screen for colon cancer 05/07/2014       Ex-smoker   11/09/2015       Hyperglycemia     12/16/2015       Abscess of chest wall   06/25/2018       Sebaceous cyst    07/25/2018       Decreased back mobility 08/20/2019       Decreased strength of trunk and back      08/20/2019       Decreased range of motion of both hips    08/20/2019       Pain aggravated by walking    08/20/2019       Hamstring tightness of both lower extremities   08/20/2019       Impaired mobility and endurance     08/20/2019       Low back pain     11/19/2020       Chronic pain      04/28/2021       Type 2 diabetes mellitus, without long-term current use of insulin      12/13/2021       CKD stage 3 secondary to diabetes   12/13/2021       Encounter for preventive health examination     12/19/2022     Past Medical History:  Diagnosis   Date       *Atrial fibrillation           Chronic kidney disease         Deep vein thrombosis           Hyperlipidemia           Hypertension        Cholecystectomy  Tonsillectomy  Skin cancer removal     FH:  Paternal Aunt- recalls cancer affecting scalp     SH:    Retired [employer], followed by off shore work,   4 children (1 passed as an infant)  Quit tobacco in the 1980s  + EtOH    Review of Systems   Constitutional:  Negative for activity change, appetite change, chills, fatigue, fever and unexpected weight change.   HENT:  Positive for hearing loss (hearing aids bilat) and trouble swallowing. Negative for nasal congestion, dental problem, postnasal drip, rhinorrhea, sinus pressure/congestion, sore throat and tinnitus.         Dry mouth   Eyes:  Negative for visual disturbance.    Respiratory:  Positive for cough. Negative for chest tightness, shortness of breath and wheezing.    Cardiovascular:  Negative for chest pain, palpitations and leg swelling.   Gastrointestinal:  Negative for abdominal distention, abdominal pain, blood in stool, change in bowel habit, constipation, diarrhea, nausea and vomiting.   Genitourinary:  Negative for bladder incontinence, decreased urine volume, difficulty urinating, dysuria, frequency (better), hematuria and urgency (better).   Musculoskeletal:  Negative for arthralgias, back pain, myalgias, neck pain and neck stiffness.   Integumentary:  Positive for rash (improved).        Dry skin   Neurological:  Positive for weakness. Negative for dizziness, light-headedness, numbness and headaches.   Psychiatric/Behavioral:  Negative for agitation, behavioral problems, confusion, dysphoric mood and sleep disturbance. The patient is not nervous/anxious.           Objective     Physical Exam  Vitals and nursing note reviewed.   Constitutional:       General: He is not in acute distress.     Appearance: Normal appearance. He is not ill-appearing.      Comments: Looks better!  ECOG= 1-2  Has wheelchair   HENT:      Head: Normocephalic and atraumatic.      Comments: Hearing aids     Mouth/Throat:      Mouth: Mucous membranes are moist.      Pharynx: Oropharynx is clear. No oropharyngeal exudate or posterior oropharyngeal erythema.   Eyes:      General: No scleral icterus.     Extraocular Movements: Extraocular movements intact.      Conjunctiva/sclera: Conjunctivae normal.      Pupils: Pupils are equal, round, and reactive to light.   Cardiovascular:      Rate and Rhythm: Normal rate and regular rhythm.      Heart sounds: No murmur heard.     No friction rub.   Pulmonary:      Effort: Pulmonary effort is normal. No respiratory distress.      Breath sounds: Normal breath sounds. No wheezing, rhonchi or rales.   Abdominal:      General: Abdomen is flat. Bowel sounds are  normal. There is no distension.      Palpations: Abdomen is soft. There is no mass.      Tenderness: There is no abdominal tenderness. There is no guarding or rebound.   Musculoskeletal:         General: No swelling, tenderness or deformity. Normal range of motion.      Right lower leg: No edema.      Left lower leg: No edema.   Skin:     General: Skin is warm and dry.      Coloration: Skin is not jaundiced or pale.      Findings: No bruising, erythema or rash.   Neurological:      General: No focal deficit present.      Mental Status: He is alert and oriented to person, place, and time. Mental status is at baseline.      Sensory: No sensory deficit.      Motor: Weakness (generalized) present.      Gait: Gait normal.   Psychiatric:         Mood and Affect: Mood normal.         Behavior: Behavior normal.         Thought Content: Thought content normal.         Judgment: Judgment normal.       Labs- reviewed     Assessment and Plan     1. Malignant neoplasm of dome of urinary bladder  Assessment & Plan:  Negative cystoscope x 4 and negative CT scans for recurrence  Repeat scan in 3 months - 7/2025      2. Chronic heart failure with preserved ejection fraction  Assessment & Plan:  Patient with new cough, crackles in bases  Obtain chest xray  Based on results, consider sooner f/u with cardiology      3. Acute cough  -     X-Ray Chest PA And Lateral; Future; Expected date: 05/13/2025    4. Persistent atrial fibrillation  Assessment & Plan:  s/p watchman device procedure, f/u scheduled      5. Hypertension associated with diabetes  Assessment & Plan:  - bp controlled  - continue metoprolol succinate and sacubitril-valsartan      6. Physical deconditioning  Assessment & Plan:  - continue physical therapy      7. Hidden penis  Assessment & Plan:  - uncomplicated at this time  - monitor             Melissa Voltz, APRN [facility]  [facility] [place] [address]  Phone: (o)  [phone]          Route Chart for Scheduling    Med Onc Chart Routing      Follow up with physician . As scheduled   Follow up with [facility]    Infusion scheduling note    Injection scheduling note    Labs   Scheduling:  Preferred lab:  Lab interval:  As scheduled   Imaging    Pharmacy appointment    Other referrals                 code applied: patient requires or will require a continuous, longitudinal, and active collaborative plan of care related to this patient's health condition, prostate cancer --the management of which requires the direction of a practitioner with specialized clinical knowledge, skill, and expertise.

## 2025-05-14 ENCOUNTER — PATIENT MESSAGE (OUTPATIENT)
Dept: ADMINISTRATIVE | Facility: OTHER | Age: OVER 89
End: 2025-05-14
Payer: MEDICARE

## 2025-05-14 ENCOUNTER — CLINICAL SUPPORT (OUTPATIENT)
Dept: REHABILITATION | Facility: HOSPITAL | Age: OVER 89
End: 2025-05-14
Payer: MEDICARE

## 2025-05-14 DIAGNOSIS — Z74.09 IMPAIRED FUNCTIONAL MOBILITY, BALANCE, GAIT, AND ENDURANCE: Primary | ICD-10-CM

## 2025-05-14 PROCEDURE — 97530 THERAPEUTIC ACTIVITIES: CPT

## 2025-05-14 PROCEDURE — 97110 THERAPEUTIC EXERCISES: CPT

## 2025-05-14 PROCEDURE — 97112 NEUROMUSCULAR REEDUCATION: CPT

## 2025-05-14 NOTE — PROGRESS NOTES
Outpatient Rehab    Physical Therapy Visit    Patient Name: Cliff Magaña  MRN: [id number]  YOB: 1935  Encounter Date: 5/14/2025    Therapy Diagnosis:   Encounter Diagnosis   Name Primary?    Impaired functional mobility, balance, gait, and endurance Yes       Physician: Denisha Still NP    Physician Orders: Eval and Treat  Medical Diagnosis: Dorsalgia, unspecified  Low back pain with sciatica, sciatica laterality unspecified, unspecified back pain laterality, unspecified chronicity    Visit # / Visits Authorized:  3 / 10  Insurance Authorization Period: 5/7/2025 to 12/31/2025  Date of Evaluation: 4/28/2025  Plan of Care Certification: 4/28/2025 to 6/28/2025     PT/PTA: PT   Number of PTA visits since last PT visit:   Time In: 1400   Time Out: 1455  Total Time (in minutes): 55   Total Billable Time (in minutes): 55    FOTO:  Intake Score:  %  Survey Score 2:  %  Survey Score 3:  %         Subjective    Patient reports having a cold over the weekend that he was less active from. He is feeling better now and ready for Physical Therapy.  No pain reported         Objective            Treatment:   Therapeutic Exercise: 25 minutes     NuStep 6 minutes for lower extremity endurance and strength lvl 3.0  Sit to stands with overhead reach 3lb 3x6  Seated Thoracic extension with foam roll 10x  Standing calf raise at wall 3x10     Manual Therapy: 0 minutes     Therapeutic Activities: 15 minutes     Bridges 3x10  Sidelying clams green thera band 3x10 both sides  Side stepping along edge of mat 5 laps with upper extremity support    Balance/Neuromuscular Re-education: 15 minutes     NBOS on air ex pad with 3lb press 2x10  3 laps of 4 inch hurdles with single point cane  Side stepping on air ex pad 2x 5 laps    Assessment & Plan   Assessment:     Patient presents with increased fatigue from recovering from recent respiratory illness. Fatigue levels limited therapeutic progression. Patient still able to be  challenged with all previous session activities. Will continue to promote balance and lower extremity strengthening activities to improve gait and decrease fall risk.    Patient will continue to benefit from skilled outpatient physical therapy to address the deficits listed in the problem list box on initial evaluation, provide pt/family education and to maximize pt's level of independence in the home and community environment.     Patient's spiritual, cultural, and educational needs considered and patient agreeable to plan of care and goals.           Plan: continue per initial plan of care    Goals:   Short Term Goals (4 Weeks):   1.  Independent with initial HEP.  2. Pt will be able to perform TUG in 16 secs with use of AD placingdemonstrating overall improved functional mobility.   5. Patient will be able to perform single leg balance without upper extremity support for 10 seconds to demonstrate reduced fall risk     Long Term Goals (8 Weeks):   9.  Pt will be able to safely perform and tolerate high level ADL's without LOB.   10. Pt will have 0 falls from start of PT sessions.  11. Independent with updated HEP.   6.  Pt will be able to walk in neighborhood ~1/2 to 1 mile at safe and coordinated speed for community walking program.  14. Pt will perform floor to stand f/b stand to floor transfer with UE support with stand by assist and no cues for improved dynamic transfer, core stability and fall safety in home and community.  15. Pt will begin some form of community fitness to begin regular and consistent performance of exercise to continue maintenance of gains made in PT.    Boris Gale, PT

## 2025-05-15 ENCOUNTER — PATIENT MESSAGE (OUTPATIENT)
Dept: ADMINISTRATIVE | Facility: OTHER | Age: OVER 89
End: 2025-05-15
Payer: MEDICARE

## 2025-05-16 ENCOUNTER — PATIENT MESSAGE (OUTPATIENT)
Dept: ADMINISTRATIVE | Facility: OTHER | Age: OVER 89
End: 2025-05-16
Payer: MEDICARE

## 2025-05-17 ENCOUNTER — PATIENT MESSAGE (OUTPATIENT)
Dept: ADMINISTRATIVE | Facility: OTHER | Age: OVER 89
End: 2025-05-17
Payer: MEDICARE

## 2025-05-18 ENCOUNTER — PATIENT MESSAGE (OUTPATIENT)
Dept: ADMINISTRATIVE | Facility: OTHER | Age: OVER 89
End: 2025-05-18
Payer: MEDICARE

## 2025-05-19 ENCOUNTER — PATIENT MESSAGE (OUTPATIENT)
Dept: ADMINISTRATIVE | Facility: OTHER | Age: OVER 89
End: 2025-05-19
Payer: MEDICARE

## 2025-05-20 ENCOUNTER — PATIENT MESSAGE (OUTPATIENT)
Dept: ADMINISTRATIVE | Facility: OTHER | Age: OVER 89
End: 2025-05-20
Payer: MEDICARE

## 2025-05-20 DIAGNOSIS — E11.22 TYPE 2 DIABETES MELLITUS WITH CHRONIC KIDNEY DISEASE, WITHOUT LONG-TERM CURRENT USE OF INSULIN, UNSPECIFIED CKD STAGE: ICD-10-CM

## 2025-05-20 RX ORDER — METFORMIN HYDROCHLORIDE 500 MG/1
500 TABLET, EXTENDED RELEASE ORAL 2 TIMES DAILY WITH MEALS
Qty: 90 TABLET | Refills: 3 | Status: CANCELLED | OUTPATIENT
Start: 2025-05-20

## 2025-05-20 RX ORDER — METFORMIN HYDROCHLORIDE 500 MG/1
500 TABLET, EXTENDED RELEASE ORAL 2 TIMES DAILY WITH MEALS
Qty: 180 TABLET | Refills: 1 | Status: SHIPPED | OUTPATIENT
Start: 2025-05-20 | End: 2025-08-18

## 2025-05-21 ENCOUNTER — CLINICAL SUPPORT (OUTPATIENT)
Dept: REHABILITATION | Facility: HOSPITAL | Age: OVER 89
End: 2025-05-21
Payer: MEDICARE

## 2025-05-21 ENCOUNTER — PATIENT MESSAGE (OUTPATIENT)
Dept: ADMINISTRATIVE | Facility: OTHER | Age: OVER 89
End: 2025-05-21
Payer: MEDICARE

## 2025-05-21 DIAGNOSIS — Z74.09 IMPAIRED FUNCTIONAL MOBILITY, BALANCE, GAIT, AND ENDURANCE: Primary | ICD-10-CM

## 2025-05-21 PROCEDURE — 97112 NEUROMUSCULAR REEDUCATION: CPT

## 2025-05-21 PROCEDURE — 97530 THERAPEUTIC ACTIVITIES: CPT

## 2025-05-21 PROCEDURE — 97110 THERAPEUTIC EXERCISES: CPT

## 2025-05-21 NOTE — PROGRESS NOTES
Outpatient Rehab    Physical Therapy Visit    Patient Name: Cliff Magaña  MRN: [id number]  YOB: 1935  Encounter Date: 5/21/2025    Therapy Diagnosis:   Encounter Diagnosis   Name Primary?    Impaired functional mobility, balance, gait, and endurance Yes       Physician: Denisha Still NP    Physician Orders: Eval and Treat  Medical Diagnosis: Dorsalgia, unspecified  Low back pain with sciatica, sciatica laterality unspecified, unspecified back pain laterality, unspecified chronicity    Visit # / Visits Authorized:  3 / 10  Insurance Authorization Period: 5/7/2025 to 12/31/2025  Date of Evaluation: 4/28/2025  Plan of Care Certification: 4/28/2025 to 6/28/2025     PT/PTA: PT   Number of PTA visits since last PT visit:   Time In: 1340   Time Out: 1435  Total Time (in minutes): 55   Total Billable Time (in minutes): 55    FOTO:  Intake Score:  %  Survey Score 2:  %  Survey Score 3:  %         Subjective    Patient reports he is no longer experiencing his cold symptoms. No adverse reaction from previous session.  No pain reported         Objective            Treatment:   Therapeutic Exercise: 30 minutes     NuStep 6 minutes for lower extremity endurance and strength lvl 3.0  LAQ 5lb 2x10 both sides  Sit to stands with overhead reach 3lb 3x6  Seated Thoracic extension with foam roll 10x  Seated Horizontal Abduction with external rotation red thera band 2x10  Standing calf raise at wall 3x10  Seated no money red thera band 3x10     Manual Therapy: 0 minutes     Therapeutic Activities: 15 minutes     Bridges 3x10  Sidelying clams green thera band 3x10 both sides  Seated scaption 1lb 3x10 (cues for upright posture)    Balance/Neuromuscular Re-education: 10 minutes     NBOS on air ex pad with 3lb press 2x10  Side stepping along edge of mat 3 laps with upper extremity support red thera band    NT:  3 laps of 4 inch hurdles with single point cane  Side stepping on air ex pad 2x 5 laps    Assessment & Plan    Assessment:     Patient progressing well with Physical Therapy with improved upright posture during gait and gait efficiency. Patient challenged further with functional strengthening and balance activities promoting upright posture. Patient still demonstrating at high fall risk requiring SBA to CGA during session. Will continue to promote balance and lower extremity strengthening activities to improve gait and decrease fall risk.    Patient will continue to benefit from skilled outpatient physical therapy to address the deficits listed in the problem list box on initial evaluation, provide pt/family education and to maximize pt's level of independence in the home and community environment.     Patient's spiritual, cultural, and educational needs considered and patient agreeable to plan of care and goals.           Plan: continue per initial plan of care    Goals:   Short Term Goals (4 Weeks):   1.  Independent with initial HEP.  2. Pt will be able to perform TUG in 16 secs with use of AD placingdemonstrating overall improved functional mobility.   5. Patient will be able to perform single leg balance without upper extremity support for 10 seconds to demonstrate reduced fall risk     Long Term Goals (8 Weeks):   9.  Pt will be able to safely perform and tolerate high level ADL's without LOB.   10. Pt will have 0 falls from start of PT sessions.  11. Independent with updated HEP.   6.  Pt will be able to walk in neighborhood ~1/2 to 1 mile at safe and coordinated speed for community walking program.  14. Pt will perform floor to stand f/b stand to floor transfer with UE support with stand by assist and no cues for improved dynamic transfer, core stability and fall safety in home and community.  15. Pt will begin some form of community fitness to begin regular and consistent performance of exercise to continue maintenance of gains made in PT.    Boris Gale, PT

## 2025-05-22 ENCOUNTER — PATIENT MESSAGE (OUTPATIENT)
Dept: ADMINISTRATIVE | Facility: OTHER | Age: OVER 89
End: 2025-05-22
Payer: MEDICARE

## 2025-05-23 ENCOUNTER — PATIENT MESSAGE (OUTPATIENT)
Dept: ADMINISTRATIVE | Facility: OTHER | Age: OVER 89
End: 2025-05-23
Payer: MEDICARE

## 2025-05-25 ENCOUNTER — PATIENT MESSAGE (OUTPATIENT)
Dept: ELECTROPHYSIOLOGY | Facility: CLINIC | Age: OVER 89
End: 2025-05-25
Payer: MEDICARE

## 2025-05-27 NOTE — TELEPHONE ENCOUNTER
Discussed with Dr Shannon, reviewed the picture, informed spouse that per Dr Sheffield he can stop the plavix for 1 week

## 2025-05-28 ENCOUNTER — CLINICAL SUPPORT (OUTPATIENT)
Dept: REHABILITATION | Facility: HOSPITAL | Age: OVER 89
End: 2025-05-28
Payer: MEDICARE

## 2025-05-28 ENCOUNTER — PATIENT MESSAGE (OUTPATIENT)
Dept: ADMINISTRATIVE | Facility: OTHER | Age: OVER 89
End: 2025-05-28
Payer: MEDICARE

## 2025-05-28 DIAGNOSIS — Z74.09 IMPAIRED FUNCTIONAL MOBILITY, BALANCE, GAIT, AND ENDURANCE: Primary | ICD-10-CM

## 2025-05-28 PROCEDURE — 97530 THERAPEUTIC ACTIVITIES: CPT

## 2025-05-28 PROCEDURE — 97110 THERAPEUTIC EXERCISES: CPT

## 2025-05-28 PROCEDURE — 97112 NEUROMUSCULAR REEDUCATION: CPT

## 2025-05-28 NOTE — PROGRESS NOTES
Outpatient Rehab    Physical Therapy Progress Note    Outpatient Rehab    Physical Therapy Visit    Patient Name: Cliff Magaña  MRN: [id number]  YOB: 1935  Encounter Date: 5/28/2025    Therapy Diagnosis:   Encounter Diagnosis   Name Primary?    Impaired functional mobility, balance, gait, and endurance Yes       Physician: Denisha Still NP    Physician Orders: Eval and Treat  Medical Diagnosis: Dorsalgia, unspecified  Low back pain with sciatica, sciatica laterality unspecified, unspecified back pain laterality, unspecified chronicity    Visit # / Visits Authorized:  4 / 10  Insurance Authorization Period: 5/7/2025 to 12/31/2025  Date of Evaluation: 4/28/2025  Plan of Care Certification: 4/28/2025 to 6/28/2025     PT/PTA: PT   Number of PTA visits since last PT visit:   Time In: 1255   Time Out: 1350  Total Time (in minutes): 55   Total Billable Time (in minutes): 55    FOTO:  Intake Score:  %  Survey Score 2:  %  Survey Score 3:  %         Subjective    Patient reports no new concerns or complaints upon entering Physical Therapy.  No pain reported         Objective    30 second STS: 12 reps without upper extremity support  TUG: 15 seconds with quad cane    FOTO:       Balance:  Sharpened Rhomberg: WNL time with no postural sway (increased fwd trunk lean utilized)  SLS: R 5 sec, L 4 sec  Tandem: RFF 30 sec, LFF 30 sec        Treatment:   Therapeutic Exercise: 30 minutes     NuStep 6 minutes for lower extremity endurance and strength lvl 3.0  LAQ 5lb 2x10 both sides  Seated Thoracic extension with foam roll 10x  Seated Horizontal Abduction with external rotation red thera band 2x10  Standing calf raise at wall 3x10  Seated no money red thera band 3x10     Manual Therapy: 0 minutes     Therapeutic Activities: 10 minutes     Seated scaption 1lb 3x10 (cues for upright posture)  Sit to stands with overhead reach 5lb 3x6    NT:  Bridges 3x10  Sidelying clams green thera band 3x10 both  sides    Balance/Neuromuscular Re-education: 15 minutes     NBOS on air ex pad with 3lb press 2x10  Side stepping along edge of mat 3 laps with upper extremity support red thera band    NT:  3 laps of 4 inch hurdles with single point cane  Side stepping on air ex pad 2x 5 laps    Assessment & Plan   Assessment:     Patient has been in Physical Therapy for 4 sessions since initial evaluation. Patient has improved in functional mobility scores and FOTO balance confidence questionnaire. Patient continues to be challenged with functional strengthening and balance activities promoting upright posture. Patient still demonstrating at high fall risk requiring SBA to CGA during session. Will continue to promote balance and lower extremity strengthening activities to improve gait and decrease fall risk.    Patient will continue to benefit from skilled outpatient physical therapy to address the deficits listed in the problem list box on initial evaluation, provide pt/family education and to maximize pt's level of independence in the home and community environment.     Patient's spiritual, cultural, and educational needs considered and patient agreeable to plan of care and goals.           Plan: continue per initial plan of care    Goals:   Short Term Goals (4 Weeks):   1.  Independent with initial HEP.  2. Pt will be able to perform TUG in 16 secs with use of AD placingdemonstrating overall improved functional mobility.   5. Patient will be able to perform single leg balance without upper extremity support for 10 seconds to demonstrate reduced fall risk     Long Term Goals (8 Weeks):   9.  Pt will be able to safely perform and tolerate high level ADL's without LOB.   10. Pt will have 0 falls from start of PT sessions.  11. Independent with updated HEP.   6.  Pt will be able to walk in neighborhood ~1/2 to 1 mile at safe and coordinated speed for community walking program.  14. Pt will perform floor to stand f/b stand to floor  transfer with UE support with stand by assist and no cues for improved dynamic transfer, core stability and fall safety in home and community.  15. Pt will begin some form of community fitness to begin regular and consistent performance of exercise to continue maintenance of gains made in PT.    Boris Gale, PT

## 2025-05-29 ENCOUNTER — PATIENT MESSAGE (OUTPATIENT)
Dept: ADMINISTRATIVE | Facility: OTHER | Age: OVER 89
End: 2025-05-29
Payer: MEDICARE

## 2025-05-30 ENCOUNTER — PATIENT MESSAGE (OUTPATIENT)
Dept: ADMINISTRATIVE | Facility: OTHER | Age: OVER 89
End: 2025-05-30
Payer: MEDICARE

## 2025-05-31 ENCOUNTER — PATIENT MESSAGE (OUTPATIENT)
Dept: ADMINISTRATIVE | Facility: OTHER | Age: OVER 89
End: 2025-05-31
Payer: MEDICARE

## 2025-06-01 ENCOUNTER — PATIENT MESSAGE (OUTPATIENT)
Dept: ADMINISTRATIVE | Facility: OTHER | Age: OVER 89
End: 2025-06-01
Payer: MEDICARE

## 2025-06-02 ENCOUNTER — PATIENT MESSAGE (OUTPATIENT)
Dept: ADMINISTRATIVE | Facility: OTHER | Age: OVER 89
End: 2025-06-02
Payer: MEDICARE

## 2025-06-03 ENCOUNTER — PATIENT MESSAGE (OUTPATIENT)
Dept: ADMINISTRATIVE | Facility: OTHER | Age: OVER 89
End: 2025-06-03
Payer: MEDICARE

## 2025-06-04 ENCOUNTER — PATIENT MESSAGE (OUTPATIENT)
Dept: ADMINISTRATIVE | Facility: OTHER | Age: OVER 89
End: 2025-06-04
Payer: MEDICARE

## 2025-06-04 ENCOUNTER — CLINICAL SUPPORT (OUTPATIENT)
Dept: REHABILITATION | Facility: HOSPITAL | Age: OVER 89
End: 2025-06-04
Payer: MEDICARE

## 2025-06-04 DIAGNOSIS — Z74.09 IMPAIRED FUNCTIONAL MOBILITY, BALANCE, GAIT, AND ENDURANCE: Primary | ICD-10-CM

## 2025-06-04 PROCEDURE — 97110 THERAPEUTIC EXERCISES: CPT

## 2025-06-04 PROCEDURE — 97530 THERAPEUTIC ACTIVITIES: CPT

## 2025-06-04 PROCEDURE — 97112 NEUROMUSCULAR REEDUCATION: CPT

## 2025-06-05 ENCOUNTER — PATIENT MESSAGE (OUTPATIENT)
Dept: ADMINISTRATIVE | Facility: OTHER | Age: OVER 89
End: 2025-06-05
Payer: MEDICARE

## 2025-06-06 ENCOUNTER — PATIENT MESSAGE (OUTPATIENT)
Dept: ADMINISTRATIVE | Facility: OTHER | Age: OVER 89
End: 2025-06-06
Payer: MEDICARE

## 2025-06-08 ENCOUNTER — PATIENT MESSAGE (OUTPATIENT)
Dept: ADMINISTRATIVE | Facility: OTHER | Age: OVER 89
End: 2025-06-08
Payer: MEDICARE

## 2025-06-09 ENCOUNTER — PATIENT MESSAGE (OUTPATIENT)
Dept: ADMINISTRATIVE | Facility: OTHER | Age: OVER 89
End: 2025-06-09
Payer: MEDICARE

## 2025-06-10 ENCOUNTER — PATIENT MESSAGE (OUTPATIENT)
Dept: ADMINISTRATIVE | Facility: OTHER | Age: OVER 89
End: 2025-06-10
Payer: MEDICARE

## 2025-06-11 ENCOUNTER — PATIENT MESSAGE (OUTPATIENT)
Dept: ADMINISTRATIVE | Facility: OTHER | Age: OVER 89
End: 2025-06-11
Payer: MEDICARE

## 2025-06-11 ENCOUNTER — CLINICAL SUPPORT (OUTPATIENT)
Dept: REHABILITATION | Facility: HOSPITAL | Age: OVER 89
End: 2025-06-11
Payer: MEDICARE

## 2025-06-11 DIAGNOSIS — Z74.09 IMPAIRED FUNCTIONAL MOBILITY, BALANCE, GAIT, AND ENDURANCE: Primary | ICD-10-CM

## 2025-06-11 DIAGNOSIS — R53.81 PHYSICAL DECONDITIONING: ICD-10-CM

## 2025-06-11 PROCEDURE — 97110 THERAPEUTIC EXERCISES: CPT

## 2025-06-11 PROCEDURE — 97112 NEUROMUSCULAR REEDUCATION: CPT

## 2025-06-11 PROCEDURE — 97530 THERAPEUTIC ACTIVITIES: CPT

## 2025-06-11 NOTE — PROGRESS NOTES
Outpatient Rehab    Physical Therapy Visit    Patient Name: Cliff Magaña  MRN: [id number]  YOB: 1935  Encounter Date: 6/11/2025    Therapy Diagnosis:   Encounter Diagnoses   Name Primary?    Impaired functional mobility, balance, gait, and endurance Yes    Physical deconditioning            Physician: Denisha Still NP    Physician Orders: Eval and Treat  Medical Diagnosis: Dorsalgia, unspecified  Low back pain with sciatica, sciatica laterality unspecified, unspecified back pain laterality, unspecified chronicity    Visit # / Visits Authorized:  6 / 10  Insurance Authorization Period: 5/7/2025 to 12/31/2025  Date of Evaluation: 4/28/2025  Plan of Care Certification: 4/28/2025 to 6/28/2025     PT/PTA:     Number of PTA visits since last PT visit:   Time In: 1300   Time Out: 1355  Total Time (in minutes): 55   Total Billable Time (in minutes): 55    FOTO:  Intake Score:  %  Survey Score 2:  %  Survey Score 3:  %         Subjective    Patient reports he is doing well getting around the house. Does note some back pain doing his home exercise program the other day.  No pain reported         Objective   5/28/2025:   30 second STS: 12 reps without upper extremity support  TUG: 15 seconds with quad cane    FOTO:       Balance:  Sharpened Rhomberg: WNL time with no postural sway (increased fwd trunk lean utilized)  SLS: R 5 sec, L 4 sec  Tandem: RFF 30 sec, LFF 30 sec        Treatment:   Therapeutic Exercise: 30 minutes     Seated ball roll out 10x 5 sec hold  NuStep 6 minutes for lower extremity endurance and strength lvl 5.0  LAQ 5lb 2x10 both sides  Seated Thoracic extension with foam roll 20x  Seated Horizontal Abduction with external rotation red thera band 3x10  Standing calf raise at wall on 2 inch step 3x10  Seated no money green thera band 3x10     Manual Therapy: 0 minutes     Therapeutic Activities: 10 minutes     Seated scaption 2lb 3x10 (cues for upright posture)  Sit to stands with overhead  reach 5lb 3x8  6 inch step ups with upper extremity support 2x10    NT:  Bridges 3x10  Sidelying clams green thera band 3x10 both sides    Balance/Neuromuscular Re-education: 15 minutes     NBOS on air ex pad with 5lb press 2x10  Side stepping on air ex pad 2x 5 laps  Side stepping along edge of mat 5 laps with upper extremity support green thera band    NT:  3 laps of 4 inch hurdles with single point cane    Assessment & Plan   Assessment:     Patient progressed today with functional strengthening in higher step up pattern and greater volume of frontal plane mobility activities. Additional activities done to continue to promote upright posture during daily activities. Patient still demonstrating at high fall risk requiring SBA to CGA during session. Will continue to promote balance and lower extremity strengthening activities to improve gait and decrease fall risk    Patient will continue to benefit from skilled outpatient physical therapy to address the deficits listed in the problem list box on initial evaluation, provide pt/family education and to maximize pt's level of independence in the home and community environment.     Patient's spiritual, cultural, and educational needs considered and patient agreeable to plan of care and goals.           Plan: continue per initial plan of care    Goals:   Short Term Goals (4 Weeks):   1.  Independent with initial HEP.  2. Pt will be able to perform TUG in 16 secs with use of AD placingdemonstrating overall improved functional mobility.   5. Patient will be able to perform single leg balance without upper extremity support for 10 seconds to demonstrate reduced fall risk     Long Term Goals (8 Weeks):   9.  Pt will be able to safely perform and tolerate high level ADL's without LOB.   10. Pt will have 0 falls from start of PT sessions.  11. Independent with updated HEP.   6.  Pt will be able to walk in neighborhood ~1/2 to 1 mile at safe and coordinated speed for community  walking program.  14. Pt will perform floor to stand f/b stand to floor transfer with UE support with stand by assist and no cues for improved dynamic transfer, core stability and fall safety in home and community.  15. Pt will begin some form of community fitness to begin regular and consistent performance of exercise to continue maintenance of gains made in PT.    Boris Gale, PT

## 2025-06-12 ENCOUNTER — PATIENT MESSAGE (OUTPATIENT)
Dept: ADMINISTRATIVE | Facility: OTHER | Age: OVER 89
End: 2025-06-12
Payer: MEDICARE

## 2025-06-13 ENCOUNTER — PATIENT MESSAGE (OUTPATIENT)
Dept: ADMINISTRATIVE | Facility: OTHER | Age: OVER 89
End: 2025-06-13
Payer: MEDICARE

## 2025-06-14 ENCOUNTER — PATIENT MESSAGE (OUTPATIENT)
Dept: ADMINISTRATIVE | Facility: OTHER | Age: OVER 89
End: 2025-06-14
Payer: MEDICARE

## 2025-06-15 ENCOUNTER — PATIENT MESSAGE (OUTPATIENT)
Dept: ADMINISTRATIVE | Facility: OTHER | Age: OVER 89
End: 2025-06-15
Payer: MEDICARE

## 2025-06-16 ENCOUNTER — PATIENT MESSAGE (OUTPATIENT)
Dept: ADMINISTRATIVE | Facility: OTHER | Age: OVER 89
End: 2025-06-16
Payer: MEDICARE

## 2025-06-17 ENCOUNTER — PATIENT MESSAGE (OUTPATIENT)
Dept: ADMINISTRATIVE | Facility: OTHER | Age: OVER 89
End: 2025-06-17
Payer: MEDICARE

## 2025-06-18 ENCOUNTER — PATIENT MESSAGE (OUTPATIENT)
Dept: ADMINISTRATIVE | Facility: OTHER | Age: OVER 89
End: 2025-06-18
Payer: MEDICARE

## 2025-06-19 ENCOUNTER — PATIENT MESSAGE (OUTPATIENT)
Dept: ADMINISTRATIVE | Facility: OTHER | Age: OVER 89
End: 2025-06-19
Payer: MEDICARE

## 2025-06-20 ENCOUNTER — PATIENT MESSAGE (OUTPATIENT)
Dept: ADMINISTRATIVE | Facility: OTHER | Age: OVER 89
End: 2025-06-20
Payer: MEDICARE

## 2025-06-21 ENCOUNTER — PATIENT MESSAGE (OUTPATIENT)
Dept: ADMINISTRATIVE | Facility: OTHER | Age: OVER 89
End: 2025-06-21
Payer: MEDICARE

## 2025-06-22 ENCOUNTER — PATIENT MESSAGE (OUTPATIENT)
Dept: ADMINISTRATIVE | Facility: OTHER | Age: OVER 89
End: 2025-06-22
Payer: MEDICARE

## 2025-06-23 ENCOUNTER — PATIENT MESSAGE (OUTPATIENT)
Dept: ADMINISTRATIVE | Facility: OTHER | Age: OVER 89
End: 2025-06-23
Payer: MEDICARE

## 2025-06-25 ENCOUNTER — PATIENT MESSAGE (OUTPATIENT)
Dept: ADMINISTRATIVE | Facility: OTHER | Age: OVER 89
End: 2025-06-25
Payer: MEDICARE

## 2025-06-26 ENCOUNTER — PATIENT MESSAGE (OUTPATIENT)
Dept: ADMINISTRATIVE | Facility: OTHER | Age: OVER 89
End: 2025-06-26
Payer: MEDICARE

## 2025-06-26 ENCOUNTER — CLINICAL SUPPORT (OUTPATIENT)
Dept: REHABILITATION | Facility: HOSPITAL | Age: OVER 89
End: 2025-06-26
Payer: MEDICARE

## 2025-06-26 DIAGNOSIS — Z74.09 IMPAIRED FUNCTIONAL MOBILITY, BALANCE, GAIT, AND ENDURANCE: Primary | ICD-10-CM

## 2025-06-26 PROCEDURE — 97110 THERAPEUTIC EXERCISES: CPT

## 2025-06-26 PROCEDURE — 97112 NEUROMUSCULAR REEDUCATION: CPT

## 2025-06-26 PROCEDURE — 97530 THERAPEUTIC ACTIVITIES: CPT

## 2025-06-26 NOTE — PROGRESS NOTES
Outpatient Rehab    Physical Therapy Discharge    Outpatient Rehab    Physical Therapy Visit    Patient Name: Cliff Magaña  MRN: [id number]  YOB: 1935  Encounter Date: 2025    Therapy Diagnosis:   Encounter Diagnosis   Name Primary?    Impaired functional mobility, balance, gait, and endurance Yes       Physician: Denisha Still NP    Physician Orders: Eval and Treat  Medical Diagnosis: Dorsalgia, unspecified  Low back pain with sciatica, sciatica laterality unspecified, unspecified back pain laterality, unspecified chronicity    Visit # / Visits Authorized:  7 / 10  Insurance Authorization Period: 2025 to 2025  Date of Evaluation: 2025  Plan of Care Certification: 2025 to 2025     PT/PTA: PT   Number of PTA visits since last PT visit:   Time In: 1245   Time Out: 1340  Total Time (in minutes): 55   Total Billable Time (in minutes): 55    FOTO:  Intake Score:  %  Survey Score 2:  %  Survey Score 3:  %         Subjective    Patient reports he is doing well and feels his balance and strength is at a level that he feels safe doing his day to day activities. Ready to make today his last day of Physical Therapy.  No pain reported         Objective   2025:   30 second STS: 11 reps without upper extremity support  TU.2 seconds with quad cane    FOTO:       Balance:  SLS: R 5 sec, L 4 sec  Tandem: RFF 30 sec, LFF 30 sec        Treatment:   Therapeutic Exercise: 30 minutes     NuStep 6 minutes for lower extremity endurance and strength lvl 5.0  LAQ 5lb 2x10 both sides  Seated Thoracic extension with foam roll 20x  Seated Horizontal Abduction with external rotation red thera band 3x10  Standing calf raise at wall on 2 inch step 3x10  Seated no money green thera band 3x10     Manual Therapy: 0 minutes     Therapeutic Activities: 10 minutes     Seated scaption 2lb 3x10 (cues for upright posture)  Sit to stands with overhead reach 5lb 3x8  6 inch step ups with upper  extremity support 2x10    NT:  Bridges 3x10  Sidelying clams green thera band 3x10 both sides    Balance/Neuromuscular Re-education: 15 minutes     NBOS on air ex pad with 5lb press 2x10  Side stepping on air ex pad 2x 5 laps  Side stepping along edge of mat 5 laps with upper extremity support green thera band    NT:  3 laps of 4 inch hurdles with single point cane    Assessment & Plan   Assessment:     Since beginning PT, pt has been seen 7 times since initial evaluation on 5/7/2025. Overall, he has made good, steady progress with his PT treatments and has worked hard towards all of his PT goals as evidenced by subjective and objective improvements. Since attending PT he has reached most of his PT goals. He will be discharged with an updated HEP and was instructed to contact us with any other questions or concerns. Pt agreeable to d/c.     Patient will continue to benefit from skilled outpatient physical therapy to address the deficits listed in the problem list box on initial evaluation, provide pt/family education and to maximize pt's level of independence in the home and community environment.     Patient's spiritual, cultural, and educational needs considered and patient agreeable to plan of care and goals.         Plan: continue per initial plan of care    Goals:   Short Term Goals (4 Weeks):   1.  Independent with initial HEP.  2. Pt will be able to perform TUG in 16 secs with use of AD placingdemonstrating overall improved functional mobility.   5. Patient will be able to perform single leg balance without upper extremity support for 10 seconds to demonstrate reduced fall risk     Long Term Goals (8 Weeks):   9.  Pt will be able to safely perform and tolerate high level ADL's without LOB.   10. Pt will have 0 falls from start of PT sessions.  11. Independent with updated HEP.   6.  Pt will be able to walk in neighborhood ~1/2 to 1 mile at safe and coordinated speed for community walking program.  14. Pt will  perform floor to stand f/b stand to floor transfer with UE support with stand by assist and no cues for improved dynamic transfer, core stability and fall safety in home and community.  15. Pt will begin some form of community fitness to begin regular and consistent performance of exercise to continue maintenance of gains made in PT.    Boris Gale, PT

## 2025-06-28 ENCOUNTER — PATIENT MESSAGE (OUTPATIENT)
Dept: ADMINISTRATIVE | Facility: OTHER | Age: OVER 89
End: 2025-06-28
Payer: MEDICARE

## 2025-06-29 ENCOUNTER — PATIENT MESSAGE (OUTPATIENT)
Dept: ADMINISTRATIVE | Facility: OTHER | Age: OVER 89
End: 2025-06-29
Payer: MEDICARE

## 2025-06-30 ENCOUNTER — PATIENT MESSAGE (OUTPATIENT)
Dept: ADMINISTRATIVE | Facility: OTHER | Age: OVER 89
End: 2025-06-30
Payer: MEDICARE

## 2025-07-01 ENCOUNTER — PATIENT MESSAGE (OUTPATIENT)
Dept: ADMINISTRATIVE | Facility: OTHER | Age: OVER 89
End: 2025-07-01
Payer: MEDICARE

## 2025-07-03 ENCOUNTER — PATIENT MESSAGE (OUTPATIENT)
Dept: ADMINISTRATIVE | Facility: OTHER | Age: OVER 89
End: 2025-07-03
Payer: MEDICARE

## 2025-07-04 ENCOUNTER — PATIENT MESSAGE (OUTPATIENT)
Dept: ADMINISTRATIVE | Facility: OTHER | Age: OVER 89
End: 2025-07-04
Payer: MEDICARE

## 2025-07-05 ENCOUNTER — PATIENT MESSAGE (OUTPATIENT)
Dept: ADMINISTRATIVE | Facility: OTHER | Age: OVER 89
End: 2025-07-05
Payer: MEDICARE

## 2025-07-07 ENCOUNTER — PATIENT MESSAGE (OUTPATIENT)
Dept: ADMINISTRATIVE | Facility: OTHER | Age: OVER 89
End: 2025-07-07
Payer: MEDICARE

## 2025-07-08 ENCOUNTER — PATIENT MESSAGE (OUTPATIENT)
Dept: ADMINISTRATIVE | Facility: OTHER | Age: OVER 89
End: 2025-07-08
Payer: MEDICARE

## 2025-07-09 ENCOUNTER — PATIENT MESSAGE (OUTPATIENT)
Dept: ADMINISTRATIVE | Facility: OTHER | Age: OVER 89
End: 2025-07-09
Payer: MEDICARE

## 2025-07-10 ENCOUNTER — PATIENT MESSAGE (OUTPATIENT)
Dept: ADMINISTRATIVE | Facility: OTHER | Age: OVER 89
End: 2025-07-10
Payer: MEDICARE

## 2025-07-11 ENCOUNTER — PATIENT MESSAGE (OUTPATIENT)
Dept: ADMINISTRATIVE | Facility: OTHER | Age: OVER 89
End: 2025-07-11
Payer: MEDICARE

## 2025-07-12 ENCOUNTER — PATIENT MESSAGE (OUTPATIENT)
Dept: ADMINISTRATIVE | Facility: OTHER | Age: OVER 89
End: 2025-07-12
Payer: MEDICARE

## 2025-07-13 ENCOUNTER — PATIENT MESSAGE (OUTPATIENT)
Dept: ADMINISTRATIVE | Facility: OTHER | Age: OVER 89
End: 2025-07-13
Payer: MEDICARE

## 2025-07-14 ENCOUNTER — PATIENT MESSAGE (OUTPATIENT)
Dept: ADMINISTRATIVE | Facility: OTHER | Age: OVER 89
End: 2025-07-14
Payer: MEDICARE

## 2025-07-14 ENCOUNTER — PATIENT MESSAGE (OUTPATIENT)
Dept: CARDIOLOGY | Facility: CLINIC | Age: OVER 89
End: 2025-07-14
Payer: MEDICARE

## 2025-07-14 DIAGNOSIS — E11.59 HYPERTENSION ASSOCIATED WITH DIABETES: ICD-10-CM

## 2025-07-14 DIAGNOSIS — I15.2 HYPERTENSION ASSOCIATED WITH DIABETES: ICD-10-CM

## 2025-07-15 ENCOUNTER — PATIENT MESSAGE (OUTPATIENT)
Dept: ADMINISTRATIVE | Facility: OTHER | Age: OVER 89
End: 2025-07-15
Payer: MEDICARE

## 2025-07-15 RX ORDER — METOPROLOL SUCCINATE 100 MG/1
100 TABLET, EXTENDED RELEASE ORAL 2 TIMES DAILY
Qty: 180 TABLET | Refills: 1 | Status: SHIPPED | OUTPATIENT
Start: 2025-07-15

## 2025-07-16 ENCOUNTER — PATIENT MESSAGE (OUTPATIENT)
Dept: ADMINISTRATIVE | Facility: OTHER | Age: OVER 89
End: 2025-07-16
Payer: MEDICARE

## 2025-07-17 ENCOUNTER — PATIENT MESSAGE (OUTPATIENT)
Dept: ADMINISTRATIVE | Facility: OTHER | Age: OVER 89
End: 2025-07-17
Payer: MEDICARE

## 2025-07-18 ENCOUNTER — PATIENT MESSAGE (OUTPATIENT)
Dept: ADMINISTRATIVE | Facility: OTHER | Age: OVER 89
End: 2025-07-18
Payer: MEDICARE

## 2025-07-19 ENCOUNTER — PATIENT MESSAGE (OUTPATIENT)
Dept: ADMINISTRATIVE | Facility: OTHER | Age: OVER 89
End: 2025-07-19
Payer: MEDICARE

## 2025-07-20 ENCOUNTER — PATIENT MESSAGE (OUTPATIENT)
Dept: ADMINISTRATIVE | Facility: OTHER | Age: OVER 89
End: 2025-07-20
Payer: MEDICARE

## 2025-07-21 ENCOUNTER — PATIENT MESSAGE (OUTPATIENT)
Dept: ADMINISTRATIVE | Facility: OTHER | Age: OVER 89
End: 2025-07-21
Payer: MEDICARE

## 2025-07-22 ENCOUNTER — HOSPITAL ENCOUNTER (OUTPATIENT)
Dept: RADIOLOGY | Facility: HOSPITAL | Age: OVER 89
Discharge: HOME | End: 2025-07-22
Attending: NURSE PRACTITIONER
Payer: MEDICARE

## 2025-07-22 ENCOUNTER — PATIENT MESSAGE (OUTPATIENT)
Dept: ADMINISTRATIVE | Facility: OTHER | Age: OVER 89
End: 2025-07-22
Payer: MEDICARE

## 2025-07-22 DIAGNOSIS — C67.9 MALIGNANT NEOPLASM OF URINARY BLADDER, UNSPECIFIED SITE: ICD-10-CM

## 2025-07-22 LAB
CREAT SERPL-MCNC: 1.3 MG/DL (ref 0.5–1.4)
SAMPLE: NORMAL

## 2025-07-22 PROCEDURE — A9698 NON-RAD CONTRAST MATERIALNOC: HCPCS | Performed by: NURSE PRACTITIONER

## 2025-07-22 PROCEDURE — 71260 CT THORAX DX C+: CPT | Mod: TC

## 2025-07-22 PROCEDURE — 71260 CT THORAX DX C+: CPT | Mod: 26,,, | Performed by: RADIOLOGY

## 2025-07-22 PROCEDURE — 74177 CT ABD & PELVIS W/CONTRAST: CPT | Mod: 26,,, | Performed by: RADIOLOGY

## 2025-07-22 PROCEDURE — 25500020 PHARM REV CODE 255: Performed by: NURSE PRACTITIONER

## 2025-07-22 RX ADMIN — IOHEXOL 75 ML: 350 INJECTION, SOLUTION INTRAVENOUS at 10:07

## 2025-07-22 RX ADMIN — BARIUM SULFATE 450 ML: 20 SUSPENSION ORAL at 10:07

## 2025-07-23 ENCOUNTER — PATIENT MESSAGE (OUTPATIENT)
Dept: ADMINISTRATIVE | Facility: OTHER | Age: OVER 89
End: 2025-07-23
Payer: MEDICARE

## 2025-07-24 ENCOUNTER — PATIENT MESSAGE (OUTPATIENT)
Dept: ADMINISTRATIVE | Facility: OTHER | Age: OVER 89
End: 2025-07-24
Payer: MEDICARE

## 2025-07-25 ENCOUNTER — PATIENT MESSAGE (OUTPATIENT)
Dept: ADMINISTRATIVE | Facility: OTHER | Age: OVER 89
End: 2025-07-25
Payer: MEDICARE

## 2025-07-25 DIAGNOSIS — E11.22 CKD STAGE 3 DUE TO TYPE 2 DIABETES MELLITUS: ICD-10-CM

## 2025-07-25 DIAGNOSIS — N18.30 CKD STAGE 3 DUE TO TYPE 2 DIABETES MELLITUS: ICD-10-CM

## 2025-07-25 DIAGNOSIS — I15.2 HYPERTENSION ASSOCIATED WITH DIABETES: ICD-10-CM

## 2025-07-25 DIAGNOSIS — E11.8 DM TYPE 2, CONTROLLED, WITH COMPLICATION: ICD-10-CM

## 2025-07-25 DIAGNOSIS — E11.59 HYPERTENSION ASSOCIATED WITH DIABETES: ICD-10-CM

## 2025-07-26 ENCOUNTER — PATIENT MESSAGE (OUTPATIENT)
Dept: ADMINISTRATIVE | Facility: OTHER | Age: OVER 89
End: 2025-07-26
Payer: MEDICARE

## 2025-07-27 ENCOUNTER — PATIENT MESSAGE (OUTPATIENT)
Dept: ADMINISTRATIVE | Facility: OTHER | Age: OVER 89
End: 2025-07-27
Payer: MEDICARE

## 2025-07-28 ENCOUNTER — PATIENT MESSAGE (OUTPATIENT)
Dept: ADMINISTRATIVE | Facility: OTHER | Age: OVER 89
End: 2025-07-28
Payer: MEDICARE

## 2025-07-28 NOTE — TELEPHONE ENCOUNTER
Care Due:                  Date            Visit Type   Department     Provider  --------------------------------------------------------------------------------                                EP -                              PRIMARY      [facility] INTERNAL  Last Visit: 04-      CARE ([facility])   MEDICINE       Munir Estrada  Next Visit: None Scheduled  None         None Found                                                            Last  Test          Frequency    Reason                     Performed    Due Date  --------------------------------------------------------------------------------    HBA1C.......  6 months...  metFORMIN................  03- 09-    [facility] Embedded Care Due Messages. Reference number: [id number].   7/28/2025 10:37:59 AM CDT

## 2025-07-28 NOTE — TELEPHONE ENCOUNTER
Refill Routing Note   Medication(s) are not appropriate for processing by [facility] for the following reason(s):        No active prescription written by provider  Drug-disease interactionDrug-Disease: empagliflozin and Hypertension associated with diabetes (no prev provider override found)    [facility] action(s):  Defer     Requires labs : Yes      Medication Therapy Plan: Drug-Disease: empagliflozin and Hypertension associated with diabetes      Appointments  past 12m or future 3m with PCP    Date Provider   Last Visit   4/2/2025 Munir Estrada MD   Next Visit   Visit date not found Munir Estrada MD   ED visits in past 90 days: 0        Note composed:10:40 AM 07/28/2025

## 2025-07-29 ENCOUNTER — PATIENT MESSAGE (OUTPATIENT)
Dept: ADMINISTRATIVE | Facility: OTHER | Age: OVER 89
End: 2025-07-29
Payer: MEDICARE

## 2025-07-29 ENCOUNTER — LAB VISIT (OUTPATIENT)
Dept: LAB | Facility: HOSPITAL | Age: OVER 89
End: 2025-07-29
Payer: MEDICARE

## 2025-07-29 ENCOUNTER — OFFICE VISIT (OUTPATIENT)
Dept: HEMATOLOGY/ONCOLOGY | Facility: CLINIC | Age: OVER 89
End: 2025-07-29
Payer: MEDICARE

## 2025-07-29 VITALS
TEMPERATURE: 97 F | HEART RATE: 85 BPM | RESPIRATION RATE: 17 BRPM | WEIGHT: 134.94 LBS | BODY MASS INDEX: 23.04 KG/M2 | DIASTOLIC BLOOD PRESSURE: 65 MMHG | HEIGHT: 64 IN | SYSTOLIC BLOOD PRESSURE: 161 MMHG | OXYGEN SATURATION: 99 %

## 2025-07-29 DIAGNOSIS — C67.0 MALIGNANT NEOPLASM OF TRIGONE OF URINARY BLADDER: Primary | ICD-10-CM

## 2025-07-29 DIAGNOSIS — C67.9 MALIGNANT NEOPLASM OF URINARY BLADDER, UNSPECIFIED SITE: ICD-10-CM

## 2025-07-29 DIAGNOSIS — S22.080A CLOSED WEDGE COMPRESSION FRACTURE OF T11 VERTEBRA, INITIAL ENCOUNTER: ICD-10-CM

## 2025-07-29 PROBLEM — S22.089A T11 VERTEBRAL FRACTURE: Status: ACTIVE | Noted: 2025-07-29

## 2025-07-29 LAB
ABSOLUTE EOSINOPHIL (OHS): 0.17 K/UL
ABSOLUTE MONOCYTE (OHS): 0.54 K/UL (ref 0.3–1)
ABSOLUTE NEUTROPHIL COUNT (OHS): 3.29 K/UL (ref 1.8–7.7)
ALBUMIN SERPL BCP-MCNC: 3.9 G/DL (ref 3.5–5.2)
ALP SERPL-CCNC: 111 UNIT/L (ref 40–150)
ALT SERPL W/O P-5'-P-CCNC: 21 UNIT/L (ref 0–55)
ANION GAP (OHS): 10 MMOL/L (ref 8–16)
AST SERPL-CCNC: 33 UNIT/L (ref 0–50)
BASOPHILS # BLD AUTO: 0.04 K/UL
BASOPHILS NFR BLD AUTO: 0.7 %
BILIRUB SERPL-MCNC: 0.5 MG/DL (ref 0.1–1)
BUN SERPL-MCNC: 44 MG/DL (ref 8–23)
CALCIUM SERPL-MCNC: 9.7 MG/DL (ref 8.7–10.5)
CHLORIDE SERPL-SCNC: 108 MMOL/L (ref 95–110)
CO2 SERPL-SCNC: 18 MMOL/L (ref 23–29)
CREAT SERPL-MCNC: 1.4 MG/DL (ref 0.5–1.4)
ERYTHROCYTE [DISTWIDTH] IN BLOOD BY AUTOMATED COUNT: 13.5 % (ref 11.5–14.5)
GFR SERPLBLD CREATININE-BSD FMLA CKD-EPI: 48 ML/MIN/1.73/M2
GLUCOSE SERPL-MCNC: 262 MG/DL (ref 70–110)
HCT VFR BLD AUTO: 47.6 % (ref 40–54)
HGB BLD-MCNC: 15.8 GM/DL (ref 14–18)
IMM GRANULOCYTES # BLD AUTO: 0.03 K/UL (ref 0–0.04)
IMM GRANULOCYTES NFR BLD AUTO: 0.5 % (ref 0–0.5)
LYMPHOCYTES # BLD AUTO: 1.65 K/UL (ref 1–4.8)
MCH RBC QN AUTO: 32.3 PG (ref 27–31)
MCHC RBC AUTO-ENTMCNC: 33.2 G/DL (ref 32–36)
MCV RBC AUTO: 97 FL (ref 82–98)
NUCLEATED RBC (/100WBC) (OHS): 0 /100 WBC
PLATELET # BLD AUTO: 181 K/UL (ref 150–450)
PMV BLD AUTO: 13 FL (ref 9.2–12.9)
POTASSIUM SERPL-SCNC: 4.8 MMOL/L (ref 3.5–5.1)
PROT SERPL-MCNC: 7.1 GM/DL (ref 6–8.4)
RBC # BLD AUTO: 4.89 M/UL (ref 4.6–6.2)
RELATIVE EOSINOPHIL (OHS): 3 %
RELATIVE LYMPHOCYTE (OHS): 28.8 % (ref 18–48)
RELATIVE MONOCYTE (OHS): 9.4 % (ref 4–15)
RELATIVE NEUTROPHIL (OHS): 57.6 % (ref 38–73)
SODIUM SERPL-SCNC: 136 MMOL/L (ref 136–145)
WBC # BLD AUTO: 5.72 K/UL (ref 3.9–12.7)

## 2025-07-29 PROCEDURE — 99215 OFFICE O/P EST HI 40 MIN: CPT | Mod: S$PBB,,, | Performed by: INTERNAL MEDICINE

## 2025-07-29 PROCEDURE — 99213 OFFICE O/P EST LOW 20 MIN: CPT | Mod: PBBFAC | Performed by: INTERNAL MEDICINE

## 2025-07-29 PROCEDURE — 36415 COLL VENOUS BLD VENIPUNCTURE: CPT

## 2025-07-29 PROCEDURE — 85025 COMPLETE CBC W/AUTO DIFF WBC: CPT

## 2025-07-29 PROCEDURE — G2211 COMPLEX E/M VISIT ADD ON: HCPCS | Mod: ,,, | Performed by: INTERNAL MEDICINE

## 2025-07-29 PROCEDURE — 84075 ASSAY ALKALINE PHOSPHATASE: CPT

## 2025-07-29 PROCEDURE — 99999 PR PBB SHADOW E&M-EST. PATIENT-LVL III: CPT | Mod: PBBFAC,,, | Performed by: INTERNAL MEDICINE

## 2025-07-29 NOTE — PROGRESS NOTES
Subjective     Patient ID: Cliff Magaña is a 89 y.o. male.    Chief Complaint: Malignant neoplasm of urinary bladder, unspecified site    HPI    Returns for follow up of bladder cancer and scan results    In the interval reports:  Mouth always seems dry  OTC lozenges  Less saliva  Harder to chew  Nose running  Food does not taste as good due to above  Hydrating well  Weight stable    Had been doing PT through end of June  Then he did exercises on his own  7/1/2025 fall at home - hit his head and back  Wife assessed - states lucid- tried to assess physically and mental status  She tried to keep him up to assess but he wanted to go to bed  States next day more back pain   Reminded ED needed for falls involving head    - 7/22/2025 CT C/A/P:  FINDINGS:  Hypoattenuating nodule 2.4 x 1.8 cm inferior right thyroid lobe, unchanged.  The central airways are patent.  No mediastinal or hilar lymphadenopathy.  The thoracic aorta is of normal caliber, there is moderate circumferential atherosclerotic plaque.  Noncalcified plaque at the origin of the brachiocephalic arterial trunk, less than 50% stenosis.     The cardiac silhouette appears slightly prominent, left atrial appendage closure device.  Coronary artery calcifications.  No pericardial effusion.  There is a small hiatal hernia.     Centrilobular emphysema.  Pleural plaque calcifications.  Tiny micro nodularities, unchanged from the prior study.  No acute focal area of airspace consolidation.  No pleural effusion.  No pneumothorax.     The axillary regions appear normal.     Mild gynecomastia.     The thoracic wall is intact.  Age indeterminate mild-moderate wedge compression fracture deformity of T11, new from the prior study.  No retropulsion of osseous fragment toward the canal, no extension to the posterior element, no canal stenosis or foraminal narrowing.     The liver appears normal.  Cholecystectomy clips.  The biliary ducts are not dilated.     Mild thickening  at the gastroesophageal junction versus small hiatal hernia, not significantly changed from the prior study.  The stomach appears normal.     Severe atrophy of the pancreas numerous calcification noted in the pancreatic duct which also appears mildly dilated, not significantly changed from the prior study, no definite pancreatic mass.     The spleen and bilateral adrenal glands appear normal.     The abdominal aorta tapers normally, there is moderate circumferential calcified atherosclerotic plaque.     The bilateral adrenal glands appear normal.     Bilateral kidneys hypoattenuating lesions, some are cysts, other are too small to characterize, not significantly changed.  The bilateral ureters are not distended.  The bladder is nondistended.  Mild anterior lateral wall calcification and thickening unchanged from prior study.  The prostate is not enlarged.  No pelvic lymphadenopathy.     Mild stool retention, no inflammatory changes of the bowel seen, no bowel dilation.  The appendix is normal.  No mesenteric inflammatory change.  No free air, no free fluid.     The abdominal wall is intact, small fat containing right inguinal hernia.     The remainder of the visualized osseous structures appear within normal limits.     Impression:     Bladder cancer surveillance.  There is stable thickening and calcification of the anterior bladder wall with mild adjacent fat stranding, it is unchanged.     Bilateral kidney hypoattenuating lesions, some represent cysts, other are too small to characterize, unchanged.     New, age-indeterminate mild-moderate compression wedge fracture deformity of T11.     Chronic pancreatitis.    Diagnosis: Muscle invasive bladder cancer post chemo/XRT     Interval history:  Presents to clinic for multiple concerns with wife.       Fecal incontinence intermittently.  Episodes happen about every 7-10 days.  Does not seem to be food related.  Prior to incontinent episodes has regular bowel movements  daily.  Seems to be a problem of not getting to the bathroom in time.       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  Has had cystoscopies with Dr. Story. Denies trouble emptying bladder, hematuria, or dysuria.       Scrotal rash is now improving. Had been prescribed creams by Dr. Story which did not help.  Evaluated by dermatology who prescribed antifungal, which has been beneficia.     Lastly started with cough Friday evening. Cough is productive. Has been taking coricidin and mucinex.  Sputum remains clear.  Denies fever.      Most recent imaging:  CT chest abdomen pelvis 4/28/25:  Impression:     In patient with history a of urothelial carcinoma of the bladder status post TURBT and chemo radiation, stable post treatment changes with mild thickening of the anterior urinary bladder wall.  No significant detrimental change.     Stable right lung base pleural nodule opacity, unchanged dating back to 2023.     Chronic pancreatitis.     Cystoscopy 4/21/25:  Findings:   1. External genitals reveal an erythematous rash over the glans and phallus of the penis.  Normal anterior urethra without evidence of strictures or tumors   2. Prostatic urethra open without signs of obstruction  3. Bladder free of masses, tumors.  There was some fibrinous material at the dome consistent with radiation reaction from his prior tumor sites, this was biopsy negative in April 2024, no papillary regrowth  Ureteral orifices in orthotopic position bilaterally         Oncology History:  - bladder cancer incidentally found on imaging as he had been under surveillance for renal cysts  (10/17/2023 ultrasound showed stable left renal cysts and new bladder masses)     -  11/16/2023 TURBT of bladder tumor  Findings:   1. Right postero-lateral papillary bladder wall tumor (3-4cm) with diffusely erythematous and nodular surrounding tissue, right < 1cm papillary bladder wall tumor immediately lateral to the larger postero-lateral papillary tumor,  and a right nodular 4cm bladder tumor with high grade and possibly invasive appearance. Tumors resected, hemostasis achieved.  Several other patches of erythema were diffusely identified on the left lateral bladder wall.  2. Bimanual exam post-TURBT showed freely mobile bladder without abnormalities  3. Bilateral retrograde pyelogram showed delicate calices with no evidence of hydronephrosis, no filling defects, and ureter normal in course and caliber, bilaterally  Pathology:  1. Bladder, right posterolateral wall, transurethral resection of bladder tumor:  - Noninvasive high-grade papillary urothelial carcinoma  - Muscularis propria present  - Multiple H&E levels evaluated  2. Bladder, right anterior, transurethral resection of bladder tumor:  - Invasive high-grade papillary urothelial carcinoma  - Tumor invades muscularis propria  - Intact expression of DNA mismatch repair proteins in tumor by immunohistochemistry (see comment)  COMMENT:  Immunohistochemistry (IHC) Testing for Mismatch Repair (MMR) Proteins:  MLH1 - Intact nuclear expression  MSH2 - Intact nuclear expression  MSH6 - Intact nuclear expression  PMS2 - Intact nuclear expression  Background nonneoplastic tissue/internal control with intact nuclear expression  IHC Interpretation  No loss of nuclear expression of MMR proteins: low probability of microsatellite instability  There are exceptions to the above IHC interpretations. These results should not be considered in isolation, and clinical correlation with genetic counseling is recommended to assess the need for germline testing.     - 11/25/2023 CT Abd/Pelvis:  FINDINGS:  Lung base: Bilateral small volume pleural effusion increased in size compared to recent prior, associated with mild compression atelectasis.  Visualized portion of heart: Coronary artery calcification.  Liver: Normal in size.  Left lobe small calcified granuloma.  No suspicious focal lesion.  Gallbladder: Cholecystectomy.  Bile  duct: No intra-or extrahepatic biliary ductal dilatation.  Spleen: Unremarkable.  Pancreas: Parenchymal atrophy.  Multiple parenchymal calcifications suggesting chronic pancreatitis.  No suspicious focal lesion.  No pancreatic ductal dilatation.  No adjacent inflammatory changes or fluid collections.  Adrenal glands: Unremarkable.  Genitourinary: Bilateral renal cysts.  Subcentimeter hypodensities in both kidneys, which are too small to characterize.  The ureters appear normal in course and caliber.  No evidence of nephrolithiasis or hydroureteronephrosis.  Urinary bladder: Postsurgical change in the anterior bladder wall from TURBT.  There are small foci of air in the bladder, likely iatrogenic.  Reproductive organs: Unremarkable.  GI tract: Small hiatal hernia.  The stomach is unremarkable.  The visualized loops of small and large bowel show no evidence of obstruction or inflammation. Diverticulosis.  Appendix unremarkable.  Peritoneum: No free air or fluid.  Retroperitoneum: No significant adenopathy.  Vasculature: Normal caliber of abdominal aorta with moderate calcified and noncalcified atherosclerosis.  Abdominal wall: Tiny fat containing umbilical hernia.  Bones: Stable T12-L1 intervertebral disc calcification.  No suspicious sclerotic lesions.  No acute fracture.  Impression:  Postoperative changes from of TURBT.  No lymphadenopathy.  No distant metastases.  Other findings as described.     - 12/6/2023 CT Chest:  FINDINGS:  Comparison is 11/21/2023.  No mediastinal, hilar or axillary adenopathy is seen.  There is a right lower pole thyroid nodule.  There are coronary calcifications.  There is a left upper pole renal cyst.  There is a small hiatal hernia.  Trachea and bronchi are patent.  There is no evidence of interstitial lung disease, bronchiectasis, airway trapping, or emphysema.  No suspicious pulmonary nodules, masses, or infiltrates are seen.  There is a calcified left lung granuloma.  Bones  demonstrate nothing focal.  Impression:  No significant abnormality seen.  Right lobe thyroid nodule.  Coronary artery calcifications.  Left upper pole renal cyst.     Last radiation session on 2/8/24     - 2/29/2024 CT Urogram Abd/Pelvis:  FINDINGS:  Stable mild cardiomegaly.  Coronary artery calcific atherosclerosis.  Visualized inferior lungs are clear.  Stable small left hepatic lobe calcification.  No suspicious hepatic lesion.  Gallbladder is surgically absent.  No biliary ductal dilatation.  Spleen and adrenal glands are unremarkable.  Stable appearance of the pancreas with atrophy, scattered calcifications, and mild duct dilatation measuring 4 mm.  Findings can be seen with sequela of chronic pancreatitis.  Bilateral renal cortical thinning.  Multiple bilateral renal cysts and subcentimeter renal hypodensities too small to characterize.  No solid enhancing renal mass.  No renal stone.  No hydronephrosis or ureteral dilatation.  Opacification of the bilateral renal collecting systems and ureters without suspicious filling defect.  Patient is status post TURBT.  There is increased heterogeneous wall thickening and enhancement along the anterior bladder (series 4, image 144) concerning for residual/recurrent tumor.  Mild perivesical stranding and vascular prominence, similar to prior exam.  Small hiatal hernia.  Colonic diverticulosis.  No evidence of bowel obstruction or inflammation.  No free intraperitoneal fluid or air.  No pathologically enlarged abdominopelvic lymph nodes.  Abdominal aorta is normal caliber.  Moderate/severe calcific atherosclerosis.  Degenerative changes of the osseous structures.  No acute fracture or aggressive osseous lesion  Impression:  1. Increased heterogeneous wall thickening and enhancement along the anterior bladder concerning for residual/recurrent tumor.  2. No evidence of metastatic disease.  3. Additional findings as above.     - 2/2024 scans without signs of metastatic  disease     - cystoscopy with Dr. Story on 3/4/2024 revealing concern for residual tumor bladder dome but TURBT for 4/12/2024 was negative on Pathology  Pathology:  1. BLADDER, RIGHT LATERAL WALL, BIOPSY:   Small fragment of urothelial mucosa with reactive changes.    Negative for dysplasia or malignancy.    2. BLADDER, POSTERIOR WALL, BIOPSY:   Heavily denuded urothelial mucosa with reactive changes.    Negative for dysplasia or malignancy.    3. BLADDER, LEFT LATERAL WALL, BIOPSY:   Small fragment of urothelial mucosa with reactive changes.    Negative for dysplasia or malignancy.    4. BLADDER, POSTERIOR PATCH, BIOPSY:   Heavily denuded urothelial mucosa with marked chronic inflammation and reactive changes.   Negative for dysplasia or malignancy.    5. BLADDER, ANTERIOR WALL TUMOR, TRANSURETHRAL RESECTION:   Polypoid cystitis and granulation tissue with squamous metaplasia of urothelial mucosa.    Negative for dysplasia or malignancy.      - 10/21/2024 Cystoscope:  Findings:   1. Normal anterior urethra without evidence of strictures or tumors   2. Prostatic urethra open without signs of obstruction  3. Bladder with healing tissue and fibrinous material at the dome, no evidence for residual malignancy.  Ureteral orifices in orthotopic position bilaterally       - 10/30/2024 CT C/A/P:  FINDINGS:  CHEST:  Neck and thoracic soft tissues:  No lymphadenopathy.  Stable 2.0 cm right thyroid lobe nodule and left thyroid lobe calcification.  Partially visualized soft tissue density in the upper back subcutaneous tissues, possibly the sebaceous cyst noted on prior CT chest..  Thoracic aorta:Left-sided aortic arch with 3 branch vessels.  Normal caliber, contour, and course.  Moderate atherosclerotic calcification.  Heart: Normal in size. No pericardial effusion.  Multi-vessel coronary artery atherosclerosis.  Calcification of the aortic annulus.  Normal diameter of the main pulmonary artery.  Hilum/Mediastinum: No  pathologically enlarged hilar or mediastinal lymph nodes.  Airways: The trachea is midline and proximal airways are patent.  Lungs/Pleura: Similar appearance of 1.3 cm subpleural nodule in the right lung base (series 3, image 226).  Calcified pleural plaque noted.  No new pulmonary nodules..  No consolidation, pleural effusion, or pneumothorax.  Esophagus: The esophagus maintains a normal course and caliber.  ABDOMEN/PELVIS:  Stomach: Small hiatal hernia.  No gastric wall thickening.  Liver: The liver is normal in size and attenuation.  Stable left hepatic lobe calcification.  No focal hepatic abnormality on this noncontrast study.  Gallbladder/Biliary System: Gallbladder is surgically absent.  No intrahepatic or extrahepatic biliary ductal dilatation.  Spleen: Normal size without focal abnormality.  Pancreas: Pancreatic atrophy with prominent pancreatic duct and scattered parenchymal calcifications, stable from prior.  Findings may represent sequela of chronic pancreatitis.  No mass or peripancreatic fat stranding.  Adrenal glands: No adrenal nodules.  Renal and Urinary system: Unchanged bilateral renal cortical thinning and nonspecific perinephric stranding.  Stable renal cysts bilaterally and subcentimeter hypodensities that are too small to characterize.    No hydronephrosis.  The ureters appear normal in course and caliber. Patient is s/p TURBT.  Anterior bladder wall thickening and new linear hyperdensities along the anterior superior bladder wall (sagittal series 10, image 64), possibly related to post treatment or postoperative change.  Stable mild pre vesicle fat stranding.  Reproductive: Dystrophic prostatic calcifications.  Bowel: The visualized loops of small and large bowel show no evidence of obstruction or inflammation.  The appendix appears normal.  Peritoneum: No free fluid or intraperitoneal free air.  Lymph Nodes: No pathologic braden enlargement in the abdomen or pelvis.  Vasculature: The  abdominal aorta and its branch vessels are normal in course and caliber.  Moderate aortoiliac atherosclerotic calcifications.  Bones: Degenerative changes of the spine.  No evidence of acute fracture or osseus destructive process.  Soft tissues: Small fat containing umbilical hernia.  Impression:  Patient is status post TURBT and chemoradiation for urothelial carcinoma of the bladder.  New linear hyperdensities within the bladder, possibly representing post treatment or postoperative change.  Otherwise there is stable bladder wall thickening and perivesical fat stranding.  Right lung base pleural based nodular opacity, stable from 2023.  Calcified pleural plaques noted.  Sequela of chronic pancreatitis.  Additional findings above.     PMH:  Active Ambulatory Problems        Diagnosis   Date Noted       CKD stage 3 due to type 2 diabetes mellitus     11/23/2012       Chronic anticoagulation 11/23/2012       Hypertension associated with diabetes     05/22/2013       Hyperlipidemia associated with type 2 diabetes mellitus     05/22/2013       Persistent atrial fibrillation      06/11/2014       Atherosclerosis of aorta      11/18/2020       Sensorineural hearing loss (SNHL) of both ears  11/19/2020       Risk for falls    12/15/2021       Renal cyst  12/14/2022       DM type 2, controlled, with complication  12/14/2022       Bladder tumor     11/16/2023       UTI (urinary tract infection) 11/21/2023       Acute hypoxic respiratory failure   11/21/2023       Elevated troponin 11/21/2023       Thyroid nodules   11/21/2023     Resolved Ambulatory Problems        Diagnosis   Date Noted       Atrial fibrillation     08/02/2012       Long term (current) use of anticoagulants 08/02/2012       Family history of diabetes mellitus 11/23/2012       Ex-cigarette smoker     11/23/2012       Metabolic syndrome      11/23/2012       Screen for colon cancer 05/07/2014       Ex-smoker   11/09/2015        Hyperglycemia     12/16/2015       Abscess of chest wall   06/25/2018       Sebaceous cyst    07/25/2018       Decreased back mobility 08/20/2019       Decreased strength of trunk and back      08/20/2019       Decreased range of motion of both hips    08/20/2019       Pain aggravated by walking    08/20/2019       Hamstring tightness of both lower extremities   08/20/2019       Impaired mobility and endurance     08/20/2019       Low back pain     11/19/2020       Chronic pain      04/28/2021       Type 2 diabetes mellitus, without long-term current use of insulin      12/13/2021       CKD stage 3 secondary to diabetes   12/13/2021       Encounter for preventive health examination     12/19/2022     Past Medical History:  Diagnosis   Date       *Atrial fibrillation           Chronic kidney disease         Deep vein thrombosis           Hyperlipidemia           Hypertension        Cholecystectomy  Tonsillectomy  Skin cancer removal     FH:  Paternal Aunt- recalls cancer affecting scalp     SH:    Retired [employer], followed by off shore work,   4 children (1 passed as an infant)  Quit tobacco in the 1980s  + EtOH  Diagnosis: Muscle invasive bladder cancer post chemo/XRT     Interval history:  Presents to clinic for multiple concerns with wife.       Fecal incontinence intermittently.  Episodes happen about every 7-10 days.  Does not seem to be food related.  Prior to incontinent episodes has regular bowel movements daily.  Seems to be a problem of not getting to the bathroom in time.       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  Has had cystoscopies with Dr. Story. Denies trouble emptying bladder, hematuria, or dysuria.       Scrotal rash is now improving. Had been prescribed creams by Dr. Story which did not help.  Evaluated by dermatology who prescribed antifungal, which has been beneficia.     Lastly started with cough Friday evening. Cough is productive. Has been taking  coricidin and mucinex.  Sputum remains clear.  Denies fever.      Most recent imaging:  CT chest abdomen pelvis 4/28/25:  Impression:     In patient with history a of urothelial carcinoma of the bladder status post TURBT and chemo radiation, stable post treatment changes with mild thickening of the anterior urinary bladder wall.  No significant detrimental change.     Stable right lung base pleural nodule opacity, unchanged dating back to 2023.     Chronic pancreatitis.     Cystoscopy 4/21/25:  Findings:   1. External genitals reveal an erythematous rash over the glans and phallus of the penis.  Normal anterior urethra without evidence of strictures or tumors   2. Prostatic urethra open without signs of obstruction  3. Bladder free of masses, tumors.  There was some fibrinous material at the dome consistent with radiation reaction from his prior tumor sites, this was biopsy negative in April 2024, no papillary regrowth  Ureteral orifices in orthotopic position bilaterally         Oncology History:  - bladder cancer incidentally found on imaging as he had been under surveillance for renal cysts  (10/17/2023 ultrasound showed stable left renal cysts and new bladder masses)     -  11/16/2023 TURBT of bladder tumor  Findings:   1. Right postero-lateral papillary bladder wall tumor (3-4cm) with diffusely erythematous and nodular surrounding tissue, right < 1cm papillary bladder wall tumor immediately lateral to the larger postero-lateral papillary tumor, and a right nodular 4cm bladder tumor with high grade and possibly invasive appearance. Tumors resected, hemostasis achieved.  Several other patches of erythema were diffusely identified on the left lateral bladder wall.  2. Bimanual exam post-TURBT showed freely mobile bladder without abnormalities  3. Bilateral retrograde pyelogram showed delicate calices with no evidence of hydronephrosis, no filling defects, and ureter normal in course and caliber,  bilaterally  Pathology:  1. Bladder, right posterolateral wall, transurethral resection of bladder tumor:  - Noninvasive high-grade papillary urothelial carcinoma  - Muscularis propria present  - Multiple H&E levels evaluated  2. Bladder, right anterior, transurethral resection of bladder tumor:  - Invasive high-grade papillary urothelial carcinoma  - Tumor invades muscularis propria  - Intact expression of DNA mismatch repair proteins in tumor by immunohistochemistry (see comment)  COMMENT:  Immunohistochemistry (IHC) Testing for Mismatch Repair (MMR) Proteins:  MLH1 - Intact nuclear expression  MSH2 - Intact nuclear expression  MSH6 - Intact nuclear expression  PMS2 - Intact nuclear expression  Background nonneoplastic tissue/internal control with intact nuclear expression  IHC Interpretation  No loss of nuclear expression of MMR proteins: low probability of microsatellite instability  There are exceptions to the above IHC interpretations. These results should not be considered in isolation, and clinical correlation with genetic counseling is recommended to assess the need for germline testing.     - 11/25/2023 CT Abd/Pelvis:  FINDINGS:  Lung base: Bilateral small volume pleural effusion increased in size compared to recent prior, associated with mild compression atelectasis.  Visualized portion of heart: Coronary artery calcification.  Liver: Normal in size.  Left lobe small calcified granuloma.  No suspicious focal lesion.  Gallbladder: Cholecystectomy.  Bile duct: No intra-or extrahepatic biliary ductal dilatation.  Spleen: Unremarkable.  Pancreas: Parenchymal atrophy.  Multiple parenchymal calcifications suggesting chronic pancreatitis.  No suspicious focal lesion.  No pancreatic ductal dilatation.  No adjacent inflammatory changes or fluid collections.  Adrenal glands: Unremarkable.  Genitourinary: Bilateral renal cysts.  Subcentimeter hypodensities in both kidneys, which are too small to characterize.  The  ureters appear normal in course and caliber.  No evidence of nephrolithiasis or hydroureteronephrosis.  Urinary bladder: Postsurgical change in the anterior bladder wall from TURBT.  There are small foci of air in the bladder, likely iatrogenic.  Reproductive organs: Unremarkable.  GI tract: Small hiatal hernia.  The stomach is unremarkable.  The visualized loops of small and large bowel show no evidence of obstruction or inflammation. Diverticulosis.  Appendix unremarkable.  Peritoneum: No free air or fluid.  Retroperitoneum: No significant adenopathy.  Vasculature: Normal caliber of abdominal aorta with moderate calcified and noncalcified atherosclerosis.  Abdominal wall: Tiny fat containing umbilical hernia.  Bones: Stable T12-L1 intervertebral disc calcification.  No suspicious sclerotic lesions.  No acute fracture.  Impression:  Postoperative changes from of TURBT.  No lymphadenopathy.  No distant metastases.  Other findings as described.     - 12/6/2023 CT Chest:  FINDINGS:  Comparison is 11/21/2023.  No mediastinal, hilar or axillary adenopathy is seen.  There is a right lower pole thyroid nodule.  There are coronary calcifications.  There is a left upper pole renal cyst.  There is a small hiatal hernia.  Trachea and bronchi are patent.  There is no evidence of interstitial lung disease, bronchiectasis, airway trapping, or emphysema.  No suspicious pulmonary nodules, masses, or infiltrates are seen.  There is a calcified left lung granuloma.  Bones demonstrate nothing focal.  Impression:  No significant abnormality seen.  Right lobe thyroid nodule.  Coronary artery calcifications.  Left upper pole renal cyst.     Last radiation session on 2/8/24     - 2/29/2024 CT Urogram Abd/Pelvis:  FINDINGS:  Stable mild cardiomegaly.  Coronary artery calcific atherosclerosis.  Visualized inferior lungs are clear.  Stable small left hepatic lobe calcification.  No suspicious hepatic lesion.  Gallbladder is surgically  absent.  No biliary ductal dilatation.  Spleen and adrenal glands are unremarkable.  Stable appearance of the pancreas with atrophy, scattered calcifications, and mild duct dilatation measuring 4 mm.  Findings can be seen with sequela of chronic pancreatitis.  Bilateral renal cortical thinning.  Multiple bilateral renal cysts and subcentimeter renal hypodensities too small to characterize.  No solid enhancing renal mass.  No renal stone.  No hydronephrosis or ureteral dilatation.  Opacification of the bilateral renal collecting systems and ureters without suspicious filling defect.  Patient is status post TURBT.  There is increased heterogeneous wall thickening and enhancement along the anterior bladder (series 4, image 144) concerning for residual/recurrent tumor.  Mild perivesical stranding and vascular prominence, similar to prior exam.  Small hiatal hernia.  Colonic diverticulosis.  No evidence of bowel obstruction or inflammation.  No free intraperitoneal fluid or air.  No pathologically enlarged abdominopelvic lymph nodes.  Abdominal aorta is normal caliber.  Moderate/severe calcific atherosclerosis.  Degenerative changes of the osseous structures.  No acute fracture or aggressive osseous lesion  Impression:  1. Increased heterogeneous wall thickening and enhancement along the anterior bladder concerning for residual/recurrent tumor.  2. No evidence of metastatic disease.  3. Additional findings as above.     - 2/2024 scans without signs of metastatic disease     - cystoscopy with Dr. Story on 3/4/2024 revealing concern for residual tumor bladder dome but TURBT for 4/12/2024 was negative on Pathology  Pathology:  1. BLADDER, RIGHT LATERAL WALL, BIOPSY:   Small fragment of urothelial mucosa with reactive changes.    Negative for dysplasia or malignancy.    2. BLADDER, POSTERIOR WALL, BIOPSY:   Heavily denuded urothelial mucosa with reactive changes.    Negative for dysplasia or malignancy.    3. BLADDER, LEFT  LATERAL WALL, BIOPSY:   Small fragment of urothelial mucosa with reactive changes.    Negative for dysplasia or malignancy.    4. BLADDER, POSTERIOR PATCH, BIOPSY:   Heavily denuded urothelial mucosa with marked chronic inflammation and reactive changes.   Negative for dysplasia or malignancy.    5. BLADDER, ANTERIOR WALL TUMOR, TRANSURETHRAL RESECTION:   Polypoid cystitis and granulation tissue with squamous metaplasia of urothelial mucosa.    Negative for dysplasia or malignancy.      - 10/21/2024 Cystoscope:  Findings:   1. Normal anterior urethra without evidence of strictures or tumors   2. Prostatic urethra open without signs of obstruction  3. Bladder with healing tissue and fibrinous material at the dome, no evidence for residual malignancy.  Ureteral orifices in orthotopic position bilaterally       - 10/30/2024 CT C/A/P:  FINDINGS:  CHEST:  Neck and thoracic soft tissues:  No lymphadenopathy.  Stable 2.0 cm right thyroid lobe nodule and left thyroid lobe calcification.  Partially visualized soft tissue density in the upper back subcutaneous tissues, possibly the sebaceous cyst noted on prior CT chest..  Thoracic aorta:Left-sided aortic arch with 3 branch vessels.  Normal caliber, contour, and course.  Moderate atherosclerotic calcification.  Heart: Normal in size. No pericardial effusion.  Multi-vessel coronary artery atherosclerosis.  Calcification of the aortic annulus.  Normal diameter of the main pulmonary artery.  Hilum/Mediastinum: No pathologically enlarged hilar or mediastinal lymph nodes.  Airways: The trachea is midline and proximal airways are patent.  Lungs/Pleura: Similar appearance of 1.3 cm subpleural nodule in the right lung base (series 3, image 226).  Calcified pleural plaque noted.  No new pulmonary nodules..  No consolidation, pleural effusion, or pneumothorax.  Esophagus: The esophagus maintains a normal course and caliber.  ABDOMEN/PELVIS:  Stomach: Small hiatal hernia.  No gastric  wall thickening.  Liver: The liver is normal in size and attenuation.  Stable left hepatic lobe calcification.  No focal hepatic abnormality on this noncontrast study.  Gallbladder/Biliary System: Gallbladder is surgically absent.  No intrahepatic or extrahepatic biliary ductal dilatation.  Spleen: Normal size without focal abnormality.  Pancreas: Pancreatic atrophy with prominent pancreatic duct and scattered parenchymal calcifications, stable from prior.  Findings may represent sequela of chronic pancreatitis.  No mass or peripancreatic fat stranding.  Adrenal glands: No adrenal nodules.  Renal and Urinary system: Unchanged bilateral renal cortical thinning and nonspecific perinephric stranding.  Stable renal cysts bilaterally and subcentimeter hypodensities that are too small to characterize.    No hydronephrosis.  The ureters appear normal in course and caliber. Patient is s/p TURBT.  Anterior bladder wall thickening and new linear hyperdensities along the anterior superior bladder wall (sagittal series 10, image 64), possibly related to post treatment or postoperative change.  Stable mild pre vesicle fat stranding.  Reproductive: Dystrophic prostatic calcifications.  Bowel: The visualized loops of small and large bowel show no evidence of obstruction or inflammation.  The appendix appears normal.  Peritoneum: No free fluid or intraperitoneal free air.  Lymph Nodes: No pathologic braden enlargement in the abdomen or pelvis.  Vasculature: The abdominal aorta and its branch vessels are normal in course and caliber.  Moderate aortoiliac atherosclerotic calcifications.  Bones: Degenerative changes of the spine.  No evidence of acute fracture or osseus destructive process.  Soft tissues: Small fat containing umbilical hernia.  Impression:  Patient is status post TURBT and chemoradiation for urothelial carcinoma of the bladder.  New linear hyperdensities within the bladder, possibly representing post treatment or  postoperative change.  Otherwise there is stable bladder wall thickening and perivesical fat stranding.  Right lung base pleural based nodular opacity, stable from 2023.  Calcified pleural plaques noted.  Sequela of chronic pancreatitis.  Additional findings above.     PMH:  Active Ambulatory Problems        Diagnosis   Date Noted       CKD stage 3 due to type 2 diabetes mellitus     11/23/2012       Chronic anticoagulation 11/23/2012       Hypertension associated with diabetes     05/22/2013       Hyperlipidemia associated with type 2 diabetes mellitus     05/22/2013       Persistent atrial fibrillation      06/11/2014       Atherosclerosis of aorta      11/18/2020       Sensorineural hearing loss (SNHL) of both ears  11/19/2020       Risk for falls    12/15/2021       Renal cyst  12/14/2022       DM type 2, controlled, with complication  12/14/2022       Bladder tumor     11/16/2023       UTI (urinary tract infection) 11/21/2023       Acute hypoxic respiratory failure   11/21/2023       Elevated troponin 11/21/2023       Thyroid nodules   11/21/2023     Resolved Ambulatory Problems        Diagnosis   Date Noted       Atrial fibrillation     08/02/2012       Long term (current) use of anticoagulants 08/02/2012       Family history of diabetes mellitus 11/23/2012       Ex-cigarette smoker     11/23/2012       Metabolic syndrome      11/23/2012       Screen for colon cancer 05/07/2014       Ex-smoker   11/09/2015       Hyperglycemia     12/16/2015       Abscess of chest wall   06/25/2018       Sebaceous cyst    07/25/2018       Decreased back mobility 08/20/2019       Decreased strength of trunk and back      08/20/2019       Decreased range of motion of both hips    08/20/2019       Pain aggravated by walking    08/20/2019       Hamstring tightness of both lower extremities   08/20/2019       Impaired mobility and endurance     08/20/2019       Low back pain     11/19/2020        Chronic pain      04/28/2021       Type 2 diabetes mellitus, without long-term current use of insulin      12/13/2021       CKD stage 3 secondary to diabetes   12/13/2021       Encounter for preventive health examination     12/19/2022     Past Medical History:  Diagnosis   Date       *Atrial fibrillation           Chronic kidney disease         Deep vein thrombosis           Hyperlipidemia           Hypertension        Cholecystectomy  Tonsillectomy  Skin cancer removal     FH:  Paternal Aunt- recalls cancer affecting scalp     SH:    Retired [employer], followed by off shore work,   4 children (1 passed as an infant)  Quit tobacco in the 1980s  + EtOH  Review of Systems   Constitutional:  Negative for activity change, appetite change, chills, fatigue, fever and unexpected weight change.   HENT:  Positive for hearing loss (hearing aids bilat) and trouble swallowing. Negative for nasal congestion, dental problem, postnasal drip, rhinorrhea, sinus pressure/congestion, sore throat and tinnitus.         Dry mouth   Eyes:  Negative for visual disturbance.   Respiratory:  Negative for cough, chest tightness, shortness of breath and wheezing.    Cardiovascular:  Negative for chest pain, palpitations and leg swelling.   Gastrointestinal:  Negative for abdominal distention, abdominal pain, blood in stool, change in bowel habit, constipation, diarrhea, nausea and vomiting.   Genitourinary:  Negative for bladder incontinence, decreased urine volume, difficulty urinating, dysuria, frequency (better), hematuria and urgency (better).   Musculoskeletal:  Positive for arthralgias and back pain. Negative for myalgias, neck pain and neck stiffness.   Integumentary:  Positive for rash (improved).        Dry skin   Neurological:  Positive for weakness. Negative for dizziness, light-headedness, numbness and headaches.   Psychiatric/Behavioral:  Negative for agitation, behavioral problems, confusion, dysphoric mood  and sleep disturbance. The patient is not nervous/anxious.           Objective     Physical Exam  Vitals and nursing note reviewed.   Constitutional:       General: He is not in acute distress.     Appearance: Normal appearance. He is normal weight. He is not ill-appearing.      Comments: ECOG= 1  Presents with wife   HENT:      Head: Normocephalic and atraumatic.      Comments: Hearing aids     Mouth/Throat:      Mouth: Mucous membranes are moist.      Pharynx: Oropharynx is clear. No oropharyngeal exudate or posterior oropharyngeal erythema.   Eyes:      General: No scleral icterus.     Extraocular Movements: Extraocular movements intact.      Conjunctiva/sclera: Conjunctivae normal.      Pupils: Pupils are equal, round, and reactive to light.   Cardiovascular:      Rate and Rhythm: Normal rate and regular rhythm.      Heart sounds: No murmur heard.     No friction rub.   Pulmonary:      Effort: Pulmonary effort is normal. No respiratory distress.      Breath sounds: Normal breath sounds. No wheezing, rhonchi or rales.   Abdominal:      General: Abdomen is flat. Bowel sounds are normal. There is no distension.      Palpations: Abdomen is soft. There is no mass.      Tenderness: There is no abdominal tenderness. There is no guarding or rebound.   Musculoskeletal:         General: Deformity present. No swelling or tenderness. Normal range of motion.      Right lower leg: No edema.      Left lower leg: No edema.   Skin:     General: Skin is warm and dry.      Coloration: Skin is not jaundiced or pale.      Findings: No bruising, erythema or rash.   Neurological:      General: No focal deficit present.      Mental Status: He is alert and oriented to person, place, and time. Mental status is at baseline.      Sensory: No sensory deficit.      Motor: Weakness (generalized) present.      Gait: Gait normal.   Psychiatric:         Mood and Affect: Mood normal.         Behavior: Behavior normal.         Thought Content:  Thought content normal.         Judgment: Judgment normal.   Labs- Reviewed       Assessment and Plan     1. Malignant neoplasm of trigone of urinary bladder    2. Closed wedge compression fracture of T11 vertebra, initial encounter      Reviewed scans and MARVIN  Clinically has been doing well but fall set him back with injury to back as above    Scan does reveal a T11 fracture  Ortho referral placed     code applied: patient requires or will require a continuous, longitudinal, and active collaborative plan of care related to this patient's health condition, bladder cancer --the management of which requires the direction of a practitioner with specialized clinical knowledge, skill, and expertise.     Route Chart for Scheduling    Med Onc Chart Routing  Urgent    Follow up with physician . Ortho referral ASAP   Follow up with [facility] . 3-4 months with cbc, cmp   Infusion scheduling note    Injection scheduling note    Labs    Imaging    Pharmacy appointment    Other referrals

## 2025-07-29 NOTE — PROGRESS NOTES
DATE: 8/5/2025  PATIENT: Cliff Magaña    Supervising Physician: Del Manuel M.D.    CHIEF COMPLAINT: low back pain    HISTORY:  Cliff Magaña is a 89 y.o. male with a pmhx of bladder cx (not currently under treatment just surveillance), CKD III, DM II, a fib on plavix here for initial evaluation of low back pain (Back - 5, Leg - 0).   The pain has been present for years but was stable until a fall on 7/1/25. Wife says pt fell backwards and hit his head. The patient describes the pain as aching.  The pain is worse with activity and improved by rest. There is negative associated numbness and tingling. There is negative subjective weakness. Prior treatments have included PT that ended a few days before the fall (pt says it helped a lot) and remote hx of ESIs, but no surgery.    The patient denies myelopathic symptoms such as handwriting changes or difficulty with buttons/coins/keys. Denies perineal paresthesias, bowel/bladder dysfunction.    PAST MEDICAL/SURGICAL HISTORY:  Past Medical History:   Diagnosis Date    *Atrial fibrillation     Cancer     Chronic kidney disease     Deep vein thrombosis     Hyperlipidemia     Hypertension     Metabolic syndrome     Type 2 diabetes mellitus, without long-term current use of insulin 12/13/2021     Past Surgical History:   Procedure Laterality Date    BIOPSY OF BLADDER N/A 4/12/2024    Procedure: BIOPSY, BLADDER;  Surgeon: Wellington Story MD;  Location: [address];  Service: Urology;  Laterality: N/A;    BLADDER FULGURATION N/A 4/12/2024    Procedure: FULGURATION, BLADDER;  Surgeon: Wellington Story MD;  Location: [address];  Service: Urology;  Laterality: N/A;    CATARACT EXTRACTION      CHOLECYSTECTOMY      CYSTOSCOPY N/A 11/16/2023    Procedure: CYSTOSCOPY;  Surgeon: Marcelino Moffett MD;  Location: [facility] OR [address] [place] [address];  Service: Urology;  Laterality: N/A;  90 minutes    DILATION OF URETHRA N/A 4/12/2024    Procedure: DILATION, URETHRA;  Surgeon: Wellington Story MD;   Location: [address];  Service: Urology;  Laterality: N/A;    ECHOCARDIOGRAM,TRANSESOPHAGEAL N/A 3/25/2025    Procedure: Transesophageal echo (BEENA) intra-procedure log documentation;  Surgeon: She, Jacquelyn Diagnostic;  Location: [facility] EP LAB;  Service: Cardiology;  Laterality: N/A;  s/p Watchman, BEENA, MAC, DOSC, 3 Prep    EYE SURGERY      INJECTION OF FACET JOINT Bilateral 4/28/2021    Procedure: FACET JOINT INJECTION BILATERAL L4/L5 DIRECT REFERRAL;  Surgeon: Philipp Iyer MD;  Location: [facility] PAIN MGT;  Service: Pain Management;  Laterality: Bilateral;  NEEDS CONSENT, ELIQUIS CLEARANCE IN CHART    OCCLUSION OF LEFT ATRIAL APPENDAGE N/A 2/13/2025    Procedure: Left atrial appendage occlusion;  Surgeon: Marcelino Sheffield MD;  Location: [facility] EP LAB;  Service: Cardiology;  Laterality: N/A;  AF, BEENA, Watchman, BSCI, GEN, MB, 3 Prep    RETROGRADE PYELOGRAPHY Bilateral 11/16/2023    Procedure: PYELOGRAM, RETROGRADE;  Surgeon: Marcelino Moffett MD;  Location: [address];  Service: Urology;  Laterality: Bilateral;  90 minutes    SKIN CANCER EXCISION      TONSILLECTOMY      TRANSESOPHAGEAL ECHOCARDIOGRAPHY N/A 2/13/2025    Procedure: ECHOCARDIOGRAM, TRANSESOPHAGEAL;  Surgeon: Nereyda Birmingham MD;  Location: [facility] EP LAB;  Service: Cardiology;  Laterality: N/A;    TURBT (TRANSURETHRAL RESECTION OF BLADDER TUMOR) N/A 11/16/2023    Procedure: TURBT (TRANSURETHRAL RESECTION OF BLADDER TUMOR);  Surgeon: Marcelino Moffett MD;  Location: [address];  Service: Urology;  Laterality: N/A;  90 minutes    TURBT (TRANSURETHRAL RESECTION OF BLADDER TUMOR) N/A 4/12/2024    Procedure: TURBT (TRANSURETHRAL RESECTION OF BLADDER TUMOR);  Surgeon: Wellington Story MD;  Location: [address];  Service: Urology;  Laterality: N/A;       Medications:   Medications Ordered Prior to Encounter[1]    Social History: Social History[2]    REVIEW OF SYSTEMS:  Constitution: Negative. Negative for chills, fever and night  "sweats.   Cardiovascular: Negative for chest pain and syncope.   Respiratory: Negative for cough and shortness of breath.   Gastrointestinal: See HPI. Negative for nausea/vomiting. Negative for abdominal pain.  Genitourinary: See HPI. Negative for discoloration or dysuria.  Skin: Negative for dry skin, itching and rash.   Hematologic/Lymphatic: Negative for bleeding problem. Does not bruise/bleed easily.   Musculoskeletal: Negative for falls and muscle weakness.   Neurological: See HPI. No seizures.   Endocrine: Negative for polydipsia, polyphagia and polyuria.   Allergic/Immunologic: Negative for hives and persistent infections.     EXAM:  Ht 5' 4" (1.626 m)   Wt 61.2 kg (134 lb 14.7 oz)   BMI 23.16 kg/m²     General: The patient is a very pleasant 89 y.o. male in no apparent distress, the patient is oriented to person, place and time.  Psych: Normal mood and affect  HEENT: Vision grossly intact, hearing intact to the spoken word.  Lungs: Respirations unlabored.  Gait: antalgic station gait  Skin: Dorsal lumbar skin negative for rashes, lesions, hairy patches and surgical scars. There is negative lumbar tenderness to palpation.  Range of motion: Lumbar range of motion is acceptable.  Spinal Balance: Global saggital and coronal spinal balance acceptable, not significant for scoliosis and kyphosis.  Musculoskeletal: No pain with the range of motion of the bilateral hips. No trochanteric tenderness to palpation.  Vascular: Bilateral lower extremities warm and well perfused, dorsalis pedis pulses 2+ bilaterally.  Neurological: Normal strength and tone in all major motor groups in the bilateral lower extremities. Normal sensation to light touch in the L2-S1 dermatomes bilaterally.  Deep tendon reflexes symmetric 2+ in the bilateral lower extremities.  Negative Babinski bilaterally. Straight leg raise negative bilaterally.    IMAGING:      Today I personally reviewed AP, Lat upright T/L-spine films that demonstrate T11 " VCF, appears new since 4/9/25     Body mass index is 23.16 kg/m².    Hemoglobin A1C   Date Value Ref Range Status   12/23/2024 8.3 (H) 4.0 - 5.6 % Final     Comment:     ADA Screening Guidelines:  5.7-6.4%  Consistent with prediabetes  >or=6.5%  Consistent with diabetes    High levels of fetal hemoglobin interfere with the HbA1C  assay. Heterozygous hemoglobin variants (HbS, HgC, etc)do  not significantly interfere with this assay.   However, presence of multiple variants may affect accuracy.     09/22/2024 6.4 (H) 4.0 - 5.6 % Final     Comment:     ADA Screening Guidelines:  5.7-6.4%  Consistent with prediabetes  >or=6.5%  Consistent with diabetes    High levels of fetal hemoglobin interfere with the HbA1C  assay. Heterozygous hemoglobin variants (HbS, HgC, etc)do  not significantly interfere with this assay.   However, presence of multiple variants may affect accuracy.     07/01/2024 8.0 (H) 4.0 - 5.6 % Final     Comment:     ADA Screening Guidelines:  5.7-6.4%  Consistent with prediabetes  >or=6.5%  Consistent with diabetes    High levels of fetal hemoglobin interfere with the HbA1C  assay. Heterozygous hemoglobin variants (HbS, HgC, etc)do  not significantly interfere with this assay.   However, presence of multiple variants may affect accuracy.       Hemoglobin A1c   Date Value Ref Range Status   03/24/2025 6.7 (H) 4.0 - 5.6 % Final     Comment:     ADA Screening Guidelines:  5.7-6.4%  Consistent with prediabetes  >=6.5%  Consistent with diabetes    High levels of fetal hemoglobin interfere with the HbA1C  assay. Heterozygous hemoglobin variants (HbS, HgC, etc)do  not significantly interfere with this assay.   However, presence of multiple variants may affect accuracy.           ASSESSMENT/PLAN:    Cliff was seen today for back pain.    Diagnoses and all orders for this visit:    Closed wedge compression fracture of T11 vertebra with routine healing, subsequent encounter    Malignant neoplasm of trigone of  urinary bladder  -     Ambulatory referral/consult to Orthopedics    Other orders  -     LIDOcaine (LIDODERM) 5 %; Place 1 patch onto the skin once daily. Remove & Discard patch within 12 hours or as directed by MD        Today we discussed at length all of the different treatment options including anti-inflammatories, acetaminophen, rest, ice, heat, physical therapy including strengthening and stretching exercises, home exercises, ROM, aerobic conditioning, aqua therapy, other modalities including ultrasound, massage, and dry needling, epidural steroid injections and finally surgical intervention.      Pt presents with new T11 VCF with known trauma. Recommended MRI due to hx of cancer, pt would like to treat conservatively and deferred MRI. Will send lidoderm patches to pharmacy and see pt back in 5 weeks for repeat xrays, sooner if pain worsens.         [1]   Current Outpatient Medications on File Prior to Visit   Medication Sig Dispense Refill    acetaminophen (TYLENOL) 650 MG TbSR Take 650 mg by mouth 2 (two) times a day.      aspirin (ECOTRIN) 81 MG EC tablet Take 1 tablet (81 mg total) by mouth once daily. 30 tablet 11    betamethasone valerate 0.1% (VALISONE) 0.1 % Crea Apply topically 2 (two) times daily. Apply twice daily for 10 days for 10 days 45 g 0    cholecalciferol, vitamin D3, (VITAMIN D3) 50 mcg (2,000 unit) Cap capsule Take by mouth once daily.      clopidogreL (PLAVIX) 75 mg tablet Take 1 tablet (75 mg total) by mouth once daily. 90 tablet 3    empagliflozin (JARDIANCE) 10 mg tablet Take 1 tablet (10 mg total) by mouth once daily. 90 tablet 0    fenofibrate micronized (LOFIBRA) 200 MG Cap Take 1 capsule (200 mg total) by mouth daily with breakfast. 90 capsule 3    loratadine (CLARITIN) 10 mg tablet Take 10 mg by mouth once daily.      metFORMIN (GLUCOPHAGE-XR) 500 MG ER 24hr tablet Take 1 tablet (500 mg total) by mouth 2 (two) times daily with meals. 180 tablet 1    metoprolol succinate  (TOPROL-XL) 100 MG 24 hr tablet Take 1 tablet (100 mg total) by mouth 2 (two) times daily. 180 tablet 1    nystatin (MYCOSTATIN) cream       sacubitriL-valsartan (ENTRESTO) 24-26 mg per tablet Take 1 tablet by mouth 2 (two) times daily. 180 tablet 3     No current facility-administered medications on file prior to visit.   [2]   Social History  Socioeconomic History    Marital status:      Spouse name: Nereyda    Number of children: 4   Tobacco Use    Smoking status: Former     Current packs/day: 0.00     Average packs/day: 2.0 packs/day for 20.0 years (40.0 ttl pk-yrs)     Types: Cigarettes     Start date: 1963     Quit date: 1983     Years since quittin.2     Passive exposure: Never    Smokeless tobacco: Never   Substance and Sexual Activity    Alcohol use: Yes     Alcohol/week: 1.0 standard drink of alcohol     Types: 1 Standard drinks or equivalent per week     Comment: occasionally    Drug use: No    Sexual activity: Not Currently     Partners: Female     Social Drivers of Health     Financial Resource Strain: Low Risk  (2024)    Overall Financial Resource Strain (CARDIA)     Difficulty of Paying Living Expenses: Not hard at all   Food Insecurity: No Food Insecurity (2024)    Hunger Vital Sign     Worried About Running Out of Food in the Last Year: Never true     Ran Out of Food in the Last Year: Never true   Transportation Needs: No Transportation Needs (2024)    TRANSPORTATION NEEDS     Transportation : No   Physical Activity: Insufficiently Active (2024)    Exercise Vital Sign     Days of Exercise per Week: 4 days     Minutes of Exercise per Session: 20 min   Stress: No Stress Concern Present (2024)    [employer] - Occupational Stress Questionnaire     Feeling of Stress : Only a little   Housing Stability: Unknown (2024)    Housing Stability Vital Sign     Unable to Pay for Housing in the Last Year: No     Homeless in the  Last Year: No

## 2025-07-30 ENCOUNTER — PATIENT MESSAGE (OUTPATIENT)
Dept: PALLIATIVE MEDICINE | Facility: CLINIC | Age: OVER 89
End: 2025-07-30
Payer: MEDICARE

## 2025-07-30 ENCOUNTER — PATIENT MESSAGE (OUTPATIENT)
Dept: ADMINISTRATIVE | Facility: OTHER | Age: OVER 89
End: 2025-07-30
Payer: MEDICARE

## 2025-07-31 ENCOUNTER — PATIENT MESSAGE (OUTPATIENT)
Dept: ADMINISTRATIVE | Facility: OTHER | Age: OVER 89
End: 2025-07-31
Payer: MEDICARE

## 2025-08-01 ENCOUNTER — PATIENT MESSAGE (OUTPATIENT)
Dept: ADMINISTRATIVE | Facility: OTHER | Age: OVER 89
End: 2025-08-01
Payer: MEDICARE

## 2025-08-01 DIAGNOSIS — M51.35 DDD (DEGENERATIVE DISC DISEASE), THORACOLUMBAR: Primary | ICD-10-CM

## 2025-08-02 ENCOUNTER — PATIENT MESSAGE (OUTPATIENT)
Dept: ADMINISTRATIVE | Facility: OTHER | Age: OVER 89
End: 2025-08-02
Payer: MEDICARE

## 2025-08-03 ENCOUNTER — PATIENT MESSAGE (OUTPATIENT)
Dept: ADMINISTRATIVE | Facility: OTHER | Age: OVER 89
End: 2025-08-03
Payer: MEDICARE

## 2025-08-04 ENCOUNTER — HOSPITAL ENCOUNTER (OUTPATIENT)
Dept: RADIOLOGY | Facility: HOSPITAL | Age: OVER 89
Discharge: HOME | End: 2025-08-04
Attending: ORTHOPAEDIC SURGERY
Payer: MEDICARE

## 2025-08-04 ENCOUNTER — OFFICE VISIT (OUTPATIENT)
Dept: ORTHOPEDICS | Facility: CLINIC | Age: OVER 89
End: 2025-08-04
Payer: MEDICARE

## 2025-08-04 VITALS — HEIGHT: 64 IN | WEIGHT: 134.94 LBS | BODY MASS INDEX: 23.04 KG/M2

## 2025-08-04 DIAGNOSIS — C67.0 MALIGNANT NEOPLASM OF TRIGONE OF URINARY BLADDER: ICD-10-CM

## 2025-08-04 DIAGNOSIS — M51.35 DDD (DEGENERATIVE DISC DISEASE), THORACOLUMBAR: ICD-10-CM

## 2025-08-04 DIAGNOSIS — M51.35 DDD (DEGENERATIVE DISC DISEASE), THORACOLUMBAR: Primary | ICD-10-CM

## 2025-08-04 DIAGNOSIS — S22.080D CLOSED WEDGE COMPRESSION FRACTURE OF T11 VERTEBRA WITH ROUTINE HEALING, SUBSEQUENT ENCOUNTER: Primary | ICD-10-CM

## 2025-08-04 PROCEDURE — 99999 PR PBB SHADOW E&M-EST. PATIENT-LVL III: CPT | Mod: PBBFAC,,, | Performed by: ORTHOPAEDIC SURGERY

## 2025-08-04 PROCEDURE — 99214 OFFICE O/P EST MOD 30 MIN: CPT | Mod: S$PBB,,, | Performed by: ORTHOPAEDIC SURGERY

## 2025-08-04 PROCEDURE — 99213 OFFICE O/P EST LOW 20 MIN: CPT | Mod: PBBFAC,25 | Performed by: ORTHOPAEDIC SURGERY

## 2025-08-04 PROCEDURE — 72080 X-RAY EXAM THORACOLMB 2/> VW: CPT | Mod: TC

## 2025-08-04 PROCEDURE — 72080 X-RAY EXAM THORACOLMB 2/> VW: CPT | Mod: 26,,, | Performed by: RADIOLOGY

## 2025-08-04 RX ORDER — LIDOCAINE 50 MG/G
1 PATCH TOPICAL DAILY
Qty: 15 PATCH | Refills: 0 | Status: ON HOLD | OUTPATIENT
Start: 2025-08-04

## 2025-08-05 ENCOUNTER — PATIENT MESSAGE (OUTPATIENT)
Dept: ADMINISTRATIVE | Facility: OTHER | Age: OVER 89
End: 2025-08-05
Payer: MEDICARE

## 2025-08-05 ENCOUNTER — OFFICE VISIT (OUTPATIENT)
Dept: PODIATRY | Facility: CLINIC | Age: OVER 89
End: 2025-08-05
Payer: MEDICARE

## 2025-08-05 VITALS — DIASTOLIC BLOOD PRESSURE: 65 MMHG | SYSTOLIC BLOOD PRESSURE: 130 MMHG | HEART RATE: 70 BPM

## 2025-08-05 DIAGNOSIS — E11.49 TYPE 2 DIABETES MELLITUS WITH NEUROLOGICAL MANIFESTATIONS: ICD-10-CM

## 2025-08-05 DIAGNOSIS — E11.51 TYPE 2 DIABETES MELLITUS WITH PERIPHERAL VASCULAR DISEASE: Primary | ICD-10-CM

## 2025-08-05 DIAGNOSIS — B35.1 ONYCHOMYCOSIS: ICD-10-CM

## 2025-08-05 PROCEDURE — 11721 DEBRIDE NAIL 6 OR MORE: CPT | Mod: PBBFAC,PN | Performed by: PODIATRIST

## 2025-08-05 PROCEDURE — 99213 OFFICE O/P EST LOW 20 MIN: CPT | Mod: PBBFAC,PN,25 | Performed by: PODIATRIST

## 2025-08-05 PROCEDURE — 99999 PR PBB SHADOW E&M-EST. PATIENT-LVL III: CPT | Mod: PBBFAC,,, | Performed by: PODIATRIST

## 2025-08-05 NOTE — PROGRESS NOTES
Subjective:      Patient ID: Cliff Magaña is a 89 y.o. male.    Chief Complaint: Nail Care (3 month nail care f/u)      Cliff is a 89 y.o. male who presents to the clinic upon referral from Dr. Steff choi. provider found  for evaluation and treatment of diabetic feet. Cliff has a past medical history of *Atrial fibrillation, Cancer, Chronic kidney disease, Deep vein thrombosis, Hyperlipidemia, Hypertension, Metabolic syndrome, and Type 2 diabetes mellitus, without long-term current use of insulin (12/13/2021). Patient relates no major problem with feet. Only complaints today consist of thickened toenails that he has difficulty trimming.  He will sometimes get a pedicure.  He also relates he gets intermittent cramping to both legs at night.  History of chronic low back pain with right lower extremity symptoms.    04/29/2022: Returns for routine nail care. No new concerns.     07/29/2022: Returns for routine nail care. No new concerns.     11/17/2022:  Returns routine foot care.  No new concerns.    02/16/2023:  Returns for routine foot care.  Due for annual foot exam.  Complains of intermittent cramping to the left foot that is brief in duration.  Denies any claudication symptoms. Followed by Cardiology.     05/18/23: Returns for routine foot care.  No new concerns.  Accompanied by his wife.    08/17/2023: Returns for routine foot care.  No new concerns.    11/09/2023:  Returns for routine foot care.  Accompanied by his wife.  No new concerns.    02/09/2024:  Returns for annual foot exam and routine foot care.  Accompanied by his wife.  Patient has had significant weight loss secondary to loss of appetite post chemotherapy and radiation therapy for bladder cancer.  Recently admitted for UTI and discharged.    05/10/2024: Returns for routine foot care.  No new concerns.    08/12/2024: Returns for routine foot care.  Accompanied by his wife.  New wound discovered during the exam today that was not known per his wife.   No pain noted.    11/07/2024: Returns for routine foot care.  Accompanied by his wife.  No new concerns.    02/04/2025: Returns for routine foot care.  Accompanied by his wife.  Pending cardiac ablation next week.  Due for annual foot exam today.  No new concerns noted.    05/06/2025: Returns for routine foot care.  Accompanied by his wife.  No new concerns.    08/05/2025: Returns for routine foot care.  No new concerns.    PCP: Munir Estrada MD    Date Last Seen by PCP:  04/02/2025    Current shoe gear: Casual shoes    Hemoglobin A1C   Date Value Ref Range Status   12/23/2024 8.3 (H) 4.0 - 5.6 % Final     Comment:     ADA Screening Guidelines:  5.7-6.4%  Consistent with prediabetes  >or=6.5%  Consistent with diabetes    High levels of fetal hemoglobin interfere with the HbA1C  assay. Heterozygous hemoglobin variants (HbS, HgC, etc)do  not significantly interfere with this assay.   However, presence of multiple variants may affect accuracy.     09/22/2024 6.4 (H) 4.0 - 5.6 % Final     Comment:     ADA Screening Guidelines:  5.7-6.4%  Consistent with prediabetes  >or=6.5%  Consistent with diabetes    High levels of fetal hemoglobin interfere with the HbA1C  assay. Heterozygous hemoglobin variants (HbS, HgC, etc)do  not significantly interfere with this assay.   However, presence of multiple variants may affect accuracy.     07/01/2024 8.0 (H) 4.0 - 5.6 % Final     Comment:     ADA Screening Guidelines:  5.7-6.4%  Consistent with prediabetes  >or=6.5%  Consistent with diabetes    High levels of fetal hemoglobin interfere with the HbA1C  assay. Heterozygous hemoglobin variants (HbS, HgC, etc)do  not significantly interfere with this assay.   However, presence of multiple variants may affect accuracy.       Hemoglobin A1c   Date Value Ref Range Status   03/24/2025 6.7 (H) 4.0 - 5.6 % Final     Comment:     ADA Screening Guidelines:  5.7-6.4%  Consistent with prediabetes  >=6.5%  Consistent with diabetes    High  levels of fetal hemoglobin interfere with the HbA1C  assay. Heterozygous hemoglobin variants (HbS, HgC, etc)do  not significantly interfere with this assay.   However, presence of multiple variants may affect accuracy.     Vitals:    08/05/25 1254   BP: 130/65   Pulse: 70   PainSc: 0-No pain      Past Medical History:   Diagnosis Date    *Atrial fibrillation     Cancer     Chronic kidney disease     Deep vein thrombosis     Hyperlipidemia     Hypertension     Metabolic syndrome     Type 2 diabetes mellitus, without long-term current use of insulin 12/13/2021       Past Surgical History:   Procedure Laterality Date    BIOPSY OF BLADDER N/A 4/12/2024    Procedure: BIOPSY, BLADDER;  Surgeon: Wellington Story MD;  Location: [address];  Service: Urology;  Laterality: N/A;    BLADDER FULGURATION N/A 4/12/2024    Procedure: FULGURATION, BLADDER;  Surgeon: Wellington Story MD;  Location: [address];  Service: Urology;  Laterality: N/A;    CATARACT EXTRACTION      CHOLECYSTECTOMY      CYSTOSCOPY N/A 11/16/2023    Procedure: CYSTOSCOPY;  Surgeon: Marcelino Moffett MD;  Location: [address];  Service: Urology;  Laterality: N/A;  90 minutes    DILATION OF URETHRA N/A 4/12/2024    Procedure: DILATION, URETHRA;  Surgeon: Wellington Story MD;  Location: [address];  Service: Urology;  Laterality: N/A;    ECHOCARDIOGRAM,TRANSESOPHAGEAL N/A 3/25/2025    Procedure: Transesophageal echo (BEENA) intra-procedure log documentation;  Surgeon: Jacquelyn Nowak Diagnostic;  Location: [facility] EP LAB;  Service: Cardiology;  Laterality: N/A;  s/p Watchman, BEENA, MAC, DOSC, 3 Prep    EYE SURGERY      INJECTION OF FACET JOINT Bilateral 4/28/2021    Procedure: FACET JOINT INJECTION BILATERAL L4/L5 DIRECT REFERRAL;  Surgeon: Philipp Iyer MD;  Location: [facility] PAIN MGT;  Service: Pain Management;  Laterality: Bilateral;  NEEDS CONSENT, ELIQUIS CLEARANCE IN CHART    OCCLUSION OF LEFT ATRIAL APPENDAGE N/A 2/13/2025    Procedure: Left  atrial appendage occlusion;  Surgeon: Marcelino Sheffield MD;  Location: [facility] EP LAB;  Service: Cardiology;  Laterality: N/A;  AF, BEENA, Watchman, BSCI, GEN, MB, 3 Prep    RETROGRADE PYELOGRAPHY Bilateral 2023    Procedure: PYELOGRAM, RETROGRADE;  Surgeon: Marcelino Moffett MD;  Location: [facility] OR [address] [place] [address];  Service: Urology;  Laterality: Bilateral;  90 minutes    SKIN CANCER EXCISION      TONSILLECTOMY      TRANSESOPHAGEAL ECHOCARDIOGRAPHY N/A 2025    Procedure: ECHOCARDIOGRAM, TRANSESOPHAGEAL;  Surgeon: Nereyda Birmingham MD;  Location: [facility] EP LAB;  Service: Cardiology;  Laterality: N/A;    TURBT (TRANSURETHRAL RESECTION OF BLADDER TUMOR) N/A 2023    Procedure: TURBT (TRANSURETHRAL RESECTION OF BLADDER TUMOR);  Surgeon: Marcelino Moffett MD;  Location: [facility] OR [address] [place] [address];  Service: Urology;  Laterality: N/A;  90 minutes    TURBT (TRANSURETHRAL RESECTION OF BLADDER TUMOR) N/A 2024    Procedure: TURBT (TRANSURETHRAL RESECTION OF BLADDER TUMOR);  Surgeon: Wellington Story MD;  Location: [facility] OR [address] [place] [address];  Service: Urology;  Laterality: N/A;       Family History   Problem Relation Name Age of Onset    Diabetes Maternal Aunt      Heart attack Neg Hx      Heart disease Neg Hx      Heart failure Neg Hx      Hyperlipidemia Neg Hx      Hypertension Neg Hx      Stroke Neg Hx         Social History     Socioeconomic History    Marital status:      Spouse name: Nereyda    Number of children: 4   Tobacco Use    Smoking status: Former     Current packs/day: 0.00     Average packs/day: 2.0 packs/day for 20.0 years (40.0 ttl pk-yrs)     Types: Cigarettes     Start date: 1963     Quit date: 1983     Years since quittin.2     Passive exposure: Never    Smokeless tobacco: Never   Substance and Sexual Activity    Alcohol use: Yes     Alcohol/week: 1.0 standard drink of alcohol     Types: 1 Standard drinks or equivalent per week     Comment: occasionally    Drug use: No    Sexual activity:  Not Currently     Partners: Female     Social Drivers of Health     Financial Resource Strain: Low Risk  (12/28/2024)    Overall Financial Resource Strain (CARDIA)     Difficulty of Paying Living Expenses: Not hard at all   Food Insecurity: No Food Insecurity (12/28/2024)    Hunger Vital Sign     Worried About Running Out of Food in the Last Year: Never true     Ran Out of Food in the Last Year: Never true   Transportation Needs: No Transportation Needs (9/23/2024)    TRANSPORTATION NEEDS     Transportation : No   Physical Activity: Insufficiently Active (12/28/2024)    Exercise Vital Sign     Days of Exercise per Week: 4 days     Minutes of Exercise per Session: 20 min   Stress: No Stress Concern Present (12/28/2024)    [employer] - Occupational Stress Questionnaire     Feeling of Stress : Only a little   Housing Stability: Unknown (12/28/2024)    Housing Stability Vital Sign     Unable to Pay for Housing in the Last Year: No     Homeless in the Last Year: No       Current Outpatient Medications   Medication Sig Dispense Refill    acetaminophen (TYLENOL) 650 MG TbSR Take 650 mg by mouth 2 (two) times a day.      aspirin (ECOTRIN) 81 MG EC tablet Take 1 tablet (81 mg total) by mouth once daily. 30 tablet 11    cholecalciferol, vitamin D3, (VITAMIN D3) 50 mcg (2,000 unit) Cap capsule Take by mouth once daily.      clopidogreL (PLAVIX) 75 mg tablet Take 1 tablet (75 mg total) by mouth once daily. 90 tablet 3    empagliflozin (JARDIANCE) 10 mg tablet Take 1 tablet (10 mg total) by mouth once daily. 90 tablet 0    fenofibrate micronized (LOFIBRA) 200 MG Cap Take 1 capsule (200 mg total) by mouth daily with breakfast. 90 capsule 3    LIDOcaine (LIDODERM) 5 % Place 1 patch onto the skin once daily. Remove & Discard patch within 12 hours or as directed by MD 15 patch 0    loratadine (CLARITIN) 10 mg tablet Take 10 mg by mouth once daily.      metFORMIN (GLUCOPHAGE-XR) 500 MG ER 24hr tablet  Take 1 tablet (500 mg total) by mouth 2 (two) times daily with meals. 180 tablet 1    metoprolol succinate (TOPROL-XL) 100 MG 24 hr tablet Take 1 tablet (100 mg total) by mouth 2 (two) times daily. 180 tablet 1    nystatin (MYCOSTATIN) cream       sacubitriL-valsartan (ENTRESTO) 24-26 mg per tablet Take 1 tablet by mouth 2 (two) times daily. 180 tablet 3     No current facility-administered medications for this visit.       Review of patient's allergies indicates:   Allergen Reactions    Niacin      Other reaction(s): Rash  Other reaction(s): Itching           Review of Systems   Constitutional: Negative for chills, fever and malaise/fatigue.   HENT:  Negative for congestion and hearing loss.    Cardiovascular:  Negative for chest pain, claudication and leg swelling.   Respiratory:  Negative for cough and shortness of breath.    Skin:  Positive for nail changes.   Musculoskeletal:  Positive for back pain and muscle cramps. Negative for joint pain and muscle weakness.   Gastrointestinal:  Negative for nausea and vomiting.   Neurological:  Positive for paresthesias. Negative for numbness and weakness.   Psychiatric/Behavioral:  Negative for altered mental status.            Objective:      Physical Exam  Constitutional:       General: He is not in acute distress.     Appearance: Normal appearance. He is not ill-appearing.   Cardiovascular:      Pulses:           Dorsalis pedis pulses are detected w/ Doppler on the right side and detected w/ Doppler on the left side.        Posterior tibial pulses are detected w/ Doppler on the right side and detected w/ Doppler on the left side.      Comments: Monophasic PT and DP bilateral with arrhythmia noted using Doppler.  Mild nonpitting edema to lower extremity bilateral.  No hair growth bilateral lower extremity.  Mild rubor on dependency bilateral lower extremity.  Musculoskeletal:      Comments: No pain with ROM or MMT bilateral lower extremity.    No significant digital  deformity bilateral.  Rectus appearing foot type bilateral.   Feet:      Right foot:      Protective Sensation: 10 sites tested.  10 sites sensed.      Skin integrity: Dry skin present.      Toenail Condition: Right toenails are abnormally thick and long.      Left foot:      Protective Sensation: 10 sites tested.  10 sites sensed.      Skin integrity: Dry skin present.      Toenail Condition: Left toenails are abnormally thick and long.   Skin:     General: Skin is warm.      Capillary Refill: Capillary refill takes 2 to 3 seconds.      Findings: No ecchymosis or erythema.      Nails: There is no clubbing.      Comments: Second toenail bilateral is growing a superior direction, thickened and discolored yellow with some mild loosening.  Nails 1, 3, 4 and 5 bilateral are mildly elongated 2-3 mm, thickened with patchy yellow discoloration mild underlying debris.    Ulceration overlying the dorsal distal lateral aspect of the left 5th toe with eschar base and slightly raised demarcating margin.  No surrounding erythema.  No drainage.  No localized pain on palpation.    Skin is atrophied to lower extremity bilateral.   Neurological:      Mental Status: He is alert and oriented to person, place, and time.      Sensory: Sensory deficit present.      Motor: Motor function is intact.      Comments: Absent vibratory sensation right foot and decreased left foot.             Assessment:       Encounter Diagnoses   Name Primary?    Type 2 diabetes mellitus with peripheral vascular disease Yes    Type 2 diabetes mellitus with neurological manifestations     Onychomycosis          Plan:       Cliff was seen today for nail care.    Diagnoses and all orders for this visit:    Type 2 diabetes mellitus with peripheral vascular disease  -     Routine Foot Care    Type 2 diabetes mellitus with neurological manifestations  -     Routine Foot Care    Onychomycosis  -     Routine Foot Care      I counseled the patient on his conditions,  their implications and medical management.    Shoe inspection. Diabetic Foot Education. Patient reminded of the importance of good nutrition and blood sugar control to help prevent podiatric complications of diabetes. Patient instructed on proper foot hygeine. We discussed wearing proper shoe gear, daily foot inspections, never walking without protective shoe gear, never putting sharp instruments to feet.    Routine foot care per attached note. Patient relates relief following the procedure. He will continue to monitor the areas daily, inspect his feet, wear protective shoe gear when ambulatory, moisturizer to maintain skin integrity.    RTC within 3 months or p.r.n. as discussed    Assisted by Nevaeh Stockton DPM PGY 2    A portion of this note was generated by voice recognition software and may contain spelling and grammar errors.

## 2025-08-05 NOTE — PROCEDURES
"Routine Foot Care    Date/Time: 8/5/2025 1:00 PM    Performed by: Bhupendra Brar DPM  Authorized by: Bhupendra Brar DPM    Time out: Immediately prior to procedure a "time out" was called to verify the correct patient, procedure, equipment, support staff and site/side marked as required.      Nail Care Type:  [place]  Location(s): All  (Left 1st Toe, Left 3rd Toe, Left 2nd Toe, Left 4th Toe, Left 5th Toe, Right 1st Toe, Right 2nd Toe, Right 3rd Toe, Right 4th Toe and Right 5th Toe)  Patient tolerance:  Patient tolerated the procedure well with no immediate complications     Used sterile nail nipper. Used sterile nail nipper. Assisted by Selina Garcia.      "

## 2025-08-06 ENCOUNTER — PATIENT MESSAGE (OUTPATIENT)
Dept: ADMINISTRATIVE | Facility: OTHER | Age: OVER 89
End: 2025-08-06
Payer: MEDICARE

## 2025-08-07 ENCOUNTER — PATIENT MESSAGE (OUTPATIENT)
Dept: ADMINISTRATIVE | Facility: OTHER | Age: OVER 89
End: 2025-08-07
Payer: MEDICARE

## 2025-08-08 ENCOUNTER — PATIENT MESSAGE (OUTPATIENT)
Dept: ADMINISTRATIVE | Facility: OTHER | Age: OVER 89
End: 2025-08-08
Payer: MEDICARE

## 2025-08-09 ENCOUNTER — HOSPITAL ENCOUNTER (INPATIENT)
Facility: HOSPITAL | Age: OVER 89
LOS: 4 days | Discharge: HOME HEALTH | DRG: 603 | End: 2025-08-13
Attending: EMERGENCY MEDICINE | Admitting: STUDENT IN AN ORGANIZED HEALTH CARE EDUCATION/TRAINING PROGRAM
Payer: MEDICARE

## 2025-08-09 DIAGNOSIS — R78.81 STREPTOCOCCAL BACTEREMIA: ICD-10-CM

## 2025-08-09 DIAGNOSIS — R11.2 NAUSEA AND VOMITING, UNSPECIFIED VOMITING TYPE: Primary | ICD-10-CM

## 2025-08-09 DIAGNOSIS — D72.829 LEUKOCYTOSIS, UNSPECIFIED TYPE: ICD-10-CM

## 2025-08-09 DIAGNOSIS — B95.5 STREPTOCOCCAL BACTEREMIA: ICD-10-CM

## 2025-08-09 DIAGNOSIS — R55 SYNCOPE AND COLLAPSE: ICD-10-CM

## 2025-08-09 DIAGNOSIS — R53.1 WEAKNESS: ICD-10-CM

## 2025-08-09 DIAGNOSIS — N39.0 URINARY TRACT INFECTION WITHOUT HEMATURIA, SITE UNSPECIFIED: ICD-10-CM

## 2025-08-09 DIAGNOSIS — R07.9 CHEST PAIN: ICD-10-CM

## 2025-08-09 DIAGNOSIS — R79.89 ELEVATED TROPONIN: ICD-10-CM

## 2025-08-09 DIAGNOSIS — R29.6 UNWITNESSED FALL: ICD-10-CM

## 2025-08-09 LAB
ABSOLUTE EOSINOPHIL (OHS): 0.03 K/UL
ABSOLUTE MONOCYTE (OHS): 1.29 K/UL (ref 0.3–1)
ABSOLUTE NEUTROPHIL COUNT (OHS): 14.26 K/UL (ref 1.8–7.7)
ALBUMIN SERPL BCP-MCNC: 3.9 G/DL (ref 3.5–5.2)
ALP SERPL-CCNC: 65 UNIT/L (ref 40–150)
ALT SERPL W/O P-5'-P-CCNC: 26 UNIT/L (ref 0–55)
ANION GAP (OHS): 15 MMOL/L (ref 8–16)
AST SERPL-CCNC: 54 UNIT/L (ref 0–50)
BACTERIA #/AREA URNS AUTO: ABNORMAL /HPF
BASOPHILS # BLD AUTO: 0.03 K/UL
BASOPHILS NFR BLD AUTO: 0.2 %
BILIRUB SERPL-MCNC: 1.3 MG/DL (ref 0.1–1)
BILIRUB UR QL STRIP.AUTO: NEGATIVE
BIPAP: 0
BIPAP: 0
BUN SERPL-MCNC: 49 MG/DL (ref 8–23)
CALCIUM SERPL-MCNC: 9.9 MG/DL (ref 8.7–10.5)
CHLORIDE SERPL-SCNC: 105 MMOL/L (ref 95–110)
CK SERPL-CCNC: 46 U/L (ref 20–200)
CLARITY UR: CLEAR
CO2 SERPL-SCNC: 15 MMOL/L (ref 23–29)
COLOR UR AUTO: YELLOW
CORRECTED TEMPERATURE (PCO2): 34.1 MMHG
CORRECTED TEMPERATURE (PH): 7.37
CORRECTED TEMPERATURE (PO2): 34.4 MMHG
CREAT SERPL-MCNC: 1.4 MG/DL (ref 0.5–1.4)
EAG (OHS): 194 MG/DL (ref 68–131)
ERYTHROCYTE [DISTWIDTH] IN BLOOD BY AUTOMATED COUNT: 13.7 % (ref 11.5–14.5)
FIO2: 21 %
FIO2: 21 %
FLUAV AG UPPER RESP QL IA.RAPID: NEGATIVE
FLUBV AG UPPER RESP QL IA.RAPID: NEGATIVE
GFR SERPLBLD CREATININE-BSD FMLA CKD-EPI: 48 ML/MIN/1.73/M2
GLUCOSE SERPL-MCNC: 171 MG/DL (ref 70–110)
GLUCOSE UR QL STRIP: ABNORMAL
HBA1C MFR BLD: 8.4 % (ref 4–5.6)
HCT VFR BLD AUTO: 47.6 % (ref 40–54)
HCT VFR BLD CALC: 46.4 % (ref 36–54)
HCV AB SERPL QL IA: NORMAL
HGB BLD-MCNC: 16 GM/DL (ref 14–18)
HGB UR QL STRIP: NEGATIVE
HIV 1+2 AB+HIV1 P24 AG SERPL QL IA: NORMAL
HYALINE CASTS UR QL AUTO: 0 /LPF (ref 0–1)
IMM GRANULOCYTES # BLD AUTO: 0.12 K/UL (ref 0–0.04)
IMM GRANULOCYTES NFR BLD AUTO: 0.7 % (ref 0–0.5)
KETONES UR QL STRIP: ABNORMAL
LDH SERPL L TO P-CCNC: 1.4 MMOL/L
LDH SERPL L TO P-CCNC: 1.7 MMOL/L (ref 0.5–2.2)
LEUKOCYTE ESTERASE UR QL STRIP: ABNORMAL
LYMPHOCYTES # BLD AUTO: 0.99 K/UL (ref 1–4.8)
MAGNESIUM SERPL-MCNC: 1.8 MG/DL (ref 1.6–2.6)
MCH RBC QN AUTO: 32.1 PG (ref 27–31)
MCHC RBC AUTO-ENTMCNC: 33.6 G/DL (ref 32–36)
MCV RBC AUTO: 96 FL (ref 82–98)
MICROSCOPIC COMMENT: ABNORMAL
NITRITE UR QL STRIP: NEGATIVE
NUCLEATED RBC (/100WBC) (OHS): 0 /100 WBC
OHS QRS DURATION: 108 MS
OHS QRS DURATION: 94 MS
OHS QTC CALCULATION: 413 MS
OHS QTC CALCULATION: 468 MS
PCO2 BLDA: 34.1 MMHG (ref 35–45)
PH SMN: 7.37 [PH] (ref 7.35–7.45)
PH UR STRIP: 5 [PH]
PHOSPHATE SERPL-MCNC: 2.9 MG/DL (ref 2.7–4.5)
PLATELET # BLD AUTO: 178 K/UL (ref 150–450)
PMV BLD AUTO: 13.3 FL (ref 9.2–12.9)
PO2 BLDA: 34.4 MMHG (ref 40–60)
POC BASE DEFICIT: -4.9 MMOL/L
POC HCO3: 19.8 MMOL/L (ref 24–28)
POC IONIZED CALCIUM: 1.13 MMOL/L (ref 1.06–1.42)
POC PERFORMED BY: ABNORMAL
POC PERFORMED BY: NORMAL
POC TEMPERATURE: 37 C
POC TEMPERATURE: 37 C
POCT GLUCOSE: 145 MG/DL (ref 70–110)
POCT GLUCOSE: 175 MG/DL (ref 70–110)
POCT GLUCOSE: 189 MG/DL (ref 70–110)
POTASSIUM BLD-SCNC: 4.6 MMOL/L (ref 3.5–5.1)
POTASSIUM SERPL-SCNC: 5.6 MMOL/L (ref 3.5–5.1)
PROT SERPL-MCNC: 7.6 GM/DL (ref 6–8.4)
PROT UR QL STRIP: NEGATIVE
RBC # BLD AUTO: 4.98 M/UL (ref 4.6–6.2)
RBC #/AREA URNS AUTO: 3 /HPF (ref 0–4)
RELATIVE EOSINOPHIL (OHS): 0.2 %
RELATIVE LYMPHOCYTE (OHS): 5.9 % (ref 18–48)
RELATIVE MONOCYTE (OHS): 7.7 % (ref 4–15)
RELATIVE NEUTROPHIL (OHS): 85.3 % (ref 38–73)
RSV A 5' UTR RNA NPH QL NAA+PROBE: NEGATIVE
SARS-COV-2 RDRP RESP QL NAA+PROBE: NEGATIVE
SARS-COV-2 RNA RESP QL NAA+PROBE: NEGATIVE
SODIUM BLD-SCNC: 139 MMOL/L (ref 136–145)
SODIUM SERPL-SCNC: 135 MMOL/L (ref 136–145)
SP GR UR STRIP: 1.02
SPECIMEN SOURCE: ABNORMAL
SPECIMEN SOURCE: NORMAL
TROPONIN I SERPL HS-MCNC: 102 NG/L
TROPONIN I SERPL HS-MCNC: 94 NG/L
TROPONIN I SERPL HS-MCNC: 98 NG/L
UROBILINOGEN UR STRIP-ACNC: NEGATIVE EU/DL
WBC # BLD AUTO: 16.72 K/UL (ref 3.9–12.7)
WBC #/AREA URNS AUTO: 27 /HPF (ref 0–5)
YEAST UR QL AUTO: ABNORMAL /HPF

## 2025-08-09 PROCEDURE — 82962 GLUCOSE BLOOD TEST: CPT

## 2025-08-09 PROCEDURE — 84484 ASSAY OF TROPONIN QUANT: CPT

## 2025-08-09 PROCEDURE — 96374 THER/PROPH/DIAG INJ IV PUSH: CPT

## 2025-08-09 PROCEDURE — 63600175 PHARM REV CODE 636 W HCPCS

## 2025-08-09 PROCEDURE — S5010 5% DEXTROSE AND 0.45% SALINE: HCPCS

## 2025-08-09 PROCEDURE — 84100 ASSAY OF PHOSPHORUS: CPT

## 2025-08-09 PROCEDURE — 87040 BLOOD CULTURE FOR BACTERIA: CPT

## 2025-08-09 PROCEDURE — 99900035 HC TECH TIME PER 15 MIN (STAT)

## 2025-08-09 PROCEDURE — 87086 URINE CULTURE/COLONY COUNT: CPT

## 2025-08-09 PROCEDURE — 93010 ELECTROCARDIOGRAM REPORT: CPT | Mod: ,,, | Performed by: INTERNAL MEDICINE

## 2025-08-09 PROCEDURE — 87154 CUL TYP ID BLD PTHGN 6+ TRGT: CPT

## 2025-08-09 PROCEDURE — 99285 EMERGENCY DEPT VISIT HI MDM: CPT | Mod: 25

## 2025-08-09 PROCEDURE — 82330 ASSAY OF CALCIUM: CPT

## 2025-08-09 PROCEDURE — 85014 HEMATOCRIT: CPT

## 2025-08-09 PROCEDURE — 86803 HEPATITIS C AB TEST: CPT | Performed by: PHYSICIAN ASSISTANT

## 2025-08-09 PROCEDURE — 83605 ASSAY OF LACTIC ACID: CPT

## 2025-08-09 PROCEDURE — 36415 COLL VENOUS BLD VENIPUNCTURE: CPT

## 2025-08-09 PROCEDURE — 25000003 PHARM REV CODE 250

## 2025-08-09 PROCEDURE — 82565 ASSAY OF CREATININE: CPT

## 2025-08-09 PROCEDURE — 84295 ASSAY OF SERUM SODIUM: CPT

## 2025-08-09 PROCEDURE — 80053 COMPREHEN METABOLIC PANEL: CPT

## 2025-08-09 PROCEDURE — 82803 BLOOD GASES ANY COMBINATION: CPT

## 2025-08-09 PROCEDURE — 83036 HEMOGLOBIN GLYCOSYLATED A1C: CPT

## 2025-08-09 PROCEDURE — 83735 ASSAY OF MAGNESIUM: CPT

## 2025-08-09 PROCEDURE — U0002 COVID-19 LAB TEST NON-CDC: HCPCS

## 2025-08-09 PROCEDURE — 93005 ELECTROCARDIOGRAM TRACING: CPT

## 2025-08-09 PROCEDURE — 87389 HIV-1 AG W/HIV-1&-2 AB AG IA: CPT | Performed by: PHYSICIAN ASSISTANT

## 2025-08-09 PROCEDURE — 85025 COMPLETE CBC W/AUTO DIFF WBC: CPT

## 2025-08-09 PROCEDURE — 25500020 PHARM REV CODE 255: Performed by: STUDENT IN AN ORGANIZED HEALTH CARE EDUCATION/TRAINING PROGRAM

## 2025-08-09 PROCEDURE — 84132 ASSAY OF SERUM POTASSIUM: CPT

## 2025-08-09 PROCEDURE — 21400001 HC TELEMETRY ROOM

## 2025-08-09 PROCEDURE — 87637 SARSCOV2&INF A&B&RSV AMP PRB: CPT | Performed by: STUDENT IN AN ORGANIZED HEALTH CARE EDUCATION/TRAINING PROGRAM

## 2025-08-09 PROCEDURE — 96375 TX/PRO/DX INJ NEW DRUG ADDON: CPT

## 2025-08-09 PROCEDURE — 81003 URINALYSIS AUTO W/O SCOPE: CPT

## 2025-08-09 PROCEDURE — 82550 ASSAY OF CK (CPK): CPT

## 2025-08-09 PROCEDURE — 51798 US URINE CAPACITY MEASURE: CPT

## 2025-08-09 PROCEDURE — 87641 MR-STAPH DNA AMP PROBE: CPT

## 2025-08-09 RX ORDER — ONDANSETRON HYDROCHLORIDE 2 MG/ML
4 INJECTION, SOLUTION INTRAVENOUS EVERY 6 HOURS PRN
Status: DISCONTINUED | OUTPATIENT
Start: 2025-08-09 | End: 2025-08-13 | Stop reason: HOSPADM

## 2025-08-09 RX ORDER — AMOXICILLIN 250 MG
1 CAPSULE ORAL 2 TIMES DAILY
Status: DISCONTINUED | OUTPATIENT
Start: 2025-08-09 | End: 2025-08-13 | Stop reason: HOSPADM

## 2025-08-09 RX ORDER — CEFTRIAXONE 2 G/1
2 INJECTION, POWDER, FOR SOLUTION INTRAMUSCULAR; INTRAVENOUS ONCE
Status: COMPLETED | OUTPATIENT
Start: 2025-08-09 | End: 2025-08-09

## 2025-08-09 RX ORDER — POLYETHYLENE GLYCOL 3350 17 G/17G
17 POWDER, FOR SOLUTION ORAL 2 TIMES DAILY
Status: DISCONTINUED | OUTPATIENT
Start: 2025-08-09 | End: 2025-08-13 | Stop reason: HOSPADM

## 2025-08-09 RX ORDER — IBUPROFEN 200 MG
24 TABLET ORAL
Status: DISCONTINUED | OUTPATIENT
Start: 2025-08-09 | End: 2025-08-11

## 2025-08-09 RX ORDER — IBUPROFEN 200 MG
16 TABLET ORAL
Status: DISCONTINUED | OUTPATIENT
Start: 2025-08-09 | End: 2025-08-13 | Stop reason: HOSPADM

## 2025-08-09 RX ORDER — DEXTROSE MONOHYDRATE AND SODIUM CHLORIDE 5; .45 G/100ML; G/100ML
INJECTION, SOLUTION INTRAVENOUS CONTINUOUS
Status: ACTIVE | OUTPATIENT
Start: 2025-08-09 | End: 2025-08-10

## 2025-08-09 RX ORDER — ALBUTEROL SULFATE 2.5 MG/.5ML
10 SOLUTION RESPIRATORY (INHALATION) ONCE
Status: DISCONTINUED | OUTPATIENT
Start: 2025-08-09 | End: 2025-08-09

## 2025-08-09 RX ORDER — GLUCAGON 1 MG
1 KIT INJECTION
Status: DISCONTINUED | OUTPATIENT
Start: 2025-08-09 | End: 2025-08-13 | Stop reason: HOSPADM

## 2025-08-09 RX ORDER — CEFTRIAXONE 1 G/1
1 INJECTION, POWDER, FOR SOLUTION INTRAMUSCULAR; INTRAVENOUS
Status: DISCONTINUED | OUTPATIENT
Start: 2025-08-10 | End: 2025-08-11

## 2025-08-09 RX ORDER — INSULIN ASPART 100 [IU]/ML
0-5 INJECTION, SOLUTION INTRAVENOUS; SUBCUTANEOUS
Status: DISCONTINUED | OUTPATIENT
Start: 2025-08-09 | End: 2025-08-13 | Stop reason: HOSPADM

## 2025-08-09 RX ORDER — IBUPROFEN 200 MG
16 TABLET ORAL
Status: DISCONTINUED | OUTPATIENT
Start: 2025-08-09 | End: 2025-08-11

## 2025-08-09 RX ORDER — ENOXAPARIN SODIUM 100 MG/ML
30 INJECTION SUBCUTANEOUS EVERY 24 HOURS
Status: DISCONTINUED | OUTPATIENT
Start: 2025-08-09 | End: 2025-08-11

## 2025-08-09 RX ORDER — SODIUM CHLORIDE 0.9 % (FLUSH) 0.9 %
10 SYRINGE (ML) INJECTION EVERY 12 HOURS PRN
Status: DISCONTINUED | OUTPATIENT
Start: 2025-08-09 | End: 2025-08-13 | Stop reason: HOSPADM

## 2025-08-09 RX ORDER — ASPIRIN 81 MG/1
81 TABLET ORAL DAILY
Status: DISCONTINUED | OUTPATIENT
Start: 2025-08-09 | End: 2025-08-13 | Stop reason: HOSPADM

## 2025-08-09 RX ORDER — IBUPROFEN 200 MG
24 TABLET ORAL
Status: DISCONTINUED | OUTPATIENT
Start: 2025-08-09 | End: 2025-08-13 | Stop reason: HOSPADM

## 2025-08-09 RX ORDER — GLUCAGON 1 MG
1 KIT INJECTION
Status: DISCONTINUED | OUTPATIENT
Start: 2025-08-09 | End: 2025-08-11

## 2025-08-09 RX ORDER — NALOXONE HCL 0.4 MG/ML
0.02 VIAL (ML) INJECTION
Status: DISCONTINUED | OUTPATIENT
Start: 2025-08-09 | End: 2025-08-13 | Stop reason: HOSPADM

## 2025-08-09 RX ORDER — METOPROLOL SUCCINATE 100 MG/1
100 TABLET, EXTENDED RELEASE ORAL 2 TIMES DAILY
Status: DISCONTINUED | OUTPATIENT
Start: 2025-08-09 | End: 2025-08-13 | Stop reason: HOSPADM

## 2025-08-09 RX ORDER — CLOPIDOGREL BISULFATE 75 MG/1
75 TABLET ORAL DAILY
Status: DISCONTINUED | OUTPATIENT
Start: 2025-08-09 | End: 2025-08-09

## 2025-08-09 RX ORDER — DEXTROSE MONOHYDRATE AND SODIUM CHLORIDE 5; .45 G/100ML; G/100ML
INJECTION, SOLUTION INTRAVENOUS CONTINUOUS
Status: DISCONTINUED | OUTPATIENT
Start: 2025-08-09 | End: 2025-08-09

## 2025-08-09 RX ADMIN — ONDANSETRON 4 MG: 2 INJECTION INTRAMUSCULAR; INTRAVENOUS at 01:08

## 2025-08-09 RX ADMIN — SODIUM CHLORIDE 1000 ML: 9 INJECTION, SOLUTION INTRAVENOUS at 09:08

## 2025-08-09 RX ADMIN — VANCOMYCIN HYDROCHLORIDE 1250 MG: 1.25 INJECTION, POWDER, LYOPHILIZED, FOR SOLUTION INTRAVENOUS at 10:08

## 2025-08-09 RX ADMIN — ENOXAPARIN SODIUM 30 MG: 30 INJECTION SUBCUTANEOUS at 05:08

## 2025-08-09 RX ADMIN — CEFTRIAXONE SODIUM 2 G: 2 INJECTION, POWDER, FOR SOLUTION INTRAMUSCULAR; INTRAVENOUS at 12:08

## 2025-08-09 RX ADMIN — IOHEXOL 100 ML: 350 INJECTION, SOLUTION INTRAVENOUS at 02:08

## 2025-08-09 RX ADMIN — DEXTROSE AND SODIUM CHLORIDE: 5; 450 INJECTION, SOLUTION INTRAVENOUS at 06:08

## 2025-08-09 RX ADMIN — DEXTROSE MONOHYDRATE AND SODIUM CHLORIDE: 5; .45 INJECTION, SOLUTION INTRAVENOUS at 07:08

## 2025-08-09 RX ADMIN — METOPROLOL SUCCINATE 100 MG: 100 TABLET, EXTENDED RELEASE ORAL at 08:08

## 2025-08-10 PROBLEM — R11.2 NAUSEA & VOMITING: Status: ACTIVE | Noted: 2025-08-10

## 2025-08-10 PROBLEM — R78.81 STREPTOCOCCAL BACTEREMIA: Status: ACTIVE | Noted: 2025-08-10

## 2025-08-10 PROBLEM — S22.089A T11 VERTEBRAL FRACTURE: Chronic | Status: ACTIVE | Noted: 2025-07-29

## 2025-08-10 PROBLEM — E11.8 DM TYPE 2, CONTROLLED, WITH COMPLICATION: Chronic | Status: ACTIVE | Noted: 2022-12-14

## 2025-08-10 PROBLEM — B95.5 STREPTOCOCCAL BACTEREMIA: Status: ACTIVE | Noted: 2025-08-10

## 2025-08-10 PROBLEM — C67.9 BLADDER CANCER: Chronic | Status: ACTIVE | Noted: 2023-11-16

## 2025-08-10 LAB
ABSOLUTE EOSINOPHIL (OHS): 0 K/UL
ABSOLUTE MONOCYTE (OHS): 1.17 K/UL (ref 0.3–1)
ABSOLUTE NEUTROPHIL COUNT (OHS): 10.78 K/UL (ref 1.8–7.7)
ACB COMPLEX DNA BLD POS QL NAA+NON-PROBE: NOT DETECTED
ALBUMIN SERPL BCP-MCNC: 3.1 G/DL (ref 3.5–5.2)
ALP SERPL-CCNC: 52 UNIT/L (ref 40–150)
ALT SERPL W/O P-5'-P-CCNC: 16 UNIT/L (ref 0–55)
ANION GAP (OHS): 12 MMOL/L (ref 8–16)
AST SERPL-CCNC: 23 UNIT/L (ref 0–50)
B FRAGILIS DNA BLD POS QL NAA+PROBE: NOT DETECTED
BACTERIA UR CULT: NO GROWTH
BASOPHILS # BLD AUTO: 0.04 K/UL
BASOPHILS NFR BLD AUTO: 0.3 %
BILIRUB SERPL-MCNC: 0.6 MG/DL (ref 0.1–1)
BUN SERPL-MCNC: 34 MG/DL (ref 8–23)
C ALBICANS DNA BLD POS QL NAA+PROBE: NOT DETECTED
C AURIS DNA BLD POS QL NAA+NON-PROBE: NOT DETECTED
C GATTII+NEOFOR DNA CSF QL NAA+NON-PROBE: NOT DETECTED
C GLABRATA DNA BLD POS QL NAA+PROBE: NOT DETECTED
C KRUSEI DNA BLD POS QL NAA+PROBE: NOT DETECTED
C PARAP DNA BLD POS QL NAA+PROBE: NOT DETECTED
C TROPICLS DNA BLD POS QL NAA+PROBE: NOT DETECTED
CALCIUM SERPL-MCNC: 9.3 MG/DL (ref 8.7–10.5)
CHLORIDE SERPL-SCNC: 108 MMOL/L (ref 95–110)
CO2 SERPL-SCNC: 19 MMOL/L (ref 23–29)
COLISTIN RES MCR-1 ISLT/SPM QL: ABNORMAL
CREAT SERPL-MCNC: 1.2 MG/DL (ref 0.5–1.4)
E CLOAC COMP DNA BLD POS QL NAA+PROBE: NOT DETECTED
E COLI DNA BLD POS QL NAA+PROBE: NOT DETECTED
E FAECALIS+OTHR E SP RRNA BLD POS FISH: NOT DETECTED
E FAECIUM HSP60 BLD POS QL PROBE: NOT DETECTED
ENTEROBACTERALES DNA BLD POS NAA+N-PRB: NOT DETECTED
ERYTHROCYTE [DISTWIDTH] IN BLOOD BY AUTOMATED COUNT: 14.4 % (ref 11.5–14.5)
ESBL CFT TO CFT-CLAV IC RTO BD POS IMP: ABNORMAL
GFR SERPLBLD CREATININE-BSD FMLA CKD-EPI: 58 ML/MIN/1.73/M2
GLUCOSE SERPL-MCNC: 243 MG/DL (ref 70–110)
GP B STREP DNA CSF QL NAA+NON-PROBE: DETECTED
HAEM INFLU DNA CSF QL NAA+NON-PROBE: NOT DETECTED
HCT VFR BLD AUTO: 42.6 % (ref 40–54)
HGB BLD-MCNC: 14.3 GM/DL (ref 14–18)
HOLD SPECIMEN: NORMAL
IMM GRANULOCYTES # BLD AUTO: 0.15 K/UL (ref 0–0.04)
IMM GRANULOCYTES NFR BLD AUTO: 1.1 % (ref 0–0.5)
IMP CARBAPENEMASE ISLT QL IA.RAPID: ABNORMAL
K OXYTOCA OMPA BLD POS QL PROBE: NOT DETECTED
KLEBSIELLA SP DNA BLD POS QL NAA+NON-PRB: NOT DETECTED
KLEBSIELLA SP DNA BLD POS QL NAA+NON-PRB: NOT DETECTED
KPC CARBAPENEMASE ISLT QL IA.RAPID: ABNORMAL
L MONOCYTOG DNA CSF QL NAA+NON-PROBE: NOT DETECTED
LYMPHOCYTES # BLD AUTO: 1.25 K/UL (ref 1–4.8)
MAGNESIUM SERPL-MCNC: 1.8 MG/DL (ref 1.6–2.6)
MCH RBC QN AUTO: 32.4 PG (ref 27–31)
MCHC RBC AUTO-ENTMCNC: 33.6 G/DL (ref 32–36)
MCV RBC AUTO: 96 FL (ref 82–98)
MECA+MECC NOSE QL NAA+PROBE: ABNORMAL
MECA+MECC+MREJ ISLT/SPM QL: ABNORMAL
MRSA PCR SCRN (OHS): NOT DETECTED
N MEN DNA CSF QL NAA+NON-PROBE: NOT DETECTED
NDM CARBAPENEMASE ISLT QL IA.RAPID: ABNORMAL
NUCLEATED RBC (/100WBC) (OHS): 0 /100 WBC
OXA-48-LIKE CRBPNASE ISLT QL IA.RAPID: ABNORMAL
P AERUGINOSA DNA BLD POS QL NAA+PROBE: NOT DETECTED
PHOSPHATE SERPL-MCNC: 2.7 MG/DL (ref 2.7–4.5)
PLATELET # BLD AUTO: 140 K/UL (ref 150–450)
PMV BLD AUTO: 13.1 FL (ref 9.2–12.9)
POCT GLUCOSE: 186 MG/DL (ref 70–110)
POCT GLUCOSE: 219 MG/DL (ref 70–110)
POCT GLUCOSE: 235 MG/DL (ref 70–110)
POCT GLUCOSE: 247 MG/DL (ref 70–110)
POTASSIUM SERPL-SCNC: 4.1 MMOL/L (ref 3.5–5.1)
PROT SERPL-MCNC: 6 GM/DL (ref 6–8.4)
PROTEUS SP DNA BLD POS QL NAA+PROBE: NOT DETECTED
RBC # BLD AUTO: 4.42 M/UL (ref 4.6–6.2)
RELATIVE EOSINOPHIL (OHS): 0 %
RELATIVE LYMPHOCYTE (OHS): 9.3 % (ref 18–48)
RELATIVE MONOCYTE (OHS): 8.7 % (ref 4–15)
RELATIVE NEUTROPHIL (OHS): 80.6 % (ref 38–73)
S AUREUS DNA BLD POS QL NAA+PROBE: NOT DETECTED
S ENT+BONG DNA STL QL NAA+NON-PROBE: NOT DETECTED
S EPIDERMIDIS HSP60 BLD POS QL PROBE: NOT DETECTED
S LUGDUNENSIS SODA BLD POS QL PROBE: NOT DETECTED
S MALTOPH DNA BLD POS QL NAA+PROBE: NOT DETECTED
S MARCESCENS DNA BLD POS QL NAA+PROBE: NOT DETECTED
S PNEUM DNA CSF QL NAA+NON-PROBE: NOT DETECTED
S PYOGENES HSP60 BLD POS QL PROBE: NOT DETECTED
SODIUM SERPL-SCNC: 139 MMOL/L (ref 136–145)
STAPH SP TUF BLD POS QL PROBE: NOT DETECTED
STREPTOCOCCUS SP TUF BLD POS QL PROBE: ABNORMAL
VAN(A+B+C1+C2) GENES ISLT/SPM: ABNORMAL
VIM CARBAPENEMASE ISLT QL IA.RAPID: ABNORMAL
WBC # BLD AUTO: 13.39 K/UL (ref 3.9–12.7)

## 2025-08-10 PROCEDURE — 63600175 PHARM REV CODE 636 W HCPCS

## 2025-08-10 PROCEDURE — 94761 N-INVAS EAR/PLS OXIMETRY MLT: CPT

## 2025-08-10 PROCEDURE — 25000003 PHARM REV CODE 250

## 2025-08-10 PROCEDURE — 80202 ASSAY OF VANCOMYCIN: CPT

## 2025-08-10 PROCEDURE — 36415 COLL VENOUS BLD VENIPUNCTURE: CPT

## 2025-08-10 PROCEDURE — A9585 GADOBUTROL INJECTION: HCPCS | Performed by: STUDENT IN AN ORGANIZED HEALTH CARE EDUCATION/TRAINING PROGRAM

## 2025-08-10 PROCEDURE — 21400001 HC TELEMETRY ROOM

## 2025-08-10 PROCEDURE — G0545 PR VISIT INHERENT TO INPT OR OBS CARE, INFECTIOUS DISEASE: HCPCS | Mod: ,,, | Performed by: STUDENT IN AN ORGANIZED HEALTH CARE EDUCATION/TRAINING PROGRAM

## 2025-08-10 PROCEDURE — 99223 1ST HOSP IP/OBS HIGH 75: CPT | Mod: GC,,, | Performed by: STUDENT IN AN ORGANIZED HEALTH CARE EDUCATION/TRAINING PROGRAM

## 2025-08-10 PROCEDURE — 25500020 PHARM REV CODE 255: Performed by: STUDENT IN AN ORGANIZED HEALTH CARE EDUCATION/TRAINING PROGRAM

## 2025-08-10 RX ORDER — PANTOPRAZOLE SODIUM 40 MG/1
40 TABLET, DELAYED RELEASE ORAL DAILY
Status: DISCONTINUED | OUTPATIENT
Start: 2025-08-10 | End: 2025-08-13 | Stop reason: HOSPADM

## 2025-08-10 RX ORDER — SODIUM CHLORIDE 0.9 G/100ML
500 IRRIGANT IRRIGATION
Status: COMPLETED | OUTPATIENT
Start: 2025-08-10 | End: 2025-08-10

## 2025-08-10 RX ORDER — PSEUDOEPHEDRINE/ACETAMINOPHEN 30MG-500MG
100 TABLET ORAL
Status: COMPLETED | OUTPATIENT
Start: 2025-08-10 | End: 2025-08-10

## 2025-08-10 RX ORDER — GADOBUTROL 604.72 MG/ML
6 INJECTION INTRAVENOUS
Status: COMPLETED | OUTPATIENT
Start: 2025-08-10 | End: 2025-08-10

## 2025-08-10 RX ORDER — SYRING-NEEDL,DISP,INSUL,0.3 ML 29 G X1/2"
296 SYRINGE, EMPTY DISPOSABLE MISCELLANEOUS
Status: COMPLETED | OUTPATIENT
Start: 2025-08-10 | End: 2025-08-10

## 2025-08-10 RX ADMIN — GADOBUTROL 6 ML: 604.72 INJECTION INTRAVENOUS at 10:08

## 2025-08-10 RX ADMIN — INSULIN ASPART 2 UNITS: 100 INJECTION, SOLUTION INTRAVENOUS; SUBCUTANEOUS at 08:08

## 2025-08-10 RX ADMIN — MAGNESIUM CITRATE 296 ML: 1.75 LIQUID ORAL at 01:08

## 2025-08-10 RX ADMIN — SODIUM CHLORIDE 500 ML: 0.9 IRRIGANT IRRIGATION at 10:08

## 2025-08-10 RX ADMIN — ASPIRIN 81 MG: 81 TABLET, COATED ORAL at 08:08

## 2025-08-10 RX ADMIN — CEFTRIAXONE SODIUM 1 G: 1 INJECTION, POWDER, FOR SOLUTION INTRAMUSCULAR; INTRAVENOUS at 12:08

## 2025-08-10 RX ADMIN — ENOXAPARIN SODIUM 30 MG: 30 INJECTION SUBCUTANEOUS at 04:08

## 2025-08-10 RX ADMIN — PANTOPRAZOLE SODIUM 40 MG: 40 TABLET, DELAYED RELEASE ORAL at 12:08

## 2025-08-10 RX ADMIN — SENNOSIDES AND DOCUSATE SODIUM 1 TABLET: 50; 8.6 TABLET ORAL at 09:08

## 2025-08-10 RX ADMIN — ONDANSETRON 4 MG: 2 INJECTION INTRAMUSCULAR; INTRAVENOUS at 08:08

## 2025-08-10 RX ADMIN — METOPROLOL SUCCINATE 100 MG: 100 TABLET, EXTENDED RELEASE ORAL at 09:08

## 2025-08-10 RX ADMIN — POLYETHYLENE GLYCOL 3350 17 G: 17 POWDER, FOR SOLUTION ORAL at 08:08

## 2025-08-10 RX ADMIN — POLYETHYLENE GLYCOL 3350 17 G: 17 POWDER, FOR SOLUTION ORAL at 09:08

## 2025-08-10 RX ADMIN — Medication 100 ML: at 01:08

## 2025-08-10 RX ADMIN — METOPROLOL SUCCINATE 100 MG: 100 TABLET, EXTENDED RELEASE ORAL at 08:08

## 2025-08-10 RX ADMIN — SENNOSIDES AND DOCUSATE SODIUM 1 TABLET: 50; 8.6 TABLET ORAL at 08:08

## 2025-08-10 RX ADMIN — INSULIN ASPART 2 UNITS: 100 INJECTION, SOLUTION INTRAVENOUS; SUBCUTANEOUS at 04:08

## 2025-08-10 RX ADMIN — INSULIN ASPART 2 UNITS: 100 INJECTION, SOLUTION INTRAVENOUS; SUBCUTANEOUS at 12:08

## 2025-08-11 LAB
ABSOLUTE EOSINOPHIL (OHS): 0.14 K/UL
ABSOLUTE MONOCYTE (OHS): 0.82 K/UL (ref 0.3–1)
ABSOLUTE NEUTROPHIL COUNT (OHS): 7.44 K/UL (ref 1.8–7.7)
ALBUMIN SERPL BCP-MCNC: 2.8 G/DL (ref 3.5–5.2)
ALP SERPL-CCNC: 49 UNIT/L (ref 40–150)
ALT SERPL W/O P-5'-P-CCNC: 17 UNIT/L (ref 0–55)
ANION GAP (OHS): 8 MMOL/L (ref 8–16)
AORTIC SIZE INDEX (SOV): 2.1 CM/M2
AORTIC SIZE INDEX: 2 CM/M2
ASCENDING AORTA: 3.2 CM
AST SERPL-CCNC: 27 UNIT/L (ref 0–50)
AV AREA BY CONTINUOUS VTI: 2 CM2
AV INDEX (PROSTH): 0.68
AV LVOT MEAN GRADIENT: 1 MMHG
AV LVOT PEAK GRADIENT: 3 MMHG
AV MEAN GRADIENT: 4 MMHG
AV PEAK GRADIENT: 7 MMHG
AV VALVE AREA BY VELOCITY RATIO: 1.7 CM²
AV VALVE AREA: 1.9 CM2
AV VELOCITY RATIO: 0.62
BASOPHILS # BLD AUTO: 0.01 K/UL
BASOPHILS NFR BLD AUTO: 0.1 %
BILIRUB SERPL-MCNC: 0.5 MG/DL (ref 0.1–1)
BSA FOR ECHO PROCEDURE: 1.63 M2
BUN SERPL-MCNC: 28 MG/DL (ref 8–23)
CALCIUM SERPL-MCNC: 8.9 MG/DL (ref 8.7–10.5)
CHLORIDE SERPL-SCNC: 110 MMOL/L (ref 95–110)
CO2 SERPL-SCNC: 21 MMOL/L (ref 23–29)
CREAT SERPL-MCNC: 1 MG/DL (ref 0.5–1.4)
CV ECHO LV RWT: 0.62 CM
DOP CALC AO PEAK VEL: 1.3 M/S
DOP CALC AO VTI: 28.5 CM
DOP CALC LVOT AREA: 2.8 CM2
DOP CALC LVOT DIAMETER: 1.9 CM
DOP CALC LVOT PEAK VEL: 0.8 M/S
DOP CALCLVOT PEAK VEL VTI: 19.4 CM
E/E' RATIO: 18 M/S
ECHO EF ESTIMATED: 64 %
ECHO LV POSTERIOR WALL: 1.2 CM (ref 0.6–1.1)
EJECTION FRACTION: 55 %
ERYTHROCYTE [DISTWIDTH] IN BLOOD BY AUTOMATED COUNT: 14.3 % (ref 11.5–14.5)
FRACTIONAL SHORTENING: 33.3 % (ref 28–44)
GFR SERPLBLD CREATININE-BSD FMLA CKD-EPI: >60 ML/MIN/1.73/M2
GLUCOSE SERPL-MCNC: 136 MG/DL (ref 70–110)
HCT VFR BLD AUTO: 41.6 % (ref 40–54)
HGB BLD-MCNC: 13.6 GM/DL (ref 14–18)
IMM GRANULOCYTES # BLD AUTO: 0.04 K/UL (ref 0–0.04)
IMM GRANULOCYTES NFR BLD AUTO: 0.4 % (ref 0–0.5)
INTERVENTRICULAR SEPTUM: 1.2 CM (ref 0.6–1.1)
IVRT: 80 MS
LA MAJOR: 6.4 CM
LA MINOR: 6.4 CM
LA WIDTH: 4.5 CM
LEFT ATRIUM SIZE: 5.1 CM
LEFT ATRIUM VOLUME INDEX MOD: 64 ML/M2
LEFT ATRIUM VOLUME INDEX: 77 ML/M2
LEFT ATRIUM VOLUME MOD: 105 ML
LEFT ATRIUM VOLUME: 125 CM3
LEFT INTERNAL DIMENSION IN SYSTOLE: 2.6 CM (ref 2.1–4)
LEFT VENTRICLE DIASTOLIC VOLUME INDEX: 40.49 ML/M2
LEFT VENTRICLE DIASTOLIC VOLUME: 66 ML
LEFT VENTRICLE MASS INDEX: 97.7 G/M2
LEFT VENTRICLE SYSTOLIC VOLUME INDEX: 14.7 ML/M2
LEFT VENTRICLE SYSTOLIC VOLUME: 24 ML
LEFT VENTRICULAR INTERNAL DIMENSION IN DIASTOLE: 3.9 CM (ref 3.5–6)
LEFT VENTRICULAR MASS: 159.3 G
LV LATERAL E/E' RATIO: 15.6 M/S
LV SEPTAL E/E' RATIO: 20.8 M/S
LYMPHOCYTES # BLD AUTO: 1.16 K/UL (ref 1–4.8)
Lab: 2 CM/M
Lab: 2.1 CM/M
MAGNESIUM SERPL-MCNC: 2 MG/DL (ref 1.6–2.6)
MCH RBC QN AUTO: 32.5 PG (ref 27–31)
MCHC RBC AUTO-ENTMCNC: 32.7 G/DL (ref 32–36)
MCV RBC AUTO: 99 FL (ref 82–98)
MV PEAK E VEL: 1.25 M/S
NUCLEATED RBC (/100WBC) (OHS): 0 /100 WBC
OHS CV CPX PATIENT HEIGHT IN: 64
OHS CV RV/LV RATIO: 0.79 CM
PHOSPHATE SERPL-MCNC: 1.7 MG/DL (ref 2.7–4.5)
PISA TR MAX VEL: 3.6 M/S
PLATELET # BLD AUTO: 131 K/UL (ref 150–450)
PMV BLD AUTO: 13.2 FL (ref 9.2–12.9)
POCT GLUCOSE: 142 MG/DL (ref 70–110)
POCT GLUCOSE: 179 MG/DL (ref 70–110)
POCT GLUCOSE: 186 MG/DL (ref 70–110)
POCT GLUCOSE: 213 MG/DL (ref 70–110)
POTASSIUM SERPL-SCNC: 3.9 MMOL/L (ref 3.5–5.1)
PROT SERPL-MCNC: 5.8 GM/DL (ref 6–8.4)
RA MAJOR: 5.37 CM
RA PRESSURE ESTIMATED: 3 MMHG
RA WIDTH: 3.59 CM
RBC # BLD AUTO: 4.19 M/UL (ref 4.6–6.2)
RELATIVE EOSINOPHIL (OHS): 1.5 %
RELATIVE LYMPHOCYTE (OHS): 12.1 % (ref 18–48)
RELATIVE MONOCYTE (OHS): 8.5 % (ref 4–15)
RELATIVE NEUTROPHIL (OHS): 77.4 % (ref 38–73)
RIGHT ATRIAL AREA: 19.7 CM2
RIGHT ATRIUM END SYSTOLIC VOLUME APICAL 4 CHAMBER INDEX BSA: 32.52 ML/M2
RIGHT ATRIUM VOLUME AREA LENGTH APICAL 4 CHAMBER: 53 ML
RIGHT VENTRICLE DIASTOLIC BASEL DIMENSION: 3.1 CM
RV TB RVSP: 7 MMHG
RV TISSUE DOPPLER FREE WALL SYSTOLIC VELOCITY 1 (APICAL 4 CHAMBER VIEW): 8.94 CM/S
SINUS: 3.4 CM
SODIUM SERPL-SCNC: 139 MMOL/L (ref 136–145)
STJ: 2.9 CM
TDI LATERAL: 0.08 M/S
TDI SEPTAL: 0.06 M/S
TDI: 0.07 M/S
TRICUSPID ANNULAR PLANE SYSTOLIC EXCURSION: 1.5 CM
TV PEAK GRADIENT: 51 MMHG
TV REST PULMONARY ARTERY PRESSURE: 55 MMHG
VANCOMYCIN SERPL-MCNC: 7.8 UG/ML (ref ?–80)
WBC # BLD AUTO: 9.61 K/UL (ref 3.9–12.7)
Z-SCORE OF LEFT VENTRICULAR DIMENSION IN END DIASTOLE: -1.65
Z-SCORE OF LEFT VENTRICULAR DIMENSION IN END SYSTOLE: -0.73

## 2025-08-11 PROCEDURE — 80053 COMPREHEN METABOLIC PANEL: CPT

## 2025-08-11 PROCEDURE — 99233 SBSQ HOSP IP/OBS HIGH 50: CPT | Mod: GC,,, | Performed by: INTERNAL MEDICINE

## 2025-08-11 PROCEDURE — 85025 COMPLETE CBC W/AUTO DIFF WBC: CPT

## 2025-08-11 PROCEDURE — 25000003 PHARM REV CODE 250

## 2025-08-11 PROCEDURE — 80202 ASSAY OF VANCOMYCIN: CPT

## 2025-08-11 PROCEDURE — 63600175 PHARM REV CODE 636 W HCPCS

## 2025-08-11 PROCEDURE — 21400001 HC TELEMETRY ROOM

## 2025-08-11 PROCEDURE — 63600175 PHARM REV CODE 636 W HCPCS: Performed by: STUDENT IN AN ORGANIZED HEALTH CARE EDUCATION/TRAINING PROGRAM

## 2025-08-11 PROCEDURE — 84100 ASSAY OF PHOSPHORUS: CPT

## 2025-08-11 PROCEDURE — 36415 COLL VENOUS BLD VENIPUNCTURE: CPT

## 2025-08-11 PROCEDURE — 83735 ASSAY OF MAGNESIUM: CPT

## 2025-08-11 PROCEDURE — 87040 BLOOD CULTURE FOR BACTERIA: CPT | Performed by: STUDENT IN AN ORGANIZED HEALTH CARE EDUCATION/TRAINING PROGRAM

## 2025-08-11 RX ORDER — ENOXAPARIN SODIUM 100 MG/ML
40 INJECTION SUBCUTANEOUS EVERY 24 HOURS
Status: DISCONTINUED | OUTPATIENT
Start: 2025-08-11 | End: 2025-08-13 | Stop reason: HOSPADM

## 2025-08-11 RX ORDER — TRIAMCINOLONE ACETONIDE 1 MG/G
CREAM TOPICAL 2 TIMES DAILY
COMMUNITY
End: 2025-08-22 | Stop reason: CLARIF

## 2025-08-11 RX ORDER — CEFTRIAXONE 2 G/1
2 INJECTION, POWDER, FOR SOLUTION INTRAMUSCULAR; INTRAVENOUS
Status: DISCONTINUED | OUTPATIENT
Start: 2025-08-11 | End: 2025-08-13 | Stop reason: HOSPADM

## 2025-08-11 RX ORDER — SODIUM,POTASSIUM PHOSPHATES 280-250MG
1 POWDER IN PACKET (EA) ORAL ONCE
Status: CANCELLED | OUTPATIENT
Start: 2025-08-11 | End: 2025-08-11

## 2025-08-11 RX ORDER — SODIUM,POTASSIUM PHOSPHATES 280-250MG
1 POWDER IN PACKET (EA) ORAL ONCE
Status: COMPLETED | OUTPATIENT
Start: 2025-08-11 | End: 2025-08-11

## 2025-08-11 RX ADMIN — POLYETHYLENE GLYCOL 3350 17 G: 17 POWDER, FOR SOLUTION ORAL at 08:08

## 2025-08-11 RX ADMIN — METOPROLOL SUCCINATE 100 MG: 100 TABLET, EXTENDED RELEASE ORAL at 08:08

## 2025-08-11 RX ADMIN — PANTOPRAZOLE SODIUM 40 MG: 40 TABLET, DELAYED RELEASE ORAL at 08:08

## 2025-08-11 RX ADMIN — SENNOSIDES AND DOCUSATE SODIUM 1 TABLET: 50; 8.6 TABLET ORAL at 08:08

## 2025-08-11 RX ADMIN — ASPIRIN 81 MG: 81 TABLET, COATED ORAL at 08:08

## 2025-08-11 RX ADMIN — POTASSIUM & SODIUM PHOSPHATES POWDER PACK 280-160-250 MG 1 PACKET: 280-160-250 PACK at 03:08

## 2025-08-11 RX ADMIN — CEFTRIAXONE SODIUM 2 G: 2 INJECTION, POWDER, FOR SOLUTION INTRAMUSCULAR; INTRAVENOUS at 12:08

## 2025-08-11 RX ADMIN — ENOXAPARIN SODIUM 40 MG: 40 INJECTION SUBCUTANEOUS at 04:08

## 2025-08-11 RX ADMIN — VANCOMYCIN HYDROCHLORIDE 750 MG: 750 INJECTION, POWDER, LYOPHILIZED, FOR SOLUTION INTRAVENOUS at 01:08

## 2025-08-12 LAB
ABSOLUTE EOSINOPHIL (OHS): 0.25 K/UL
ABSOLUTE MONOCYTE (OHS): 0.67 K/UL (ref 0.3–1)
ABSOLUTE NEUTROPHIL COUNT (OHS): 5.4 K/UL (ref 1.8–7.7)
ALBUMIN SERPL BCP-MCNC: 2.9 G/DL (ref 3.5–5.2)
ALP SERPL-CCNC: 60 UNIT/L (ref 40–150)
ALT SERPL W/O P-5'-P-CCNC: 27 UNIT/L (ref 0–55)
ANION GAP (OHS): 10 MMOL/L (ref 8–16)
AST SERPL-CCNC: 37 UNIT/L (ref 0–50)
BACTERIA BLD CULT: ABNORMAL
BACTERIA BLD CULT: ABNORMAL
BASOPHILS # BLD AUTO: 0.03 K/UL
BASOPHILS NFR BLD AUTO: 0.4 %
BILIRUB SERPL-MCNC: 0.5 MG/DL (ref 0.1–1)
BUN SERPL-MCNC: 20 MG/DL (ref 8–23)
CALCIUM SERPL-MCNC: 9.2 MG/DL (ref 8.7–10.5)
CHLORIDE SERPL-SCNC: 110 MMOL/L (ref 95–110)
CO2 SERPL-SCNC: 20 MMOL/L (ref 23–29)
CREAT SERPL-MCNC: 0.9 MG/DL (ref 0.5–1.4)
ERYTHROCYTE [DISTWIDTH] IN BLOOD BY AUTOMATED COUNT: 13.7 % (ref 11.5–14.5)
GFR SERPLBLD CREATININE-BSD FMLA CKD-EPI: >60 ML/MIN/1.73/M2
GLUCOSE SERPL-MCNC: 129 MG/DL (ref 70–110)
GRAM STN SPEC: ABNORMAL
HCT VFR BLD AUTO: 43.3 % (ref 40–54)
HGB BLD-MCNC: 14.3 GM/DL (ref 14–18)
HOLD SPECIMEN: NORMAL
IMM GRANULOCYTES # BLD AUTO: 0.03 K/UL (ref 0–0.04)
IMM GRANULOCYTES NFR BLD AUTO: 0.4 % (ref 0–0.5)
LYMPHOCYTES # BLD AUTO: 0.97 K/UL (ref 1–4.8)
MAGNESIUM SERPL-MCNC: 1.9 MG/DL (ref 1.6–2.6)
MCH RBC QN AUTO: 32 PG (ref 27–31)
MCHC RBC AUTO-ENTMCNC: 33 G/DL (ref 32–36)
MCV RBC AUTO: 97 FL (ref 82–98)
NUCLEATED RBC (/100WBC) (OHS): 0 /100 WBC
PHOSPHATE SERPL-MCNC: 2.1 MG/DL (ref 2.7–4.5)
PLATELET # BLD AUTO: 124 K/UL (ref 150–450)
PMV BLD AUTO: 13 FL (ref 9.2–12.9)
POCT GLUCOSE: 138 MG/DL (ref 70–110)
POCT GLUCOSE: 191 MG/DL (ref 70–110)
POCT GLUCOSE: 253 MG/DL (ref 70–110)
POTASSIUM SERPL-SCNC: 4 MMOL/L (ref 3.5–5.1)
PROT SERPL-MCNC: 6.2 GM/DL (ref 6–8.4)
RBC # BLD AUTO: 4.47 M/UL (ref 4.6–6.2)
RELATIVE EOSINOPHIL (OHS): 3.4 %
RELATIVE LYMPHOCYTE (OHS): 13.2 % (ref 18–48)
RELATIVE MONOCYTE (OHS): 9.1 % (ref 4–15)
RELATIVE NEUTROPHIL (OHS): 73.5 % (ref 38–73)
SODIUM SERPL-SCNC: 140 MMOL/L (ref 136–145)
VANCOMYCIN TROUGH SERPL-MCNC: 9.7 UG/ML (ref 10–22)
WBC # BLD AUTO: 7.35 K/UL (ref 3.9–12.7)

## 2025-08-12 PROCEDURE — 25000003 PHARM REV CODE 250

## 2025-08-12 PROCEDURE — 36410 VNPNXR 3YR/> PHY/QHP DX/THER: CPT

## 2025-08-12 PROCEDURE — C1751 CATH, INF, PER/CENT/MIDLINE: HCPCS

## 2025-08-12 PROCEDURE — 97165 OT EVAL LOW COMPLEX 30 MIN: CPT

## 2025-08-12 PROCEDURE — 63600175 PHARM REV CODE 636 W HCPCS: Performed by: STUDENT IN AN ORGANIZED HEALTH CARE EDUCATION/TRAINING PROGRAM

## 2025-08-12 PROCEDURE — 36415 COLL VENOUS BLD VENIPUNCTURE: CPT

## 2025-08-12 PROCEDURE — 76937 US GUIDE VASCULAR ACCESS: CPT

## 2025-08-12 PROCEDURE — 97535 SELF CARE MNGMENT TRAINING: CPT

## 2025-08-12 PROCEDURE — 21400001 HC TELEMETRY ROOM

## 2025-08-12 RX ORDER — CEFTRIAXONE 1 G/1
2 INJECTION, POWDER, FOR SOLUTION INTRAMUSCULAR; INTRAVENOUS DAILY
Qty: 26 G | Refills: 0 | Status: SHIPPED | OUTPATIENT
Start: 2025-08-12 | End: 2025-08-22 | Stop reason: CLARIF

## 2025-08-12 RX ORDER — ACETAMINOPHEN 325 MG/1
650 TABLET ORAL EVERY 6 HOURS PRN
Status: DISCONTINUED | OUTPATIENT
Start: 2025-08-12 | End: 2025-08-13 | Stop reason: HOSPADM

## 2025-08-12 RX ORDER — SODIUM,POTASSIUM PHOSPHATES 280-250MG
2 POWDER IN PACKET (EA) ORAL ONCE
Status: COMPLETED | OUTPATIENT
Start: 2025-08-12 | End: 2025-08-12

## 2025-08-12 RX ORDER — SACUBITRIL AND VALSARTAN 24; 26 MG/1; MG/1
1 TABLET ORAL 2 TIMES DAILY
Status: DISCONTINUED | OUTPATIENT
Start: 2025-08-12 | End: 2025-08-13 | Stop reason: HOSPADM

## 2025-08-12 RX ORDER — SODIUM CHLORIDE 0.9 % (FLUSH) 0.9 %
10 SYRINGE (ML) INJECTION EVERY 12 HOURS PRN
Status: DISCONTINUED | OUTPATIENT
Start: 2025-08-12 | End: 2025-08-13 | Stop reason: HOSPADM

## 2025-08-12 RX ORDER — HYDRALAZINE HYDROCHLORIDE 10 MG/1
10 TABLET, FILM COATED ORAL EVERY 8 HOURS PRN
Status: DISCONTINUED | OUTPATIENT
Start: 2025-08-12 | End: 2025-08-13 | Stop reason: HOSPADM

## 2025-08-12 RX ADMIN — SENNOSIDES AND DOCUSATE SODIUM 1 TABLET: 50; 8.6 TABLET ORAL at 08:08

## 2025-08-12 RX ADMIN — PANTOPRAZOLE SODIUM 40 MG: 40 TABLET, DELAYED RELEASE ORAL at 08:08

## 2025-08-12 RX ADMIN — ACETAMINOPHEN 650 MG: 325 TABLET ORAL at 09:08

## 2025-08-12 RX ADMIN — ASPIRIN 81 MG: 81 TABLET, COATED ORAL at 08:08

## 2025-08-12 RX ADMIN — CEFTRIAXONE SODIUM 2 G: 2 INJECTION, POWDER, FOR SOLUTION INTRAMUSCULAR; INTRAVENOUS at 11:08

## 2025-08-12 RX ADMIN — POTASSIUM & SODIUM PHOSPHATES POWDER PACK 280-160-250 MG 2 PACKET: 280-160-250 PACK at 09:08

## 2025-08-12 RX ADMIN — METOPROLOL SUCCINATE 100 MG: 100 TABLET, EXTENDED RELEASE ORAL at 08:08

## 2025-08-13 ENCOUNTER — TELEPHONE (OUTPATIENT)
Dept: INTERNAL MEDICINE | Facility: CLINIC | Age: OVER 89
End: 2025-08-13
Payer: MEDICARE

## 2025-08-13 VITALS
BODY MASS INDEX: 22.2 KG/M2 | TEMPERATURE: 98 F | RESPIRATION RATE: 18 BRPM | HEART RATE: 80 BPM | HEIGHT: 64 IN | OXYGEN SATURATION: 95 % | DIASTOLIC BLOOD PRESSURE: 73 MMHG | SYSTOLIC BLOOD PRESSURE: 155 MMHG | WEIGHT: 130 LBS

## 2025-08-13 LAB
POCT GLUCOSE: 166 MG/DL (ref 70–110)
POCT GLUCOSE: 203 MG/DL (ref 70–110)
POCT GLUCOSE: 225 MG/DL (ref 70–110)

## 2025-08-13 PROCEDURE — 25000003 PHARM REV CODE 250

## 2025-08-13 PROCEDURE — 25000003 PHARM REV CODE 250: Performed by: STUDENT IN AN ORGANIZED HEALTH CARE EDUCATION/TRAINING PROGRAM

## 2025-08-13 PROCEDURE — 63600175 PHARM REV CODE 636 W HCPCS: Performed by: STUDENT IN AN ORGANIZED HEALTH CARE EDUCATION/TRAINING PROGRAM

## 2025-08-13 RX ADMIN — CEFTRIAXONE SODIUM 2 G: 2 INJECTION, POWDER, FOR SOLUTION INTRAMUSCULAR; INTRAVENOUS at 11:08

## 2025-08-13 RX ADMIN — ASPIRIN 81 MG: 81 TABLET, COATED ORAL at 08:08

## 2025-08-13 RX ADMIN — INSULIN ASPART 2 UNITS: 100 INJECTION, SOLUTION INTRAVENOUS; SUBCUTANEOUS at 11:08

## 2025-08-13 RX ADMIN — SENNOSIDES AND DOCUSATE SODIUM 1 TABLET: 50; 8.6 TABLET ORAL at 08:08

## 2025-08-13 RX ADMIN — METOPROLOL SUCCINATE 100 MG: 100 TABLET, EXTENDED RELEASE ORAL at 08:08

## 2025-08-13 RX ADMIN — PANTOPRAZOLE SODIUM 40 MG: 40 TABLET, DELAYED RELEASE ORAL at 08:08

## 2025-08-13 RX ADMIN — SACUBITRIL AND VALSARTAN 1 TABLET: 24; 26 TABLET, FILM COATED ORAL at 08:08

## 2025-08-14 ENCOUNTER — PATIENT MESSAGE (OUTPATIENT)
Dept: ADMINISTRATIVE | Facility: OTHER | Age: OVER 89
End: 2025-08-14
Payer: MEDICARE

## 2025-08-15 ENCOUNTER — TELEPHONE (OUTPATIENT)
Dept: ADMINISTRATIVE | Facility: CLINIC | Age: OVER 89
End: 2025-08-15
Payer: MEDICARE

## 2025-08-15 ENCOUNTER — PATIENT MESSAGE (OUTPATIENT)
Dept: ADMINISTRATIVE | Facility: OTHER | Age: OVER 89
End: 2025-08-15
Payer: MEDICARE

## 2025-08-15 LAB
BACTERIA BLD CULT: NORMAL
BACTERIA BLD CULT: NORMAL

## 2025-08-16 ENCOUNTER — PATIENT MESSAGE (OUTPATIENT)
Dept: ADMINISTRATIVE | Facility: OTHER | Age: OVER 89
End: 2025-08-16
Payer: MEDICARE

## 2025-08-16 LAB — BACTERIA BLD CULT: NORMAL

## 2025-08-17 ENCOUNTER — PATIENT MESSAGE (OUTPATIENT)
Dept: ADMINISTRATIVE | Facility: OTHER | Age: OVER 89
End: 2025-08-17
Payer: MEDICARE

## 2025-08-18 ENCOUNTER — PATIENT MESSAGE (OUTPATIENT)
Dept: ADMINISTRATIVE | Facility: OTHER | Age: OVER 89
End: 2025-08-18
Payer: MEDICARE

## 2025-08-18 ENCOUNTER — LAB REQUISITION (OUTPATIENT)
Dept: LAB | Facility: HOSPITAL | Age: OVER 89
End: 2025-08-18
Payer: MEDICARE

## 2025-08-18 ENCOUNTER — TELEPHONE (OUTPATIENT)
Dept: INTERNAL MEDICINE | Facility: CLINIC | Age: OVER 89
End: 2025-08-18
Payer: MEDICARE

## 2025-08-18 DIAGNOSIS — E11.8 DM TYPE 2, CONTROLLED, WITH COMPLICATION: Primary | ICD-10-CM

## 2025-08-18 DIAGNOSIS — B95.5 UNSPECIFIED STREPTOCOCCUS AS THE CAUSE OF DISEASES CLASSIFIED ELSEWHERE: ICD-10-CM

## 2025-08-18 LAB
ABSOLUTE EOSINOPHIL (OHS): 0.21 K/UL
ABSOLUTE MONOCYTE (OHS): 0.65 K/UL (ref 0.3–1)
ABSOLUTE NEUTROPHIL COUNT (OHS): 4.7 K/UL (ref 1.8–7.7)
BACTERIA BLD CULT: NORMAL
BASOPHILS # BLD AUTO: 0.04 K/UL
BASOPHILS NFR BLD AUTO: 0.6 %
ERYTHROCYTE [DISTWIDTH] IN BLOOD BY AUTOMATED COUNT: 14 % (ref 11.5–14.5)
HCT VFR BLD AUTO: 40.7 % (ref 40–54)
HGB BLD-MCNC: 13.3 GM/DL (ref 14–18)
IMM GRANULOCYTES # BLD AUTO: 0.08 K/UL (ref 0–0.04)
IMM GRANULOCYTES NFR BLD AUTO: 1.2 % (ref 0–0.5)
LYMPHOCYTES # BLD AUTO: 1.08 K/UL (ref 1–4.8)
MCH RBC QN AUTO: 31.5 PG (ref 27–31)
MCHC RBC AUTO-ENTMCNC: 32.7 G/DL (ref 32–36)
MCV RBC AUTO: 96 FL (ref 82–98)
NUCLEATED RBC (/100WBC) (OHS): 0 /100 WBC
PLATELET # BLD AUTO: 238 K/UL (ref 150–450)
PMV BLD AUTO: 12.3 FL (ref 9.2–12.9)
RBC # BLD AUTO: 4.22 M/UL (ref 4.6–6.2)
RELATIVE EOSINOPHIL (OHS): 3.1 %
RELATIVE LYMPHOCYTE (OHS): 16 % (ref 18–48)
RELATIVE MONOCYTE (OHS): 9.6 % (ref 4–15)
RELATIVE NEUTROPHIL (OHS): 69.5 % (ref 38–73)
WBC # BLD AUTO: 6.76 K/UL (ref 3.9–12.7)

## 2025-08-18 PROCEDURE — 85025 COMPLETE CBC W/AUTO DIFF WBC: CPT | Performed by: INTERNAL MEDICINE

## 2025-08-18 RX ORDER — LANCETS
EACH MISCELLANEOUS
Qty: 200 EACH | Refills: 3 | Status: SHIPPED | OUTPATIENT
Start: 2025-08-18 | End: 2025-08-22 | Stop reason: CLARIF

## 2025-08-18 RX ORDER — INSULIN PUMP SYRINGE, 3 ML
EACH MISCELLANEOUS
Qty: 1 EACH | Refills: 0 | Status: SHIPPED | OUTPATIENT
Start: 2025-08-18 | End: 2025-08-22 | Stop reason: CLARIF

## 2025-08-19 ENCOUNTER — PATIENT MESSAGE (OUTPATIENT)
Dept: ADMINISTRATIVE | Facility: OTHER | Age: OVER 89
End: 2025-08-19
Payer: MEDICARE

## 2025-08-19 ENCOUNTER — LAB REQUISITION (OUTPATIENT)
Dept: LAB | Facility: HOSPITAL | Age: OVER 89
End: 2025-08-19
Payer: MEDICARE

## 2025-08-19 ENCOUNTER — PATIENT MESSAGE (OUTPATIENT)
Dept: OTHER | Facility: OTHER | Age: OVER 89
End: 2025-08-19
Payer: MEDICARE

## 2025-08-19 DIAGNOSIS — R78.81 BACTEREMIA: ICD-10-CM

## 2025-08-19 LAB
ALBUMIN SERPL BCP-MCNC: 3.3 G/DL (ref 3.5–5.2)
ALP SERPL-CCNC: 119 UNIT/L (ref 40–150)
ALT SERPL W/O P-5'-P-CCNC: 40 UNIT/L (ref 0–55)
ANION GAP (OHS): 13 MMOL/L (ref 8–16)
AST SERPL-CCNC: 32 UNIT/L (ref 0–50)
BILIRUB SERPL-MCNC: 0.5 MG/DL (ref 0.1–1)
BUN SERPL-MCNC: 28 MG/DL (ref 8–23)
CALCIUM SERPL-MCNC: 10.1 MG/DL (ref 8.7–10.5)
CHLORIDE SERPL-SCNC: 101 MMOL/L (ref 95–110)
CO2 SERPL-SCNC: 25 MMOL/L (ref 23–29)
CREAT SERPL-MCNC: 0.9 MG/DL (ref 0.5–1.4)
CRP SERPL-MCNC: 22 MG/L
GFR SERPLBLD CREATININE-BSD FMLA CKD-EPI: >60 ML/MIN/1.73/M2
GLUCOSE SERPL-MCNC: 297 MG/DL (ref 70–110)
POTASSIUM SERPL-SCNC: 4.2 MMOL/L (ref 3.5–5.1)
PROT SERPL-MCNC: 6.7 GM/DL (ref 6–8.4)
SODIUM SERPL-SCNC: 139 MMOL/L (ref 136–145)

## 2025-08-19 PROCEDURE — 86140 C-REACTIVE PROTEIN: CPT | Performed by: INTERNAL MEDICINE

## 2025-08-19 PROCEDURE — 80053 COMPREHEN METABOLIC PANEL: CPT | Performed by: INTERNAL MEDICINE

## 2025-08-20 ENCOUNTER — PATIENT MESSAGE (OUTPATIENT)
Dept: ADMINISTRATIVE | Facility: OTHER | Age: OVER 89
End: 2025-08-20
Payer: MEDICARE

## 2025-08-20 ENCOUNTER — OFFICE VISIT (OUTPATIENT)
Dept: INFECTIOUS DISEASES | Facility: CLINIC | Age: OVER 89
End: 2025-08-20
Payer: MEDICARE

## 2025-08-20 ENCOUNTER — TELEPHONE (OUTPATIENT)
Dept: INTERNAL MEDICINE | Facility: CLINIC | Age: OVER 89
End: 2025-08-20
Payer: MEDICARE

## 2025-08-20 DIAGNOSIS — R78.81 STREPTOCOCCAL BACTEREMIA: Primary | ICD-10-CM

## 2025-08-20 DIAGNOSIS — B95.5 STREPTOCOCCAL BACTEREMIA: Primary | ICD-10-CM

## 2025-08-20 PROCEDURE — 99999 PR PBB SHADOW E&M-EST. PATIENT-LVL III: CPT | Mod: PBBFAC,GC,,

## 2025-08-20 PROCEDURE — 99213 OFFICE O/P EST LOW 20 MIN: CPT | Mod: S$PBB,GC,, | Performed by: INTERNAL MEDICINE

## 2025-08-20 PROCEDURE — 99213 OFFICE O/P EST LOW 20 MIN: CPT | Mod: PBBFAC

## 2025-08-21 ENCOUNTER — PATIENT MESSAGE (OUTPATIENT)
Dept: ADMINISTRATIVE | Facility: OTHER | Age: OVER 89
End: 2025-08-21
Payer: MEDICARE

## 2025-08-21 ENCOUNTER — OCHSNER VIRTUAL EMERGENCY DEPARTMENT (OUTPATIENT)
Facility: CLINIC | Age: OVER 89
End: 2025-08-21
Payer: MEDICARE

## 2025-08-21 ENCOUNTER — PATIENT OUTREACH (OUTPATIENT)
Facility: OTHER | Age: OVER 89
End: 2025-08-21
Payer: MEDICARE

## 2025-08-21 ENCOUNTER — HOSPITAL ENCOUNTER (INPATIENT)
Facility: HOSPITAL | Age: OVER 89
LOS: 1 days | Discharge: HOME HEALTH | DRG: 291 | End: 2025-08-23
Attending: STUDENT IN AN ORGANIZED HEALTH CARE EDUCATION/TRAINING PROGRAM | Admitting: INTERNAL MEDICINE
Payer: MEDICARE

## 2025-08-21 ENCOUNTER — OFFICE VISIT (OUTPATIENT)
Dept: INTERNAL MEDICINE | Facility: CLINIC | Age: OVER 89
End: 2025-08-21
Payer: MEDICARE

## 2025-08-21 VITALS
DIASTOLIC BLOOD PRESSURE: 62 MMHG | WEIGHT: 137.56 LBS | RESPIRATION RATE: 18 BRPM | TEMPERATURE: 98 F | OXYGEN SATURATION: 96 % | BODY MASS INDEX: 23.49 KG/M2 | HEIGHT: 64 IN | SYSTOLIC BLOOD PRESSURE: 124 MMHG | HEART RATE: 91 BPM

## 2025-08-21 DIAGNOSIS — I50.9 ACUTE ON CHRONIC CONGESTIVE HEART FAILURE, UNSPECIFIED HEART FAILURE TYPE: Primary | ICD-10-CM

## 2025-08-21 DIAGNOSIS — R06.02 SHORTNESS OF BREATH: ICD-10-CM

## 2025-08-21 DIAGNOSIS — R07.9 CHEST PAIN: ICD-10-CM

## 2025-08-21 DIAGNOSIS — R78.81 STREPTOCOCCAL BACTEREMIA: ICD-10-CM

## 2025-08-21 DIAGNOSIS — R06.02 SOB (SHORTNESS OF BREATH): Primary | ICD-10-CM

## 2025-08-21 DIAGNOSIS — Z09 HOSPITAL DISCHARGE FOLLOW-UP: Primary | ICD-10-CM

## 2025-08-21 DIAGNOSIS — B95.5 STREPTOCOCCAL BACTEREMIA: ICD-10-CM

## 2025-08-21 DIAGNOSIS — I50.33 ACUTE ON CHRONIC HEART FAILURE WITH PRESERVED EJECTION FRACTION: ICD-10-CM

## 2025-08-21 LAB
ABSOLUTE EOSINOPHIL (OHS): 0.19 K/UL
ABSOLUTE MONOCYTE (OHS): 0.63 K/UL (ref 0.3–1)
ABSOLUTE NEUTROPHIL COUNT (OHS): 4.55 K/UL (ref 1.8–7.7)
ALBUMIN SERPL BCP-MCNC: 3.4 G/DL (ref 3.5–5.2)
ALP SERPL-CCNC: 97 UNIT/L (ref 40–150)
ALT SERPL W/O P-5'-P-CCNC: 30 UNIT/L (ref 0–55)
ANION GAP (OHS): 10 MMOL/L (ref 8–16)
AST SERPL-CCNC: 30 UNIT/L (ref 0–50)
BASOPHILS # BLD AUTO: 0.04 K/UL
BASOPHILS NFR BLD AUTO: 0.6 %
BILIRUB SERPL-MCNC: 0.2 MG/DL (ref 0.1–1)
BIPAP: 0
BUN SERPL-MCNC: 34 MG/DL (ref 8–23)
CALCIUM SERPL-MCNC: 10.5 MG/DL (ref 8.7–10.5)
CHLORIDE SERPL-SCNC: 105 MMOL/L (ref 95–110)
CO2 SERPL-SCNC: 22 MMOL/L (ref 23–29)
CORRECTED TEMPERATURE (PCO2): 36.6 MMHG
CORRECTED TEMPERATURE (PH): 7.46
CORRECTED TEMPERATURE (PO2): 45.8 MMHG
CREAT SERPL-MCNC: 0.9 MG/DL (ref 0.5–1.4)
ERYTHROCYTE [DISTWIDTH] IN BLOOD BY AUTOMATED COUNT: 13.9 % (ref 11.5–14.5)
FIO2: 21 %
GFR SERPLBLD CREATININE-BSD FMLA CKD-EPI: >60 ML/MIN/1.73/M2
GLUCOSE SERPL-MCNC: 196 MG/DL (ref 70–110)
HCT VFR BLD AUTO: 37.9 % (ref 40–54)
HCT VFR BLD CALC: 42.3 % (ref 36–54)
HGB BLD-MCNC: 13 GM/DL (ref 14–18)
IMM GRANULOCYTES # BLD AUTO: 0.06 K/UL (ref 0–0.04)
IMM GRANULOCYTES NFR BLD AUTO: 0.9 % (ref 0–0.5)
LDH SERPL L TO P-CCNC: 2.1 MMOL/L (ref 0.5–2.2)
LYMPHOCYTES # BLD AUTO: 1.19 K/UL (ref 1–4.8)
MCH RBC QN AUTO: 33.1 PG (ref 27–31)
MCHC RBC AUTO-ENTMCNC: 34.3 G/DL (ref 32–36)
MCV RBC AUTO: 96 FL (ref 82–98)
NT-PROBNP SERPL-MCNC: 2213 PG/ML
NUCLEATED RBC (/100WBC) (OHS): 0 /100 WBC
OHS QRS DURATION: 92 MS
OHS QTC CALCULATION: 425 MS
PCO2 BLDA: 36.6 MMHG (ref 35–45)
PH SMN: 7.46 [PH] (ref 7.35–7.45)
PLATELET # BLD AUTO: 259 K/UL (ref 150–450)
PMV BLD AUTO: 12.3 FL (ref 9.2–12.9)
PO2 BLDA: 45.8 MMHG (ref 40–60)
POC BASE DEFICIT: 2.5 MMOL/L
POC HCO3: 26.4 MMOL/L (ref 24–28)
POC PERFORMED BY: ABNORMAL
POC TEMPERATURE: 37 C
POTASSIUM SERPL-SCNC: 4.2 MMOL/L (ref 3.5–5.1)
PROT SERPL-MCNC: 6.7 GM/DL (ref 6–8.4)
RBC # BLD AUTO: 3.93 M/UL (ref 4.6–6.2)
RELATIVE EOSINOPHIL (OHS): 2.9 %
RELATIVE LYMPHOCYTE (OHS): 17.9 % (ref 18–48)
RELATIVE MONOCYTE (OHS): 9.5 % (ref 4–15)
RELATIVE NEUTROPHIL (OHS): 68.2 % (ref 38–73)
SODIUM SERPL-SCNC: 137 MMOL/L (ref 136–145)
SPECIMEN SOURCE: ABNORMAL
TROPONIN I SERPL HS-MCNC: 63 NG/L
WBC # BLD AUTO: 6.66 K/UL (ref 3.9–12.7)

## 2025-08-21 PROCEDURE — 63600175 PHARM REV CODE 636 W HCPCS

## 2025-08-21 PROCEDURE — 83605 ASSAY OF LACTIC ACID: CPT

## 2025-08-21 PROCEDURE — 99215 OFFICE O/P EST HI 40 MIN: CPT | Mod: PBBFAC,PO | Performed by: FAMILY MEDICINE

## 2025-08-21 PROCEDURE — G0378 HOSPITAL OBSERVATION PER HR: HCPCS

## 2025-08-21 PROCEDURE — 93005 ELECTROCARDIOGRAM TRACING: CPT

## 2025-08-21 PROCEDURE — 99900035 HC TECH TIME PER 15 MIN (STAT)

## 2025-08-21 PROCEDURE — 84100 ASSAY OF PHOSPHORUS: CPT

## 2025-08-21 PROCEDURE — 83735 ASSAY OF MAGNESIUM: CPT

## 2025-08-21 PROCEDURE — 99999 PR PBB SHADOW E&M-EST. PATIENT-LVL V: CPT | Mod: PBBFAC,,, | Performed by: FAMILY MEDICINE

## 2025-08-21 PROCEDURE — 96376 TX/PRO/DX INJ SAME DRUG ADON: CPT

## 2025-08-21 PROCEDURE — 25000003 PHARM REV CODE 250

## 2025-08-21 PROCEDURE — 93010 ELECTROCARDIOGRAM REPORT: CPT | Mod: ,,, | Performed by: INTERNAL MEDICINE

## 2025-08-21 PROCEDURE — 83880 ASSAY OF NATRIURETIC PEPTIDE: CPT | Performed by: STUDENT IN AN ORGANIZED HEALTH CARE EDUCATION/TRAINING PROGRAM

## 2025-08-21 PROCEDURE — 96375 TX/PRO/DX INJ NEW DRUG ADDON: CPT

## 2025-08-21 PROCEDURE — 94761 N-INVAS EAR/PLS OXIMETRY MLT: CPT | Mod: XB

## 2025-08-21 PROCEDURE — 82803 BLOOD GASES ANY COMBINATION: CPT

## 2025-08-21 PROCEDURE — 99285 EMERGENCY DEPT VISIT HI MDM: CPT | Mod: 25,27

## 2025-08-21 PROCEDURE — 96372 THER/PROPH/DIAG INJ SC/IM: CPT

## 2025-08-21 PROCEDURE — 80053 COMPREHEN METABOLIC PANEL: CPT | Performed by: STUDENT IN AN ORGANIZED HEALTH CARE EDUCATION/TRAINING PROGRAM

## 2025-08-21 PROCEDURE — 25500020 PHARM REV CODE 255: Performed by: STUDENT IN AN ORGANIZED HEALTH CARE EDUCATION/TRAINING PROGRAM

## 2025-08-21 PROCEDURE — 85025 COMPLETE CBC W/AUTO DIFF WBC: CPT | Performed by: STUDENT IN AN ORGANIZED HEALTH CARE EDUCATION/TRAINING PROGRAM

## 2025-08-21 PROCEDURE — 84484 ASSAY OF TROPONIN QUANT: CPT | Performed by: STUDENT IN AN ORGANIZED HEALTH CARE EDUCATION/TRAINING PROGRAM

## 2025-08-21 PROCEDURE — 96374 THER/PROPH/DIAG INJ IV PUSH: CPT

## 2025-08-21 RX ORDER — IBUPROFEN 200 MG
24 TABLET ORAL
Status: DISCONTINUED | OUTPATIENT
Start: 2025-08-21 | End: 2025-08-23 | Stop reason: HOSPADM

## 2025-08-21 RX ORDER — FENOFIBRATE 200 MG/1
200 CAPSULE ORAL
Status: DISCONTINUED | OUTPATIENT
Start: 2025-08-22 | End: 2025-08-23 | Stop reason: HOSPADM

## 2025-08-21 RX ORDER — GLUCAGON 1 MG
1 KIT INJECTION
Status: DISCONTINUED | OUTPATIENT
Start: 2025-08-21 | End: 2025-08-23 | Stop reason: HOSPADM

## 2025-08-21 RX ORDER — NALOXONE HCL 0.4 MG/ML
0.02 VIAL (ML) INJECTION
Status: DISCONTINUED | OUTPATIENT
Start: 2025-08-21 | End: 2025-08-23 | Stop reason: HOSPADM

## 2025-08-21 RX ORDER — ASPIRIN 81 MG/1
81 TABLET ORAL DAILY
Status: DISCONTINUED | OUTPATIENT
Start: 2025-08-22 | End: 2025-08-23 | Stop reason: HOSPADM

## 2025-08-21 RX ORDER — INSULIN ASPART 100 [IU]/ML
0-5 INJECTION, SOLUTION INTRAVENOUS; SUBCUTANEOUS
Status: DISCONTINUED | OUTPATIENT
Start: 2025-08-21 | End: 2025-08-23 | Stop reason: HOSPADM

## 2025-08-21 RX ORDER — IBUPROFEN 200 MG
16 TABLET ORAL
Status: DISCONTINUED | OUTPATIENT
Start: 2025-08-21 | End: 2025-08-23 | Stop reason: HOSPADM

## 2025-08-21 RX ORDER — CLOPIDOGREL BISULFATE 75 MG/1
75 TABLET ORAL DAILY
Status: DISCONTINUED | OUTPATIENT
Start: 2025-08-22 | End: 2025-08-23 | Stop reason: HOSPADM

## 2025-08-21 RX ORDER — FUROSEMIDE 10 MG/ML
40 INJECTION INTRAMUSCULAR; INTRAVENOUS DAILY
Status: DISCONTINUED | OUTPATIENT
Start: 2025-08-22 | End: 2025-08-22

## 2025-08-21 RX ORDER — METOPROLOL SUCCINATE 100 MG/1
100 TABLET, EXTENDED RELEASE ORAL 2 TIMES DAILY
Status: DISCONTINUED | OUTPATIENT
Start: 2025-08-21 | End: 2025-08-23 | Stop reason: HOSPADM

## 2025-08-21 RX ORDER — SODIUM CHLORIDE 0.9 % (FLUSH) 0.9 %
10 SYRINGE (ML) INJECTION EVERY 12 HOURS PRN
Status: DISCONTINUED | OUTPATIENT
Start: 2025-08-21 | End: 2025-08-23 | Stop reason: HOSPADM

## 2025-08-21 RX ORDER — FUROSEMIDE 10 MG/ML
40 INJECTION INTRAMUSCULAR; INTRAVENOUS
Status: COMPLETED | OUTPATIENT
Start: 2025-08-21 | End: 2025-08-21

## 2025-08-21 RX ORDER — SACUBITRIL AND VALSARTAN 24; 26 MG/1; MG/1
1 TABLET ORAL 2 TIMES DAILY
Status: DISCONTINUED | OUTPATIENT
Start: 2025-08-21 | End: 2025-08-23 | Stop reason: HOSPADM

## 2025-08-21 RX ORDER — CEFTRIAXONE 1 G/1
2 INJECTION, POWDER, FOR SOLUTION INTRAMUSCULAR; INTRAVENOUS DAILY
Status: DISCONTINUED | OUTPATIENT
Start: 2025-08-22 | End: 2025-08-23 | Stop reason: HOSPADM

## 2025-08-21 RX ORDER — ENOXAPARIN SODIUM 100 MG/ML
40 INJECTION SUBCUTANEOUS EVERY 24 HOURS
Status: DISCONTINUED | OUTPATIENT
Start: 2025-08-21 | End: 2025-08-23 | Stop reason: HOSPADM

## 2025-08-21 RX ADMIN — SACUBITRIL AND VALSARTAN 1 TABLET: 24; 26 TABLET, FILM COATED ORAL at 08:08

## 2025-08-21 RX ADMIN — METOPROLOL SUCCINATE 100 MG: 100 TABLET, EXTENDED RELEASE ORAL at 08:08

## 2025-08-21 RX ADMIN — ENOXAPARIN SODIUM 40 MG: 40 INJECTION SUBCUTANEOUS at 08:08

## 2025-08-21 RX ADMIN — FUROSEMIDE 40 MG: 10 INJECTION, SOLUTION INTRAVENOUS at 04:08

## 2025-08-21 RX ADMIN — IOHEXOL 100 ML: 350 INJECTION, SOLUTION INTRAVENOUS at 05:08

## 2025-08-22 ENCOUNTER — DOCUMENTATION ONLY (OUTPATIENT)
Dept: CARDIOLOGY | Facility: CLINIC | Age: OVER 89
End: 2025-08-22
Payer: MEDICARE

## 2025-08-22 DIAGNOSIS — R06.02 SOB (SHORTNESS OF BREATH): Primary | ICD-10-CM

## 2025-08-22 LAB
ABSOLUTE EOSINOPHIL (OHS): 0.25 K/UL
ABSOLUTE MONOCYTE (OHS): 0.68 K/UL (ref 0.3–1)
ABSOLUTE NEUTROPHIL COUNT (OHS): 3.05 K/UL (ref 1.8–7.7)
ALBUMIN SERPL BCP-MCNC: 3.3 G/DL (ref 3.5–5.2)
ALP SERPL-CCNC: 82 UNIT/L (ref 40–150)
ALT SERPL W/O P-5'-P-CCNC: 29 UNIT/L (ref 0–55)
ANION GAP (OHS): 10 MMOL/L (ref 8–16)
AST SERPL-CCNC: 28 UNIT/L (ref 0–50)
BASOPHILS # BLD AUTO: 0.07 K/UL
BASOPHILS NFR BLD AUTO: 1.3 %
BILIRUB SERPL-MCNC: 0.4 MG/DL (ref 0.1–1)
BUN SERPL-MCNC: 30 MG/DL (ref 8–23)
CALCIUM SERPL-MCNC: 10.3 MG/DL (ref 8.7–10.5)
CHLORIDE SERPL-SCNC: 106 MMOL/L (ref 95–110)
CO2 SERPL-SCNC: 23 MMOL/L (ref 23–29)
CREAT SERPL-MCNC: 0.9 MG/DL (ref 0.5–1.4)
ERYTHROCYTE [DISTWIDTH] IN BLOOD BY AUTOMATED COUNT: 13.7 % (ref 11.5–14.5)
GFR SERPLBLD CREATININE-BSD FMLA CKD-EPI: >60 ML/MIN/1.73/M2
GLUCOSE SERPL-MCNC: 183 MG/DL (ref 70–110)
HCT VFR BLD AUTO: 39.7 % (ref 40–54)
HGB BLD-MCNC: 13 GM/DL (ref 14–18)
IMM GRANULOCYTES # BLD AUTO: 0.05 K/UL (ref 0–0.04)
IMM GRANULOCYTES NFR BLD AUTO: 1 % (ref 0–0.5)
LYMPHOCYTES # BLD AUTO: 1.16 K/UL (ref 1–4.8)
MAGNESIUM SERPL-MCNC: 1.8 MG/DL (ref 1.6–2.6)
MCH RBC QN AUTO: 31.6 PG (ref 27–31)
MCHC RBC AUTO-ENTMCNC: 32.7 G/DL (ref 32–36)
MCV RBC AUTO: 97 FL (ref 82–98)
NUCLEATED RBC (/100WBC) (OHS): 0 /100 WBC
PHOSPHATE SERPL-MCNC: 2.6 MG/DL (ref 2.7–4.5)
PLATELET # BLD AUTO: 246 K/UL (ref 150–450)
PMV BLD AUTO: 11.9 FL (ref 9.2–12.9)
POTASSIUM SERPL-SCNC: 3.7 MMOL/L (ref 3.5–5.1)
PROT SERPL-MCNC: 6.4 GM/DL (ref 6–8.4)
RBC # BLD AUTO: 4.11 M/UL (ref 4.6–6.2)
RELATIVE EOSINOPHIL (OHS): 4.8 %
RELATIVE LYMPHOCYTE (OHS): 22.1 % (ref 18–48)
RELATIVE MONOCYTE (OHS): 12.9 % (ref 4–15)
RELATIVE NEUTROPHIL (OHS): 57.9 % (ref 38–73)
SODIUM SERPL-SCNC: 139 MMOL/L (ref 136–145)
TROPONIN I SERPL HS-MCNC: 64 NG/L
WBC # BLD AUTO: 5.26 K/UL (ref 3.9–12.7)

## 2025-08-22 PROCEDURE — 63600175 PHARM REV CODE 636 W HCPCS

## 2025-08-22 PROCEDURE — 25000003 PHARM REV CODE 250

## 2025-08-22 PROCEDURE — 20600001 HC STEP DOWN PRIVATE ROOM

## 2025-08-22 PROCEDURE — 85025 COMPLETE CBC W/AUTO DIFF WBC: CPT

## 2025-08-22 PROCEDURE — 84484 ASSAY OF TROPONIN QUANT: CPT

## 2025-08-22 PROCEDURE — 82040 ASSAY OF SERUM ALBUMIN: CPT

## 2025-08-22 RX ORDER — FUROSEMIDE 20 MG/1
20 TABLET ORAL DAILY
Qty: 30 TABLET | Refills: 11 | Status: CANCELLED | OUTPATIENT
Start: 2025-08-22 | End: 2026-08-22

## 2025-08-22 RX ORDER — FUROSEMIDE 20 MG/1
20 TABLET ORAL DAILY
Qty: 30 TABLET | Refills: 11 | Status: SHIPPED | OUTPATIENT
Start: 2025-08-22 | End: 2025-08-26

## 2025-08-22 RX ORDER — CEFTRIAXONE 1 G/1
2 INJECTION, POWDER, FOR SOLUTION INTRAMUSCULAR; INTRAVENOUS DAILY
Qty: 4 G | Refills: 0 | Status: SHIPPED | OUTPATIENT
Start: 2025-08-23 | End: 2025-08-26 | Stop reason: ALTCHOICE

## 2025-08-22 RX ORDER — LANCETS 28 GAUGE
EACH MISCELLANEOUS
COMMUNITY
Start: 2025-08-18

## 2025-08-22 RX ORDER — FUROSEMIDE 10 MG/ML
40 INJECTION INTRAMUSCULAR; INTRAVENOUS ONCE
Status: COMPLETED | OUTPATIENT
Start: 2025-08-22 | End: 2025-08-22

## 2025-08-22 RX ADMIN — ENOXAPARIN SODIUM 40 MG: 40 INJECTION SUBCUTANEOUS at 06:08

## 2025-08-22 RX ADMIN — FUROSEMIDE 40 MG: 10 INJECTION, SOLUTION INTRAVENOUS at 02:08

## 2025-08-22 RX ADMIN — METOPROLOL SUCCINATE 100 MG: 100 TABLET, EXTENDED RELEASE ORAL at 08:08

## 2025-08-22 RX ADMIN — ASPIRIN 81 MG: 81 TABLET, COATED ORAL at 10:08

## 2025-08-22 RX ADMIN — SACUBITRIL AND VALSARTAN 1 TABLET: 24; 26 TABLET, FILM COATED ORAL at 08:08

## 2025-08-22 RX ADMIN — SACUBITRIL AND VALSARTAN 1 TABLET: 24; 26 TABLET, FILM COATED ORAL at 10:08

## 2025-08-22 RX ADMIN — CLOPIDOGREL 75 MG: 75 TABLET ORAL at 10:08

## 2025-08-22 RX ADMIN — CEFTRIAXONE SODIUM 2 G: 1 INJECTION, POWDER, FOR SOLUTION INTRAMUSCULAR; INTRAVENOUS at 10:08

## 2025-08-22 RX ADMIN — METOPROLOL SUCCINATE 100 MG: 100 TABLET, EXTENDED RELEASE ORAL at 10:08

## 2025-08-23 ENCOUNTER — PATIENT MESSAGE (OUTPATIENT)
Dept: INFECTIOUS DISEASES | Facility: CLINIC | Age: OVER 89
End: 2025-08-23
Payer: MEDICARE

## 2025-08-23 VITALS
TEMPERATURE: 98 F | OXYGEN SATURATION: 95 % | BODY MASS INDEX: 21.86 KG/M2 | RESPIRATION RATE: 17 BRPM | DIASTOLIC BLOOD PRESSURE: 65 MMHG | WEIGHT: 128.06 LBS | HEART RATE: 73 BPM | HEIGHT: 64 IN | SYSTOLIC BLOOD PRESSURE: 154 MMHG

## 2025-08-23 LAB
ABSOLUTE EOSINOPHIL (OHS): 0.28 K/UL
ABSOLUTE MONOCYTE (OHS): 0.82 K/UL (ref 0.3–1)
ABSOLUTE NEUTROPHIL COUNT (OHS): 2.85 K/UL (ref 1.8–7.7)
ALBUMIN SERPL BCP-MCNC: 3.4 G/DL (ref 3.5–5.2)
ALP SERPL-CCNC: 76 UNIT/L (ref 40–150)
ALT SERPL W/O P-5'-P-CCNC: 27 UNIT/L (ref 0–55)
ANION GAP (OHS): 12 MMOL/L (ref 8–16)
AST SERPL-CCNC: 28 UNIT/L (ref 0–50)
BASOPHILS # BLD AUTO: 0.06 K/UL
BASOPHILS NFR BLD AUTO: 1.1 %
BILIRUB SERPL-MCNC: 0.5 MG/DL (ref 0.1–1)
BUN SERPL-MCNC: 30 MG/DL (ref 8–23)
CALCIUM SERPL-MCNC: 10.2 MG/DL (ref 8.7–10.5)
CHLORIDE SERPL-SCNC: 103 MMOL/L (ref 95–110)
CO2 SERPL-SCNC: 24 MMOL/L (ref 23–29)
CREAT SERPL-MCNC: 1 MG/DL (ref 0.5–1.4)
ERYTHROCYTE [DISTWIDTH] IN BLOOD BY AUTOMATED COUNT: 13.8 % (ref 11.5–14.5)
GFR SERPLBLD CREATININE-BSD FMLA CKD-EPI: >60 ML/MIN/1.73/M2
GLUCOSE SERPL-MCNC: 163 MG/DL (ref 70–110)
HCT VFR BLD AUTO: 44.2 % (ref 40–54)
HGB BLD-MCNC: 14.7 GM/DL (ref 14–18)
IMM GRANULOCYTES # BLD AUTO: 0.08 K/UL (ref 0–0.04)
IMM GRANULOCYTES NFR BLD AUTO: 1.5 % (ref 0–0.5)
LYMPHOCYTES # BLD AUTO: 1.31 K/UL (ref 1–4.8)
MAGNESIUM SERPL-MCNC: 1.8 MG/DL (ref 1.6–2.6)
MCH RBC QN AUTO: 32.8 PG (ref 27–31)
MCHC RBC AUTO-ENTMCNC: 33.3 G/DL (ref 32–36)
MCV RBC AUTO: 99 FL (ref 82–98)
NUCLEATED RBC (/100WBC) (OHS): 0 /100 WBC
PHOSPHATE SERPL-MCNC: 3 MG/DL (ref 2.7–4.5)
PLATELET # BLD AUTO: 232 K/UL (ref 150–450)
PMV BLD AUTO: 12.4 FL (ref 9.2–12.9)
POCT GLUCOSE: 200 MG/DL (ref 70–110)
POCT GLUCOSE: 279 MG/DL (ref 70–110)
POCT GLUCOSE: 286 MG/DL (ref 70–110)
POTASSIUM SERPL-SCNC: 3.7 MMOL/L (ref 3.5–5.1)
PROT SERPL-MCNC: 6.8 GM/DL (ref 6–8.4)
RBC # BLD AUTO: 4.48 M/UL (ref 4.6–6.2)
RELATIVE EOSINOPHIL (OHS): 5.2 %
RELATIVE LYMPHOCYTE (OHS): 24.3 % (ref 18–48)
RELATIVE MONOCYTE (OHS): 15.2 % (ref 4–15)
RELATIVE NEUTROPHIL (OHS): 52.7 % (ref 38–73)
SODIUM SERPL-SCNC: 139 MMOL/L (ref 136–145)
WBC # BLD AUTO: 5.4 K/UL (ref 3.9–12.7)

## 2025-08-23 PROCEDURE — 63600175 PHARM REV CODE 636 W HCPCS

## 2025-08-23 PROCEDURE — 85025 COMPLETE CBC W/AUTO DIFF WBC: CPT

## 2025-08-23 PROCEDURE — 36415 COLL VENOUS BLD VENIPUNCTURE: CPT

## 2025-08-23 PROCEDURE — 84100 ASSAY OF PHOSPHORUS: CPT

## 2025-08-23 PROCEDURE — 25000003 PHARM REV CODE 250

## 2025-08-23 PROCEDURE — 80053 COMPREHEN METABOLIC PANEL: CPT

## 2025-08-23 PROCEDURE — 83735 ASSAY OF MAGNESIUM: CPT

## 2025-08-23 PROCEDURE — 94761 N-INVAS EAR/PLS OXIMETRY MLT: CPT

## 2025-08-23 RX ORDER — IBUPROFEN 200 MG
16 TABLET ORAL
Status: CANCELLED | OUTPATIENT
Start: 2025-08-23

## 2025-08-23 RX ORDER — IBUPROFEN 200 MG
24 TABLET ORAL
Status: CANCELLED | OUTPATIENT
Start: 2025-08-23

## 2025-08-23 RX ORDER — POTASSIUM CHLORIDE 750 MG/1
10 CAPSULE, EXTENDED RELEASE ORAL DAILY
Qty: 30 CAPSULE | Refills: 0 | Status: SHIPPED | OUTPATIENT
Start: 2025-08-23 | End: 2025-08-26

## 2025-08-23 RX ORDER — GLUCAGON 1 MG
1 KIT INJECTION
Status: CANCELLED | OUTPATIENT
Start: 2025-08-23

## 2025-08-23 RX ADMIN — FENOFIBRATE 200 MG: 200 CAPSULE ORAL at 09:08

## 2025-08-23 RX ADMIN — METOPROLOL SUCCINATE 100 MG: 100 TABLET, EXTENDED RELEASE ORAL at 09:08

## 2025-08-23 RX ADMIN — INSULIN ASPART 3 UNITS: 100 INJECTION, SOLUTION INTRAVENOUS; SUBCUTANEOUS at 08:08

## 2025-08-23 RX ADMIN — CEFTRIAXONE SODIUM 2 G: 1 INJECTION, POWDER, FOR SOLUTION INTRAMUSCULAR; INTRAVENOUS at 09:08

## 2025-08-23 RX ADMIN — CLOPIDOGREL 75 MG: 75 TABLET ORAL at 09:08

## 2025-08-23 RX ADMIN — ASPIRIN 81 MG: 81 TABLET, COATED ORAL at 09:08

## 2025-08-23 RX ADMIN — INSULIN ASPART 3 UNITS: 100 INJECTION, SOLUTION INTRAVENOUS; SUBCUTANEOUS at 12:08

## 2025-08-23 RX ADMIN — SACUBITRIL AND VALSARTAN 1 TABLET: 24; 26 TABLET, FILM COATED ORAL at 09:08

## 2025-08-24 ENCOUNTER — PATIENT MESSAGE (OUTPATIENT)
Dept: ADMINISTRATIVE | Facility: OTHER | Age: OVER 89
End: 2025-08-24
Payer: MEDICARE

## 2025-08-25 ENCOUNTER — TELEPHONE (OUTPATIENT)
Dept: CARDIOLOGY | Facility: CLINIC | Age: OVER 89
End: 2025-08-25
Payer: MEDICARE

## 2025-08-25 ENCOUNTER — PATIENT MESSAGE (OUTPATIENT)
Dept: ADMINISTRATIVE | Facility: OTHER | Age: OVER 89
End: 2025-08-25
Payer: MEDICARE

## 2025-08-25 ENCOUNTER — TELEPHONE (OUTPATIENT)
Dept: INFECTIOUS DISEASES | Facility: CLINIC | Age: OVER 89
End: 2025-08-25
Payer: MEDICARE

## 2025-08-26 ENCOUNTER — PATIENT MESSAGE (OUTPATIENT)
Dept: ADMINISTRATIVE | Facility: OTHER | Age: OVER 89
End: 2025-08-26
Payer: MEDICARE

## 2025-08-26 ENCOUNTER — OFFICE VISIT (OUTPATIENT)
Dept: CARDIOLOGY | Facility: CLINIC | Age: OVER 89
End: 2025-08-26
Payer: MEDICARE

## 2025-08-26 ENCOUNTER — HOSPITAL ENCOUNTER (OUTPATIENT)
Dept: CARDIOLOGY | Facility: CLINIC | Age: OVER 89
Discharge: HOME | End: 2025-08-26
Payer: MEDICARE

## 2025-08-26 ENCOUNTER — TELEPHONE (OUTPATIENT)
Dept: CARDIOLOGY | Facility: CLINIC | Age: OVER 89
End: 2025-08-26
Payer: MEDICARE

## 2025-08-26 ENCOUNTER — OFFICE VISIT (OUTPATIENT)
Dept: ELECTROPHYSIOLOGY | Facility: CLINIC | Age: OVER 89
End: 2025-08-26
Payer: MEDICARE

## 2025-08-26 VITALS
HEART RATE: 89 BPM | BODY MASS INDEX: 22.02 KG/M2 | DIASTOLIC BLOOD PRESSURE: 62 MMHG | HEIGHT: 64 IN | WEIGHT: 129 LBS | SYSTOLIC BLOOD PRESSURE: 104 MMHG

## 2025-08-26 VITALS
HEART RATE: 87 BPM | DIASTOLIC BLOOD PRESSURE: 67 MMHG | HEIGHT: 64 IN | SYSTOLIC BLOOD PRESSURE: 144 MMHG | BODY MASS INDEX: 21.98 KG/M2 | OXYGEN SATURATION: 98 % | WEIGHT: 128.75 LBS

## 2025-08-26 DIAGNOSIS — I15.2 HYPERTENSION ASSOCIATED WITH DIABETES: ICD-10-CM

## 2025-08-26 DIAGNOSIS — I48.19 PERSISTENT ATRIAL FIBRILLATION: Primary | Chronic | ICD-10-CM

## 2025-08-26 DIAGNOSIS — E11.59 HYPERTENSION ASSOCIATED WITH DIABETES: ICD-10-CM

## 2025-08-26 DIAGNOSIS — Z79.01 CHRONIC ANTICOAGULATION: ICD-10-CM

## 2025-08-26 DIAGNOSIS — I48.0 PAROXYSMAL ATRIAL FIBRILLATION: ICD-10-CM

## 2025-08-26 DIAGNOSIS — I50.32 CHRONIC HEART FAILURE WITH PRESERVED EJECTION FRACTION: Primary | ICD-10-CM

## 2025-08-26 DIAGNOSIS — I50.32 CHRONIC HEART FAILURE WITH PRESERVED EJECTION FRACTION: ICD-10-CM

## 2025-08-26 LAB
OHS QRS DURATION: 100 MS
OHS QTC CALCULATION: 425 MS

## 2025-08-26 PROCEDURE — 99496 TRANSJ CARE MGMT HIGH F2F 7D: CPT | Mod: S$PBB,,,

## 2025-08-26 PROCEDURE — 99999 PR PBB SHADOW E&M-EST. PATIENT-LVL V: CPT | Mod: PBBFAC,,,

## 2025-08-26 PROCEDURE — 99213 OFFICE O/P EST LOW 20 MIN: CPT | Mod: PBBFAC,25 | Performed by: NURSE PRACTITIONER

## 2025-08-26 PROCEDURE — 99999 PR PBB SHADOW E&M-EST. PATIENT-LVL III: CPT | Mod: PBBFAC,,, | Performed by: NURSE PRACTITIONER

## 2025-08-26 PROCEDURE — 99215 OFFICE O/P EST HI 40 MIN: CPT | Mod: PBBFAC,27

## 2025-08-26 PROCEDURE — 93010 ELECTROCARDIOGRAM REPORT: CPT | Mod: S$PBB,,, | Performed by: INTERNAL MEDICINE

## 2025-08-26 PROCEDURE — 99214 OFFICE O/P EST MOD 30 MIN: CPT | Mod: S$PBB,,, | Performed by: NURSE PRACTITIONER

## 2025-08-26 PROCEDURE — 93005 ELECTROCARDIOGRAM TRACING: CPT | Mod: PBBFAC | Performed by: INTERNAL MEDICINE

## 2025-08-26 RX ORDER — FUROSEMIDE 20 MG/1
20 TABLET ORAL
Qty: 60 TABLET | Refills: 3 | Status: SHIPPED | OUTPATIENT
Start: 2025-08-27

## 2025-08-26 RX ORDER — POTASSIUM CHLORIDE 750 MG/1
10 CAPSULE, EXTENDED RELEASE ORAL DAILY PRN
Qty: 60 CAPSULE | Refills: 3 | Status: SHIPPED | OUTPATIENT
Start: 2025-08-26

## 2025-08-27 ENCOUNTER — PATIENT MESSAGE (OUTPATIENT)
Dept: ADMINISTRATIVE | Facility: OTHER | Age: OVER 89
End: 2025-08-27
Payer: MEDICARE

## 2025-08-28 ENCOUNTER — OFFICE VISIT (OUTPATIENT)
Dept: INTERNAL MEDICINE | Facility: CLINIC | Age: OVER 89
End: 2025-08-28
Payer: MEDICARE

## 2025-08-28 VITALS
OXYGEN SATURATION: 98 % | HEART RATE: 82 BPM | WEIGHT: 124.56 LBS | HEIGHT: 64 IN | DIASTOLIC BLOOD PRESSURE: 56 MMHG | TEMPERATURE: 97 F | SYSTOLIC BLOOD PRESSURE: 132 MMHG | RESPIRATION RATE: 14 BRPM | BODY MASS INDEX: 21.27 KG/M2

## 2025-08-28 DIAGNOSIS — E11.22 CKD STAGE 3 DUE TO TYPE 2 DIABETES MELLITUS: Chronic | ICD-10-CM

## 2025-08-28 DIAGNOSIS — Z09 HOSPITAL DISCHARGE FOLLOW-UP: Primary | ICD-10-CM

## 2025-08-28 DIAGNOSIS — E11.59 HYPERTENSION ASSOCIATED WITH DIABETES: ICD-10-CM

## 2025-08-28 DIAGNOSIS — N18.30 CKD STAGE 3 DUE TO TYPE 2 DIABETES MELLITUS: Chronic | ICD-10-CM

## 2025-08-28 DIAGNOSIS — E78.5 HYPERLIPIDEMIA ASSOCIATED WITH TYPE 2 DIABETES MELLITUS: ICD-10-CM

## 2025-08-28 DIAGNOSIS — E11.69 HYPERLIPIDEMIA ASSOCIATED WITH TYPE 2 DIABETES MELLITUS: ICD-10-CM

## 2025-08-28 DIAGNOSIS — I50.32 CHRONIC HEART FAILURE WITH PRESERVED EJECTION FRACTION: ICD-10-CM

## 2025-08-28 DIAGNOSIS — I15.2 HYPERTENSION ASSOCIATED WITH DIABETES: ICD-10-CM

## 2025-08-28 DIAGNOSIS — I70.0 ATHEROSCLEROSIS OF AORTA: ICD-10-CM

## 2025-08-28 DIAGNOSIS — E11.8 DM TYPE 2, CONTROLLED, WITH COMPLICATION: Chronic | ICD-10-CM

## 2025-08-28 DIAGNOSIS — I48.19 PERSISTENT ATRIAL FIBRILLATION: Chronic | ICD-10-CM

## 2025-08-28 PROCEDURE — 99215 OFFICE O/P EST HI 40 MIN: CPT | Mod: S$PBB,,,

## 2025-08-28 PROCEDURE — 99999 PR PBB SHADOW E&M-EST. PATIENT-LVL V: CPT | Mod: PBBFAC,,,

## 2025-08-28 PROCEDURE — G2211 COMPLEX E/M VISIT ADD ON: HCPCS | Mod: ,,,

## 2025-08-28 PROCEDURE — 99215 OFFICE O/P EST HI 40 MIN: CPT | Mod: PBBFAC,PO

## 2025-08-29 ENCOUNTER — PATIENT MESSAGE (OUTPATIENT)
Dept: ADMINISTRATIVE | Facility: OTHER | Age: OVER 89
End: 2025-08-29
Payer: MEDICARE

## 2025-09-03 ENCOUNTER — TELEPHONE (OUTPATIENT)
Dept: CARDIOLOGY | Facility: CLINIC | Age: OVER 89
End: 2025-09-03
Payer: MEDICARE

## (undated) DEVICE — PAD DEFIB CADENCE ADULT R2

## (undated) DEVICE — UNDERGLOVES BIOGEL PI SZ 7 LF

## (undated) DEVICE — KIT CUSTOM MANIFOLD

## (undated) DEVICE — UNDERGLOVES BIOGEL PI SIZE 7.5

## (undated) DEVICE — DILATOR COONS TAPER 14FR

## (undated) DEVICE — TRAY CYSTO BASIN OMC

## (undated) DEVICE — PACK CYSTOSCOPY III SIRUS

## (undated) DEVICE — BASIN SPLASH SHLD W/PAD BLUE

## (undated) DEVICE — SOL IRRI STRL WATER 1000ML

## (undated) DEVICE — PACK EP DRAPE OMC

## (undated) DEVICE — DEVICE STAT LOCK CATH SECURE

## (undated) DEVICE — GUIDEWIRE AMPLATZ .035X260 SS

## (undated) DEVICE — SHEATH HEMOSTASIS 8.5FR

## (undated) DEVICE — SYR 50ML CATH TIP

## (undated) DEVICE — SYR 10CC LUER LOCK

## (undated) DEVICE — CATH POLLACK OPEN-END FLEXI-TI

## (undated) DEVICE — LOOP CUTTING BPLR 24/26F .30MM

## (undated) DEVICE — KIT PROBE COVER WITH GEL

## (undated) DEVICE — SOL NACL IRR 3000ML

## (undated) DEVICE — ADAPTER HOSE 10FT 8MM

## (undated) DEVICE — Device

## (undated) DEVICE — SYS WATCHMAN FXD CURVE DBL US

## (undated) DEVICE — INTRO SWARTZ SL2 8.5FR 63CM

## (undated) DEVICE — TUBING SUCTION SET FOR ENDOMAT

## (undated) DEVICE — CATH ANGIO DIAG PIG 6FX110

## (undated) DEVICE — DIALATOR COONS TAPER 12F 20CM

## (undated) DEVICE — DRESSING LEUKOPLAST FLEX 1X3IN

## (undated) DEVICE — DEVICE PERCLOSE SUT CLSR 6FR

## (undated) DEVICE — NDL TRNSSPTL BRK-1 18GA 71CM

## (undated) DEVICE — BAG DRAIN ANTI REFLUX 2000ML

## (undated) DEVICE — SET INTRO PERFRMR SHTH 16FR

## (undated) DEVICE — CUTTER LEAD

## (undated) DEVICE — BAG URINARY DRAINAGE 2000ML

## (undated) DEVICE — LOOP CUT BIPOLAR 24/26FR YEL